# Patient Record
Sex: MALE | Race: WHITE | Employment: OTHER | URBAN - METROPOLITAN AREA
[De-identification: names, ages, dates, MRNs, and addresses within clinical notes are randomized per-mention and may not be internally consistent; named-entity substitution may affect disease eponyms.]

---

## 2017-02-01 ENCOUNTER — OFFICE VISIT (OUTPATIENT)
Dept: FAMILY MEDICINE CLINIC | Age: 57
End: 2017-02-01

## 2017-02-01 VITALS
RESPIRATION RATE: 18 BRPM | HEIGHT: 73 IN | TEMPERATURE: 98.2 F | SYSTOLIC BLOOD PRESSURE: 140 MMHG | HEART RATE: 79 BPM | BODY MASS INDEX: 29.53 KG/M2 | DIASTOLIC BLOOD PRESSURE: 90 MMHG | WEIGHT: 222.8 LBS | OXYGEN SATURATION: 99 %

## 2017-02-01 DIAGNOSIS — M54.41 CHRONIC MIDLINE LOW BACK PAIN WITH RIGHT-SIDED SCIATICA: Primary | ICD-10-CM

## 2017-02-01 DIAGNOSIS — M54.6 ACUTE MIDLINE THORACIC BACK PAIN: ICD-10-CM

## 2017-02-01 DIAGNOSIS — G89.29 CHRONIC MIDLINE LOW BACK PAIN WITH RIGHT-SIDED SCIATICA: Primary | ICD-10-CM

## 2017-02-01 DIAGNOSIS — G89.4 CHRONIC PAIN SYNDROME: ICD-10-CM

## 2017-02-01 RX ORDER — FENTANYL 75 UG/H
1 PATCH TRANSDERMAL
Qty: 10 PATCH | Refills: 0 | Status: SHIPPED | OUTPATIENT
Start: 2017-02-01 | End: 2017-05-01 | Stop reason: SDUPTHER

## 2017-02-01 NOTE — PATIENT INSTRUCTIONS

## 2017-02-01 NOTE — PROGRESS NOTES
Isha Antonio is a 55 y.o. male   Chief Complaint   Patient presents with    Medication Refill    pt here for refill of his fentanyl states that he uses patch for chronic low back pain. Pt also with a large ventral hernia with a chronic wound on abd. Pt states his pain is 10/10 and with the pain patch it is tolerable at a 4-5/10. Pt states he can not tolerate laying down. Pt has not seen spine and is willing to. 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 Pt refuses wound care for abd discussed wound care with him and s/s of infection. States he went to wound care and felt it was useless and expensive. Chief Complaint   Patient presents with    Medication Refill     he is a 55y.o. year old male who presents for evalution. Reviewed PmHx, RxHx, FmHx, SocHx, AllgHx and updated and dated in the chart. Review of Systems - negative except as listed above in the HPI    Objective:     Vitals:    02/01/17 1503   BP: 140/90   Pulse: 79   Resp: 18   Temp: 98.2 °F (36.8 °C)   TempSrc: Oral   SpO2: 99%   Weight: 222 lb 12.8 oz (101.1 kg)   Height: 6' 1\" (1.854 m)       Current Outpatient Prescriptions   Medication Sig    fentaNYL (DURAGESIC) 75 mcg/hr 1 Patch by TransDERmal route every seventy-two (72) hours. Max Daily Amount: 1 Patch. DO NOT FILL UNTIL 4/1/17    fentaNYL (DURAGESIC) 75 mcg/hr 1 Patch by TransDERmal route every seventy-two (72) hours. Max Daily Amount: 1 Patch. DO NOT FILL UNTIL 3/2/17    fentaNYL (DURAGESIC) 75 mcg/hr 1 Patch by TransDERmal route every seventy-two (72) hours. Max Daily Amount: 1 Patch.  losartan (COZAAR) 100 mg tablet Take 1 Tab by mouth daily.  omeprazole (PRILOSEC) 20 mg capsule Take 1 Cap by mouth daily.  simvastatin (ZOCOR) 40 mg tablet Take 1 Tab by mouth nightly.  loratadine (CLARITIN) 10 mg tablet TAKE 1 TABLET BY MOUTH DAILY. No current facility-administered medications for this visit. Physical Examination: General appearance - alert, well appearing, and in no distress  Eyes - pupils equal and reactive, extraocular eye movements intact  Chest - clear to auscultation, no wheezes, rales or rhonchi, symmetric air entry  Heart - normal rate, regular rhythm, normal S1, S2, no murmurs, rubs, clicks or gallops  Back exam - limited range of motion, pain with motion noted during exam, tenderness noted midline lumbar spine  Abd wound not infected      Assessment/ Plan:   Jose Payan was seen today for medication refill. Diagnoses and all orders for this visit:    Chronic midline low back pain with right-sided sciatica  -     XR SPINE LUMB 2 OR 3 V; Future  -     REFERRAL TO ORTHOPEDICS    Chronic pain syndrome  -     fentaNYL (DURAGESIC) 75 mcg/hr; 1 Patch by TransDERmal route every seventy-two (72) hours. Max Daily Amount: 1 Patch. DO NOT FILL UNTIL 4/1/17  -     fentaNYL (DURAGESIC) 75 mcg/hr; 1 Patch by TransDERmal route every seventy-two (72) hours. Max Daily Amount: 1 Patch. DO NOT FILL UNTIL 3/2/17  -     fentaNYL (DURAGESIC) 75 mcg/hr; 1 Patch by TransDERmal route every seventy-two (72) hours. Max Daily Amount: 1 Patch. Acute midline thoracic back pain  -     XR SPINE THORAC 3 V; Future     checked  Follow-up Disposition:  Return in about 3 months (around 5/1/2017), or if symptoms worsen or fail to improve. I have discussed the diagnosis with the patient and the intended plan as seen in the above orders. The patient has received an after-visit summary and questions were answered concerning future plans. Pt conveyed understanding of plan. Medication Side Effects and Warnings were discussed with patient      Ben Pickard DO   Over 50% of the 25 minutes face to face with Golden Umanzor consisted of counseling and/or discussing treatment plans in reference to his chronic pain and abd wound.

## 2017-02-20 RX ORDER — ASPIRIN 81 MG/1
81 TABLET ORAL DAILY
Qty: 90 TAB | Refills: 3
Start: 2017-02-20 | End: 2018-05-21 | Stop reason: SDDI

## 2017-05-01 ENCOUNTER — HOSPITAL ENCOUNTER (OUTPATIENT)
Dept: LAB | Age: 57
Discharge: HOME | End: 2017-05-01
Payer: MEDICARE

## 2017-05-01 ENCOUNTER — OFFICE VISIT (OUTPATIENT)
Dept: FAMILY MEDICINE CLINIC | Age: 57
End: 2017-05-01

## 2017-05-01 VITALS
HEART RATE: 99 BPM | HEIGHT: 73 IN | SYSTOLIC BLOOD PRESSURE: 154 MMHG | TEMPERATURE: 98.2 F | BODY MASS INDEX: 28.79 KG/M2 | OXYGEN SATURATION: 96 % | WEIGHT: 217.2 LBS | DIASTOLIC BLOOD PRESSURE: 95 MMHG | RESPIRATION RATE: 16 BRPM

## 2017-05-01 DIAGNOSIS — I10 ESSENTIAL HYPERTENSION: Primary | Chronic | ICD-10-CM

## 2017-05-01 DIAGNOSIS — G89.4 CHRONIC PAIN SYNDROME: ICD-10-CM

## 2017-05-01 DIAGNOSIS — M51.36 DDD (DEGENERATIVE DISC DISEASE), LUMBAR: ICD-10-CM

## 2017-05-01 PROCEDURE — 82570 ASSAY OF URINE CREATININE: CPT

## 2017-05-01 RX ORDER — FENTANYL 75 UG/H
1 PATCH TRANSDERMAL
Qty: 10 PATCH | Refills: 0 | Status: SHIPPED | OUTPATIENT
Start: 2017-05-01 | End: 2017-08-01 | Stop reason: SDUPTHER

## 2017-05-01 RX ORDER — NALOXONE HYDROCHLORIDE 4 MG/.1ML
1 SPRAY NASAL AS NEEDED
Qty: 1 BOTTLE | Refills: 2 | Status: SHIPPED | OUTPATIENT
Start: 2017-05-01 | End: 2018-05-21

## 2017-05-01 NOTE — MR AVS SNAPSHOT
Visit Information Date & Time Provider Department Dept. Phone Encounter #  
 5/1/2017  2:30 PM Qamar Angulo MD [address] [facility] [phone] [id number] Follow-up Instructions Return in about 3 months (around 8/1/2017). Upcoming Health Maintenance Date Due Pneumococcal 19-64 Medium Risk (1 of 1 - PPSV23) 11/6/1980 DTaP/Tdap/Td series (1 - Tdap) 11/6/1982 INFLUENZA AGE 9 TO ADULT 8/1/2017 COLONOSCOPY 3/8/2026 Allergies as of 5/1/2017  Review Complete On: 5/1/2017 By: Qamar Angulo MD  
  
 Severity Noted Reaction Type Reactions Morphine  10/20/2015    Other (comments)  
 itching Current Immunizations  Reviewed on 7/11/2016 No immunizations on file. Not reviewed this visit You Were Diagnosed With   
  
 Codes Comments Essential hypertension    -  Primary ICD-10-CM: I10 
ICD-9-CM: 401.9 Chronic pain syndrome     ICD-10-CM: G89.4 ICD-9-CM: 338.4 DDD (degenerative disc disease), lumbar     ICD-10-CM: M51.36 
ICD-9-CM: 722.52 Vitals BP Pulse Temp Resp Height(growth percentile) Weight(growth percentile) (!) 154/95 99 98.2 °F (36.8 °C) (Oral) 16 6' 1\" (1.854 m) 217 lb 3.2 oz (98.5 kg) SpO2 BMI Smoking Status 96% 28.66 kg/m2 Current Every Day Smoker Vitals History BMI and BSA Data Body Mass Index Body Surface Area  
 28.66 kg/m 2 2.25 m 2 Preferred Pharmacy Pharmacy Name Phone [id number], [address] [facility] [phone] Your Updated Medication List  
  
   
This list is accurate as of: 5/1/17  3:20 PM.  Always use your most recent med list.  
  
  
  
  
 aspirin delayed-release 81 mg tablet Take 1 Tab by mouth daily. * fentaNYL 75 mcg/hr Commonly known as:  DURAGESIC  
1 Patch by TransDERmal route every seventy-two (72) hours. Max Daily Amount: 1 Patch. DO NOT FILL UNTIL 4/1/17 * fentaNYL 75 mcg/hr Commonly known as:  Mary Kay Butler  
 1 Patch by TransDERmal route every seventy-two (72) hours. Max Daily Amount: 1 Patch. DO NOT FILL UNTIL 3/2/17 * fentaNYL 75 mcg/hr Commonly known as:  DURAGESIC  
1 Patch by TransDERmal route every seventy-two (72) hours. Max Daily Amount: 1 Patch.  
  
 loratadine 10 mg tablet Commonly known as:  CLARITIN  
TAKE 1 TABLET BY MOUTH DAILY. losartan 100 mg tablet Commonly known as:  COZAAR Take 1 Tab by mouth daily. naloxone 4 mg/actuation Spry Commonly known as:  NARCAN  
1 Spray by Nasal route as needed. Indications: OPIATE-INDUCED RESPIRATORY DEPRESSION  
  
 omeprazole 20 mg capsule Commonly known as:  PRILOSEC Take 1 Cap by mouth daily. simvastatin 40 mg tablet Commonly known as:  ZOCOR Take 1 Tab by mouth nightly. * Notice: This list has 3 medication(s) that are the same as other medications prescribed for you. Read the directions carefully, and ask your doctor or other care provider to review them with you. Prescriptions Printed Refills  
 fentaNYL (DURAGESIC) 75 mcg/hr 0 Si Patch by TransDERmal route every seventy-two (72) hours. Max Daily Amount: 1 Patch. DO NOT FILL UNTIL 17 Class: Print Route: TransDERmal  
 fentaNYL (DURAGESIC) 75 mcg/hr 0 Si Patch by TransDERmal route every seventy-two (72) hours. Max Daily Amount: 1 Patch. DO NOT FILL UNTIL 3/2/17 Class: Print Route: TransDERmal  
 fentaNYL (DURAGESIC) 75 mcg/hr 0 Si Patch by TransDERmal route every seventy-two (72) hours. Max Daily Amount: 1 Patch. Class: Print Route: TransDERmal  
  
Prescriptions Sent to Pharmacy Refills  
 naloxone (NARCAN) 4 mg/actuation spry 2 Si Spray by Nasal route as needed. Indications: OPIATE-INDUCED RESPIRATORY DEPRESSION Class: Normal  
 Pharmacy: [id number] - [place], [address] Ph #: [phone] Route: Nasal  
  
We Performed the Following [facility] MONITORING [[id number] Custom] Follow-up Instructions Return in about 3 months (around 8/1/2017). Introducing [facility]! Dear Pedro Burt: Thank you for requesting a [employer] account. Our records indicate that you already have an active [employer] account. You can access your account anytime at [email or web address] Did you know that you can access your hospital and ER discharge instructions at any time in [employer]? You can also review all of your test results from your hospital stay or ER visit. Additional Information If you have questions, please visit the Frequently Asked Questions section of the [employer] website at [email or web address] Remember, [employer] is NOT to be used for urgent needs. For medical emergencies, dial 911. Now available from your iPhone and Android! Please provide this summary of care documentation to your next provider. Your primary care clinician is listed as JENNIFER COLLIER. If you have any questions after today's visit, please call [phone].

## 2017-05-01 NOTE — LETTER
AGREEMENT for controlled medication treatment I, Joann Habermann, have agreed to a course of treatment that includes taking controlled medication. For this treatment I designate _____________________________________ as the [facility] Provider.  The purpose of this agreement is to prevent misunderstandings about controlled medications I may be taking for treatment, and to comply with applicable laws. I understand this agreement is essential to the trust and confidence necessary in a provider-patient relationship and that the designated provider will treat me in accordance with the statements below. As part of my treatment I agree to the followin.  USE 
a. I will take the controlled medication as instructed by the designated provider and avoid improper use of controlled medications. b.   I will not share, sell or trade controlled medication with anyone as this is a criminal offense.  
c.   I will not use any illegal controlled substance, including marijuana, cocaine, etc. as this is a criminal offense. 2.  PROVIDERS 
a. I will only obtain controlled medication from the designated provider. b.   I will not attempt to obtain the same or similar controlled medications, such as opioid pain medication, stimulants, anti-anxiety or hypnotics from any other provider as this is a criminal offense. 
c.   I will inform the designated provider about all other licensed professionals providing medical care to me and authorize communication between all providers to coordinate care, particularly prescribing or dispensing of controlled medications. 3.  PHARMACY 
a.   I will only fill controlled medication prescriptions at the approved pharmacy as listed below. 4.  OFFICE VISITS 
a. I agree to attend scheduled office visit appointments. b.   I am aware my office visits may be monthly or as determined necessary by the designated provider. c.   I will communicate fully with the designated provider about the character and intensity of my symptoms, the effect of the symptoms on my daily life, and how well the controlled medication is helping to relieve the cause of my symptoms. 5.  REFILLS OR CALL-IN PRESCRIPTIONS OF CONTROLLED MEDICATIONS 
a. I agree that refills of my prescriptions for controlled medications will be made at the time of an office visit during regular office hours. No refills will be available during evenings or on weekends. Abusive or inappropriate behavior related to medication refills will not be tolerated. b.   I will not call the office repeatedly to inquire about my controlled medication. I understand that medications will be written on the due date and not before. Calling the office repeatedly will be considered harassment, and I may be discharged from the practice. c.   Controlled medications may not be called in for refill, but doing so is at the discretion of the designated provider. d.   I am aware that only I must  prescriptions for controlled medications at the office but the designated provider may allow a designee to  the prescription from the office under very specific circumstance that may develop. 6.  TOXICOLOGY SCREENING 
a. I agree to random urine toxicology screenings in order to comply with government and [address] [place] [address] regulations. I understand that I will be financially responsible for any charges incurred by this office, Lucy Mclaughlin of [facility] laboratory, [employer], or [facility] for the urine toxicology screening, which may not be covered by my insurance. Failure to do so will be considered non-compliance and I may be discharged. b. In some cases an oral swab or hair sample may be substituted for a urine screen. This is at the discretion of the designated provider. I understand that I will be financially responsible for any charges incurred as well. c.   I understand that if the urine toxicology does not show my medications prescribed to me by the designated provider, or it shows any illegal substance or any other medications NOT prescribed by the designated providers, I may be discharged from this practice at the discretion of the designated provider and no further controlled medications will be prescribed or follow-up appointments scheduled. Also, if any illegal substances are detected on the urine toxicology, this information may be provided to local law enforcement. 7. PILL COUNTS 
a. I am aware I may be called at any time to come into the office for a count of all my remaining controlled medications in order to help the designated provider understand the rate at which I use my controlled medications and to more effectively adjust dosage. b. I agree that I will use my medication at a rate NO greater than the prescribed rate and that use of my medication at a greater rate will result in my being without medication for the period of time until next expected due date. c. I will bring in the containers with the medication prescribed by all providers, including the designated provider, to each office visit even if there is no medication remaining. All controlled medication will be in the original containers from the pharmacy for each medication. Failure to do so will be considered non-compliance and I may be discharged from the practice. 8.  LOSS OR THEFT OF MEDICATION 
a. I will safeguard my controlled medication from loss or theft. b.   Lost or stolen controlled medication may not be replaced. This includes a prescription that has not yet been filled at the pharmacy. c.   In the event my controlled medications are stolen or lost, I will notify the designated providers office immediately.   If such event occurs during the night, weekend or holiday, I will leave a detailed message on the answering machine or answering service at the number listed above. 
d.   I will file and produce an official police report for any effort to replace controlled medications prescribed. 9.  AGENCY COLLABORATION I authorize the designated provider and the authorized pharmacy/pharmacist to cooperate fully with any city, state, or federal law enforcement agency in the investigation of any possible misuse, sale or other diversion of my controlled medication. 10.  TREATMENT I understand that if I violate any of the conditions, my controlled medication and/or treatment will be terminated. If the violation involves obtaining controlled substances and/or dangerous drugs from another source, the incident may be reported to other medical facilities and authorities, including law enforcement. In this case, the designated provider may taper off the medication over a period of several days, as necessary, to avoid withdrawal symptoms or will suggest alternate treatment facilities. Also, a drug dependence treatment program may be recommended. 11.  AGREEMENT I agree to follow these guidelines that have been fully explained to me. All of my questions and concerns regarding treatment have been adequately answered. A copy of this document has been given to me. I agree to use ______________________________ Authorized Pharmacy located at _________________________________________________________________ Telephone ________________________________for filling ALL controlled medication prescriptions. This agreement is entered into on 5/1/2017 Patient signature__________________________________________________________ Legal Guardian signature___________________________________________________ Provider signature_________________________________________________________ Witness signature_________________________________________________________

## 2017-05-01 NOTE — LETTER
AGREEMENT for controlled medication treatment I, Mitchel Charlton, have agreed to a course of treatment that includes taking controlled medication. For this treatment I designate _____________________________________ as the [facility] Provider.  The purpose of this agreement is to prevent misunderstandings about controlled medications I may be taking for treatment, and to comply with applicable laws. I understand this agreement is essential to the trust and confidence necessary in a provider-patient relationship and that the designated provider will treat me in accordance with the statements below. As part of my treatment I agree to the followin.  USE 
a. I will take the controlled medication as instructed by the designated provider and avoid improper use of controlled medications. b.   I will not share, sell or trade controlled medication with anyone as this is a criminal offense.  
c.   I will not use any illegal controlled substance, including marijuana, cocaine, etc. as this is a criminal offense. 2.  PROVIDERS 
a. I will only obtain controlled medication from the designated provider. b.   I will not attempt to obtain the same or similar controlled medications, such as opioid pain medication, stimulants, anti-anxiety or hypnotics from any other provider as this is a criminal offense. 
c.   I will inform the designated provider about all other licensed professionals providing medical care to me and authorize communication between all providers to coordinate care, particularly prescribing or dispensing of controlled medications. 3.  PHARMACY 
a.   I will only fill controlled medication prescriptions at the approved pharmacy as listed below. 4.  OFFICE VISITS 
a. I agree to attend scheduled office visit appointments. b.   I am aware my office visits may be monthly or as determined necessary by the designated provider. c.   I will communicate fully with the designated provider about the character and intensity of my symptoms, the effect of the symptoms on my daily life, and how well the controlled medication is helping to relieve the cause of my symptoms. 5.  REFILLS OR CALL-IN PRESCRIPTIONS OF CONTROLLED MEDICATIONS 
a. I agree that refills of my prescriptions for controlled medications will be made at the time of an office visit during regular office hours. No refills will be available during evenings or on weekends. Abusive or inappropriate behavior related to medication refills will not be tolerated. b.   I will not call the office repeatedly to inquire about my controlled medication. I understand that medications will be written on the due date and not before. Calling the office repeatedly will be considered harassment, and I may be discharged from the practice. c.   Controlled medications may not be called in for refill, but doing so is at the discretion of the designated provider. d.   I am aware that only I must  prescriptions for controlled medications at the office but the designated provider may allow a designee to  the prescription from the office under very specific circumstance that may develop. 6.  TOXICOLOGY SCREENING 
a. I agree to random urine toxicology screenings in order to comply with government and [address] [place] [address] regulations. I understand that I will be financially responsible for any charges incurred by this office, Matilda Adams of [facility] laboratory, [employer], or [facility] for the urine toxicology screening, which may not be covered by my insurance. Failure to do so will be considered non-compliance and I may be discharged. b. In some cases an oral swab or hair sample may be substituted for a urine screen. This is at the discretion of the designated provider. I understand that I will be financially responsible for any charges incurred as well. c.   I understand that if the urine toxicology does not show my medications prescribed to me by the designated provider, or it shows any illegal substance or any other medications NOT prescribed by the designated providers, I may be discharged from this practice at the discretion of the designated provider and no further controlled medications will be prescribed or follow-up appointments scheduled. Also, if any illegal substances are detected on the urine toxicology, this information may be provided to local law enforcement. 7. PILL COUNTS 
a. I am aware I may be called at any time to come into the office for a count of all my remaining controlled medications in order to help the designated provider understand the rate at which I use my controlled medications and to more effectively adjust dosage. b. I agree that I will use my medication at a rate NO greater than the prescribed rate and that use of my medication at a greater rate will result in my being without medication for the period of time until next expected due date. c. I will bring in the containers with the medication prescribed by all providers, including the designated provider, to each office visit even if there is no medication remaining. All controlled medication will be in the original containers from the pharmacy for each medication. Failure to do so will be considered non-compliance and I may be discharged from the practice. 8.  LOSS OR THEFT OF MEDICATION 
a. I will safeguard my controlled medication from loss or theft. b.   Lost or stolen controlled medication may not be replaced. This includes a prescription that has not yet been filled at the pharmacy. c.   In the event my controlled medications are stolen or lost, I will notify the designated providers office immediately.   If such event occurs during the night, weekend or holiday, I will leave a detailed message on the answering machine or answering service at the number listed above. 
d.   I will file and produce an official police report for any effort to replace controlled medications prescribed. 9.  AGENCY COLLABORATION I authorize the designated provider and the authorized pharmacy/pharmacist to cooperate fully with any city, state, or federal law enforcement agency in the investigation of any possible misuse, sale or other diversion of my controlled medication. 10.  TREATMENT I understand that if I violate any of the conditions, my controlled medication and/or treatment will be terminated. If the violation involves obtaining controlled substances and/or dangerous drugs from another source, the incident may be reported to other medical facilities and authorities, including law enforcement. In this case, the designated provider may taper off the medication over a period of several days, as necessary, to avoid withdrawal symptoms or will suggest alternate treatment facilities. Also, a drug dependence treatment program may be recommended. 11.  AGREEMENT I agree to follow these guidelines that have been fully explained to me. All of my questions and concerns regarding treatment have been adequately answered. A copy of this document has been given to me. I agree to use ______________________________ Authorized Pharmacy located at _________________________________________________________________ Telephone ________________________________for filling ALL controlled medication prescriptions. This agreement is entered into on 5/1/2017 Patient signature__________________________________________________________ Legal Guardian signature___________________________________________________ Provider signature_________________________________________________________ Witness signature_________________________________________________________

## 2017-05-01 NOTE — PROGRESS NOTES
Chief Complaint   Patient presents with    Medication Refill     1. Have you been to the ER, urgent care clinic since your last visit? Hospitalized since your last visit? No    2. Have you seen or consulted any other health care providers outside of the [address] [place] [address] since your last visit? Include any pap smears or colon screening. No      DDD and chronic pain rx, is not a surgical candidate, long term trt of narcotics, does smoke pot off and on, wears patch, has severe abd hernia and cannot get it repaired and is in 10/10 pain most days    Pt cannot afford PT or xrays      Chief Complaint   Patient presents with    Medication Refill     he is a 55y.o. year old male who presents for evalution. Reviewed PmHx, RxHx, FmHx, SocHx, AllgHx and updated and dated in the chart.     Patient Active Problem List    Diagnosis    DDD (degenerative disc disease), lumbar    Chronic pain    Aspiration pneumonia (HCC)    Hypernatremia    Ischemic colitis ([address]    Colon perforation ([address]    Acute respiratory failure with hypoxia ([address]    Acute blood loss anemia    JESUS (acute kidney injury) ([address]    Pneumonia    Elevated INR    Alcoholism ([address]    Nicotine addiction    PVD (peripheral vascular disease) (HCC)    Hyponatremia    ETOH abuse    Coagulation disorder (HCC)    Allergic rhinitis due to other allergen    Stroke 2002    Anal fistula    HTN (hypertension)    Genital herpes    Noncompliance with treatment    High cholesterol    left Carotid Artery Occlusion       Review of Systems - negative except as listed above in the HPI    Objective:     Vitals:    05/01/17 1501   BP: (!) 154/95   Pulse: 99   Resp: 16   Temp: 98.2 °F (36.8 °C)   TempSrc: Oral   SpO2: 96%   Weight: 217 lb 3.2 oz (98.5 kg)   Height: 6' 1\" (1.854 m)     Physical Examination: General appearance - chronically ill appearing and walking with walker  Abdomen - open abd hernia-very very large and non healing    Assessment/ Plan:   Rosa M Nogueira was seen today for medication refill. Diagnoses and all orders for this visit:    Essential hypertension  -inc with pain    Chronic pain syndrome  -     fentaNYL (DURAGESIC) 75 mcg/hr; 1 Patch by TransDERmal route every seventy-two (72) hours. Max Daily Amount: 1 Patch. DO NOT FILL UNTIL 4/1/17  -     fentaNYL (DURAGESIC) 75 mcg/hr; 1 Patch by TransDERmal route every seventy-two (72) hours. Max Daily Amount: 1 Patch. DO NOT FILL UNTIL 3/2/17  -     fentaNYL (DURAGESIC) 75 mcg/hr; 1 Patch by TransDERmal route every seventy-two (72) hours. Max Daily Amount: 1 Patch.  -     COMPLIANCE DRUG SCREEN/PRESCRIPTION MONITORING  -     naloxone (NARCAN) 4 mg/actuation spry; 1 Spray by Nasal route as needed. Indications: OPIATE-INDUCED RESPIRATORY DEPRESSION    DDD (degenerative disc disease), lumbar  -as above   Opioid/Pain Management:    1. Has the patient signed a pain contract for chronic narcotics use? yes  2. Has the patient had a UDS or Serum screen as per guidelines as per [facility]?:   yes    3. Calculated Morphine Milligram Equivalent (MME)=qual  4. Does patient meet necessary guidelines for Naloxone treatement per the [facility]?:    yes  5. Has the Prescription Monitoring Program been reviewed? yes           Follow-up Disposition:  Return in about 3 months (around 8/1/2017). I have discussed the diagnosis with the patient and the intended plan as seen in the above orders. The patient understands and agrees with the plan. The patient has received an after-visit summary and questions were answered concerning future plans. Medication Side Effects and Warnings were discussed with patient  Patient Labs were reviewed and or requested:  Patient Past Records were reviewed and or requested    Rosa Martinez M.D. There are no Patient Instructions on file for this visit.

## 2017-05-06 LAB — DRUGS UR: NORMAL

## 2017-08-01 ENCOUNTER — DOCUMENTATION ONLY (OUTPATIENT)
Dept: FAMILY MEDICINE CLINIC | Age: 57
End: 2017-08-01

## 2017-08-01 ENCOUNTER — OFFICE VISIT (OUTPATIENT)
Dept: FAMILY MEDICINE CLINIC | Age: 57
End: 2017-08-01

## 2017-08-01 VITALS
DIASTOLIC BLOOD PRESSURE: 85 MMHG | TEMPERATURE: 98.3 F | RESPIRATION RATE: 22 BRPM | HEART RATE: 90 BPM | OXYGEN SATURATION: 98 % | WEIGHT: 215 LBS | SYSTOLIC BLOOD PRESSURE: 132 MMHG | HEIGHT: 73 IN | BODY MASS INDEX: 28.49 KG/M2

## 2017-08-01 DIAGNOSIS — M51.36 DDD (DEGENERATIVE DISC DISEASE), LUMBAR: Primary | ICD-10-CM

## 2017-08-01 DIAGNOSIS — G89.4 CHRONIC PAIN SYNDROME: ICD-10-CM

## 2017-08-01 DIAGNOSIS — K43.9 ABDOMINAL WALL HERNIA: ICD-10-CM

## 2017-08-01 RX ORDER — FENTANYL 75 UG/H
1 PATCH TRANSDERMAL
Qty: 10 PATCH | Refills: 0 | Status: SHIPPED | OUTPATIENT
Start: 2017-08-01 | End: 2018-04-20 | Stop reason: SDUPTHER

## 2017-08-01 RX ORDER — FENTANYL 75 UG/H
1 PATCH TRANSDERMAL
Qty: 10 PATCH | Refills: 0 | Status: SHIPPED | OUTPATIENT
Start: 2017-08-01 | End: 2017-11-01 | Stop reason: SDUPTHER

## 2017-08-01 RX ORDER — FENTANYL 75 UG/H
1 PATCH TRANSDERMAL
Qty: 10 PATCH | Refills: 0 | Status: SHIPPED | OUTPATIENT
Start: 2017-08-01 | End: 2018-02-01 | Stop reason: SDUPTHER

## 2017-08-01 NOTE — PROGRESS NOTES
Patient here for 3 month f/u for med refill. 1. Have you been to the ER, urgent care clinic since your last visit? Hospitalized since your last visit? No    2. Have you seen or consulted any other health care providers outside of the [address] [place] [address] since your last visit? Include any pap smears or colon screening. No       Chief Complaint   Patient presents with    Medication Refill     he is a 55y.o. year old male who presents for evalution. Reviewed PmHx, RxHx, FmHx, SocHx, AllgHx and updated and dated in the chart. Patient Active Problem List    Diagnosis    Abdominal wall hernia    DDD (degenerative disc disease), lumbar    Chronic pain    Aspiration pneumonia (HCC)    Hypernatremia    Ischemic colitis ([address]    Colon perforation ([address]    Acute respiratory failure with hypoxia ([address]    Acute blood loss anemia    JESUS (acute kidney injury) ([address]    Pneumonia    Elevated INR    [facility]    Nicotine addiction    PVD (peripheral vascular disease) (HCC)    Hyponatremia    ETOH abuse    Coagulation disorder (HCC)    Allergic rhinitis due to other allergen    Stroke 2002    Anal fistula    HTN (hypertension)    Genital herpes    Noncompliance with treatment    High cholesterol    left Carotid Artery Occlusion       Review of Systems - negative except as listed above in the HPI    Objective:     Vitals:    08/01/17 1515   BP: 132/85   Pulse: 90   Resp: 22   Temp: 98.3 °F (36.8 °C)   SpO2: 98%   Weight: 215 lb (97.5 kg)   Height: 6' 1\" (1.854 m)     Physical Examination: General appearance - alert, well appearing, and in no distress    Assessment/ Plan:   Diagnoses and all orders for this visit:    1. DDD (degenerative disc disease), lumbar  - Opioid/Pain Management:    1. Has the patient signed a pain contract for chronic narcotic use? yes  2. Has the patient had a UDS or Serum screen as per guidelines as per [facility]?:   yes    3.   Does patient meet necessary guidelines for Naloxone treatement per the [facility]?:    yes  4. Has the Prescription Monitoring Program been reviewed? Yes  5. Does patient have a long term condition that requires long term use of a Narcotic?  yes  6. Has patient been tolerant of therapy and responsible to routine follow up and specialist follow-up? Yes      2. Chronic pain syndrome  -     fentaNYL (DURAGESIC) 75 mcg/hr; 1 Patch by TransDERmal route every seventy-two (72) hours. Max Daily Amount: 1 Patch. DO NOT FILL UNTIL 3/2/17  -     fentaNYL (DURAGESIC) 75 mcg/hr; 1 Patch by TransDERmal route every seventy-two (72) hours. Max Daily Amount: 1 Patch. -     fentaNYL (DURAGESIC) 75 mcg/hr; 1 Patch by TransDERmal route every seventy-two (72) hours. Max Daily Amount: 1 Patch. DO NOT FILL UNTIL 4/1/17    3. Abdominal wall hernia  -source of chronic pain as well       Follow-up Disposition:  Return in about 3 months (around 11/1/2017). I have discussed the diagnosis with the patient and the intended plan as seen in the above orders. The patient understands and agrees with the plan. The patient has received an after-visit summary and questions were answered concerning future plans. Medication Side Effects and Warnings were discussed with patient  Patient Labs were reviewed and or requested:  Patient Past Records were reviewed and or requested    Venkatesh Barreto M.D. There are no Patient Instructions on file for this visit.

## 2017-08-01 NOTE — MR AVS SNAPSHOT
Visit Information Date & Time Provider Department Dept. Phone Encounter #  
 8/1/2017  3:00 PM Amilcar Metzger MD [address] [facility] [phone] [id number] Follow-up Instructions Return in about 3 months (around 11/1/2017). Upcoming Health Maintenance Date Due Pneumococcal 19-64 Medium Risk (1 of 1 - PPSV23) 11/6/1980 DTaP/Tdap/Td series (1 - Tdap) 11/6/1982 INFLUENZA AGE 9 TO ADULT 8/1/2017 COLONOSCOPY 3/8/2026 Allergies as of 8/1/2017  Review Complete On: 8/1/2017 By: Amilcar Metzger MD  
  
 Severity Noted Reaction Type Reactions Morphine  10/20/2015    Other (comments)  
 itching Current Immunizations  Reviewed on 7/11/2016 No immunizations on file. Not reviewed this visit You Were Diagnosed With   
  
 Codes Comments DDD (degenerative disc disease), lumbar    -  Primary ICD-10-CM: M51.36 
ICD-9-CM: 722.52 Chronic pain syndrome     ICD-10-CM: G89.4 ICD-9-CM: 338. 4 Abdominal wall hernia     ICD-10-CM: K43.9 ICD-9-CM: 553.20 Vitals BP Pulse Temp Resp Height(growth percentile) Weight(growth percentile) 132/85 90 98.3 °F (36.8 °C) 22 6' 1\" (1.854 m) 215 lb (97.5 kg) SpO2 BMI Smoking Status 98% 28.37 kg/m2 Current Every Day Smoker Vitals History BMI and BSA Data Body Mass Index Body Surface Area  
 28.37 kg/m 2 2.24 m 2 Preferred Pharmacy Pharmacy Name Phone [id number] [phone] Your Updated Medication List  
  
   
This list is accurate as of: 8/1/17  3:36 PM.  Always use your most recent med list.  
  
  
  
  
 aspirin delayed-release 81 mg tablet Take 1 Tab by mouth daily. * fentaNYL 75 mcg/hr Commonly known as:  DURAGESIC  
1 Patch by TransDERmal route every seventy-two (72) hours. Max Daily Amount: 1 Patch. DO NOT FILL UNTIL 3/2/17 * fentaNYL 75 mcg/hr Commonly known as:  Anya Jack  
 1 Patch by TransDERmal route every seventy-two (72) hours. Max Daily Amount: 1 Patch. * fentaNYL 75 mcg/hr Commonly known as:  DURAGESIC  
1 Patch by TransDERmal route every seventy-two (72) hours. Max Daily Amount: 1 Patch. DO NOT FILL UNTIL 17  
  
 loratadine 10 mg tablet Commonly known as:  CLARITIN  
TAKE 1 TABLET BY MOUTH DAILY. losartan 100 mg tablet Commonly known as:  COZAAR Take 1 Tab by mouth daily. naloxone 4 mg/actuation Spry Commonly known as:  NARCAN  
1 Spray by Nasal route as needed. Indications: OPIATE-INDUCED RESPIRATORY DEPRESSION  
  
 omeprazole 20 mg capsule Commonly known as:  PRILOSEC Take 1 Cap by mouth daily. simvastatin 40 mg tablet Commonly known as:  ZOCOR Take 1 Tab by mouth nightly. * Notice: This list has 3 medication(s) that are the same as other medications prescribed for you. Read the directions carefully, and ask your doctor or other care provider to review them with you. Prescriptions Printed Refills  
 fentaNYL (DURAGESIC) 75 mcg/hr 0 Si Patch by TransDERmal route every seventy-two (72) hours. Max Daily Amount: 1 Patch. DO NOT FILL UNTIL 3/2/17 Class: Print Route: TransDERmal  
 fentaNYL (DURAGESIC) 75 mcg/hr 0 Si Patch by TransDERmal route every seventy-two (72) hours. Max Daily Amount: 1 Patch. Class: Print Route: TransDERmal  
 fentaNYL (DURAGESIC) 75 mcg/hr 0 Si Patch by TransDERmal route every seventy-two (72) hours. Max Daily Amount: 1 Patch. DO NOT FILL UNTIL 17 Class: Print Route: TransDERmal  
  
Follow-up Instructions Return in about 3 months (around 2017). Introducing [facility] SERVICES! Dear Asad Mar: Thank you for requesting a [employer] account. Our records indicate that you already have an active [employer] account. You can access your account anytime at [email or web address]Cloud Did you know that you can access your hospital and ER discharge instructions at any time in [employer]? You can also review all of your test results from your hospital stay or ER visit. Additional Information If you have questions, please visit the Frequently Asked Questions section of the [employer] website at [email or web address] Remember, [employer] is NOT to be used for urgent needs. For medical emergencies, dial 911. Now available from your iPhone and Android! Please provide this summary of care documentation to your next provider. Your primary care clinician is listed as JENNIFER COLLIER. If you have any questions after today's visit, please call [phone].

## 2017-09-18 RX ORDER — SIMVASTATIN 40 MG/1
40 TABLET, FILM COATED ORAL
Qty: 90 TAB | Refills: 3 | Status: SHIPPED | OUTPATIENT
Start: 2017-09-18 | End: 2018-04-20 | Stop reason: ALTCHOICE

## 2017-09-18 RX ORDER — LOSARTAN POTASSIUM 100 MG/1
100 TABLET ORAL DAILY
Qty: 90 TAB | Refills: 3 | Status: SHIPPED | OUTPATIENT
Start: 2017-09-18 | End: 2018-04-20 | Stop reason: ALTCHOICE

## 2017-09-18 RX ORDER — OMEPRAZOLE 20 MG/1
20 CAPSULE, DELAYED RELEASE ORAL DAILY
Qty: 90 CAP | Refills: 3 | Status: SHIPPED | OUTPATIENT
Start: 2017-09-18 | End: 2018-09-22 | Stop reason: SDUPTHER

## 2017-11-01 ENCOUNTER — OFFICE VISIT (OUTPATIENT)
Dept: FAMILY MEDICINE CLINIC | Age: 57
End: 2017-11-01

## 2017-11-01 VITALS
DIASTOLIC BLOOD PRESSURE: 90 MMHG | HEART RATE: 110 BPM | BODY MASS INDEX: 28.3 KG/M2 | WEIGHT: 213.5 LBS | RESPIRATION RATE: 20 BRPM | SYSTOLIC BLOOD PRESSURE: 150 MMHG | OXYGEN SATURATION: 95 % | TEMPERATURE: 98.6 F | HEIGHT: 73 IN

## 2017-11-01 DIAGNOSIS — G89.4 CHRONIC PAIN SYNDROME: Primary | ICD-10-CM

## 2017-11-01 RX ORDER — FENTANYL 75 UG/H
1 PATCH TRANSDERMAL
Qty: 10 PATCH | Refills: 0 | Status: SHIPPED | OUTPATIENT
Start: 2017-11-01 | End: 2017-11-01 | Stop reason: SDUPTHER

## 2017-11-01 RX ORDER — FENTANYL 75 UG/H
1 PATCH TRANSDERMAL
Qty: 10 PATCH | Refills: 0 | Status: SHIPPED | OUTPATIENT
Start: 2017-11-01 | End: 2018-02-01 | Stop reason: SDUPTHER

## 2017-11-01 NOTE — MR AVS SNAPSHOT
Visit Information Date & Time Provider Department Dept. Phone Encounter #  
 11/1/2017  3:00 PM Rochelle Cruz MD [address] [facility] [phone] [id number] Follow-up Instructions Return in about 3 months (around 2/1/2018). Upcoming Health Maintenance Date Due Pneumococcal 19-64 Medium Risk (1 of 1 - PPSV23) 11/6/1980 DTaP/Tdap/Td series (1 - Tdap) 11/6/1982 COLONOSCOPY 3/8/2026 Allergies as of 11/1/2017  Review Complete On: 11/1/2017 By: Rochelle Cruz MD  
  
 Severity Noted Reaction Type Reactions Morphine  10/20/2015    Other (comments)  
 itching Current Immunizations  Reviewed on 7/11/2016 No immunizations on file. Not reviewed this visit You Were Diagnosed With   
  
 Codes Comments Chronic pain syndrome     ICD-10-CM: G89.4 ICD-9-CM: 338. 4 Vitals BP Pulse Temp Resp Height(growth percentile) Weight(growth percentile) 150/90 (!) 110 98.6 °F (37 °C) 20 6' 1\" (1.854 m) 213 lb 8 oz (96.8 kg) SpO2 BMI Smoking Status 95% 28.17 kg/m2 Current Every Day Smoker Vitals History BMI and BSA Data Body Mass Index Body Surface Area  
 28.17 kg/m 2 2.23 m 2 Preferred Pharmacy Pharmacy Name Phone [id number], [address] [facility] [phone] Your Updated Medication List  
  
   
This list is accurate as of: 11/1/17  3:56 PM.  Always use your most recent med list.  
  
  
  
  
 aspirin delayed-release 81 mg tablet Take 1 Tab by mouth daily. * fentaNYL 75 mcg/hr Commonly known as:  DURAGESIC  
1 Patch by TransDERmal route every seventy-two (72) hours. Max Daily Amount: 1 Patch. * fentaNYL 75 mcg/hr Commonly known as:  DURAGESIC  
1 Patch by TransDERmal route every seventy-two (72) hours. Max Daily Amount: 1 Patch. DO NOT FILL UNTIL 4/1/17 * fentaNYL 75 mcg/hr Commonly known as:  Juma Box  
 1 Patch by TransDERmal route every seventy-two (72) hours. Max Daily Amount: 1 Patch.  
  
 loratadine 10 mg tablet Commonly known as:  CLARITIN  
TAKE 1 TABLET BY MOUTH DAILY. losartan 100 mg tablet Commonly known as:  COZAAR Take 1 Tab by mouth daily. naloxone 4 mg/actuation nasal spray Commonly known as:  NARCAN  
1 Spray by Nasal route as needed. Indications: OPIATE-INDUCED RESPIRATORY DEPRESSION  
  
 omeprazole 20 mg capsule Commonly known as:  PRILOSEC Take 1 Cap by mouth daily. simvastatin 40 mg tablet Commonly known as:  ZOCOR Take 1 Tab by mouth nightly. * Notice: This list has 3 medication(s) that are the same as other medications prescribed for you. Read the directions carefully, and ask your doctor or other care provider to review them with you. Prescriptions Printed Refills  
 fentaNYL (DURAGESIC) 75 mcg/hr 0 Si Patch by TransDERmal route every seventy-two (72) hours. Max Daily Amount: 1 Patch. Class: Print Route: TransDERmal  
  
Follow-up Instructions Return in about 3 months (around 2018). Introducing [address]! Dear Robert Double: Thank you for requesting a [email or web address] account. Our records indicate that you already have an active [email or web address] account. You can access your account anytime at [email or web address]. Did you know that you can access your hospital and ER discharge instructions at any time in [email or web address]? You can also review all of your test results from your hospital stay or ER visit. Additional Information If you have questions, please visit the Frequently Asked Questions section of the [email or web address] website at [email or web address]. Remember, [email or web address] is NOT to be used for urgent needs. For medical emergencies, dial 911. Now available from your iPhone and Android! Please provide this summary of care documentation to your next provider. Your primary care clinician is listed as JENNIFER COLLIER. If you have any questions after today's visit, please call [phone].

## 2017-11-01 NOTE — PROGRESS NOTES
Patient here for med refill. 1. Have you been to the ER, urgent care clinic since your last visit? Hospitalized since your last visit? No    2. Have you seen or consulted any other health care providers outside of the [address] [place] [address] since your last visit? Include any pap smears or colon screening. No       Chief Complaint   Patient presents with    Medication Refill     He is a 55 y.o. male who presents for evalution. Reviewed PmHx, RxHx, FmHx, SocHx, AllgHx and updated and dated in the chart. Patient Active Problem List    Diagnosis    Abdominal wall hernia    DDD (degenerative disc disease), lumbar    Chronic pain    Aspiration pneumonia (HCC)    Hypernatremia    Ischemic colitis ([address]    Colon perforation ([address]    Acute respiratory failure with hypoxia ([address]    Acute blood loss anemia    JESUS (acute kidney injury) ([address]    Pneumonia    Elevated INR    Alcoholism ([address]    Nicotine addiction    PVD (peripheral vascular disease) (HCC)    Hyponatremia    ETOH abuse    Coagulation disorder (HCC)    Allergic rhinitis due to other allergen    Stroke 2002    Anal fistula    HTN (hypertension)    Genital herpes    Noncompliance with treatment    High cholesterol    left Carotid Artery Occlusion       Review of Systems - negative except as listed above in the HPI    Objective:     Vitals:    11/01/17 1546   BP: 150/90   Pulse: (!) 110   Resp: 20   Temp: 98.6 °F (37 °C)   SpO2: 95%   Weight: 213 lb 8 oz (96.8 kg)   Height: 6' 1\" (1.854 m)     Physical Examination: General appearance - alert, well appearing, and in no distress  Open abd wound    Assessment/ Plan:   Diagnoses and all orders for this visit:    1. Chronic pain syndrome  -     fentaNYL (DURAGESIC) 75 mcg/hr; 1 Patch by TransDERmal route every seventy-two (72) hours. Max Daily Amount: 1 Patch. -3 fills   Opioid/Pain Management:    1. Has the patient signed a pain contract for chronic narcotic use? yes  2.   Has the patient had a UDS or Serum screen as per guidelines as per [facility]?:   yes    3. Does patient meet necessary guidelines for Naloxone treatement per the [facility]?:    yes  4. Has the Prescription Monitoring Program been reviewed? yes  5. Does patient have a long term condition that requires long term use of a Narcotic?  yes  6. Has patient been tolerant of therapy and responsible to routine follow up and specialist follow-up? Yes           Follow-up Disposition:  Return in about 3 months (around 2/1/2018). I have discussed the diagnosis with the patient and the intended plan as seen in the above orders. The patient understands and agrees with the plan. The patient has received an after-visit summary and questions were answered concerning future plans. Medication Side Effects and Warnings were discussed with patient  Patient Labs were reviewed and or requested:  Patient Past Records were reviewed and or requested    Crys Hollingsworth M.D. There are no Patient Instructions on file for this visit.

## 2017-11-02 ENCOUNTER — DOCUMENTATION ONLY (OUTPATIENT)
Dept: FAMILY MEDICINE CLINIC | Age: 57
End: 2017-11-02

## 2018-02-01 ENCOUNTER — OFFICE VISIT (OUTPATIENT)
Dept: FAMILY MEDICINE CLINIC | Age: 58
End: 2018-02-01

## 2018-02-01 ENCOUNTER — DOCUMENTATION ONLY (OUTPATIENT)
Dept: FAMILY MEDICINE CLINIC | Age: 58
End: 2018-02-01

## 2018-02-01 ENCOUNTER — HOSPITAL ENCOUNTER (OUTPATIENT)
Dept: LAB | Age: 58
Discharge: HOME | End: 2018-02-01
Payer: MEDICARE

## 2018-02-01 VITALS
BODY MASS INDEX: 27.41 KG/M2 | TEMPERATURE: 98.4 F | DIASTOLIC BLOOD PRESSURE: 87 MMHG | HEART RATE: 95 BPM | RESPIRATION RATE: 18 BRPM | HEIGHT: 73 IN | SYSTOLIC BLOOD PRESSURE: 160 MMHG | OXYGEN SATURATION: 97 % | WEIGHT: 206.8 LBS

## 2018-02-01 DIAGNOSIS — J69.0 ASPIRATION PNEUMONIA, UNSPECIFIED ASPIRATION PNEUMONIA TYPE, UNSPECIFIED LATERALITY, UNSPECIFIED PART OF LUNG (HCC): ICD-10-CM

## 2018-02-01 DIAGNOSIS — K63.1 COLON PERFORATION (HCC): ICD-10-CM

## 2018-02-01 DIAGNOSIS — F10.20 ALCOHOLISM (HCC): ICD-10-CM

## 2018-02-01 DIAGNOSIS — I10 ESSENTIAL HYPERTENSION: Chronic | ICD-10-CM

## 2018-02-01 DIAGNOSIS — K55.9 ISCHEMIC COLITIS (HCC): ICD-10-CM

## 2018-02-01 DIAGNOSIS — I73.9 PVD (PERIPHERAL VASCULAR DISEASE) (HCC): ICD-10-CM

## 2018-02-01 DIAGNOSIS — J96.01 ACUTE RESPIRATORY FAILURE WITH HYPOXIA (HCC): ICD-10-CM

## 2018-02-01 DIAGNOSIS — N17.9 AKI (ACUTE KIDNEY INJURY) (HCC): ICD-10-CM

## 2018-02-01 DIAGNOSIS — G89.4 CHRONIC PAIN SYNDROME: Primary | ICD-10-CM

## 2018-02-01 DIAGNOSIS — D68.9 COAGULATION DISORDER (HCC): ICD-10-CM

## 2018-02-01 PROCEDURE — 82570 ASSAY OF URINE CREATININE: CPT

## 2018-02-01 RX ORDER — FENTANYL 75 UG/H
1 PATCH TRANSDERMAL
Qty: 10 PATCH | Refills: 0 | Status: SHIPPED | OUTPATIENT
Start: 2018-02-01 | End: 2018-06-05 | Stop reason: SDUPTHER

## 2018-02-01 RX ORDER — FENTANYL 75 UG/H
1 PATCH TRANSDERMAL
Qty: 10 PATCH | Refills: 0 | Status: SHIPPED | OUTPATIENT
Start: 2018-02-01 | End: 2018-04-20 | Stop reason: SDUPTHER

## 2018-02-01 RX ORDER — FENTANYL 75 UG/H
1 PATCH TRANSDERMAL
Qty: 10 PATCH | Refills: 0 | Status: SHIPPED | OUTPATIENT
Start: 2018-02-01 | End: 2018-02-01 | Stop reason: SDUPTHER

## 2018-02-01 NOTE — MR AVS SNAPSHOT
[address] [place] [address] 
[phone] Patient: Sue Friday MRN:  [id number] Visit Information Date & Time Provider Department Dept. Phone Encounter #  
 2018  2:30 PM Mina Rabago MD [address] [facility] [phone] [id number] Follow-up Instructions Return in about 3 months (around 2018). Upcoming Health Maintenance Date Due Pneumococcal 19-64 Medium Risk (1 of 1 - PPSV23) 1980 DTaP/Tdap/Td series (1 - Tdap) 1982 COLONOSCOPY 3/8/2026 Allergies as of 2018  Review Complete On: 2018 By: Mina Rabago MD  
  
 Severity Noted Reaction Type Reactions Morphine  10/20/2015    Other (comments)  
 itching Current Immunizations  Reviewed on 2016 No immunizations on file. Not reviewed this visit You Were Diagnosed With   
  
 Codes Comments Chronic pain syndrome    -  Primary ICD-10-CM: G89.4 ICD-9-CM: 338.4 Colon perforation ([facility]     ICD-10-CM: K63.1 ICD-9-CM: 527.47 PVD (peripheral vascular disease) ([facility]     ICD-10-CM: I73.9 ICD-9-CM: 443.9 Ischemic colitis ([facility]     ICD-10-CM: K55.9 ICD-9-CM: 557.9 JESUS (acute kidney injury) ([facility]     ICD-10-CM: N17.9 ICD-9-CM: 368. 9 Alcoholism ([facility]     ICD-10-CM: N48.75 ICD-9-CM: 303.90 Acute respiratory failure with hypoxia (HCC)     ICD-10-CM: J96.01 
ICD-9-CM: 518.81 Aspiration pneumonia, unspecified aspiration pneumonia type, unspecified laterality, unspecified part of lung ([facility]     ICD-10-CM: J69.0 ICD-9-CM: 507.0 Essential hypertension     ICD-10-CM: I10 
ICD-9-CM: 401.9 Coagulation disorder ([address]     ICD-10-CM: S12.5 ICD-9-CM: 286. 9 Vitals BP Pulse Temp Resp Height(growth percentile) Weight(growth percentile) 160/87 95 98.4 °F (36.9 °C) (Oral) 18 6' 1\" (1.854 m) 206 lb 12.8 oz (93.8 kg) SpO2 BMI Smoking Status 97% 27.28 kg/m2 Current Every Day Smoker Vitals History BMI and BSA Data Body Mass Index Body Surface Area  
 27.28 kg/m 2 2.2 m 2 Preferred Pharmacy [facility] Name Phone [id number] [address] [facility] [phone] Your Updated Medication List  
  
   
This list is accurate as of: 18  3:36 PM.  Always use your most recent med list.  
  
  
  
  
 aspirin delayed-release 81 mg tablet Take 1 Tab by mouth daily. * fentaNYL 75 mcg/hr Commonly known as:  DURAGESIC  
1 Patch by TransDERmal route every seventy-two (72) hours. Max Daily Amount: 1 Patch. DO NOT FILL UNTIL 17 * fentaNYL 75 mcg/hr Commonly known as:  DURAGESIC  
1 Patch by TransDERmal route every seventy-two (72) hours. Max Daily Amount: 1 Patch. * fentaNYL 75 mcg/hr Commonly known as:  DURAGESIC  
1 Patch by TransDERmal route every seventy-two (72) hours. Max Daily Amount: 1 Patch.  
  
 loratadine 10 mg tablet Commonly known as:  CLARITIN  
TAKE 1 TABLET BY MOUTH DAILY. losartan 100 mg tablet Commonly known as:  COZAAR Take 1 Tab by mouth daily. naloxone 4 mg/actuation nasal spray Commonly known as:  NARCAN  
1 Spray by Nasal route as needed. Indications: OPIATE-INDUCED RESPIRATORY DEPRESSION  
  
 omeprazole 20 mg capsule Commonly known as:  PRILOSEC Take 1 Cap by mouth daily. simvastatin 40 mg tablet Commonly known as:  ZOCOR Take 1 Tab by mouth nightly. * Notice: This list has 3 medication(s) that are the same as other medications prescribed for you. Read the directions carefully, and ask your doctor or other care provider to review them with you. Prescriptions Printed Refills  
 fentaNYL (DURAGESIC) 75 mcg/hr 0 Si Patch by TransDERmal route every seventy-two (72) hours. Max Daily Amount: 1 Patch. Class: Print  Route: TransDERmal  
 fentaNYL (DURAGESIC) 75 mcg/hr 0  
 Si Patch by TransDERmal route every seventy-two (72) hours. Max Daily Amount: 1 Patch. Class: Print Route: TransDERmal  
  
Follow-up Instructions Return in about 3 months (around 2018). Introducing [facility] SERVICES! Dear Jack Mcclain: Thank you for requesting a [employer] account. Our records indicate that you already have an active [employer] account. You can access your account anytime at [email or web address] Did you know that you can access your hospital and ER discharge instructions at any time in [employer]? You can also review all of your test results from your hospital stay or ER visit. Additional Information If you have questions, please visit the Frequently Asked Questions section of the [employer] website at [email or web address] Remember, [employer] is NOT to be used for urgent needs. For medical emergencies, dial 911. Now available from your iPhone and Android! Please provide this summary of care documentation to your next provider. Your primary care clinician is listed as JENNIFER COLLIER. If you have any questions after today's visit, please call [phone].

## 2018-02-01 NOTE — PROGRESS NOTES
Chief Complaint   Patient presents with    Cerebrovascular Accident    Medication Refill     Fentanyl    LOW BACK PAIN     Open Abdo. Wound. Hernia. 1. Have you been to the ER, urgent care clinic since your last visit? Hospitalized since your last visit? No    2. Have you seen or consulted any other health care providers outside of the [address] [place] [address] since your last visit? Include any pap smears or colon screening. No      Pt admits to \"pot\" use chronically for his pain      Chief Complaint   Patient presents with    Cerebrovascular Accident    Medication Refill     Fentanyl    LOW BACK PAIN     He is a 65 y.o. male who presents for evalution. Reviewed PmHx, RxHx, FmHx, SocHx, AllgHx and updated and dated in the chart.     Patient Active Problem List    Diagnosis    Abdominal wall hernia    DDD (degenerative disc disease), lumbar    Chronic pain    Aspiration pneumonia (HCC)    Hypernatremia    Ischemic colitis ([address]    Colon perforation ([address]    Acute respiratory failure with hypoxia ([address]    Acute blood loss anemia    JESUS (acute kidney injury) ([address]    Pneumonia    Elevated INR    Alcoholism ([address]    Nicotine addiction    PVD (peripheral vascular disease) (HCC)    Hyponatremia    ETOH abuse    Coagulation disorder (HCC)    Allergic rhinitis due to other allergen    Stroke 2002    Anal fistula    HTN (hypertension)    Genital herpes    Noncompliance with treatment    High cholesterol    left Carotid Artery Occlusion       Review of Systems - negative except as listed above in the HPI    Objective:     Vitals:    02/01/18 1518   BP: 160/87   Pulse: 95   Resp: 18   Temp: 98.4 °F (36.9 °C)   TempSrc: Oral   SpO2: 97%   Weight: 206 lb 12.8 oz (93.8 kg)   Height: 6' 1\" (1.854 m)     Physical Examination: General appearance - alert, well appearing, and in no distress  Chest - clear to auscultation, no wheezes, rales or rhonchi, symmetric air entry  Heart - normal rate, regular rhythm, normal S1, S2, no murmurs, rubs, clicks or gallops  Abdomen - abd hernia open wound, walks with walker      Assessment/ Plan:   Diagnoses and all orders for this visit:    1. Chronic pain syndrome  -     fentaNYL (DURAGESIC) 75 mcg/hr; 1 Patch by TransDERmal route every seventy-two (72) hours. Max Daily Amount: 1 Patch. -     fentaNYL (DURAGESIC) 75 mcg/hr; 1 Patch by TransDERmal route every seventy-two (72) hours. Max Daily Amount: 1 Patch.  -     COMPLIANCE DRUG SCREEN/PRESCRIPTION MONITORING  -3 fills, he is aware of risks of OD with ETOH and Pot use and assumes risk knowingly  -admits to using pot daily for years and was not aware of last UDS  -due to pt hx and pain hx and compliance hx I will make an exception for this pt due to pain   Opioid/Pain Management:    1. Has the patient signed a pain contract for chronic narcotic use? yes  2. Has the patient had a UDS or Serum screen as per guidelines as per [facility]?:   yes    3. Does patient meet necessary guidelines for Naloxone treatement per the [facility]?:    yes  4. Has the Prescription Monitoring Program been reviewed? yes  5. Does patient have a long term condition that requires long term use of a Narcotic?  yes  6. Has patient been tolerant of therapy and responsible to routine follow up and specialist follow-up? Yes      2. Colon perforation ([address]  -as above    3. PVD (peripheral vascular disease) (HCC)  -severe pain    4. Ischemic colitis ([address]  -resolved    5. JESUS (acute kidney injury) ([address]  -no labs    6. Alcoholism ([address]  -no issues    7. Acute respiratory failure with hypoxia (HCC)  -resolved    8. Aspiration pneumonia, unspecified aspiration pneumonia type, unspecified laterality, unspecified part of lung ([address]  -resolved    9. Essential hypertension  -inc with pain    10. Coagulation disorder ([address]  -off rx     Follow-up Disposition:  Return in about 3 months (around 5/1/2018) for pain.     I have discussed the diagnosis with the patient and the intended plan as seen in the above orders. The patient understands and agrees with the plan. The patient has received an after-visit summary and questions were answered concerning future plans. Medication Side Effects and Warnings were discussed with patient  Patient Labs were reviewed and or requested:  Patient Past Records were reviewed and or requested    Ann Garnett M.D. There are no Patient Instructions on file for this visit.           \

## 2018-02-07 LAB — DRUGS UR: NORMAL

## 2018-04-16 ENCOUNTER — PATIENT OUTREACH (OUTPATIENT)
Dept: FAMILY MEDICINE CLINIC | Age: 58
End: 2018-04-16

## 2018-04-16 NOTE — PROGRESS NOTES
[address]  [phone]  Fax [phone]    Patient Name: Rosa M Bains  YOB: 1960/18 admitted to [facility]. Patient unable to get up from recliner for 2 days due to pain in sacral area (10/10) and unable to walk. EMS had to knock down door to bring to the hospital.  Outpatient NN will continue to monitor for discharge plans.

## 2018-04-20 ENCOUNTER — PATIENT OUTREACH (OUTPATIENT)
Dept: FAMILY MEDICINE CLINIC | Age: 58
End: 2018-04-20

## 2018-04-20 RX ORDER — LANOLIN ALCOHOL/MO/W.PET/CERES
CREAM (GRAM) TOPICAL DAILY
COMMUNITY
End: 2018-05-21 | Stop reason: SDDI

## 2018-04-20 RX ORDER — IBUPROFEN 200 MG
1 TABLET ORAL EVERY 24 HOURS
COMMUNITY
End: 2018-05-21 | Stop reason: SDDI

## 2018-04-20 RX ORDER — TRIAMCINOLONE ACETONIDE 1 MG/G
CREAM TOPICAL 2 TIMES DAILY
COMMUNITY
End: 2020-09-01

## 2018-04-20 RX ORDER — LIDOCAINE 50 MG/G
2 PATCH TOPICAL EVERY 24 HOURS
COMMUNITY
End: 2018-05-21 | Stop reason: SDDI

## 2018-04-20 RX ORDER — CHOLECALCIFEROL TAB 125 MCG (5000 UNIT) 125 MCG
TAB ORAL DAILY
COMMUNITY
End: 2018-05-21 | Stop reason: SDDI

## 2018-04-20 RX ORDER — PREDNISONE 5 MG/1
TABLET ORAL SEE ADMIN INSTRUCTIONS
COMMUNITY
End: 2018-05-21

## 2018-04-20 RX ORDER — CALCIUM CARBONATE 600 MG
600 TABLET ORAL 2 TIMES DAILY
COMMUNITY
End: 2018-05-21 | Stop reason: SDDI

## 2018-04-20 RX ORDER — CHLORDIAZEPOXIDE HYDROCHLORIDE 5 MG/1
25 CAPSULE, GELATIN COATED ORAL
COMMUNITY
End: 2018-05-21 | Stop reason: SDDI

## 2018-04-20 RX ORDER — BISMUTH SUBSALICYLATE 262 MG
1 TABLET,CHEWABLE ORAL DAILY
COMMUNITY
End: 2018-05-21 | Stop reason: SDDI

## 2018-04-20 RX ORDER — PANTOPRAZOLE SODIUM 40 MG/1
40 TABLET, DELAYED RELEASE ORAL 2 TIMES DAILY
COMMUNITY
End: 2018-05-21

## 2018-04-20 RX ORDER — FOLIC ACID 1 MG/1
TABLET ORAL DAILY
COMMUNITY
End: 2018-05-21 | Stop reason: SDDI

## 2018-04-20 RX ORDER — TRAMADOL HYDROCHLORIDE 50 MG/1
50 TABLET ORAL
COMMUNITY
End: 2018-06-05 | Stop reason: ALTCHOICE

## 2018-05-16 ENCOUNTER — PATIENT OUTREACH (OUTPATIENT)
Dept: FAMILY MEDICINE CLINIC | Age: 58
End: 2018-05-16

## 2018-05-16 NOTE — PROGRESS NOTES
Nurse Navigator Documentation      Date/Time:  5/16/2018 8:37 AM    Spoke to discharge planner at [facility] [address] ([facility]). Discharge Planner states that the patient is being discharged today. Discharge planner to fax copy of discharge instructions and med list to NN to review with the patient. Patient refused home health. Follow-up appointment scheduled with Dr. Gladys Pretty. Will follow-up with the patient by phone tomorrow for transitions of care coordination.     PCP/Specialist follow up: Future Appointments  Date Time Provider Julieta Walker   5/21/2018 10:45 AM Arleen Martinez MD [facility] [address]

## 2018-05-18 ENCOUNTER — PATIENT OUTREACH (OUTPATIENT)
Dept: FAMILY MEDICINE CLINIC | Age: 58
End: 2018-05-18

## 2018-05-18 ENCOUNTER — TELEPHONE (OUTPATIENT)
Dept: FAMILY MEDICINE CLINIC | Age: 58
End: 2018-05-18

## 2018-05-18 NOTE — PROGRESS NOTES
Hospital Discharge Follow-Up      Date/Time:  2018 12:06 PM    Patient was admitted to [facility] on 4/15/18 and discharged on 18 for bilateral longitudinal sacral fracture. The physician discharge summary was available at the time of outreach. Patient was admitted to [facility] until 18. Discharged home. Patient refused home health. Patient was called within 2 business days of discharge, message left requesting call back. Top Challenges reviewed with the provider   [address]  Refusing many medications       Method of communication with provider :face to face    Inpatient RRAT score: unknown  Was this a readmission? no   Patient stated reason for the readmission: n/a    Nurse Navigator (NN) contacted the patient by telephone to perform post hospital discharge assessment. Verified name and  with patient as identifiers. Provided introduction to self, and explanation of the Nurse Navigator role. Reviewed discharge instructions and red flags with patient who verbalized understanding. Patient given an opportunity to ask questions and does not have any further questions or concerns at this time. The patient agrees to contact the PCP office for questions related to their healthcare. NN provided contact information for future reference. Home Health orders at discharge: Patient recommended HH, however, refused. Home Health company: none  Date of initial visit: n/a    Durable Medical Equipment ordered/company: none  Durable Medical Equipment received: none    Barriers to care? lack of knowledge about disease, level of motivation, support system, transportation, utilization of services    Advance Care Planning:   Does patient have an Advance Directive:  reviewed and current       Medication:     Medication reconciliation was performed with patient, who verbalizes understanding of administration of home medications.   There were no barriers to obtaining medications identified at this time. Patient refusing to take many of his medications. Dr. Nik Norman made aware and medications discontinued per discussion with Dr. Nik Norman. Med list reflects what the patient is currently taking. Referral to Pharm D needed: no     Current Outpatient Prescriptions   Medication Sig    losartan (COZAAR) 100 mg tablet Take 100 mg by mouth daily.  simvastatin (ZOCOR) 40 mg tablet Take  by mouth nightly.  OXYCODONE HCL/ACETAMINOPHEN (ROXICET PO) Take 1 Tab by mouth every six (6) hours as needed for Pain.  traMADol (ULTRAM) 50 mg tablet Take 50 mg by mouth every six (6) hours as needed for Pain.  triamcinolone acetonide (TRIDERM) 0.1 % topical cream Apply  to affected area two (2) times a day. use thin layer    fentaNYL (DURAGESIC) 75 mcg/hr 1 Patch by TransDERmal route every seventy-two (72) hours. Max Daily Amount: 1 Patch.  omeprazole (PRILOSEC) 20 mg capsule Take 1 Cap by mouth daily. No current facility-administered medications for this visit.         Medications Discontinued During This Encounter   Medication Reason    naloxone (NARCAN) 4 mg/actuation spry Not A Current Medication    pantoprazole (PROTONIX) 40 mg tablet Not A Current Medication    predniSONE (STERAPRED) 5 mg dose pack Not A Current Medication    aspirin delayed-release 81 mg tablet Non-Compliance    calcium carbonate (CALTREX) 600 mg calcium (1,500 mg) tablet Non-Compliance    chlordiazePOXIDE (LIBRIUM) 5 mg capsule Non-Compliance    cholecalciferol, VITAMIN D3, (VITAMIN D3) 5,000 unit tab tablet Non-Compliance    folic acid (FOLVITE) 1 mg tablet Non-Compliance    lidocaine (LIDODERM) 5 % Non-Compliance    multivitamin (ONE A DAY) tablet Non-Compliance    nicotine (NICODERM CQ) 21 mg/24 hr Non-Compliance    thiamine (B-1) 100 mg tablet Non-Compliance       PCP/Specialist follow up:   Future Appointments  Date Time Provider Julieta Denise   5/23/2018 2:45 PM Onesimo Sweeney MD [facility]          Goals      Supportive resources in place to maintain patient in the community (ie. Home Health, DME equipment, refer to, medication assistant plan, etc.)            5/21/18 - Patient lives alone with very little support. Refused home health. Cancelled appointment today due to transportation. Appointment rescheduled for 5/23/18. Patient will be attempting to coordinate transportation and will call NN back tomorrow if transportation will be a problem.  MISA

## 2018-05-21 RX ORDER — LOSARTAN POTASSIUM 100 MG/1
100 TABLET ORAL DAILY
COMMUNITY
End: 2020-09-01

## 2018-05-21 RX ORDER — SIMVASTATIN 40 MG/1
TABLET, FILM COATED ORAL
COMMUNITY
End: 2020-09-01

## 2018-05-24 ENCOUNTER — PATIENT OUTREACH (OUTPATIENT)
Dept: FAMILY MEDICINE CLINIC | Age: 58
End: 2018-05-24

## 2018-05-24 NOTE — PROGRESS NOTES
Goals      Supportive resources in place to maintain patient in the community (ie. Home Health, DME equipment, refer to, medication assistant plan, etc.)            5/21/18 - Patient lives alone with very little support. Refused home health. Cancelled appointment today due to transportation. Appointment rescheduled for 5/23/18. Patient will be attempting to coordinate transportation and will call NN back tomorrow if transportation will be a problem. MISA    5/24/18 - Noted that patient did not show for appointment yesterday. Call placed; message left requesting call back.  MISA

## 2018-06-05 ENCOUNTER — OFFICE VISIT (OUTPATIENT)
Dept: FAMILY MEDICINE CLINIC | Age: 58
End: 2018-06-05

## 2018-06-05 VITALS
WEIGHT: 177 LBS | RESPIRATION RATE: 18 BRPM | DIASTOLIC BLOOD PRESSURE: 74 MMHG | TEMPERATURE: 97.7 F | BODY MASS INDEX: 23.46 KG/M2 | HEIGHT: 73 IN | OXYGEN SATURATION: 97 % | SYSTOLIC BLOOD PRESSURE: 121 MMHG | HEART RATE: 85 BPM

## 2018-06-05 DIAGNOSIS — S32.10XG CLOSED FRACTURE OF SACRUM WITH DELAYED HEALING, UNSPECIFIED PORTION OF SACRUM, SUBSEQUENT ENCOUNTER: ICD-10-CM

## 2018-06-05 DIAGNOSIS — I10 ESSENTIAL HYPERTENSION: Chronic | ICD-10-CM

## 2018-06-05 DIAGNOSIS — G89.4 CHRONIC PAIN SYNDROME: Primary | ICD-10-CM

## 2018-06-05 RX ORDER — FENTANYL 75 UG/H
1 PATCH TRANSDERMAL
Qty: 10 PATCH | Refills: 0 | Status: SHIPPED | OUTPATIENT
Start: 2018-06-05 | End: 2018-08-30 | Stop reason: SDUPTHER

## 2018-06-05 RX ORDER — FENTANYL 75 UG/H
1 PATCH TRANSDERMAL
Qty: 10 PATCH | Refills: 0 | Status: SHIPPED | OUTPATIENT
Start: 2018-06-05 | End: 2018-06-05 | Stop reason: SDUPTHER

## 2018-06-05 NOTE — MR AVS SNAPSHOT
[address] [address] 
[phone] Patient: Dennie Sell MRN:  :[id number] Visit Information Date & Time Provider Department Dept. Phone Encounter #  
 2018 11:10 AM Tan Duvall MD [facility] [phone] [id number] Follow-up Instructions Return in about 3 months (around 2018). Upcoming Health Maintenance Date Due Pneumococcal 19-64 Medium Risk (1 of 1 - PPSV23) 1980 DTaP/Tdap/Td series (1 - Tdap) 1982 MEDICARE YEARLY EXAM 3/14/2018 Influenza Age 5 to Adult 2018 COLONOSCOPY 3/8/2026 Allergies as of 2018  Review Complete On: 2018 By: Tan Duvall MD  
  
 Severity Noted Reaction Type Reactions Morphine  10/20/2015    Other (comments)  
 itching Current Immunizations  Reviewed on 2016 No immunizations on file. Not reviewed this visit You Were Diagnosed With   
  
 Codes Comments Chronic pain syndrome    -  Primary ICD-10-CM: G89.4 ICD-9-CM: 338.4 Essential hypertension     ICD-10-CM: I10 
ICD-9-CM: 401.9 Vitals BP Pulse Temp Resp Height(growth percentile) Weight(growth percentile) 121/74 85 97.7 °F (36.5 °C) 18 6' 1\" (1.854 m) 177 lb (80.3 kg) SpO2 BMI Smoking Status 97% 23.35 kg/m2 Current Every Day Smoker Vitals History BMI and BSA Data Body Mass Index Body Surface Area  
 23.35 kg/m 2 2.03 m 2 Preferred Pharmacy Pharmacy Name Phone [id number] [place] [facility] [phone] Your Updated Medication List  
  
   
This list is accurate as of 18 11:59 AM.  Always use your most recent med list.  
  
  
  
  
 fentaNYL 75 mcg/hr Commonly known as:  DURAGESIC  
1 Patch by TransDERmal route every seventy-two (72) hours. Max Daily Amount: 1 Patch.  
  
 losartan 100 mg tablet Commonly known as:  COZAAR  
 Take 100 mg by mouth daily. omeprazole 20 mg capsule Commonly known as:  PRILOSEC Take 1 Cap by mouth daily. simvastatin 40 mg tablet Commonly known as:  ZOCOR Take  by mouth nightly. TRIDERM 0.1 % topical cream  
Generic drug:  triamcinolone acetonide Apply  to affected area two (2) times a day. use thin layer Prescriptions Printed Refills  
 fentaNYL (DURAGESIC) 75 mcg/hr 0 Si Patch by TransDERmal route every seventy-two (72) hours. Max Daily Amount: 1 Patch. Class: Print Route: TransDERmal  
  
Follow-up Instructions Return in about 3 months (around 2018). Introducing [facility] & [facility] SERVICES! Dear Veronica Reyes: Thank you for requesting a [email or web address] account. Our records indicate that you already have an active [email or web address] account. You can access your account anytime at [email or web address] Did you know that you can access your hospital and ER discharge instructions at any time in [email or web address]? You can also review all of your test results from your hospital stay or ER visit. Additional Information If you have questions, please visit the Frequently Asked Questions section of the [email or web address] website at [email or web address] Remember, [email or web address] is NOT to be used for urgent needs. For medical emergencies, dial 911. Now available from your iPhone and Android! Please provide this summary of care documentation to your next provider. Your primary care clinician is listed as JENNIFER COLLIER. If you have any questions after today's visit, please call [phone].

## 2018-06-05 NOTE — PROGRESS NOTES
Patient here for hosp f/u 4/16/18 Jeancarlos Munoz for sacral-lumbar pain. Needs med refill today. 1. Have you been to the ER, urgent care clinic since your last visit? Hospitalized since your last visit? No    2. Have you seen or consulted any other health care providers outside of the [address] [place] [address] since your last visit? Include any pap smears or colon screening. No     Just got out of rehab, not taking any rx, wants to refill his pain patch      Chief Complaint   Patient presents with   [address] [place] [address] Follow Up     4/16/2018  hosp for sacral-lumbar pain x 1 week then rehab 3 weeks. discharge date from rehab in May. He is a 65 y.o. male who presents for evalution. Reviewed PmHx, RxHx, FmHx, SocHx, AllgHx and updated and dated in the chart. Patient Active Problem List    Diagnosis    Abdominal wall hernia    DDD (degenerative disc disease), lumbar    Chronic pain    Aspiration pneumonia (HCC)    Hypernatremia    Ischemic colitis ([address]    Colon perforation ([address]    Acute respiratory failure with hypoxia ([address]    Acute blood loss anemia    JESUS (acute kidney injury) ([address]    Pneumonia    Elevated INR    Alcoholism ([address]    Nicotine addiction    PVD (peripheral vascular disease) (HCC)    Hyponatremia    ETOH abuse    Coagulation disorder (HCC)    Allergic rhinitis due to other allergen    Stroke 2002    Anal fistula    HTN (hypertension)    Genital herpes    Noncompliance with treatment    High cholesterol    left Carotid Artery Occlusion       Review of Systems - negative except as listed above in the HPI    Objective:     Vitals:    06/05/18 1137   BP: 121/74   Pulse: 85   Resp: 18   Temp: 97.7 °F (36.5 °C)   SpO2: 97%   Weight: 177 lb (80.3 kg)   Height: 6' 1\" (1.854 m)     Physical Examination: General appearance - alert, well appearing, and in no distress    Assessment/ Plan:   Diagnoses and all orders for this visit:    1.  Chronic pain syndrome  -     fentaNYL (DURAGESIC) 75 mcg/hr; 1 Patch by TransDERmal route every seventy-two (72) hours. Max Daily Amount: 1 Patch. -refill times 3   -uds next OV  -just got out of rehab  -pt is always noncompliant and will not change, supp care    2. Closed fracture of sacrum with delayed healing, unspecified portion of sacrum, subsequent encounter  -as above    3. Essential hypertension  -at goal       Follow-up Disposition:  Return in about 3 months (around 9/5/2018). I have discussed the diagnosis with the patient and the intended plan as seen in the above orders. The patient understands and agrees with the plan. The patient has received an after-visit summary and questions were answered concerning future plans. Medication Side Effects and Warnings were discussed with patient  Patient Labs were reviewed and or requested:  Patient Past Records were reviewed and or requested    Ирина Hancock M.D. There are no Patient Instructions on file for this visit.

## 2018-06-07 ENCOUNTER — PATIENT OUTREACH (OUTPATIENT)
Dept: FAMILY MEDICINE CLINIC | Age: 58
End: 2018-06-07

## 2018-06-07 NOTE — PROGRESS NOTES
Goals Addressed      COMPLETED: Supportive resources in place to maintain patient in the community (ie. Home Health, DME equipment, refer to, medication assistant plan, etc.)                  5/21/18 - Patient lives alone with very little support. Refused home health. Cancelled appointment today due to transportation. Appointment rescheduled for 5/23/18. Patient will be attempting to coordinate transportation and will call NN back tomorrow if transportation will be a problem. MISA    5/24/18 - Noted that patient did not show for appointment yesterday. Call placed; message left requesting call back. KEB    5/30/18 - Call placed again. Message left requesting call back. Will close current encounter and reopen when patient calls back. Will try calling again next week if no return call. KEB    6/7/18 - No return call from patient. Noted that patient attended follow-up on 6/5/18. Will resolve episode.  MISA

## 2018-08-30 ENCOUNTER — OFFICE VISIT (OUTPATIENT)
Dept: FAMILY MEDICINE CLINIC | Age: 58
End: 2018-08-30

## 2018-08-30 VITALS
SYSTOLIC BLOOD PRESSURE: 128 MMHG | HEART RATE: 89 BPM | DIASTOLIC BLOOD PRESSURE: 69 MMHG | TEMPERATURE: 98.1 F | BODY MASS INDEX: 23.72 KG/M2 | OXYGEN SATURATION: 99 % | RESPIRATION RATE: 16 BRPM | WEIGHT: 179 LBS | HEIGHT: 73 IN

## 2018-08-30 DIAGNOSIS — G89.4 CHRONIC PAIN SYNDROME: ICD-10-CM

## 2018-08-30 RX ORDER — FENTANYL 75 UG/H
1 PATCH TRANSDERMAL
Qty: 10 PATCH | Refills: 0 | Status: SHIPPED | OUTPATIENT
Start: 2018-08-30 | End: 2018-08-30 | Stop reason: SDUPTHER

## 2018-08-30 RX ORDER — FENTANYL 75 UG/H
1 PATCH TRANSDERMAL
Qty: 10 PATCH | Refills: 0 | Status: SHIPPED | OUTPATIENT
Start: 2018-08-30 | End: 2018-11-28 | Stop reason: SDUPTHER

## 2018-08-30 NOTE — MR AVS SNAPSHOT
[address] [address] 
[phone] Patient: Lianne Swain MRN:  :[id number] Visit Information Date & Time Provider Department Dept. Phone Encounter #  
 2018  3:15 PM Alicia Gaytan MD [facility] [phone] [id number] Follow-up Instructions Return in about 3 months (around 2018). Upcoming Health Maintenance Date Due Pneumococcal 19-64 Medium Risk (1 of 1 - PPSV23) 1980 DTaP/Tdap/Td series (1 - Tdap) 1982 MEDICARE YEARLY EXAM 3/14/2018 Influenza Age 5 to Adult 2018 COLONOSCOPY 3/8/2026 Allergies as of 2018  Review Complete On: 2018 By: Alicia Gaytan MD  
  
 Severity Noted Reaction Type Reactions Morphine  10/20/2015    Other (comments)  
 itching Current Immunizations  Reviewed on 2016 No immunizations on file. Not reviewed this visit You Were Diagnosed With   
  
 Codes Comments Chronic pain syndrome     ICD-10-CM: G89.4 ICD-9-CM: 338. 4 Vitals BP Pulse Temp Resp Height(growth percentile) Weight(growth percentile) 128/69 89 98.1 °F (36.7 °C) (Oral) 16 6' 1\" (1.854 m) 179 lb (81.2 kg) SpO2 BMI Smoking Status 99% 23.62 kg/m2 Current Every Day Smoker BMI and BSA Data Body Mass Index Body Surface Area  
 23.62 kg/m 2 2.05 m 2 Preferred Pharmacy Pharmacy Name Phone [id number], [address] [facility] [phone] Your Updated Medication List  
  
   
This list is accurate as of 18  4:04 PM.  Always use your most recent med list.  
  
  
  
  
 fentaNYL 75 mcg/hr Commonly known as:  DURAGESIC  
1 Patch by TransDERmal route every seventy-two (72) hours. Max Daily Amount: 1 Patch.  
  
 losartan 100 mg tablet Commonly known as:  COZAAR Take 100 mg by mouth daily. omeprazole 20 mg capsule Commonly known as:  PRILOSEC  
 Take 1 Cap by mouth daily. simvastatin 40 mg tablet Commonly known as:  ZOCOR Take  by mouth nightly. TRIDERM 0.1 % topical cream  
Generic drug:  triamcinolone acetonide Apply  to affected area two (2) times a day. use thin layer Prescriptions Printed Refills  
 fentaNYL (DURAGESIC) 75 mcg/hr 0 Si Patch by TransDERmal route every seventy-two (72) hours. Max Daily Amount: 1 Patch. Class: Print Route: TransDERmal  
  
Follow-up Instructions Return in about 3 months (around 2018). Introducing [facility] & [facility] SERVICES! Dear Maverick Lora: Thank you for requesting a [employer] account. Our records indicate that you already have an active [employer] account. You can access your account anytime at [email or web address] Did you know that you can access your hospital and ER discharge instructions at any time in [employer]? You can also review all of your test results from your hospital stay or ER visit. Additional Information If you have questions, please visit the Frequently Asked Questions section of the [employer] website at [email or web address] Remember, [employer] is NOT to be used for urgent needs. For medical emergencies, dial 911. Now available from your iPhone and Android! Please provide this summary of care documentation to your next provider. Your primary care clinician is listed as JENNIFER COLLIER. If you have any questions after today's visit, please call [phone].
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
## 2018-09-04 ENCOUNTER — DOCUMENTATION ONLY (OUTPATIENT)
Dept: FAMILY MEDICINE CLINIC | Age: 58
End: 2018-09-04

## 2018-09-24 RX ORDER — OMEPRAZOLE 20 MG/1
CAPSULE, DELAYED RELEASE ORAL
Qty: 90 CAP | Refills: 3 | Status: SHIPPED | OUTPATIENT
Start: 2018-09-24 | End: 2018-12-19 | Stop reason: SDUPTHER

## 2018-09-24 RX ORDER — SIMVASTATIN 40 MG/1
TABLET, FILM COATED ORAL
Qty: 90 TAB | Refills: 3 | Status: SHIPPED | OUTPATIENT
Start: 2018-09-24 | End: 2018-12-19 | Stop reason: SDUPTHER

## 2018-09-24 RX ORDER — LOSARTAN POTASSIUM 100 MG/1
TABLET ORAL
Qty: 90 TAB | Refills: 3 | Status: SHIPPED | OUTPATIENT
Start: 2018-09-24 | End: 2018-12-19 | Stop reason: SDUPTHER

## 2018-11-28 ENCOUNTER — DOCUMENTATION ONLY (OUTPATIENT)
Dept: FAMILY MEDICINE CLINIC | Age: 58
End: 2018-11-28

## 2018-11-28 ENCOUNTER — OFFICE VISIT (OUTPATIENT)
Dept: FAMILY MEDICINE CLINIC | Age: 58
End: 2018-11-28

## 2018-11-28 VITALS
OXYGEN SATURATION: 97 % | DIASTOLIC BLOOD PRESSURE: 88 MMHG | HEART RATE: 98 BPM | SYSTOLIC BLOOD PRESSURE: 150 MMHG | RESPIRATION RATE: 22 BRPM | BODY MASS INDEX: 24.52 KG/M2 | TEMPERATURE: 98.1 F | WEIGHT: 185 LBS | HEIGHT: 73 IN

## 2018-11-28 DIAGNOSIS — G89.4 CHRONIC PAIN SYNDROME: ICD-10-CM

## 2018-11-28 RX ORDER — FENTANYL 75 UG/H
1 PATCH TRANSDERMAL
Qty: 10 PATCH | Refills: 0 | Status: SHIPPED | OUTPATIENT
Start: 2018-11-28 | End: 2019-02-26 | Stop reason: SDUPTHER

## 2018-11-28 RX ORDER — FENTANYL 75 UG/H
1 PATCH TRANSDERMAL
Qty: 10 PATCH | Refills: 0 | Status: SHIPPED | OUTPATIENT
Start: 2018-11-28 | End: 2018-11-28 | Stop reason: SDUPTHER

## 2018-11-28 RX ORDER — NALOXONE HYDROCHLORIDE 4 MG/.1ML
1 SPRAY NASAL
Qty: 1 EACH | Refills: 1 | Status: SHIPPED | OUTPATIENT
Start: 2018-11-28 | End: 2018-11-28

## 2018-11-28 NOTE — PROGRESS NOTES
Patient here for med refill. 1. Have you been to the ER, urgent care clinic since your last visit? Hospitalized since your last visit? No 
 
2. Have you seen or consulted any other health care providers outside of the [address] [place] [address] since your last visit? Include any pap smears or colon screening. No  
 
 
Chief Complaint Patient presents with  Follow-up He is a 63 y.o. male who presents for evalution. Reviewed PmHx, RxHx, FmHx, SocHx, AllgHx and updated and dated in the chart. Patient Active Problem List  
 Diagnosis  Abdominal wall hernia  DDD (degenerative disc disease), lumbar  Chronic pain  Aspiration pneumonia ([facility]  Hypernatremia  Ischemic colitis ([facility]  Colon perforation ([facility]  Acute respiratory failure with hypoxia (HCC)  Acute blood loss anemia  JESUS (acute kidney injury) ([facility]  Pneumonia  Elevated INR  Alcoholism ([facility]  Nicotine addiction  PVD (peripheral vascular disease) ([facility]  Hyponatremia  ETOH abuse  Coagulation disorder ([facility]  Allergic rhinitis due to other allergen [facility] Stroke 2002  Anal fistula  
 HTN (hypertension)  Genital herpes  Noncompliance with treatment  High cholesterol  left Carotid Artery Occlusion Review of Systems - negative except as listed above in the HPI Objective:  
 
Vitals:  
 11/28/18 1552 BP: 150/88 Pulse: 98 Resp: 22 Temp: 98.1 °F (36.7 °C) SpO2: 97% Weight: 185 lb (83.9 kg) Height: 6' 1\" (1.854 m) Physical Examination: General appearance - alert, well appearing, and in no distress Chest - clear to auscultation, no wheezes, rales or rhonchi, symmetric air entry Heart - normal rate, regular rhythm, normal S1, S2, no murmurs, rubs, clicks or gallops Assessment/ Plan:  
Diagnoses and all orders for this visit: 
 
1.  Chronic pain syndrome 
-     fentaNYL (DURAGESIC) 75 mcg/hr; 1 Patch by TransDERmal route every seventy-two (72) hours. Max Daily Amount: 1 Patch. -     fentaNYL (DURAGESIC) 75 mcg/hr; 1 Patch by TransDERmal route every seventy-two (72) hours. Max Daily Amount: 1 Patch. -3 fills UDS next OV 
- ok Opioid/Pain Management: 
1. Has the patient signed a pain contract for chronic narcotic use? yes 2. Has the patient had a UDS or Serum screen as per guidelines as per [facility]?:   yes 3. Does patient meet necessary guidelines for Naloxone treatement per the [facility]?:    yes 4. Has the Prescription Monitoring Program been reviewed? yes 5. Does patient have a long term condition that requires long term use of a Narcotic?  yes 6. Has patient been tolerant of therapy and responsible to routine follow up and specialist follow-up? Yes Other orders 
-     naloxone [facility]) 4 mg/actuation nasal spray; 1 [place] by IntraNASal route once as needed for up to 1 dose. Follow-up Disposition: 
Return in about 3 months (around 2/28/2019). I have discussed the diagnosis with the patient and the intended plan as seen in the above orders. The patient understands and agrees with the plan. The patient has received an after-visit summary and questions were answered concerning future plans. Medication Side Effects and Warnings were discussed with patient Patient Labs were reviewed and or requested: 
Patient Past Records were reviewed and or requested Lainey León M.D. There are no Patient Instructions on file for this visit.

## 2018-12-19 RX ORDER — LOSARTAN POTASSIUM 100 MG/1
TABLET ORAL
Qty: 90 TAB | Refills: 3 | Status: SHIPPED | OUTPATIENT
Start: 2018-12-19 | End: 2019-12-24

## 2018-12-19 RX ORDER — SIMVASTATIN 40 MG/1
TABLET, FILM COATED ORAL
Qty: 90 TAB | Refills: 3 | Status: SHIPPED | OUTPATIENT
Start: 2018-12-19 | End: 2019-12-24

## 2018-12-19 RX ORDER — OMEPRAZOLE 20 MG/1
CAPSULE, DELAYED RELEASE ORAL
Qty: 90 CAP | Refills: 3 | Status: SHIPPED | OUTPATIENT
Start: 2018-12-19 | End: 2019-12-24

## 2019-02-26 ENCOUNTER — DOCUMENTATION ONLY (OUTPATIENT)
Dept: FAMILY MEDICINE CLINIC | Age: 59
End: 2019-02-26

## 2019-02-26 ENCOUNTER — OFFICE VISIT (OUTPATIENT)
Dept: FAMILY MEDICINE CLINIC | Age: 59
End: 2019-02-26

## 2019-02-26 ENCOUNTER — HOSPITAL ENCOUNTER (OUTPATIENT)
Dept: LAB | Age: 59
Discharge: HOME | End: 2019-02-26
Payer: MEDICARE

## 2019-02-26 VITALS
HEIGHT: 73 IN | HEART RATE: 100 BPM | DIASTOLIC BLOOD PRESSURE: 77 MMHG | TEMPERATURE: 97.9 F | SYSTOLIC BLOOD PRESSURE: 171 MMHG | BODY MASS INDEX: 24.12 KG/M2 | OXYGEN SATURATION: 97 % | RESPIRATION RATE: 18 BRPM | WEIGHT: 182 LBS

## 2019-02-26 DIAGNOSIS — G89.4 CHRONIC PAIN SYNDROME: Primary | ICD-10-CM

## 2019-02-26 PROCEDURE — 82570 ASSAY OF URINE CREATININE: CPT

## 2019-02-26 RX ORDER — FENTANYL 75 UG/H
1 PATCH TRANSDERMAL
Qty: 10 PATCH | Refills: 0 | Status: SHIPPED | OUTPATIENT
Start: 2019-02-26 | End: 2019-02-26 | Stop reason: SDUPTHER

## 2019-02-26 RX ORDER — FENTANYL 75 UG/H
1 PATCH TRANSDERMAL
Qty: 10 PATCH | Refills: 0 | Status: SHIPPED | OUTPATIENT
Start: 2019-02-26 | End: 2019-03-28

## 2019-02-26 NOTE — PROGRESS NOTES
Chief Complaint Patient presents with  LOW BACK PAIN  
 Medication Refill Patient in office today for chronic low back pain and med check. Pt states fentanyl patch works well with pain control. Chief Complaint Patient presents with  LOW BACK PAIN  
 Medication Refill He is a 63 y.o. male who presents for evalution. Reviewed PmHx, RxHx, FmHx, SocHx, AllgHx and updated and dated in the chart. Patient Active Problem List  
 Diagnosis  Abdominal wall hernia  DDD (degenerative disc disease), lumbar  Chronic pain  Aspiration pneumonia ([address]  Hypernatremia  Ischemic colitis ([address]  Colon perforation ([address]  Acute respiratory failure with hypoxia (HCC)  Acute blood loss anemia  JESUS (acute kidney injury) ([address]  Pneumonia  Elevated INR  Alcoholism ([address]  Nicotine addiction  PVD (peripheral vascular disease) ([address]  Hyponatremia  ETOH abuse  Coagulation disorder ([address]  Allergic rhinitis due to other allergen [facility] Stroke 2002  Anal fistula  
 HTN (hypertension)  Genital herpes  Noncompliance with treatment  High cholesterol  left Carotid Artery Occlusion Review of Systems - negative except as listed above in the HPI Objective:  
 
Vitals:  
 02/26/19 1557 BP: 171/77 Pulse: 100 Resp: 18 Temp: 97.9 °F (36.6 °C) TempSrc: Oral  
SpO2: 97% Weight: 182 lb (82.6 kg) Height: 6' 1\" (1.854 m) Physical Examination: General appearance - alert, well appearing, and in no distress Chest - clear to auscultation, no wheezes, rales or rhonchi, symmetric air entry Heart - normal rate, regular rhythm, normal S1, S2, no murmurs, rubs, clicks or gallops Assessment/ Plan:  
Diagnoses and all orders for this visit: 
 
1. Chronic pain syndrome 
-     fentaNYL (DURAGESIC) 75 mcg/hr; 1 Patch by TransDERmal route every seventy-two (72) hours for 30 days. Max Daily Amount: 1 Patch. -     fentaNYL (DURAGESIC) 75 mcg/hr; 1 Patch by TransDERmal route every seventy-two (72) hours for 30 days. Max Daily Amount: 1 Patch. 
-     COMPLIANCE DRUG SCREEN/PRESCRIPTION MONITORING 
-3 fills 
-admit to [facility] use Opioid/Pain Management: 
1. Has the patient signed a pain contract for chronic narcotic use? yes 2. Has the patient had a UDS or Serum screen as per guidelines as per [facility]?:   yes 3. Does patient meet necessary guidelines for Naloxone treatement per the [facility]?:    yes 4. Has the Prescription Monitoring Program been reviewed? yes 5. Does patient have a long term condition that requires long term use of a Narcotic?  yes 6. Has patient been tolerant of therapy and responsible to routine follow up and specialist follow-up? Yes Follow-up Disposition: 
Return in about 3 months (around 5/26/2019). I have discussed the diagnosis with the patient and the intended plan as seen in the above orders. The patient understands and agrees with the plan. The patient has received an after-visit summary and questions were answered concerning future plans. Medication Side Effects and Warnings were discussed with patient Patient Labs were reviewed and or requested: 
Patient Past Records were reviewed and or requested Diimtri Lopez M.D. There are no Patient Instructions on file for this visit.

## 2019-03-03 LAB — DRUGS UR: NORMAL

## 2019-12-24 RX ORDER — OMEPRAZOLE 20 MG/1
CAPSULE, DELAYED RELEASE ORAL
Qty: 90 CAP | Refills: 3 | Status: SHIPPED | OUTPATIENT
Start: 2019-12-24 | End: 2020-06-18

## 2019-12-24 RX ORDER — SIMVASTATIN 40 MG/1
TABLET, FILM COATED ORAL
Qty: 90 TAB | Refills: 3 | Status: SHIPPED | OUTPATIENT
Start: 2019-12-24

## 2019-12-24 RX ORDER — LOSARTAN POTASSIUM 100 MG/1
TABLET ORAL
Qty: 90 TAB | Refills: 3 | Status: SHIPPED | OUTPATIENT
Start: 2019-12-24

## 2019-12-24 NOTE — TELEPHONE ENCOUNTER
----- Message from Jaimee Salmon sent at 12/23/2019  9:39 PM EST -----  Regarding: Dr Shelly Rashid  Medication Refill    Caller (if not patient):      Relationship of caller (if not patient):      Best contact number(s): [phone]       Name of medication and dosage if known: three medications (pt does not know the names of medication)      Is patient out of this medication (yes/no): yes      Pharmacy name: [facility], [address] [place] [address] listed in chart? (yes/no): yes  Pharmacy phone number:      Details to clarify the request: Pt states that the pharmacy has submitted request for refills today      Jaimee Salmon

## 2020-02-19 ENCOUNTER — TELEPHONE (OUTPATIENT)
Dept: FAMILY MEDICINE CLINIC | Age: 60
End: 2020-02-19

## 2020-02-19 NOTE — TELEPHONE ENCOUNTER
Haley Romero, from [facility] calling to see if Dr. Jose Jordan will follow patient after discharge from rehab for abdominal wound. Per Dr. Jose Jordan yes. Follow up needed with Dr. Jose Jordan. Haley Romero states she will inform patient to schedule an appointment.

## 2020-02-27 ENCOUNTER — DOCUMENTATION ONLY (OUTPATIENT)
Dept: FAMILY MEDICINE CLINIC | Age: 60
End: 2020-02-27

## 2020-02-27 NOTE — PROGRESS NOTES
[employer] certification & POC were put on [facility] desk to process

## 2020-03-02 ENCOUNTER — TELEPHONE (OUTPATIENT)
Dept: FAMILY MEDICINE CLINIC | Age: 60
End: 2020-03-02

## 2020-03-02 NOTE — TELEPHONE ENCOUNTER
[employer] certification & POC were signed & faxed to [phone] on 2/27/2020, ok,Copy placed in scan folder to be scanned to chart.

## 2020-04-02 ENCOUNTER — DOCUMENTATION ONLY (OUTPATIENT)
Dept: FAMILY MEDICINE CLINIC | Age: 60
End: 2020-04-02

## 2020-04-06 ENCOUNTER — TELEPHONE (OUTPATIENT)
Dept: FAMILY MEDICINE CLINIC | Age: 60
End: 2020-04-06

## 2020-04-06 NOTE — TELEPHONE ENCOUNTER
[employer] order was signed & faxed to [phone],ok,Copy placed in scan folder to be scanned to chart.

## 2020-04-23 ENCOUNTER — DOCUMENTATION ONLY (OUTPATIENT)
Dept: FAMILY MEDICINE CLINIC | Age: 60
End: 2020-04-23

## 2020-04-23 NOTE — PROGRESS NOTES
[employer] [facility] certification & POC were put on [facility] desk to process

## 2020-05-19 ENCOUNTER — TELEPHONE (OUTPATIENT)
Dept: PRIMARY CARE CLINIC | Age: 60
End: 2020-05-19

## 2020-05-19 NOTE — TELEPHONE ENCOUNTER
[place] home [facility] nurse would like an order to change dressing to abdomen from xeroform to aquagel. Verbal order given as per Dr. Jax Nur.

## 2020-05-21 ENCOUNTER — DOCUMENTATION ONLY (OUTPATIENT)
Dept: FAMILY MEDICINE CLINIC | Age: 60
End: 2020-05-21

## 2020-06-18 ENCOUNTER — TELEPHONE (OUTPATIENT)
Dept: FAMILY MEDICINE CLINIC | Age: 60
End: 2020-06-18

## 2020-06-18 RX ORDER — PANTOPRAZOLE SODIUM 40 MG/1
40 TABLET, DELAYED RELEASE ORAL DAILY
Qty: 90 TAB | Refills: 3 | Status: SHIPPED | OUTPATIENT
Start: 2020-06-18

## 2020-06-18 NOTE — TELEPHONE ENCOUNTER
Patient called stated that insurance will no longer cover his Prilosec. He was last seen 2- but he can not get into office at any time states can not walk well.       He needs antacid medication

## 2020-06-18 NOTE — TELEPHONE ENCOUNTER
Called and spoke with patient. Advised of new RX sent to pharmacy. Patient verbalized understanding.

## 2020-08-31 ENCOUNTER — TELEPHONE (OUTPATIENT)
Dept: FAMILY MEDICINE CLINIC | Age: 60
End: 2020-08-31

## 2020-08-31 NOTE — TELEPHONE ENCOUNTER
Disregard this message for medication. Pt care giver called back and I made a virtual visit for tomorrow to discuss what is going on with pt.

## 2020-08-31 NOTE — TELEPHONE ENCOUNTER
Patient is insisting a Z-Joaquim. He is refusing a VV.  Patient's phone: [phone] Pharmacy on file

## 2020-09-01 ENCOUNTER — VIRTUAL VISIT (OUTPATIENT)
Dept: FAMILY MEDICINE CLINIC | Age: 60
End: 2020-09-01
Payer: MEDICARE

## 2020-09-01 DIAGNOSIS — L89.309 PRESSURE INJURY OF SKIN OF BUTTOCK, UNSPECIFIED INJURY STAGE, UNSPECIFIED LATERALITY: ICD-10-CM

## 2020-09-01 DIAGNOSIS — F10.20 ALCOHOLISM (HCC): ICD-10-CM

## 2020-09-01 DIAGNOSIS — D68.9 COAGULATION DISORDER (HCC): ICD-10-CM

## 2020-09-01 DIAGNOSIS — I10 ESSENTIAL HYPERTENSION: ICD-10-CM

## 2020-09-01 DIAGNOSIS — K63.1 COLON PERFORATION (HCC): ICD-10-CM

## 2020-09-01 DIAGNOSIS — Z00.00 ROUTINE GENERAL MEDICAL EXAMINATION AT A HEALTH CARE FACILITY: Primary | ICD-10-CM

## 2020-09-01 DIAGNOSIS — K55.9 ISCHEMIC COLITIS (HCC): ICD-10-CM

## 2020-09-01 DIAGNOSIS — J40 BRONCHITIS: ICD-10-CM

## 2020-09-01 DIAGNOSIS — I73.9 PVD (PERIPHERAL VASCULAR DISEASE) (HCC): ICD-10-CM

## 2020-09-01 PROCEDURE — 99214 OFFICE O/P EST MOD 30 MIN: CPT | Performed by: FAMILY MEDICINE

## 2020-09-01 PROCEDURE — G0439 PPPS, SUBSEQ VISIT: HCPCS | Performed by: FAMILY MEDICINE

## 2020-09-01 RX ORDER — CEFDINIR 300 MG/1
300 CAPSULE ORAL 2 TIMES DAILY
Qty: 20 CAP | Refills: 0 | Status: SHIPPED | OUTPATIENT
Start: 2020-09-01 | End: 2020-10-27

## 2020-09-01 NOTE — PROGRESS NOTES
Mr. Rashmi Wagner is here today for his Medicare complete physical examination. He also complains of coughing up yellow discharge for last week and a half mild wheezing and no shortness of breath. He has had no COVID-19 contacts. Patient has long history of alcoholism. He has bedsores on his buttocks and drains of yellow discharge in his hernia repaired area on his abdomen. Blood pressure is good at home. Consent: Seven Morillo, who was seen by synchronous (real-time) audio-video technology, and/or his healthcare decision maker, is aware that this patient-initiated, Telehealth encounter on 9/1/2020 is a billable service, with coverage as determined by his insurance carrier. He is aware that he may receive a bill and has provided verbal consent to proceed: YES    Fall Screen is completed and assessed=yes  Depression Screen is completed and assessed=yes  Medication list reviewed and adjusted for accuracy=yes  Immunizations reviewed and updated=yes  Health/Preventative Screenings reviewed and updated=yes  ADL Functions reviewed=yes    712  Subjective:   Seven Morillo is a 63 y.o. male who was seen for No chief complaint on file. Prior to Admission medications    Medication Sig Start Date End Date Taking? Authorizing Provider   pantoprazole (PROTONIX) 40 mg tablet Take 1 Tab by mouth daily. 6/18/20  Yes Franklin Anand MD   losartan (COZAAR) 100 mg tablet TAKE 1 TABLET BY MOUTH EVERY DAY 12/24/19  Yes Irene Joseph NP   simvastatin (ZOCOR) 40 mg tablet TAKE 1 TABLET BY MOUTH EVERY DAY AT NIGHT 12/24/19  Yes Irene Joseph NP   cefdinir (OMNICEF) 300 mg capsule Take 1 Cap by mouth two (2) times a day for 10 days. 9/1/20 9/11/20  Franklin Anand MD   losartan (COZAAR) 100 mg tablet Take 100 mg by mouth daily. 9/1/20  Provider, Historical   simvastatin (ZOCOR) 40 mg tablet Take  by mouth nightly.   9/1/20  Provider, Historical   triamcinolone acetonide (TRIDERM) 0.1 % topical cream Apply  to affected area two (2) times a day. use thin layer  9/1/20  Provider, Historical     Allergies   Allergen Reactions    Morphine Other (comments)     itching     @ROS    Objective:   Vital Signs: (As obtained by patient/caregiver at home)  There were no vitals taken for this visit. [INSTRUCTIONS:  \"[x]\" Indicates a positive item  \"[]\" Indicates a negative item  -- DELETE ALL ITEMS NOT EXAMINED]    Constitutional: [x] Appears well-developed and well-nourished [x] No apparent distress      [] Abnormal -     Mental status: [x] Alert and awake  [x] Oriented to person/place/time [x] Able to follow commands    [] Abnormal -     Eyes:   EOM    [x]  Normal    [] Abnormal -   Sclera  [x]  Normal    [] Abnormal -          Discharge [x]  None visible   [] Abnormal -     HENT: [x] Normocephalic, atraumatic  [] Abnormal -   [x] Mouth/Throat: Mucous membranes are moist    External Ears [x] Normal  [] Abnormal -    Neck: [x] No visualized mass [] Abnormal -     Pulmonary/Chest: [x] Respiratory effort normal   [x] No visualized signs of difficulty breathing or respiratory distress        [] Abnormal -        Neurological:        [x] No Facial Asymmetry (Cranial nerve 7 motor function) (limited exam due to video visit)          [x] No gaze palsy        [] Abnormal -          Skin:        [x] No significant exanthematous lesions or discoloration noted on facial skin         [] Abnormal -            Psychiatric:       [x] Normal Affect [] Abnormal -        [x] No Hallucinations    Other pertinent observable physical exam findings:-              Assessment & Plan:   Diagnoses and all orders for this visit:    1. Routine general medical examination at a health care facility  -Patient is a tremendous fall risk with overall muscle wasting due to chronic alcohol use. -He is currently living with somebody who is helping him out.  -Patient has no interest in getting living will    2. Bronchitis  -     cefdinir (OMNICEF) 300 mg capsule;  Take 1 Cap by mouth two (2) times a day for 10 days.  -Add medication  3. Alcoholism ([facility]   -Patient continues to drink alcohol at home  4. Ischemic colitis ([facility]  -No changes     5. Coagulation disorder ([facility]  -Patient is patient is no longer on Coumadin  6. PVD (peripheral vascular disease) (HCC)  -Chronic  7. Colon perforation (HCC)  -Stable  8. Essential hypertension  -At goal     9. Pressure injury of skin of buttock, unspecified injury stage, unspecified laterality  -Consult home health for routine care and review of bedsores      Pain evaluation performed   -Cognitive Screen performed  -Depression Screen, Fall risks (by up and go test)  and ADL functionality were addressed  -Medication list updated and reviewed for any changes   -A comprehensive review of medical issues and a plan was formulated  --Health Screenings for preventions were addressed and a plan was formulated  -Shingles Vaccine was recommended if appropriate for age  -Discussed with patient cancer risk factors and appropriate screenings for age  -Patient evaluated for colonoscopy and referred if needed per screeing criteria  -Labs from previous visits were discussed with patient   -Discussed with patient diet and exercise and formulated a plan as needed  -An Advanced care plan was developed with the patient if appropriate for age  -Alcohol screening performed and was negative            We discussed the expected course, resolution and complications of the diagnosis(es) in detail. Medication risks, benefits, costs, interactions, and alternatives were discussed as indicated. I advised him to contact the office if his condition worsens, changes or fails to improve as anticipated. He expressed understanding with the diagnosis(es) and plan. Katlin Park is a 63 y.o. male being evaluated by a video visit encounter for concerns as above. A caregiver was present when appropriate.  Due to this being a TeleHealth encounter (During JGAAP-25 public health emergency), evaluation of the following organ systems was limited: Vitals/Constitutional/EENT/Resp/CV/GI//MS/Neuro/Skin/Heme-Lymph-Imm. Pursuant to the emergency declaration under the [facility] waiver authority and the [employer] Act, this Virtual  Visit was conducted, with patient's (and/or legal guardian's) consent, to reduce the patient's risk of exposure to COVID-19 and provide necessary medical care. Services were provided through a video synchronous discussion virtually to substitute for in-person clinic visit. Patient and provider were located at their individual homes.         Cynthia Ferguson MD

## 2020-09-02 ENCOUNTER — TELEPHONE (OUTPATIENT)
Dept: FAMILY MEDICINE CLINIC | Age: 60
End: 2020-09-02

## 2020-09-02 DIAGNOSIS — J40 BRONCHITIS: Primary | ICD-10-CM

## 2020-09-02 DIAGNOSIS — S31.809A WOUND OF BUTTOCK, UNSPECIFIED LATERALITY, INITIAL ENCOUNTER: Primary | ICD-10-CM

## 2020-09-02 DIAGNOSIS — Z51.89 ENCOUNTER FOR WOUND CARE: ICD-10-CM

## 2020-09-02 NOTE — TELEPHONE ENCOUNTER
Naz from [facility] called and would like a call back at [phone] regarding referral to home health for patient. Please call her back.

## 2020-09-03 ENCOUNTER — HOME HEALTH ADMISSION (OUTPATIENT)
Dept: HOME HEALTH SERVICES | Facility: HOME HEALTH | Age: 60
End: 2020-09-03
Payer: MEDICARE

## 2020-09-05 ENCOUNTER — HOME CARE VISIT (OUTPATIENT)
Dept: SCHEDULING | Facility: HOME HEALTH | Age: 60
End: 2020-09-05
Payer: MEDICARE

## 2020-09-05 PROCEDURE — G0299 HHS/HOSPICE OF RN EA 15 MIN: HCPCS

## 2020-09-05 PROCEDURE — 400013 HH SOC

## 2020-09-05 PROCEDURE — 3331090001 HH PPS REVENUE CREDIT

## 2020-09-05 PROCEDURE — 3331090002 HH PPS REVENUE DEBIT

## 2020-09-06 PROCEDURE — 3331090002 HH PPS REVENUE DEBIT

## 2020-09-06 PROCEDURE — 3331090001 HH PPS REVENUE CREDIT

## 2020-09-07 PROCEDURE — 3331090002 HH PPS REVENUE DEBIT

## 2020-09-07 PROCEDURE — 3331090001 HH PPS REVENUE CREDIT

## 2020-09-08 ENCOUNTER — TELEPHONE (OUTPATIENT)
Dept: FAMILY MEDICINE CLINIC | Age: 60
End: 2020-09-08

## 2020-09-08 ENCOUNTER — HOME CARE VISIT (OUTPATIENT)
Dept: HOME HEALTH SERVICES | Facility: HOME HEALTH | Age: 60
End: 2020-09-08
Payer: MEDICARE

## 2020-09-08 VITALS
DIASTOLIC BLOOD PRESSURE: 60 MMHG | RESPIRATION RATE: 18 BRPM | TEMPERATURE: 98.1 F | OXYGEN SATURATION: 97 % | SYSTOLIC BLOOD PRESSURE: 110 MMHG | HEART RATE: 86 BPM

## 2020-09-08 PROCEDURE — 82140 ASSAY OF AMMONIA: CPT

## 2020-09-08 PROCEDURE — 99999999999 HC CONV PATIENT CONVENIENCE-OTHER

## 2020-09-08 PROCEDURE — 83735 ASSAY OF MAGNESIUM: CPT

## 2020-09-08 PROCEDURE — 3331090001 HH PPS REVENUE CREDIT

## 2020-09-08 PROCEDURE — 36415 COLL VENOUS BLD VENIPUNCTURE: CPT

## 2020-09-08 PROCEDURE — A6212 FOAM DRG <=16 SQ IN W/BORDER: HCPCS

## 2020-09-08 PROCEDURE — A6216 NON-STERILE GAUZE<=16 SQ IN: HCPCS

## 2020-09-08 PROCEDURE — 84443 ASSAY THYROID STIM HORMONE: CPT

## 2020-09-08 PROCEDURE — 3331090002 HH PPS REVENUE DEBIT

## 2020-09-08 PROCEDURE — 85025 COMPLETE CBC W/AUTO DIFF WBC: CPT

## 2020-09-08 PROCEDURE — A6260 WOUND CLEANSER ANY TYPE/SIZE: HCPCS

## 2020-09-08 PROCEDURE — 81001 URINALYSIS AUTO W/SCOPE: CPT

## 2020-09-08 PROCEDURE — A6223 GAUZE >16<=48 NO W/SAL W/O B: HCPCS

## 2020-09-08 PROCEDURE — 85610 PROTHROMBIN TIME: CPT

## 2020-09-08 PROCEDURE — 80053 COMPREHEN METABOLIC PANEL: CPT

## 2020-09-08 PROCEDURE — 84484 ASSAY OF TROPONIN QUANT: CPT

## 2020-09-08 PROCEDURE — 71045 X-RAY EXAM CHEST 1 VIEW: CPT

## 2020-09-08 PROCEDURE — 70450 CT HEAD/BRAIN W/O DYE: CPT

## 2020-09-08 PROCEDURE — 83880 ASSAY OF NATRIURETIC PEPTIDE: CPT

## 2020-09-08 PROCEDURE — 83605 ASSAY OF LACTIC ACID: CPT

## 2020-09-08 PROCEDURE — 74018 RADEX ABDOMEN 1 VIEW: CPT

## 2020-09-08 NOTE — TELEPHONE ENCOUNTER
I called 911, after unsuccessful attempts to reach patient [phone], or friend Eriberto Izaguirre [phone]. Dispatcher states they have already been called this am and patient has  refused x 2 today  to leave with rescue squad.

## 2020-09-08 NOTE — TELEPHONE ENCOUNTER
Returned call to MATTHEW Mojica , he states rescue was there twice already and patient refused to go. I told carry I will attempt to call patient again to see if I can talk to him. Attempted several times to contact patient, no answer.

## 2020-09-08 NOTE — TELEPHONE ENCOUNTER
Patient's friend- Kenneth Mccracken is calling back . He spoke with police and they spoke with doctor at ER and he is stating our office is to call 911 for ambulance. The only way they can take patient is for us to call or patient to agree to go and patient is not agreeing. Mr Love Shahid phone- [phone]. He said he will continue calling all day until something is solved.

## 2020-09-08 NOTE — TELEPHONE ENCOUNTER
Patient's friend/Karla Noe's stating patient is eating cigarette butts and can't get out of bed at all and completely incoherent.   Mr Noe's phone: [phone]

## 2020-09-08 NOTE — TELEPHONE ENCOUNTER
Carry from [facility] called and states she would like a verbal order for OT/PT and  to eval patient. VO given as per Dr. Louise Mcmullen. Bilat leg edema.

## 2020-09-09 ENCOUNTER — HOME CARE VISIT (OUTPATIENT)
Dept: HOME HEALTH SERVICES | Facility: HOME HEALTH | Age: 60
End: 2020-09-09
Payer: MEDICARE

## 2020-09-09 PROCEDURE — 74177 CT ABD & PELVIS W/CONTRAST: CPT

## 2020-09-09 PROCEDURE — 86592 SYPHILIS TEST NON-TREP QUAL: CPT

## 2020-09-09 PROCEDURE — 3331090002 HH PPS REVENUE DEBIT

## 2020-09-09 PROCEDURE — 87040 BLOOD CULTURE FOR BACTERIA: CPT

## 2020-09-09 PROCEDURE — 99999999999 HC CONV PATIENT CONVENIENCE-OTHER

## 2020-09-09 PROCEDURE — 93005 ELECTROCARDIOGRAM TRACING: CPT

## 2020-09-09 PROCEDURE — 84443 ASSAY THYROID STIM HORMONE: CPT

## 2020-09-09 PROCEDURE — 3331090001 HH PPS REVENUE CREDIT

## 2020-09-09 PROCEDURE — U0002 COVID-19 LAB TEST NON-CDC: HCPCS

## 2020-09-10 ENCOUNTER — HOSPITAL ENCOUNTER (INPATIENT)
Age: 60
LOS: 47 days | Discharge: NURSING, REHAB OR LONG-TERM CARE | DRG: 177 | End: 2020-10-27
Attending: INTERNAL MEDICINE | Admitting: FAMILY MEDICINE
Payer: MEDICARE

## 2020-09-10 ENCOUNTER — HOME CARE VISIT (OUTPATIENT)
Dept: HOME HEALTH SERVICES | Facility: HOME HEALTH | Age: 60
End: 2020-09-10
Payer: MEDICARE

## 2020-09-10 ENCOUNTER — IP HISTORICAL/CONVERTED ENCOUNTER (OUTPATIENT)
Dept: OTHER | Age: 60
End: 2020-09-10

## 2020-09-10 ENCOUNTER — HOME CARE VISIT (OUTPATIENT)
Dept: SCHEDULING | Facility: HOME HEALTH | Age: 60
End: 2020-09-10
Payer: MEDICARE

## 2020-09-10 PROCEDURE — 86592 SYPHILIS TEST NON-TREP QUAL: CPT

## 2020-09-10 PROCEDURE — 87040 BLOOD CULTURE FOR BACTERIA: CPT

## 2020-09-10 PROCEDURE — 3331090002 HH PPS REVENUE DEBIT

## 2020-09-10 PROCEDURE — 3331090003 HH PPS REVENUE ADJ

## 2020-09-10 PROCEDURE — 97166 OT EVAL MOD COMPLEX 45 MIN: CPT

## 2020-09-10 PROCEDURE — 99285 EMERGENCY DEPT VISIT HI MDM: CPT

## 2020-09-10 PROCEDURE — 74177 CT ABD & PELVIS W/CONTRAST: CPT

## 2020-09-10 PROCEDURE — 97530 THERAPEUTIC ACTIVITIES: CPT

## 2020-09-10 PROCEDURE — 74018 RADEX ABDOMEN 1 VIEW: CPT

## 2020-09-10 PROCEDURE — 99999999999 HC CONV PATIENT CONVENIENCE-OTHER

## 2020-09-10 PROCEDURE — 3331090001 HH PPS REVENUE CREDIT

## 2020-09-10 PROCEDURE — 70450 CT HEAD/BRAIN W/O DYE: CPT

## 2020-09-10 PROCEDURE — 80048 BASIC METABOLIC PNL TOTAL CA: CPT

## 2020-09-10 PROCEDURE — 71045 X-RAY EXAM CHEST 1 VIEW: CPT

## 2020-09-10 PROCEDURE — 96361 HYDRATE IV INFUSION ADD-ON: CPT

## 2020-09-10 PROCEDURE — 97162 PT EVAL MOD COMPLEX 30 MIN: CPT

## 2020-09-10 PROCEDURE — 80053 COMPREHEN METABOLIC PANEL: CPT

## 2020-09-10 PROCEDURE — 85610 PROTHROMBIN TIME: CPT

## 2020-09-10 PROCEDURE — U0002 COVID-19 LAB TEST NON-CDC: HCPCS

## 2020-09-10 PROCEDURE — 81001 URINALYSIS AUTO W/SCOPE: CPT

## 2020-09-10 PROCEDURE — 83735 ASSAY OF MAGNESIUM: CPT

## 2020-09-10 PROCEDURE — 84484 ASSAY OF TROPONIN QUANT: CPT

## 2020-09-10 PROCEDURE — 94760 N-INVAS EAR/PLS OXIMETRY 1: CPT

## 2020-09-10 PROCEDURE — 96374 THER/PROPH/DIAG INJ IV PUSH: CPT

## 2020-09-10 PROCEDURE — 83880 ASSAY OF NATRIURETIC PEPTIDE: CPT

## 2020-09-10 PROCEDURE — 36415 COLL VENOUS BLD VENIPUNCTURE: CPT

## 2020-09-10 PROCEDURE — 84443 ASSAY THYROID STIM HORMONE: CPT

## 2020-09-10 PROCEDURE — 93005 ELECTROCARDIOGRAM TRACING: CPT

## 2020-09-10 PROCEDURE — 83605 ASSAY OF LACTIC ACID: CPT

## 2020-09-10 PROCEDURE — 82140 ASSAY OF AMMONIA: CPT

## 2020-09-10 PROCEDURE — 85025 COMPLETE CBC W/AUTO DIFF WBC: CPT

## 2020-09-11 DIAGNOSIS — J18.9 CHRONIC PNEUMONIA: ICD-10-CM

## 2020-09-11 PROCEDURE — 85027 COMPLETE CBC AUTOMATED: CPT

## 2020-09-11 PROCEDURE — 94760 N-INVAS EAR/PLS OXIMETRY 1: CPT

## 2020-09-11 PROCEDURE — 99999999999 HC CONV PATIENT CONVENIENCE-OTHER

## 2020-09-11 PROCEDURE — 3331090001 HH PPS REVENUE CREDIT

## 2020-09-11 PROCEDURE — 36415 COLL VENOUS BLD VENIPUNCTURE: CPT

## 2020-09-11 PROCEDURE — 80048 BASIC METABOLIC PNL TOTAL CA: CPT

## 2020-09-11 PROCEDURE — 3331090002 HH PPS REVENUE DEBIT

## 2020-09-11 RX ORDER — MAG HYDROX/ALUMINUM HYD/SIMETH 200-200-20
30 SUSPENSION, ORAL (FINAL DOSE FORM) ORAL
Status: DISCONTINUED | OUTPATIENT
Start: 2020-09-12 | End: 2020-10-27 | Stop reason: HOSPADM

## 2020-09-11 RX ORDER — LOSARTAN POTASSIUM 50 MG/1
100 TABLET ORAL DAILY
Status: DISCONTINUED | OUTPATIENT
Start: 2020-09-12 | End: 2020-10-09

## 2020-09-11 RX ORDER — ADHESIVE BANDAGE
30 BANDAGE TOPICAL DAILY PRN
Status: DISCONTINUED | OUTPATIENT
Start: 2020-09-12 | End: 2020-10-27 | Stop reason: HOSPADM

## 2020-09-11 RX ORDER — AZITHROMYCIN 500 MG/1
500 TABLET, FILM COATED ORAL DAILY
Status: DISCONTINUED | OUTPATIENT
Start: 2020-09-12 | End: 2020-09-23

## 2020-09-11 RX ORDER — ATORVASTATIN CALCIUM 20 MG/1
20 TABLET, FILM COATED ORAL DAILY
Status: DISCONTINUED | OUTPATIENT
Start: 2020-09-12 | End: 2020-10-27 | Stop reason: HOSPADM

## 2020-09-11 RX ORDER — ACETAMINOPHEN 325 MG/1
650 TABLET ORAL
Status: DISCONTINUED | OUTPATIENT
Start: 2020-09-12 | End: 2020-10-27 | Stop reason: HOSPADM

## 2020-09-11 RX ORDER — ENOXAPARIN SODIUM 100 MG/ML
40 INJECTION SUBCUTANEOUS EVERY 24 HOURS
Status: DISCONTINUED | OUTPATIENT
Start: 2020-09-12 | End: 2020-10-06

## 2020-09-11 RX ORDER — ONDANSETRON 2 MG/ML
4 INJECTION INTRAMUSCULAR; INTRAVENOUS
Status: DISCONTINUED | OUTPATIENT
Start: 2020-09-12 | End: 2020-10-27 | Stop reason: HOSPADM

## 2020-09-11 RX ORDER — OMEPRAZOLE 20 MG/1
20 CAPSULE, DELAYED RELEASE ORAL DAILY
COMMUNITY

## 2020-09-11 RX ORDER — PANTOPRAZOLE SODIUM 40 MG/1
40 TABLET, DELAYED RELEASE ORAL DAILY
Status: DISCONTINUED | OUTPATIENT
Start: 2020-09-12 | End: 2020-10-27 | Stop reason: HOSPADM

## 2020-09-12 LAB
ANION GAP SERPL CALC-SCNC: 3 MMOL/L (ref 5–15)
BASOPHILS # BLD: 0 K/UL (ref 0–0.1)
BASOPHILS NFR BLD: 1 % (ref 0–1)
BUN SERPL-MCNC: 4 MG/DL (ref 6–20)
BUN/CREAT SERPL: 7 (ref 12–20)
CA-I BLD-MCNC: 7.7 MG/DL (ref 8.5–10.1)
CHLORIDE SERPL-SCNC: 93 MMOL/L (ref 97–108)
CO2 SERPL-SCNC: 37 MMOL/L (ref 21–32)
CREAT SERPL-MCNC: 0.56 MG/DL (ref 0.7–1.3)
DIFFERENTIAL METHOD BLD: ABNORMAL
EOSINOPHIL # BLD: 0.1 K/UL (ref 0–0.4)
EOSINOPHIL NFR BLD: 2 % (ref 0–7)
ERYTHROCYTE [DISTWIDTH] IN BLOOD BY AUTOMATED COUNT: 15.5 % (ref 11.5–14.5)
GLUCOSE BLD STRIP.AUTO-MCNC: 105 MG/DL (ref 65–100)
GLUCOSE SERPL-MCNC: 63 MG/DL (ref 65–100)
HCT VFR BLD AUTO: 22.9 % (ref 36.6–50.3)
HGB BLD-MCNC: 7.3 % (ref 12.1–17)
IMM GRANULOCYTES # BLD AUTO: 0 K/UL (ref 0–0.04)
IMM GRANULOCYTES NFR BLD AUTO: 0 % (ref 0–0.5)
LYMPHOCYTES # BLD: 0.9 K/UL (ref 0.8–3.5)
LYMPHOCYTES NFR BLD: 20 % (ref 12–49)
MCH RBC QN AUTO: 28.3 PG (ref 26–34)
MCHC RBC AUTO-ENTMCNC: 31.9 G/DL (ref 30–36.5)
MCV RBC AUTO: 88.8 FL (ref 80–99)
MONOCYTES # BLD: 0.7 K/UL (ref 0–1)
MONOCYTES NFR BLD: 14 % (ref 5–13)
NEUTS SEG # BLD: 2.9 K/UL (ref 1.8–8)
NEUTS SEG NFR BLD: 63 % (ref 32–75)
PERFORMED BY, TECHID: ABNORMAL
PLATELET # BLD AUTO: 268 K/UL (ref 150–400)
PMV BLD AUTO: 8.6 FL (ref 8.9–12.9)
POTASSIUM SERPL-SCNC: 3.6 MMOL/L (ref 3.5–5.1)
RBC # BLD AUTO: 2.58 M/UL (ref 4.1–5.7)
SODIUM SERPL-SCNC: 133 MMOL/L (ref 136–145)
WBC # BLD AUTO: 4.7 K/UL (ref 4.1–11.1)

## 2020-09-12 PROCEDURE — 86900 BLOOD TYPING SEROLOGIC ABO: CPT

## 2020-09-12 PROCEDURE — 36415 COLL VENOUS BLD VENIPUNCTURE: CPT

## 2020-09-12 PROCEDURE — 82962 GLUCOSE BLOOD TEST: CPT

## 2020-09-12 PROCEDURE — 77010033678 HC OXYGEN DAILY

## 2020-09-12 PROCEDURE — 80048 BASIC METABOLIC PNL TOTAL CA: CPT

## 2020-09-12 PROCEDURE — 85025 COMPLETE CBC W/AUTO DIFF WBC: CPT

## 2020-09-12 PROCEDURE — 3331090002 HH PPS REVENUE DEBIT

## 2020-09-12 PROCEDURE — 3331090001 HH PPS REVENUE CREDIT

## 2020-09-12 PROCEDURE — 74011000258 HC RX REV CODE- 258: Performed by: INTERNAL MEDICINE

## 2020-09-12 PROCEDURE — 99999999999 HC CONV PATIENT CONVENIENCE-OTHER

## 2020-09-12 PROCEDURE — 65270000029 HC RM PRIVATE

## 2020-09-12 PROCEDURE — 74011250636 HC RX REV CODE- 250/636: Performed by: INTERNAL MEDICINE

## 2020-09-12 PROCEDURE — G0378 HOSPITAL OBSERVATION PER HR: HCPCS

## 2020-09-12 PROCEDURE — 74011250637 HC RX REV CODE- 250/637: Performed by: INTERNAL MEDICINE

## 2020-09-12 RX ORDER — SODIUM CHLORIDE 9 MG/ML
250 INJECTION, SOLUTION INTRAVENOUS AS NEEDED
Status: DISCONTINUED | OUTPATIENT
Start: 2020-09-12 | End: 2020-10-27 | Stop reason: HOSPADM

## 2020-09-12 RX ADMIN — ENOXAPARIN SODIUM 40 MG: 40 INJECTION SUBCUTANEOUS at 09:11

## 2020-09-12 RX ADMIN — PIPERACILLIN SODIUM AND TAZOBACTAM SODIUM 3.38 G: 3; .375 INJECTION, POWDER, LYOPHILIZED, FOR SOLUTION INTRAVENOUS at 09:12

## 2020-09-12 RX ADMIN — ATORVASTATIN CALCIUM 20 MG: 20 TABLET, FILM COATED ORAL at 20:59

## 2020-09-12 RX ADMIN — PIPERACILLIN SODIUM AND TAZOBACTAM SODIUM 3.38 G: 3; .375 INJECTION, POWDER, LYOPHILIZED, FOR SOLUTION INTRAVENOUS at 18:16

## 2020-09-12 RX ADMIN — LOSARTAN POTASSIUM 100 MG: 50 TABLET, FILM COATED ORAL at 09:11

## 2020-09-12 NOTE — PROGRESS NOTES
Please refer to the paper portion of the medical record for therapy documentation prior to 09/12/2020 including evaluation findings, treatment notes, and therapy plan/goals. Please refer to the electronic portion of the medical record for therapy documentation entered on or after 09/12/2020.

## 2020-09-12 NOTE — PROGRESS NOTES
Hospitalist Progress Note         Karine Lazaro DO, MBA   Covering Team 5 on 9/22   Please use the page  at [phone] to reach me      Daily Progress Note: 9/12/2020    Chief complaint: Shortness of breath  Subjective: The patient is seen for follow  up. Notes improvement in shortness of breath overnight. Per nursing staff, patient desaturates upon exertion.   He was placed on 4 L nasal O2 this morning    Problem List:  Problem List as of 9/12/2020 Date Reviewed: 9/1/2020          Codes Class Noted - Resolved    Abdominal wall hernia ICD-10-CM: K43.9  ICD-9-CM: 553.20  8/1/2017 - Present        DDD (degenerative disc disease), lumbar ICD-10-CM: M51.36  ICD-9-CM: 722.52  5/1/2017 - Present        Chronic pain ICD-10-CM: G89.29  ICD-9-CM: 338.29  1/6/2015 - Present        Aspiration pneumonia ([facility] ICD-10-CM: J69.0  ICD-9-CM: 507.0  1/19/2014 - Present        Hypernatremia ICD-10-CM: E87.0  ICD-9-CM: 276.0  1/14/2014 - Present        Ischemic colitis ([facility] ICD-10-CM: K55.9  ICD-9-CM: 557.9  1/13/2014 - Present        Colon perforation ([facility] ICD-10-CM: K63.1  ICD-9-CM: 569.83  1/13/2014 - Present        Acute respiratory failure with hypoxia ([facility] ICD-10-CM: J96.01  ICD-9-CM: 518.81  1/13/2014 - Present        Acute blood loss anemia ICD-10-CM: D62  ICD-9-CM: 285.1  1/13/2014 - Present        JESUS (acute kidney injury) ([facility] ICD-10-CM: N17.9  ICD-9-CM: 584.9  1/13/2014 - Present        Pneumonia ICD-10-CM: J18.9  ICD-9-CM: 486  1/1/2014 - Present        Elevated INR ICD-10-CM: R79.1  ICD-9-CM: 790.92  1/1/2014 - Present        Alcoholism ([facility] ICD-10-CM: F10.20  ICD-9-CM: 303.90  1/1/2014 - Present        Nicotine addiction ICD-10-CM: F17.200  ICD-9-CM: 305.1  1/1/2014 - Present        PVD (peripheral vascular disease) ([facility] ICD-10-CM: I73.9  ICD-9-CM: 443.9  7/31/2013 - Present        Hyponatremia ICD-10-CM: E87.1  ICD-9-CM: 276.1  5/30/2012 - Present        ETOH abuse ICD-10-CM: F10.10  ICD-9-CM: 305.00  5/30/2012 - Present        Coagulation disorder ([facility] ICD-10-CM: D68.9  ICD-9-CM: 286.9  5/30/2012 - Present        Allergic rhinitis due to other allergen ICD-10-CM: J30.89  ICD-9-CM: 477.8  12/27/2011 - Present        Stroke 2002 (Chronic) ICD-10-CM: I63.9  ICD-9-CM: 434.91  3/15/2010 - Present        Anal fistula (Chronic) ICD-10-CM: K60.3  ICD-9-CM: 565.1  3/15/2010 - Present        HTN (hypertension) (Chronic) ICD-10-CM: I10  ICD-9-CM: 401.9  3/15/2010 - Present        Genital herpes (Chronic) ICD-10-CM: A60.00  ICD-9-CM: 054.10  3/15/2010 - Present        Noncompliance with treatment (Chronic) ICD-10-CM: Z91.19  ICD-9-CM: V15.81  3/15/2010 - Present        High cholesterol (Chronic) ICD-10-CM: E78.00  ICD-9-CM: 272.0  3/15/2010 - Present        left Carotid Artery Occlusion (Chronic) ICD-10-CM: W74.17  ICD-9-CM: 433.10  3/15/2010 - Present              Medications reviewed  Current Facility-Administered Medications   Medication Dose Route Frequency    0.9% sodium chloride infusion 250 mL  250 mL IntraVENous PRN    atorvastatin (LIPITOR) tablet 20 mg  20 mg Oral DAILY    enoxaparin (LOVENOX) injection 40 mg  40 mg SubCUTAneous Q24H    losartan (COZAAR) tablet 100 mg  100 mg Oral DAILY    piperacillin-tazobactam (ZOSYN) 3.375 g in 0.9% sodium chloride (MBP/ADV) 100 mL MBP  3.375 g IntraVENous Q8H    pantoprazole (PROTONIX) tablet 40 mg  40 mg Oral DAILY    acetaminophen (TYLENOL) tablet 650 mg  650 mg Oral Q4H PRN    alum-mag hydroxide-simeth (MYLANTA) oral suspension 30 mL  30 mL Oral Q4H PRN    magnesium hydroxide (MILK OF MAGNESIA) 400 mg/5 mL oral suspension 30 mL  30 mL Oral DAILY PRN    ondansetron (ZOFRAN) injection 4 mg  4 mg IntraVENous Q8H PRN    azithromycin (ZITHROMAX) tablet 500 mg  500 mg Oral DAILY       Review of Systems:   A comprehensive review of systems was negative except for: Respiratory: positive for dyspnea on exertion    Objective:   Physical Exam: Visit Vitals  /78   Pulse 89   Temp 98 °F (36.7 °C)   Resp 18   SpO2 100%    O2 Flow Rate (L/min): 3 l/min O2 Device: Nasal cannula    Temp (24hrs), Av.3 °F (36.8 °C), Min:98 °F (36.7 °C), Max:98.9 °F (37.2 °C)    No intake/output data recorded. No intake/output data recorded. General:  Alert, cooperative, no distress, appears stated age. Lungs:   Clear to auscultation bilaterally. Chest wall:  No tenderness or deformity. Heart:  Regular rate and rhythm, S1, S2 normal, no murmur, click, rub or gallop. Abdomen:   Soft, non-tender. Bowel sounds normal. No masses,  No organomegaly. Extremities: Extremities normal, atraumatic, no cyanosis or edema. Pulses: 2+ and symmetric all extremities. Skin: Skin color, texture, turgor normal. No rashes or lesions   Neurologic: CNII-XII intact. No gross sensory or motor deficits     Data Review:       Recent Days:  Recent Labs     20  0726   WBC 4.7   HGB 7.3*   HCT 22.9*        Recent Labs     20  0726   *   K 3.6   CL 93*   CO2 37*   GLU 63*   BUN 4*   CREA 0.56*   CA 7.7*     No results for input(s): PH, PCO2, PO2, HCO3, FIO2 in the last 72 hours.     24 Hour Results:  Recent Results (from the past 24 hour(s))   METABOLIC PANEL, BASIC    Collection Time: 20  7:26 AM   Result Value Ref Range    Sodium 133 (L) 136 - 145 mmol/L    Potassium 3.6 3.5 - 5.1 mmol/L    Chloride 93 (L) 97 - 108 mmol/L    CO2 37 (H) 21 - 32 mmol/L    Anion gap 3 (L) 5 - 15 mmol/L    Glucose 63 (L) 65 - 100 mg/dL    BUN 4 (L) 6 - 20 mg/dL    Creatinine 0.56 (L) 0.70 - 1.30 mg/dL    BUN/Creatinine ratio 7 (L) 12 - 20      GFR est AA >60 >60 ml/min/1.73m2    GFR est non-AA >60 >60 ml/min/1.73m2    Calcium 7.7 (L) 8.5 - 10.1 mg/dL   CBC WITH AUTOMATED DIFF    Collection Time: 20  7:26 AM   Result Value Ref Range    WBC 4.7 4.1 - 11.1 K/uL    RBC 2.58 (L) 4.10 - 5.70 M/uL    HGB 7.3 (L) 12.1 - 17.0 %    HCT 22.9 (L) 36.6 - 50.3 %    MCV 88.8 80.0 - 99.0 FL    MCH 28.3 26.0 - 34.0 PG    MCHC 31.9 30.0 - 36.5 g/dL    RDW 15.5 (H) 11.5 - 14.5 %    PLATELET 353 452 - 301 K/uL    MPV 8.6 (L) 8.9 - 12.9 FL    NEUTROPHILS 63 32 - 75 %    LYMPHOCYTES 20 12 - 49 %    MONOCYTES 14 (H) 5 - 13 %    EOSINOPHILS 2 0 - 7 %    BASOPHILS 1 0 - 1 %    IMMATURE GRANULOCYTES 0 0.0 - 0.5 %    ABS. NEUTROPHILS 2.9 1.8 - 8.0 K/UL    ABS. LYMPHOCYTES 0.9 0.8 - 3.5 K/UL    ABS. MONOCYTES 0.7 0.0 - 1.0 K/UL    ABS. EOSINOPHILS 0.1 0.0 - 0.4 K/UL    ABS. BASOPHILS 0.0 0.0 - 0.1 K/UL    ABS. IMM. GRANS. 0.0 0.00 - 0.04 K/UL    DF AUTOMATED             Assessment/     Acute hypoxic respiratory failure  Left lower lobe pneumonia  Acute on chronic anemia  Dehydration  Rule out COVID-19  Generalized weakness and unsteady gait         Plan:  Continue supportive care including IV antibiotics  Transfuse 2 units of packed red blood cells  Obtain stool for occult blood/check iron studies  Anticipate discharge to skilled care facility Monday    Care Plan discussed with: Patient/Family and Nurse    Total time spent with patient: 30 minutes.     Katherine Pace MD

## 2020-09-13 LAB
BASOPHILS # BLD: 0 K/UL (ref 0–0.1)
BASOPHILS NFR BLD: 0 % (ref 0–1)
DIFFERENTIAL METHOD BLD: ABNORMAL
EOSINOPHIL # BLD: 0.1 K/UL (ref 0–0.4)
EOSINOPHIL NFR BLD: 1 % (ref 0–7)
ERYTHROCYTE [DISTWIDTH] IN BLOOD BY AUTOMATED COUNT: 15.8 % (ref 11.5–14.5)
HCT VFR BLD AUTO: 29.1 % (ref 36.6–50.3)
HGB BLD-MCNC: 9.2 % (ref 12.1–17)
IMM GRANULOCYTES # BLD AUTO: 0 K/UL (ref 0–0.04)
IMM GRANULOCYTES NFR BLD AUTO: 0 % (ref 0–0.5)
LYMPHOCYTES # BLD: 0.9 K/UL (ref 0.8–3.5)
LYMPHOCYTES NFR BLD: 13 % (ref 12–49)
MCH RBC QN AUTO: 28.7 PG (ref 26–34)
MCHC RBC AUTO-ENTMCNC: 31.6 G/DL (ref 30–36.5)
MCV RBC AUTO: 90.7 FL (ref 80–99)
MONOCYTES # BLD: 0.8 K/UL (ref 0–1)
MONOCYTES NFR BLD: 11 % (ref 5–13)
NEUTS SEG # BLD: 5.2 K/UL (ref 1.8–8)
NEUTS SEG NFR BLD: 75 % (ref 32–75)
PLATELET # BLD AUTO: 270 K/UL (ref 150–400)
PMV BLD AUTO: 8.6 FL (ref 8.9–12.9)
RBC # BLD AUTO: 3.21 M/UL (ref 4.1–5.7)
WBC # BLD AUTO: 7 K/UL (ref 4.1–11.1)

## 2020-09-13 PROCEDURE — 74011250636 HC RX REV CODE- 250/636: Performed by: INTERNAL MEDICINE

## 2020-09-13 PROCEDURE — 36415 COLL VENOUS BLD VENIPUNCTURE: CPT

## 2020-09-13 PROCEDURE — 3331090001 HH PPS REVENUE CREDIT

## 2020-09-13 PROCEDURE — 3331090002 HH PPS REVENUE DEBIT

## 2020-09-13 PROCEDURE — 74011250637 HC RX REV CODE- 250/637: Performed by: INTERNAL MEDICINE

## 2020-09-13 PROCEDURE — 65660000000 HC RM CCU STEPDOWN

## 2020-09-13 PROCEDURE — 36430 TRANSFUSION BLD/BLD COMPNT: CPT

## 2020-09-13 PROCEDURE — P9016 RBC LEUKOCYTES REDUCED: HCPCS

## 2020-09-13 PROCEDURE — 65270000029 HC RM PRIVATE

## 2020-09-13 PROCEDURE — 85025 COMPLETE CBC W/AUTO DIFF WBC: CPT

## 2020-09-13 PROCEDURE — 77010033678 HC OXYGEN DAILY

## 2020-09-13 PROCEDURE — 74011000258 HC RX REV CODE- 258: Performed by: INTERNAL MEDICINE

## 2020-09-13 RX ADMIN — AZITHROMYCIN MONOHYDRATE 500 MG: 500 TABLET ORAL at 06:25

## 2020-09-13 RX ADMIN — PIPERACILLIN SODIUM AND TAZOBACTAM SODIUM 3.38 G: 3; .375 INJECTION, POWDER, LYOPHILIZED, FOR SOLUTION INTRAVENOUS at 18:26

## 2020-09-13 RX ADMIN — ENOXAPARIN SODIUM 40 MG: 40 INJECTION SUBCUTANEOUS at 08:44

## 2020-09-13 RX ADMIN — PANTOPRAZOLE SODIUM 40 MG: 40 TABLET, DELAYED RELEASE ORAL at 06:25

## 2020-09-13 RX ADMIN — LOSARTAN POTASSIUM 100 MG: 50 TABLET, FILM COATED ORAL at 08:44

## 2020-09-13 RX ADMIN — PIPERACILLIN SODIUM AND TAZOBACTAM SODIUM 3.38 G: 3; .375 INJECTION, POWDER, LYOPHILIZED, FOR SOLUTION INTRAVENOUS at 06:00

## 2020-09-13 RX ADMIN — AZITHROMYCIN MONOHYDRATE 500 MG: 500 TABLET ORAL at 08:44

## 2020-09-13 RX ADMIN — ATORVASTATIN CALCIUM 20 MG: 20 TABLET, FILM COATED ORAL at 21:50

## 2020-09-13 NOTE — PROGRESS NOTES
Hospitalist Progress Note         Jordy Cantrell DO, MBA   Covering Team 5 on 9/22   Please use the page  at [phone] to reach me      Daily Progress Note: 9/13/2020    Chief complaint: Shortness of breath  Subjective: The patient is seen for follow  up. Offers no complaints. Received 2 units of packed red blood cells yesterday.     Problem List:  Problem List as of 9/13/2020 Date Reviewed: 9/1/2020          Codes Class Noted - Resolved    Abdominal wall hernia ICD-10-CM: K43.9  ICD-9-CM: 553.20  8/1/2017 - Present        DDD (degenerative disc disease), lumbar ICD-10-CM: M51.36  ICD-9-CM: 722.52  5/1/2017 - Present        Chronic pain ICD-10-CM: G89.29  ICD-9-CM: 338.29  1/6/2015 - Present        Aspiration pneumonia ([facility] ICD-10-CM: J69.0  ICD-9-CM: 507.0  1/19/2014 - Present        Hypernatremia ICD-10-CM: E87.0  ICD-9-CM: 276.0  1/14/2014 - Present        Ischemic colitis ([facility] ICD-10-CM: K55.9  ICD-9-CM: 557.9  1/13/2014 - Present        Colon perforation ([facility] ICD-10-CM: K63.1  ICD-9-CM: 569.83  1/13/2014 - Present        Acute respiratory failure with hypoxia ([facility] ICD-10-CM: J96.01  ICD-9-CM: 518.81  1/13/2014 - Present        Acute blood loss anemia ICD-10-CM: D62  ICD-9-CM: 285.1  1/13/2014 - Present        JESUS (acute kidney injury) ([facility] ICD-10-CM: N17.9  ICD-9-CM: 584.9  1/13/2014 - Present        Pneumonia ICD-10-CM: J18.9  ICD-9-CM: 486  1/1/2014 - Present        Elevated INR ICD-10-CM: R79.1  ICD-9-CM: 790.92  1/1/2014 - Present        Alcoholism ([address] ICD-10-CM: F10.20  ICD-9-CM: 303.90  1/1/2014 - Present        Nicotine addiction ICD-10-CM: F17.200  ICD-9-CM: 305.1  1/1/2014 - Present        PVD (peripheral vascular disease) ([facility] ICD-10-CM: I73.9  ICD-9-CM: 443.9  7/31/2013 - Present        Hyponatremia ICD-10-CM: E87.1  ICD-9-CM: 276.1  5/30/2012 - Present        ETOH abuse ICD-10-CM: F10.10  ICD-9-CM: 305.00  5/30/2012 - Present        Coagulation disorder [facility]) ICD-10-CM: D68.9  ICD-9-CM: 286.9  5/30/2012 - Present        Allergic rhinitis due to other allergen ICD-10-CM: J30.89  ICD-9-CM: 477.8  12/27/2011 - Present        Stroke 2002 (Chronic) ICD-10-CM: I63.9  ICD-9-CM: 434.91  3/15/2010 - Present        Anal fistula (Chronic) ICD-10-CM: K60.3  ICD-9-CM: 565.1  3/15/2010 - Present        HTN (hypertension) (Chronic) ICD-10-CM: I10  ICD-9-CM: 401.9  3/15/2010 - Present        Genital herpes (Chronic) ICD-10-CM: A60.00  ICD-9-CM: 054.10  3/15/2010 - Present        Noncompliance with treatment (Chronic) ICD-10-CM: Z91.19  ICD-9-CM: V15.81  3/15/2010 - Present        High cholesterol (Chronic) ICD-10-CM: E78.00  ICD-9-CM: 272.0  3/15/2010 - Present        left Carotid Artery Occlusion (Chronic) ICD-10-CM: V52.72  ICD-9-CM: 433.10  3/15/2010 - Present              Medications reviewed  Current Facility-Administered Medications   Medication Dose Route Frequency    0.9% sodium chloride infusion 250 mL  250 mL IntraVENous PRN    atorvastatin (LIPITOR) tablet 20 mg  20 mg Oral DAILY    enoxaparin (LOVENOX) injection 40 mg  40 mg SubCUTAneous Q24H    losartan (COZAAR) tablet 100 mg  100 mg Oral DAILY    piperacillin-tazobactam (ZOSYN) 3.375 g in 0.9% sodium chloride (MBP/ADV) 100 mL MBP  3.375 g IntraVENous Q8H    pantoprazole (PROTONIX) tablet 40 mg  40 mg Oral DAILY    acetaminophen (TYLENOL) tablet 650 mg  650 mg Oral Q4H PRN    alum-mag hydroxide-simeth (MYLANTA) oral suspension 30 mL  30 mL Oral Q4H PRN    magnesium hydroxide (MILK OF MAGNESIA) 400 mg/5 mL oral suspension 30 mL  30 mL Oral DAILY PRN    ondansetron (ZOFRAN) injection 4 mg  4 mg IntraVENous Q8H PRN    azithromycin (ZITHROMAX) tablet 500 mg  500 mg Oral DAILY       Review of Systems:   A comprehensive review of systems was negative except for: Respiratory: positive for dyspnea on exertion    Objective:   Physical Exam:     Visit Vitals  /70   Pulse 93   Temp 97.9 °F (36.6 °C)   Resp 20 SpO2 100%    O2 Flow Rate (L/min): 3 l/min O2 Device: Nasal cannula    Temp (24hrs), Av.3 °F (36.8 °C), Min:97.9 °F (36.6 °C), Max:98.6 °F (37 °C)    No intake/output data recorded. No intake/output data recorded. General:  Alert, cooperative, no distress, appears stated age. Lungs:   Clear to auscultation bilaterally with diminished air entry bilateral bases. Chest wall:  No tenderness or deformity. Heart:  Regular rate and rhythm, S1, S2 normal, no murmur, click, rub or gallop. Abdomen:   Soft, non-tender. Bowel sounds normal. No masses,  No organomegaly. Extremities: Extremities normal, atraumatic, no cyanosis or edema. Pulses: 2+ and symmetric all extremities. Skin: Skin color, texture, turgor normal. No rashes or lesions   Neurologic: CNII-XII intact. No gross sensory or motor deficits     Data Review:       Recent Days:  Recent Labs     20  0726   WBC 4.7   HGB 7.3*   HCT 22.9*        Recent Labs     20  0726   *   K 3.6   CL 93*   CO2 37*   GLU 63*   BUN 4*   CREA 0.56*   CA 7.7*     No results for input(s): PH, PCO2, PO2, HCO3, FIO2 in the last 72 hours.     24 Hour Results:  Recent Results (from the past 24 hour(s))   TYPE & SCREEN    Collection Time: 20  2:52 PM   Result Value Ref Range    Crossmatch Expiration 09/15/2020,2359     ABO/Rh(D) O Positive     Antibody screen Negative     Unit number [id number]     Blood component type  LR     Unit division 00     Status of unit [address] to transfuse     Crossmatch result Compatible     Unit number [id number]     Blood component type  LR,1     Unit division 00     Status of unit [address] to transfuse     Crossmatch result Compatible    GLUCOSE, POC    Collection Time: 20  8:26 PM   Result Value Ref Range    Glucose (POC) 105 (H) 65 - 100 mg/dL    Performed by Nya Moreno            Assessment/     Acute hypoxic respiratory failure  Left lower lobe pneumonia  Acute on chronic anemia  Dehydration  Rule out COVID-19  Generalized weakness and unsteady gait          Plan:  Continue supportive care including IV antibiotics  Await placement to skilled care facility  Overall clinically improving        Care Plan discussed with: Patient/Family    Total time spent with patient: 30 minutes.     Pedro Key MD

## 2020-09-14 ENCOUNTER — HOME CARE VISIT (OUTPATIENT)
Dept: HOME HEALTH SERVICES | Facility: HOME HEALTH | Age: 60
End: 2020-09-14
Payer: MEDICARE

## 2020-09-14 LAB
ABO + RH BLD: NORMAL
ANION GAP SERPL CALC-SCNC: 3 MMOL/L (ref 5–15)
BASOPHILS # BLD: 0 K/UL (ref 0–0.1)
BASOPHILS NFR BLD: 1 % (ref 0–1)
BLD PROD TYP BPU: NORMAL
BLD PROD TYP BPU: NORMAL
BLOOD GROUP ANTIBODIES SERPL: NEGATIVE
BPU ID: NORMAL
BPU ID: NORMAL
BUN SERPL-MCNC: 4 MG/DL (ref 6–20)
BUN/CREAT SERPL: 8 (ref 12–20)
CA-I BLD-MCNC: 8.4 MG/DL (ref 8.5–10.1)
CHLORIDE SERPL-SCNC: 97 MMOL/L (ref 97–108)
CO2 SERPL-SCNC: 35 MMOL/L (ref 21–32)
CREAT SERPL-MCNC: 0.52 MG/DL (ref 0.7–1.3)
CROSSMATCH RESULT,%XM: NORMAL
CROSSMATCH RESULT,%XM: NORMAL
DIFFERENTIAL METHOD BLD: ABNORMAL
EOSINOPHIL # BLD: 0.2 K/UL (ref 0–0.4)
EOSINOPHIL NFR BLD: 4 % (ref 0–7)
ERYTHROCYTE [DISTWIDTH] IN BLOOD BY AUTOMATED COUNT: 16 % (ref 11.5–14.5)
GLUCOSE SERPL-MCNC: 59 MG/DL (ref 65–100)
HCT VFR BLD AUTO: 30 % (ref 36.6–50.3)
HGB BLD-MCNC: 9.4 % (ref 12.1–17)
IMM GRANULOCYTES # BLD AUTO: 0 K/UL (ref 0–0.04)
IMM GRANULOCYTES NFR BLD AUTO: 0 % (ref 0–0.5)
LYMPHOCYTES # BLD: 1 K/UL (ref 0.8–3.5)
LYMPHOCYTES NFR BLD: 18 % (ref 12–49)
MCH RBC QN AUTO: 28.7 PG (ref 26–34)
MCHC RBC AUTO-ENTMCNC: 31.3 G/DL (ref 30–36.5)
MCV RBC AUTO: 91.7 FL (ref 80–99)
MONOCYTES # BLD: 0.7 K/UL (ref 0–1)
MONOCYTES NFR BLD: 13 % (ref 5–13)
NEUTS SEG # BLD: 3.6 K/UL (ref 1.8–8)
NEUTS SEG NFR BLD: 64 % (ref 32–75)
PLATELET # BLD AUTO: 269 K/UL (ref 150–400)
PMV BLD AUTO: 9.1 FL (ref 8.9–12.9)
POTASSIUM SERPL-SCNC: 3.9 MMOL/L (ref 3.5–5.1)
RBC # BLD AUTO: 3.27 M/UL (ref 4.1–5.7)
SODIUM SERPL-SCNC: 135 MMOL/L (ref 136–145)
SPECIMEN EXP DATE BLD: NORMAL
STATUS OF UNIT,%ST: NORMAL
STATUS OF UNIT,%ST: NORMAL
TRANSFUSION STATUS PATIENT QL: NORMAL
TRANSFUSION STATUS PATIENT QL: NORMAL
UNIT DIVISION, %UDIV: 0
UNIT DIVISION, %UDIV: 0
WBC # BLD AUTO: 5.5 K/UL (ref 4.1–11.1)

## 2020-09-14 PROCEDURE — 74011250637 HC RX REV CODE- 250/637: Performed by: INTERNAL MEDICINE

## 2020-09-14 PROCEDURE — 74011250636 HC RX REV CODE- 250/636: Performed by: INTERNAL MEDICINE

## 2020-09-14 PROCEDURE — 77010033678 HC OXYGEN DAILY

## 2020-09-14 PROCEDURE — 3331090002 HH PPS REVENUE DEBIT

## 2020-09-14 PROCEDURE — 65270000029 HC RM PRIVATE

## 2020-09-14 PROCEDURE — 94760 N-INVAS EAR/PLS OXIMETRY 1: CPT

## 2020-09-14 PROCEDURE — 93005 ELECTROCARDIOGRAM TRACING: CPT | Performed by: INTERNAL MEDICINE

## 2020-09-14 PROCEDURE — 74011000258 HC RX REV CODE- 258: Performed by: INTERNAL MEDICINE

## 2020-09-14 PROCEDURE — 36415 COLL VENOUS BLD VENIPUNCTURE: CPT

## 2020-09-14 PROCEDURE — 3331090001 HH PPS REVENUE CREDIT

## 2020-09-14 PROCEDURE — 85025 COMPLETE CBC W/AUTO DIFF WBC: CPT

## 2020-09-14 PROCEDURE — 65660000000 HC RM CCU STEPDOWN

## 2020-09-14 PROCEDURE — 80048 BASIC METABOLIC PNL TOTAL CA: CPT

## 2020-09-14 RX ORDER — METOPROLOL TARTRATE 25 MG/1
25 TABLET, FILM COATED ORAL 2 TIMES DAILY
Status: COMPLETED | OUTPATIENT
Start: 2020-09-14 | End: 2020-09-15

## 2020-09-14 RX ADMIN — PANTOPRAZOLE SODIUM 40 MG: 40 TABLET, DELAYED RELEASE ORAL at 06:27

## 2020-09-14 RX ADMIN — PIPERACILLIN SODIUM AND TAZOBACTAM SODIUM 3.38 G: 3; .375 INJECTION, POWDER, LYOPHILIZED, FOR SOLUTION INTRAVENOUS at 03:52

## 2020-09-14 RX ADMIN — PIPERACILLIN SODIUM AND TAZOBACTAM SODIUM 3.38 G: 3; .375 INJECTION, POWDER, LYOPHILIZED, FOR SOLUTION INTRAVENOUS at 12:39

## 2020-09-14 RX ADMIN — ATORVASTATIN CALCIUM 20 MG: 20 TABLET, FILM COATED ORAL at 21:01

## 2020-09-14 RX ADMIN — PIPERACILLIN SODIUM AND TAZOBACTAM SODIUM 3.38 G: 3; .375 INJECTION, POWDER, LYOPHILIZED, FOR SOLUTION INTRAVENOUS at 21:01

## 2020-09-14 RX ADMIN — ENOXAPARIN SODIUM 40 MG: 40 INJECTION SUBCUTANEOUS at 08:19

## 2020-09-14 RX ADMIN — LOSARTAN POTASSIUM 100 MG: 50 TABLET, FILM COATED ORAL at 08:19

## 2020-09-15 ENCOUNTER — APPOINTMENT (OUTPATIENT)
Dept: GENERAL RADIOLOGY | Age: 60
DRG: 177 | End: 2020-09-15
Attending: INTERNAL MEDICINE
Payer: MEDICARE

## 2020-09-15 ENCOUNTER — APPOINTMENT (OUTPATIENT)
Dept: GENERAL RADIOLOGY | Age: 60
DRG: 177 | End: 2020-09-15
Attending: HOSPITALIST
Payer: MEDICARE

## 2020-09-15 LAB
ANION GAP SERPL CALC-SCNC: 2 MMOL/L (ref 5–15)
ATRIAL RATE: 87 BPM
BASOPHILS # BLD: 0.1 K/UL (ref 0–0.1)
BASOPHILS NFR BLD: 1 % (ref 0–1)
BUN SERPL-MCNC: 3 MG/DL (ref 6–20)
BUN/CREAT SERPL: 5 (ref 12–20)
CA-I BLD-MCNC: 8.6 MG/DL (ref 8.5–10.1)
CALCULATED P AXIS, ECG09: 79 DEGREES
CALCULATED R AXIS, ECG10: 65 DEGREES
CALCULATED T AXIS, ECG11: 76 DEGREES
CHLORIDE SERPL-SCNC: 94 MMOL/L (ref 97–108)
CO2 SERPL-SCNC: 39 MMOL/L (ref 21–32)
CREAT SERPL-MCNC: 0.6 MG/DL (ref 0.7–1.3)
DIAGNOSIS, 93000: NORMAL
DIFFERENTIAL METHOD BLD: ABNORMAL
EOSINOPHIL # BLD: 0.2 K/UL (ref 0–0.4)
EOSINOPHIL NFR BLD: 3 % (ref 0–7)
ERYTHROCYTE [DISTWIDTH] IN BLOOD BY AUTOMATED COUNT: 15.9 % (ref 11.5–14.5)
GLUCOSE SERPL-MCNC: 73 MG/DL (ref 65–100)
HCT VFR BLD AUTO: 32.9 % (ref 36.6–50.3)
HGB BLD-MCNC: 10.3 % (ref 12.1–17)
IMM GRANULOCYTES # BLD AUTO: 0 K/UL (ref 0–0.04)
IMM GRANULOCYTES NFR BLD AUTO: 0 % (ref 0–0.5)
LYMPHOCYTES # BLD: 0.9 K/UL (ref 0.8–3.5)
LYMPHOCYTES NFR BLD: 15 % (ref 12–49)
MCH RBC QN AUTO: 28.8 PG (ref 26–34)
MCHC RBC AUTO-ENTMCNC: 31.3 G/DL (ref 30–36.5)
MCV RBC AUTO: 91.9 FL (ref 80–99)
MONOCYTES # BLD: 0.8 K/UL (ref 0–1)
MONOCYTES NFR BLD: 14 % (ref 5–13)
NEUTS SEG # BLD: 4.1 K/UL (ref 1.8–8)
NEUTS SEG NFR BLD: 67 % (ref 32–75)
P-R INTERVAL, ECG05: 176 MS
PLATELET # BLD AUTO: 303 K/UL (ref 150–400)
PMV BLD AUTO: 9 FL (ref 8.9–12.9)
POTASSIUM SERPL-SCNC: 4.1 MMOL/L (ref 3.5–5.1)
Q-T INTERVAL, ECG07: 362 MS
QRS DURATION, ECG06: 98 MS
QTC CALCULATION (BEZET), ECG08: 435 MS
RBC # BLD AUTO: 3.58 M/UL (ref 4.1–5.7)
SODIUM SERPL-SCNC: 135 MMOL/L (ref 136–145)
TSH SERPL DL<=0.05 MIU/L-ACNC: 2.83 UIU/ML (ref 0.36–3.74)
VENTRICULAR RATE, ECG03: 87 BPM
WBC # BLD AUTO: 6 K/UL (ref 4.1–11.1)

## 2020-09-15 PROCEDURE — 74011000258 HC RX REV CODE- 258: Performed by: INTERNAL MEDICINE

## 2020-09-15 PROCEDURE — 74011250637 HC RX REV CODE- 250/637: Performed by: INTERNAL MEDICINE

## 2020-09-15 PROCEDURE — 80048 BASIC METABOLIC PNL TOTAL CA: CPT

## 2020-09-15 PROCEDURE — 36415 COLL VENOUS BLD VENIPUNCTURE: CPT

## 2020-09-15 PROCEDURE — 94760 N-INVAS EAR/PLS OXIMETRY 1: CPT

## 2020-09-15 PROCEDURE — 77010033678 HC OXYGEN DAILY

## 2020-09-15 PROCEDURE — 65660000000 HC RM CCU STEPDOWN

## 2020-09-15 PROCEDURE — 84443 ASSAY THYROID STIM HORMONE: CPT

## 2020-09-15 PROCEDURE — 93005 ELECTROCARDIOGRAM TRACING: CPT

## 2020-09-15 PROCEDURE — 85025 COMPLETE CBC W/AUTO DIFF WBC: CPT

## 2020-09-15 PROCEDURE — 74022 RADEX COMPL AQT ABD SERIES: CPT

## 2020-09-15 PROCEDURE — 3331090001 HH PPS REVENUE CREDIT

## 2020-09-15 PROCEDURE — 3331090002 HH PPS REVENUE DEBIT

## 2020-09-15 PROCEDURE — 97530 THERAPEUTIC ACTIVITIES: CPT

## 2020-09-15 PROCEDURE — 74011250636 HC RX REV CODE- 250/636: Performed by: INTERNAL MEDICINE

## 2020-09-15 PROCEDURE — 65270000029 HC RM PRIVATE

## 2020-09-15 RX ORDER — DILTIAZEM HYDROCHLORIDE 120 MG/1
120 CAPSULE, COATED, EXTENDED RELEASE ORAL DAILY
Status: DISCONTINUED | OUTPATIENT
Start: 2020-09-15 | End: 2020-10-10

## 2020-09-15 RX ORDER — ALBUTEROL SULFATE 90 UG/1
2 AEROSOL, METERED RESPIRATORY (INHALATION)
Status: DISCONTINUED | OUTPATIENT
Start: 2020-09-15 | End: 2020-09-18

## 2020-09-15 RX ADMIN — PIPERACILLIN SODIUM AND TAZOBACTAM SODIUM 3.38 G: 3; .375 INJECTION, POWDER, LYOPHILIZED, FOR SOLUTION INTRAVENOUS at 14:18

## 2020-09-15 RX ADMIN — ENOXAPARIN SODIUM 40 MG: 40 INJECTION SUBCUTANEOUS at 09:29

## 2020-09-15 RX ADMIN — LOSARTAN POTASSIUM 100 MG: 50 TABLET, FILM COATED ORAL at 09:30

## 2020-09-15 RX ADMIN — METOPROLOL TARTRATE 25 MG: 25 TABLET, FILM COATED ORAL at 09:30

## 2020-09-15 RX ADMIN — METOPROLOL TARTRATE 25 MG: 25 TABLET, FILM COATED ORAL at 00:03

## 2020-09-15 RX ADMIN — ATORVASTATIN CALCIUM 20 MG: 20 TABLET, FILM COATED ORAL at 20:52

## 2020-09-15 RX ADMIN — PIPERACILLIN SODIUM AND TAZOBACTAM SODIUM 3.38 G: 3; .375 INJECTION, POWDER, LYOPHILIZED, FOR SOLUTION INTRAVENOUS at 05:23

## 2020-09-15 RX ADMIN — METOPROLOL TARTRATE 25 MG: 25 TABLET, FILM COATED ORAL at 19:08

## 2020-09-15 RX ADMIN — PANTOPRAZOLE SODIUM 40 MG: 40 TABLET, DELAYED RELEASE ORAL at 05:21

## 2020-09-15 RX ADMIN — AZITHROMYCIN MONOHYDRATE 500 MG: 500 TABLET ORAL at 09:30

## 2020-09-15 RX ADMIN — PIPERACILLIN SODIUM AND TAZOBACTAM SODIUM 3.38 G: 3; .375 INJECTION, POWDER, LYOPHILIZED, FOR SOLUTION INTRAVENOUS at 20:52

## 2020-09-15 NOTE — PROGRESS NOTES
CM met with patient at bedside. Patient would like his referral sent to [address] [place] [address] [address] and rehab at this time. CM to send referral [facility]. Patient does not think he is ready for discharge at this time.

## 2020-09-15 NOTE — CONSULTS
Pulmonary/ CC Consult    Subjective:     Date of Consultation:  September 15, 2020  Referring Physician: Tray Fernandez MD    Reason for consultation: Evaluation for shortness of breath/pneumonia    Mr. Ava Jenkins is a 63 y.o. male who is being seen for elevation of shortness of breath and pneumonia. Has history of chronic smoking. He was smoking prior to coming to the hospital.  He has been hospital for last 4 to 5 days. He noted that he was getting increasingly weak and had a ground-level fall which resulted in a bruise of his left side of forehead and left upper arm. He was admitted and treated for pneumonia. No blood cultures growth was noted. Has been on IV Zosyn. He was noted to be more short of breath especially when he lays on his right side. Has history of pneumonia in the past as well. He has other comorbid conditions including hypertension, anal fistula and peripheral vascular disease. There is no chest x-ray is available for review. On bedside evaluation he is looking more tachypneic. He is on supplemental oxygen via nasal cannula.       Patient Active Problem List   Diagnosis Code    Stroke 2002 I63.9    Anal fistula K60.3    HTN (hypertension) I10    Genital herpes A60.00    Noncompliance with treatment Z91.19    High cholesterol E78.00    left Carotid Artery Occlusion I65.29    Allergic rhinitis due to other allergen J30.89    Hyponatremia E87.1    ETOH abuse F10.10    Coagulation disorder ([facility]) D68.9    PVD (peripheral vascular disease) ([facility]) I73.9    Pneumonia J18.9    Elevated INR R79.1    Alcoholism ([facility]) F10.20    Nicotine addiction F17.200    Ischemic colitis ([facility]) K55.9    Colon perforation ([facility]) K63.1    Acute respiratory failure with hypoxia ([facility]) J96.01    Acute blood loss anemia D62    JESUS (acute kidney injury) ([facility]) N17.9    Hypernatremia E87.0    Aspiration pneumonia ([facility]) J69.0    Chronic pain G89.29    DDD (degenerative disc disease), lumbar M51.36  Abdominal wall hernia K43.9     Past Medical History:   Diagnosis Date    Anal fistula 3/15/2010    Anxiety     ARF (acute renal failure) (HCC)     Colon perforation (HCC)     Genital herpes 3/15/2010    GERD (gastroesophageal reflux disease)     High cholesterol 3/15/2010    History of blood transfusion     HTN (hypertension) 3/15/2010    Hyponatremia     Ischemic colitis ([facility]     left Carotid Artery Occlusion 3/15/2010    Noncompliance with treatment 3/15/2010    Pneumonia 2011    PUD (peptic ulcer disease)     Septic shock(785.52)     Stroke 2002 3/15/2010    Thromboembolus ([facility]     rt thigh    TIA (transient ischemic attack) 2007    pt reported      Family History   Problem Relation Age of Onset    Cancer Mother       Social History     Tobacco Use    Smoking status: Current Every Day Smoker     Packs/day: 2.00     Years: 35.00     Pack years: 70.00    Smokeless tobacco: Never Used   Substance Use Topics    Alcohol use: Yes     Alcohol/week: 70.0 standard drinks     Types: 84 Cans of beer per week     Comment: happy hour every day from 4 to 2am     Past Surgical History:   Procedure Laterality Date    CARDIAC CATHETERIZATION      CARDIAC SURG PROCEDURE UNLIST      HX COLECTOMY  1/11/2014    Right hemicolectomy for free air, perforated viscus    US SCROTUM/TESTICLES      right testicle removed      Prior to Admission medications    Medication Sig Start Date End Date Taking? Authorizing Provider   omeprazole (PRILOSEC) 20 mg capsule Take 20 mg by mouth daily. Yes Provider, Historical   pantoprazole (PROTONIX) 40 mg tablet Take 1 Tab by mouth daily.  6/18/20   Aakash Abarca MD   losartan (COZAAR) 100 mg tablet TAKE 1 TABLET BY MOUTH EVERY DAY 12/24/19   Wilton Dust, NP   simvastatin (ZOCOR) 40 mg tablet TAKE 1 TABLET BY MOUTH EVERY DAY AT NIGHT 12/24/19   Wilton Dust, NP     Allergies   Allergen Reactions    Morphine Other (comments)     itching        Review of Systems: All 10 systems were reviewed. Positive pertinent findings are mentioned above  Rest of the examination review is essentially unremarkable    Objective:   Blood pressure 135/69, pulse 83, temperature 98.1 °F (36.7 °C), resp. rate 18, SpO2 99 %. Temp (24hrs), Av °F (36.7 °C), Min:97.4 °F (36.3 °C), Max:98.4 °F (36.9 °C)       Data Review:  Current Facility-Administered Medications   Medication Dose Route Frequency    dilTIAZem ER (CARDIZEM CD) capsule 120 mg  120 mg Oral DAILY    metoprolol tartrate (LOPRESSOR) tablet 25 mg  25 mg Oral BID    0.9% sodium chloride infusion 250 mL  250 mL IntraVENous PRN    atorvastatin (LIPITOR) tablet 20 mg  20 mg Oral DAILY    enoxaparin (LOVENOX) injection 40 mg  40 mg SubCUTAneous Q24H    losartan (COZAAR) tablet 100 mg  100 mg Oral DAILY    piperacillin-tazobactam (ZOSYN) 3.375 g in 0.9% sodium chloride (MBP/ADV) 100 mL MBP  3.375 g IntraVENous Q8H    pantoprazole (PROTONIX) tablet 40 mg  40 mg Oral DAILY    acetaminophen (TYLENOL) tablet 650 mg  650 mg Oral Q4H PRN    alum-mag hydroxide-simeth (MYLANTA) oral suspension 30 mL  30 mL Oral Q4H PRN    magnesium hydroxide (MILK OF MAGNESIA) 400 mg/5 mL oral suspension 30 mL  30 mL Oral DAILY PRN    ondansetron (ZOFRAN) injection 4 mg  4 mg IntraVENous Q8H PRN    azithromycin (ZITHROMAX) tablet 500 mg  500 mg Oral DAILY        Exam:    This is a middle-aged male who looks older than his stated age. He is alert and oriented however looking tachypneic. JVD is absent, he has a bruise on his left forehead. Also has bruise on his right upper arm. JVD is absent. Chest: Has rhonchi audible at right lung base. Heart: S1-S2 normal  Abdomen: Soft, nontender, no visceromegaly  Extremities: No edema, cyanosis or clubbing  Neuro: No focal motor deficit.     Recent Results (from the past 24 hour(s))   CBC WITH AUTOMATED DIFF    Collection Time: 09/15/20  6:25 AM   Result Value Ref Range    WBC 6.0 4.1 - 11.1 K/uL RBC 3.58 (L) 4.10 - 5.70 M/uL    HGB 10.3 (L) 12.1 - 17.0 %    HCT 32.9 (L) 36.6 - 50.3 %    MCV 91.9 80.0 - 99.0 FL    MCH 28.8 26.0 - 34.0 PG    MCHC 31.3 30.0 - 36.5 g/dL    RDW 15.9 (H) 11.5 - 14.5 %    PLATELET 413 990 - 106 K/uL    MPV 9.0 8.9 - 12.9 FL    NEUTROPHILS 67 32 - 75 %    LYMPHOCYTES 15 12 - 49 %    MONOCYTES 14 (H) 5 - 13 %    EOSINOPHILS 3 0 - 7 %    BASOPHILS 1 0 - 1 %    IMMATURE GRANULOCYTES 0 0.0 - 0.5 %    ABS. NEUTROPHILS 4.1 1.8 - 8.0 K/UL    ABS. LYMPHOCYTES 0.9 0.8 - 3.5 K/UL    ABS. MONOCYTES 0.8 0.0 - 1.0 K/UL    ABS. EOSINOPHILS 0.2 0.0 - 0.4 K/UL    ABS. BASOPHILS 0.1 0.0 - 0.1 K/UL    ABS. IMM. GRANS. 0.0 0.00 - 0.04 K/UL    DF AUTOMATED     METABOLIC PANEL, BASIC    Collection Time: 09/15/20  6:25 AM   Result Value Ref Range    Sodium 135 (L) 136 - 145 mmol/L    Potassium 4.1 3.5 - 5.1 mmol/L    Chloride 94 (L) 97 - 108 mmol/L    CO2 39 (H) 21 - 32 mmol/L    Anion gap 2 (L) 5 - 15 mmol/L    Glucose 73 65 - 100 mg/dL    BUN 3 (L) 6 - 20 mg/dL    Creatinine 0.60 (L) 0.70 - 1.30 mg/dL    BUN/Creatinine ratio 5 (L) 12 - 20      GFR est AA >60 >60 ml/min/1.73m2    GFR est non-AA >60 >60 ml/min/1.73m2    Calcium 8.6 8.5 - 10.1 mg/dL   EKG, 12 LEAD, INITIAL    Collection Time: 09/15/20  9:02 AM   Result Value Ref Range    Ventricular Rate 87 BPM    Atrial Rate 87 BPM    P-R Interval 176 ms    QRS Duration 98 ms    Q-T Interval 362 ms    QTC Calculation (Bezet) 435 ms    Calculated P Axis 79 degrees    Calculated R Axis 65 degrees    Calculated T Axis 76 degrees    Diagnosis       Normal sinus rhythm  Normal ECG  No previous ECGs available  Confirmed by Yas Manning MD, Sherman Meehan ([id number]) on 9/15/2020 11:03:54 AM     TSH 3RD GENERATION    Collection Time: 09/15/20  1:46 PM   Result Value Ref Range    TSH 2.83 0.36 - 3.74 uIU/mL       Impression:    This is a middle-aged male who was admitted because of increasing symptoms of shortness of breath  He is getting treated in hospital for pneumonia with IV Zosyn. He is noted to be increasingly tachypneic and short of breath. He has been on supplemental oxygen. Chest x-ray results are not available because of change of health record system. He has following medical problems      Plan:   1. Shortness of breath:  Patient is short of breath, he is on nasal cannula oxygen. He was admitted about 5 days ago in the hospital and was treated for pneumonia with IV Zosyn. He is also on IV Zithromax. His WBC count is normal.  We will repeat his chest x-ray today to further evaluate airspace disease/lung consolidation. In the meanwhile I will keep him on supplemental oxygen  2. COPD:  He has a history of COPD based on chronic smoking. Had formal pulmonary function studies done. I will start him on prednisone 40 mg once daily. We will start him on inhaled Dulera 2 puffs twice daily and rescue inhaler albuterol. 3.  Pneumonia:  He is on IV Zosyn along with IV Zithromax. We will adjust his antibiotics based chest x-ray results.   4.  Hypertension:  He is on antihypertensive medications    Thank you for involving me in the management of your patient    Laci Tomas MD  Pulmonary/CC

## 2020-09-15 NOTE — PROGRESS NOTES
Problem: Mobility Impaired (Adult and Pediatric)  Goal: *Acute Goals and Plan of Care (Insert Text)  Description: Pt will be I with LE HEP in 7 days. Pt will perform bed mobility with mod I in 7 days. Pt will perform transfers with mod I in 7 days. Pt will amb 25-50 feet with LRAD safely with mod I in 7 days. Outcome: Progressing Towards Goal     Problem: Patient Education: Go to Patient Education Activity  Goal: Patient/Family Education  Description: LE HEP to improve strength and functional mobility       Outcome: Progressing Towards Goal   PHYSICAL THERAPY TREATMENT  Patient: Osiris Guajardo (59 y.o. male)  Date: 9/15/2020  Diagnosis: pneumonia hypokalemia hypertension chronic gerd hy   <principal problem not specified>       Precautions:    Chart, physical therapy assessment, plan of care and goals were reviewed. ASSESSMENT  Patient continues with skilled PT services and is progressing towards goals. Pt agreeable to PT session with max encouragement. Pt reports 5/10 back pain and SOB when received in supine. Current Level of Function Impacting Discharge (mobility/balance): Pt presents with decr ROM, strength, bed mobility, transfers, balance, gait, activity tolerance, safety awareness, and functional mobility. Other factors to consider for discharge: req supp O2 via NC 3L, dyspnea          PLAN :  Patient continues to benefit from skilled intervention to address the above impairments. Continue treatment per established plan of care. to address goals. Recommendation for discharge: (in order for the patient to meet his/her long term goals)  PT d/c recc SNF when medically appropriate.      This discharge recommendation:  Has been made in collaboration with the attending provider and/or case management    IF patient discharges home will need the following DME: patient owns DME required for discharge       SUBJECTIVE:   Patient stated my back hurts and I can't breathe, I won't be able to do much therapy even though I know I need it.     OBJECTIVE DATA SUMMARY:   Critical Behavior:  Neurologic State: Alert  Orientation Level: Oriented X4  Cognition: Appropriate decision making, Follows commands     Functional Mobility Training:  Bed Mobility:  Rolling: Supervision  Supine to Sit: Supervision  Sit to Supine: Supervision  Scooting: Supervision    Pt completed bed mobility while toileting and performed supine<>sit following a period of decongestion req Pt to spit sputum in trash sitting EOB. Transfers:      Pt declined sit<>stand and bed <>chair transfers despite max encouragement stating \" I'm scared of falling, that's how I ended up here. \" Pt educated on importance of sitting upright for lung health and to prevent further low back pain. Pt denies dizziness and SOB with sitting EOB and change of position. Balance:  Sitting: Intact; With support  Pt tolerated sitting EOB approx 10 min with UE support using railing of bed to remain upright. Ambulation/Gait Training:            Does not occur this session due to Pt refusal despite max encouragement. Stairs: Therapeutic Exercises: Pt completed bed level and supported sitting LE therex for ROM, strength, and functional mobility. Pt req min cues for proper form and activity pacing. EXERCISE   Sets   Reps   Active Active Assist   Passive Self ROM   Comments   Ankle Pumps   [x] [] [] []    Quad Sets/Glut Sets   [] [] [] []    Hamstring Sets   [] [] [] []    Short Arc Quads   [] [] [] []    Heel Slides   [x] [] [] []    Straight Leg Raises   [] [] [] []    Hip abd/add   [x] [] [] []    Long Arc Quads   [x] [] [] []    Marching   [x] [] [] []    Bridges   [x] [] [] []       Pain Ratin/10 low back    Activity Tolerance:   Fair, requires rest breaks, and observed SOB with activity  Please refer to the flowsheet for vital signs taken during this treatment.     After treatment patient left in no apparent distress:   Supine in bed    COMMUNICATION/COLLABORATION:   The patients plan of care was discussed with: Registered nurse.      Jonnie Bae, PT   Time Calculation: 38 mins

## 2020-09-15 NOTE — CONSULTS
Consult    NAME: Danie Boland   :  1960   MRN:  [id number]     Date/Time:  9/15/2020 8:35 AM    Patient PCP: Jarett Lindsey MD  ________________________________________________________________________     Assessment:   Paroxysmal atrial flutter/fibrillation with rapid ventricular response which in this patient could be due to hyperadrenergic state with pre-existing cardiomyopathy. The patient however has no known prior history of coronary artery disease or myocardial infarction. Plan:     1. I will use calcium channel blocker such as verapamil or Cardizem for rate control of paroxysmal atrial fibrillation/flutter in this patient. 2. Echocardiogram for LV function and valvular function. 3. Check thyroid function test to rule out any coexistent hyperthyroidism as cause of patient's paroxysmal atrial fibrillation/flutter. []        High complexity decision making was performed        Subjective:   CHIEF COMPLAINT:   This is an inpatient cardiology consultation requested by Enrique Mosley for atrial fibrillation with rapid ventricular response. HISTORY OF PRESENT ILLNESS:     Cesia Card is a 63 y.o.  male who who has a history of multiple medical problems including colon perforation, anal fistula, ischemic colitis, TIA, pretension, hypercholesterolemia and renal failure who was admitted for acute hypoxic respiratory failure with generalized weakness. Patient's work-up later has been notable for left lower lobe pneumonia with dehydration. In hospital on telemetry has been noted briefly for spell of tachycardia suspicious for atrial flutter/fibrillation with rapid ventricular response with heart rate in 160s resulting in this cardiology consultation. He denies feeling any palpitations or chest fluttering, any unusual dyspnea or chest pain. Patient later on his own converted to sinus rhythm. Patient currently is in sinus rhythm with heart rate in 80s to 90s.   Past Medical History:   Diagnosis Date    Anal fistula 3/15/2010    Anxiety     ARF (acute renal failure) (HCC)     Colon perforation (HCC)     Genital herpes 3/15/2010    GERD (gastroesophageal reflux disease)     High cholesterol 3/15/2010    History of blood transfusion     HTN (hypertension) 3/15/2010    Hyponatremia     Ischemic colitis ([facility]     left Carotid Artery Occlusion 3/15/2010    Noncompliance with treatment 3/15/2010    Pneumonia 2011    PUD (peptic ulcer disease)     Septic shock(785.52)     Stroke 2002 3/15/2010    Thromboembolus ([facility]     rt thigh    TIA (transient ischemic attack) 2007    pt reported      Past Surgical History:   Procedure Laterality Date    CARDIAC CATHETERIZATION      CARDIAC SURG PROCEDURE UNLIST      HX COLECTOMY  1/11/2014    Right hemicolectomy for free air, perforated viscus    US SCROTUM/TESTICLES      right testicle removed     Allergies   Allergen Reactions    Morphine Other (comments)     itching      Meds:  See below  Social History     Tobacco Use    Smoking status: Current Every Day Smoker     Packs/day: 2.00     Years: 35.00     Pack years: 70.00    Smokeless tobacco: Never Used   Substance Use Topics    Alcohol use:  Yes     Alcohol/week: 70.0 standard drinks     Types: 84 Cans of beer per week     Comment: happy hour every day from 4 to 2am      Family History   Problem Relation Age of Onset    Cancer Mother        REVIEW OF SYSTEMS:     []         Unable to obtain  ROS due to ---   [x]         Total of 12 systems reviewed as follows:    Constitutional: negative fever, negative chills, negative weight loss  Eyes:   negative visual changes  ENT:   negative sore throat, tongue or lip swelling  Respiratory:  Admitted for left lower lobe pneumonia with respiratory failure  Cards:  Per history of present illness  GI:   negative for nausea, vomiting, diarrhea, and abdominal pain  Genitourinary: negative for frequency, dysuria  Integument:  negative for rash   Hematologic: negative for easy bruising and gum/nose bleeding  Musculoskel: negative for myalgias,  back pain  Neurological:  negative for headaches, dizziness, vertigo, weakness  Behavl/Psych: negative for feelings of anxiety, depression         Objective:      Physical Exam:    Last 24hrs VS reviewed since prior progress note. Most recent are:    Visit Vitals  BP (!) 141/79   Pulse 85   Temp 97.4 °F (36.3 °C)   Resp 18   SpO2 95%       Intake/Output Summary (Last 24 hours) at 9/15/2020 0835  Last data filed at 9/15/2020 4838  Gross per 24 hour   Intake 240 ml   Output 825 ml   Net -585 ml        General Appearance: Chronically ill-appearing middle-aged man, alert & oriented x 3,    no acute distress. Ears/Nose/Mouth/Throat: Pupils equal and round, Hearing grossly normal.  Neck: Supple. JVP within normal limits. Carotids good upstrokes, with no bruit. Chest: Lungs crackles in lower lung base with scattered rhonchi  Cardiovascular: Regular rate and rhythm, S1S2 normal, no murmur, rubs, gallops. Abdomen: Soft, non-tender, bowel sounds are active. No organomegaly. Extremities: No edema bilaterally. Femoral pulses +2, Distal Pulses +1. Skin: Warm and dry. Neuro: CN II-XII grossly intact, Strength and sensation grossly intact. []         Post-cath site without hematoma, bruit, tenderness, or thrill. Distal pulses intact. Data:      Telemetry:    EKG:  []  No new EKG for review. Prior to Admission medications    Medication Sig Start Date End Date Taking? Authorizing Provider   omeprazole (PRILOSEC) 20 mg capsule Take 20 mg by mouth daily. Yes Provider, Historical   pantoprazole (PROTONIX) 40 mg tablet Take 1 Tab by mouth daily.  6/18/20   Shamir Carter MD   losartan (COZAAR) 100 mg tablet TAKE 1 TABLET BY MOUTH EVERY DAY 12/24/19   Roxanna Strauss NP   simvastatin (ZOCOR) 40 mg tablet TAKE 1 TABLET BY MOUTH EVERY DAY AT NIGHT 12/24/19   Roxanna Strauss NP       Recent Results (from the past 25 hour(s))   CBC WITH AUTOMATED DIFF    Collection Time: 09/14/20  8:40 AM   Result Value Ref Range    WBC 5.5 4.1 - 11.1 K/uL    RBC 3.27 (L) 4.10 - 5.70 M/uL    HGB 9.4 (L) 12.1 - 17.0 %    HCT 30.0 (L) 36.6 - 50.3 %    MCV 91.7 80.0 - 99.0 FL    MCH 28.7 26.0 - 34.0 PG    MCHC 31.3 30.0 - 36.5 g/dL    RDW 16.0 (H) 11.5 - 14.5 %    PLATELET 812 491 - 325 K/uL    MPV 9.1 8.9 - 12.9 FL    NEUTROPHILS 64 32 - 75 %    LYMPHOCYTES 18 12 - 49 %    MONOCYTES 13 5 - 13 %    EOSINOPHILS 4 0 - 7 %    BASOPHILS 1 0 - 1 %    IMMATURE GRANULOCYTES 0 0.0 - 0.5 %    ABS. NEUTROPHILS 3.6 1.8 - 8.0 K/UL    ABS. LYMPHOCYTES 1.0 0.8 - 3.5 K/UL    ABS. MONOCYTES 0.7 0.0 - 1.0 K/UL    ABS. EOSINOPHILS 0.2 0.0 - 0.4 K/UL    ABS. BASOPHILS 0.0 0.0 - 0.1 K/UL    ABS. IMM. GRANS. 0.0 0.00 - 0.04 K/UL    DF AUTOMATED     METABOLIC PANEL, BASIC    Collection Time: 09/14/20  8:40 AM   Result Value Ref Range    Sodium 135 (L) 136 - 145 mmol/L    Potassium 3.9 3.5 - 5.1 mmol/L    Chloride 97 97 - 108 mmol/L    CO2 35 (H) 21 - 32 mmol/L    Anion gap 3 (L) 5 - 15 mmol/L    Glucose 59 (L) 65 - 100 mg/dL    BUN 4 (L) 6 - 20 mg/dL    Creatinine 0.52 (L) 0.70 - 1.30 mg/dL    BUN/Creatinine ratio 8 (L) 12 - 20      GFR est AA >60 >60 ml/min/1.73m2    GFR est non-AA >60 >60 ml/min/1.73m2    Calcium 8.4 (L) 8.5 - 10.1 mg/dL   I thank you for allowing me to see this patient. Please send a copy of this dictation to Dr. Eriberto Mims.     Milton Bertrand MD

## 2020-09-15 NOTE — PROGRESS NOTES
Patient was declined at [facility]. Patient gave choice for other SNF's close by, Referrals sent. Currently accepted at [facility], but they need H&P and other clinical information before the Epic transition. Currently CM can see clinical information on an older [id number] [place] [address] on nursing unit, unable to print to [employer] for the referral source, unable to connect [address] [place] [address] to [address] [employer] on unit to print the information to manually fax information to facility. Will continue to attempt and also contact IT for support.

## 2020-09-15 NOTE — PROGRESS NOTES
Hospitalist Progress Note               Daily Progress Note: 9/15/2020    Chief complaint: Shortness of breath  Subjective: The patient is seen for follow  up. Offers no complaints. 50-year-old male was admitted to the hospital with a complaint of shortness of breath and cough. Patient was found to have left lower lobe pneumonia. Patient continues to have shortness of breath and cough. Patient has severe rails and crackles on his left side and become short of breath just by turning from left lateral position to right lateral position.   He received 2 units of packed red blood cells during the stay    Problem List:  Problem List as of 9/15/2020 Date Reviewed: 9/1/2020          Codes Class Noted - Resolved    Abdominal wall hernia ICD-10-CM: K43.9  ICD-9-CM: 553.20  8/1/2017 - Present        DDD (degenerative disc disease), lumbar ICD-10-CM: M51.36  ICD-9-CM: 722.52  5/1/2017 - Present        Chronic pain ICD-10-CM: G89.29  ICD-9-CM: 338.29  1/6/2015 - Present        Aspiration pneumonia ([facility] ICD-10-CM: J69.0  ICD-9-CM: 507.0  1/19/2014 - Present        Hypernatremia ICD-10-CM: E87.0  ICD-9-CM: 276.0  1/14/2014 - Present        Ischemic colitis ([facility] ICD-10-CM: K55.9  ICD-9-CM: 557.9  1/13/2014 - Present        Colon perforation ([facility] ICD-10-CM: K63.1  ICD-9-CM: 569.83  1/13/2014 - Present        Acute respiratory failure with hypoxia ([facility] ICD-10-CM: J96.01  ICD-9-CM: 518.81  1/13/2014 - Present        Acute blood loss anemia ICD-10-CM: D62  ICD-9-CM: 285.1  1/13/2014 - Present        JESUS (acute kidney injury) ([facility] ICD-10-CM: N17.9  ICD-9-CM: 584.9  1/13/2014 - Present        Pneumonia ICD-10-CM: J18.9  ICD-9-CM: 486  1/1/2014 - Present        Elevated INR ICD-10-CM: R79.1  ICD-9-CM: 790.92  1/1/2014 - Present        Alcoholism ([facility] ICD-10-CM: F10.20  ICD-9-CM: 303.90  1/1/2014 - Present        Nicotine addiction ICD-10-CM: T66.480  ICD-9-CM: 305.1  1/1/2014 - Present        PVD (peripheral vascular disease) ([facility] ICD-10-CM: I73.9  ICD-9-CM: 443.9  7/31/2013 - Present        Hyponatremia ICD-10-CM: E87.1  ICD-9-CM: 276.1  5/30/2012 - Present        ETOH abuse ICD-10-CM: F10.10  ICD-9-CM: 305.00  5/30/2012 - Present        Coagulation disorder ([facility] ICD-10-CM: D68.9  ICD-9-CM: 286.9  5/30/2012 - Present        Allergic rhinitis due to other allergen ICD-10-CM: J30.89  ICD-9-CM: 477.8  12/27/2011 - Present        Stroke 2002 (Chronic) ICD-10-CM: I63.9  ICD-9-CM: 434.91  3/15/2010 - Present        Anal fistula (Chronic) ICD-10-CM: K60.3  ICD-9-CM: 565.1  3/15/2010 - Present        HTN (hypertension) (Chronic) ICD-10-CM: I10  ICD-9-CM: 401.9  3/15/2010 - Present        Genital herpes (Chronic) ICD-10-CM: A60.00  ICD-9-CM: 054.10  3/15/2010 - Present        Noncompliance with treatment (Chronic) ICD-10-CM: Z91.19  ICD-9-CM: V15.81  3/15/2010 - Present        High cholesterol (Chronic) ICD-10-CM: E78.00  ICD-9-CM: 272.0  3/15/2010 - Present        left Carotid Artery Occlusion (Chronic) ICD-10-CM: B97.29  ICD-9-CM: 433.10  3/15/2010 - Present              Medications reviewed  Current Facility-Administered Medications   Medication Dose Route Frequency    dilTIAZem ER (CARDIZEM CD) capsule 120 mg  120 mg Oral DAILY    metoprolol tartrate (LOPRESSOR) tablet 25 mg  25 mg Oral BID    0.9% sodium chloride infusion 250 mL  250 mL IntraVENous PRN    atorvastatin (LIPITOR) tablet 20 mg  20 mg Oral DAILY    enoxaparin (LOVENOX) injection 40 mg  40 mg SubCUTAneous Q24H    losartan (COZAAR) tablet 100 mg  100 mg Oral DAILY    piperacillin-tazobactam (ZOSYN) 3.375 g in 0.9% sodium chloride (MBP/ADV) 100 mL MBP  3.375 g IntraVENous Q8H    pantoprazole (PROTONIX) tablet 40 mg  40 mg Oral DAILY    acetaminophen (TYLENOL) tablet 650 mg  650 mg Oral Q4H PRN    alum-mag hydroxide-simeth (MYLANTA) oral suspension 30 mL  30 mL Oral Q4H PRN    magnesium hydroxide (MILK OF MAGNESIA) 400 mg/5 mL oral suspension 30 mL  30 mL Oral DAILY PRN    ondansetron (ZOFRAN) injection 4 mg  4 mg IntraVENous Q8H PRN    azithromycin (ZITHROMAX) tablet 500 mg  500 mg Oral DAILY       Review of Systems:   A comprehensive review of systems was negative except for: Respiratory: positive for dyspnea on exertion    Objective:   Physical Exam:     Visit Vitals  /69   Pulse 83   Temp 98.1 °F (36.7 °C)   Resp 18   SpO2 99%    O2 Flow Rate (L/min): 3 l/min O2 Device: Nasal cannula    Temp (24hrs), Av °F (36.7 °C), Min:97.4 °F (36.3 °C), Max:98.4 °F (36.9 °C)    No intake/output data recorded.  1901 - 09/15 0700  In: 240 [P.O.:240]  Out: 825 [Urine:825]    General:  Alert, cooperative, no distress, appears stated age. Lungs:   Clear to auscultation bilaterally with diminished air entry bilateral bases. Chest wall:  No tenderness or deformity. Heart:  Regular rate and rhythm, S1, S2 normal, no murmur, click, rub or gallop. Abdomen:   Soft, non-tender. Bowel sounds normal. No masses,  No organomegaly. Extremities: Extremities normal, atraumatic, no cyanosis or edema. Pulses: 2+ and symmetric all extremities. Skin: Skin color, texture, turgor normal. No rashes or lesions   Neurologic: CNII-XII intact. No gross sensory or motor deficits     Data Review:       Recent Days:  Recent Labs     09/15/20  0625 20  0840 20  1220   WBC 6.0 5.5 7.0   HGB 10.3* 9.4* 9.2*   HCT 32.9* 30.0* 29.1*    269 270     Recent Labs     09/15/20  0625 20  0840   * 135*   K 4.1 3.9   CL 94* 97   CO2 39* 35*   GLU 73 59*   BUN 3* 4*   CREA 0.60* 0.52*   CA 8.6 8.4*     No results for input(s): PH, PCO2, PO2, HCO3, FIO2 in the last 72 hours.     24 Hour Results:  Recent Results (from the past 24 hour(s))   CBC WITH AUTOMATED DIFF    Collection Time: 09/15/20  6:25 AM   Result Value Ref Range    WBC 6.0 4.1 - 11.1 K/uL    RBC 3.58 (L) 4.10 - 5.70 M/uL    HGB 10.3 (L) 12.1 - 17.0 %    HCT 32.9 (L) 36.6 - 50.3 %    MCV 91.9 80.0 - 99.0 FL    MCH 28.8 26.0 - 34.0 PG    MCHC 31.3 30.0 - 36.5 g/dL    RDW 15.9 (H) 11.5 - 14.5 %    PLATELET 877 434 - 358 K/uL    MPV 9.0 8.9 - 12.9 FL    NEUTROPHILS 67 32 - 75 %    LYMPHOCYTES 15 12 - 49 %    MONOCYTES 14 (H) 5 - 13 %    EOSINOPHILS 3 0 - 7 %    BASOPHILS 1 0 - 1 %    IMMATURE GRANULOCYTES 0 0.0 - 0.5 %    ABS. NEUTROPHILS 4.1 1.8 - 8.0 K/UL    ABS. LYMPHOCYTES 0.9 0.8 - 3.5 K/UL    ABS. MONOCYTES 0.8 0.0 - 1.0 K/UL    ABS. EOSINOPHILS 0.2 0.0 - 0.4 K/UL    ABS. BASOPHILS 0.1 0.0 - 0.1 K/UL    ABS. IMM.  GRANS. 0.0 0.00 - 0.04 K/UL    DF AUTOMATED     METABOLIC PANEL, BASIC    Collection Time: 09/15/20  6:25 AM   Result Value Ref Range    Sodium 135 (L) 136 - 145 mmol/L    Potassium 4.1 3.5 - 5.1 mmol/L    Chloride 94 (L) 97 - 108 mmol/L    CO2 39 (H) 21 - 32 mmol/L    Anion gap 2 (L) 5 - 15 mmol/L    Glucose 73 65 - 100 mg/dL    BUN 3 (L) 6 - 20 mg/dL    Creatinine 0.60 (L) 0.70 - 1.30 mg/dL    BUN/Creatinine ratio 5 (L) 12 - 20      GFR est AA >60 >60 ml/min/1.73m2    GFR est non-AA >60 >60 ml/min/1.73m2    Calcium 8.6 8.5 - 10.1 mg/dL   EKG, 12 LEAD, INITIAL    Collection Time: 09/15/20  9:02 AM   Result Value Ref Range    Ventricular Rate 87 BPM    Atrial Rate 87 BPM    P-R Interval 176 ms    QRS Duration 98 ms    Q-T Interval 362 ms    QTC Calculation (Bezet) 435 ms    Calculated P Axis 79 degrees    Calculated R Axis 65 degrees    Calculated T Axis 76 degrees    Diagnosis       Normal sinus rhythm  Normal ECG  No previous ECGs available  Confirmed by Rain Thompson MD, Duke Carlson ([id number]) on 9/15/2020 11:03:54 AM     TSH 3RD GENERATION    Collection Time: 09/15/20  1:46 PM   Result Value Ref Range    TSH 2.83 0.36 - 3.74 uIU/mL           Assessment/     Acute hypoxic respiratory failure  Left lower lobe pneumonia  Acute on chronic anemia  Dehydration  Rule out COVID-19  Generalized weakness and unsteady gait          Plan:  Continue supportive care including IV antibiotics  Will get pulmn eval as well. Await placement to skilled care facility  Overall clinically improving        Care Plan discussed with: Patient/Family    Total time spent with patient: 30 minutes.     Sandip Abdalla MD

## 2020-09-16 ENCOUNTER — APPOINTMENT (OUTPATIENT)
Dept: CT IMAGING | Age: 60
DRG: 177 | End: 2020-09-16
Attending: INTERNAL MEDICINE
Payer: MEDICARE

## 2020-09-16 ENCOUNTER — APPOINTMENT (OUTPATIENT)
Dept: NON INVASIVE DIAGNOSTICS | Age: 60
DRG: 177 | End: 2020-09-16
Attending: INTERNAL MEDICINE
Payer: MEDICARE

## 2020-09-16 LAB
ANION GAP SERPL CALC-SCNC: 4 MMOL/L (ref 5–15)
ANION GAP SERPL CALC-SCNC: 4 MMOL/L (ref 5–15)
BASOPHILS # BLD: 0 K/UL (ref 0–0.1)
BASOPHILS # BLD: 0.1 K/UL (ref 0–0.1)
BASOPHILS NFR BLD: 1 % (ref 0–1)
BASOPHILS NFR BLD: 1 % (ref 0–1)
BUN SERPL-MCNC: 3 MG/DL (ref 6–20)
BUN SERPL-MCNC: 3 MG/DL (ref 6–20)
BUN/CREAT SERPL: 5 (ref 12–20)
BUN/CREAT SERPL: 6 (ref 12–20)
CA-I BLD-MCNC: 8.2 MG/DL (ref 8.5–10.1)
CA-I BLD-MCNC: 8.4 MG/DL (ref 8.5–10.1)
CHLORIDE SERPL-SCNC: 92 MMOL/L (ref 97–108)
CHLORIDE SERPL-SCNC: 94 MMOL/L (ref 97–108)
CO2 SERPL-SCNC: 38 MMOL/L (ref 21–32)
CO2 SERPL-SCNC: 39 MMOL/L (ref 21–32)
CREAT SERPL-MCNC: 0.49 MG/DL (ref 0.7–1.3)
CREAT SERPL-MCNC: 0.64 MG/DL (ref 0.7–1.3)
DIFFERENTIAL METHOD BLD: ABNORMAL
DIFFERENTIAL METHOD BLD: ABNORMAL
ECHO AO ROOT DIAM: 3.5 CM
ECHO AV MEAN GRADIENT: 35 MMHG
ECHO AV PEAK GRADIENT: 61 MMHG
ECHO AV VTI: 91.3 CM
ECHO EST RA PRESSURE: 3 MMHG
ECHO LV E' SEPTAL VELOCITY: 4.97 CM/S
ECHO LV INTERNAL DIMENSION DIASTOLIC: 3.89 CM (ref 4.2–5.9)
ECHO LV INTERNAL DIMENSION SYSTOLIC: 2.55 CM
ECHO LV IVSD: 1.21 CM (ref 0.6–1)
ECHO LV MASS 2D: 170.8 G (ref 88–224)
ECHO LV MASS INDEX 2D: 82.6 G/M2 (ref 49–115)
ECHO LV POSTERIOR WALL DIASTOLIC: 1.31 CM (ref 0.6–1)
ECHO LVOT DIAM: 1.9 CM
ECHO LVOT PEAK GRADIENT: 8 MMHG
ECHO LVOT VTI: 32.6 CM
ECHO MV A VELOCITY: 37 CM/S
ECHO MV E DECELERATION TIME (DT): 0.17 S
ECHO MV E VELOCITY: 55.5 CM/S
ECHO MV E/A RATIO: 1.5
ECHO MV E/E' SEPTAL: 11.17
ECHO PV PEAK INSTANTANEOUS GRADIENT SYSTOLIC: 2 MMHG
ECHO PV REGURGITANT MAX VELOCITY: 138 CM/S
ECHO PV REGURGITANT MAX VELOCITY: 390 CM/S
ECHO PV REGURGITANT MAX VELOCITY: 75.4 CM/S
ECHO PVEIN A DURATION: 0.09 S
ECHO PVEIN A VELOCITY: 22.2 CM/S
ECHO RV INTERNAL DIMENSION: 3.71 CM
ECHO TV MAX VELOCITY: 333 CM/S
ECHO TV MEAN GRADIENT: 5 MMHG
ECHO TV REGURGITANT PEAK GRADIENT: 44 MMHG
EOSINOPHIL # BLD: 0 K/UL (ref 0–0.4)
EOSINOPHIL # BLD: 0.1 K/UL (ref 0–0.4)
EOSINOPHIL NFR BLD: 1 % (ref 0–7)
EOSINOPHIL NFR BLD: 2 % (ref 0–7)
ERYTHROCYTE [DISTWIDTH] IN BLOOD BY AUTOMATED COUNT: 16.1 % (ref 11.5–14.5)
ERYTHROCYTE [DISTWIDTH] IN BLOOD BY AUTOMATED COUNT: 16.2 % (ref 11.5–14.5)
GLUCOSE SERPL-MCNC: 54 MG/DL (ref 65–100)
GLUCOSE SERPL-MCNC: 85 MG/DL (ref 65–100)
HCT VFR BLD AUTO: 31 % (ref 36.6–50.3)
HCT VFR BLD AUTO: 33.1 % (ref 36.6–50.3)
HGB BLD-MCNC: 10.2 % (ref 12.1–17)
HGB BLD-MCNC: 9.8 % (ref 12.1–17)
IMM GRANULOCYTES # BLD AUTO: 0 K/UL (ref 0–0.04)
IMM GRANULOCYTES # BLD AUTO: 0 K/UL (ref 0–0.04)
IMM GRANULOCYTES NFR BLD AUTO: 0 % (ref 0–0.5)
IMM GRANULOCYTES NFR BLD AUTO: 0 % (ref 0–0.5)
LYMPHOCYTES # BLD: 0.6 K/UL (ref 0.8–3.5)
LYMPHOCYTES # BLD: 0.9 K/UL (ref 0.8–3.5)
LYMPHOCYTES NFR BLD: 10 % (ref 12–49)
LYMPHOCYTES NFR BLD: 15 % (ref 12–49)
MCH RBC QN AUTO: 28.8 PG (ref 26–34)
MCH RBC QN AUTO: 29.2 PG (ref 26–34)
MCHC RBC AUTO-ENTMCNC: 30.8 G/DL (ref 30–36.5)
MCHC RBC AUTO-ENTMCNC: 31.6 G/DL (ref 30–36.5)
MCV RBC AUTO: 92.3 FL (ref 80–99)
MCV RBC AUTO: 93.5 FL (ref 80–99)
MONOCYTES # BLD: 0.5 K/UL (ref 0–1)
MONOCYTES # BLD: 0.8 K/UL (ref 0–1)
MONOCYTES NFR BLD: 13 % (ref 5–13)
MONOCYTES NFR BLD: 8 % (ref 5–13)
NEUTS SEG # BLD: 4 K/UL (ref 1.8–8)
NEUTS SEG # BLD: 4.7 K/UL (ref 1.8–8)
NEUTS SEG NFR BLD: 69 % (ref 32–75)
NEUTS SEG NFR BLD: 80 % (ref 32–75)
PLATELET # BLD AUTO: 276 K/UL (ref 150–400)
PLATELET # BLD AUTO: 283 K/UL (ref 150–400)
PMV BLD AUTO: 9 FL (ref 8.9–12.9)
PMV BLD AUTO: 9.4 FL (ref 8.9–12.9)
POTASSIUM SERPL-SCNC: 3.1 MMOL/L (ref 3.5–5.1)
POTASSIUM SERPL-SCNC: 3.6 MMOL/L (ref 3.5–5.1)
RBC # BLD AUTO: 3.36 M/UL (ref 4.1–5.7)
RBC # BLD AUTO: 3.54 M/UL (ref 4.1–5.7)
SODIUM SERPL-SCNC: 135 MMOL/L (ref 136–145)
SODIUM SERPL-SCNC: 136 MMOL/L (ref 136–145)
WBC # BLD AUTO: 5.8 K/UL (ref 4.1–11.1)
WBC # BLD AUTO: 5.9 K/UL (ref 4.1–11.1)

## 2020-09-16 PROCEDURE — 3331090001 HH PPS REVENUE CREDIT

## 2020-09-16 PROCEDURE — 74011250636 HC RX REV CODE- 250/636: Performed by: INTERNAL MEDICINE

## 2020-09-16 PROCEDURE — 80048 BASIC METABOLIC PNL TOTAL CA: CPT

## 2020-09-16 PROCEDURE — 74011250637 HC RX REV CODE- 250/637: Performed by: INTERNAL MEDICINE

## 2020-09-16 PROCEDURE — 77010033678 HC OXYGEN DAILY

## 2020-09-16 PROCEDURE — 74011636637 HC RX REV CODE- 636/637: Performed by: INTERNAL MEDICINE

## 2020-09-16 PROCEDURE — 65270000029 HC RM PRIVATE

## 2020-09-16 PROCEDURE — 94760 N-INVAS EAR/PLS OXIMETRY 1: CPT

## 2020-09-16 PROCEDURE — 85025 COMPLETE CBC W/AUTO DIFF WBC: CPT

## 2020-09-16 PROCEDURE — 74011000258 HC RX REV CODE- 258: Performed by: INTERNAL MEDICINE

## 2020-09-16 PROCEDURE — 3331090002 HH PPS REVENUE DEBIT

## 2020-09-16 PROCEDURE — 36415 COLL VENOUS BLD VENIPUNCTURE: CPT

## 2020-09-16 PROCEDURE — 93306 TTE W/DOPPLER COMPLETE: CPT

## 2020-09-16 PROCEDURE — 94640 AIRWAY INHALATION TREATMENT: CPT

## 2020-09-16 PROCEDURE — 65660000000 HC RM CCU STEPDOWN

## 2020-09-16 RX ORDER — BUDESONIDE AND FORMOTEROL FUMARATE DIHYDRATE 80; 4.5 UG/1; UG/1
2 AEROSOL RESPIRATORY (INHALATION)
Status: DISCONTINUED | OUTPATIENT
Start: 2020-09-16 | End: 2020-09-22

## 2020-09-16 RX ADMIN — BUDESONIDE AND FORMOTEROL FUMARATE DIHYDRATE 2 PUFF: 80; 4.5 AEROSOL RESPIRATORY (INHALATION) at 13:39

## 2020-09-16 RX ADMIN — PREDNISONE 30 MG: 20 TABLET ORAL at 16:42

## 2020-09-16 RX ADMIN — AZITHROMYCIN MONOHYDRATE 500 MG: 500 TABLET ORAL at 08:48

## 2020-09-16 RX ADMIN — PANTOPRAZOLE SODIUM 40 MG: 40 TABLET, DELAYED RELEASE ORAL at 04:18

## 2020-09-16 RX ADMIN — DILTIAZEM HYDROCHLORIDE 120 MG: 120 CAPSULE, COATED, EXTENDED RELEASE ORAL at 08:48

## 2020-09-16 RX ADMIN — PIPERACILLIN SODIUM AND TAZOBACTAM SODIUM 3.38 G: 3; .375 INJECTION, POWDER, LYOPHILIZED, FOR SOLUTION INTRAVENOUS at 04:18

## 2020-09-16 RX ADMIN — ATORVASTATIN CALCIUM 20 MG: 20 TABLET, FILM COATED ORAL at 20:58

## 2020-09-16 RX ADMIN — ENOXAPARIN SODIUM 40 MG: 40 INJECTION SUBCUTANEOUS at 08:48

## 2020-09-16 RX ADMIN — ALBUTEROL SULFATE 2 PUFF: 108 AEROSOL, METERED RESPIRATORY (INHALATION) at 07:56

## 2020-09-16 RX ADMIN — ALBUTEROL SULFATE 2 PUFF: 108 AEROSOL, METERED RESPIRATORY (INHALATION) at 13:41

## 2020-09-16 RX ADMIN — PIPERACILLIN SODIUM AND TAZOBACTAM SODIUM 3.38 G: 3; .375 INJECTION, POWDER, LYOPHILIZED, FOR SOLUTION INTRAVENOUS at 20:57

## 2020-09-16 RX ADMIN — LOSARTAN POTASSIUM 100 MG: 50 TABLET, FILM COATED ORAL at 08:48

## 2020-09-16 RX ADMIN — BUDESONIDE AND FORMOTEROL FUMARATE DIHYDRATE 2 PUFF: 80; 4.5 AEROSOL RESPIRATORY (INHALATION) at 19:45

## 2020-09-16 RX ADMIN — PIPERACILLIN SODIUM AND TAZOBACTAM SODIUM 3.38 G: 3; .375 INJECTION, POWDER, LYOPHILIZED, FOR SOLUTION INTRAVENOUS at 12:07

## 2020-09-16 RX ADMIN — MOMETASONE FUROATE AND FORMOTEROL FUMARATE DIHYDRATE 2 PUFF: 100; 5 AEROSOL RESPIRATORY (INHALATION) at 08:00

## 2020-09-16 RX ADMIN — ALBUTEROL SULFATE 2 PUFF: 108 AEROSOL, METERED RESPIRATORY (INHALATION) at 19:45

## 2020-09-16 NOTE — PROGRESS NOTES
Pulm/CC Progress Note    Patient was consulted for evaluation of increasing shortness of breath  Subjective:   Daily Progress Note: 2020   Patient examined at bedside,  His shortness of breath has shown some improvement  Chest x-ray was done, results were reviewed. Patient denies any fever or chills. He has complaints of more dyspnea when he lays on his right side. Review of Systems  Feels weak, has complaints of shortness of breath. He is on nasal cannula oxygen. Denied any nausea vomiting. Has fair appetite    Objective:     Visit Vitals  BP (!) 140/79   Pulse 86   Temp 98.1 °F (36.7 °C)   Resp 16   Ht 6' 0.99\" (1.854 m)   Wt 82.6 kg (182 lb 1.6 oz)   SpO2 93%   BMI 24.03 kg/m²    O2 Flow Rate (L/min): 3 l/min(found on 3lpm nc, decreased to 2lpm nc ) O2 Device: Nasal cannula    Temp (24hrs), Av.1 °F (36.7 °C), Min:98 °F (36.7 °C), Max:98.1 °F (36.7 °C)      No intake/output data recorded.    1901 -  0700  In: 240 [P.O.:240]  Out: 825 [Urine:825]    Current Facility-Administered Medications   Medication Dose Route Frequency    budesonide-formoterol (SYMBICORT) 80-4.5 mcg inhaler  2 Puff Inhalation BID RT    dilTIAZem ER (CARDIZEM CD) capsule 120 mg  120 mg Oral DAILY    albuterol (PROVENTIL HFA, VENTOLIN HFA, PROAIR HFA) inhaler 2 Puff  2 Puff Inhalation QID RT    predniSONE (DELTASONE) tablet 30 mg  30 mg Oral DAILY WITH DINNER    0.9% sodium chloride infusion 250 mL  250 mL IntraVENous PRN    atorvastatin (LIPITOR) tablet 20 mg  20 mg Oral DAILY    enoxaparin (LOVENOX) injection 40 mg  40 mg SubCUTAneous Q24H    losartan (COZAAR) tablet 100 mg  100 mg Oral DAILY    piperacillin-tazobactam (ZOSYN) 3.375 g in 0.9% sodium chloride (MBP/ADV) 100 mL MBP  3.375 g IntraVENous Q8H    pantoprazole (PROTONIX) tablet 40 mg  40 mg Oral DAILY    acetaminophen (TYLENOL) tablet 650 mg  650 mg Oral Q4H PRN    alum-mag hydroxide-simeth (MYLANTA) oral suspension 30 mL  30 mL Oral Q4H PRN    magnesium hydroxide (MILK OF MAGNESIA) 400 mg/5 mL oral suspension 30 mL  30 mL Oral DAILY PRN    ondansetron (ZOFRAN) injection 4 mg  4 mg IntraVENous Q8H PRN    azithromycin (ZITHROMAX) tablet 500 mg  500 mg Oral DAILY       Physical Exam:  General: Patient is overall weak. On nasal cannula oxygen HEENT: atraumatic, PERRL, moist mucosa,     Neck: Trachea midline, no carotid bruit, no masses. Supple but thick. Respiratory: Has decreased air entry at left hemithorax. Rhonchi audible in the right hemithorax. A high riding  Cardiovascular: RRR, no m/r/g, Normal S1 and S2   abdomen: Soft, non-tender, non-distended, normal bowel sounds in all quadrants, no hepatosplenomegaly, no tympany. Rectal: deferred  Extremities: no cyanosis or clubbing. Trace edema. Integumentary: warm, dry, and pink, with no rash, purpura, or petechia. Heme/Lymph: no lymphadenopathy, no bruises  Neurological: No focal motor deficit   psychiatric: cooperative with normal mood, affect, and cognition      Additional comments: Chest x-ray and abdominal x-rays also reviewed    Data Review    Recent Results (from the past 24 hour(s))   CBC WITH AUTOMATED DIFF    Collection Time: 09/16/20  6:20 AM   Result Value Ref Range    WBC 5.8 4.1 - 11.1 K/uL    RBC 3.54 (L) 4.10 - 5.70 M/uL    HGB 10.2 (L) 12.1 - 17.0 %    HCT 33.1 (L) 36.6 - 50.3 %    MCV 93.5 80.0 - 99.0 FL    MCH 28.8 26.0 - 34.0 PG    MCHC 30.8 30.0 - 36.5 g/dL    RDW 16.2 (H) 11.5 - 14.5 %    PLATELET 428 946 - 263 K/uL    MPV 9.4 8.9 - 12.9 FL    NEUTROPHILS 69 32 - 75 %    LYMPHOCYTES 15 12 - 49 %    MONOCYTES 13 5 - 13 %    EOSINOPHILS 2 0 - 7 %    BASOPHILS 1 0 - 1 %    IMMATURE GRANULOCYTES 0 0.0 - 0.5 %    ABS. NEUTROPHILS 4.0 1.8 - 8.0 K/UL    ABS. LYMPHOCYTES 0.9 0.8 - 3.5 K/UL    ABS. MONOCYTES 0.8 0.0 - 1.0 K/UL    ABS. EOSINOPHILS 0.1 0.0 - 0.4 K/UL    ABS. BASOPHILS 0.1 0.0 - 0.1 K/UL    ABS. IMM.  GRANS. 0.0 0.00 - 0.04 K/UL    DF AUTOMATED     METABOLIC PANEL, BASIC    Collection Time: 09/16/20  6:20 AM   Result Value Ref Range    Sodium 136 136 - 145 mmol/L    Potassium 3.6 3.5 - 5.1 mmol/L    Chloride 94 (L) 97 - 108 mmol/L    CO2 38 (H) 21 - 32 mmol/L    Anion gap 4 (L) 5 - 15 mmol/L    Glucose 54 (L) 65 - 100 mg/dL    BUN 3 (L) 6 - 20 mg/dL    Creatinine 0.49 (L) 0.70 - 1.30 mg/dL    BUN/Creatinine ratio 6 (L) 12 - 20      GFR est AA >60 >60 ml/min/1.73m2    GFR est non-AA >60 >60 ml/min/1.73m2    Calcium 8.4 (L) 8.5 - 10.1 mg/dL   ECHO ADULT COMPLETE    Collection Time: 09/16/20 11:51 AM   Result Value Ref Range    Aortic Valve Systolic Mean Gradient 66.53 mmHg    AoV VTI 91.30 cm    Pulmonic Regurgitant End Max Velocity 390.00 cm/s    AoV PG 61.00 mmHg    Ao Root D 3.50 cm    IVSd 1.21 (A) 0.6 - 1.0 cm    LVIDd 3.89 (A) 4.2 - 5.9 cm    LVIDs 2.55 cm    LVOT d 1.90 cm    TV MG 5.00 mmHg    LVOT VTI 32.60 cm    Pulmonic Regurgitant End Max Velocity 138.00 cm/s    LVOT Peak Gradient 8.00 mmHg    LVPWd 1.31 (A) 0.6 - 1.0 cm    LV E' Septal Velocity 4.97 cm/s    Mitral Valve E Wave Deceleration Time 0.17 s    MV A Darell 37.00 cm/s    MV E Darell 55.50 cm/s    Pulmonic Regurgitant End Max Velocity 75.40 cm/s    Pulmonic Valve Systolic Peak Instantaneous Gradient 2.00 mmHg    P Vein A Dur 0.09 s    Pulmonary Vein \"A\" Wave Velocity 22.20 cm/s    Est. RA Pressure 3.00 mmHg    RVIDd 3.71 cm    Tricuspid Valve Max Velocity 333.00 cm/s    Triscuspid Valve Regurgitation Peak Gradient 44.00 mmHg    MV E/A 1.50     LV Mass .8 88 - 224 g    LV Mass AL Index 82.6 49 - 115 g/m2    E/E' septal 11.17        XR ABD ACUTE W 1 V CHEST   Final Result   IMPRESSION: Opacification of the left hemithorax, questioned for remote   pneumonectomy or lung collapse with pleural fluid. Prominent right parenchymal/interstitial lung markings compatible with fibrosis   and possible partial vascular congestion. Small bowel gas, nonobstructive pattern. Assessment/Plan:          This is a middle-aged male who was admitted because of increasing symptoms of shortness of breath  He is getting treated in hospital for pneumonia with IV Zosyn. He is noted to be increasingly tachypneic and short of breath. He has been on supplemental oxygen. His chest x-ray from last evening showed left lung opacification. There is a possibility that he may have had left pneumonectomy done. Patient did not give any significant information about previous surgery. He has following medical problems        Plan:   1. Shortness of breath:  Patient is short of breath, he is on nasal cannula oxygen. He was admitted about 5 days ago in the hospital and was treated for pneumonia with IV Zosyn. He is also on IV Zithromax. His WBC count is normal.  I saw his chest x-ray that was done last evening which showed left lung opacification. He may have had surgery done in the past.  Patient does not remember any surgeries. I will get CT scan of chest to further evaluate his left lung opacification. No chest x-ray results are available from his admission probably because the electronic health system has been changed and it is not uploaded. In the meanwhile he will be continued on antibiotics steroids and bronchodilators. Overall his shortness of breath has shown improvement as compared to yesterday. 2.  COPD:  He has a history of COPD based on chronic smoking. Patient got started on prednisone along with inhaled steroid with a spacer. Patient is on Symbicort and rescue inhaler. .  3.  Pneumonia:  He is on IV Zosyn along with IV Zithromax. We will adjust his antibiotics based chest x-ray results.   4.  Hypertension:  He is on antihypertensive medications       Thank you for involving me in the management of your patient      Care Plan discussed with: Attending physician    Total time spent with patient: 30 minutes including review of imaging studies and discussion with radiologist..     Houston Brito MD  Pulmonary/CC

## 2020-09-16 NOTE — PROGRESS NOTES
Progress Note      9/16/2020 12:59 PM  NAME: Chente Cassidy   MRN:  [id number]   Admit Diagnosis: pneumonia hypokalemia hypertension chronic gerd hy      Problem List:   1. Paroxysmal atrial fibrillation with rapid ventricular response to hyperadrenergic state from respiratory tract infection. Patient's atrial fibrillation now has converted to sinus rhythm  2. Acute hypoxic respiratory failure  3. Left lower lobe pneumonia5.   4. Dehydration  5. Rule out COVID-19  t     Assessment/Plan:     1. Continue Cardizem for paroxysmal atrial fibrillation with anticoagulation by Lovenox. 2. Continue IV antibiotics for left lower lobe pneumonia as per medical team.  3. Echocardiogram pending. []       High complexity decision making was performed in this patient at high risk for decompensation with multiple organ involvement. Subjective:     Chente Cassidy denies chest pain, dyspnea. Discussed with RN events overnight. Review of Systems:    Symptom Y/N Comments  Symptom Y/N Comments   Fever/Chills N   Chest Pain N    Poor Appetite N   Edema N    Cough N   Abdominal Pain N    Sputum N   Joint Pain N    SOB/LIZAMA N   Pruritis/Rash N    Nausea/vomit N   Tolerating PT/OT Y    Diarrhea N   Tolerating Diet Y    Constipation N   Other       Could NOT obtain due to:      Objective:      Physical Exam:    Last 24hrs VS reviewed since prior progress note. Most recent are:    Visit Vitals  BP (!) 140/79   Pulse 86   Temp 98.1 °F (36.7 °C)   Resp 16   Ht 6' 0.99\" (1.854 m)   Wt 82.6 kg (182 lb 1.6 oz)   SpO2 98%   BMI 24.03 kg/m²     No intake or output data in the 24 hours ending 09/16/20 1259     General Appearance: Well developed, well nourished, alert & oriented x 3,    no acute distress. Ears/Nose/Mouth/Throat: Hearing grossly normal.  Neck: Supple. Chest: Lungs with crackles at bases and scattered rhonchi. Cardiovascular: Regular rate and rhythm, S1S2 normal, no murmur.   Abdomen: Soft, non-tender, bowel sounds are active. Extremities: No edema bilaterally. Skin: Warm and dry. []         Post-cath site without hematoma, bruit, tenderness, or thrill. Distal pulses intact. PMH/SH reviewed - no change compared to H&P    Data Review    Telemetry: normal sinus rhythm     EKG:   []  No new EKG for review  XR ABD ACUTE W 1 V CHEST   Final Result   IMPRESSION: Opacification of the left hemithorax, questioned for remote   pneumonectomy or lung collapse with pleural fluid. Prominent right parenchymal/interstitial lung markings compatible with fibrosis   and possible partial vascular congestion. Small bowel gas, nonobstructive pattern. Lab Data Personally Reviewed:    Recent Labs     09/16/20  0620 09/15/20  0625   WBC 5.8 6.0   HGB 10.2* 10.3*   HCT 33.1* 32.9*    303     No results for input(s): INR, PTP, APTT, INREXT in the last 72 hours. Recent Labs     09/15/20  0625 09/14/20  0840   * 135*   K 4.1 3.9   CL 94* 97   CO2 39* 35*   BUN 3* 4*   CREA 0.60* 0.52*   GLU 73 59*   CA 8.6 8.4*     No results for input(s): CPK, CKNDX, TROIQ in the last 72 hours. No lab exists for component: CPKMB  Lab Results   Component Value Date/Time    Cholesterol, total 134 07/31/2013 02:52 PM    HDL Cholesterol 75 07/31/2013 02:52 PM    LDL, calculated 50 07/31/2013 02:52 PM    Triglyceride 43 07/31/2013 02:52 PM    CHOL/HDL Ratio 2.4 03/09/2010 03:44 PM       No results for input(s): AP, TBIL, TP, ALB, GLOB, GGT, AML, LPSE in the last 72 hours. No lab exists for component: SGOT, GPT, AMYP, HLPSE  No results for input(s): PH, PCO2, PO2 in the last 72 hours.     Medications Personally Reviewed:    Current Facility-Administered Medications   Medication Dose Route Frequency    budesonide-formoterol (SYMBICORT) 80-4.5 mcg inhaler  2 Puff Inhalation BID RT    dilTIAZem ER (CARDIZEM CD) capsule 120 mg  120 mg Oral DAILY    albuterol (PROVENTIL HFA, VENTOLIN HFA, PROAIR HFA) inhaler 2 Puff  2 Puff Inhalation QID RT    predniSONE (DELTASONE) tablet 30 mg  30 mg Oral DAILY WITH DINNER    0.9% sodium chloride infusion 250 mL  250 mL IntraVENous PRN    atorvastatin (LIPITOR) tablet 20 mg  20 mg Oral DAILY    enoxaparin (LOVENOX) injection 40 mg  40 mg SubCUTAneous Q24H    losartan (COZAAR) tablet 100 mg  100 mg Oral DAILY    piperacillin-tazobactam (ZOSYN) 3.375 g in 0.9% sodium chloride (MBP/ADV) 100 mL MBP  3.375 g IntraVENous Q8H    pantoprazole (PROTONIX) tablet 40 mg  40 mg Oral DAILY    acetaminophen (TYLENOL) tablet 650 mg  650 mg Oral Q4H PRN    alum-mag hydroxide-simeth (MYLANTA) oral suspension 30 mL  30 mL Oral Q4H PRN    magnesium hydroxide (MILK OF MAGNESIA) 400 mg/5 mL oral suspension 30 mL  30 mL Oral DAILY PRN    ondansetron (ZOFRAN) injection 4 mg  4 mg IntraVENous Q8H PRN    azithromycin (ZITHROMAX) tablet 500 mg  500 mg Oral Surinder Herring MD

## 2020-09-16 NOTE — PROGRESS NOTES
Hospitalist Progress Note               Daily Progress Note: 9/16/2020    Chief complaint: Shortness of breath  Subjective: The patient is seen for follow  up. Offers no complaints. 76-year-old male was admitted to the hospital with a complaint of shortness of breath and cough. Patient was found to have left lower lobe pneumonia. Patient continues to have shortness of breath and cough. Patient has severe rails and crackles on his left side and become short of breath just by turning from left lateral position to right lateral position. He received 2 units of packed red blood cells during the stay. Patient continue to complains SOB, worse on lying down.      Problem List:  Problem List as of 9/16/2020 Date Reviewed: 9/1/2020          Codes Class Noted - Resolved    Abdominal wall hernia ICD-10-CM: K43.9  ICD-9-CM: 553.20  8/1/2017 - Present        DDD (degenerative disc disease), lumbar ICD-10-CM: M51.36  ICD-9-CM: 722.52  5/1/2017 - Present        Chronic pain ICD-10-CM: G89.29  ICD-9-CM: 338.29  1/6/2015 - Present        Aspiration pneumonia ([facility] ICD-10-CM: J69.0  ICD-9-CM: 507.0  1/19/2014 - Present        Hypernatremia ICD-10-CM: E87.0  ICD-9-CM: 276.0  1/14/2014 - Present        Ischemic colitis ([facility] ICD-10-CM: K55.9  ICD-9-CM: 557.9  1/13/2014 - Present        Colon perforation ([facility] ICD-10-CM: K63.1  ICD-9-CM: 569.83  1/13/2014 - Present        Acute respiratory failure with hypoxia ([facility] ICD-10-CM: J96.01  ICD-9-CM: 518.81  1/13/2014 - Present        Acute blood loss anemia ICD-10-CM: D62  ICD-9-CM: 285.1  1/13/2014 - Present        JESUS (acute kidney injury) ([facility] ICD-10-CM: N17.9  ICD-9-CM: 584.9  1/13/2014 - Present        Pneumonia ICD-10-CM: J18.9  ICD-9-CM: 486  1/1/2014 - Present        Elevated INR ICD-10-CM: R79.1  ICD-9-CM: 790.92  1/1/2014 - Present        Alcoholism ([facility] ICD-10-CM: F10.20  ICD-9-CM: 303.90  1/1/2014 - Present        Nicotine addiction ICD-10-CM: X61.569  ICD-9-CM: 305.1  1/1/2014 - Present        PVD (peripheral vascular disease) ([facility] ICD-10-CM: I73.9  ICD-9-CM: 443.9  7/31/2013 - Present        Hyponatremia ICD-10-CM: E87.1  ICD-9-CM: 276.1  5/30/2012 - Present        ETOH abuse ICD-10-CM: F10.10  ICD-9-CM: 305.00  5/30/2012 - Present        Coagulation disorder ([facility] ICD-10-CM: D68.9  ICD-9-CM: 286.9  5/30/2012 - Present        Allergic rhinitis due to other allergen ICD-10-CM: J30.89  ICD-9-CM: 477.8  12/27/2011 - Present        Stroke 2002 (Chronic) ICD-10-CM: I63.9  ICD-9-CM: 434.91  3/15/2010 - Present        Anal fistula (Chronic) ICD-10-CM: K60.3  ICD-9-CM: 565.1  3/15/2010 - Present        HTN (hypertension) (Chronic) ICD-10-CM: I10  ICD-9-CM: 401.9  3/15/2010 - Present        Genital herpes (Chronic) ICD-10-CM: A60.00  ICD-9-CM: 054.10  3/15/2010 - Present        Noncompliance with treatment (Chronic) ICD-10-CM: Z91.19  ICD-9-CM: V15.81  3/15/2010 - Present        High cholesterol (Chronic) ICD-10-CM: E78.00  ICD-9-CM: 272.0  3/15/2010 - Present        left Carotid Artery Occlusion (Chronic) ICD-10-CM: Q85.07  ICD-9-CM: 433.10  3/15/2010 - Present              Medications reviewed  Current Facility-Administered Medications   Medication Dose Route Frequency    budesonide-formoterol (SYMBICORT) 80-4.5 mcg inhaler  2 Puff Inhalation BID RT    dilTIAZem ER (CARDIZEM CD) capsule 120 mg  120 mg Oral DAILY    albuterol (PROVENTIL HFA, VENTOLIN HFA, PROAIR HFA) inhaler 2 Puff  2 Puff Inhalation QID RT    predniSONE (DELTASONE) tablet 30 mg  30 mg Oral DAILY WITH DINNER    0.9% sodium chloride infusion 250 mL  250 mL IntraVENous PRN    atorvastatin (LIPITOR) tablet 20 mg  20 mg Oral DAILY    enoxaparin (LOVENOX) injection 40 mg  40 mg SubCUTAneous Q24H    losartan (COZAAR) tablet 100 mg  100 mg Oral DAILY    piperacillin-tazobactam (ZOSYN) 3.375 g in 0.9% sodium chloride (MBP/ADV) 100 mL MBP  3.375 g IntraVENous Q8H    pantoprazole (PROTONIX) tablet 40 mg  40 mg Oral DAILY    acetaminophen (TYLENOL) tablet 650 mg  650 mg Oral Q4H PRN    alum-mag hydroxide-simeth (MYLANTA) oral suspension 30 mL  30 mL Oral Q4H PRN    magnesium hydroxide (MILK OF MAGNESIA) 400 mg/5 mL oral suspension 30 mL  30 mL Oral DAILY PRN    ondansetron (ZOFRAN) injection 4 mg  4 mg IntraVENous Q8H PRN    azithromycin (ZITHROMAX) tablet 500 mg  500 mg Oral DAILY       Review of Systems:   A comprehensive review of systems was negative except for: Respiratory: positive for dyspnea on exertion    Objective:   Physical Exam:     Visit Vitals  BP (!) 140/79   Pulse 86   Temp 98.1 °F (36.7 °C)   Resp 16   Ht 6' 0.99\" (1.854 m)   Wt 82.6 kg (182 lb 1.6 oz)   SpO2 93%   BMI 24.03 kg/m²    O2 Flow Rate (L/min): 3 l/min(found on 3lpm nc, decreased to 2lpm nc ) O2 Device: Nasal cannula    Temp (24hrs), Av.1 °F (36.7 °C), Min:98 °F (36.7 °C), Max:98.1 °F (36.7 °C)    No intake/output data recorded.  1901 -  0700  In: 240 [P.O.:240]  Out: 825 [Urine:825]    General:  Alert, cooperative, no distress, appears stated age. Lungs:   Clear to auscultation bilaterally with diminished air entry bilateral bases. Chest wall:  No tenderness or deformity. Heart:  Regular rate and rhythm, S1, S2 normal, no murmur, click, rub or gallop. Abdomen:   Soft, non-tender. Bowel sounds normal. No masses,  No organomegaly. Extremities: Extremities normal, atraumatic, no cyanosis or edema. Pulses: 2+ and symmetric all extremities. Skin: Skin color, texture, turgor normal. No rashes or lesions   Neurologic: CNII-XII intact.  No gross sensory or motor deficits     Data Review:       Recent Days:  Recent Labs     20  0620 09/15/20  0625 20  0840   WBC 5.8 6.0 5.5   HGB 10.2* 10.3* 9.4*   HCT 33.1* 32.9* 30.0*    303 269     Recent Labs     20  0620 09/15/20  0625 20  0840    135* 135*   K 3.6 4.1 3.9   CL 94* 94* 97   CO2 38* 39* 35*   GLU 54* 73 59*   BUN 3* 3* 4*   CREA 0.49* 0.60* 0.52*   CA 8.4* 8.6 8.4*     No results for input(s): PH, PCO2, PO2, HCO3, FIO2 in the last 72 hours. 24 Hour Results:  Recent Results (from the past 24 hour(s))   CBC WITH AUTOMATED DIFF    Collection Time: 09/16/20  6:20 AM   Result Value Ref Range    WBC 5.8 4.1 - 11.1 K/uL    RBC 3.54 (L) 4.10 - 5.70 M/uL    HGB 10.2 (L) 12.1 - 17.0 %    HCT 33.1 (L) 36.6 - 50.3 %    MCV 93.5 80.0 - 99.0 FL    MCH 28.8 26.0 - 34.0 PG    MCHC 30.8 30.0 - 36.5 g/dL    RDW 16.2 (H) 11.5 - 14.5 %    PLATELET 415 703 - 544 K/uL    MPV 9.4 8.9 - 12.9 FL    NEUTROPHILS 69 32 - 75 %    LYMPHOCYTES 15 12 - 49 %    MONOCYTES 13 5 - 13 %    EOSINOPHILS 2 0 - 7 %    BASOPHILS 1 0 - 1 %    IMMATURE GRANULOCYTES 0 0.0 - 0.5 %    ABS. NEUTROPHILS 4.0 1.8 - 8.0 K/UL    ABS. LYMPHOCYTES 0.9 0.8 - 3.5 K/UL    ABS. MONOCYTES 0.8 0.0 - 1.0 K/UL    ABS. EOSINOPHILS 0.1 0.0 - 0.4 K/UL    ABS. BASOPHILS 0.1 0.0 - 0.1 K/UL    ABS. IMM.  GRANS. 0.0 0.00 - 0.04 K/UL    DF AUTOMATED     METABOLIC PANEL, BASIC    Collection Time: 09/16/20  6:20 AM   Result Value Ref Range    Sodium 136 136 - 145 mmol/L    Potassium 3.6 3.5 - 5.1 mmol/L    Chloride 94 (L) 97 - 108 mmol/L    CO2 38 (H) 21 - 32 mmol/L    Anion gap 4 (L) 5 - 15 mmol/L    Glucose 54 (L) 65 - 100 mg/dL    BUN 3 (L) 6 - 20 mg/dL    Creatinine 0.49 (L) 0.70 - 1.30 mg/dL    BUN/Creatinine ratio 6 (L) 12 - 20      GFR est AA >60 >60 ml/min/1.73m2    GFR est non-AA >60 >60 ml/min/1.73m2    Calcium 8.4 (L) 8.5 - 10.1 mg/dL   ECHO ADULT COMPLETE    Collection Time: 09/16/20 11:51 AM   Result Value Ref Range    Aortic Valve Systolic Mean Gradient 15.97 mmHg    AoV VTI 91.30 cm    Pulmonic Regurgitant End Max Velocity 390.00 cm/s    AoV PG 61.00 mmHg    Ao Root D 3.50 cm    IVSd 1.21 (A) 0.6 - 1.0 cm    LVIDd 3.89 (A) 4.2 - 5.9 cm    LVIDs 2.55 cm    LVOT d 1.90 cm    TV MG 5.00 mmHg    LVOT VTI 32.60 cm    Pulmonic Regurgitant End Max Velocity 138.00 cm/s LVOT Peak Gradient 8.00 mmHg    LVPWd 1.31 (A) 0.6 - 1.0 cm    LV E' Septal Velocity 4.97 cm/s    Mitral Valve E Wave Deceleration Time 0.17 s    MV A Darell 37.00 cm/s    MV E Darell 55.50 cm/s    Pulmonic Regurgitant End Max Velocity 75.40 cm/s    Pulmonic Valve Systolic Peak Instantaneous Gradient 2.00 mmHg    P Vein A Dur 0.09 s    Pulmonary Vein \"A\" Wave Velocity 22.20 cm/s    Est. RA Pressure 3.00 mmHg    RVIDd 3.71 cm    Tricuspid Valve Max Velocity 333.00 cm/s    Triscuspid Valve Regurgitation Peak Gradient 44.00 mmHg    MV E/A 1.50     LV Mass .8 88 - 224 g    LV Mass AL Index 82.6 49 - 115 g/m2    E/E' septal 11.17        Echo:  Result status: Final result    · LV: Estimated LVEF is 60 - 65%. Biplane method used to measure ejection fraction. Normal cavity size and systolic function (ejection fraction normal). Mild concentric hypertrophy. Wall motion: normal. Moderate (grade 2) left ventricular diastolic dysfunction. · AV: Probably trileaflet aortic valve. Moderate aortic valve leaflet calcification present. Severely reduced right leaflet mobility of the aortic valve. Aortic valve area is 1 cm2. Moderate to severe aortic valve stenosis is present. Mild aortic valve regurgitation is present. · MV: Mitral annular calcification. · TV: Mild tricuspid valve regurgitation is present. COMPARISON: Portable chest 1/27/2014     There is opacification of the left hemithorax. Surgical clips are suspected in  the region of the left hilum. There are increased parenchymal/interstitial  markings in the right lung. Left heart border is obscured. There is gas in the  transverse colon. There are multiple nondistended gas-filled loops of small  bowel. No mass or free air. Surgical clips in the right inguinal region.   Vascular calcifications.     IMPRESSION  IMPRESSION: Opacification of the left hemithorax, questioned for remote  pneumonectomy or lung collapse with pleural fluid.     Prominent right parenchymal/interstitial lung markings compatible with fibrosis  and possible partial vascular congestion.     Small bowel gas, nonobstructive pattern. Assessment/     Acute hypoxic respiratory failure  Left lower lobe pneumonia  Left pleural effusion  Acute on chronic anemia  Dehydration  Rule out COVID-19  Generalized weakness and unsteady gait          Plan:  Continue supportive care including IV antibiotics  We will get CT chest  Will get pulmn eval as well. Await placement to skilled care facility  Overall clinically improving    Care Plan discussed with: Patient/Family    Total time spent with patient: 30 minutes.     Tony Hickey MD

## 2020-09-17 ENCOUNTER — APPOINTMENT (OUTPATIENT)
Dept: CT IMAGING | Age: 60
DRG: 177 | End: 2020-09-17
Attending: INTERNAL MEDICINE
Payer: MEDICARE

## 2020-09-17 LAB
ANION GAP SERPL CALC-SCNC: ABNORMAL MMOL/L (ref 5–15)
ARTERIAL PATENCY WRIST A: ABNORMAL
BASE EXCESS BLDA CALC-SCNC: 15.8 MMOL/L (ref 0–2)
BASOPHILS # BLD: 0 K/UL (ref 0–0.1)
BASOPHILS NFR BLD: 0 % (ref 0–1)
BDY SITE: ABNORMAL
BUN SERPL-MCNC: 3 MG/DL (ref 6–20)
BUN/CREAT SERPL: 5 (ref 12–20)
CA-I BLD-MCNC: 8 MG/DL (ref 8.5–10.1)
CHLORIDE SERPL-SCNC: 93 MMOL/L (ref 97–108)
CO2 SERPL-SCNC: >45 MMOL/L (ref 21–32)
COHGB MFR BLD: 1.6 % (ref 0–3)
CREAT SERPL-MCNC: 0.58 MG/DL (ref 0.7–1.3)
DIFFERENTIAL METHOD BLD: ABNORMAL
EOSINOPHIL # BLD: 0 K/UL (ref 0–0.4)
EOSINOPHIL NFR BLD: 0 % (ref 0–7)
ERYTHROCYTE [DISTWIDTH] IN BLOOD BY AUTOMATED COUNT: 16.2 % (ref 11.5–14.5)
GAS FLOW.O2 O2 DELIVERY SYS: 2 L/MIN
GLUCOSE SERPL-MCNC: 90 MG/DL (ref 65–100)
HCO3 BLDA-SCNC: 40 MMOL/L (ref 22–26)
HCT VFR BLD AUTO: 31.2 % (ref 36.6–50.3)
HGB BLD OXIMETRY-MCNC: 9.5 G/DL (ref 12–16)
HGB BLD-MCNC: 9.6 % (ref 12.1–17)
IMM GRANULOCYTES # BLD AUTO: 0 K/UL (ref 0–0.04)
IMM GRANULOCYTES NFR BLD AUTO: 0 % (ref 0–0.5)
LYMPHOCYTES # BLD: 0.8 K/UL (ref 0.8–3.5)
LYMPHOCYTES NFR BLD: 12 % (ref 12–49)
MCH RBC QN AUTO: 28.4 PG (ref 26–34)
MCHC RBC AUTO-ENTMCNC: 30.8 G/DL (ref 30–36.5)
MCV RBC AUTO: 92.3 FL (ref 80–99)
METHGB MFR BLD: 0.9 % (ref 0–3)
MONOCYTES # BLD: 0.7 K/UL (ref 0–1)
MONOCYTES NFR BLD: 11 % (ref 5–13)
NEUTS SEG # BLD: 4.9 K/UL (ref 1.8–8)
NEUTS SEG NFR BLD: 77 % (ref 32–75)
PCO2 BLDA: 67 MMHG (ref 35–45)
PH BLDA: 7.41 [PH] (ref 7.35–7.45)
PLATELET # BLD AUTO: 278 K/UL (ref 150–400)
PMV BLD AUTO: 9.2 FL (ref 8.9–12.9)
PO2 BLDA: 81 MMHG (ref 75–100)
POTASSIUM SERPL-SCNC: 3.5 MMOL/L (ref 3.5–5.1)
RBC # BLD AUTO: 3.38 M/UL (ref 4.1–5.7)
SAO2 % BLD: 97 %
SAO2% DEVICE SAO2% SENSOR NAME: ABNORMAL
SODIUM SERPL-SCNC: 138 MMOL/L (ref 136–145)
WBC # BLD AUTO: 6.4 K/UL (ref 4.1–11.1)

## 2020-09-17 PROCEDURE — 65660000000 HC RM CCU STEPDOWN

## 2020-09-17 PROCEDURE — 74011250637 HC RX REV CODE- 250/637: Performed by: INTERNAL MEDICINE

## 2020-09-17 PROCEDURE — 3331090002 HH PPS REVENUE DEBIT

## 2020-09-17 PROCEDURE — 80048 BASIC METABOLIC PNL TOTAL CA: CPT

## 2020-09-17 PROCEDURE — 3331090001 HH PPS REVENUE CREDIT

## 2020-09-17 PROCEDURE — 97530 THERAPEUTIC ACTIVITIES: CPT

## 2020-09-17 PROCEDURE — 94760 N-INVAS EAR/PLS OXIMETRY 1: CPT

## 2020-09-17 PROCEDURE — 94640 AIRWAY INHALATION TREATMENT: CPT

## 2020-09-17 PROCEDURE — 82803 BLOOD GASES ANY COMBINATION: CPT

## 2020-09-17 PROCEDURE — 36600 WITHDRAWAL OF ARTERIAL BLOOD: CPT

## 2020-09-17 PROCEDURE — 36415 COLL VENOUS BLD VENIPUNCTURE: CPT

## 2020-09-17 PROCEDURE — 77010033678 HC OXYGEN DAILY

## 2020-09-17 PROCEDURE — 74011000258 HC RX REV CODE- 258: Performed by: INTERNAL MEDICINE

## 2020-09-17 PROCEDURE — 85025 COMPLETE CBC W/AUTO DIFF WBC: CPT

## 2020-09-17 PROCEDURE — 74011636637 HC RX REV CODE- 636/637: Performed by: INTERNAL MEDICINE

## 2020-09-17 PROCEDURE — 74011250636 HC RX REV CODE- 250/636: Performed by: INTERNAL MEDICINE

## 2020-09-17 PROCEDURE — 65270000029 HC RM PRIVATE

## 2020-09-17 RX ORDER — LORAZEPAM 1 MG/1
1 TABLET ORAL
Status: COMPLETED | OUTPATIENT
Start: 2020-09-17 | End: 2020-09-18

## 2020-09-17 RX ADMIN — ENOXAPARIN SODIUM 40 MG: 40 INJECTION SUBCUTANEOUS at 08:50

## 2020-09-17 RX ADMIN — ALBUTEROL SULFATE 2 PUFF: 108 AEROSOL, METERED RESPIRATORY (INHALATION) at 12:48

## 2020-09-17 RX ADMIN — BUDESONIDE AND FORMOTEROL FUMARATE DIHYDRATE 2 PUFF: 80; 4.5 AEROSOL RESPIRATORY (INHALATION) at 07:46

## 2020-09-17 RX ADMIN — PREDNISONE 30 MG: 20 TABLET ORAL at 19:04

## 2020-09-17 RX ADMIN — PIPERACILLIN SODIUM AND TAZOBACTAM SODIUM 3.38 G: 3; .375 INJECTION, POWDER, LYOPHILIZED, FOR SOLUTION INTRAVENOUS at 21:42

## 2020-09-17 RX ADMIN — PANTOPRAZOLE SODIUM 40 MG: 40 TABLET, DELAYED RELEASE ORAL at 05:31

## 2020-09-17 RX ADMIN — PIPERACILLIN SODIUM AND TAZOBACTAM SODIUM 3.38 G: 3; .375 INJECTION, POWDER, LYOPHILIZED, FOR SOLUTION INTRAVENOUS at 05:31

## 2020-09-17 RX ADMIN — ALBUTEROL SULFATE 2 PUFF: 108 AEROSOL, METERED RESPIRATORY (INHALATION) at 07:46

## 2020-09-17 RX ADMIN — ATORVASTATIN CALCIUM 20 MG: 20 TABLET, FILM COATED ORAL at 21:42

## 2020-09-17 RX ADMIN — ALBUTEROL SULFATE 2 PUFF: 108 AEROSOL, METERED RESPIRATORY (INHALATION) at 20:00

## 2020-09-17 RX ADMIN — DILTIAZEM HYDROCHLORIDE 120 MG: 120 CAPSULE, COATED, EXTENDED RELEASE ORAL at 08:50

## 2020-09-17 RX ADMIN — PIPERACILLIN SODIUM AND TAZOBACTAM SODIUM 3.38 G: 3; .375 INJECTION, POWDER, LYOPHILIZED, FOR SOLUTION INTRAVENOUS at 14:52

## 2020-09-17 RX ADMIN — BUDESONIDE AND FORMOTEROL FUMARATE DIHYDRATE 2 PUFF: 80; 4.5 AEROSOL RESPIRATORY (INHALATION) at 20:01

## 2020-09-17 RX ADMIN — LOSARTAN POTASSIUM 100 MG: 50 TABLET, FILM COATED ORAL at 08:50

## 2020-09-17 RX ADMIN — ALBUTEROL SULFATE 2 PUFF: 108 AEROSOL, METERED RESPIRATORY (INHALATION) at 16:39

## 2020-09-17 NOTE — PROGRESS NOTES
[facility] Skin Assessment    Pt has an IV in the left hand. The pt has a bruise on the forehead and bruises scattered over arms, and legs. Pt has moisture associated breakdown on buttocks. All other skin is warm, dry, and intact. Skin assessment completed by Sharyn Yanez Rn and NABEEL Smith RN.

## 2020-09-17 NOTE — PROGRESS NOTES
Problem: Self Care Deficits Care Plan (Adult)  Goal: *Acute Goals and Plan of Care (Insert Text)  Description:   1. Pt will be mod I sit < - >  prep for toileting LRAD   2. Pt will be mod I toileting/toilet transfer/cloth mgmt LRAD  3. Pt will be mod I sponge bathing sitting/standing at sink LRAD  4. Pt will be mod A LE dressing EOB  5. Pt will be mod I grooming standing at sink LRAD  6. Pt will be I following UE HEP in prep for self care tasks - progressing towards goal  Outcome: Progressing Towards Goal     OCCUPATIONAL THERAPY RE-EVALUATION  Patient: April Dunbar (83 y.o. male)  Date: 9/17/2020  Diagnosis: pneumonia hypokalemia hypertension chronic gerd hy   <principal problem not specified>       Precautions:  Fall precautions  Chart, occupational therapy assessment, plan of care, and goals were reviewed. ASSESSMENT  Pt received semi supine in bed agreeable for OT/PT RA with encouragement. Since initial eval, pt requiring decreased assistance for bed mobility however requiring increased time to complete tasks and agree to working with therapy. Pt continues to present with decreased balance, decreased activity tolerance, generalized weakness, decreased safety awareness, and increased need for assist with self care and functional transfers/mobility. Other factors to consider for discharge: motivation, time since onset, severity of deficits         PLAN :  Recommendations and Planned Interventions: self care training, functional mobility training, therapeutic exercise, balance training, therapeutic activities, endurance activities, patient education, and home safety training    Frequency/Duration: Patient will be followed by occupational therapy 3 times a week to address goals.     Recommend next OT session: OT/PT co-tx 2/2 pt tolerance and fear of falling with stance    Recommendation for discharge: (in order for the patient to meet his/her long term goals)  SNF    This discharge recommendation:  Has been made in collaboration with the attending provider and/or case management         SUBJECTIVE:   Patient stated I know I shouldn't say no but I don't want to stand.     OBJECTIVE DATA SUMMARY:   Cognitive/Behavioral Status:  Neurologic State: Alert     Hearing: Auditory  Auditory Impairment: None      Range of Motion:  LUE AROM shoulder flex aprox 70 degrees, PROM 95)  RUE WFL      Strength:  RUE Strength  Observation: grossly observed ot be 3+/5     LUE Strength  Observation: grossly 3+/5 within available range (AROM shoulder flex aprox 70 degrees, PROM 95)    Functional Mobility and Transfers for ADLs:  Bed Mobility:  Rolling: Minimum assistance  Supine to Sit: Minimum assistance  Sit to Supine: Minimum assistance  Scooting: Minimum assistance(bed level)    Balance:  Sitting: Impaired; With support  Sitting - Static: Fair (occasional)  Sitting - Dynamic: Fair (occasional)    ADL Assessment:    Oral Facial Hygiene/Grooming: Stand-by assistance;Setup(simulated bed level)    ADL Intervention and task modifications:    Grooming  Grooming Assistance: Set-up; Stand-by assistance(bed level simple grooming simulated)    Therapeutic Exercises:   Pt educated on and completed elpidio UE HEP for 1 set of 10 reps in prep for self care tasks    Activity Tolerance:   Poor and requires frequent rest breaks  Please refer to the flowsheet for vital signs taken during this treatment. After treatment patient left in no apparent distress:   Supine in bed, Call bell within reach, and Bed / chair alarm activated    COMMUNICATION/COLLABORATION:   The patients plan of care was discussed with: Physical therapist and Registered nurse.      [place] [employer]  Time Calculation: 19 mins

## 2020-09-17 NOTE — PROGRESS NOTES
Progress Note      9/17/2020 12:59 PM  NAME: Lee Ann Ma   MRN:  [id number]   Admit Diagnosis: pneumonia hypokalemia hypertension chronic gerd hy      Problem List:   1. Paroxysmal atrial fibrillation with rapid ventricular response to hyperadrenergic state from respiratory tract infection. Patient's atrial fibrillation now has converted to sinus rhythm  2. Acute hypoxic respiratory failure  3. Left lower lobe pneumonia5.   4. Dehydration  5. Rule out COVID-19  t     Assessment/Plan:     1. Continue Cardizem for paroxysmal atrial fibrillation with anticoagulation by Lovenox. 2. Continue IV antibiotics for left lower lobe pneumonia as per medical team.  3. Echocardiogram shows well-preserved LV systolic function with mild aortic regurgitation. 4.   Consider pharmacologic stress MPI study when patient clears from PNA which can be done as an outpatient         []       High complexity decision making was performed in this patient at high risk for decompensation with multiple organ involvement. Subjective:     Lee Ann Ma denies chest pain, dyspnea. Discussed with RN events overnight. Review of Systems:    Symptom Y/N Comments  Symptom Y/N Comments   Fever/Chills N   Chest Pain N    Poor Appetite N   Edema N    Cough N   Abdominal Pain N    Sputum N   Joint Pain N    SOB/LIZAMA N   Pruritis/Rash N    Nausea/vomit N   Tolerating PT/OT Y    Diarrhea N   Tolerating Diet Y    Constipation N   Other       Could NOT obtain due to:      Objective:      Physical Exam:    Last 24hrs VS reviewed since prior progress note. Most recent are:    Visit Vitals  /68   Pulse 95   Temp 97.6 °F (36.4 °C)   Resp 18   Ht 6' 0.99\" (1.854 m)   Wt 82.6 kg (182 lb 1.6 oz)   SpO2 95%   BMI 24.03 kg/m²     No intake or output data in the 24 hours ending 09/17/20 1456     General Appearance: Well developed, well nourished, alert & oriented x 3,    no acute distress.   Ears/Nose/Mouth/Throat: Hearing grossly normal.  Neck: Supple. Chest: Lungs with crackles at bases and scattered rhonchi. Cardiovascular: Regular rate and rhythm, S1S2 normal, no murmur. Abdomen: Soft, non-tender, bowel sounds are active. Extremities: No edema bilaterally. Skin: Warm and dry. []         Post-cath site without hematoma, bruit, tenderness, or thrill. Distal pulses intact. PMH/SH reviewed - no change compared to H&P    Data Review    Telemetry: normal sinus rhythm     EKG:   []  No new EKG for review  XR ABD ACUTE W 1 V CHEST   Final Result   IMPRESSION: Opacification of the left hemithorax, questioned for remote   pneumonectomy or lung collapse with pleural fluid. Prominent right parenchymal/interstitial lung markings compatible with fibrosis   and possible partial vascular congestion. Small bowel gas, nonobstructive pattern. CT CHEST WO CONT    (Results Pending)      Echo:  Result status: Final result    · LV: Estimated LVEF is 60 - 65%. Biplane method used to measure ejection fraction. Normal cavity size and systolic function (ejection fraction normal). Mild concentric hypertrophy. Wall motion: normal. Moderate (grade 2) left ventricular diastolic dysfunction. · AV: Probably trileaflet aortic valve. Moderate aortic valve leaflet calcification present. Severely reduced right leaflet mobility of the aortic valve. Aortic valve area is 1 cm2. Moderate to severe aortic valve stenosis is present. Mild aortic valve regurgitation is present. · MV: Mitral annular calcification. · TV: Mild tricuspid valve regurgitation is present. Lab Data Personally Reviewed:    Recent Labs     09/17/20  0400 09/16/20  1530   WBC 6.4 5.9   HGB 9.6* 9.8*   HCT 31.2* 31.0*    276     No results for input(s): INR, PTP, APTT, INREXT, INREXT in the last 72 hours.    Recent Labs     09/17/20  0400 09/16/20  1530 09/16/20  0620    135* 136   K 3.5 3.1* 3.6   CL 93* 92* 94*   CO2 >45* 39* 38*   BUN 3* 3* 3*   CREA 0.58* 0.64* 0.49*   GLU 90 85 54*   CA 8.0* 8.2* 8.4*     No results for input(s): CPK, CKNDX, TROIQ in the last 72 hours. No lab exists for component: CPKMB  Lab Results   Component Value Date/Time    Cholesterol, total 134 07/31/2013 02:52 PM    HDL Cholesterol 75 07/31/2013 02:52 PM    LDL, calculated 50 07/31/2013 02:52 PM    Triglyceride 43 07/31/2013 02:52 PM    CHOL/HDL Ratio 2.4 03/09/2010 03:44 PM       No results for input(s): AP, TBIL, TP, ALB, GLOB, GGT, AML, LPSE in the last 72 hours.     No lab exists for component: SGOT, GPT, AMYP, HLPSE  Recent Labs     09/17/20  1410   PH 7.412   PCO2 67*   PO2 81       Medications Personally Reviewed:    Current Facility-Administered Medications   Medication Dose Route Frequency    LORazepam (ATIVAN) tablet 1 mg  1 mg Oral ON CALL    budesonide-formoterol (SYMBICORT) 80-4.5 mcg inhaler  2 Puff Inhalation BID RT    dilTIAZem ER (CARDIZEM CD) capsule 120 mg  120 mg Oral DAILY    albuterol (PROVENTIL HFA, VENTOLIN HFA, PROAIR HFA) inhaler 2 Puff  2 Puff Inhalation QID RT    predniSONE (DELTASONE) tablet 30 mg  30 mg Oral DAILY WITH DINNER    0.9% sodium chloride infusion 250 mL  250 mL IntraVENous PRN    atorvastatin (LIPITOR) tablet 20 mg  20 mg Oral DAILY    enoxaparin (LOVENOX) injection 40 mg  40 mg SubCUTAneous Q24H    losartan (COZAAR) tablet 100 mg  100 mg Oral DAILY    piperacillin-tazobactam (ZOSYN) 3.375 g in 0.9% sodium chloride (MBP/ADV) 100 mL MBP  3.375 g IntraVENous Q8H    pantoprazole (PROTONIX) tablet 40 mg  40 mg Oral DAILY    acetaminophen (TYLENOL) tablet 650 mg  650 mg Oral Q4H PRN    alum-mag hydroxide-simeth (MYLANTA) oral suspension 30 mL  30 mL Oral Q4H PRN    magnesium hydroxide (MILK OF MAGNESIA) 400 mg/5 mL oral suspension 30 mL  30 mL Oral DAILY PRN    ondansetron (ZOFRAN) injection 4 mg  4 mg IntraVENous Q8H PRN    azithromycin (ZITHROMAX) tablet 500 mg  500 mg Oral Cabrera Chao MD

## 2020-09-17 NOTE — PROGRESS NOTES
Problem: Mobility Impaired (Adult and Pediatric)  Goal: *Acute Goals and Plan of Care (Insert Text)  Description: Pt will be I with LE HEP in 7 days. Pt will perform bed mobility with mod I in 7 days. Pt will perform transfers with mod I in 7 days. Pt will amb 25-50 feet with LRAD safely with mod I in 7 days. Outcome: Progressing Towards Goal     Problem: Patient Education: Go to Patient Education Activity  Goal: Patient/Family Education  Description: LE HEP to improve strength and functional mobility       Outcome: Progressing Towards Goal   PHYSICAL THERAPY RE-ASSESSMENT  Patient: Ava Jenkins (8 y.o. male)  Date: 9/17/2020  Diagnosis: pneumonia hypokalemia hypertension chronic gerd hy   <principal problem not specified>       Precautions:    Chart, physical therapy assessment, plan of care and goals were reviewed. ASSESSMENT  Patient continues with skilled PT services and is progressing towards goals, Pt presents with decr ROM, strength, bed mobility, transfers, balance, gait, activity tolerance, safety awareness, and functional mobility. Pt agreeable to PT/OT re-assessment with encouragement this session. Pt was evaluated by PT on 8/10/20 and has participated in 1 PT session since then. Pt has minimal mobility participation, declining OOB activities, sit<>stand transfers, and gait training. Current Level of Function Impacting Discharge (mobility/balance): Pt presents with decr functional mobility compared to Pt reported PLOF and would benefit from continued skilled PT services to address above limitations and progress towards goals. Other factors to consider for discharge: poor activity tolerance, poor therapy participation. PLAN :  Patient continues to benefit from skilled intervention to address the above impairments. Continue treatment per established plan of care. to address goals.     Recommendation for discharge: (in order for the patient to meet his/her long term goals)  PT d/c recc SNF when medically appropriate. This discharge recommendation:  Has been made in collaboration with the attending provider and/or case management    IF patient discharges home will need the following DME: to be determined (TBD)       SUBJECTIVE:   Patient stated its almost lunch and I can't do anything, I don't feel well.     OBJECTIVE DATA SUMMARY:   Critical Behavior:  Neurologic State: Appropriate for age  Orientation Level: Oriented X4  Cognition: Decreased attention/concentration     Functional Mobility Training:  Bed Mobility:  Rolling: Minimum assistance  Supine to Sit: Minimum assistance  Sit to Supine: Minimum assistance  Scooting: Minimum assistance     Pt performed bed mobility with Min A req encouragement to participate and cues for rolling technique, hand placement, LE assist, and for safety. Pt declined further mobility training stating \"I can't do it. \" Pt educated on the importance of OOB activity and participating in therapy session to prevent further decline. Transfers:      Does not occur due to Pt refusal.      Balance:  Sitting: With support; Impaired  Sitting - Static: Fair (occasional)  Sitting - Dynamic: Fair (occasional)  Ambulation/Gait Training:               Does not occur  Stairs: Therapeutic Exercises:   Pt educated on LE therex sitting EOB for ROM, strengthening, and functional mobility. Pt req cues for activity pacing and proper muscle activation.      Therapeutic Exercises:       EXERCISE   Sets   Reps   Active Active Assist   Passive Self ROM   Comments   Ankle Pumps   [] [] [] []    Quad Sets/Glut Sets   [] [] [] []    Hamstring Sets   [] [] [] []    Short Arc Quads   [] [] [] []    Heel Slides   [] [] [] []    Straight Leg Raises   [] [] [] []    Hip abd/add   [] [] [] []    Long Arc Quads  10 [x] [] [] []    Marching  10 [x] [] [] []       [] [] [] []         Pain Ratin/10 back pain    Activity Tolerance:   Poor and requires rest breaks  Please refer to the flowsheet for vital signs taken during this treatment. After treatment patient left in no apparent distress:   Supine in bed, Call bell within reach, and Side rails x 3    COMMUNICATION/COLLABORATION:   The patients plan of care was discussed with: Occupational therapist and Registered nurse. PT/OT co-treatment for Pt safety and to maximize functional mobility participation.       Larry Coleman, PT   Time Calculation: 18 mins

## 2020-09-17 NOTE — PROGRESS NOTES
Hospitalist Progress Note               Daily Progress Note: 9/17/2020    Chief complaint: Shortness of breath  Subjective: The patient is seen for follow  up. Offers no complaints. 81-year-old male was admitted to the hospital with a complaint of shortness of breath and cough. Patient was found to have left lower lobe pneumonia. Patient continues to have shortness of breath and cough. Patient has severe rails and crackles on his left side and become short of breath just by turning from left lateral position to right lateral position. He received 2 units of packed red blood cells during the stay. Patient continue to complains SOB, worse on lying down. Patient could not get CT chest done as he got anxious and did not lay down.      Problem List:  Problem List as of 9/17/2020 Date Reviewed: 9/1/2020          Codes Class Noted - Resolved    Abdominal wall hernia ICD-10-CM: K43.9  ICD-9-CM: 553.20  8/1/2017 - Present        DDD (degenerative disc disease), lumbar ICD-10-CM: M51.36  ICD-9-CM: 722.52  5/1/2017 - Present        Chronic pain ICD-10-CM: G89.29  ICD-9-CM: 338.29  1/6/2015 - Present        Aspiration pneumonia ([facility] ICD-10-CM: J69.0  ICD-9-CM: 507.0  1/19/2014 - Present        Hypernatremia ICD-10-CM: E87.0  ICD-9-CM: 276.0  1/14/2014 - Present        Ischemic colitis ([facility] ICD-10-CM: K55.9  ICD-9-CM: 557.9  1/13/2014 - Present        Colon perforation ([facility] ICD-10-CM: K63.1  ICD-9-CM: 569.83  1/13/2014 - Present        Acute respiratory failure with hypoxia ([facility] ICD-10-CM: J96.01  ICD-9-CM: 518.81  1/13/2014 - Present        Acute blood loss anemia ICD-10-CM: D62  ICD-9-CM: 285.1  1/13/2014 - Present        JESUS (acute kidney injury) ([facility] ICD-10-CM: N17.9  ICD-9-CM: 584.9  1/13/2014 - Present        Pneumonia ICD-10-CM: J18.9  ICD-9-CM: 486  1/1/2014 - Present        Elevated INR ICD-10-CM: R79.1  ICD-9-CM: 790.92  1/1/2014 - Present        Alcoholism ([facility] ICD-10-CM: F10.20  ICD-9-CM: 303.90  1/1/2014 - Present        Nicotine addiction ICD-10-CM: F17.200  ICD-9-CM: 305.1  1/1/2014 - Present        PVD (peripheral vascular disease) ([facility] ICD-10-CM: I73.9  ICD-9-CM: 443.9  7/31/2013 - Present        Hyponatremia ICD-10-CM: E87.1  ICD-9-CM: 276.1  5/30/2012 - Present        ETOH abuse ICD-10-CM: F10.10  ICD-9-CM: 305.00  5/30/2012 - Present        Coagulation disorder ([facility] ICD-10-CM: D68.9  ICD-9-CM: 286.9  5/30/2012 - Present        Allergic rhinitis due to other allergen ICD-10-CM: J30.89  ICD-9-CM: 477.8  12/27/2011 - Present        Stroke 2002 (Chronic) ICD-10-CM: I63.9  ICD-9-CM: 434.91  3/15/2010 - Present        Anal fistula (Chronic) ICD-10-CM: K60.3  ICD-9-CM: 565.1  3/15/2010 - Present        HTN (hypertension) (Chronic) ICD-10-CM: I10  ICD-9-CM: 401.9  3/15/2010 - Present        Genital herpes (Chronic) ICD-10-CM: A60.00  ICD-9-CM: 054.10  3/15/2010 - Present        Noncompliance with treatment (Chronic) ICD-10-CM: Z91.19  ICD-9-CM: V15.81  3/15/2010 - Present        High cholesterol (Chronic) ICD-10-CM: E78.00  ICD-9-CM: 272.0  3/15/2010 - Present        left Carotid Artery Occlusion (Chronic) ICD-10-CM: P00.33  ICD-9-CM: 433.10  3/15/2010 - Present              Medications reviewed  Current Facility-Administered Medications   Medication Dose Route Frequency    LORazepam (ATIVAN) tablet 1 mg  1 mg Oral ON CALL    budesonide-formoterol (SYMBICORT) 80-4.5 mcg inhaler  2 Puff Inhalation BID RT    dilTIAZem ER (CARDIZEM CD) capsule 120 mg  120 mg Oral DAILY    albuterol (PROVENTIL HFA, VENTOLIN HFA, PROAIR HFA) inhaler 2 Puff  2 Puff Inhalation QID RT    predniSONE (DELTASONE) tablet 30 mg  30 mg Oral DAILY WITH DINNER    0.9% sodium chloride infusion 250 mL  250 mL IntraVENous PRN    atorvastatin (LIPITOR) tablet 20 mg  20 mg Oral DAILY    enoxaparin (LOVENOX) injection 40 mg  40 mg SubCUTAneous Q24H    losartan (COZAAR) tablet 100 mg  100 mg Oral DAILY    piperacillin-tazobactam (ZOSYN) 3.375 g in 0.9% sodium chloride (MBP/ADV) 100 mL MBP  3.375 g IntraVENous Q8H    pantoprazole (PROTONIX) tablet 40 mg  40 mg Oral DAILY    acetaminophen (TYLENOL) tablet 650 mg  650 mg Oral Q4H PRN    alum-mag hydroxide-simeth (MYLANTA) oral suspension 30 mL  30 mL Oral Q4H PRN    magnesium hydroxide (MILK OF MAGNESIA) 400 mg/5 mL oral suspension 30 mL  30 mL Oral DAILY PRN    ondansetron (ZOFRAN) injection 4 mg  4 mg IntraVENous Q8H PRN    azithromycin (ZITHROMAX) tablet 500 mg  500 mg Oral DAILY       Review of Systems:   A comprehensive review of systems was negative except for: Respiratory: positive for dyspnea on exertion    Objective:   Physical Exam:     Visit Vitals  /68   Pulse 95   Temp 97.6 °F (36.4 °C)   Resp 18   Ht 6' 0.99\" (1.854 m)   Wt 82.6 kg (182 lb 1.6 oz)   SpO2 95%   BMI 24.03 kg/m²    O2 Flow Rate (L/min): 2 l/min O2 Device: Nasal cannula    Temp (24hrs), Av °F (36.7 °C), Min:97.6 °F (36.4 °C), Max:98.2 °F (36.8 °C)    No intake/output data recorded. No intake/output data recorded. General:  Alert, cooperative, no distress, appears stated age. Lungs:   Clear to auscultation bilaterally with diminished air entry bilateral.   Chest wall:  No tenderness or deformity. Heart:  Regular rate and rhythm, S1, S2 normal, no murmur, click, rub or gallop. Abdomen:   Soft, non-tender. Bowel sounds normal. No masses,  No organomegaly. Extremities: Extremities normal, atraumatic, no cyanosis or edema. Pulses: 2+ and symmetric all extremities. Skin: Skin color, texture, turgor normal. No rashes or lesions   Neurologic: CNII-XII intact.  No gross sensory or motor deficits     Data Review:       Recent Days:  Recent Labs     20  0400 20  1530 20  0620   WBC 6.4 5.9 5.8   HGB 9.6* 9.8* 10.2*   HCT 31.2* 31.0* 33.1*    276 283     Recent Labs     20  0400 20  1530 20  0620    135* 136   K 3.5 3.1* 3.6 CL 93* 92* 94*   CO2 >45* 39* 38*   GLU 90 85 54*   BUN 3* 3* 3*   CREA 0.58* 0.64* 0.49*   CA 8.0* 8.2* 8.4*     No results for input(s): PH, PCO2, PO2, HCO3, FIO2 in the last 72 hours. 24 Hour Results:  Recent Results (from the past 24 hour(s))   CBC WITH AUTOMATED DIFF    Collection Time: 09/16/20  3:30 PM   Result Value Ref Range    WBC 5.9 4.1 - 11.1 K/uL    RBC 3.36 (L) 4.10 - 5.70 M/uL    HGB 9.8 (L) 12.1 - 17.0 %    HCT 31.0 (L) 36.6 - 50.3 %    MCV 92.3 80.0 - 99.0 FL    MCH 29.2 26.0 - 34.0 PG    MCHC 31.6 30.0 - 36.5 g/dL    RDW 16.1 (H) 11.5 - 14.5 %    PLATELET 790 404 - 848 K/uL    MPV 9.0 8.9 - 12.9 FL    NEUTROPHILS 80 (H) 32 - 75 %    LYMPHOCYTES 10 (L) 12 - 49 %    MONOCYTES 8 5 - 13 %    EOSINOPHILS 1 0 - 7 %    BASOPHILS 1 0 - 1 %    IMMATURE GRANULOCYTES 0 0.0 - 0.5 %    ABS. NEUTROPHILS 4.7 1.8 - 8.0 K/UL    ABS. LYMPHOCYTES 0.6 (L) 0.8 - 3.5 K/UL    ABS. MONOCYTES 0.5 0.0 - 1.0 K/UL    ABS. EOSINOPHILS 0.0 0.0 - 0.4 K/UL    ABS. BASOPHILS 0.0 0.0 - 0.1 K/UL    ABS. IMM.  GRANS. 0.0 0.00 - 0.04 K/UL    DF AUTOMATED     METABOLIC PANEL, BASIC    Collection Time: 09/16/20  3:30 PM   Result Value Ref Range    Sodium 135 (L) 136 - 145 mmol/L    Potassium 3.1 (L) 3.5 - 5.1 mmol/L    Chloride 92 (L) 97 - 108 mmol/L    CO2 39 (H) 21 - 32 mmol/L    Anion gap 4 (L) 5 - 15 mmol/L    Glucose 85 65 - 100 mg/dL    BUN 3 (L) 6 - 20 mg/dL    Creatinine 0.64 (L) 0.70 - 1.30 mg/dL    BUN/Creatinine ratio 5 (L) 12 - 20      GFR est AA >60 >60 ml/min/1.73m2    GFR est non-AA >60 >60 ml/min/1.73m2    Calcium 8.2 (L) 8.5 - 10.1 mg/dL   CBC WITH AUTOMATED DIFF    Collection Time: 09/17/20  4:00 AM   Result Value Ref Range    WBC 6.4 4.1 - 11.1 K/uL    RBC 3.38 (L) 4.10 - 5.70 M/uL    HGB 9.6 (L) 12.1 - 17.0 %    HCT 31.2 (L) 36.6 - 50.3 %    MCV 92.3 80.0 - 99.0 FL    MCH 28.4 26.0 - 34.0 PG    MCHC 30.8 30.0 - 36.5 g/dL    RDW 16.2 (H) 11.5 - 14.5 %    PLATELET 013 396 - 143 K/uL    MPV 9.2 8.9 - 12.9 FL NEUTROPHILS 77 (H) 32 - 75 %    LYMPHOCYTES 12 12 - 49 %    MONOCYTES 11 5 - 13 %    EOSINOPHILS 0 0 - 7 %    BASOPHILS 0 0 - 1 %    IMMATURE GRANULOCYTES 0 0.0 - 0.5 %    ABS. NEUTROPHILS 4.9 1.8 - 8.0 K/UL    ABS. LYMPHOCYTES 0.8 0.8 - 3.5 K/UL    ABS. MONOCYTES 0.7 0.0 - 1.0 K/UL    ABS. EOSINOPHILS 0.0 0.0 - 0.4 K/UL    ABS. BASOPHILS 0.0 0.0 - 0.1 K/UL    ABS. IMM. GRANS. 0.0 0.00 - 0.04 K/UL    DF AUTOMATED     METABOLIC PANEL, BASIC    Collection Time: 09/17/20  4:00 AM   Result Value Ref Range    Sodium 138 136 - 145 mmol/L    Potassium 3.5 3.5 - 5.1 mmol/L    Chloride 93 (L) 97 - 108 mmol/L    CO2 >45 (HH) 21 - 32 mmol/L    Anion gap Not calculated 5 - 15 mmol/L    Glucose 90 65 - 100 mg/dL    BUN 3 (L) 6 - 20 mg/dL    Creatinine 0.58 (L) 0.70 - 1.30 mg/dL    BUN/Creatinine ratio 5 (L) 12 - 20      GFR est AA >60 >60 ml/min/1.73m2    GFR est non-AA >60 >60 ml/min/1.73m2    Calcium 8.0 (L) 8.5 - 10.1 mg/dL       Echo:  Result status: Final result    · LV: Estimated LVEF is 60 - 65%. Biplane method used to measure ejection fraction. Normal cavity size and systolic function (ejection fraction normal). Mild concentric hypertrophy. Wall motion: normal. Moderate (grade 2) left ventricular diastolic dysfunction. · AV: Probably trileaflet aortic valve. Moderate aortic valve leaflet calcification present. Severely reduced right leaflet mobility of the aortic valve. Aortic valve area is 1 cm2. Moderate to severe aortic valve stenosis is present. Mild aortic valve regurgitation is present. · MV: Mitral annular calcification. · TV: Mild tricuspid valve regurgitation is present. XR ABD ACUTE W 1 V CHEST   Final Result   IMPRESSION: Opacification of the left hemithorax, questioned for remote   pneumonectomy or lung collapse with pleural fluid. Prominent right parenchymal/interstitial lung markings compatible with fibrosis   and possible partial vascular congestion.       Small bowel gas, nonobstructive pattern. CT CHEST WO CONT    (Results Pending)         Assessment/     Acute hypoxic respiratory failure  Hypercapnia  Left lower lobe pneumonia  Left pleural effusion  Acute on chronic anemia  Dehydration  Rule out COVID-19  Generalized weakness and unsteady gait          Plan:  Continue supportive care including IV antibiotics  We will get CT chest- Will check ABG and see if he can get ativan and go for CT. Will consider NIPPV if CO2 is elevated. Will get pulmn eval as well. Await placement to skilled care facility  Overall clinically improving    Care Plan discussed with: Patient/Family, Nurse,  and Consultant Pulmnology    Total time spent with patient: 30 minutes.     Billy Leigh MD

## 2020-09-17 NOTE — PROGRESS NOTES
Pulm/CC Progress Note    Patient was consulted for evaluation of increasing shortness of breath  Subjective:   Daily Progress Note: 2020   Patient examined at bedside,  His shortness of breath has shown some improvement. His CT scan of chest could not be done as patient reported to get short of breath when he lays down flat. Declined going to the CT scan of concerned that he has to lay flat. He has complaints of more dyspnea when he lays on his right side. Review of Systems  has complains of shortness of breath. He is on nasal cannula oxygen. Denied any nausea vomiting. Has fair appetite    Objective:     Visit Vitals  /68   Pulse 95   Temp 97.6 °F (36.4 °C)   Resp 18   Ht 6' 0.99\" (1.854 m)   Wt 82.6 kg (182 lb 1.6 oz)   SpO2 95%   BMI 24.03 kg/m²    O2 Flow Rate (L/min): 2 l/min O2 Device: Nasal cannula    Temp (24hrs), Av °F (36.7 °C), Min:97.6 °F (36.4 °C), Max:98.2 °F (36.8 °C)      No intake/output data recorded. No intake/output data recorded.     Current Facility-Administered Medications   Medication Dose Route Frequency    LORazepam (ATIVAN) tablet 1 mg  1 mg Oral ON CALL    budesonide-formoterol (SYMBICORT) 80-4.5 mcg inhaler  2 Puff Inhalation BID RT    dilTIAZem ER (CARDIZEM CD) capsule 120 mg  120 mg Oral DAILY    albuterol (PROVENTIL HFA, VENTOLIN HFA, PROAIR HFA) inhaler 2 Puff  2 Puff Inhalation QID RT    predniSONE (DELTASONE) tablet 30 mg  30 mg Oral DAILY WITH DINNER    0.9% sodium chloride infusion 250 mL  250 mL IntraVENous PRN    atorvastatin (LIPITOR) tablet 20 mg  20 mg Oral DAILY    enoxaparin (LOVENOX) injection 40 mg  40 mg SubCUTAneous Q24H    losartan (COZAAR) tablet 100 mg  100 mg Oral DAILY    piperacillin-tazobactam (ZOSYN) 3.375 g in 0.9% sodium chloride (MBP/ADV) 100 mL MBP  3.375 g IntraVENous Q8H    pantoprazole (PROTONIX) tablet 40 mg  40 mg Oral DAILY    acetaminophen (TYLENOL) tablet 650 mg  650 mg Oral Q4H PRN    alum-mag hydroxide-simeth (MYLANTA) oral suspension 30 mL  30 mL Oral Q4H PRN    magnesium hydroxide (MILK OF MAGNESIA) 400 mg/5 mL oral suspension 30 mL  30 mL Oral DAILY PRN    ondansetron (ZOFRAN) injection 4 mg  4 mg IntraVENous Q8H PRN    azithromycin (ZITHROMAX) tablet 500 mg  500 mg Oral DAILY       Physical Exam:  General: Unresponsive. Patient is overall weak. On nasal cannula oxygen   HEENT: atraumatic, PERRL, moist mucosa,     Neck: Trachea midline, no carotid bruit, no masses. Supple but thick. Respiratory: Has decreased air entry at left hemithorax. Rhonchi audible in the right hemithorax. A high riding  Cardiovascular: RRR, no m/r/g, Normal S1 and S2   abdomen: Has ventral abdominal hernia, evidence of surgical scars soft,   Rectal: deferred  Extremities: no cyanosis or clubbing. Trace edema. Integumentary: warm, dry, and pink, with no rash, purpura, or petechia. Heme/Lymph: no lymphadenopathy, no bruises  Neurological: No focal motor deficit   psychiatric: cooperative with normal mood, affect, and cognition      Additional comments: Chest x-ray and abdominal x-rays also reviewed    Data Review    Recent Results (from the past 24 hour(s))   CBC WITH AUTOMATED DIFF    Collection Time: 09/16/20  3:30 PM   Result Value Ref Range    WBC 5.9 4.1 - 11.1 K/uL    RBC 3.36 (L) 4.10 - 5.70 M/uL    HGB 9.8 (L) 12.1 - 17.0 %    HCT 31.0 (L) 36.6 - 50.3 %    MCV 92.3 80.0 - 99.0 FL    MCH 29.2 26.0 - 34.0 PG    MCHC 31.6 30.0 - 36.5 g/dL    RDW 16.1 (H) 11.5 - 14.5 %    PLATELET 792 715 - 815 K/uL    MPV 9.0 8.9 - 12.9 FL    NEUTROPHILS 80 (H) 32 - 75 %    LYMPHOCYTES 10 (L) 12 - 49 %    MONOCYTES 8 5 - 13 %    EOSINOPHILS 1 0 - 7 %    BASOPHILS 1 0 - 1 %    IMMATURE GRANULOCYTES 0 0.0 - 0.5 %    ABS. NEUTROPHILS 4.7 1.8 - 8.0 K/UL    ABS. LYMPHOCYTES 0.6 (L) 0.8 - 3.5 K/UL    ABS. MONOCYTES 0.5 0.0 - 1.0 K/UL    ABS. EOSINOPHILS 0.0 0.0 - 0.4 K/UL    ABS. BASOPHILS 0.0 0.0 - 0.1 K/UL    ABS. IMM.  GRANS. 0.0 0.00 - 0.04 K/UL DF AUTOMATED     METABOLIC PANEL, BASIC    Collection Time: 09/16/20  3:30 PM   Result Value Ref Range    Sodium 135 (L) 136 - 145 mmol/L    Potassium 3.1 (L) 3.5 - 5.1 mmol/L    Chloride 92 (L) 97 - 108 mmol/L    CO2 39 (H) 21 - 32 mmol/L    Anion gap 4 (L) 5 - 15 mmol/L    Glucose 85 65 - 100 mg/dL    BUN 3 (L) 6 - 20 mg/dL    Creatinine 0.64 (L) 0.70 - 1.30 mg/dL    BUN/Creatinine ratio 5 (L) 12 - 20      GFR est AA >60 >60 ml/min/1.73m2    GFR est non-AA >60 >60 ml/min/1.73m2    Calcium 8.2 (L) 8.5 - 10.1 mg/dL   CBC WITH AUTOMATED DIFF    Collection Time: 09/17/20  4:00 AM   Result Value Ref Range    WBC 6.4 4.1 - 11.1 K/uL    RBC 3.38 (L) 4.10 - 5.70 M/uL    HGB 9.6 (L) 12.1 - 17.0 %    HCT 31.2 (L) 36.6 - 50.3 %    MCV 92.3 80.0 - 99.0 FL    MCH 28.4 26.0 - 34.0 PG    MCHC 30.8 30.0 - 36.5 g/dL    RDW 16.2 (H) 11.5 - 14.5 %    PLATELET 865 759 - 423 K/uL    MPV 9.2 8.9 - 12.9 FL    NEUTROPHILS 77 (H) 32 - 75 %    LYMPHOCYTES 12 12 - 49 %    MONOCYTES 11 5 - 13 %    EOSINOPHILS 0 0 - 7 %    BASOPHILS 0 0 - 1 %    IMMATURE GRANULOCYTES 0 0.0 - 0.5 %    ABS. NEUTROPHILS 4.9 1.8 - 8.0 K/UL    ABS. LYMPHOCYTES 0.8 0.8 - 3.5 K/UL    ABS. MONOCYTES 0.7 0.0 - 1.0 K/UL    ABS. EOSINOPHILS 0.0 0.0 - 0.4 K/UL    ABS. BASOPHILS 0.0 0.0 - 0.1 K/UL    ABS. IMM.  GRANS. 0.0 0.00 - 0.04 K/UL    DF AUTOMATED     METABOLIC PANEL, BASIC    Collection Time: 09/17/20  4:00 AM   Result Value Ref Range    Sodium 138 136 - 145 mmol/L    Potassium 3.5 3.5 - 5.1 mmol/L    Chloride 93 (L) 97 - 108 mmol/L    CO2 >45 (HH) 21 - 32 mmol/L    Anion gap Not calculated 5 - 15 mmol/L    Glucose 90 65 - 100 mg/dL    BUN 3 (L) 6 - 20 mg/dL    Creatinine 0.58 (L) 0.70 - 1.30 mg/dL    BUN/Creatinine ratio 5 (L) 12 - 20      GFR est AA >60 >60 ml/min/1.73m2    GFR est non-AA >60 >60 ml/min/1.73m2    Calcium 8.0 (L) 8.5 - 10.1 mg/dL       XR ABD ACUTE W 1 V CHEST   Final Result   IMPRESSION: Opacification of the left hemithorax, questioned for remote pneumonectomy or lung collapse with pleural fluid. Prominent right parenchymal/interstitial lung markings compatible with fibrosis   and possible partial vascular congestion. Small bowel gas, nonobstructive pattern. CT CHEST WO CONT    (Results Pending)          Assessment/Plan: This is a middle-aged male who was admitted because of increasing symptoms of shortness of breath  He is getting treated in hospital for pneumonia with IV Zosyn. He is noted to be increasingly tachypneic and short of breath. He has been on supplemental oxygen. His chest x-ray from last evening showed left lung opacification. There is a possibility that he may have had left pneumonectomy done. Patient did not give any significant information about previous surgery. He has following medical problems        Plan:   1. Shortness of breath:  Patient is short of breath, he is on nasal cannula oxygen. He was admitted about 5 days ago in the hospital and was treated for pneumonia with IV Zosyn. He is also on IV Zithromax. His WBC count is normal.  I reviewed his chest x-ray which showed left lung opacification. There is no scar slime for any previous surgery in his left hemithorax. CT scan of chest was ordered for him but patient did not go for the test as he is concerned that he cannot lay flat which makes him short of breath. Discussed with attending physician, will give him 2 mg of Ativan when he is transported to the CT scan to help him relax, CT scan would be done with him mildly sedated. He is still not able to do his CT scan of stent will order ultrasound of his left hemithorax. In the meanwhile he will be continued on antibiotics steroids and bronchodilators. Overall his shortness of breath has shown improvement as compared to yesterday. 2.  COPD:  He has a history of COPD based on chronic smoking. Patient got started on prednisone along with inhaled steroid with a spacer.     Patient is on Symbicort and rescue inhaler. .  3.  Pneumonia:  He is on IV Zosyn along with IV Zithromax. We will adjust his antibiotics based chest x-ray results. 4.  Hypertension:  He is on antihypertensive medications  5. Status post ex lap:  His abdominal examination shows significant fecal scars and ventral abdominal hernia. ;       Thank you for involving me in the management of your patient      Care Plan discussed with: Attending physician    Total time spent with patient: 30 minutes including review of imaging studies and discussion with radiologist..     Teagan Perez MD  Pulmonary/CC

## 2020-09-18 ENCOUNTER — APPOINTMENT (OUTPATIENT)
Dept: CT IMAGING | Age: 60
DRG: 177 | End: 2020-09-18
Attending: INTERNAL MEDICINE
Payer: MEDICARE

## 2020-09-18 PROCEDURE — 74011250637 HC RX REV CODE- 250/637: Performed by: INTERNAL MEDICINE

## 2020-09-18 PROCEDURE — 74011636637 HC RX REV CODE- 636/637: Performed by: INTERNAL MEDICINE

## 2020-09-18 PROCEDURE — 71250 CT THORAX DX C-: CPT

## 2020-09-18 PROCEDURE — 74011250637 HC RX REV CODE- 250/637: Performed by: HOSPITALIST

## 2020-09-18 PROCEDURE — 77010033678 HC OXYGEN DAILY

## 2020-09-18 PROCEDURE — 94760 N-INVAS EAR/PLS OXIMETRY 1: CPT

## 2020-09-18 PROCEDURE — 3331090001 HH PPS REVENUE CREDIT

## 2020-09-18 PROCEDURE — 74011250636 HC RX REV CODE- 250/636: Performed by: INTERNAL MEDICINE

## 2020-09-18 PROCEDURE — 94640 AIRWAY INHALATION TREATMENT: CPT

## 2020-09-18 PROCEDURE — 65270000029 HC RM PRIVATE

## 2020-09-18 PROCEDURE — 3331090002 HH PPS REVENUE DEBIT

## 2020-09-18 PROCEDURE — 65660000000 HC RM CCU STEPDOWN

## 2020-09-18 PROCEDURE — 74011000258 HC RX REV CODE- 258: Performed by: INTERNAL MEDICINE

## 2020-09-18 RX ORDER — ALBUTEROL SULFATE 90 UG/1
2 AEROSOL, METERED RESPIRATORY (INHALATION)
Status: DISCONTINUED | OUTPATIENT
Start: 2020-09-18 | End: 2020-09-19 | Stop reason: SDUPTHER

## 2020-09-18 RX ADMIN — ENOXAPARIN SODIUM 40 MG: 40 INJECTION SUBCUTANEOUS at 09:03

## 2020-09-18 RX ADMIN — ATORVASTATIN CALCIUM 20 MG: 20 TABLET, FILM COATED ORAL at 20:10

## 2020-09-18 RX ADMIN — AZITHROMYCIN MONOHYDRATE 500 MG: 500 TABLET ORAL at 09:03

## 2020-09-18 RX ADMIN — PIPERACILLIN SODIUM AND TAZOBACTAM SODIUM 3.38 G: 3; .375 INJECTION, POWDER, LYOPHILIZED, FOR SOLUTION INTRAVENOUS at 05:36

## 2020-09-18 RX ADMIN — ALBUTEROL SULFATE 2 PUFF: 108 AEROSOL, METERED RESPIRATORY (INHALATION) at 20:24

## 2020-09-18 RX ADMIN — LOSARTAN POTASSIUM 100 MG: 50 TABLET, FILM COATED ORAL at 09:03

## 2020-09-18 RX ADMIN — ALBUTEROL SULFATE 2 PUFF: 108 AEROSOL, METERED RESPIRATORY (INHALATION) at 13:37

## 2020-09-18 RX ADMIN — ALBUTEROL SULFATE 2 PUFF: 108 AEROSOL, METERED RESPIRATORY (INHALATION) at 08:21

## 2020-09-18 RX ADMIN — LORAZEPAM 1 MG: 1 TABLET ORAL at 12:16

## 2020-09-18 RX ADMIN — PIPERACILLIN SODIUM AND TAZOBACTAM SODIUM 3.38 G: 3; .375 INJECTION, POWDER, LYOPHILIZED, FOR SOLUTION INTRAVENOUS at 14:14

## 2020-09-18 RX ADMIN — BUDESONIDE AND FORMOTEROL FUMARATE DIHYDRATE 2 PUFF: 80; 4.5 AEROSOL RESPIRATORY (INHALATION) at 20:24

## 2020-09-18 RX ADMIN — DILTIAZEM HYDROCHLORIDE 120 MG: 120 CAPSULE, COATED, EXTENDED RELEASE ORAL at 09:03

## 2020-09-18 RX ADMIN — BUDESONIDE AND FORMOTEROL FUMARATE DIHYDRATE 2 PUFF: 80; 4.5 AEROSOL RESPIRATORY (INHALATION) at 08:21

## 2020-09-18 RX ADMIN — PIPERACILLIN SODIUM AND TAZOBACTAM SODIUM 3.38 G: 3; .375 INJECTION, POWDER, LYOPHILIZED, FOR SOLUTION INTRAVENOUS at 22:57

## 2020-09-18 RX ADMIN — PREDNISONE 30 MG: 20 TABLET ORAL at 20:10

## 2020-09-18 RX ADMIN — PANTOPRAZOLE SODIUM 40 MG: 40 TABLET, DELAYED RELEASE ORAL at 05:36

## 2020-09-18 NOTE — PROGRESS NOTES
Hospitalist Progress Note               Daily Progress Note: 9/18/2020    Chief complaint: Shortness of breath  Subjective: The patient is seen for follow  up. Offers no complaints. 55-year-old male was admitted to the hospital with a complaint of shortness of breath and cough. Patient was found to have left lower lobe pneumonia. Patient continues to have shortness of breath and cough. Patient has severe rails and crackles on his left side and become short of breath just by turning from left lateral position to right lateral position. He received 2 units of packed red blood cells during the stay. Patient continue to complains SOB, worse on lying down. CT was done yesterday.      Problem List:  Problem List as of 9/18/2020 Date Reviewed: 9/1/2020          Codes Class Noted - Resolved    Abdominal wall hernia ICD-10-CM: K43.9  ICD-9-CM: 553.20  8/1/2017 - Present        DDD (degenerative disc disease), lumbar ICD-10-CM: M51.36  ICD-9-CM: 722.52  5/1/2017 - Present        Chronic pain ICD-10-CM: G89.29  ICD-9-CM: 338.29  1/6/2015 - Present        Aspiration pneumonia ([facility] ICD-10-CM: J69.0  ICD-9-CM: 507.0  1/19/2014 - Present        Hypernatremia ICD-10-CM: E87.0  ICD-9-CM: 276.0  1/14/2014 - Present        Ischemic colitis ([facility] ICD-10-CM: K55.9  ICD-9-CM: 557.9  1/13/2014 - Present        Colon perforation ([facility] ICD-10-CM: K63.1  ICD-9-CM: 569.83  1/13/2014 - Present        Acute respiratory failure with hypoxia ([facility] ICD-10-CM: J96.01  ICD-9-CM: 518.81  1/13/2014 - Present        Acute blood loss anemia ICD-10-CM: D62  ICD-9-CM: 285.1  1/13/2014 - Present        JESUS (acute kidney injury) ([facility] ICD-10-CM: N17.9  ICD-9-CM: 584.9  1/13/2014 - Present        Pneumonia ICD-10-CM: J18.9  ICD-9-CM: 486  1/1/2014 - Present        Elevated INR ICD-10-CM: R79.1  ICD-9-CM: 790.92  1/1/2014 - Present        Alcoholism ([facility] ICD-10-CM: F10.20  ICD-9-CM: 303.90  1/1/2014 - Present Nicotine addiction ICD-10-CM: F17.200  ICD-9-CM: 305.1  1/1/2014 - Present        PVD (peripheral vascular disease) ([facility] ICD-10-CM: I73.9  ICD-9-CM: 443.9  7/31/2013 - Present        Hyponatremia ICD-10-CM: E87.1  ICD-9-CM: 276.1  5/30/2012 - Present        ETOH abuse ICD-10-CM: F10.10  ICD-9-CM: 305.00  5/30/2012 - Present        Coagulation disorder ([facility] ICD-10-CM: D68.9  ICD-9-CM: 286.9  5/30/2012 - Present        Allergic rhinitis due to other allergen ICD-10-CM: J30.89  ICD-9-CM: 477.8  12/27/2011 - Present        Stroke 2002 (Chronic) ICD-10-CM: I63.9  ICD-9-CM: 434.91  3/15/2010 - Present        Anal fistula (Chronic) ICD-10-CM: K60.3  ICD-9-CM: 565.1  3/15/2010 - Present        HTN (hypertension) (Chronic) ICD-10-CM: I10  ICD-9-CM: 401.9  3/15/2010 - Present        Genital herpes (Chronic) ICD-10-CM: A60.00  ICD-9-CM: 054.10  3/15/2010 - Present        Noncompliance with treatment (Chronic) ICD-10-CM: Z91.19  ICD-9-CM: V15.81  3/15/2010 - Present        High cholesterol (Chronic) ICD-10-CM: E78.00  ICD-9-CM: 272.0  3/15/2010 - Present        left Carotid Artery Occlusion (Chronic) ICD-10-CM: T21.66  ICD-9-CM: 433.10  3/15/2010 - Present              Medications reviewed  Current Facility-Administered Medications   Medication Dose Route Frequency    LORazepam (ATIVAN) tablet 1 mg  1 mg Oral ON CALL    budesonide-formoterol (SYMBICORT) 80-4.5 mcg inhaler  2 Puff Inhalation BID RT    dilTIAZem ER (CARDIZEM CD) capsule 120 mg  120 mg Oral DAILY    albuterol (PROVENTIL HFA, VENTOLIN HFA, PROAIR HFA) inhaler 2 Puff  2 Puff Inhalation QID RT    predniSONE (DELTASONE) tablet 30 mg  30 mg Oral DAILY WITH DINNER    0.9% sodium chloride infusion 250 mL  250 mL IntraVENous PRN    atorvastatin (LIPITOR) tablet 20 mg  20 mg Oral DAILY    enoxaparin (LOVENOX) injection 40 mg  40 mg SubCUTAneous Q24H    losartan (COZAAR) tablet 100 mg  100 mg Oral DAILY    piperacillin-tazobactam (ZOSYN) 3.375 g in 0.9% sodium chloride (MBP/ADV) 100 mL MBP  3.375 g IntraVENous Q8H    pantoprazole (PROTONIX) tablet 40 mg  40 mg Oral DAILY    acetaminophen (TYLENOL) tablet 650 mg  650 mg Oral Q4H PRN    alum-mag hydroxide-simeth (MYLANTA) oral suspension 30 mL  30 mL Oral Q4H PRN    magnesium hydroxide (MILK OF MAGNESIA) 400 mg/5 mL oral suspension 30 mL  30 mL Oral DAILY PRN    ondansetron (ZOFRAN) injection 4 mg  4 mg IntraVENous Q8H PRN    azithromycin (ZITHROMAX) tablet 500 mg  500 mg Oral DAILY       Review of Systems:   A comprehensive review of systems was negative except for: Respiratory: positive for dyspnea on exertion    Objective:   Physical Exam:     Visit Vitals  /69   Pulse 80   Temp 97.4 °F (36.3 °C)   Resp 18   Ht 6' 0.99\" (1.854 m)   Wt 82.6 kg (182 lb 1.6 oz)   SpO2 98%   BMI 24.03 kg/m²    O2 Flow Rate (L/min): 2 l/min(decreased o2 to 1lpm nc) O2 Device: Nasal cannula    Temp (24hrs), Av.6 °F (36.4 °C), Min:97.3 °F (36.3 °C), Max:98 °F (36.7 °C)    No intake/output data recorded.  1901 -  0700  In: -   Out: 750 [Urine:750]    General:  Alert, cooperative, no distress, appears stated age. Lungs:   Clear to auscultation bilaterally with diminished air entry bilateral.   Chest wall:  No tenderness or deformity. Heart:  Regular rate and rhythm, S1, S2 normal, no murmur, click, rub or gallop. Abdomen:   Soft, non-tender. Bowel sounds normal. No masses,  No organomegaly. Extremities: Extremities normal, atraumatic, no cyanosis or edema. Pulses: 2+ and symmetric all extremities. Skin: Skin color, texture, turgor normal. No rashes or lesions   Neurologic: CNII-XII intact.  No gross sensory or motor deficits     Data Review:       Recent Days:  Recent Labs     20  0400 20  1530 20  0620   WBC 6.4 5.9 5.8   HGB 9.6* 9.8* 10.2*   HCT 31.2* 31.0* 33.1*    276 283     Recent Labs     20  0400 20  1530 20  0620    135* 136   K 3.5 3.1* 3.6   CL 93* 92* 94*   CO2 >45* 39* 38*   GLU 90 85 54*   BUN 3* 3* 3*   CREA 0.58* 0.64* 0.49*   CA 8.0* 8.2* 8.4*     Recent Labs     09/17/20  1410   PH 7.412   PCO2 67*   PO2 81   HCO3 40*       24 Hour Results:  Recent Results (from the past 24 hour(s))   BLOOD GAS, ARTERIAL    Collection Time: 09/17/20  2:10 PM   Result Value Ref Range    pH 7.412 7.35 - 7.45      PCO2 67 (H) 35 - 45 mmHg    PO2 81 75 - 100 mmHg    O2 SAT 97 >95 %    BICARBONATE 40 (HH) 22 - 26 mmol/L    BASE EXCESS 15.8 (H) 0 - 2 mmol/L    O2 METHOD Nasal Cannula      O2 FLOW RATE 2 L/min    SITE Right Brachial      RAQUEL'S TEST PASS      Carboxy-Hgb 1.6 0 - 3 %    Methemoglobin 0.9 0 - 3 %    tHb 9.5 (L) 12.0 - 16.0 g/dL       Echo:  Result status: Final result    · LV: Estimated LVEF is 60 - 65%. Biplane method used to measure ejection fraction. Normal cavity size and systolic function (ejection fraction normal). Mild concentric hypertrophy. Wall motion: normal. Moderate (grade 2) left ventricular diastolic dysfunction. · AV: Probably trileaflet aortic valve. Moderate aortic valve leaflet calcification present. Severely reduced right leaflet mobility of the aortic valve. Aortic valve area is 1 cm2. Moderate to severe aortic valve stenosis is present. Mild aortic valve regurgitation is present. · MV: Mitral annular calcification. · TV: Mild tricuspid valve regurgitation is present. XR ABD ACUTE W 1 V CHEST   Final Result   IMPRESSION: Opacification of the left hemithorax, questioned for remote   pneumonectomy or lung collapse with pleural fluid. Prominent right parenchymal/interstitial lung markings compatible with fibrosis   and possible partial vascular congestion. Small bowel gas, nonobstructive pattern. CT CHEST WO CONT    (Results Pending)   REPORT:  96 mL Optiray 350 were injected and scanning was performed the abdomen and   pelvis.  Coronal reconstructions were performed.     There is patchy groundglass opacification in the right middle lobe, lingula,   and right lower lobe.     The liver, gallbladder, spleen, adrenal glands, and kidneys appear   unremarkable. There is a tiny hypodensity in the left kidney which is too   small to characterize. There is no significant lymphadenopathy.     Scanning through the pelvis demonstrates that the bladder is unremarkable. There is significant distention of the ascending and transverse colon. There   is mild pelvic and perihepatic ascites. The appendix is not identified but   no abnormal bowel loops are seen. There is a T11 compression deformity which   has progressed since May 2012.         IMPRESSION:  1. Significant colonic distention. The appendix is not identified. 2. Ascites. 3. Patchy groundglass at the lung bases, nonspecific. Assessment/     Acute hypoxic respiratory failure  Hypercapnia  Left lower lobe pneumonia  Left pleural effusion  Acute on chronic anemia  Dehydration  Rule out COVID-19  Generalized weakness and unsteady gait          Plan:  Continue supportive care including IV antibiotics  Continue prednisone. O2 supplements  Pulmn eval appreciated  Overall clinically improving    Care Plan discussed with: Patient/Family, Nurse,  and Consultant Pulmnology    Total time spent with patient: 30 minutes.     Jessica Alcaraz MD

## 2020-09-18 NOTE — PROGRESS NOTES
Comprehensive Nutrition Assessment    Type and Reason for Visit: RD nutrition re-screen/LOS    Nutrition Recommendations/Plan:   Continue current Cardiac diet   Add Ensure pdg BID  Allow snacks as desired  Monitor BMs, consider adding PRN immodium for diarrhea  Please document % of all meal/snack consumed      Nutrition Assessment:  Admitted for PNA, hypokalemia. Pt currently s/p 2 units PRBC, remains inpatient waiting for SNF placement. Interviewed at bedside, pt endorses good appetites and intakes inpatient, RD observed breakfast tray with >/=75% consumed. Pt endorsed displeasure with cardiac diet, wants salt packets. RD explained to pt why he is on a cardiac diet and suggested using other condiements (ketchup, mustard, etc) to help flavor food. Pt still asking for salt pkts. Discussed snacks vs. supplements, pt agreeable to Ensure pdg. Labs: Na 135, K+ 3.1, BG wnl; Meds: abx, mylanta, MOM, prednisone, PRN PPI    Malnutrition Assessment:  Malnutrition Status:  Mild malnutrition    Context:  Chronic illness     Findings of the 6 clinical characteristics of malnutrition:   Energy Intake:  Mild decrease in energy intake (specify)  Weight Loss:  No significant weight loss     Body Fat Loss:  No significant body fat loss,     Muscle Mass Loss:  7 - Severe muscle mass loss, Calf (gastrocnemius), Thigh (quadraceps), Temples (temporalis)  Fluid Accumulation:  No significant fluid accumulation,     Strength:  Not performed         Estimated Daily Nutrient Needs:  Energy (kcal):  2010(HBE x1.2)  Protein (g):  94(1.2g/kg)       Fluid (ml/day):  2010(1ml/kcal)    Needs for COPD using AND guidelines     Nutrition Related Findings:  Pt with adequate dentition but endorses issues with dry mouth, denies needing altered texture diet. Pt also endorses heavy diarrhea that is likely 2/2 abx, refused both yogurt and probiotic supplement, RD suggested pt ask for immodium if diarrhea worsens.       Wounds:    Moisture associate skin damage(buttocks)       Current Nutrition Therapies:   DIET CARDIAC Regular    Anthropometric Measures:  · Height:  6' 0.99\" (185.4 cm)  · Current Body Wt:  78.1 kg (172 lb 2.9 oz)   · Admission Body Wt:  182 lb 1.6 oz    · Usual Body Wt:  143 lb     · Ideal Body Wt:  184 lbs:  93.6 %   · BMI Category:  Normal weight (BMI 18.5-24. 9)     Pt endorses wt loss from UBW x1 year however this is unfounded according to admission BW. Pt also endorses wt fluctuations, says he has been eating better due to roommate helping him.      Nutrition Diagnosis:   · Mild malnutrition related to catabolic illness(COPD) as evidenced by severe muscle loss      Nutrition Interventions:   Food and/or Nutrient Delivery: Continue current diet, Start oral nutrition supplement(Ensure pdg)  Nutrition Education and Counseling: No recommendations at this time  Coordination of Nutrition Care: Continued inpatient monitoring    Goals:  Intakes >/=75% of EENs, wt maintenance with in +/-0.5kg, BMs every 1-2 days in 7 days       Nutrition Monitoring and Evaluation:   Behavioral-Environmental Outcomes: Readiness for change  Food/Nutrient Intake Outcomes: Food and nutrient intake  Physical Signs/Symptoms Outcomes: Diarrhea, Weight    Discharge Planning:    No discharge needs at this time     Electronically signed by Medina Sheppard RD on 9/18/2020 at 8:05 AM    Contact: Ext [phone]

## 2020-09-18 NOTE — PROGRESS NOTES
Pulm/CC Progress Note    Patient was consulted for evaluation of increasing shortness of breath  Subjective:   Daily Progress Note: 2020   Patient examined at bedside,  His shortness of breath has shown some improvement. His CT scan of chest could not be done as patient reported to get short of breath when he lays down flat. He is written Ativan to help him relax and get CT scan chest done. He has complaints of more dyspnea when he lays on his right side. Review of Systems  has complains of shortness of breath. He is on nasal cannula oxygen. Denied any nausea vomiting. Has fair appetite    Objective:     Visit Vitals  /69   Pulse 80   Temp 97.4 °F (36.3 °C)   Resp 18   Ht 6' 0.99\" (1.854 m)   Wt 82.6 kg (182 lb 1.6 oz)   SpO2 98%   BMI 24.03 kg/m²    O2 Flow Rate (L/min): (2) O2 Device: Nasal cannula    Temp (24hrs), Av.6 °F (36.4 °C), Min:97.3 °F (36.3 °C), Max:98 °F (36.7 °C)      No intake/output data recorded.    1901 -  0700  In: -   Out: 750 [Urine:750]    Current Facility-Administered Medications   Medication Dose Route Frequency    budesonide-formoterol (SYMBICORT) 80-4.5 mcg inhaler  2 Puff Inhalation BID RT    dilTIAZem ER (CARDIZEM CD) capsule 120 mg  120 mg Oral DAILY    albuterol (PROVENTIL HFA, VENTOLIN HFA, PROAIR HFA) inhaler 2 Puff  2 Puff Inhalation QID RT    predniSONE (DELTASONE) tablet 30 mg  30 mg Oral DAILY WITH DINNER    0.9% sodium chloride infusion 250 mL  250 mL IntraVENous PRN    atorvastatin (LIPITOR) tablet 20 mg  20 mg Oral DAILY    enoxaparin (LOVENOX) injection 40 mg  40 mg SubCUTAneous Q24H    losartan (COZAAR) tablet 100 mg  100 mg Oral DAILY    piperacillin-tazobactam (ZOSYN) 3.375 g in 0.9% sodium chloride (MBP/ADV) 100 mL MBP  3.375 g IntraVENous Q8H    pantoprazole (PROTONIX) tablet 40 mg  40 mg Oral DAILY    acetaminophen (TYLENOL) tablet 650 mg  650 mg Oral Q4H PRN    alum-mag hydroxide-simeth (MYLANTA) oral suspension 30 mL  30 mL Oral Q4H PRN    magnesium hydroxide (MILK OF MAGNESIA) 400 mg/5 mL oral suspension 30 mL  30 mL Oral DAILY PRN    ondansetron (ZOFRAN) injection 4 mg  4 mg IntraVENous Q8H PRN    azithromycin (ZITHROMAX) tablet 500 mg  500 mg Oral DAILY       Physical Exam:  General: Unresponsive. Patient is overall weak. On nasal cannula oxygen   HEENT: atraumatic, PERRL, moist mucosa,     Neck: Trachea midline, no carotid bruit, no masses. Supple but thick. Respiratory: Has decreased air entry at left hemithorax. Rhonchi audible in the right hemithorax. A high riding  Cardiovascular: RRR, no m/r/g, Normal S1 and S2   abdomen: Has ventral abdominal hernia, evidence of surgical scars soft,   Rectal: deferred  Extremities: no cyanosis or clubbing. Trace edema. Integumentary: warm, dry, and pink, with no rash, purpura, or petechia. Heme/Lymph: no lymphadenopathy, no bruises  Neurological: No focal motor deficit   psychiatric: cooperative with normal mood, affect, and cognition      Additional comments: Chest x-ray and abdominal x-rays also reviewed    Data Review    Recent Results (from the past 24 hour(s))   BLOOD GAS, ARTERIAL    Collection Time: 09/17/20  2:10 PM   Result Value Ref Range    pH 7.412 7.35 - 7.45      PCO2 67 (H) 35 - 45 mmHg    PO2 81 75 - 100 mmHg    O2 SAT 97 >95 %    BICARBONATE 40 (HH) 22 - 26 mmol/L    BASE EXCESS 15.8 (H) 0 - 2 mmol/L    O2 METHOD Nasal Cannula      O2 FLOW RATE 2 L/min    SITE Right Brachial      RAQUEL'S TEST PASS      Carboxy-Hgb 1.6 0 - 3 %    Methemoglobin 0.9 0 - 3 %    tHb 9.5 (L) 12.0 - 16.0 g/dL       XR ABD ACUTE W 1 V CHEST   Final Result   IMPRESSION: Opacification of the left hemithorax, questioned for remote   pneumonectomy or lung collapse with pleural fluid. Prominent right parenchymal/interstitial lung markings compatible with fibrosis   and possible partial vascular congestion. Small bowel gas, nonobstructive pattern.       CT CHEST WO CONT    (Results Pending)          Assessment/Plan: This is a middle-aged male who was admitted because of increasing symptoms of shortness of breath  He is getting treated in hospital for pneumonia with IV Zosyn. He is noted to be increasingly tachypneic and short of breath. He has been on supplemental oxygen. His chest x-ray from last evening showed left lung opacification. There is a possibility that he may have had left pneumonectomy done. Patient did not give any significant information about previous surgery. He has following medical problems        Plan:   1. Shortness of breath:  Patient is short of breath, he is on nasal cannula oxygen. He was admitted about 7 days ago in the hospital and was treated for pneumonia with IV Zosyn. He is also on IV Zithromax. His WBC count is normal.  I reviewed his chest x-ray which showed left lung opacification. There is no scar slime for any previous surgery in his left hemithorax. CT scan of chest was ordered for him but patient did not go for the test as he is concerned that he cannot lay flat which makes him short of breath. Discussed with attending physician, will give him Ativan when he is transported to the CT scan to help him relax, CT scan would be done with him mildly sedated today. Discussed with patient. He is still not able to do his CT scan of stent will order ultrasound of his left hemithorax. In the meanwhile he will be continued on antibiotics steroids and bronchodilators. Overall his shortness of breath has shown improvement as compared to yesterday. 2.  COPD:  He has a history of COPD based on chronic smoking. Patient got started on prednisone along with inhaled steroid with a spacer. Patient is on Symbicort and rescue inhaler. .  3.  Pneumonia:  He is on IV Zosyn along with IV Zithromax. We will adjust his antibiotics based chest x-ray results. 4.  Hypertension:  He is on antihypertensive medications  5.   Status post ex lap:  His abdominal examination shows significant fecal scars and ventral abdominal hernia. ;       Thank you for involving me in the management of your patient      Care Plan discussed with: Attending physician    Total time spent with patient: 30 minutes including review of imaging studies and discussion with radiologist..     Niles Ramirez MD  Pulmonary/CC

## 2020-09-19 LAB
ANION GAP SERPL CALC-SCNC: 1 MMOL/L (ref 5–15)
BASOPHILS # BLD: 0 K/UL (ref 0–0.1)
BASOPHILS NFR BLD: 0 % (ref 0–1)
BUN SERPL-MCNC: 8 MG/DL (ref 6–20)
BUN/CREAT SERPL: 12 (ref 12–20)
CA-I BLD-MCNC: 8 MG/DL (ref 8.5–10.1)
CHLORIDE SERPL-SCNC: 95 MMOL/L (ref 97–108)
CO2 SERPL-SCNC: 43 MMOL/L (ref 21–32)
CREAT SERPL-MCNC: 0.67 MG/DL (ref 0.7–1.3)
DIFFERENTIAL METHOD BLD: ABNORMAL
EOSINOPHIL # BLD: 0 K/UL (ref 0–0.4)
EOSINOPHIL NFR BLD: 0 % (ref 0–7)
ERYTHROCYTE [DISTWIDTH] IN BLOOD BY AUTOMATED COUNT: 17.4 % (ref 11.5–14.5)
GLUCOSE SERPL-MCNC: 102 MG/DL (ref 65–100)
HCT VFR BLD AUTO: 29.2 % (ref 36.6–50.3)
HGB BLD-MCNC: 9 % (ref 12.1–17)
IMM GRANULOCYTES # BLD AUTO: 0 K/UL (ref 0–0.04)
IMM GRANULOCYTES NFR BLD AUTO: 0 % (ref 0–0.5)
LYMPHOCYTES # BLD: 0.8 K/UL (ref 0.8–3.5)
LYMPHOCYTES NFR BLD: 13 % (ref 12–49)
MCH RBC QN AUTO: 28.6 PG (ref 26–34)
MCHC RBC AUTO-ENTMCNC: 30.8 G/DL (ref 30–36.5)
MCV RBC AUTO: 92.7 FL (ref 80–99)
MONOCYTES # BLD: 0.9 K/UL (ref 0–1)
MONOCYTES NFR BLD: 14 % (ref 5–13)
NEUTS SEG # BLD: 4.8 K/UL (ref 1.8–8)
NEUTS SEG NFR BLD: 73 % (ref 32–75)
PLATELET # BLD AUTO: 269 K/UL (ref 150–400)
PMV BLD AUTO: 9.6 FL (ref 8.9–12.9)
POTASSIUM SERPL-SCNC: 3.5 MMOL/L (ref 3.5–5.1)
RBC # BLD AUTO: 3.15 M/UL (ref 4.1–5.7)
SODIUM SERPL-SCNC: 139 MMOL/L (ref 136–145)
WBC # BLD AUTO: 6.5 K/UL (ref 4.1–11.1)

## 2020-09-19 PROCEDURE — 36415 COLL VENOUS BLD VENIPUNCTURE: CPT

## 2020-09-19 PROCEDURE — 80048 BASIC METABOLIC PNL TOTAL CA: CPT

## 2020-09-19 PROCEDURE — 74011250637 HC RX REV CODE- 250/637: Performed by: INTERNAL MEDICINE

## 2020-09-19 PROCEDURE — 65660000000 HC RM CCU STEPDOWN

## 2020-09-19 PROCEDURE — 3331090002 HH PPS REVENUE DEBIT

## 2020-09-19 PROCEDURE — 74011000250 HC RX REV CODE- 250: Performed by: INTERNAL MEDICINE

## 2020-09-19 PROCEDURE — 3331090001 HH PPS REVENUE CREDIT

## 2020-09-19 PROCEDURE — 85025 COMPLETE CBC W/AUTO DIFF WBC: CPT

## 2020-09-19 PROCEDURE — 74011636637 HC RX REV CODE- 636/637: Performed by: INTERNAL MEDICINE

## 2020-09-19 PROCEDURE — 94760 N-INVAS EAR/PLS OXIMETRY 1: CPT

## 2020-09-19 PROCEDURE — 94669 MECHANICAL CHEST WALL OSCILL: CPT

## 2020-09-19 PROCEDURE — 65270000029 HC RM PRIVATE

## 2020-09-19 PROCEDURE — 74011000258 HC RX REV CODE- 258: Performed by: INTERNAL MEDICINE

## 2020-09-19 PROCEDURE — 94640 AIRWAY INHALATION TREATMENT: CPT

## 2020-09-19 PROCEDURE — 74011250636 HC RX REV CODE- 250/636: Performed by: INTERNAL MEDICINE

## 2020-09-19 PROCEDURE — 77010033678 HC OXYGEN DAILY

## 2020-09-19 RX ORDER — FUROSEMIDE 10 MG/ML
40 INJECTION INTRAMUSCULAR; INTRAVENOUS DAILY
Status: DISCONTINUED | OUTPATIENT
Start: 2020-09-20 | End: 2020-09-25

## 2020-09-19 RX ORDER — IPRATROPIUM BROMIDE AND ALBUTEROL SULFATE 2.5; .5 MG/3ML; MG/3ML
3 SOLUTION RESPIRATORY (INHALATION)
Status: DISCONTINUED | OUTPATIENT
Start: 2020-09-19 | End: 2020-09-23

## 2020-09-19 RX ORDER — ACETYLCYSTEINE 200 MG/ML
400 SOLUTION ORAL; RESPIRATORY (INHALATION)
Status: DISCONTINUED | OUTPATIENT
Start: 2020-09-19 | End: 2020-09-22

## 2020-09-19 RX ADMIN — BUDESONIDE AND FORMOTEROL FUMARATE DIHYDRATE 2 PUFF: 80; 4.5 AEROSOL RESPIRATORY (INHALATION) at 09:43

## 2020-09-19 RX ADMIN — BUDESONIDE AND FORMOTEROL FUMARATE DIHYDRATE 2 PUFF: 80; 4.5 AEROSOL RESPIRATORY (INHALATION) at 20:03

## 2020-09-19 RX ADMIN — ENOXAPARIN SODIUM 40 MG: 40 INJECTION SUBCUTANEOUS at 09:21

## 2020-09-19 RX ADMIN — IPRATROPIUM BROMIDE AND ALBUTEROL SULFATE 3 ML: .5; 3 SOLUTION RESPIRATORY (INHALATION) at 15:48

## 2020-09-19 RX ADMIN — DILTIAZEM HYDROCHLORIDE 120 MG: 120 CAPSULE, COATED, EXTENDED RELEASE ORAL at 09:21

## 2020-09-19 RX ADMIN — PIPERACILLIN SODIUM AND TAZOBACTAM SODIUM 3.38 G: 3; .375 INJECTION, POWDER, LYOPHILIZED, FOR SOLUTION INTRAVENOUS at 13:35

## 2020-09-19 RX ADMIN — ACETYLCYSTEINE 400 MG: 200 SOLUTION ORAL; RESPIRATORY (INHALATION) at 15:48

## 2020-09-19 RX ADMIN — PANTOPRAZOLE SODIUM 40 MG: 40 TABLET, DELAYED RELEASE ORAL at 04:24

## 2020-09-19 RX ADMIN — AZITHROMYCIN MONOHYDRATE 500 MG: 500 TABLET ORAL at 09:21

## 2020-09-19 RX ADMIN — PIPERACILLIN SODIUM AND TAZOBACTAM SODIUM 3.38 G: 3; .375 INJECTION, POWDER, LYOPHILIZED, FOR SOLUTION INTRAVENOUS at 04:24

## 2020-09-19 RX ADMIN — LOSARTAN POTASSIUM 100 MG: 50 TABLET, FILM COATED ORAL at 09:21

## 2020-09-19 RX ADMIN — ACETYLCYSTEINE 800 MG: 200 SOLUTION ORAL; RESPIRATORY (INHALATION) at 20:03

## 2020-09-19 RX ADMIN — ALBUTEROL SULFATE 2 PUFF: 108 AEROSOL, METERED RESPIRATORY (INHALATION) at 09:43

## 2020-09-19 RX ADMIN — PREDNISONE 30 MG: 20 TABLET ORAL at 17:44

## 2020-09-19 RX ADMIN — IPRATROPIUM BROMIDE AND ALBUTEROL SULFATE 3 ML: .5; 3 SOLUTION RESPIRATORY (INHALATION) at 20:03

## 2020-09-19 RX ADMIN — ALBUTEROL SULFATE 2 PUFF: 108 AEROSOL, METERED RESPIRATORY (INHALATION) at 02:02

## 2020-09-19 RX ADMIN — PIPERACILLIN SODIUM AND TAZOBACTAM SODIUM 3.38 G: 3; .375 INJECTION, POWDER, LYOPHILIZED, FOR SOLUTION INTRAVENOUS at 21:07

## 2020-09-19 RX ADMIN — ATORVASTATIN CALCIUM 20 MG: 20 TABLET, FILM COATED ORAL at 21:07

## 2020-09-19 NOTE — PROGRESS NOTES
Hospitalist Progress Note               Daily Progress Note: 9/19/2020    Chief complaint: Shortness of breath  Subjective: The patient is seen for follow  up. Offers no complaints. 52-year-old male was admitted to the hospital with a complaint of shortness of breath and cough. Patient was found to have left lower lobe pneumonia. Patient continues to have shortness of breath and cough. Patient has severe rails and crackles on his left side and become short of breath just by turning from left lateral position to right lateral position. He received 2 units of packed red blood cells during the stay. Patient continue to complains SOB, worse on lying down. CT CHEST WO CONT   Final Result   IMPRESSION: Complete collapse of the left lung due to complete bronchial   obstruction, possibly aspiration. Fluid overload also noted      XR ABD ACUTE W 1 V CHEST   Final Result   IMPRESSION: Opacification of the left hemithorax, questioned for remote   pneumonectomy or lung collapse with pleural fluid. Prominent right parenchymal/interstitial lung markings compatible with fibrosis   and possible partial vascular congestion. Small bowel gas, nonobstructive pattern.       US CHEST    (Results Pending)         Problem List:  Problem List as of 9/19/2020 Date Reviewed: 9/1/2020          Codes Class Noted - Resolved    Abdominal wall hernia ICD-10-CM: K43.9  ICD-9-CM: 553.20  8/1/2017 - Present        DDD (degenerative disc disease), lumbar ICD-10-CM: M51.36  ICD-9-CM: 722.52  5/1/2017 - Present        Chronic pain ICD-10-CM: G89.29  ICD-9-CM: 338.29  1/6/2015 - Present        Aspiration pneumonia ([facility] ICD-10-CM: J69.0  ICD-9-CM: 507.0  1/19/2014 - Present        Hypernatremia ICD-10-CM: E87.0  ICD-9-CM: 276.0  1/14/2014 - Present        Ischemic colitis ([facility] ICD-10-CM: K55.9  ICD-9-CM: 557.9  1/13/2014 - Present        Colon perforation ([facility] ICD-10-CM: K63.1  ICD-9-CM: 569.83 1/13/2014 - Present        Acute respiratory failure with hypoxia (HCC) ICD-10-CM: J96.01  ICD-9-CM: 518.81  1/13/2014 - Present        Acute blood loss anemia ICD-10-CM: D62  ICD-9-CM: 285.1  1/13/2014 - Present        JESUS (acute kidney injury) ([place] [id number].) ICD-10-CM: N17.9  ICD-9-CM: 584.9  1/13/2014 - Present        Pneumonia ICD-10-CM: J18.9  ICD-9-CM: 486  1/1/2014 - Present        Elevated INR ICD-10-CM: R79.1  ICD-9-CM: 790.92  1/1/2014 - Present        Alcoholism ([place] [address].) ICD-10-CM: F10.20  ICD-9-CM: 303.90  1/1/2014 - Present        Nicotine addiction ICD-10-CM: F17.200  ICD-9-CM: 305.1  1/1/2014 - Present        PVD (peripheral vascular disease) ([place] [address] ICD-10-CM: I73.9  ICD-9-CM: 443.9  7/31/2013 - Present        Hyponatremia ICD-10-CM: E87.1  ICD-9-CM: 276.1  5/30/2012 - Present        ETOH abuse ICD-10-CM: F10.10  ICD-9-CM: 305.00  5/30/2012 - Present        Coagulation disorder ([place] [address] ICD-10-CM: D68.9  ICD-9-CM: 286.9  5/30/2012 - Present        Allergic rhinitis due to other allergen ICD-10-CM: J30.89  ICD-9-CM: 477.8  12/27/2011 - Present        Stroke 2002 (Chronic) ICD-10-CM: I63.9  ICD-9-CM: 434.91  3/15/2010 - Present        Anal fistula (Chronic) ICD-10-CM: K60.3  ICD-9-CM: 565.1  3/15/2010 - Present        HTN (hypertension) (Chronic) ICD-10-CM: I10  ICD-9-CM: 401.9  3/15/2010 - Present        Genital herpes (Chronic) ICD-10-CM: A60.00  ICD-9-CM: 054.10  3/15/2010 - Present        Noncompliance with treatment (Chronic) ICD-10-CM: Z91.19  ICD-9-CM: V15.81  3/15/2010 - Present        High cholesterol (Chronic) ICD-10-CM: E78.00  ICD-9-CM: 272.0  3/15/2010 - Present        left Carotid Artery Occlusion (Chronic) ICD-10-CM: L39.06  ICD-9-CM: 433.10  3/15/2010 - Present              Medications reviewed  Current Facility-Administered Medications   Medication Dose Route Frequency    albuterol-ipratropium (DUO-NEB) 2.5 MG-0.5 MG/3 ML  3 mL Nebulization QID RT    acetylcysteine (MUCOMYST) 200 mg/mL (20 %) solution 400 mg  400 mg Nebulization QID RT    budesonide-formoterol (SYMBICORT) 80-4.5 mcg inhaler  2 Puff Inhalation BID RT    dilTIAZem ER (CARDIZEM CD) capsule 120 mg  120 mg Oral DAILY    predniSONE (DELTASONE) tablet 30 mg  30 mg Oral DAILY WITH DINNER    0.9% sodium chloride infusion 250 mL  250 mL IntraVENous PRN    atorvastatin (LIPITOR) tablet 20 mg  20 mg Oral DAILY    enoxaparin (LOVENOX) injection 40 mg  40 mg SubCUTAneous Q24H    losartan (COZAAR) tablet 100 mg  100 mg Oral DAILY    piperacillin-tazobactam (ZOSYN) 3.375 g in 0.9% sodium chloride (MBP/ADV) 100 mL MBP  3.375 g IntraVENous Q8H    pantoprazole (PROTONIX) tablet 40 mg  40 mg Oral DAILY    acetaminophen (TYLENOL) tablet 650 mg  650 mg Oral Q4H PRN    alum-mag hydroxide-simeth (MYLANTA) oral suspension 30 mL  30 mL Oral Q4H PRN    magnesium hydroxide (MILK OF MAGNESIA) 400 mg/5 mL oral suspension 30 mL  30 mL Oral DAILY PRN    ondansetron (ZOFRAN) injection 4 mg  4 mg IntraVENous Q8H PRN    azithromycin (ZITHROMAX) tablet 500 mg  500 mg Oral DAILY       Review of Systems:   A comprehensive review of systems was negative except for: Respiratory: positive for dyspnea on exertion    Objective:   Physical Exam:     Visit Vitals  /78   Pulse 97   Temp 99.5 °F (37.5 °C)   Resp 16 Comment: 16   Ht 6' 0.99\" (1.854 m)   Wt 76.9 kg (169 lb 8.5 oz)   SpO2 97%   BMI 22.37 kg/m²    O2 Flow Rate (L/min): 2 l/min O2 Device: Nasal cannula    Temp (24hrs), Av.3 °F (36.8 °C), Min:97.6 °F (36.4 °C), Max:99.5 °F (37.5 °C)    No intake/output data recorded.  1901 -  0700  In: -   Out: 750 [Urine:750]    General:  Alert, cooperative, no distress, appears stated age. Lungs:   Clear to auscultation bilaterally with diminished air entry Left side   Chest wall:  No tenderness or deformity. Heart:  Regular rate and rhythm, S1, S2 normal, no murmur, click, rub or gallop. Abdomen:   Soft, non-tender.  Bowel sounds normal. No masses,  No organomegaly. Extremities: Extremities normal, atraumatic, no cyanosis or edema. Pulses: 2+ and symmetric all extremities. Skin: Skin color, texture, turgor normal. No rashes or lesions   Neurologic: CNII-XII intact. No gross sensory or motor deficits     Data Review:       Recent Days:  Recent Labs     09/19/20  1151 09/17/20  0400 09/16/20  1530   WBC 6.5 6.4 5.9   HGB 9.0* 9.6* 9.8*   HCT 29.2* 31.2* 31.0*    278 276     Recent Labs     09/19/20  1151 09/17/20  0400 09/16/20  1530    138 135*   K 3.5 3.5 3.1*   CL 95* 93* 92*   CO2 43* >45* 39*   * 90 85   BUN 8 3* 3*   CREA 0.67* 0.58* 0.64*   CA 8.0* 8.0* 8.2*     Recent Labs     09/17/20  1410   PH 7.412   PCO2 67*   PO2 81   HCO3 40*       24 Hour Results:  Recent Results (from the past 24 hour(s))   CBC WITH AUTOMATED DIFF    Collection Time: 09/19/20 11:51 AM   Result Value Ref Range    WBC 6.5 4.1 - 11.1 K/uL    RBC 3.15 (L) 4.10 - 5.70 M/uL    HGB 9.0 (L) 12.1 - 17.0 %    HCT 29.2 (L) 36.6 - 50.3 %    MCV 92.7 80.0 - 99.0 FL    MCH 28.6 26.0 - 34.0 PG    MCHC 30.8 30.0 - 36.5 g/dL    RDW 17.4 (H) 11.5 - 14.5 %    PLATELET 981 779 - 311 K/uL    MPV 9.6 8.9 - 12.9 FL    NEUTROPHILS 73 32 - 75 %    LYMPHOCYTES 13 12 - 49 %    MONOCYTES 14 (H) 5 - 13 %    EOSINOPHILS 0 0 - 7 %    BASOPHILS 0 0 - 1 %    IMMATURE GRANULOCYTES 0 0.0 - 0.5 %    ABS. NEUTROPHILS 4.8 1.8 - 8.0 K/UL    ABS. LYMPHOCYTES 0.8 0.8 - 3.5 K/UL    ABS. MONOCYTES 0.9 0.0 - 1.0 K/UL    ABS. EOSINOPHILS 0.0 0.0 - 0.4 K/UL    ABS. BASOPHILS 0.0 0.0 - 0.1 K/UL    ABS. IMM.  GRANS. 0.0 0.00 - 0.04 K/UL    DF AUTOMATED     METABOLIC PANEL, BASIC    Collection Time: 09/19/20 11:51 AM   Result Value Ref Range    Sodium 139 136 - 145 mmol/L    Potassium 3.5 3.5 - 5.1 mmol/L    Chloride 95 (L) 97 - 108 mmol/L    CO2 43 (HH) 21 - 32 mmol/L    Anion gap 1 (L) 5 - 15 mmol/L    Glucose 102 (H) 65 - 100 mg/dL    BUN 8 6 - 20 mg/dL    Creatinine 0.67 (L) 0.70 - 1.30 mg/dL    BUN/Creatinine ratio 12 12 - 20      GFR est AA >60 >60 ml/min/1.73m2    GFR est non-AA >60 >60 ml/min/1.73m2    Calcium 8.0 (L) 8.5 - 10.1 mg/dL       Echo:  Result status: Final result    · LV: Estimated LVEF is 60 - 65%. Biplane method used to measure ejection fraction. Normal cavity size and systolic function (ejection fraction normal). Mild concentric hypertrophy. Wall motion: normal. Moderate (grade 2) left ventricular diastolic dysfunction. · AV: Probably trileaflet aortic valve. Moderate aortic valve leaflet calcification present. Severely reduced right leaflet mobility of the aortic valve. Aortic valve area is 1 cm2. Moderate to severe aortic valve stenosis is present. Mild aortic valve regurgitation is present. · MV: Mitral annular calcification. · TV: Mild tricuspid valve regurgitation is present. CT CHEST WO CONT   Final Result   IMPRESSION: Complete collapse of the left lung due to complete bronchial   obstruction, possibly aspiration. Fluid overload also noted      XR ABD ACUTE W 1 V CHEST   Final Result   IMPRESSION: Opacification of the left hemithorax, questioned for remote   pneumonectomy or lung collapse with pleural fluid. Prominent right parenchymal/interstitial lung markings compatible with fibrosis   and possible partial vascular congestion. Small bowel gas, nonobstructive pattern. US CHEST    (Results Pending)   REPORT:  96 mL Optiray 350 were injected and scanning was performed the abdomen and   pelvis. Coronal reconstructions were performed.     There is patchy groundglass opacification in the right middle lobe, lingula,   and right lower lobe.     The liver, gallbladder, spleen, adrenal glands, and kidneys appear   unremarkable. There is a tiny hypodensity in the left kidney which is too   small to characterize. There is no significant lymphadenopathy.     Scanning through the pelvis demonstrates that the bladder is unremarkable.    There is significant distention of the ascending and transverse colon. There   is mild pelvic and perihepatic ascites. The appendix is not identified but   no abnormal bowel loops are seen. There is a T11 compression deformity which   has progressed since May 2012.         IMPRESSION:  1. Significant colonic distention. The appendix is not identified. 2. Ascites. 3. Patchy groundglass at the lung bases, nonspecific. Assessment/     Acute hypoxic respiratory failure  Hypercapnia  Left lower lobe pneumonia- with lung colapse  Left pleural effusion  Acute on chronic anemia  Dehydration  Rule out COVID-19  Generalized weakness and unsteady gait          Plan:  Continue supportive care including IV antibiotics  Continue prednisone. O2 supplements  Pulmn eval appreciated, d/w pulmnology  Overall clinically improving    Care Plan discussed with: Patient/Family, Nurse,  and Consultant Pulmnology    Total time spent with patient: 40 minutes.     Adelaide Hamilton MD

## 2020-09-19 NOTE — PROGRESS NOTES
Problem: Falls - Risk of  Goal: *Absence of Falls  Description: Document Tony Jolly Fall Risk and appropriate interventions in the flowsheet.   Outcome: Progressing Towards Goal  Note: Fall Risk Interventions:  Mobility Interventions: OT consult for ADLs, PT Consult for mobility concerns, Bed/chair exit alarm, Communicate number of staff needed for ambulation/transfer    Mentation Interventions: Bed/chair exit alarm, Adequate sleep, hydration, pain control, Room close to nurse's station, More frequent rounding, Toileting rounds         Elimination Interventions: Bed/chair exit alarm, Call light in reach, Patient to call for help with toileting needs, Urinal in reach    History of Falls Interventions: Bed/chair exit alarm, Consult care management for discharge planning, Room close to nurse's station, Door open when patient unattended         Problem: Patient Education: Go to Patient Education Activity  Goal: Patient/Family Education  Outcome: Progressing Towards Goal

## 2020-09-19 NOTE — PROGRESS NOTES
Pulm/CC Progress Note    Patient was consulted for evaluation of increasing shortness of breath  Subjective:   Daily Progress Note: 2020   Patient examined at bedside,  His shortness of breath has shown some improvement. No acute distress  On nasal cannula oxygen    CT chest reviewed and shows near total collapse of the left lung due to debris in the airway  Moderate size right and small left-sided pleural effusion     Review of Systems  has complains of shortness of breath. He is on nasal cannula oxygen. Denied any nausea vomiting. Has fair appetite    Objective:     Visit Vitals  /68   Pulse 97   Temp 99.1 °F (37.3 °C)   Resp 18   Ht 6' 0.99\" (1.854 m)   Wt 76.9 kg (169 lb 8.5 oz)   SpO2 97%   BMI 22.37 kg/m²    O2 Flow Rate (L/min): 2 l/min O2 Device: Nasal cannula    Temp (24hrs), Av.5 °F (36.9 °C), Min:97.6 °F (36.4 °C), Max:99.5 °F (37.5 °C)      No intake/output data recorded.    1901 -  0700  In: -   Out: 750 [Urine:750]    Current Facility-Administered Medications   Medication Dose Route Frequency    albuterol-ipratropium (DUO-NEB) 2.5 MG-0.5 MG/3 ML  3 mL Nebulization QID RT    acetylcysteine (MUCOMYST) 200 mg/mL (20 %) solution 400 mg  400 mg Nebulization QID RT    budesonide-formoterol (SYMBICORT) 80-4.5 mcg inhaler  2 Puff Inhalation BID RT    dilTIAZem ER (CARDIZEM CD) capsule 120 mg  120 mg Oral DAILY    predniSONE (DELTASONE) tablet 30 mg  30 mg Oral DAILY WITH DINNER    0.9% sodium chloride infusion 250 mL  250 mL IntraVENous PRN    atorvastatin (LIPITOR) tablet 20 mg  20 mg Oral DAILY    enoxaparin (LOVENOX) injection 40 mg  40 mg SubCUTAneous Q24H    losartan (COZAAR) tablet 100 mg  100 mg Oral DAILY    piperacillin-tazobactam (ZOSYN) 3.375 g in 0.9% sodium chloride (MBP/ADV) 100 mL MBP  3.375 g IntraVENous Q8H    pantoprazole (PROTONIX) tablet 40 mg  40 mg Oral DAILY    acetaminophen (TYLENOL) tablet 650 mg  650 mg Oral Q4H PRN    alum-mag hydroxide-simeth (MYLANTA) oral suspension 30 mL  30 mL Oral Q4H PRN    magnesium hydroxide (MILK OF MAGNESIA) 400 mg/5 mL oral suspension 30 mL  30 mL Oral DAILY PRN    ondansetron (ZOFRAN) injection 4 mg  4 mg IntraVENous Q8H PRN    azithromycin (ZITHROMAX) tablet 500 mg  500 mg Oral DAILY       Physical Exam:  General: Unresponsive. Patient is overall weak. On nasal cannula oxygen   HEENT: atraumatic, PERRL, moist mucosa,     Neck: Trachea midline, no carotid bruit, no masses. Supple but thick. Respiratory: Has decreased air entry at left hemithorax. Rhonchi audible in the right hemithorax. A high riding  Cardiovascular: RRR, no m/r/g, Normal S1 and S2   abdomen: Has ventral abdominal hernia, evidence of surgical scars soft,   Rectal: deferred  Extremities: no cyanosis or clubbing. Trace edema. Integumentary: warm, dry, and pink, with no rash, purpura, or petechia. Heme/Lymph: no lymphadenopathy, no bruises  Neurological: No focal motor deficit   psychiatric: cooperative with normal mood, affect, and cognition      Additional comments: Chest x-ray and abdominal x-rays also reviewed    Data Review    Recent Results (from the past 24 hour(s))   CBC WITH AUTOMATED DIFF    Collection Time: 09/19/20 11:51 AM   Result Value Ref Range    WBC 6.5 4.1 - 11.1 K/uL    RBC 3.15 (L) 4.10 - 5.70 M/uL    HGB 9.0 (L) 12.1 - 17.0 %    HCT 29.2 (L) 36.6 - 50.3 %    MCV 92.7 80.0 - 99.0 FL    MCH 28.6 26.0 - 34.0 PG    MCHC 30.8 30.0 - 36.5 g/dL    RDW 17.4 (H) 11.5 - 14.5 %    PLATELET 353 580 - 594 K/uL    MPV 9.6 8.9 - 12.9 FL    NEUTROPHILS 73 32 - 75 %    LYMPHOCYTES 13 12 - 49 %    MONOCYTES 14 (H) 5 - 13 %    EOSINOPHILS 0 0 - 7 %    BASOPHILS 0 0 - 1 %    IMMATURE GRANULOCYTES 0 0.0 - 0.5 %    ABS. NEUTROPHILS 4.8 1.8 - 8.0 K/UL    ABS. LYMPHOCYTES 0.8 0.8 - 3.5 K/UL    ABS. MONOCYTES 0.9 0.0 - 1.0 K/UL    ABS. EOSINOPHILS 0.0 0.0 - 0.4 K/UL    ABS. BASOPHILS 0.0 0.0 - 0.1 K/UL    ABS. IMM.  GRANS. 0.0 0.00 - 0.04 K/UL    DF AUTOMATED     METABOLIC PANEL, BASIC    Collection Time: 09/19/20 11:51 AM   Result Value Ref Range    Sodium 139 136 - 145 mmol/L    Potassium 3.5 3.5 - 5.1 mmol/L    Chloride 95 (L) 97 - 108 mmol/L    CO2 43 (HH) 21 - 32 mmol/L    Anion gap 1 (L) 5 - 15 mmol/L    Glucose 102 (H) 65 - 100 mg/dL    BUN 8 6 - 20 mg/dL    Creatinine 0.67 (L) 0.70 - 1.30 mg/dL    BUN/Creatinine ratio 12 12 - 20      GFR est AA >60 >60 ml/min/1.73m2    GFR est non-AA >60 >60 ml/min/1.73m2    Calcium 8.0 (L) 8.5 - 10.1 mg/dL       CT CHEST WO CONT   Final Result   IMPRESSION: Complete collapse of the left lung due to complete bronchial   obstruction, possibly aspiration. Fluid overload also noted      XR ABD ACUTE W 1 V CHEST   Final Result   IMPRESSION: Opacification of the left hemithorax, questioned for remote   pneumonectomy or lung collapse with pleural fluid. Prominent right parenchymal/interstitial lung markings compatible with fibrosis   and possible partial vascular congestion. Small bowel gas, nonobstructive pattern. US CHEST    (Results Pending)          Assessment/Plan: This is a middle-aged male who was admitted because of increasing symptoms of shortness of breath  He is getting treated in hospital for pneumonia with IV Zosyn. He is noted to be increasingly tachypneic and short of breath. He has been on supplemental oxygen. His chest x-ray from last evening showed left lung opacification. There is a possibility that he may have had left pneumonectomy done. Patient did not give any significant information about previous surgery. He has following medical problems        Plan:   1.     Left lung collapse/shortness of breath:  Shortness of breath and hypoxia improved  Appears to be due to pneumonia along with COPD and left lung collapse  Lung collapse is complicated by possible aspiration and pneumonia  Respiratory status appears relatively stable on 2 to 3 L nasal cannula oxygen with adequate saturations  No acute distress  Discussed the option of bronchoscopy with the patient but he does not wish to do so at present unless urgently required  We will proceed with nebulizers and Mucomyst every 6 hours  Aggressive chest PT with vest every 6 hours with suctioning and encouragement of coughing  Will follow next 24 to 48 hours before making decision regarding need for possible bronchoscopy for airway clearance if imaging not improved    2. COPD:  He has a history of COPD based on chronic smoking. Patient got started on prednisone along with inhaled steroid with a spacer. Patient is on Symbicort and rescue inhaler    3. Pneumonia:  He is on IV Zosyn along with IV Zithromax. We will adjust his antibiotics based chest x-ray results. 4.  Hypertension:  He is on antihypertensive medications    5. Status post ex lap:  His abdominal examination shows significant fecal scars and ventral abdominal hernia. ;    Questions of patient were answered at bedside in detail  Case discussed in detail with RN, RT, and care team  Thank you for involving me in the care of this patient  I will follow with you closely during hospitalization    Time spent more than 30 minutes under patient care with no overlap    Merline Loader, MD  Pulmonary/CC

## 2020-09-20 LAB
ANION GAP SERPL CALC-SCNC: 1 MMOL/L (ref 5–15)
BASOPHILS # BLD: 0 K/UL (ref 0–0.1)
BASOPHILS NFR BLD: 0 % (ref 0–1)
BUN SERPL-MCNC: 8 MG/DL (ref 6–20)
BUN/CREAT SERPL: 15 (ref 12–20)
CA-I BLD-MCNC: 8.4 MG/DL (ref 8.5–10.1)
CHLORIDE SERPL-SCNC: 93 MMOL/L (ref 97–108)
CO2 SERPL-SCNC: 40 MMOL/L (ref 21–32)
CREAT SERPL-MCNC: 0.52 MG/DL (ref 0.7–1.3)
DIFFERENTIAL METHOD BLD: ABNORMAL
EOSINOPHIL # BLD: 0 K/UL (ref 0–0.4)
EOSINOPHIL NFR BLD: 0 % (ref 0–7)
ERYTHROCYTE [DISTWIDTH] IN BLOOD BY AUTOMATED COUNT: 16.6 % (ref 11.5–14.5)
GLUCOSE SERPL-MCNC: 92 MG/DL (ref 65–100)
HCT VFR BLD AUTO: 32.3 % (ref 36.6–50.3)
HGB BLD-MCNC: 9.8 % (ref 12.1–17)
IMM GRANULOCYTES # BLD AUTO: 0 K/UL
IMM GRANULOCYTES NFR BLD AUTO: 0 %
LYMPHOCYTES # BLD: 1.1 K/UL (ref 0.8–3.5)
LYMPHOCYTES NFR BLD: 18 % (ref 12–49)
MCH RBC QN AUTO: 29.3 PG (ref 26–34)
MCHC RBC AUTO-ENTMCNC: 30.3 G/DL (ref 30–36.5)
MCV RBC AUTO: 96.7 FL (ref 80–99)
MONOCYTES # BLD: 0.4 K/UL (ref 0–1)
MONOCYTES NFR BLD: 7 % (ref 5–13)
NEUTS BAND NFR BLD MANUAL: 7 % (ref 0–6)
NEUTS SEG # BLD: 4.6 K/UL (ref 1.8–8)
NEUTS SEG NFR BLD: 68 % (ref 32–75)
PLATELET # BLD AUTO: 246 K/UL (ref 150–400)
PMV BLD AUTO: 9.7 FL (ref 8.9–12.9)
POTASSIUM SERPL-SCNC: 3.6 MMOL/L (ref 3.5–5.1)
RBC # BLD AUTO: 3.34 M/UL (ref 4.1–5.7)
RBC MORPH BLD: ABNORMAL
RBC MORPH BLD: ABNORMAL
SODIUM SERPL-SCNC: 134 MMOL/L (ref 136–145)
WBC # BLD AUTO: 6.1 K/UL (ref 4.1–11.1)

## 2020-09-20 PROCEDURE — 74011636637 HC RX REV CODE- 636/637: Performed by: INTERNAL MEDICINE

## 2020-09-20 PROCEDURE — 80048 BASIC METABOLIC PNL TOTAL CA: CPT

## 2020-09-20 PROCEDURE — 74011000250 HC RX REV CODE- 250: Performed by: INTERNAL MEDICINE

## 2020-09-20 PROCEDURE — 36415 COLL VENOUS BLD VENIPUNCTURE: CPT

## 2020-09-20 PROCEDURE — 94640 AIRWAY INHALATION TREATMENT: CPT

## 2020-09-20 PROCEDURE — 65270000029 HC RM PRIVATE

## 2020-09-20 PROCEDURE — 74011250637 HC RX REV CODE- 250/637: Performed by: INTERNAL MEDICINE

## 2020-09-20 PROCEDURE — 97530 THERAPEUTIC ACTIVITIES: CPT

## 2020-09-20 PROCEDURE — 65660000000 HC RM CCU STEPDOWN

## 2020-09-20 PROCEDURE — 77010033678 HC OXYGEN DAILY

## 2020-09-20 PROCEDURE — 3331090001 HH PPS REVENUE CREDIT

## 2020-09-20 PROCEDURE — 3331090002 HH PPS REVENUE DEBIT

## 2020-09-20 PROCEDURE — 85025 COMPLETE CBC W/AUTO DIFF WBC: CPT

## 2020-09-20 PROCEDURE — 74011250636 HC RX REV CODE- 250/636: Performed by: INTERNAL MEDICINE

## 2020-09-20 PROCEDURE — 94760 N-INVAS EAR/PLS OXIMETRY 1: CPT

## 2020-09-20 PROCEDURE — 74011000258 HC RX REV CODE- 258: Performed by: INTERNAL MEDICINE

## 2020-09-20 PROCEDURE — 94669 MECHANICAL CHEST WALL OSCILL: CPT

## 2020-09-20 RX ADMIN — ENOXAPARIN SODIUM 40 MG: 40 INJECTION SUBCUTANEOUS at 10:40

## 2020-09-20 RX ADMIN — ATORVASTATIN CALCIUM 20 MG: 20 TABLET, FILM COATED ORAL at 21:49

## 2020-09-20 RX ADMIN — IPRATROPIUM BROMIDE AND ALBUTEROL SULFATE 3 ML: .5; 3 SOLUTION RESPIRATORY (INHALATION) at 19:32

## 2020-09-20 RX ADMIN — PIPERACILLIN SODIUM AND TAZOBACTAM SODIUM 3.38 G: 3; .375 INJECTION, POWDER, LYOPHILIZED, FOR SOLUTION INTRAVENOUS at 21:51

## 2020-09-20 RX ADMIN — BUDESONIDE AND FORMOTEROL FUMARATE DIHYDRATE 2 PUFF: 80; 4.5 AEROSOL RESPIRATORY (INHALATION) at 19:32

## 2020-09-20 RX ADMIN — LOSARTAN POTASSIUM 100 MG: 50 TABLET, FILM COATED ORAL at 10:40

## 2020-09-20 RX ADMIN — PIPERACILLIN SODIUM AND TAZOBACTAM SODIUM 3.38 G: 3; .375 INJECTION, POWDER, LYOPHILIZED, FOR SOLUTION INTRAVENOUS at 04:53

## 2020-09-20 RX ADMIN — ACETYLCYSTEINE 400 MG: 200 SOLUTION ORAL; RESPIRATORY (INHALATION) at 19:33

## 2020-09-20 RX ADMIN — IPRATROPIUM BROMIDE AND ALBUTEROL SULFATE 3 ML: .5; 3 SOLUTION RESPIRATORY (INHALATION) at 08:33

## 2020-09-20 RX ADMIN — PIPERACILLIN SODIUM AND TAZOBACTAM SODIUM 3.38 G: 3; .375 INJECTION, POWDER, LYOPHILIZED, FOR SOLUTION INTRAVENOUS at 13:54

## 2020-09-20 RX ADMIN — AZITHROMYCIN MONOHYDRATE 500 MG: 500 TABLET ORAL at 10:40

## 2020-09-20 RX ADMIN — PANTOPRAZOLE SODIUM 40 MG: 40 TABLET, DELAYED RELEASE ORAL at 05:50

## 2020-09-20 RX ADMIN — FUROSEMIDE 40 MG: 10 INJECTION, SOLUTION INTRAMUSCULAR; INTRAVENOUS at 10:40

## 2020-09-20 RX ADMIN — PREDNISONE 30 MG: 20 TABLET ORAL at 16:40

## 2020-09-20 RX ADMIN — BUDESONIDE AND FORMOTEROL FUMARATE DIHYDRATE 2 PUFF: 80; 4.5 AEROSOL RESPIRATORY (INHALATION) at 08:33

## 2020-09-20 RX ADMIN — ACETYLCYSTEINE 400 MG: 200 SOLUTION ORAL; RESPIRATORY (INHALATION) at 15:46

## 2020-09-20 RX ADMIN — IPRATROPIUM BROMIDE AND ALBUTEROL SULFATE 3 ML: .5; 3 SOLUTION RESPIRATORY (INHALATION) at 15:46

## 2020-09-20 RX ADMIN — ACETYLCYSTEINE 400 MG: 200 SOLUTION ORAL; RESPIRATORY (INHALATION) at 11:31

## 2020-09-20 RX ADMIN — ACETAMINOPHEN 650 MG: 325 TABLET, FILM COATED ORAL at 23:45

## 2020-09-20 RX ADMIN — ACETYLCYSTEINE 400 MG: 200 SOLUTION ORAL; RESPIRATORY (INHALATION) at 08:33

## 2020-09-20 RX ADMIN — IPRATROPIUM BROMIDE AND ALBUTEROL SULFATE 3 ML: .5; 3 SOLUTION RESPIRATORY (INHALATION) at 11:30

## 2020-09-20 RX ADMIN — DILTIAZEM HYDROCHLORIDE 120 MG: 120 CAPSULE, COATED, EXTENDED RELEASE ORAL at 10:40

## 2020-09-20 NOTE — PROGRESS NOTES
Pulm/CC Progress Note    Patient was consulted for evaluation of increasing shortness of breath  Subjective:   Daily Progress Note: 2020   Patient examined at bedside,    His shortness of breath has shown some improvement. No acute distress  On nasal cannula oxygen    CT chest reviewed and shows near total collapse of the left lung due to debris in the airway  Moderate size right and small left-sided pleural effusion     Review of Systems  has complains of shortness of breath. He is on nasal cannula oxygen. Denied any nausea vomiting. Has fair appetite    Objective:     Visit Vitals  /64   Pulse 91   Temp 98 °F (36.7 °C)   Resp 12   Ht 6' 0.99\" (1.854 m)   Wt 76.9 kg (169 lb 8.5 oz)   SpO2 98%   BMI 22.37 kg/m²    O2 Flow Rate (L/min): 2 l/min O2 Device: Nasal cannula    Temp (24hrs), Av.2 °F (36.8 °C), Min:97.4 °F (36.3 °C), Max:99.1 °F (37.3 °C)      No intake/output data recorded.    1901 -  0700  In: -   Out: 1100 [Urine:1100]    Current Facility-Administered Medications   Medication Dose Route Frequency    albuterol-ipratropium (DUO-NEB) 2.5 MG-0.5 MG/3 ML  3 mL Nebulization QID RT    acetylcysteine (MUCOMYST) 200 mg/mL (20 %) solution 400 mg  400 mg Nebulization QID RT    furosemide (LASIX) injection 40 mg  40 mg IntraVENous DAILY    budesonide-formoterol (SYMBICORT) 80-4.5 mcg inhaler  2 Puff Inhalation BID RT    dilTIAZem ER (CARDIZEM CD) capsule 120 mg  120 mg Oral DAILY    predniSONE (DELTASONE) tablet 30 mg  30 mg Oral DAILY WITH DINNER    0.9% sodium chloride infusion 250 mL  250 mL IntraVENous PRN    atorvastatin (LIPITOR) tablet 20 mg  20 mg Oral DAILY    enoxaparin (LOVENOX) injection 40 mg  40 mg SubCUTAneous Q24H    losartan (COZAAR) tablet 100 mg  100 mg Oral DAILY    piperacillin-tazobactam (ZOSYN) 3.375 g in 0.9% sodium chloride (MBP/ADV) 100 mL MBP  3.375 g IntraVENous Q8H    pantoprazole (PROTONIX) tablet 40 mg  40 mg Oral DAILY    acetaminophen (TYLENOL) tablet 650 mg  650 mg Oral Q4H PRN    alum-mag hydroxide-simeth (MYLANTA) oral suspension 30 mL  30 mL Oral Q4H PRN    magnesium hydroxide (MILK OF MAGNESIA) 400 mg/5 mL oral suspension 30 mL  30 mL Oral DAILY PRN    ondansetron (ZOFRAN) injection 4 mg  4 mg IntraVENous Q8H PRN    azithromycin (ZITHROMAX) tablet 500 mg  500 mg Oral DAILY       Physical Exam:  General: Unresponsive. Patient is overall weak. On nasal cannula oxygen   HEENT: atraumatic, PERRL, moist mucosa,     Neck: Trachea midline, no carotid bruit, no masses. Supple but thick. Respiratory: Has decreased air entry at left hemithorax. Rhonchi audible in the right hemithorax. A high riding  Cardiovascular: RRR, no m/r/g, Normal S1 and S2   abdomen: Has ventral abdominal hernia, evidence of surgical scars soft,   Rectal: deferred  Extremities: no cyanosis or clubbing. Trace edema. Integumentary: warm, dry, and pink, with no rash, purpura, or petechia. Heme/Lymph: no lymphadenopathy, no bruises  Neurological: No focal motor deficit   psychiatric: cooperative with normal mood, affect, and cognition      Additional comments: Chest x-ray and abdominal x-rays also reviewed    Data Review    Recent Results (from the past 24 hour(s))   METABOLIC PANEL, BASIC    Collection Time: 09/20/20  7:20 AM   Result Value Ref Range    Sodium 134 (L) 136 - 145 mmol/L    Potassium 3.6 3.5 - 5.1 mmol/L    Chloride 93 (L) 97 - 108 mmol/L    CO2 40 (H) 21 - 32 mmol/L    Anion gap 1 (L) 5 - 15 mmol/L    Glucose 92 65 - 100 mg/dL    BUN 8 6 - 20 mg/dL    Creatinine 0.52 (L) 0.70 - 1.30 mg/dL    BUN/Creatinine ratio 15 12 - 20      GFR est AA >60 >60 ml/min/1.73m2    GFR est non-AA >60 >60 ml/min/1.73m2    Calcium 8.4 (L) 8.5 - 10.1 mg/dL       CT CHEST WO CONT   Final Result   IMPRESSION: Complete collapse of the left lung due to complete bronchial   obstruction, possibly aspiration.  Fluid overload also noted      XR ABD ACUTE W 1 V CHEST   Final Result IMPRESSION: Opacification of the left hemithorax, questioned for remote   pneumonectomy or lung collapse with pleural fluid. Prominent right parenchymal/interstitial lung markings compatible with fibrosis   and possible partial vascular congestion. Small bowel gas, nonobstructive pattern. US CHEST    (Results Pending)          Assessment/Plan: This is a middle-aged male who was admitted because of increasing symptoms of shortness of breath  He is getting treated in hospital for pneumonia with IV Zosyn. He is noted to be increasingly tachypneic and short of breath. He has been on supplemental oxygen. His chest x-ray from last evening showed left lung opacification. There is a possibility that he may have had left pneumonectomy done. Patient did not give any significant information about previous surgery. He has following medical problems        Plan:   1. Left lung collapse/shortness of breath:  Shortness of breath and hypoxia improved  Appears to be due to pneumonia along with COPD and left lung collapse  Lung collapse is complicated by possible aspiration and pneumonia  Respiratory status appears relatively stable on 2 to 3 L nasal cannula oxygen with adequate saturations    Coughing up a lot of mucus overnight  Breathing is a lot easier and does not appear to be in any distress at this time    Discussed the option of bronchoscopy with the patient but he does not wish to do so at present unless urgently required  We will proceed with nebulizers and Mucomyst every 6 hours  Aggressive chest PT with vest every 6 hours with suctioning and encouragement of coughing  Will repeat chest x-ray in a.m. Will follow next 24 hours before making decision regarding need for possible bronchoscopy for airway clearance if imaging not improved    2. COPD:  He has a history of COPD based on chronic smoking. Patient got started on prednisone along with inhaled steroid with a spacer.     Patient is on Symbicort and rescue inhaler    3. Pneumonia:  He is on IV Zosyn along with IV Zithromax. We will adjust his antibiotics based chest x-ray results. 4.  Hypertension:  He is on antihypertensive medications    5. Status post ex lap:  His abdominal examination shows significant fecal scars and ventral abdominal hernia. ;    Questions of patient were answered at bedside in detail  Case discussed in detail with RN, RT, and care team  Thank you for involving me in the care of this patient  I will follow with you closely during hospitalization    Time spent more than 30 minutes under patient care with no overlap    Megan Lawson MD  Pulmonary/CC

## 2020-09-20 NOTE — PROGRESS NOTES
Problem: Mobility Impaired (Adult and Pediatric)  Goal: *Acute Goals and Plan of Care (Insert Text)  Description: Pt will be I with LE HEP in 7 days. Pt will perform bed mobility with mod I in 7 days. Pt will perform transfers with mod I in 7 days. Pt will amb 25-50 feet with LRAD safely with mod I in 7 days. Outcome: Progressing Towards Goal     Problem: Patient Education: Go to Patient Education Activity  Goal: Patient/Family Education  Description: LE HEP to improve strength and functional mobility       Outcome: Progressing Towards Goal     PHYSICAL THERAPY TREATMENT  Patient: Alba Lenz (86 y.o. male)  Date: 9/20/2020  Diagnosis: pneumonia hypokalemia hypertension chronic gerd hy   <principal problem not specified>       Precautions:    Chart, physical therapy assessment, plan of care and goals were reviewed. ASSESSMENT  Patient continues with skilled PT services and is progressing towards goals. Pt presents with decr ROM, strength, bed mobility, transfers, gait, balance, activity tolerance, safety awareness, and functional mobility. Pt agreeable to PT session. Pt reports constant low back pain. Pt completed EOB therex for LE with encouragement. Pt downgraded to 1x/week for POC due to repeatedly presenting with poor affect and lack of participation with therapy. Other factors to consider for discharge: Activity tolerance, participation with therapy          PLAN :  Patient continues to benefit from skilled intervention to address the above impairments. Continue treatment per established plan of care. to address goals. Recommendation for discharge: (in order for the patient to meet his/her long term goals)  PT d/c recc SNF when medically appropriate.      This discharge recommendation:  Has been made in collaboration with the attending provider and/or case management    IF patient discharges home will need the following DME: patient owns DME required for discharge       SUBJECTIVE: Patient stated you always come at the wrong time.     OBJECTIVE DATA SUMMARY:   Critical Behavior:  Neurologic State: Alert  Orientation Level: Oriented X4  Cognition: Follows commands, Poor safety awareness     Functional Mobility Training:  Bed Mobility:  Rolling: Stand-by assistance  Supine to Sit: Stand-by assistance  Sit to Supine: Stand-by assistance  Scooting: Moderate assistance     Pt completed bed mobility with SBA and scoot to [facility] with Mod A req cues for hand placement, LE use, and safety. Pt repeatedly stating \"you're going to make me bust my hernia. \" Pt educated on safe bed mobility techniques. Pt refused OOB activities stating \"I will fall, I just want a wheelchair to get around in.\" Pt tolerated sittign EOB for approx 12 min completed dynamic activities with BUE support. Transfers:   Does not occur     Balance:  Sitting: Impaired; With support  Sitting - Static: Poor (constant support)  Sitting - Dynamic: Poor (constant support)  Ambulation/Gait Training:         Does not occur  Stairs: Therapeutic Exercises:   Pt completed and educated on LE therex for ROM, strengthening, and functional mobility. Pt req max encouragement to participate in therex. EXERCISE   Sets   Reps   Active Active Assist   Passive Self ROM   Comments   Ankle Pumps, toe raises  10 [x] [] [] []    Quad Sets/Glut Sets   [] [] [] []    Hamstring Sets   [] [] [] []    Short Arc Quads   [] [] [] []    Heel Slides  10 [x] [] [] []    Straight Leg Raises   [] [] [] []    Hip abd/add  10 [x] [] [] []    Long Arc Quads  10 [x] [] [] []    Marching  10 [x] [] [] []       [] [] [] []       Pain Ratin/10 low back pain    Activity Tolerance:   Poor  Please refer to the flowsheet for vital signs taken during this treatment.     After treatment patient left in no apparent distress:   Supine in bed, Call bell within reach, Bed / chair alarm activated, and Side rails x 3    COMMUNICATION/COLLABORATION:   The patients plan of care was discussed with: Registered nurse.      Miguel Francis, PT   Time Calculation: 19 mins

## 2020-09-20 NOTE — PROGRESS NOTES
Hospitalist Progress Note               Daily Progress Note: 9/20/2020    Chief complaint: Shortness of breath  Subjective: The patient is seen for follow  up. Offers no complaints. 56-year-old male was admitted to the hospital with a complaint of shortness of breath and cough. Patient was found to have left lower lobe pneumonia. Patient continues to have shortness of breath and cough. Patient has severe rails and crackles on his left side and become short of breath just by turning from left lateral position to right lateral position. He received 2 units of packed red blood cells during the stay. Patient continue to complains SOB, worse on lying down. CT CHEST WO CONT   Final Result   IMPRESSION: Complete collapse of the left lung due to complete bronchial   obstruction, possibly aspiration. Fluid overload also noted      XR ABD ACUTE W 1 V CHEST   Final Result   IMPRESSION: Opacification of the left hemithorax, questioned for remote   pneumonectomy or lung collapse with pleural fluid. Prominent right parenchymal/interstitial lung markings compatible with fibrosis   and possible partial vascular congestion. Small bowel gas, nonobstructive pattern.       US CHEST    (Results Pending)         Problem List:  Problem List as of 9/20/2020 Date Reviewed: 9/1/2020          Codes Class Noted - Resolved    Abdominal wall hernia ICD-10-CM: K43.9  ICD-9-CM: 553.20  8/1/2017 - Present        DDD (degenerative disc disease), lumbar ICD-10-CM: M51.36  ICD-9-CM: 722.52  5/1/2017 - Present        Chronic pain ICD-10-CM: G89.29  ICD-9-CM: 338.29  1/6/2015 - Present        Aspiration pneumonia ([facility] ICD-10-CM: J69.0  ICD-9-CM: 507.0  1/19/2014 - Present        Hypernatremia ICD-10-CM: E87.0  ICD-9-CM: 276.0  1/14/2014 - Present        Ischemic colitis ([facility] ICD-10-CM: K55.9  ICD-9-CM: 557.9  1/13/2014 - Present        Colon perforation ([facility] ICD-10-CM: K63.1  ICD-9-CM: 569.83 1/13/2014 - Present        Acute respiratory failure with hypoxia (HCC) ICD-10-CM: J96.01  ICD-9-CM: 518.81  1/13/2014 - Present        Acute blood loss anemia ICD-10-CM: D62  ICD-9-CM: 285.1  1/13/2014 - Present        JESUS (acute kidney injury) ([place] [address] ICD-10-CM: N17.9  ICD-9-CM: 584.9  1/13/2014 - Present        Pneumonia ICD-10-CM: J18.9  ICD-9-CM: 486  1/1/2014 - Present        Elevated INR ICD-10-CM: R79.1  ICD-9-CM: 790.92  1/1/2014 - Present        Alcoholism ([place] [address] ICD-10-CM: F10.20  ICD-9-CM: 303.90  1/1/2014 - Present        Nicotine addiction ICD-10-CM: F17.200  ICD-9-CM: 305.1  1/1/2014 - Present        PVD (peripheral vascular disease) ([place] [address] ICD-10-CM: I73.9  ICD-9-CM: 443.9  7/31/2013 - Present        Hyponatremia ICD-10-CM: E87.1  ICD-9-CM: 276.1  5/30/2012 - Present        ETOH abuse ICD-10-CM: F10.10  ICD-9-CM: 305.00  5/30/2012 - Present        Coagulation disorder ([place] [address] ICD-10-CM: D68.9  ICD-9-CM: 286.9  5/30/2012 - Present        Allergic rhinitis due to other allergen ICD-10-CM: J30.89  ICD-9-CM: 477.8  12/27/2011 - Present        Stroke 2002 (Chronic) ICD-10-CM: I63.9  ICD-9-CM: 434.91  3/15/2010 - Present        Anal fistula (Chronic) ICD-10-CM: K60.3  ICD-9-CM: 565.1  3/15/2010 - Present        HTN (hypertension) (Chronic) ICD-10-CM: I10  ICD-9-CM: 401.9  3/15/2010 - Present        Genital herpes (Chronic) ICD-10-CM: A60.00  ICD-9-CM: 054.10  3/15/2010 - Present        Noncompliance with treatment (Chronic) ICD-10-CM: Z91.19  ICD-9-CM: V15.81  3/15/2010 - Present        High cholesterol (Chronic) ICD-10-CM: E78.00  ICD-9-CM: 272.0  3/15/2010 - Present        left Carotid Artery Occlusion (Chronic) ICD-10-CM: P33.90  ICD-9-CM: 433.10  3/15/2010 - Present              Medications reviewed  Current Facility-Administered Medications   Medication Dose Route Frequency    albuterol-ipratropium (DUO-NEB) 2.5 MG-0.5 MG/3 ML  3 mL Nebulization QID RT    acetylcysteine (MUCOMYST) 200 mg/mL (20 %) solution 400 mg  400 mg Nebulization QID RT    furosemide (LASIX) injection 40 mg  40 mg IntraVENous DAILY    budesonide-formoterol (SYMBICORT) 80-4.5 mcg inhaler  2 Puff Inhalation BID RT    dilTIAZem ER (CARDIZEM CD) capsule 120 mg  120 mg Oral DAILY    predniSONE (DELTASONE) tablet 30 mg  30 mg Oral DAILY WITH DINNER    0.9% sodium chloride infusion 250 mL  250 mL IntraVENous PRN    atorvastatin (LIPITOR) tablet 20 mg  20 mg Oral DAILY    enoxaparin (LOVENOX) injection 40 mg  40 mg SubCUTAneous Q24H    losartan (COZAAR) tablet 100 mg  100 mg Oral DAILY    piperacillin-tazobactam (ZOSYN) 3.375 g in 0.9% sodium chloride (MBP/ADV) 100 mL MBP  3.375 g IntraVENous Q8H    pantoprazole (PROTONIX) tablet 40 mg  40 mg Oral DAILY    acetaminophen (TYLENOL) tablet 650 mg  650 mg Oral Q4H PRN    alum-mag hydroxide-simeth (MYLANTA) oral suspension 30 mL  30 mL Oral Q4H PRN    magnesium hydroxide (MILK OF MAGNESIA) 400 mg/5 mL oral suspension 30 mL  30 mL Oral DAILY PRN    ondansetron (ZOFRAN) injection 4 mg  4 mg IntraVENous Q8H PRN    azithromycin (ZITHROMAX) tablet 500 mg  500 mg Oral DAILY       Review of Systems:   A comprehensive review of systems was negative except for: Respiratory: positive for dyspnea on exertion    Objective:   Physical Exam:     Visit Vitals  /64   Pulse 91   Temp 98 °F (36.7 °C)   Resp 12   Ht 6' 0.99\" (1.854 m)   Wt 76.9 kg (169 lb 8.5 oz)   SpO2 98%   BMI 22.37 kg/m²    O2 Flow Rate (L/min): 2 l/min O2 Device: Nasal cannula    Temp (24hrs), Av.2 °F (36.8 °C), Min:97.4 °F (36.3 °C), Max:99.1 °F (37.3 °C)    No intake/output data recorded.  1901 -  0700  In: -   Out: 1100 [Urine:1100]    General:  Alert, cooperative, no distress, appears stated age. Lungs:   Clear to auscultation bilaterally with diminished air entry Left side   Chest wall:  No tenderness or deformity. Heart:  Regular rate and rhythm, S1, S2 normal, no murmur, click, rub or gallop. Abdomen:   Soft, non-tender. Bowel sounds normal. No masses,  No organomegaly. Extremities: Extremities normal, atraumatic, no cyanosis or edema. Pulses: 2+ and symmetric all extremities. Skin: Skin color, texture, turgor normal. No rashes or lesions   Neurologic: CNII-XII intact. No gross sensory or motor deficits     Data Review:       Recent Days:  Recent Labs     09/19/20  1151   WBC 6.5   HGB 9.0*   HCT 29.2*        Recent Labs     09/20/20  0720 09/19/20  1151   * 139   K 3.6 3.5   CL 93* 95*   CO2 40* 43*   GLU 92 102*   BUN 8 8   CREA 0.52* 0.67*   CA 8.4* 8.0*     No results for input(s): PH, PCO2, PO2, HCO3, FIO2 in the last 72 hours. 24 Hour Results:  Recent Results (from the past 24 hour(s))   METABOLIC PANEL, BASIC    Collection Time: 09/20/20  7:20 AM   Result Value Ref Range    Sodium 134 (L) 136 - 145 mmol/L    Potassium 3.6 3.5 - 5.1 mmol/L    Chloride 93 (L) 97 - 108 mmol/L    CO2 40 (H) 21 - 32 mmol/L    Anion gap 1 (L) 5 - 15 mmol/L    Glucose 92 65 - 100 mg/dL    BUN 8 6 - 20 mg/dL    Creatinine 0.52 (L) 0.70 - 1.30 mg/dL    BUN/Creatinine ratio 15 12 - 20      GFR est AA >60 >60 ml/min/1.73m2    GFR est non-AA >60 >60 ml/min/1.73m2    Calcium 8.4 (L) 8.5 - 10.1 mg/dL       Echo:  Result status: Final result    · LV: Estimated LVEF is 60 - 65%. Biplane method used to measure ejection fraction. Normal cavity size and systolic function (ejection fraction normal). Mild concentric hypertrophy. Wall motion: normal. Moderate (grade 2) left ventricular diastolic dysfunction. · AV: Probably trileaflet aortic valve. Moderate aortic valve leaflet calcification present. Severely reduced right leaflet mobility of the aortic valve. Aortic valve area is 1 cm2. Moderate to severe aortic valve stenosis is present. Mild aortic valve regurgitation is present. · MV: Mitral annular calcification. · TV: Mild tricuspid valve regurgitation is present.      CT CHEST WO CONT   Final Result IMPRESSION: Complete collapse of the left lung due to complete bronchial   obstruction, possibly aspiration. Fluid overload also noted      XR ABD ACUTE W 1 V CHEST   Final Result   IMPRESSION: Opacification of the left hemithorax, questioned for remote   pneumonectomy or lung collapse with pleural fluid. Prominent right parenchymal/interstitial lung markings compatible with fibrosis   and possible partial vascular congestion. Small bowel gas, nonobstructive pattern. US CHEST    (Results Pending)   REPORT:  96 mL Optiray 350 were injected and scanning was performed the abdomen and   pelvis. Coronal reconstructions were performed.     There is patchy groundglass opacification in the right middle lobe, lingula,   and right lower lobe.     The liver, gallbladder, spleen, adrenal glands, and kidneys appear   unremarkable. There is a tiny hypodensity in the left kidney which is too   small to characterize. There is no significant lymphadenopathy.     Scanning through the pelvis demonstrates that the bladder is unremarkable. There is significant distention of the ascending and transverse colon. There   is mild pelvic and perihepatic ascites. The appendix is not identified but   no abnormal bowel loops are seen. There is a T11 compression deformity which   has progressed since May 2012.         IMPRESSION:  1. Significant colonic distention. The appendix is not identified. 2. Ascites. 3. Patchy groundglass at the lung bases, nonspecific. Assessment/     Acute hypoxic respiratory failure  Hypercapnia  Left lower lobe pneumonia- with lung colapse  Left pleural effusion  Acute on chronic anemia  Dehydration  Rule out COVID-19  Generalized weakness and unsteady gait          Plan:  Continue supportive care including IV antibiotics  Continue mucomyst nebs. Continue prednisone.    O2 supplements  Pulmn eval appreciated, d/w pulmnology  Overall clinically improving    Care Plan discussed with: Patient/Family, Nurse,  and Consultant Pulmnology    Total time spent with patient: 25 minutes.     Danie Thomason MD

## 2020-09-21 ENCOUNTER — APPOINTMENT (OUTPATIENT)
Dept: ENDOSCOPY | Age: 60
DRG: 177 | End: 2020-09-21
Attending: INTERNAL MEDICINE
Payer: MEDICARE

## 2020-09-21 ENCOUNTER — APPOINTMENT (OUTPATIENT)
Dept: GENERAL RADIOLOGY | Age: 60
DRG: 177 | End: 2020-09-21
Attending: INTERNAL MEDICINE
Payer: MEDICARE

## 2020-09-21 ENCOUNTER — ANESTHESIA (OUTPATIENT)
Dept: ENDOSCOPY | Age: 60
DRG: 177 | End: 2020-09-21
Payer: MEDICARE

## 2020-09-21 ENCOUNTER — ANESTHESIA EVENT (OUTPATIENT)
Dept: ENDOSCOPY | Age: 60
DRG: 177 | End: 2020-09-21
Payer: MEDICARE

## 2020-09-21 LAB
ANION GAP SERPL CALC-SCNC: 2 MMOL/L (ref 5–15)
BASOPHILS # BLD: 0 K/UL (ref 0–0.1)
BASOPHILS NFR BLD: 0 % (ref 0–1)
BUN SERPL-MCNC: 12 MG/DL (ref 6–20)
BUN/CREAT SERPL: 21 (ref 12–20)
CA-I BLD-MCNC: 8.3 MG/DL (ref 8.5–10.1)
CHLORIDE SERPL-SCNC: 94 MMOL/L (ref 97–108)
CO2 SERPL-SCNC: 39 MMOL/L (ref 21–32)
CREAT SERPL-MCNC: 0.57 MG/DL (ref 0.7–1.3)
DIFFERENTIAL METHOD BLD: ABNORMAL
EOSINOPHIL # BLD: 0 K/UL (ref 0–0.4)
EOSINOPHIL NFR BLD: 0 % (ref 0–7)
ERYTHROCYTE [DISTWIDTH] IN BLOOD BY AUTOMATED COUNT: 17.7 % (ref 11.5–14.5)
GLUCOSE SERPL-MCNC: 92 MG/DL (ref 65–100)
HCT VFR BLD AUTO: 31 % (ref 36.6–50.3)
HGB BLD-MCNC: 9.4 % (ref 12.1–17)
IMM GRANULOCYTES # BLD AUTO: 0 K/UL (ref 0–0.04)
IMM GRANULOCYTES NFR BLD AUTO: 0 % (ref 0–0.5)
LYMPHOCYTES # BLD: 0.9 K/UL (ref 0.8–3.5)
LYMPHOCYTES NFR BLD: 16 % (ref 12–49)
MCH RBC QN AUTO: 28.5 PG (ref 26–34)
MCHC RBC AUTO-ENTMCNC: 30.3 G/DL (ref 30–36.5)
MCV RBC AUTO: 93.9 FL (ref 80–99)
MONOCYTES # BLD: 0.5 K/UL (ref 0–1)
MONOCYTES NFR BLD: 9 % (ref 5–13)
NEUTS SEG # BLD: 4.2 K/UL (ref 1.8–8)
NEUTS SEG NFR BLD: 75 % (ref 32–75)
PLATELET # BLD AUTO: 250 K/UL (ref 150–400)
PMV BLD AUTO: 9.9 FL (ref 8.9–12.9)
POTASSIUM SERPL-SCNC: 3.8 MMOL/L (ref 3.5–5.1)
RBC # BLD AUTO: 3.3 M/UL (ref 4.1–5.7)
SODIUM SERPL-SCNC: 135 MMOL/L (ref 136–145)
WBC # BLD AUTO: 5.6 K/UL (ref 4.1–11.1)

## 2020-09-21 PROCEDURE — 74011250636 HC RX REV CODE- 250/636: Performed by: INTERNAL MEDICINE

## 2020-09-21 PROCEDURE — 74011000250 HC RX REV CODE- 250: Performed by: INTERNAL MEDICINE

## 2020-09-21 PROCEDURE — 74011250636 HC RX REV CODE- 250/636: Performed by: NURSE ANESTHETIST, CERTIFIED REGISTERED

## 2020-09-21 PROCEDURE — 74011250637 HC RX REV CODE- 250/637: Performed by: INTERNAL MEDICINE

## 2020-09-21 PROCEDURE — 94640 AIRWAY INHALATION TREATMENT: CPT

## 2020-09-21 PROCEDURE — 74011000258 HC RX REV CODE- 258: Performed by: NURSE ANESTHETIST, CERTIFIED REGISTERED

## 2020-09-21 PROCEDURE — 74011250636 HC RX REV CODE- 250/636

## 2020-09-21 PROCEDURE — 74011636637 HC RX REV CODE- 636/637: Performed by: INTERNAL MEDICINE

## 2020-09-21 PROCEDURE — 77030012699 HC VLV SUC CNTRL OCOA -A: Performed by: INTERNAL MEDICINE

## 2020-09-21 PROCEDURE — 71046 X-RAY EXAM CHEST 2 VIEWS: CPT

## 2020-09-21 PROCEDURE — 94669 MECHANICAL CHEST WALL OSCILL: CPT

## 2020-09-21 PROCEDURE — 94760 N-INVAS EAR/PLS OXIMETRY 1: CPT

## 2020-09-21 PROCEDURE — 36415 COLL VENOUS BLD VENIPUNCTURE: CPT

## 2020-09-21 PROCEDURE — 74011000250 HC RX REV CODE- 250: Performed by: NURSE ANESTHETIST, CERTIFIED REGISTERED

## 2020-09-21 PROCEDURE — 3331090001 HH PPS REVENUE CREDIT

## 2020-09-21 PROCEDURE — 77010033678 HC OXYGEN DAILY

## 2020-09-21 PROCEDURE — 80048 BASIC METABOLIC PNL TOTAL CA: CPT

## 2020-09-21 PROCEDURE — 85025 COMPLETE CBC W/AUTO DIFF WBC: CPT

## 2020-09-21 PROCEDURE — 3331090002 HH PPS REVENUE DEBIT

## 2020-09-21 PROCEDURE — 74011250637 HC RX REV CODE- 250/637

## 2020-09-21 PROCEDURE — 2709999900 HC NON-CHARGEABLE SUPPLY: Performed by: INTERNAL MEDICINE

## 2020-09-21 PROCEDURE — 65660000000 HC RM CCU STEPDOWN

## 2020-09-21 PROCEDURE — 65270000029 HC RM PRIVATE

## 2020-09-21 PROCEDURE — 74011636637 HC RX REV CODE- 636/637

## 2020-09-21 PROCEDURE — 76060000032 HC ANESTHESIA 0.5 TO 1 HR: Performed by: INTERNAL MEDICINE

## 2020-09-21 PROCEDURE — 76040000007: Performed by: INTERNAL MEDICINE

## 2020-09-21 PROCEDURE — 74011000258 HC RX REV CODE- 258: Performed by: INTERNAL MEDICINE

## 2020-09-21 RX ORDER — FENTANYL CITRATE 50 UG/ML
INJECTION, SOLUTION INTRAMUSCULAR; INTRAVENOUS
Status: COMPLETED
Start: 2020-09-21 | End: 2020-09-21

## 2020-09-21 RX ORDER — DEXAMETHASONE SODIUM PHOSPHATE 4 MG/ML
INJECTION, SOLUTION INTRA-ARTICULAR; INTRALESIONAL; INTRAMUSCULAR; INTRAVENOUS; SOFT TISSUE AS NEEDED
Status: DISCONTINUED | OUTPATIENT
Start: 2020-09-21 | End: 2020-09-21 | Stop reason: HOSPADM

## 2020-09-21 RX ORDER — PHENYLEPHRINE HCL IN 0.9% NACL 1 MG/10 ML
SYRINGE (ML) INTRAVENOUS
Status: DISPENSED
Start: 2020-09-21 | End: 2020-09-22

## 2020-09-21 RX ORDER — MINERAL OIL
OIL (ML) MISCELLANEOUS AS NEEDED
Status: DISCONTINUED | OUTPATIENT
Start: 2020-09-21 | End: 2020-10-13 | Stop reason: HOSPADM

## 2020-09-21 RX ORDER — PIPERACILLIN SODIUM, TAZOBACTAM SODIUM 3; .375 G/15ML; G/15ML
INJECTION, POWDER, LYOPHILIZED, FOR SOLUTION INTRAVENOUS
Status: COMPLETED
Start: 2020-09-21 | End: 2020-09-21

## 2020-09-21 RX ORDER — LIDOCAINE HYDROCHLORIDE 20 MG/ML
INJECTION, SOLUTION EPIDURAL; INFILTRATION; INTRACAUDAL; PERINEURAL AS NEEDED
Status: DISCONTINUED | OUTPATIENT
Start: 2020-09-21 | End: 2020-09-21 | Stop reason: HOSPADM

## 2020-09-21 RX ORDER — FENTANYL CITRATE 50 UG/ML
INJECTION, SOLUTION INTRAMUSCULAR; INTRAVENOUS AS NEEDED
Status: DISCONTINUED | OUTPATIENT
Start: 2020-09-21 | End: 2020-09-21 | Stop reason: HOSPADM

## 2020-09-21 RX ORDER — ONDANSETRON 2 MG/ML
INJECTION INTRAMUSCULAR; INTRAVENOUS AS NEEDED
Status: DISCONTINUED | OUTPATIENT
Start: 2020-09-21 | End: 2020-09-21 | Stop reason: HOSPADM

## 2020-09-21 RX ORDER — ATORVASTATIN CALCIUM 20 MG/1
TABLET, FILM COATED ORAL
Status: COMPLETED
Start: 2020-09-21 | End: 2020-09-21

## 2020-09-21 RX ORDER — PROPOFOL 10 MG/ML
INJECTION, EMULSION INTRAVENOUS
Status: COMPLETED
Start: 2020-09-21 | End: 2020-09-21

## 2020-09-21 RX ORDER — LIDOCAINE HYDROCHLORIDE 10 MG/ML
INJECTION INFILTRATION; PERINEURAL AS NEEDED
Status: DISCONTINUED | OUTPATIENT
Start: 2020-09-21 | End: 2020-10-13 | Stop reason: HOSPADM

## 2020-09-21 RX ORDER — ONDANSETRON 2 MG/ML
INJECTION INTRAMUSCULAR; INTRAVENOUS
Status: COMPLETED
Start: 2020-09-21 | End: 2020-09-21

## 2020-09-21 RX ORDER — PROPOFOL 10 MG/ML
INJECTION, EMULSION INTRAVENOUS AS NEEDED
Status: DISCONTINUED | OUTPATIENT
Start: 2020-09-21 | End: 2020-09-21 | Stop reason: HOSPADM

## 2020-09-21 RX ORDER — PREDNISONE 5 MG/1
TABLET ORAL
Status: COMPLETED
Start: 2020-09-21 | End: 2020-09-21

## 2020-09-21 RX ORDER — LIDOCAINE HYDROCHLORIDE 20 MG/ML
INJECTION, SOLUTION EPIDURAL; INFILTRATION; INTRACAUDAL; PERINEURAL
Status: COMPLETED
Start: 2020-09-21 | End: 2020-09-21

## 2020-09-21 RX ORDER — PREDNISONE 20 MG/1
TABLET ORAL
Status: COMPLETED
Start: 2020-09-21 | End: 2020-09-21

## 2020-09-21 RX ORDER — DEXAMETHASONE SODIUM PHOSPHATE 4 MG/ML
INJECTION, SOLUTION INTRA-ARTICULAR; INTRALESIONAL; INTRAMUSCULAR; INTRAVENOUS; SOFT TISSUE
Status: COMPLETED
Start: 2020-09-21 | End: 2020-09-21

## 2020-09-21 RX ORDER — SODIUM CHLORIDE 900 MG/100ML
INJECTION INTRAVENOUS
Status: COMPLETED
Start: 2020-09-21 | End: 2020-09-22

## 2020-09-21 RX ORDER — SUCCINYLCHOLINE CHLORIDE 20 MG/ML
INJECTION INTRAMUSCULAR; INTRAVENOUS AS NEEDED
Status: DISCONTINUED | OUTPATIENT
Start: 2020-09-21 | End: 2020-09-21 | Stop reason: HOSPADM

## 2020-09-21 RX ADMIN — BUDESONIDE AND FORMOTEROL FUMARATE DIHYDRATE 2 PUFF: 80; 4.5 AEROSOL RESPIRATORY (INHALATION) at 20:08

## 2020-09-21 RX ADMIN — ACETAMINOPHEN 650 MG: 325 TABLET, FILM COATED ORAL at 23:51

## 2020-09-21 RX ADMIN — PREDNISONE: 5 TABLET ORAL at 19:00

## 2020-09-21 RX ADMIN — PHENYLEPHRINE HYDROCHLORIDE 200 MCG: 10 INJECTION INTRAVENOUS at 14:56

## 2020-09-21 RX ADMIN — DEXAMETHASONE SODIUM PHOSPHATE 4 MG: 4 INJECTION, SOLUTION INTRA-ARTICULAR; INTRALESIONAL; INTRAMUSCULAR; INTRAVENOUS; SOFT TISSUE at 14:50

## 2020-09-21 RX ADMIN — ACETYLCYSTEINE 400 MG: 200 SOLUTION ORAL; RESPIRATORY (INHALATION) at 20:07

## 2020-09-21 RX ADMIN — PANTOPRAZOLE SODIUM 40 MG: 40 TABLET, DELAYED RELEASE ORAL at 07:00

## 2020-09-21 RX ADMIN — PIPERACILLIN AND TAZOBACTAM 3.38 G: 3; .375 INJECTION, POWDER, LYOPHILIZED, FOR SOLUTION INTRAVENOUS at 16:05

## 2020-09-21 RX ADMIN — PANTOPRAZOLE SODIUM 40 MG: 40 TABLET, DELAYED RELEASE ORAL at 04:04

## 2020-09-21 RX ADMIN — PREDNISONE 30 MG: 20 TABLET ORAL at 18:28

## 2020-09-21 RX ADMIN — FUROSEMIDE 40 MG: 10 INJECTION, SOLUTION INTRAMUSCULAR; INTRAVENOUS at 10:06

## 2020-09-21 RX ADMIN — ATORVASTATIN CALCIUM 20 MG: 20 TABLET, FILM COATED ORAL at 21:47

## 2020-09-21 RX ADMIN — PIPERACILLIN SODIUM AND TAZOBACTAM SODIUM 3.38 G: 3; .375 INJECTION, POWDER, LYOPHILIZED, FOR SOLUTION INTRAVENOUS at 04:04

## 2020-09-21 RX ADMIN — DILTIAZEM HYDROCHLORIDE 120 MG: 120 CAPSULE, COATED, EXTENDED RELEASE ORAL at 10:05

## 2020-09-21 RX ADMIN — FENTANYL CITRATE 100 MCG: 50 INJECTION, SOLUTION INTRAMUSCULAR; INTRAVENOUS at 14:38

## 2020-09-21 RX ADMIN — SUCCINYLCHOLINE CHLORIDE 140 MG: 20 INJECTION, SOLUTION INTRAMUSCULAR; INTRAVENOUS at 14:40

## 2020-09-21 RX ADMIN — PHENYLEPHRINE HYDROCHLORIDE 200 MCG: 10 INJECTION INTRAVENOUS at 14:51

## 2020-09-21 RX ADMIN — PHENYLEPHRINE HYDROCHLORIDE 300 MCG: 10 INJECTION INTRAVENOUS at 14:49

## 2020-09-21 RX ADMIN — PROPOFOL 200 MG: 10 INJECTION, EMULSION INTRAVENOUS at 14:39

## 2020-09-21 RX ADMIN — ACETAMINOPHEN 650 MG: 325 TABLET, FILM COATED ORAL at 04:04

## 2020-09-21 RX ADMIN — LOSARTAN POTASSIUM 100 MG: 50 TABLET, FILM COATED ORAL at 10:05

## 2020-09-21 RX ADMIN — LIDOCAINE HYDROCHLORIDE 100 MG: 20 INJECTION, SOLUTION EPIDURAL; INFILTRATION; INTRACAUDAL; PERINEURAL at 14:39

## 2020-09-21 RX ADMIN — PIPERACILLIN SODIUM AND TAZOBACTAM SODIUM 3.38 G: 3; .375 INJECTION, POWDER, LYOPHILIZED, FOR SOLUTION INTRAVENOUS at 23:55

## 2020-09-21 RX ADMIN — IPRATROPIUM BROMIDE AND ALBUTEROL SULFATE 3 ML: .5; 3 SOLUTION RESPIRATORY (INHALATION) at 20:08

## 2020-09-21 RX ADMIN — ONDANSETRON 4 MG: 2 INJECTION INTRAMUSCULAR; INTRAVENOUS at 14:50

## 2020-09-21 RX ADMIN — AZITHROMYCIN MONOHYDRATE 500 MG: 500 TABLET ORAL at 10:05

## 2020-09-21 RX ADMIN — PREDNISONE: 20 TABLET ORAL at 19:00

## 2020-09-21 RX ADMIN — PIPERACILLIN AND TAZOBACTAM 3.38 G: 3; .375 INJECTION, POWDER, LYOPHILIZED, FOR SOLUTION INTRAVENOUS at 23:55

## 2020-09-21 RX ADMIN — PIPERACILLIN SODIUM AND TAZOBACTAM SODIUM 3.38 G: 3; .375 INJECTION, POWDER, LYOPHILIZED, FOR SOLUTION INTRAVENOUS at 16:04

## 2020-09-21 NOTE — ANESTHESIA PREPROCEDURE EVALUATION
Relevant Problems   No relevant active problems       Anesthetic History          Comments: \"Strong Gag reflex\"     Review of Systems / Medical History  Patient summary reviewed, nursing notes reviewed and pertinent labs reviewed    Pulmonary          Pneumonia and smoker (2 PK DAILY. )      Comments: Allergic Rhinitis. Hx of Aspiration pneumonia. Neuro/Psych       CVA (RIGHT SIDED WEAKNESS. )  TIA    Comments: Left Carotid Occlusion. Cardiovascular    Hypertension          Hyperlipidemia      Comments: Hb/Hct 9.8/32.3   GI/Hepatic/Renal     GERD    Renal disease: ARF  PUD     Endo/Other        Arthritis and anemia     Other Findings   Comments: Non compliant with treatment. ETOH ABUSE. CHRONIC BACK PAIN.    Hb/Hct 9.8/32.3         Physical Exam    Airway  Mallampati: I           Cardiovascular    Rhythm: regular  Rate: normal         Dental    Dentition: Poor dentition     Pulmonary    Rhonchi             Abdominal         Other Findings            Anesthetic Plan    ASA: 3  Anesthesia type: general          Induction: Intravenous  Anesthetic plan and risks discussed with: Patient

## 2020-09-21 NOTE — PROGRESS NOTES
Hospitalist Progress Note               Daily Progress Note: 9/21/2020    Chief complaint: Shortness of breath  Subjective: The patient is seen for follow  up. Offers no complaints. 55-year-old male was admitted to the hospital with a complaint of shortness of breath and cough. Patient was found to have left lower lobe pneumonia. Patient continues to have shortness of breath and cough. Patient has severe rails and crackles on his left side and become short of breath just by turning from left lateral position to right lateral position. He received 2 units of packed red blood cells during the stay. Patient continue to complains SOB, worse on lying down. XR CHEST PA LAT   Final Result   Impression:   Ongoing near complete white out of the left lung. Little interval change since   the prior examination. CT CHEST WO CONT   Final Result   IMPRESSION: Complete collapse of the left lung due to complete bronchial   obstruction, possibly aspiration. Fluid overload also noted      XR ABD ACUTE W 1 V CHEST   Final Result   IMPRESSION: Opacification of the left hemithorax, questioned for remote   pneumonectomy or lung collapse with pleural fluid. Prominent right parenchymal/interstitial lung markings compatible with fibrosis   and possible partial vascular congestion. Small bowel gas, nonobstructive pattern.       US CHEST    (Results Pending)         Problem List:  Problem List as of 9/21/2020 Date Reviewed: 9/1/2020          Codes Class Noted - Resolved    Abdominal wall hernia ICD-10-CM: K43.9  ICD-9-CM: 553.20  8/1/2017 - Present        DDD (degenerative disc disease), lumbar ICD-10-CM: M51.36  ICD-9-CM: 722.52  5/1/2017 - Present        Chronic pain ICD-10-CM: G89.29  ICD-9-CM: 338.29  1/6/2015 - Present        Aspiration pneumonia ([facility] [address] ICD-10-CM: J69.0  ICD-9-CM: 507.0  1/19/2014 - Present        Hypernatremia ICD-10-CM: E87.0  ICD-9-CM: 276.0  1/14/2014 - Present Ischemic colitis ([place] [address].) ICD-10-CM: K55.9  ICD-9-CM: 557.9  1/13/2014 - Present        Colon perforation ([place] [address].) ICD-10-CM: K63.1  ICD-9-CM: 569.83  1/13/2014 - Present        Acute respiratory failure with hypoxia (HCC) ICD-10-CM: J96.01  ICD-9-CM: 518.81  1/13/2014 - Present        Acute blood loss anemia ICD-10-CM: D62  ICD-9-CM: 285.1  1/13/2014 - Present        JESUS (acute kidney injury) ([place] [address].) ICD-10-CM: N17.9  ICD-9-CM: 584.9  1/13/2014 - Present        Pneumonia ICD-10-CM: J18.9  ICD-9-CM: 486  1/1/2014 - Present        Elevated INR ICD-10-CM: R79.1  ICD-9-CM: 790.92  1/1/2014 - Present        Alcoholism ([place] [address].) ICD-10-CM: F10.20  ICD-9-CM: 303.90  1/1/2014 - Present        Nicotine addiction ICD-10-CM: F17.200  ICD-9-CM: 305.1  1/1/2014 - Present        PVD (peripheral vascular disease) ([place] [address].) ICD-10-CM: I73.9  ICD-9-CM: 443.9  7/31/2013 - Present        Hyponatremia ICD-10-CM: E87.1  ICD-9-CM: 276.1  5/30/2012 - Present        ETOH abuse ICD-10-CM: F10.10  ICD-9-CM: 305.00  5/30/2012 - Present        Coagulation disorder ([place] [address].) ICD-10-CM: D68.9  ICD-9-CM: 286.9  5/30/2012 - Present        Allergic rhinitis due to other allergen ICD-10-CM: J30.89  ICD-9-CM: 477.8  12/27/2011 - Present        Stroke 2002 (Chronic) ICD-10-CM: I63.9  ICD-9-CM: 434.91  3/15/2010 - Present        Anal fistula (Chronic) ICD-10-CM: K60.3  ICD-9-CM: 565.1  3/15/2010 - Present        HTN (hypertension) (Chronic) ICD-10-CM: I10  ICD-9-CM: 401.9  3/15/2010 - Present        Genital herpes (Chronic) ICD-10-CM: A60.00  ICD-9-CM: 054.10  3/15/2010 - Present        Noncompliance with treatment (Chronic) ICD-10-CM: Z91.19  ICD-9-CM: V15.81  3/15/2010 - Present        High cholesterol (Chronic) ICD-10-CM: E78.00  ICD-9-CM: 272.0  3/15/2010 - Present        left Carotid Artery Occlusion (Chronic) ICD-10-CM: C53.58  ICD-9-CM: 433.10  3/15/2010 - Present              Medications reviewed  Current Facility-Administered Medications   Medication Dose Route Frequency    albuterol-ipratropium (DUO-NEB) 2.5 MG-0.5 MG/3 ML  3 mL Nebulization QID RT    acetylcysteine (MUCOMYST) 200 mg/mL (20 %) solution 400 mg  400 mg Nebulization QID RT    furosemide (LASIX) injection 40 mg  40 mg IntraVENous DAILY    budesonide-formoterol (SYMBICORT) 80-4.5 mcg inhaler  2 Puff Inhalation BID RT    dilTIAZem ER (CARDIZEM CD) capsule 120 mg  120 mg Oral DAILY    predniSONE (DELTASONE) tablet 30 mg  30 mg Oral DAILY WITH DINNER    0.9% sodium chloride infusion 250 mL  250 mL IntraVENous PRN    atorvastatin (LIPITOR) tablet 20 mg  20 mg Oral DAILY    enoxaparin (LOVENOX) injection 40 mg  40 mg SubCUTAneous Q24H    losartan (COZAAR) tablet 100 mg  100 mg Oral DAILY    piperacillin-tazobactam (ZOSYN) 3.375 g in 0.9% sodium chloride (MBP/ADV) 100 mL MBP  3.375 g IntraVENous Q8H    pantoprazole (PROTONIX) tablet 40 mg  40 mg Oral DAILY    acetaminophen (TYLENOL) tablet 650 mg  650 mg Oral Q4H PRN    alum-mag hydroxide-simeth (MYLANTA) oral suspension 30 mL  30 mL Oral Q4H PRN    magnesium hydroxide (MILK OF MAGNESIA) 400 mg/5 mL oral suspension 30 mL  30 mL Oral DAILY PRN    ondansetron (ZOFRAN) injection 4 mg  4 mg IntraVENous Q8H PRN    azithromycin (ZITHROMAX) tablet 500 mg  500 mg Oral DAILY       Review of Systems:   A comprehensive review of systems was negative except for: Respiratory: positive for dyspnea on exertion    Objective:   Physical Exam:     Visit Vitals  /75 (BP Patient Position: At rest)   Pulse 83   Temp 98.2 °F (36.8 °C)   Resp 16   Ht 6' 0.99\" (1.854 m)   Wt 76.9 kg (169 lb 8.5 oz)   SpO2 98%   BMI 22.37 kg/m²    O2 Flow Rate (L/min): 2 l/min O2 Device: Nasal cannula    Temp (24hrs), Av.3 °F (36.8 °C), Min:98.2 °F (36.8 °C), Max:98.3 °F (36.8 °C)    No intake/output data recorded.  1901 -  0700  In: -   Out: 1100 [Urine:1100]    General:  Alert, cooperative, no distress, appears stated age.    Lungs:   Clear to auscultation bilaterally with diminished air entry Left side   Chest wall:  No tenderness or deformity. Heart:  Regular rate and rhythm, S1, S2 normal, no murmur, click, rub or gallop. Abdomen:   Soft, non-tender. Bowel sounds normal. No masses,  No organomegaly. Extremities: Extremities normal, atraumatic, no cyanosis or edema. Pulses: 2+ and symmetric all extremities. Skin: Skin color, texture, turgor normal. No rashes or lesions   Neurologic: CNII-XII intact. No gross sensory or motor deficits     Data Review:       Recent Days:  Recent Labs     09/20/20  0720 09/19/20  1151   WBC 6.1 6.5   HGB 9.8* 9.0*   HCT 32.3* 29.2*    269     Recent Labs     09/21/20  0615 09/20/20  0720 09/19/20  1151   * 134* 139   K 3.8 3.6 3.5   CL 94* 93* 95*   CO2 39* 40* 43*   GLU 92 92 102*   BUN 12 8 8   CREA 0.57* 0.52* 0.67*   CA 8.3* 8.4* 8.0*     No results for input(s): PH, PCO2, PO2, HCO3, FIO2 in the last 72 hours. 24 Hour Results:  Recent Results (from the past 24 hour(s))   METABOLIC PANEL, BASIC    Collection Time: 09/21/20  6:15 AM   Result Value Ref Range    Sodium 135 (L) 136 - 145 mmol/L    Potassium 3.8 3.5 - 5.1 mmol/L    Chloride 94 (L) 97 - 108 mmol/L    CO2 39 (H) 21 - 32 mmol/L    Anion gap 2 (L) 5 - 15 mmol/L    Glucose 92 65 - 100 mg/dL    BUN 12 6 - 20 mg/dL    Creatinine 0.57 (L) 0.70 - 1.30 mg/dL    BUN/Creatinine ratio 21 (H) 12 - 20      GFR est AA >60 >60 ml/min/1.73m2    GFR est non-AA >60 >60 ml/min/1.73m2    Calcium 8.3 (L) 8.5 - 10.1 mg/dL       Echo:  Result status: Final result    · LV: Estimated LVEF is 60 - 65%. Biplane method used to measure ejection fraction. Normal cavity size and systolic function (ejection fraction normal). Mild concentric hypertrophy. Wall motion: normal. Moderate (grade 2) left ventricular diastolic dysfunction. · AV: Probably trileaflet aortic valve. Moderate aortic valve leaflet calcification present.  Severely reduced right leaflet mobility of the aortic valve. Aortic valve area is 1 cm2. Moderate to severe aortic valve stenosis is present. Mild aortic valve regurgitation is present. · MV: Mitral annular calcification. · TV: Mild tricuspid valve regurgitation is present. Assessment/     Acute hypoxic respiratory failure  Hypercapnia  Left lower lobe pneumonia- with lung colapse  Left pleural effusion  Acute on chronic anemia  Dehydration  Rule out COVID-19  Generalized weakness and unsteady gait          Plan:  Continue supportive care including IV antibiotics  Plan for bronchoscopy today. Continue prednisone. O2 supplements  Pulmn eval appreciated, d/w pulmnology  Overall clinically improving    Care Plan discussed with: Patient/Family, Nurse,  and Consultant Pulmnology    Total time spent with patient: 25 minutes.     Kaley Murillo MD

## 2020-09-21 NOTE — ANESTHESIA POSTPROCEDURE EVALUATION
Procedure(s):  BRONCHOSCOPY.     general    Anesthesia Post Evaluation      Multimodal analgesia: multimodal analgesia not used between 6 hours prior to anesthesia start to PACU discharge  Patient location during evaluation: bedside  Patient participation: complete - patient participated  Level of consciousness: awake  Pain score: 0  Airway patency: patent  Anesthetic complications: no  Cardiovascular status: stable  Respiratory status: unassisted  Hydration status: euvolemic  Post anesthesia nausea and vomiting:  none  Final Post Anesthesia Temperature Assessment:  Normothermia (36.0-37.5 degrees C)      INITIAL Post-op Vital signs:   Vitals Value Taken Time   /82 9/21/2020  3:30 PM   Temp 36.3 °C (97.3 °F) 9/21/2020  3:16 PM   Pulse 99 9/21/2020  3:30 PM   Resp 18 9/21/2020  3:30 PM   SpO2 93 % 9/21/2020  3:30 PM

## 2020-09-21 NOTE — PROGRESS NOTES
Pulm/CC Progress Note    Subjective:   Daily Progress Note: 2020   Patient examined at bedside, no acute events overnight. He is more anxious today in anticipation of the planned flexible bronchoscopy. His shortness of breath has shown some improvement. On nasal cannula oxygen, 2 L nasal cannula. CT chest reviewed and shows near total collapse of the left lung due to debris in the airway  Moderate size right and small left-sided pleural effusion    Patient underwent aggressive pulmonary hygiene management with bronchodilators/Mucomyst/chest physiotherapy over the weekend, however his chest x-ray was reviewed today and shows persistent left lung collapse secondary to likely mucous plugging. Patient is scheduled for flexible bronchoscopy with mucus clearance planned for this afternoon. Review of Systems  has complains of shortness of breath. He is on nasal cannula oxygen. Denied any nausea vomiting. Has fair appetite    Objective:     Visit Vitals  /75 (BP Patient Position: At rest)   Pulse 83   Temp 98.2 °F (36.8 °C)   Resp 16   Ht 6' 0.99\" (1.854 m)   Wt 76.9 kg (169 lb 8.5 oz)   SpO2 98%   BMI 22.37 kg/m²    O2 Flow Rate (L/min): 2 l/min O2 Device: Nasal cannula    Temp (24hrs), Av.3 °F (36.8 °C), Min:98.2 °F (36.8 °C), Max:98.3 °F (36.8 °C)      No intake/output data recorded.    1901 -  0700  In: -   Out: 1100 [Urine:1100]    Current Facility-Administered Medications   Medication Dose Route Frequency    albuterol-ipratropium (DUO-NEB) 2.5 MG-0.5 MG/3 ML  3 mL Nebulization QID RT    acetylcysteine (MUCOMYST) 200 mg/mL (20 %) solution 400 mg  400 mg Nebulization QID RT    furosemide (LASIX) injection 40 mg  40 mg IntraVENous DAILY    budesonide-formoterol (SYMBICORT) 80-4.5 mcg inhaler  2 Puff Inhalation BID RT    dilTIAZem ER (CARDIZEM CD) capsule 120 mg  120 mg Oral DAILY    predniSONE (DELTASONE) tablet 30 mg  30 mg Oral DAILY WITH DINNER    0.9% sodium chloride infusion 250 mL  250 mL IntraVENous PRN    atorvastatin (LIPITOR) tablet 20 mg  20 mg Oral DAILY    enoxaparin (LOVENOX) injection 40 mg  40 mg SubCUTAneous Q24H    losartan (COZAAR) tablet 100 mg  100 mg Oral DAILY    piperacillin-tazobactam (ZOSYN) 3.375 g in 0.9% sodium chloride (MBP/ADV) 100 mL MBP  3.375 g IntraVENous Q8H    pantoprazole (PROTONIX) tablet 40 mg  40 mg Oral DAILY    acetaminophen (TYLENOL) tablet 650 mg  650 mg Oral Q4H PRN    alum-mag hydroxide-simeth (MYLANTA) oral suspension 30 mL  30 mL Oral Q4H PRN    magnesium hydroxide (MILK OF MAGNESIA) 400 mg/5 mL oral suspension 30 mL  30 mL Oral DAILY PRN    ondansetron (ZOFRAN) injection 4 mg  4 mg IntraVENous Q8H PRN    azithromycin (ZITHROMAX) tablet 500 mg  500 mg Oral DAILY       Physical Exam:  General: Alert and oriented x3.  2 L nasal cannula. Pleasant but nervous. HEENT: atraumatic, PERRL, moist mucosa,     Neck: Trachea midline, no carotid bruit, no masses. Supple but thick. Respiratory: Has decreased air entry at left hemithorax. Rhonchi audible in the right hemithorax. 2 L nasal cannula. Cardiovascular: RRR, no m/r/g, Normal S1 and S2   abdomen: Has ventral abdominal hernia, evidence of surgical scars soft,   Rectal: deferred  Extremities: no cyanosis or clubbing. Trace edema. Integumentary: warm, dry, and pink, with no rash, purpura, or petechia.    Heme/Lymph: no lymphadenopathy, no bruises  Neurological: No focal motor deficit   psychiatric: cooperative with normal mood, affect, and cognition      Additional comments: Chest x-ray and abdominal x-rays also reviewed    Data Review    Recent Results (from the past 24 hour(s))   METABOLIC PANEL, BASIC    Collection Time: 09/21/20  6:15 AM   Result Value Ref Range    Sodium 135 (L) 136 - 145 mmol/L    Potassium 3.8 3.5 - 5.1 mmol/L    Chloride 94 (L) 97 - 108 mmol/L    CO2 39 (H) 21 - 32 mmol/L    Anion gap 2 (L) 5 - 15 mmol/L    Glucose 92 65 - 100 mg/dL    BUN 12 6 - 20 mg/dL    Creatinine 0.57 (L) 0.70 - 1.30 mg/dL    BUN/Creatinine ratio 21 (H) 12 - 20      GFR est AA >60 >60 ml/min/1.73m2    GFR est non-AA >60 >60 ml/min/1.73m2    Calcium 8.3 (L) 8.5 - 10.1 mg/dL       XR CHEST PA LAT   Final Result   Impression:   Ongoing near complete white out of the left lung. Little interval change since   the prior examination. CT CHEST WO CONT   Final Result   IMPRESSION: Complete collapse of the left lung due to complete bronchial   obstruction, possibly aspiration. Fluid overload also noted      XR ABD ACUTE W 1 V CHEST   Final Result   IMPRESSION: Opacification of the left hemithorax, questioned for remote   pneumonectomy or lung collapse with pleural fluid. Prominent right parenchymal/interstitial lung markings compatible with fibrosis   and possible partial vascular congestion. Small bowel gas, nonobstructive pattern. US CHEST    (Results Pending)          Assessment/Plan: This is a middle-aged male who was admitted because of increasing symptoms of shortness of breath  He is getting treated in hospital for pneumonia with IV Zosyn/Azithromycin. He is noted to be increasingly tachypneic and short of breath. He has been on supplemental oxygen. His chest x-ray showed left lung opacification likely from mucous plugging. This has not resolved with aggressive pulmonary hygiene over the weekend.     Plan:   1. Left lung collapse/shortness of breath:  Shortness of breath and hypoxia improved, satting well on 2 L nasal cannula. Please wean supplemental oxygen for sats greater than 92%. Appears to be due to pneumonia along with COPD and left lung collapse  Lung collapse is complicated by possible aspiration and pneumonia  Repeat chest x-ray without improvement in left-sided collapse, will plan for flexible therapeutic bronchoscopy today. Also will likely obtain sputum sample/BAL while in the airway.   Continue aggressive pulmonary hygiene with nebulizers and Mucomyst every 6 hours;  Aggressive chest PT with vest every 6 hours with suctioning and encouragement of coughing  Consent for the bronchoscopy has been signed, it is in his chart. 2.  COPD:  He has a history of COPD based on chronic smoking. Patient got started on prednisone along with inhaled steroid with a spacer. Patient is on Symbicort and rescue inhaler  Further systemic steroid weaning recommendations to follow tomorrow. 3.  Pneumonia:  He is on IV Zosyn along with IV Zithromax. We will adjust his antibiotics based sputum culture results. Likely will undergo bronchoalveolar washings today, cultures to be sent to the lab. 4.  Hypertension:  He is on antihypertensive medications, BP's stable. 5.  Status post ex lap:  His abdominal examination shows significant fecal scars and ventral abdominal hernia. ;    Questions of patient were answered at bedside in detail  Case discussed in detail with RN, RT, and care team  Thank you for involving me in the care of this patient  I will follow with you closely during hospitalization    Time spent more than 30 minutes under patient care with no overlap        Camden Dsouza DO  [facility]

## 2020-09-21 NOTE — PROGRESS NOTES
OT treatment attempt at 8:59 AM, however pt refused therapy at this time stating that \"he does not need therapy\" and \"He is about to leave for surgery and is anxious about the procedure\". Pt. Encouraged to participate with OT, pt stated he has pain in lower back. Pt educated on the importance of participating in therapy to increase strength and endurance and to allow him to get back to independence. Therapist also discussed importance of performing in OOB mobility in prep for ADL's (toliet transfers and grooming at sink) . Pt. began to get agitated with therapist; therapist attempted to re-direct conversation; however pt stated \"just leave\". Transportation in pt's room to take pt off the floor for x-ray. Will continue to follow pt. And attempt therapy session at a later time.

## 2020-09-22 ENCOUNTER — APPOINTMENT (OUTPATIENT)
Dept: ENDOSCOPY | Age: 60
DRG: 177 | End: 2020-09-22
Attending: INTERNAL MEDICINE
Payer: MEDICARE

## 2020-09-22 ENCOUNTER — APPOINTMENT (OUTPATIENT)
Dept: GENERAL RADIOLOGY | Age: 60
DRG: 177 | End: 2020-09-22
Attending: INTERNAL MEDICINE
Payer: MEDICARE

## 2020-09-22 ENCOUNTER — ANESTHESIA EVENT (OUTPATIENT)
Dept: ENDOSCOPY | Age: 60
DRG: 177 | End: 2020-09-22
Payer: MEDICARE

## 2020-09-22 PROCEDURE — 94669 MECHANICAL CHEST WALL OSCILL: CPT

## 2020-09-22 PROCEDURE — 94668 MNPJ CHEST WALL SBSQ: CPT

## 2020-09-22 PROCEDURE — 74011000250 HC RX REV CODE- 250: Performed by: INTERNAL MEDICINE

## 2020-09-22 PROCEDURE — 74011250637 HC RX REV CODE- 250/637: Performed by: INTERNAL MEDICINE

## 2020-09-22 PROCEDURE — 3331090001 HH PPS REVENUE CREDIT

## 2020-09-22 PROCEDURE — 71046 X-RAY EXAM CHEST 2 VIEWS: CPT

## 2020-09-22 PROCEDURE — 3331090002 HH PPS REVENUE DEBIT

## 2020-09-22 PROCEDURE — 65270000029 HC RM PRIVATE

## 2020-09-22 PROCEDURE — 74011000258 HC RX REV CODE- 258: Performed by: INTERNAL MEDICINE

## 2020-09-22 PROCEDURE — 94760 N-INVAS EAR/PLS OXIMETRY 1: CPT

## 2020-09-22 PROCEDURE — 74011250636 HC RX REV CODE- 250/636: Performed by: INTERNAL MEDICINE

## 2020-09-22 PROCEDURE — 74011636637 HC RX REV CODE- 636/637: Performed by: INTERNAL MEDICINE

## 2020-09-22 PROCEDURE — 87070 CULTURE OTHR SPECIMN AEROBIC: CPT

## 2020-09-22 PROCEDURE — 87205 SMEAR GRAM STAIN: CPT

## 2020-09-22 PROCEDURE — 74011000258 HC RX REV CODE- 258

## 2020-09-22 PROCEDURE — 77010033678 HC OXYGEN DAILY

## 2020-09-22 PROCEDURE — 94640 AIRWAY INHALATION TREATMENT: CPT

## 2020-09-22 RX ORDER — PREDNISONE 20 MG/1
20 TABLET ORAL
Status: DISCONTINUED | OUTPATIENT
Start: 2020-09-22 | End: 2020-09-24

## 2020-09-22 RX ORDER — GUAIFENESIN 100 MG/5ML
400 SOLUTION ORAL EVERY 6 HOURS
Status: DISCONTINUED | OUTPATIENT
Start: 2020-09-22 | End: 2020-10-24

## 2020-09-22 RX ADMIN — IPRATROPIUM BROMIDE AND ALBUTEROL SULFATE 3 ML: .5; 3 SOLUTION RESPIRATORY (INHALATION) at 11:16

## 2020-09-22 RX ADMIN — PANTOPRAZOLE SODIUM 40 MG: 40 TABLET, DELAYED RELEASE ORAL at 09:50

## 2020-09-22 RX ADMIN — BUDESONIDE AND FORMOTEROL FUMARATE DIHYDRATE 2 PUFF: 80; 4.5 AEROSOL RESPIRATORY (INHALATION) at 07:48

## 2020-09-22 RX ADMIN — ENOXAPARIN SODIUM 40 MG: 40 INJECTION SUBCUTANEOUS at 09:43

## 2020-09-22 RX ADMIN — PIPERACILLIN SODIUM AND TAZOBACTAM SODIUM 3.38 G: 3; .375 INJECTION, POWDER, LYOPHILIZED, FOR SOLUTION INTRAVENOUS at 09:43

## 2020-09-22 RX ADMIN — IPRATROPIUM BROMIDE AND ALBUTEROL SULFATE 3 ML: .5; 3 SOLUTION RESPIRATORY (INHALATION) at 19:36

## 2020-09-22 RX ADMIN — ATORVASTATIN CALCIUM 20 MG: 20 TABLET, FILM COATED ORAL at 22:15

## 2020-09-22 RX ADMIN — IPRATROPIUM BROMIDE AND ALBUTEROL SULFATE 3 ML: .5; 3 SOLUTION RESPIRATORY (INHALATION) at 15:33

## 2020-09-22 RX ADMIN — PIPERACILLIN SODIUM AND TAZOBACTAM SODIUM 3.38 G: 3; .375 INJECTION, POWDER, LYOPHILIZED, FOR SOLUTION INTRAVENOUS at 23:59

## 2020-09-22 RX ADMIN — ALUMINUM HYDROXIDE, MAGNESIUM HYDROXIDE, AND SIMETHICONE 30 ML: 200; 200; 20 SUSPENSION ORAL at 22:15

## 2020-09-22 RX ADMIN — ACETYLCYSTEINE 400 MG: 200 SOLUTION ORAL; RESPIRATORY (INHALATION) at 07:49

## 2020-09-22 RX ADMIN — AZITHROMYCIN MONOHYDRATE 500 MG: 500 TABLET ORAL at 09:43

## 2020-09-22 RX ADMIN — PIPERACILLIN SODIUM AND TAZOBACTAM SODIUM 3.38 G: 3; .375 INJECTION, POWDER, LYOPHILIZED, FOR SOLUTION INTRAVENOUS at 14:08

## 2020-09-22 RX ADMIN — PREDNISONE 20 MG: 20 TABLET ORAL at 17:00

## 2020-09-22 RX ADMIN — LOSARTAN POTASSIUM 100 MG: 50 TABLET, FILM COATED ORAL at 09:43

## 2020-09-22 RX ADMIN — FUROSEMIDE 40 MG: 10 INJECTION, SOLUTION INTRAMUSCULAR; INTRAVENOUS at 09:44

## 2020-09-22 RX ADMIN — SODIUM CHLORIDE 100 ML: 900 INJECTION INTRAVENOUS at 00:00

## 2020-09-22 RX ADMIN — ACETAMINOPHEN 650 MG: 325 TABLET, FILM COATED ORAL at 22:16

## 2020-09-22 RX ADMIN — GUAIFENESIN 400 MG: 200 SOLUTION ORAL at 11:00

## 2020-09-22 RX ADMIN — GUAIFENESIN 400 MG: 200 SOLUTION ORAL at 17:34

## 2020-09-22 RX ADMIN — DILTIAZEM HYDROCHLORIDE 120 MG: 120 CAPSULE, COATED, EXTENDED RELEASE ORAL at 09:44

## 2020-09-22 RX ADMIN — GUAIFENESIN 400 MG: 200 SOLUTION ORAL at 23:59

## 2020-09-22 RX ADMIN — IPRATROPIUM BROMIDE AND ALBUTEROL SULFATE 3 ML: .5; 3 SOLUTION RESPIRATORY (INHALATION) at 07:48

## 2020-09-22 NOTE — PROGRESS NOTES
PT treatment attempted at [phone] however, pt stating therapy should come back later so that nursing could shave him. When offered to assist patient with sitting EOB while OT assisted with self care however patient still declining. Will continue to follow pt and will attempt treatment at a later time.  Thank you

## 2020-09-22 NOTE — PROGRESS NOTES
Progress Note    Patient: Brayden Powell MRN: [id number]  SSN: [id number]    YOB: 1960  Age: 63 y.o. Sex: male      Admit Date: 9/10/2020    LOS: 12 days     Subjective:     Chief complaint: Shortness of breath  Subjective: The patient is seen for follow  up. Offers no complaints. 21-year-old male was admitted to the hospital with a complaint of shortness of breath and cough. Patient was found to have left lower lobe pneumonia. Patient continues to have shortness of breath and cough. Patient has severe rails and crackles on his left side and become short of breath just by turning from left lateral position to right lateral position. He received 2 units of packed red blood cells during the stay. Patient continue to complains SOB, worse on lying down. Review of Systems   Constitutional: Negative. HENT: Negative. Eyes: Negative. Respiratory: Positive for shortness of breath and wheezing. Cardiovascular: Negative. Gastrointestinal: Negative. Genitourinary: Negative. Musculoskeletal: Negative. Skin: Negative. Neurological: Negative. Endo/Heme/Allergies: Negative. Psychiatric/Behavioral: Negative. Objective:     Vitals:    09/22/20 1117 09/22/20 1457 09/22/20 1520 09/22/20 1535   BP:  139/69 (!) 85/55    Pulse:  86 100    Resp:  17 20    Temp:  98.9 °F (37.2 °C) 98.7 °F (37.1 °C)    SpO2: 98% 94% 92% 92%   Weight:       Height:            Intake and Output:  Current Shift: No intake/output data recorded. Last three shifts: 09/20 1901 - 09/22 0700  In: 666 [P.O.:666]  Out: 175 [Urine:175]    Physical Exam:   General:  Alert, cooperative, no distress, appears stated age. Lungs:   Clear to auscultation bilaterally with diminished air entry Left side   Chest wall:  No tenderness or deformity. Heart:  Regular rate and rhythm, S1, S2 normal, no murmur, click, rub or gallop. Abdomen:   Soft, non-tender.  Bowel sounds normal. No masses,  No organomegaly. Extremities: Extremities normal, atraumatic, no cyanosis or edema. Pulses: 2+ and symmetric all extremities. Skin: Skin color, texture, turgor normal. No rashes or lesions   Neurologic: CNII-XII intact. No gross sensory or motor deficits         Lab/Data Review:  No results found for this or any previous visit (from the past 24 hour(s)). Assessment:       Acute hypoxic respiratory failure  Hypercapnia  Left lower lobe pneumonia- with lung colapse  Left pleural effusion  Acute on chronic anemia  Dehydration  Rule out COVID-19  Generalized weakness and unsteady gait            Plan:  Continue supportive care including IV antibiotics  Plan for bronchoscopy today. Continue prednisone.    O2 supplements  Pulmn eval appreciated, d/w pulmnology  Overall clinically improving but XR unchamnged

## 2020-09-22 NOTE — ROUTINE PROCESS
Bedside shift change report given to Cristobal Cotter RN (oncoming nurse) by Gabriela Allison RN 
 (offgoing nurse). Report included the following information SBAR and OR Summary.

## 2020-09-22 NOTE — PROGRESS NOTES
Pulm/CC Progress Note    Subjective:   Daily Progress Note: 2020   Patient examined at bedside, no acute events overnight. States that he is feeling better today and able to cough up some more mucous. Nevertheless, his CXR this morning is showing persistent left lung collapse with shift of the mediastinum to the left. His shortness of breath has shown some improvement. On nasal cannula oxygen, 2 L nasal cannula. CT chest reviewed and shows near total collapse of the left lung due to debris in the airway  Moderate size right and small left-sided pleural effusion    Patient undergoing aggressive pulmonary hygiene, but still poor ability to clear his mucous. Review of Systems  has complains of shortness of breath. He is on nasal cannula oxygen. Denied any nausea vomiting. Has fair appetite    Objective:     Visit Vitals  /72   Pulse 88   Temp 97.9 °F (36.6 °C)   Resp 17   Ht 6' 0.99\" (1.854 m)   Wt 76.9 kg (169 lb 8.5 oz)   SpO2 100%   BMI 22.37 kg/m²    O2 Flow Rate (L/min): 2 l/min O2 Device: Nasal cannula    Temp (24hrs), Av.5 °F (36.4 °C), Min:97.3 °F (36.3 °C), Max:97.9 °F (36.6 °C)      No intake/output data recorded.    1901 -  0700  In: 666 [P.O.:666]  Out: 175 [Urine:175]    Current Facility-Administered Medications   Medication Dose Route Frequency    guaiFENesin (ROBITUSSIN) 100 mg/5 mL oral liquid 400 mg  400 mg Oral Q6H    lidocaine (XYLOCAINE) 10 mg/mL (1 %) injection    PRN    mineral oil (topical)    PRN    albuterol-ipratropium (DUO-NEB) 2.5 MG-0.5 MG/3 ML  3 mL Nebulization QID RT    furosemide (LASIX) injection 40 mg  40 mg IntraVENous DAILY    budesonide-formoterol (SYMBICORT) 80-4.5 mcg inhaler  2 Puff Inhalation BID RT    dilTIAZem ER (CARDIZEM CD) capsule 120 mg  120 mg Oral DAILY    predniSONE (DELTASONE) tablet 30 mg  30 mg Oral DAILY WITH DINNER    0.9% sodium chloride infusion 250 mL  250 mL IntraVENous PRN    atorvastatin (LIPITOR) tablet 20 mg  20 mg Oral DAILY    enoxaparin (LOVENOX) injection 40 mg  40 mg SubCUTAneous Q24H    losartan (COZAAR) tablet 100 mg  100 mg Oral DAILY    piperacillin-tazobactam (ZOSYN) 3.375 g in 0.9% sodium chloride (MBP/ADV) 100 mL MBP  3.375 g IntraVENous Q8H    pantoprazole (PROTONIX) tablet 40 mg  40 mg Oral DAILY    acetaminophen (TYLENOL) tablet 650 mg  650 mg Oral Q4H PRN    alum-mag hydroxide-simeth (MYLANTA) oral suspension 30 mL  30 mL Oral Q4H PRN    magnesium hydroxide (MILK OF MAGNESIA) 400 mg/5 mL oral suspension 30 mL  30 mL Oral DAILY PRN    ondansetron (ZOFRAN) injection 4 mg  4 mg IntraVENous Q8H PRN    azithromycin (ZITHROMAX) tablet 500 mg  500 mg Oral DAILY       Physical Exam:  General: Alert and oriented x3.  2 L nasal cannula. Pleasant but nervous. HEENT: atraumatic, PERRL, moist mucosa,     Neck: Trachea midline, no carotid bruit, no masses. Supple but thick. Respiratory: Has decreased air entry at left hemithorax. Rhonchi audible in the right hemithorax. 2 L nasal cannula. Cardiovascular: RRR, no m/r/g, Normal S1 and S2   abdomen: Has ventral abdominal hernia, evidence of surgical scars soft,   Rectal: deferred  Extremities: no cyanosis or clubbing. Trace edema. Integumentary: warm, dry, and pink, with no rash, purpura, or petechia. Heme/Lymph: no lymphadenopathy, no bruises  Neurological: No focal motor deficit   psychiatric: cooperative with normal mood, affect, and cognition      Additional comments: Chest x-ray and abdominal x-rays also reviewed    Data Review    No results found for this or any previous visit (from the past 24 hour(s)). XR CHEST AP LAT   Final Result   IMPRESSION: Persistent collapse of the left lung with bilateral pleural   effusions. Increasing vascular congestion on the right. XR CHEST PA LAT   Final Result   Impression:   Ongoing near complete white out of the left lung. Little interval change since   the prior examination.       CT CHEST WO CONT   Final Result   IMPRESSION: Complete collapse of the left lung due to complete bronchial   obstruction, possibly aspiration. Fluid overload also noted      XR ABD ACUTE W 1 V CHEST   Final Result   IMPRESSION: Opacification of the left hemithorax, questioned for remote   pneumonectomy or lung collapse with pleural fluid. Prominent right parenchymal/interstitial lung markings compatible with fibrosis   and possible partial vascular congestion. Small bowel gas, nonobstructive pattern. US CHEST    (Results Pending)          Assessment/Plan: This is a middle-aged male who was admitted because of increasing symptoms of shortness of breath. He is getting treated in hospital for pneumonia with IV Zosyn/Azithromycin. He is noted to be increasingly tachypneic and short of breath. He has been on supplemental oxygen. His chest x-ray is showing left lung opacification likely from mucous plugging. This has not resolved with aggressive pulmonary hygiene over the weekend or after one suction bronchoscopy.     Plan:   1. Left lung collapse/shortness of breath:  Shortness of breath and hypoxia improved, satting well on 2 L nasal cannula. Please wean supplemental oxygen for sats greater than 92%. S/P suction bronchoscopy with adequate removal of multiple mucous plugs, BAL of the lingula performed and sent for cell count an culture. Unfortunately, today's CXR still showing left lung collpase. Will take back for a repeat suction bronchoscopy tomorrow at 0800 as he has eaten breakfast this morning and endoscopy is busy this afternoon. Will repeat CXR after the bronch is completed to see if there is any component of pleural fluid. Continue aggressive pulmonary hygiene with nebulizers every 6 hours;  Aggressive chest PT with vest every 6 hours with suctioning and encouragement of coughing.  Added guaifenesin today, stopped Mucomyst.  Consent for the bronchoscopy has been signed, it is in his chart.  Continue Zosyn/Azithromycin  Continue Lasix 40 mg IV daily, target a negative fluid goal daily. 2.  COPD:  He has a history of COPD based on chronic smoking. Patient got started on prednisone along with inhaled steroid with a spacer. D/C symbicort as inhaled corticosteroids should be avoided in pneumonia. Patient should be discharged with a LAMA/LABA such as Anoro and needs f/u in our office for PFT's and further management. Wean prednisone to 20 mg today. Recommend a total of 5 days of systemic steroids. 3.  Pneumonia:  He is on IV Zosyn along with IV Zithromax. We will adjust his antibiotics based BAL culture results. 4.  Hypertension:  He is on antihypertensive medications, BP's stable. 5.  Status post ex lap:  His abdominal examination shows significant fecal scars and ventral abdominal hernia. ;    Questions of patient were answered at bedside in detail  Case discussed in detail with RN, RT, and care team  Thank you for involving me in the care of this patient  I will follow with you closely during hospitalization        Roby Pabon DO  [facility]

## 2020-09-22 NOTE — ANESTHESIA PREPROCEDURE EVALUATION
Relevant Problems   No relevant active problems       Anesthetic History   No history of anesthetic complications            Review of Systems / Medical History  Patient summary reviewed, nursing notes reviewed and pertinent labs reviewed    Pulmonary      Recent URI    Pneumonia and shortness of breath      Comments: Asp PNA  Resp Failure   Neuro/Psych       CVA  TIA    Comments: Anxiety Cardiovascular    Hypertension          PAD and hyperlipidemia      Comments: Carotid Stenosis   GI/Hepatic/Renal     GERD      PUD    Comments: Colitis Endo/Other        Arthritis and anemia     Other Findings   Comments: Herpes           Physical Exam    Airway  Mallampati: II  TM Distance: 4 - 6 cm  Neck ROM: normal range of motion   Mouth opening: Normal     Cardiovascular    Rhythm: regular  Rate: normal         Dental    Dentition: Poor dentition     Pulmonary  Breath sounds clear to auscultation  Rhonchi:bilateral    Wheezes:bilateral         Abdominal  Abdominal exam normal       Other Findings            Anesthetic Plan    ASA: 4  Anesthesia type: general and total IV anesthesia          Induction: Intravenous  Anesthetic plan and risks discussed with: Patient

## 2020-09-22 NOTE — PROGRESS NOTES
OT treatment attempted at [phone] however, pt stating therapy come back later so that nursing could assist him with shaving. When offered to assist patient with sitting EOB while therapist assisted with self care however patient still declining. Will continue to follow pt and will attempt treatment at a later time.

## 2020-09-23 ENCOUNTER — APPOINTMENT (OUTPATIENT)
Dept: GENERAL RADIOLOGY | Age: 60
DRG: 177 | End: 2020-09-23
Attending: INTERNAL MEDICINE
Payer: MEDICARE

## 2020-09-23 ENCOUNTER — ANESTHESIA (OUTPATIENT)
Dept: ENDOSCOPY | Age: 60
DRG: 177 | End: 2020-09-23
Payer: MEDICARE

## 2020-09-23 ENCOUNTER — ANESTHESIA EVENT (OUTPATIENT)
Dept: ENDOSCOPY | Age: 60
DRG: 177 | End: 2020-09-23
Payer: MEDICARE

## 2020-09-23 PROCEDURE — 74011000250 HC RX REV CODE- 250: Performed by: INTERNAL MEDICINE

## 2020-09-23 PROCEDURE — 3331090001 HH PPS REVENUE CREDIT

## 2020-09-23 PROCEDURE — 3331090002 HH PPS REVENUE DEBIT

## 2020-09-23 PROCEDURE — 76060000032 HC ANESTHESIA 0.5 TO 1 HR: Performed by: INTERNAL MEDICINE

## 2020-09-23 PROCEDURE — 74011250637 HC RX REV CODE- 250/637: Performed by: INTERNAL MEDICINE

## 2020-09-23 PROCEDURE — 94640 AIRWAY INHALATION TREATMENT: CPT

## 2020-09-23 PROCEDURE — 76040000007: Performed by: INTERNAL MEDICINE

## 2020-09-23 PROCEDURE — 65270000029 HC RM PRIVATE

## 2020-09-23 PROCEDURE — 65660000000 HC RM CCU STEPDOWN

## 2020-09-23 PROCEDURE — 2709999900 HC NON-CHARGEABLE SUPPLY: Performed by: INTERNAL MEDICINE

## 2020-09-23 PROCEDURE — 74011000250 HC RX REV CODE- 250: Performed by: NURSE ANESTHETIST, CERTIFIED REGISTERED

## 2020-09-23 PROCEDURE — 74011250636 HC RX REV CODE- 250/636: Performed by: INTERNAL MEDICINE

## 2020-09-23 PROCEDURE — 77030012699 HC VLV SUC CNTRL OCOA -A: Performed by: INTERNAL MEDICINE

## 2020-09-23 PROCEDURE — 74011000258 HC RX REV CODE- 258: Performed by: INTERNAL MEDICINE

## 2020-09-23 PROCEDURE — 74011250636 HC RX REV CODE- 250/636: Performed by: NURSE ANESTHETIST, CERTIFIED REGISTERED

## 2020-09-23 PROCEDURE — 74011250637 HC RX REV CODE- 250/637

## 2020-09-23 PROCEDURE — 94760 N-INVAS EAR/PLS OXIMETRY 1: CPT

## 2020-09-23 PROCEDURE — 74011636637 HC RX REV CODE- 636/637: Performed by: INTERNAL MEDICINE

## 2020-09-23 PROCEDURE — 74011250636 HC RX REV CODE- 250/636

## 2020-09-23 PROCEDURE — 71045 X-RAY EXAM CHEST 1 VIEW: CPT

## 2020-09-23 RX ORDER — DILTIAZEM HYDROCHLORIDE 120 MG/1
CAPSULE, COATED, EXTENDED RELEASE ORAL
Status: COMPLETED
Start: 2020-09-23 | End: 2020-09-23

## 2020-09-23 RX ORDER — PROPOFOL 10 MG/ML
INJECTION, EMULSION INTRAVENOUS
Status: COMPLETED
Start: 2020-09-23 | End: 2020-09-23

## 2020-09-23 RX ORDER — ROCURONIUM BROMIDE 10 MG/ML
INJECTION, SOLUTION INTRAVENOUS AS NEEDED
Status: DISCONTINUED | OUTPATIENT
Start: 2020-09-23 | End: 2020-09-23 | Stop reason: HOSPADM

## 2020-09-23 RX ORDER — PROPOFOL 10 MG/ML
INJECTION, EMULSION INTRAVENOUS AS NEEDED
Status: DISCONTINUED | OUTPATIENT
Start: 2020-09-23 | End: 2020-09-23 | Stop reason: HOSPADM

## 2020-09-23 RX ORDER — SUCCINYLCHOLINE CHLORIDE 20 MG/ML
INJECTION INTRAMUSCULAR; INTRAVENOUS AS NEEDED
Status: DISCONTINUED | OUTPATIENT
Start: 2020-09-23 | End: 2020-09-23 | Stop reason: HOSPADM

## 2020-09-23 RX ORDER — MIDAZOLAM HYDROCHLORIDE 1 MG/ML
INJECTION, SOLUTION INTRAMUSCULAR; INTRAVENOUS AS NEEDED
Status: DISCONTINUED | OUTPATIENT
Start: 2020-09-23 | End: 2020-09-23 | Stop reason: HOSPADM

## 2020-09-23 RX ORDER — LOSARTAN POTASSIUM 50 MG/1
TABLET ORAL
Status: COMPLETED
Start: 2020-09-23 | End: 2020-09-23

## 2020-09-23 RX ORDER — SODIUM CHLORIDE, SODIUM LACTATE, POTASSIUM CHLORIDE, CALCIUM CHLORIDE 600; 310; 30; 20 MG/100ML; MG/100ML; MG/100ML; MG/100ML
INJECTION, SOLUTION INTRAVENOUS
Status: DISCONTINUED | OUTPATIENT
Start: 2020-09-23 | End: 2020-09-23 | Stop reason: HOSPADM

## 2020-09-23 RX ORDER — ROCURONIUM BROMIDE 10 MG/ML
INJECTION, SOLUTION INTRAVENOUS
Status: COMPLETED
Start: 2020-09-23 | End: 2020-09-23

## 2020-09-23 RX ORDER — MIDAZOLAM HYDROCHLORIDE 1 MG/ML
INJECTION, SOLUTION INTRAMUSCULAR; INTRAVENOUS
Status: DISCONTINUED
Start: 2020-09-23 | End: 2020-09-23

## 2020-09-23 RX ORDER — FUROSEMIDE 10 MG/ML
INJECTION INTRAMUSCULAR; INTRAVENOUS
Status: COMPLETED
Start: 2020-09-23 | End: 2020-09-23

## 2020-09-23 RX ORDER — SUCCINYLCHOLINE CHLORIDE 20 MG/ML INJECTION SOLUTION
SOLUTION
Status: COMPLETED
Start: 2020-09-23 | End: 2020-09-23

## 2020-09-23 RX ORDER — LIDOCAINE HYDROCHLORIDE 20 MG/ML
INJECTION, SOLUTION EPIDURAL; INFILTRATION; INTRACAUDAL; PERINEURAL AS NEEDED
Status: DISCONTINUED | OUTPATIENT
Start: 2020-09-23 | End: 2020-09-23 | Stop reason: HOSPADM

## 2020-09-23 RX ORDER — IPRATROPIUM BROMIDE AND ALBUTEROL SULFATE 2.5; .5 MG/3ML; MG/3ML
3 SOLUTION RESPIRATORY (INHALATION)
Status: DISCONTINUED | OUTPATIENT
Start: 2020-09-23 | End: 2020-10-10

## 2020-09-23 RX ORDER — LIDOCAINE HYDROCHLORIDE 20 MG/ML
INJECTION, SOLUTION EPIDURAL; INFILTRATION; INTRACAUDAL; PERINEURAL
Status: COMPLETED
Start: 2020-09-23 | End: 2020-09-23

## 2020-09-23 RX ADMIN — IPRATROPIUM BROMIDE AND ALBUTEROL SULFATE 3 ML: .5; 3 SOLUTION RESPIRATORY (INHALATION) at 19:28

## 2020-09-23 RX ADMIN — ENOXAPARIN SODIUM 40 MG: 40 INJECTION SUBCUTANEOUS at 11:04

## 2020-09-23 RX ADMIN — ACETAMINOPHEN 650 MG: 325 TABLET, FILM COATED ORAL at 22:39

## 2020-09-23 RX ADMIN — ATORVASTATIN CALCIUM 20 MG: 20 TABLET, FILM COATED ORAL at 21:00

## 2020-09-23 RX ADMIN — FUROSEMIDE 40 MG: 10 INJECTION, SOLUTION INTRAMUSCULAR; INTRAVENOUS at 11:04

## 2020-09-23 RX ADMIN — GUAIFENESIN 400 MG: 200 SOLUTION ORAL at 11:08

## 2020-09-23 RX ADMIN — SUCCINYLCHOLINE CHLORIDE 160 MG: 20 INJECTION, SOLUTION INTRAMUSCULAR; INTRAVENOUS at 08:11

## 2020-09-23 RX ADMIN — LOSARTAN POTASSIUM 100 MG: 50 TABLET, FILM COATED ORAL at 11:03

## 2020-09-23 RX ADMIN — GUAIFENESIN 400 MG: 200 SOLUTION ORAL at 18:18

## 2020-09-23 RX ADMIN — IPRATROPIUM BROMIDE AND ALBUTEROL SULFATE 3 ML: .5; 3 SOLUTION RESPIRATORY (INHALATION) at 11:20

## 2020-09-23 RX ADMIN — PROPOFOL 200 MG: 10 INJECTION, EMULSION INTRAVENOUS at 08:11

## 2020-09-23 RX ADMIN — ROCURONIUM BROMIDE 5 MG: 10 SOLUTION INTRAVENOUS at 08:11

## 2020-09-23 RX ADMIN — PREDNISONE 20 MG: 20 TABLET ORAL at 18:37

## 2020-09-23 RX ADMIN — MIDAZOLAM HYDROCHLORIDE 2 MG: 2 INJECTION, SOLUTION INTRAMUSCULAR; INTRAVENOUS at 08:02

## 2020-09-23 RX ADMIN — PIPERACILLIN SODIUM AND TAZOBACTAM SODIUM 3.38 G: 3; .375 INJECTION, POWDER, LYOPHILIZED, FOR SOLUTION INTRAVENOUS at 18:37

## 2020-09-23 RX ADMIN — SODIUM CHLORIDE, POTASSIUM CHLORIDE, SODIUM LACTATE AND CALCIUM CHLORIDE: 600; 310; 30; 20 INJECTION, SOLUTION INTRAVENOUS at 08:02

## 2020-09-23 RX ADMIN — DILTIAZEM HYDROCHLORIDE 120 MG: 120 CAPSULE, COATED, EXTENDED RELEASE ORAL at 11:05

## 2020-09-23 RX ADMIN — LIDOCAINE HYDROCHLORIDE 100 MG: 20 INJECTION, SOLUTION EPIDURAL; INFILTRATION; INTRACAUDAL; PERINEURAL at 08:11

## 2020-09-23 RX ADMIN — IPRATROPIUM BROMIDE AND ALBUTEROL SULFATE 3 ML: .5; 3 SOLUTION RESPIRATORY (INHALATION) at 16:00

## 2020-09-23 NOTE — PROGRESS NOTES
Pulm/CC Progress Note    Subjective:   Daily Progress Note: 2020     Patient examined at bedside today, no acute events overnight. States that he is still coughing up a lot of mucous yesterday/this morning. CXR yesterday showing persistent left lung collapse despite bronchoscopy on ; planning repeat bronchoscopy this morning with suctioning of mucous plugs. His shortness of breath has shown some improvement. On nasal cannula oxygen, 1-2 L nasal cannula. CT chest reviewed and shows near total collapse of the left lung due to debris in the airway  Moderate size right and small left-sided pleural effusion    Patient undergoing aggressive pulmonary hygiene, but still poor ability to clear his mucous. Review of Systems  has complains of shortness of breath. He is on nasal cannula oxygen. Denied any nausea vomiting. Has fair appetite    Objective:     Visit Vitals  BP (!) 154/78 (BP 1 Location: Left arm, BP Patient Position: Supine)   Pulse 89   Temp 98.6 °F (37 °C)   Resp 18   Ht 6' 0.99\" (1.854 m)   Wt 75.6 kg (166 lb 10.7 oz)   SpO2 98%   BMI 21.99 kg/m²    O2 Flow Rate (L/min): 1 l/min O2 Device: Nasal cannula    Temp (24hrs), Av.4 °F (36.9 °C), Min:97.9 °F (36.6 °C), Max:98.9 °F (37.2 °C)      No intake/output data recorded.    1901 -  0700  In: 666 [P.O.:666]  Out: 700 [Urine:700]    Current Facility-Administered Medications   Medication Dose Route Frequency    succinylcholine chloride 200 mg/10 mL (20 mg/mL) 200 mg/10 ml syringe        lidocaine (PF) (XYLOCAINE) 20 mg/mL (2 %) injection        rocuronium 10 mg/mL injection        propofoL (DIPRIVAN) 10 mg/mL injection        midazolam (PF) (VERSED) 1 mg/mL injection        guaiFENesin (ROBITUSSIN) 100 mg/5 mL oral liquid 400 mg  400 mg Oral Q6H    predniSONE (DELTASONE) tablet 20 mg  20 mg Oral DAILY WITH DINNER    lidocaine (XYLOCAINE) 10 mg/mL (1 %) injection    PRN    mineral oil (topical)    PRN    albuterol-ipratropium (DUO-NEB) 2.5 MG-0.5 MG/3 ML  3 mL Nebulization QID RT    furosemide (LASIX) injection 40 mg  40 mg IntraVENous DAILY    dilTIAZem ER (CARDIZEM CD) capsule 120 mg  120 mg Oral DAILY    0.9% sodium chloride infusion 250 mL  250 mL IntraVENous PRN    atorvastatin (LIPITOR) tablet 20 mg  20 mg Oral DAILY    enoxaparin (LOVENOX) injection 40 mg  40 mg SubCUTAneous Q24H    losartan (COZAAR) tablet 100 mg  100 mg Oral DAILY    piperacillin-tazobactam (ZOSYN) 3.375 g in 0.9% sodium chloride (MBP/ADV) 100 mL MBP  3.375 g IntraVENous Q8H    pantoprazole (PROTONIX) tablet 40 mg  40 mg Oral DAILY    acetaminophen (TYLENOL) tablet 650 mg  650 mg Oral Q4H PRN    alum-mag hydroxide-simeth (MYLANTA) oral suspension 30 mL  30 mL Oral Q4H PRN    magnesium hydroxide (MILK OF MAGNESIA) 400 mg/5 mL oral suspension 30 mL  30 mL Oral DAILY PRN    ondansetron (ZOFRAN) injection 4 mg  4 mg IntraVENous Q8H PRN       Physical Exam:  General: Alert and oriented x3.  1 L nasal cannula. Pleasant but nervous. Chronically ill appearing. HEENT: atraumatic, PERRL, moist mucosa,     Neck: Trachea midline, no carotid bruit, no masses. Supple but thick. Respiratory: Has decreased air entry throughout the left hemithorax. Rhonchi audible in the right hemithorax but this is improved. 1 L nasal cannula. Cardiovascular: RRR, no m/r/g, Normal S1 and S2   abdomen: Has ventral abdominal hernia, evidence of surgical scars soft,   Rectal: deferred  Extremities: no cyanosis or clubbing. Trace edema. Integumentary: warm, dry, and pink, with no rash, purpura, or petechia. Heme/Lymph: no lymphadenopathy, no bruises  Neurological: No focal motor deficit   psychiatric: cooperative with normal mood, affect, and cognition      Additional comments: Chest x-rays reviewed    Data Review    No results found for this or any previous visit (from the past 24 hour(s)).     XR CHEST AP LAT   Final Result   IMPRESSION: Persistent collapse of the left lung with bilateral pleural   effusions. Increasing vascular congestion on the right. XR CHEST PA LAT   Final Result   Impression:   Ongoing near complete white out of the left lung. Little interval change since   the prior examination. CT CHEST WO CONT   Final Result   IMPRESSION: Complete collapse of the left lung due to complete bronchial   obstruction, possibly aspiration. Fluid overload also noted      XR ABD ACUTE W 1 V CHEST   Final Result   IMPRESSION: Opacification of the left hemithorax, questioned for remote   pneumonectomy or lung collapse with pleural fluid. Prominent right parenchymal/interstitial lung markings compatible with fibrosis   and possible partial vascular congestion. Small bowel gas, nonobstructive pattern. US CHEST    (Results Pending)   XR CHEST PORT    (Results Pending)          Assessment/Plan: This is a middle-aged male who was admitted because of increasing symptoms of shortness of breath. He is getting treated in hospital for pneumonia with IV Zosyn/Azithromycin. He is noted to be increasingly tachypneic and short of breath. He has been on supplemental oxygen. His chest x-ray is showing left lung opacification likely from mucous plugging. This has not resolved with aggressive pulmonary hygiene over the weekend or after one suction bronchoscopy.     Plan:     1.)    Left lung collapse/shortness of breath:  Shortness of breath and hypoxia improved with diuresis and antibiotics, however still having significant mucous production and difficulty clearing his mucous. This has resulted in left lung colllapse. Mucous plugging persisting despite aggressive pulmonary hygiene and 1 clean out bronch on 9/21/20. BAL of the lingula performed and sent for cell count an culture, these results are currently pending. Planning repeat bronchoscopy today for removal of mucous plugs.  I will then perform a bedside US of his chest to evaluate for pleural effusion, but ultimately I suspect that if there is effusion it is due to collapse of his lung. Sometimes a large pleural effusion can prevent adequate expansion of the lung, but I suspect his collapse is mostly due to mucous plugging (mediastinum is always shifted toward the affected side on his chest films). If there is a significant pleural effusion present, will plan for thoracentesis today. Supplemental O2 needs improved to 1L NC today. Please wean supplemental oxygen for sats greater than 92%. Continue aggressive pulmonary hygiene with nebulizers every 6 hours;  Aggressive chest PT with vest every 6 hours with suctioning and encouragement of coughing. Added guaifenesin 400 mg q6, stopped Mucomyst.  Consent for the bronchoscopy has been signed, it is in his chart. Continue Zosyn, stop azithromycin today. Once mucous is better tolerated, can switch to Augmentin. Would plan for a total of 10-14 days of antibiotics. Continue Lasix 40 mg IV daily, target a negative fluid goal daily. 2.  COPD:  He has a history of COPD based on chronic smoking. Patient got started on prednisone along with inhaled steroid with a spacer. D/C symbicort as inhaled corticosteroids should be avoided in pneumonia. Patient should be discharged with a LAMA/LABA such as Anoro and needs f/u in our office for PFT's and further management. Weaned prednisone to 20 mg yesterday. Likely wean further tomorrow to 10 mg daily. 3.  Pneumonia:  He is on IV Zosyn, stop Zithromax today. We will adjust his antibiotics based BAL culture results. 4.  Hypertension:  He is on antihypertensive medications, BP's stable. 5.  Status post ex lap:  His abdominal examination shows significant fecal scars and ventral abdominal hernia. ;    Questions of patient were answered at bedside in detail  Case discussed in detail with RN, RT, and care team  Thank you for involving me in the care of this patient  I will follow with you closely during hospitalization        Hortencia Tubbs DO  [facility]

## 2020-09-23 NOTE — ANESTHESIA PREPROCEDURE EVALUATION
Relevant Problems   No relevant active problems       Anesthetic History               Review of Systems / Medical History  Patient summary reviewed, nursing notes reviewed and pertinent labs reviewed    Pulmonary          Pneumonia and shortness of breath      Comments: Acute Resp Failure. Neuro/Psych       CVA  TIA    Comments: Anxiety Cardiovascular    Hypertension  Valvular problems/murmurs: aortic stenosis        PAD and hyperlipidemia      Comments: Carotid Stenosis    ECHO:   · LV: Estimated LVEF is 60 - 65%. Biplane method used to measure ejection fraction. Normal cavity size and systolic function (ejection fraction normal). Mild concentric hypertrophy. Wall motion: normal. Moderate (grade 2) left ventricular diastolic dysfunction. · AV: Probably trileaflet aortic valve. Moderate aortic valve leaflet calcification present. Severely reduced right leaflet mobility of the aortic valve. Aortic valve area is 1 cm2. Moderate to severe aortic valve stenosis is present. Mild aortic valve regurgitation is present. · MV: Mitral annular calcification. · TV: Mild tricuspid valve regurgitation is present.      EKG: NSR   GI/Hepatic/Renal     GERD      PUD     Endo/Other        Arthritis and anemia     Other Findings   Comments: Herpes           Physical Exam    Airway  Mallampati: II  TM Distance: 4 - 6 cm  Neck ROM: normal range of motion   Mouth opening: Normal     Cardiovascular    Rhythm: regular  Rate: normal    Murmur: Grade 2, Aortic area     Dental    Dentition: Poor dentition     Pulmonary  Breath sounds clear to auscultation               Abdominal  GI exam deferred       Other Findings            Anesthetic Plan    ASA: 4  Anesthesia type: general - backup and total IV anesthesia          Induction: Intravenous  Anesthetic plan and risks discussed with: Patient

## 2020-09-23 NOTE — ANESTHESIA POSTPROCEDURE EVALUATION
Procedure(s):  BRONCHOSCOPY.     general, total IV anesthesia    Anesthesia Post Evaluation      Multimodal analgesia: multimodal analgesia not used between 6 hours prior to anesthesia start to PACU discharge  Patient location during evaluation: bedside  Patient participation: complete - patient participated  Level of consciousness: awake  Pain score: 0  Pain management: adequate  Airway patency: patent  Cardiovascular status: stable  Respiratory status: unassisted  Hydration status: acceptable  Post anesthesia nausea and vomiting:  none  Final Post Anesthesia Temperature Assessment:  Normothermia (36.0-37.5 degrees C)      INITIAL Post-op Vital signs:   Vitals Value Taken Time   /74 9/23/2020  9:33 AM   Temp 36.7 °C (98 °F) 9/23/2020  8:52 AM   Pulse 99 9/23/2020  9:33 AM   Resp 27 9/23/2020  9:33 AM   SpO2 97 % 9/23/2020  9:33 AM

## 2020-09-23 NOTE — PROGRESS NOTES
Progress Note    Patient: Clau Hernández MRN: [id number]  SSN: [id number]    YOB: 1960  Age: 63 y.o. Sex: male      Admit Date: 9/10/2020    LOS: 13 days     Subjective:     59M, H/o COPD not on oxygen with with shortness of breath s/t pneumonia complicated by left lung collapse. Currently, undergoing bronchoscopy and aggressive pulmonary toilet. On zosyn- deescalation based on BAL cultures. Subjectively coughing with sputum. Plan for bronchoscopy today with the aim to remove plugs and open up the lungs. Repeat XR in AM.       ROS  Review of Systems   Constitutional: Negative. HENT: Negative. Eyes: Negative. Respiratory: Positive for shortness of breath and cough  Cardiovascular: Negative. Gastrointestinal: Negative. Genitourinary: Negative. Musculoskeletal: Negative. Skin: Negative. Neurological: Negative. Endo/Heme/Allergies: Negative. Psychiatric/Behavioral: Negative. Objective:     Vitals:    09/23/20 1000 09/23/20 1015 09/23/20 1124 09/23/20 1125   BP: 122/81 139/85     Pulse: 94 96     Resp: 17 18     Temp:  97 °F (36.1 °C)     SpO2: 99%  99% 99%   Weight:       Height:            Intake and Output:  Current Shift: 09/23 0701 - 09/23 1900  In: 400 [I.V.:400]  Out: 50 [Urine:50]  Last three shifts: 09/21 1901 - 09/23 0700  In: 666 [P.O.:666]  Out: 700 [Urine:700]    Physical Exam:   General:  Alert, cooperative, no distress, appears stated age. Lungs:   Clear to auscultation bilaterally with diminished air entry Left side   Chest wall:  No tenderness or deformity. Heart:  Regular rate and rhythm, S1, S2 normal, no murmur, click, rub or gallop. Abdomen:   Soft, non-tender. Bowel sounds normal. No masses,  No organomegaly. Extremities: Extremities normal, atraumatic, no cyanosis or edema. Pulses: 2+ and symmetric all extremities. Skin: Skin color, texture, turgor normal. No rashes or lesions   Neurologic: CNII-XII intact.  No gross sensory or motor deficits         Lab/Data Review:  No results found for this or any previous visit (from the past 24 hour(s)). No current facility-administered medications on file prior to encounter. Current Outpatient Medications on File Prior to Encounter   Medication Sig Dispense Refill    omeprazole (PRILOSEC) 20 mg capsule Take 20 mg by mouth daily.  pantoprazole (PROTONIX) 40 mg tablet Take 1 Tab by mouth daily. 90 Tab 3    losartan (COZAAR) 100 mg tablet TAKE 1 TABLET BY MOUTH EVERY DAY 90 Tab 3    simvastatin (ZOCOR) 40 mg tablet TAKE 1 TABLET BY MOUTH EVERY DAY AT NIGHT 90 Tab 3     Assessment:   (1) acute hypoxic respiratory failure : likely s/t 2,3. On 1L NC    (2) Left lung collapse: s/p 2 broncoscopy and aggressive pulmonary hygeine. Chest PT, Q6H nebs, guaifenesin, lasix with negative fluid target. Once XR shows resolution of collapse would likely change to Augmentin for 10 days    (3) pneumonia : same as #2. (4) COPD: LAMA/ LABA, OP PFT, pulm f/u. Weaning prednisone. (5) anemia: AOCI. stable    (6) GERD: protonix. Complicates #1.     (7) HTN : on losartan and CCB    (8) HLD: simvastatin    DVT ppx: lovenox    DISPO: once lung collapse resolves, likely dc as per PT recommendations.        Signed By: Nan Duran MD     September 23, 2020

## 2020-09-23 NOTE — ROUTINE PROCESS
TRANSFER - OUT REPORT: 
 
Verbal report given to Franco Morrison on Gabrielle Davenport  being transferred to [address] for routine post - op Report consisted of patients Situation, Background, Assessment and  
Recommendations(SBAR). Information from the following report(s) SBAR, Procedure Summary, Intake/Output, MAR and Recent Results was reviewed with the receiving nurse. Opportunity for questions and clarification was provided. Patient transported with: 
 O2 @ 2 liters

## 2020-09-23 NOTE — MANAGEMENT PLAN
Flexible bronchoscopy completed and endoscopy this morning. Patient was again significantly occluded with mucous plugs throughout the left tracheobronchial tree. These mucous plugs were successfully suctioned and removed with a good result of clear segments within the left upper lobe, left lower lobe, and left mainstem bronchus. Prior to extubation, recruitment maneuvers were performed by the anesthesia team in order to assist with left lung reexpansion, much appreciated. The patient was successfully extubated and is now being sent to the PACU for recovery. We will check a chest x-ray now to evaluate for left lung reexpansion. After the procedure, I did perform a bedside chest ultrasound which did not reveal any evidence of pleural effusion. His abnormal chest imaging is secondary to mucous plugging and inability to clear his own secretions. Continue aggressive pulmonary hygiene, will add an Acapella device which he can use multiple times per hour at the bedside to assist in mucus clearance. I will discuss with the respiratory therapist on whether or not we have a cough assist device here. For all the questions/concerns please give me a call. Otherwise, I will continue to follow along with you on a daily basis. Zacarias Mercado,   [facility] [address] [place] [address] [phone]):  Postprocedural chest x-ray reviewed showing partial opening of left upper lobe. Still with significant collapse. I discussed the case with respiratory therapy, we will add EZ-PAP pressure therapy to be use q6 along with his nebulizers. Also, I will repeat a CXR in the morning.  Hopefully he will open up with continues treatments, otherwise I may need to bring him back for another bronchoscopy +/- checking a repeat CT chest.

## 2020-09-23 NOTE — ROUTINE PROCESS
Bedside shift change report given to Frandy Antony RN (oncoming nurse) by Rogelio Hines RN (offgoing nurse). Report included the following information SBAR and Procedure Summary.

## 2020-09-24 ENCOUNTER — APPOINTMENT (OUTPATIENT)
Dept: ENDOSCOPY | Age: 60
DRG: 177 | End: 2020-09-24
Attending: INTERNAL MEDICINE
Payer: MEDICARE

## 2020-09-24 ENCOUNTER — ANESTHESIA EVENT (OUTPATIENT)
Dept: ENDOSCOPY | Age: 60
DRG: 177 | End: 2020-09-24
Payer: MEDICARE

## 2020-09-24 ENCOUNTER — APPOINTMENT (OUTPATIENT)
Dept: GENERAL RADIOLOGY | Age: 60
DRG: 177 | End: 2020-09-24
Attending: INTERNAL MEDICINE
Payer: MEDICARE

## 2020-09-24 ENCOUNTER — ANESTHESIA (OUTPATIENT)
Dept: ENDOSCOPY | Age: 60
DRG: 177 | End: 2020-09-24
Payer: MEDICARE

## 2020-09-24 PROCEDURE — 74011250636 HC RX REV CODE- 250/636: Performed by: INTERNAL MEDICINE

## 2020-09-24 PROCEDURE — 76060000032 HC ANESTHESIA 0.5 TO 1 HR: Performed by: INTERNAL MEDICINE

## 2020-09-24 PROCEDURE — 74011250637 HC RX REV CODE- 250/637: Performed by: INTERNAL MEDICINE

## 2020-09-24 PROCEDURE — 74011000258 HC RX REV CODE- 258: Performed by: INTERNAL MEDICINE

## 2020-09-24 PROCEDURE — 76040000007: Performed by: INTERNAL MEDICINE

## 2020-09-24 PROCEDURE — 3331090002 HH PPS REVENUE DEBIT

## 2020-09-24 PROCEDURE — 3331090001 HH PPS REVENUE CREDIT

## 2020-09-24 PROCEDURE — 74011636637 HC RX REV CODE- 636/637: Performed by: INTERNAL MEDICINE

## 2020-09-24 PROCEDURE — 94760 N-INVAS EAR/PLS OXIMETRY 1: CPT

## 2020-09-24 PROCEDURE — 65270000029 HC RM PRIVATE

## 2020-09-24 PROCEDURE — 77010033678 HC OXYGEN DAILY

## 2020-09-24 PROCEDURE — 74011000250 HC RX REV CODE- 250: Performed by: INTERNAL MEDICINE

## 2020-09-24 PROCEDURE — 94640 AIRWAY INHALATION TREATMENT: CPT

## 2020-09-24 PROCEDURE — 74011000250 HC RX REV CODE- 250: Performed by: NURSE ANESTHETIST, CERTIFIED REGISTERED

## 2020-09-24 PROCEDURE — 71045 X-RAY EXAM CHEST 1 VIEW: CPT

## 2020-09-24 PROCEDURE — 74011250636 HC RX REV CODE- 250/636: Performed by: NURSE ANESTHETIST, CERTIFIED REGISTERED

## 2020-09-24 RX ORDER — SUCCINYLCHOLINE CHLORIDE 20 MG/ML
INJECTION INTRAMUSCULAR; INTRAVENOUS AS NEEDED
Status: DISCONTINUED | OUTPATIENT
Start: 2020-09-24 | End: 2020-09-24 | Stop reason: HOSPADM

## 2020-09-24 RX ORDER — LIDOCAINE HYDROCHLORIDE 20 MG/ML
INJECTION, SOLUTION EPIDURAL; INFILTRATION; INTRACAUDAL; PERINEURAL
Status: COMPLETED
Start: 2020-09-24 | End: 2020-09-24

## 2020-09-24 RX ORDER — EPINEPHRINE 0.1 MG/ML
INJECTION INTRACARDIAC; INTRAVENOUS
Status: DISCONTINUED
Start: 2020-09-24 | End: 2020-09-24 | Stop reason: WASHOUT

## 2020-09-24 RX ORDER — PROPOFOL 10 MG/ML
INJECTION, EMULSION INTRAVENOUS
Status: COMPLETED
Start: 2020-09-24 | End: 2020-09-24

## 2020-09-24 RX ORDER — ROCURONIUM BROMIDE 10 MG/ML
INJECTION, SOLUTION INTRAVENOUS AS NEEDED
Status: DISCONTINUED | OUTPATIENT
Start: 2020-09-24 | End: 2020-09-24 | Stop reason: HOSPADM

## 2020-09-24 RX ORDER — PROPOFOL 10 MG/ML
INJECTION, EMULSION INTRAVENOUS AS NEEDED
Status: DISCONTINUED | OUTPATIENT
Start: 2020-09-24 | End: 2020-09-24 | Stop reason: HOSPADM

## 2020-09-24 RX ORDER — LIDOCAINE HYDROCHLORIDE 10 MG/ML
INJECTION INFILTRATION; PERINEURAL
Status: DISPENSED
Start: 2020-09-24 | End: 2020-09-25

## 2020-09-24 RX ORDER — PREDNISONE 5 MG/1
10 TABLET ORAL
Status: DISCONTINUED | OUTPATIENT
Start: 2020-09-24 | End: 2020-09-28

## 2020-09-24 RX ORDER — LIDOCAINE HYDROCHLORIDE 20 MG/ML
INJECTION, SOLUTION EPIDURAL; INFILTRATION; INTRACAUDAL; PERINEURAL AS NEEDED
Status: DISCONTINUED | OUTPATIENT
Start: 2020-09-24 | End: 2020-09-24 | Stop reason: HOSPADM

## 2020-09-24 RX ORDER — ROCURONIUM BROMIDE 10 MG/ML
INJECTION, SOLUTION INTRAVENOUS
Status: COMPLETED
Start: 2020-09-24 | End: 2020-09-24

## 2020-09-24 RX ORDER — SUCCINYLCHOLINE CHLORIDE 20 MG/ML INJECTION SOLUTION
SOLUTION
Status: COMPLETED
Start: 2020-09-24 | End: 2020-09-24

## 2020-09-24 RX ORDER — SODIUM CHLORIDE, SODIUM LACTATE, POTASSIUM CHLORIDE, CALCIUM CHLORIDE 600; 310; 30; 20 MG/100ML; MG/100ML; MG/100ML; MG/100ML
INJECTION, SOLUTION INTRAVENOUS
Status: DISCONTINUED | OUTPATIENT
Start: 2020-09-24 | End: 2020-09-24 | Stop reason: HOSPADM

## 2020-09-24 RX ADMIN — ROCURONIUM BROMIDE 5 MG: 10 SOLUTION INTRAVENOUS at 12:57

## 2020-09-24 RX ADMIN — SODIUM CHLORIDE, POTASSIUM CHLORIDE, SODIUM LACTATE AND CALCIUM CHLORIDE: 600; 310; 30; 20 INJECTION, SOLUTION INTRAVENOUS at 12:49

## 2020-09-24 RX ADMIN — IPRATROPIUM BROMIDE AND ALBUTEROL SULFATE 3 ML: .5; 3 SOLUTION RESPIRATORY (INHALATION) at 19:53

## 2020-09-24 RX ADMIN — GUAIFENESIN 400 MG: 200 SOLUTION ORAL at 15:21

## 2020-09-24 RX ADMIN — PIPERACILLIN SODIUM AND TAZOBACTAM SODIUM 3.38 G: 3; .375 INJECTION, POWDER, LYOPHILIZED, FOR SOLUTION INTRAVENOUS at 23:59

## 2020-09-24 RX ADMIN — PROPOFOL 200 MG: 10 INJECTION, EMULSION INTRAVENOUS at 12:57

## 2020-09-24 RX ADMIN — ATORVASTATIN CALCIUM 20 MG: 20 TABLET, FILM COATED ORAL at 20:36

## 2020-09-24 RX ADMIN — LIDOCAINE HYDROCHLORIDE 100 MG: 20 INJECTION, SOLUTION EPIDURAL; INFILTRATION; INTRACAUDAL; PERINEURAL at 12:57

## 2020-09-24 RX ADMIN — GUAIFENESIN 400 MG: 200 SOLUTION ORAL at 09:53

## 2020-09-24 RX ADMIN — PIPERACILLIN SODIUM AND TAZOBACTAM SODIUM 3.38 G: 3; .375 INJECTION, POWDER, LYOPHILIZED, FOR SOLUTION INTRAVENOUS at 15:21

## 2020-09-24 RX ADMIN — ACETAMINOPHEN 650 MG: 325 TABLET, FILM COATED ORAL at 23:19

## 2020-09-24 RX ADMIN — SUCCINYLCHOLINE CHLORIDE 160 MG: 20 INJECTION, SOLUTION INTRAMUSCULAR; INTRAVENOUS at 12:57

## 2020-09-24 RX ADMIN — PIPERACILLIN SODIUM AND TAZOBACTAM SODIUM 3.38 G: 3; .375 INJECTION, POWDER, LYOPHILIZED, FOR SOLUTION INTRAVENOUS at 09:48

## 2020-09-24 RX ADMIN — IPRATROPIUM BROMIDE AND ALBUTEROL SULFATE 3 ML: .5; 3 SOLUTION RESPIRATORY (INHALATION) at 09:44

## 2020-09-24 RX ADMIN — IPRATROPIUM BROMIDE AND ALBUTEROL SULFATE 3 ML: .5; 3 SOLUTION RESPIRATORY (INHALATION) at 16:06

## 2020-09-24 RX ADMIN — LOSARTAN POTASSIUM 100 MG: 50 TABLET, FILM COATED ORAL at 09:47

## 2020-09-24 RX ADMIN — PIPERACILLIN SODIUM AND TAZOBACTAM SODIUM 3.38 G: 3; .375 INJECTION, POWDER, LYOPHILIZED, FOR SOLUTION INTRAVENOUS at 01:34

## 2020-09-24 RX ADMIN — FUROSEMIDE 40 MG: 10 INJECTION, SOLUTION INTRAMUSCULAR; INTRAVENOUS at 09:47

## 2020-09-24 RX ADMIN — ENOXAPARIN SODIUM 40 MG: 40 INJECTION SUBCUTANEOUS at 09:48

## 2020-09-24 RX ADMIN — DILTIAZEM HYDROCHLORIDE 120 MG: 120 CAPSULE, COATED, EXTENDED RELEASE ORAL at 09:47

## 2020-09-24 RX ADMIN — PREDNISONE 10 MG: 5 TABLET ORAL at 15:22

## 2020-09-24 NOTE — PROGRESS NOTES
Progress Note    Patient: Darling Vidal MRN: [id number]  SSN: [id number]    YOB: 1960  Age: 63 y.o. Sex: male      Admit Date: 9/10/2020    LOS: 14 days     Subjective:   59M, H/o COPD not on oxygen with with shortness of breath s/t pneumonia complicated by left lung collapse.      Currently, undergoing bronchoscopy and aggressive pulmonary toilet. On zosyn- deescalation based on BAL cultures.     Subjectively coughing with sputum. Plan for bronchoscopy today with the aim to remove plugs and open up the lungs. Repeat XR in AM.         ROS  Review of Systems   Constitutional: Negative.    HENT: Negative.    Eyes: Negative.    Respiratory: Positive for shortness of breath and cough  Cardiovascular: Negative.    Gastrointestinal: Negative.    Genitourinary: Negative.    Musculoskeletal: Negative.    Skin: Negative.    Neurological: Negative.    Endo/Heme/Allergies: Negative.    Psychiatric/Behavioral: Negative.      Objective:     Vitals:    09/23/20 1925 09/24/20 0301 09/24/20 0825 09/24/20 0952   BP:  126/73 (!) 145/87    Pulse:  84 87    Resp:  18 18    Temp:  98.4 °F (36.9 °C) 98 °F (36.7 °C)    SpO2: 98% 97% 98% 100%   Weight:       Height:            Intake and Output:  Current Shift: No intake/output data recorded. Last three shifts: 09/22 1901 - 09/24 0700  In: 400 [I.V.:400]  Out: 18 [Urine:575]    Physical Exam:   Physical Exam:   General:  Alert, cooperative, no distress, appears stated age. Lungs:   Clear to auscultation bilaterally with diminished air entry Left side   Chest wall:  No tenderness or deformity. Heart:  Regular rate and rhythm, S1, S2 normal, no murmur, click, rub or gallop. Abdomen:   Soft, non-tender. Bowel sounds normal. No masses,  No organomegaly. Extremities: Extremities normal, atraumatic, no cyanosis or edema. Pulses: 2+ and symmetric all extremities. Skin: Skin color, texture, turgor normal. No rashes or lesions   Neurologic: CNII-XII intact.  No gross sensory or motor deficits         Lab/Data Review:No results found for this or any previous visit (from the past 24 hour(s)). Assessment:     (1) acute hypoxic respiratory failure : likely s/t 2,3. On 1L NC     (2) Left lung collapse: s/p 2 broncoscopy and aggressive pulmonary hygeine. Chest PT, Q6H nebs, guaifenesin, lasix with negative fluid target. Once XR shows resolution of collapse would likely change to Augmentin for 10 days     (3) pneumonia : same as #2.     (4) COPD: LAMA/ LABA, OP PFT, pulm f/u. Weaning prednisone.      (5) anemia: AOCI. stable     (6) GERD: protonix. Complicates #1.      (7) HTN : on losartan and CCB     (8) HLD: simvastatin     DVT ppx: lovenox     DISPO: bronch today. once lung collapse resolves (it may take time), likely dc as per PT recommendations.      Signed By: Sean Wick MD     September 24, 2020

## 2020-09-24 NOTE — PROGRESS NOTES
Comprehensive Nutrition Assessment    Type and Reason for Visit: RD nutrition re-screen/LOS    Nutrition Recommendations/Plan:     Consider liberalizing to Regular diet  Continue Ensure Pdg BID  Honor pt preferences    Nursing to document %meal and supplement intakes in I/Os  Consider addition of anti-diarrheal agent if diarrhea persists    Nutrition Assessment:  Admitted for PNA, hypokalemia. LL lung collaps s/p x2 Bronch, plan for 3rd today. NPO for procedure. Out of room at time of visit. Ordered Cardiac diet prior to this, appetite documented as fair. RN relayed caring for pt during admit, confirmed good acceptance of Ensure pdg with fair acceptance of meal trays. On initial visit, interviewed at bedside, pt endorses good appetites and intakes inpatient, RD observed breakfast tray with >/=75% consumed. Pt endorsed displeasure with cardiac diet, wants salt packets. RD explained to pt why he is on a cardiac diet and suggested using other condiments (ketchup, mustard, etc) to help flavor food. Pt still asking for salt pkts. Discussed snacks vs. supplements, pt agreeable to Ensure pdg. Meds: abx, statin, mylanta, lasix, MoM, PRN PPI    Malnutrition Assessment:  Malnutrition Status:  Mild malnutrition    Context:  Chronic illness     Findings of the 6 clinical characteristics of malnutrition:   Energy Intake:  Mild decrease in energy intake (specify)  Weight Loss:  No significant weight loss     Body Fat Loss:  No significant body fat loss,     Muscle Mass Loss:  7 - Severe muscle mass loss, Calf (gastrocnemius), Thigh (quadraceps), Temples (temporalis)  Fluid Accumulation:  No significant fluid accumulation,     Strength:  Not performed         Estimated Daily Nutrient Needs:  Energy (kcal):  2010(HBE x1.2)  Protein (g):  94(1.2g/kg)       Fluid (ml/day):  2010(1ml/kcal)    Nutrition Related Findings:  RD visualized adequate dentition on intial visit, pt reported issues with dry mouth, tolerates reg texture diet. Loose BM recorded 9/23 - unclear if diarrhea/liquid stool continues. Wounds:    Moisture associate skin damage(buttocks)       Current Nutrition Therapies:   DIET NUTRITIONAL SUPPLEMENTS  DIET NPO      Anthropometric Measures:  · Height:  6' 0.99\" (185.4 cm)  · Current Body Wt:  75.6 kg (166 lb 10.7 oz)   · Admission Body Wt:  182 lb 1.6 oz    · Usual Body Wt:  143 lb(1 year ago)     · Ideal Body Wt:  184 lbs:  93.6 %   · BMI Category:  Normal weight (BMI 18.5-24. 9)       Nutrition Diagnosis:   · Mild malnutrition related to catabolic illness(COPD) as evidenced by severe muscle loss    Nutrition Interventions:   Food and/or Nutrient Delivery: Continue current diet, Continue oral nutrition supplement  Nutrition Education and Counseling: No recommendations at this time  Coordination of Nutrition Care: Continued inpatient monitoring    Goals:  Intakes >/=75% of EENs, wt maintenance with in +/-0.5kg, BMs every 1-2 days in 7 days       Nutrition Monitoring and Evaluation:   Behavioral-Environmental Outcomes: Readiness for change  Food/Nutrient Intake Outcomes: Food and nutrient intake  Physical Signs/Symptoms Outcomes: Diarrhea, Weight    Discharge Planning:    No discharge needs at this time     Electronically signed by Johnie Waldrop on 9/24/2020 at 3:51 PM    Contact: [phone]

## 2020-09-24 NOTE — ANESTHESIA PREPROCEDURE EVALUATION
Relevant Problems   No relevant active problems       Anesthetic History   No history of anesthetic complications            Review of Systems / Medical History  Patient summary reviewed, nursing notes reviewed and pertinent labs reviewed    Pulmonary          Pneumonia and shortness of breath         Neuro/Psych       CVA  TIA    Comments: anxiety Cardiovascular    Hypertension  Valvular problems/murmurs: aortic stenosis        PAD and hyperlipidemia    Exercise tolerance: <4 METS: Carotid stenosis  Comments: · LV: Estimated LVEF is 60 - 65%. Biplane method used to measure ejection fraction. Normal cavity size and systolic function (ejection fraction normal). Mild concentric hypertrophy. Wall motion: normal. Moderate (grade 2) left ventricular diastolic dysfunction. · AV: Probably trileaflet aortic valve. Moderate aortic valve leaflet calcification present. Severely reduced right leaflet mobility of the aortic valve. Aortic valve area is 1 cm2. Moderate to severe aortic valve stenosis is present. Mild aortic valve regurgitation is present. · MV: Mitral annular calcification. · TV: Mild tricuspid valve regurgitation is present.    GI/Hepatic/Renal     GERD      PUD     Endo/Other        Arthritis     Other Findings   Comments: Herpes         Past Medical History:   Diagnosis Date    Anal fistula 3/15/2010    Anxiety     ARF (acute renal failure) (HCC)     Colon perforation (HCC)     Genital herpes 3/15/2010    GERD (gastroesophageal reflux disease)     High cholesterol 3/15/2010    History of blood transfusion     HTN (hypertension) 3/15/2010    Hyponatremia     Ischemic colitis ([address]     left Carotid Artery Occlusion 3/15/2010    Noncompliance with treatment 3/15/2010    Pneumonia 2011    PUD (peptic ulcer disease)     Septic shock(785.52)     Stroke 2002 3/15/2010    Thromboembolus ([address]     rt thigh    TIA (transient ischemic attack) 2007    pt reported       Past Surgical History: Procedure Laterality Date    CARDIAC CATHETERIZATION      CARDIAC SURG PROCEDURE UNLIST      HX COLECTOMY  1/11/2014    Right hemicolectomy for free air, perforated viscus    US SCROTUM/TESTICLES      right testicle removed       Current Outpatient Medications   Medication Instructions    losartan (COZAAR) 100 mg tablet TAKE 1 TABLET BY MOUTH EVERY DAY    omeprazole (PRILOSEC) 20 mg, Oral, DAILY    pantoprazole (PROTONIX) 40 mg, Oral, DAILY    simvastatin (ZOCOR) 40 mg tablet TAKE 1 TABLET BY MOUTH EVERY DAY AT NIGHT       Current Facility-Administered Medications   Medication Dose Route Frequency    predniSONE (DELTASONE) tablet 10 mg  10 mg Oral DAILY WITH DINNER    albuterol-ipratropium (DUO-NEB) 2.5 MG-0.5 MG/3 ML  3 mL Nebulization QID RT    guaiFENesin (ROBITUSSIN) 100 mg/5 mL oral liquid 400 mg  400 mg Oral Q6H    lidocaine (XYLOCAINE) 10 mg/mL (1 %) injection    PRN    mineral oil (topical)    PRN    furosemide (LASIX) injection 40 mg  40 mg IntraVENous DAILY    dilTIAZem ER (CARDIZEM CD) capsule 120 mg  120 mg Oral DAILY    0.9% sodium chloride infusion 250 mL  250 mL IntraVENous PRN    atorvastatin (LIPITOR) tablet 20 mg  20 mg Oral DAILY    enoxaparin (LOVENOX) injection 40 mg  40 mg SubCUTAneous Q24H    losartan (COZAAR) tablet 100 mg  100 mg Oral DAILY    piperacillin-tazobactam (ZOSYN) 3.375 g in 0.9% sodium chloride (MBP/ADV) 100 mL MBP  3.375 g IntraVENous Q8H    pantoprazole (PROTONIX) tablet 40 mg  40 mg Oral DAILY    acetaminophen (TYLENOL) tablet 650 mg  650 mg Oral Q4H PRN    alum-mag hydroxide-simeth (MYLANTA) oral suspension 30 mL  30 mL Oral Q4H PRN    magnesium hydroxide (MILK OF MAGNESIA) 400 mg/5 mL oral suspension 30 mL  30 mL Oral DAILY PRN    ondansetron (ZOFRAN) injection 4 mg  4 mg IntraVENous Q8H PRN       Patient Vitals for the past 24 hrs:   Temp Pulse Resp BP SpO2   09/25/20 0807     98 %   09/25/20 0751 36.7 °C (98.1 °F) 82 19 (!) 143/84 99 % 09/24/20 2038 36.6 °C (97.9 °F) (!) 105 20 112/68    09/24/20 1951     97 %   09/24/20 1607     97 %   09/24/20 1502 36.6 °C (97.9 °F) 86 20 107/64 (!) 80 %   09/24/20 1340  96 24 139/86 98 %   09/24/20 1334 36.3 °C (97.4 °F) (!) 101 16 (!) 150/85 98 %       Lab Results   Component Value Date/Time    WBC 5.6 09/21/2020 06:15 AM    HGB 9.4 (L) 09/21/2020 06:15 AM    HCT 31.0 (L) 09/21/2020 06:15 AM    PLATELET 090 08/11/5072 06:15 AM    MCV 93.9 09/21/2020 06:15 AM     Lab Results   Component Value Date/Time    Sodium 135 (L) 09/21/2020 06:15 AM    Potassium 3.8 09/21/2020 06:15 AM    Chloride 94 (L) 09/21/2020 06:15 AM    CO2 39 (H) 09/21/2020 06:15 AM    Anion gap 2 (L) 09/21/2020 06:15 AM    Glucose 92 09/21/2020 06:15 AM    BUN 12 09/21/2020 06:15 AM    Creatinine 0.57 (L) 09/21/2020 06:15 AM    BUN/Creatinine ratio 21 (H) 09/21/2020 06:15 AM    GFR est AA >60 09/21/2020 06:15 AM    GFR est non-AA >60 09/21/2020 06:15 AM    Calcium 8.3 (L) 09/21/2020 06:15 AM     No results found for: APTT, PTP, INR, INREXT  Lab Results   Component Value Date/Time    Glucose 92 09/21/2020 06:15 AM    Glucose (POC) 105 (H) 09/12/2020 08:26 PM               Physical Exam    Airway  Mallampati: II  TM Distance: 4 - 6 cm  Neck ROM: normal range of motion   Mouth opening: Normal     Cardiovascular    Rhythm: regular  Rate: normal         Dental    Dentition: Poor dentition     Pulmonary    Rhonchi:bilateral             Abdominal  GI exam deferred       Other Findings            Anesthetic Plan    ASA: 4  Anesthesia type: total IV anesthesia and general          Induction: Intravenous  Anesthetic plan and risks discussed with: Patient and Family      General anesthesia was prescribed for this patient because by definition it is \"a drug-induced loss of consciousness during which patients are not arousable, even by painful stimulation. \" Sometimes, the ability to independently maintain ventilatory function is often impaired and patients often require assistance in maintaining a patent airway. Occasionally, positive pressure ventilation may be required because of depressed spontaneous ventilation or drug-induced depression of neuromuscular function. This depth of anesthesia is preferred for endoscopic procedures to facilitate the procedure and for patient safety/quality of care.

## 2020-09-24 NOTE — PROGRESS NOTES
Pulm/CC Progress Note    Subjective:   Daily Progress Note: 2020     Patient seen and examined at th bedside today, no acute events overnight. States that he is still coughing up a lot of mucous yesterday/this morning, helped by the addition of EZ-PAP. CXR yesterday after bronchoscopy #2 showing partial NATALIE opening but persistent LLL collapse. This is unchanged this morning on today's CXR. We will plan for bronchoscopy #3 today at 12:30-1PM.       Review of Systems  has complains of shortness of breath. He is on nasal cannula oxygen. Denied any nausea vomiting. Has fair appetite    Objective:     Visit Vitals  BP (!) 145/87   Pulse 87   Temp 98 °F (36.7 °C)   Resp 18   Ht 6' 0.99\" (1.854 m)   Wt 75.6 kg (166 lb 10.7 oz)   SpO2 98%   BMI 21.99 kg/m²    O2 Flow Rate (L/min): 2 l/min O2 Device: Nasal cannula    Temp (24hrs), Av.8 °F (36.6 °C), Min:97 °F (36.1 °C), Max:98.4 °F (36.9 °C)      No intake/output data recorded.    1901 -  0700  In: 400 [I.V.:400]  Out: 575 [Urine:575]    Current Facility-Administered Medications   Medication Dose Route Frequency    predniSONE (DELTASONE) tablet 10 mg  10 mg Oral DAILY WITH DINNER    albuterol-ipratropium (DUO-NEB) 2.5 MG-0.5 MG/3 ML  3 mL Nebulization QID RT    guaiFENesin (ROBITUSSIN) 100 mg/5 mL oral liquid 400 mg  400 mg Oral Q6H    lidocaine (XYLOCAINE) 10 mg/mL (1 %) injection    PRN    mineral oil (topical)    PRN    furosemide (LASIX) injection 40 mg  40 mg IntraVENous DAILY    dilTIAZem ER (CARDIZEM CD) capsule 120 mg  120 mg Oral DAILY    0.9% sodium chloride infusion 250 mL  250 mL IntraVENous PRN    atorvastatin (LIPITOR) tablet 20 mg  20 mg Oral DAILY    enoxaparin (LOVENOX) injection 40 mg  40 mg SubCUTAneous Q24H    losartan (COZAAR) tablet 100 mg  100 mg Oral DAILY    piperacillin-tazobactam (ZOSYN) 3.375 g in 0.9% sodium chloride (MBP/ADV) 100 mL MBP  3.375 g IntraVENous Q8H    pantoprazole (PROTONIX) tablet 40 mg  40 mg Oral DAILY    acetaminophen (TYLENOL) tablet 650 mg  650 mg Oral Q4H PRN    alum-mag hydroxide-simeth (MYLANTA) oral suspension 30 mL  30 mL Oral Q4H PRN    magnesium hydroxide (MILK OF MAGNESIA) 400 mg/5 mL oral suspension 30 mL  30 mL Oral DAILY PRN    ondansetron (ZOFRAN) injection 4 mg  4 mg IntraVENous Q8H PRN       Physical Exam:  General: Alert and oriented x3.  1 L nasal cannula. Pleasant but nervous. Chronically ill appearing. HEENT: atraumatic, PERRL, moist mucosa,     Neck: Trachea midline, no carotid bruit, no masses. Supple but thick. Respiratory: Has decreased air entry throughout the left hemithorax. Rhonchi audible in the right hemithorax but this is improved. 1 L nasal cannula. Cardiovascular: RRR, no m/r/g, Normal S1 and S2   abdomen: Has ventral abdominal hernia, evidence of surgical scars soft,   Rectal: deferred  Extremities: no cyanosis or clubbing. Trace edema. Integumentary: warm, dry, and pink, with no rash, purpura, or petechia. Heme/Lymph: no lymphadenopathy, no bruises  Neurological: No focal motor deficit   psychiatric: cooperative with normal mood, affect, and cognition      Additional comments: Chest x-rays reviewed    Data Review    No results found for this or any previous visit (from the past 24 hour(s)). XR CHEST PORT   Final Result   Impression:Left lung collapse without improvement from prior study. XR CHEST PORT   Final Result   IMPRESSION:   1. There is milder enlargement of the cardiac silhouette as well as increasing   pulmonary vascular ill definition suspect for mild pulmonary edema. This could   be related to cardiogenic edema or fluid overload. 2.  There is been an improvement in the overall aeration of the left lung with   and improved visualization of vascular markings within the left upper lung zone. There still remains significant partial collapse of the left lung.    3.  There is increased patchy airspace disease laterally within the right lower   lobe just above the right lateral costophrenic angle. 4.  There are persistent moderate-sized bilateral pleural effusions. XR CHEST AP LAT   Final Result   IMPRESSION: Persistent collapse of the left lung with bilateral pleural   effusions. Increasing vascular congestion on the right. XR CHEST PA LAT   Final Result   Impression:   Ongoing near complete white out of the left lung. Little interval change since   the prior examination. CT CHEST WO CONT   Final Result   IMPRESSION: Complete collapse of the left lung due to complete bronchial   obstruction, possibly aspiration. Fluid overload also noted      XR ABD ACUTE W 1 V CHEST   Final Result   IMPRESSION: Opacification of the left hemithorax, questioned for remote   pneumonectomy or lung collapse with pleural fluid. Prominent right parenchymal/interstitial lung markings compatible with fibrosis   and possible partial vascular congestion. Small bowel gas, nonobstructive pattern. Assessment/Plan: This is a middle-aged male who was admitted because of increasing symptoms of shortness of breath. He is getting treated in hospital for pneumonia with IV Zosyn/Azithromycin. He is noted to be increasingly tachypneic and short of breath. He has been on supplemental oxygen. His chest x-ray is showing left lung opacification likely from mucous plugging. This is slowly improving with aggressive pulmonary hygiene and two suction bronchoscopies.     Plan:     1.)    Left lung collapse/shortness of breath:  Shortness of breath and hypoxia improved with diuresis and antibiotics, however still having significant mucous production and difficulty clearing his mucous. This has resulted in left lung colllapse. Mucous plugging persisting despite aggressive pulmonary hygiene and 2 clean out bronchs on 9/21/20 and 9/23/20.  BAL of the lingula performed and sent for cell count an culture, these results are currently pending. Bedside ultrasound performed after the bronchoscopy yesterday did not reveal a significant pleural effusion on the left. Planning repeat bronchoscopy today at 12:30-1:00 PM  Supplemental O2 needs improved to 1L NC today. Please wean supplemental oxygen for sats greater than 92%. Continue aggressive pulmonary hygiene with nebulizers every 6 hours;  Aggressive chest PT with vest every 6 hours, EZ-PAP therapy q6, acapella device as well as incentive spirometer use. Added guaifenesin 400 mg q6, stopped Mucomyst.  Consent for the bronchoscopy has been signed, it is in his chart. Continue Zosyn, stopped azithromycin on 9/23/2020. Once mucous is better tolerated, can switch to Augmentin. Would plan for a total of 14 days of antibiotics. Continue Lasix 40 mg IV daily, target a negative fluid goal daily. 2.)  COPD:  He has a history of COPD based on chronic smoking. Continue scheduled bronchodilators. Patient should be discharged with a LAMA/LABA such as Anoro and needs f/u in our office for PFT's and further management. Weaned prednisone to 10 mg daily today. 3.)  Pneumonia:  He is on IV Zosyn, stopped Zithromax on 9/23/2020. Recommend a total of 14 days of antibiotics. We will adjust his antibiotics based BAL culture results. 4.)  Hypertension:  He is on antihypertensive medications, BP's stable.     5.)  Status post ex lap:  His abdominal examination shows significant fecal scars and ventral abdominal hernia      Patient has an appointment with me in the clinic on Tuesday, October 13th at 11 AM.      Questions of patient were answered at bedside in detail  Case discussed in detail with RN, RT, and care team  Thank you for involving me in the care of this patient  I will follow with you closely during hospitalization        Julia Jones DO  1972 [address]

## 2020-09-25 ENCOUNTER — ANESTHESIA (OUTPATIENT)
Dept: ENDOSCOPY | Age: 60
DRG: 177 | End: 2020-09-25
Payer: MEDICARE

## 2020-09-25 ENCOUNTER — APPOINTMENT (OUTPATIENT)
Dept: GENERAL RADIOLOGY | Age: 60
DRG: 177 | End: 2020-09-25
Attending: INTERNAL MEDICINE
Payer: MEDICARE

## 2020-09-25 ENCOUNTER — APPOINTMENT (OUTPATIENT)
Dept: ENDOSCOPY | Age: 60
DRG: 177 | End: 2020-09-25
Attending: INTERNAL MEDICINE
Payer: MEDICARE

## 2020-09-25 LAB
ANION GAP SERPL CALC-SCNC: 0 MMOL/L (ref 5–15)
BASOPHILS # BLD: 0 K/UL (ref 0–0.1)
BASOPHILS NFR BLD: 0 % (ref 0–1)
BUN SERPL-MCNC: 13 MG/DL (ref 6–20)
BUN/CREAT SERPL: 20 (ref 12–20)
CA-I BLD-MCNC: 8.4 MG/DL (ref 8.5–10.1)
CHLORIDE SERPL-SCNC: 95 MMOL/L (ref 97–108)
CO2 SERPL-SCNC: 42 MMOL/L (ref 21–32)
CREAT SERPL-MCNC: 0.64 MG/DL (ref 0.7–1.3)
DIFFERENTIAL METHOD BLD: ABNORMAL
EOSINOPHIL # BLD: 0.1 K/UL (ref 0–0.4)
EOSINOPHIL NFR BLD: 1 % (ref 0–7)
ERYTHROCYTE [DISTWIDTH] IN BLOOD BY AUTOMATED COUNT: 17.7 % (ref 11.5–14.5)
GLUCOSE SERPL-MCNC: 66 MG/DL (ref 65–100)
HCT VFR BLD AUTO: 32 % (ref 36.6–50.3)
HGB BLD-MCNC: 9.8 % (ref 12.1–17)
IMM GRANULOCYTES # BLD AUTO: 0 K/UL (ref 0–0.04)
IMM GRANULOCYTES NFR BLD AUTO: 1 % (ref 0–0.5)
LYMPHOCYTES # BLD: 1.5 K/UL (ref 0.8–3.5)
LYMPHOCYTES NFR BLD: 17 % (ref 12–49)
MAGNESIUM SERPL-MCNC: 1.4 MG/DL (ref 1.6–2.4)
MCH RBC QN AUTO: 28.9 PG (ref 26–34)
MCHC RBC AUTO-ENTMCNC: 30.6 G/DL (ref 30–36.5)
MCV RBC AUTO: 94.4 FL (ref 80–99)
MONOCYTES # BLD: 1.2 K/UL (ref 0–1)
MONOCYTES NFR BLD: 14 % (ref 5–13)
NEUTS SEG # BLD: 5.9 K/UL (ref 1.8–8)
NEUTS SEG NFR BLD: 67 % (ref 32–75)
PLATELET # BLD AUTO: 296 K/UL (ref 150–400)
PMV BLD AUTO: 10.7 FL (ref 8.9–12.9)
POTASSIUM SERPL-SCNC: 3.6 MMOL/L (ref 3.5–5.1)
RBC # BLD AUTO: 3.39 M/UL (ref 4.1–5.7)
SARS-COV-2, COV2: NORMAL
SODIUM SERPL-SCNC: 137 MMOL/L (ref 136–145)
WBC # BLD AUTO: 8.6 K/UL (ref 4.1–11.1)

## 2020-09-25 PROCEDURE — 74011000250 HC RX REV CODE- 250: Performed by: INTERNAL MEDICINE

## 2020-09-25 PROCEDURE — 87635 SARS-COV-2 COVID-19 AMP PRB: CPT

## 2020-09-25 PROCEDURE — 2709999900 HC NON-CHARGEABLE SUPPLY: Performed by: INTERNAL MEDICINE

## 2020-09-25 PROCEDURE — 74011250636 HC RX REV CODE- 250/636: Performed by: NURSE ANESTHETIST, CERTIFIED REGISTERED

## 2020-09-25 PROCEDURE — 74011250637 HC RX REV CODE- 250/637: Performed by: INTERNAL MEDICINE

## 2020-09-25 PROCEDURE — 74011250636 HC RX REV CODE- 250/636: Performed by: INTERNAL MEDICINE

## 2020-09-25 PROCEDURE — 87449 NOS EACH ORGANISM AG IA: CPT

## 2020-09-25 PROCEDURE — 3331090001 HH PPS REVENUE CREDIT

## 2020-09-25 PROCEDURE — 83735 ASSAY OF MAGNESIUM: CPT

## 2020-09-25 PROCEDURE — 71045 X-RAY EXAM CHEST 1 VIEW: CPT

## 2020-09-25 PROCEDURE — 85025 COMPLETE CBC W/AUTO DIFF WBC: CPT

## 2020-09-25 PROCEDURE — 77030012699 HC VLV SUC CNTRL OCOA -A: Performed by: INTERNAL MEDICINE

## 2020-09-25 PROCEDURE — 80048 BASIC METABOLIC PNL TOTAL CA: CPT

## 2020-09-25 PROCEDURE — 74011000250 HC RX REV CODE- 250: Performed by: NURSE ANESTHETIST, CERTIFIED REGISTERED

## 2020-09-25 PROCEDURE — 76060000032 HC ANESTHESIA 0.5 TO 1 HR: Performed by: INTERNAL MEDICINE

## 2020-09-25 PROCEDURE — 74011000258 HC RX REV CODE- 258: Performed by: INTERNAL MEDICINE

## 2020-09-25 PROCEDURE — 65270000029 HC RM PRIVATE

## 2020-09-25 PROCEDURE — 36415 COLL VENOUS BLD VENIPUNCTURE: CPT

## 2020-09-25 PROCEDURE — 3331090002 HH PPS REVENUE DEBIT

## 2020-09-25 PROCEDURE — 76040000007: Performed by: INTERNAL MEDICINE

## 2020-09-25 PROCEDURE — 94640 AIRWAY INHALATION TREATMENT: CPT

## 2020-09-25 PROCEDURE — 74011636637 HC RX REV CODE- 636/637: Performed by: INTERNAL MEDICINE

## 2020-09-25 PROCEDURE — 77030008684 HC TU ET CUF COVD -B: Performed by: NURSE ANESTHETIST, CERTIFIED REGISTERED

## 2020-09-25 RX ORDER — POTASSIUM CHLORIDE 20 MEQ/1
40 TABLET, EXTENDED RELEASE ORAL ONCE
Status: COMPLETED | OUTPATIENT
Start: 2020-09-25 | End: 2020-09-25

## 2020-09-25 RX ORDER — ROCURONIUM BROMIDE 10 MG/ML
INJECTION, SOLUTION INTRAVENOUS AS NEEDED
Status: DISCONTINUED | OUTPATIENT
Start: 2020-09-25 | End: 2020-09-25 | Stop reason: HOSPADM

## 2020-09-25 RX ORDER — DEXAMETHASONE SODIUM PHOSPHATE 4 MG/ML
INJECTION, SOLUTION INTRA-ARTICULAR; INTRALESIONAL; INTRAMUSCULAR; INTRAVENOUS; SOFT TISSUE
Status: COMPLETED
Start: 2020-09-25 | End: 2020-09-25

## 2020-09-25 RX ORDER — ETOMIDATE 2 MG/ML
INJECTION INTRAVENOUS AS NEEDED
Status: DISCONTINUED | OUTPATIENT
Start: 2020-09-25 | End: 2020-09-25 | Stop reason: HOSPADM

## 2020-09-25 RX ORDER — EPINEPHRINE 0.1 MG/ML
INJECTION INTRACARDIAC; INTRAVENOUS
Status: DISCONTINUED
Start: 2020-09-25 | End: 2020-09-25 | Stop reason: WASHOUT

## 2020-09-25 RX ORDER — DEXAMETHASONE SODIUM PHOSPHATE 4 MG/ML
INJECTION, SOLUTION INTRA-ARTICULAR; INTRALESIONAL; INTRAMUSCULAR; INTRAVENOUS; SOFT TISSUE AS NEEDED
Status: DISCONTINUED | OUTPATIENT
Start: 2020-09-25 | End: 2020-09-25 | Stop reason: HOSPADM

## 2020-09-25 RX ORDER — PROPOFOL 10 MG/ML
INJECTION, EMULSION INTRAVENOUS AS NEEDED
Status: DISCONTINUED | OUTPATIENT
Start: 2020-09-25 | End: 2020-09-25 | Stop reason: HOSPADM

## 2020-09-25 RX ORDER — FENTANYL CITRATE 50 UG/ML
INJECTION, SOLUTION INTRAMUSCULAR; INTRAVENOUS
Status: COMPLETED
Start: 2020-09-25 | End: 2020-09-25

## 2020-09-25 RX ORDER — LIDOCAINE HYDROCHLORIDE 10 MG/ML
INJECTION INFILTRATION; PERINEURAL
Status: DISCONTINUED
Start: 2020-09-25 | End: 2020-09-25 | Stop reason: WASHOUT

## 2020-09-25 RX ORDER — ETOMIDATE 2 MG/ML
INJECTION INTRAVENOUS
Status: COMPLETED
Start: 2020-09-25 | End: 2020-09-25

## 2020-09-25 RX ORDER — LIDOCAINE HYDROCHLORIDE 20 MG/ML
INJECTION, SOLUTION EPIDURAL; INFILTRATION; INTRACAUDAL; PERINEURAL
Status: DISPENSED
Start: 2020-09-25 | End: 2020-09-26

## 2020-09-25 RX ORDER — MAGNESIUM SULFATE HEPTAHYDRATE 40 MG/ML
2 INJECTION, SOLUTION INTRAVENOUS ONCE
Status: COMPLETED | OUTPATIENT
Start: 2020-09-25 | End: 2020-09-25

## 2020-09-25 RX ORDER — PROPOFOL 10 MG/ML
INJECTION, EMULSION INTRAVENOUS
Status: COMPLETED
Start: 2020-09-25 | End: 2020-09-25

## 2020-09-25 RX ORDER — FENTANYL CITRATE 50 UG/ML
INJECTION, SOLUTION INTRAMUSCULAR; INTRAVENOUS AS NEEDED
Status: DISCONTINUED | OUTPATIENT
Start: 2020-09-25 | End: 2020-09-25 | Stop reason: HOSPADM

## 2020-09-25 RX ORDER — SODIUM CHLORIDE, SODIUM LACTATE, POTASSIUM CHLORIDE, CALCIUM CHLORIDE 600; 310; 30; 20 MG/100ML; MG/100ML; MG/100ML; MG/100ML
INJECTION, SOLUTION INTRAVENOUS
Status: DISCONTINUED | OUTPATIENT
Start: 2020-09-25 | End: 2020-09-25 | Stop reason: HOSPADM

## 2020-09-25 RX ORDER — ONDANSETRON 2 MG/ML
INJECTION INTRAMUSCULAR; INTRAVENOUS
Status: COMPLETED
Start: 2020-09-25 | End: 2020-09-25

## 2020-09-25 RX ORDER — ONDANSETRON 2 MG/ML
INJECTION INTRAMUSCULAR; INTRAVENOUS AS NEEDED
Status: DISCONTINUED | OUTPATIENT
Start: 2020-09-25 | End: 2020-09-25 | Stop reason: HOSPADM

## 2020-09-25 RX ORDER — FUROSEMIDE 40 MG/1
40 TABLET ORAL DAILY
Status: DISCONTINUED | OUTPATIENT
Start: 2020-09-26 | End: 2020-10-01

## 2020-09-25 RX ORDER — SUCCINYLCHOLINE CHLORIDE 20 MG/ML INJECTION SOLUTION
SOLUTION
Status: DISPENSED
Start: 2020-09-25 | End: 2020-09-26

## 2020-09-25 RX ADMIN — FUROSEMIDE 40 MG: 10 INJECTION, SOLUTION INTRAMUSCULAR; INTRAVENOUS at 08:20

## 2020-09-25 RX ADMIN — SODIUM CHLORIDE, POTASSIUM CHLORIDE, SODIUM LACTATE AND CALCIUM CHLORIDE: 600; 310; 30; 20 INJECTION, SOLUTION INTRAVENOUS at 13:03

## 2020-09-25 RX ADMIN — IPRATROPIUM BROMIDE AND ALBUTEROL SULFATE 3 ML: .5; 3 SOLUTION RESPIRATORY (INHALATION) at 11:33

## 2020-09-25 RX ADMIN — GUAIFENESIN 400 MG: 200 SOLUTION ORAL at 17:06

## 2020-09-25 RX ADMIN — FENTANYL CITRATE 100 MCG: 50 INJECTION, SOLUTION INTRAMUSCULAR; INTRAVENOUS at 13:09

## 2020-09-25 RX ADMIN — PIPERACILLIN SODIUM AND TAZOBACTAM SODIUM 3.38 G: 3; .375 INJECTION, POWDER, LYOPHILIZED, FOR SOLUTION INTRAVENOUS at 23:50

## 2020-09-25 RX ADMIN — PIPERACILLIN SODIUM AND TAZOBACTAM SODIUM 3.38 G: 3; .375 INJECTION, POWDER, LYOPHILIZED, FOR SOLUTION INTRAVENOUS at 17:06

## 2020-09-25 RX ADMIN — GUAIFENESIN 400 MG: 200 SOLUTION ORAL at 23:49

## 2020-09-25 RX ADMIN — PREDNISONE 10 MG: 5 TABLET ORAL at 17:06

## 2020-09-25 RX ADMIN — ROCURONIUM BROMIDE 30 MG: 10 SOLUTION INTRAVENOUS at 13:09

## 2020-09-25 RX ADMIN — PROPOFOL 50 MG: 10 INJECTION, EMULSION INTRAVENOUS at 13:09

## 2020-09-25 RX ADMIN — ONDANSETRON 4 MG: 2 INJECTION INTRAMUSCULAR; INTRAVENOUS at 13:18

## 2020-09-25 RX ADMIN — IPRATROPIUM BROMIDE AND ALBUTEROL SULFATE 3 ML: .5; 3 SOLUTION RESPIRATORY (INHALATION) at 08:07

## 2020-09-25 RX ADMIN — POTASSIUM CHLORIDE 40 MEQ: 1500 TABLET, EXTENDED RELEASE ORAL at 15:43

## 2020-09-25 RX ADMIN — DEXAMETHASONE SODIUM PHOSPHATE 8 MG: 4 INJECTION, SOLUTION INTRA-ARTICULAR; INTRALESIONAL; INTRAMUSCULAR; INTRAVENOUS; SOFT TISSUE at 13:18

## 2020-09-25 RX ADMIN — DILTIAZEM HYDROCHLORIDE 120 MG: 120 CAPSULE, COATED, EXTENDED RELEASE ORAL at 08:20

## 2020-09-25 RX ADMIN — ATORVASTATIN CALCIUM 20 MG: 20 TABLET, FILM COATED ORAL at 20:48

## 2020-09-25 RX ADMIN — PIPERACILLIN SODIUM AND TAZOBACTAM SODIUM 3.38 G: 3; .375 INJECTION, POWDER, LYOPHILIZED, FOR SOLUTION INTRAVENOUS at 08:20

## 2020-09-25 RX ADMIN — ETOMIDATE 10 MG: 2 INJECTION INTRAVENOUS at 13:09

## 2020-09-25 RX ADMIN — GUAIFENESIN 400 MG: 200 SOLUTION ORAL at 11:43

## 2020-09-25 RX ADMIN — PHENYLEPHRINE HYDROCHLORIDE 200 MCG: 10 INJECTION INTRAVENOUS at 13:16

## 2020-09-25 RX ADMIN — MAGNESIUM SULFATE IN WATER 2 G: 40 INJECTION, SOLUTION INTRAVENOUS at 14:36

## 2020-09-25 RX ADMIN — LOSARTAN POTASSIUM 100 MG: 50 TABLET, FILM COATED ORAL at 08:20

## 2020-09-25 NOTE — ANESTHESIA POSTPROCEDURE EVALUATION
Procedure(s):  BRONCHOSCOPY.    total IV anesthesia, general    Anesthesia Post Evaluation      Multimodal analgesia: multimodal analgesia not used between 6 hours prior to anesthesia start to PACU discharge  Patient location during evaluation: bedside  Patient participation: complete - patient participated  Level of consciousness: awake  Pain score: 0  Airway patency: patent  Anesthetic complications: yes  Cardiovascular status: acceptable  Respiratory status: acceptable  Hydration status: acceptable  Post anesthesia nausea and vomiting:  none  Final Post Anesthesia Temperature Assessment:  Normothermia (36.0-37.5 degrees C)      INITIAL Post-op Vital signs:   Vitals Value Taken Time   /73 9/25/2020  1:42 PM   Temp     Pulse 109 9/25/2020  1:42 PM   Resp 25 9/25/2020  1:42 PM   SpO2 100 % 9/25/2020  1:42 PM

## 2020-09-25 NOTE — PROGRESS NOTES
OT reassessment attempted at [phone], despite encouragement, pt politely declined stating he would Tun[place] like to rest\" today. Will continue to follow patient and attempt OT reassessment at a later date. Thank you.

## 2020-09-25 NOTE — PROGRESS NOTES
Progress Note    Patient: Brayden Powell MRN: [id number]  SSN: [id number]    YOB: 1960  Age: 63 y.o. Sex: male      Admit Date: 9/10/2020    LOS: 15 days     Subjective:   59M, H/o COPD not on oxygen with with shortness of breath s/t pneumonia complicated by left lung collapse.      Currently, undergoing bronchoscopy and aggressive pulmonary toilet. On zosyn- deescalation based on BAL cultures.     Subjectively coughing with sputum. Plan for bronchoscopy today with the aim to remove plugs and open up the lungs. Repeat XR in AM.         ROS  Review of Systems   Constitutional: Negative.    HENT: Negative.    Eyes: Negative.    Respiratory: Positive for shortness of breath and cough  Cardiovascular: Negative.    Gastrointestinal: Negative.    Genitourinary: Negative.    Musculoskeletal: Negative.    Skin: Negative.    Neurological: Negative.    Endo/Heme/Allergies: Negative.    Psychiatric/Behavioral: Negative.        Review of Systems:  Constitutional:  denies weight loss, no fever, no chills, no night sweats +++ cough  Eye: No recent visual disturbances, no discharge, no double vision  Ear/nose/mouth/throat : No hearing disturbance, no ear pain, no nasal congestion, no sore throat, no trouble swallowing. Respiratory : No trouble breathing, no cough, no shortness of breath, no hemoptysis, no wheezing  Cardiovascular : No chest pain, no palpitation, no racing of heart, no orthopnea, no paroxysmal nocturnal dyspnea, no peripheral edema  Gastrointestinal : No nausea, no vomiting, no diarrhea, constipation, heartburn, abdominal pain  Genitourinary : No dysuria, no hematuria, no increased frequency, incontinence,  Lymphatics : No swollen glands -Neck, axillary, inguinal  Endocrine : No excessive thirst, no polyuria no cold intolerance, no heat intolerance. Immunologic : No hives, urticaria, no seasonal allergies,   Musculoskeletal : Left upper back pain.   No joint swelling, pain, effusion,  no neck pain,   Integumentary : No rash, no pruritus, no ecchymosis  Hematology : No petechiae, No easy bruising,  No tendency to bleed easy  Neurology : Denies change in mental status, no abnormal balance, no headache, no confusion, numbness, tingling,  Psychiatric : No mood swings, no anxiety, depression      Objective:     Vitals:    09/25/20 1355 09/25/20 1400 09/25/20 1405 09/25/20 1500   BP: 119/65 113/66 110/65 119/69   Pulse: 84 82 84 75   Resp: 22 25 24 20   Temp:    98.2 °F (36.8 °C)   SpO2: 98% 98% 98% 96%   Weight:       Height:            Intake and Output:  Current Shift: 09/25 0701 - 09/25 1900  In: -   Out: 125 [Urine:125]  Last three shifts: 09/23 1901 - 09/25 0700  In: 400 [I.V.:400]  Out: -       Physical Exam:   General:  Alert, cooperative, no distress, appears stated age. Lungs:   Clear to auscultation bilaterally with diminished air entry Left side   Chest wall:  No tenderness or deformity. Heart:  Regular rate and rhythm, S1, S2 normal, no murmur, click, rub or gallop. Abdomen:   Soft, non-tender. Bowel sounds normal. No masses,  No organomegaly. Extremities: Extremities normal, atraumatic, no cyanosis or edema. Pulses: 2+ and symmetric all extremities. Skin: Skin color, texture, turgor normal. No rashes or lesions   Neurologic: CNII-XII intact.  No gross sensory or motor deficits         Lab/Data Review:  Recent Results (from the past 24 hour(s))   CBC WITH AUTOMATED DIFF    Collection Time: 09/25/20  8:00 AM   Result Value Ref Range    WBC 8.6 4.1 - 11.1 K/uL    RBC 3.39 (L) 4.10 - 5.70 M/uL    HGB 9.8 (L) 12.1 - 17.0 %    HCT 32.0 (L) 36.6 - 50.3 %    MCV 94.4 80.0 - 99.0 FL    MCH 28.9 26.0 - 34.0 PG    MCHC 30.6 30.0 - 36.5 g/dL    RDW 17.7 (H) 11.5 - 14.5 %    PLATELET 587 858 - 763 K/uL    MPV 10.7 8.9 - 12.9 FL    NEUTROPHILS 67 32 - 75 %    LYMPHOCYTES 17 12 - 49 %    MONOCYTES 14 (H) 5 - 13 %    EOSINOPHILS 1 0 - 7 %    BASOPHILS 0 0 - 1 %    IMMATURE GRANULOCYTES 1 (H) 0.0 - 0.5 %    ABS. NEUTROPHILS 5.9 1.8 - 8.0 K/UL    ABS. LYMPHOCYTES 1.5 0.8 - 3.5 K/UL    ABS. MONOCYTES 1.2 (H) 0.0 - 1.0 K/UL    ABS. EOSINOPHILS 0.1 0.0 - 0.4 K/UL    ABS. BASOPHILS 0.0 0.0 - 0.1 K/UL    ABS. IMM. GRANS. 0.0 0.00 - 0.04 K/UL    DF AUTOMATED     MAGNESIUM    Collection Time: 09/25/20  8:00 AM   Result Value Ref Range    Magnesium 1.4 (L) 1.6 - 2.4 mg/dL   METABOLIC PANEL, BASIC    Collection Time: 09/25/20  8:00 AM   Result Value Ref Range    Sodium 137 136 - 145 mmol/L    Potassium 3.6 3.5 - 5.1 mmol/L    Chloride 95 (L) 97 - 108 mmol/L    CO2 42 (HH) 21 - 32 mmol/L    Anion gap 0 (L) 5 - 15 mmol/L    Glucose 66 65 - 100 mg/dL    BUN 13 6 - 20 mg/dL    Creatinine 0.64 (L) 0.70 - 1.30 mg/dL    BUN/Creatinine ratio 20 12 - 20      GFR est AA >60 >60 ml/min/1.73m2    GFR est non-AA >60 >60 ml/min/1.73m2    Calcium 8.4 (L) 8.5 - 10.1 mg/dL   SARS-COV-2    Collection Time: 09/25/20 10:30 AM   Result Value Ref Range    SARS-CoV-2 Please find results under separate order           Assessment and plan:       (1) acute hypoxic respiratory failure : likely s/t 2,3. On 1L NC     (2) Left lung collapse: s/p 2 broncoscopy and aggressive pulmonary hygeine. Chest PT, Q6H nebs, guaifenesin, lasix with negative fluid target. Once XR shows resolution of collapse would likely change to Augmentin for 10 days     (3) pneumonia : same as #2.     (4) COPD: LAMA/ LABA, OP PFT, pulm f/u. Weaning prednisone.      (5) anemia: AOCI. stable     (6) GERD: protonix.  Complicates #1.      (7) HTN : on losartan and CCB     (8) HLD: simvastatin     DVT ppx: lovenox     DISPO: as per pulm, dispo per PT      Signed By: Lisa Celaya MD     September 25, 2020

## 2020-09-25 NOTE — PROGRESS NOTES
Pulm/CC Progress Note    Subjective:   Daily Progress Note: 2020     Patient seen and examined at th bedside today, no acute events overnight. States that he is still coughing up a lot of mucous yesterday/this morning, helped by the addition of EZ-PAP. CXR this morning showing improvement after bronch #3; now his NATALIE is completely open, LLL is still collapsed however. Still stating that he is having difficulty with mucous clearance, mainly because of pain from his ventral hernia. We will plan for bronchoscopy #4 today at 12:30-1PM. Likely will be the last time we will need to do this. Review of Systems  has complains of shortness of breath. He is on nasal cannula oxygen. Denied any nausea vomiting. Has fair appetite    Objective:     Visit Vitals  BP (!) 143/84   Pulse 82   Temp 98.1 °F (36.7 °C)   Resp 19   Ht 6' 0.99\" (1.854 m)   Wt 75.6 kg (166 lb 10.7 oz)   SpO2 98%   BMI 21.99 kg/m²    O2 Flow Rate (L/min): 2 l/min O2 Device: Nasal cannula    Temp (24hrs), Av.8 °F (36.6 °C), Min:97.4 °F (36.3 °C), Max:98.1 °F (36.7 °C)      No intake/output data recorded.    1901 -  0700  In: 400 [I.V.:400]  Out: -     Current Facility-Administered Medications   Medication Dose Route Frequency    [START ON 2020] furosemide (LASIX) tablet 40 mg  40 mg Oral DAILY    predniSONE (DELTASONE) tablet 10 mg  10 mg Oral DAILY WITH DINNER    albuterol-ipratropium (DUO-NEB) 2.5 MG-0.5 MG/3 ML  3 mL Nebulization QID RT    guaiFENesin (ROBITUSSIN) 100 mg/5 mL oral liquid 400 mg  400 mg Oral Q6H    lidocaine (XYLOCAINE) 10 mg/mL (1 %) injection    PRN    mineral oil (topical)    PRN    dilTIAZem ER (CARDIZEM CD) capsule 120 mg  120 mg Oral DAILY    0.9% sodium chloride infusion 250 mL  250 mL IntraVENous PRN    atorvastatin (LIPITOR) tablet 20 mg  20 mg Oral DAILY    enoxaparin (LOVENOX) injection 40 mg  40 mg SubCUTAneous Q24H    losartan (COZAAR) tablet 100 mg  100 mg Oral DAILY    piperacillin-tazobactam (ZOSYN) 3.375 g in 0.9% sodium chloride (MBP/ADV) 100 mL MBP  3.375 g IntraVENous Q8H    pantoprazole (PROTONIX) tablet 40 mg  40 mg Oral DAILY    acetaminophen (TYLENOL) tablet 650 mg  650 mg Oral Q4H PRN    alum-mag hydroxide-simeth (MYLANTA) oral suspension 30 mL  30 mL Oral Q4H PRN    magnesium hydroxide (MILK OF MAGNESIA) 400 mg/5 mL oral suspension 30 mL  30 mL Oral DAILY PRN    ondansetron (ZOFRAN) injection 4 mg  4 mg IntraVENous Q8H PRN       Physical Exam:  General: Alert and oriented x3.  1 L nasal cannula. Pleasant but nervous. Chronically ill appearing. HEENT: atraumatic, PERRL, moist mucosa,     Neck: Trachea midline, no carotid bruit, no masses. Supple but thick. Respiratory: Improved air entry in the anterior/superior chest, still diminished at the left base. Rhonchi improved in the right hemithorax. 1 L nasal cannula. Cardiovascular: RRR, no m/r/g, Normal S1 and S2   abdomen: Has ventral abdominal hernia, evidence of surgical scars soft,   Rectal: deferred  Extremities: no cyanosis or clubbing. Trace edema. Integumentary: warm, dry, and pink, with no rash, purpura, or petechia.    Heme/Lymph: no lymphadenopathy, no bruises  Neurological: No focal motor deficit   psychiatric: cooperative with normal mood, affect, and cognition      Additional comments: Chest x-rays reviewed    Data Review    Recent Results (from the past 24 hour(s))   MAGNESIUM    Collection Time: 09/25/20  8:00 AM   Result Value Ref Range    Magnesium 1.4 (L) 1.6 - 2.4 mg/dL   METABOLIC PANEL, BASIC    Collection Time: 09/25/20  8:00 AM   Result Value Ref Range    Sodium 137 136 - 145 mmol/L    Potassium 3.6 3.5 - 5.1 mmol/L    Chloride 95 (L) 97 - 108 mmol/L    CO2 42 (HH) 21 - 32 mmol/L    Anion gap 0 (L) 5 - 15 mmol/L    Glucose 66 65 - 100 mg/dL    BUN 13 6 - 20 mg/dL    Creatinine 0.64 (L) 0.70 - 1.30 mg/dL    BUN/Creatinine ratio 20 12 - 20      GFR est AA >60 >60 ml/min/1.73m2    GFR est non-AA >60 >60 ml/min/1.73m2    Calcium 8.4 (L) 8.5 - 10.1 mg/dL     Today's CXR read is currently pending. XR CHEST PORT   Final Result   Impression:Left lung collapse without improvement from prior study. XR CHEST PORT   Final Result   IMPRESSION:   1. There is milder enlargement of the cardiac silhouette as well as increasing   pulmonary vascular ill definition suspect for mild pulmonary edema. This could   be related to cardiogenic edema or fluid overload. 2.  There is been an improvement in the overall aeration of the left lung with   and improved visualization of vascular markings within the left upper lung zone. There still remains significant partial collapse of the left lung. 3.  There is increased patchy airspace disease laterally within the right lower   lobe just above the right lateral costophrenic angle. 4.  There are persistent moderate-sized bilateral pleural effusions. XR CHEST AP LAT   Final Result   IMPRESSION: Persistent collapse of the left lung with bilateral pleural   effusions. Increasing vascular congestion on the right. XR CHEST PA LAT   Final Result   Impression:   Ongoing near complete white out of the left lung. Little interval change since   the prior examination. CT CHEST WO CONT   Final Result   IMPRESSION: Complete collapse of the left lung due to complete bronchial   obstruction, possibly aspiration. Fluid overload also noted      XR ABD ACUTE W 1 V CHEST   Final Result   IMPRESSION: Opacification of the left hemithorax, questioned for remote   pneumonectomy or lung collapse with pleural fluid. Prominent right parenchymal/interstitial lung markings compatible with fibrosis   and possible partial vascular congestion. Small bowel gas, nonobstructive pattern. XR CHEST PORT    (Results Pending)          Assessment/Plan: This is a middle-aged male who was admitted because of increasing symptoms of shortness of breath.  He is getting treated in hospital for pneumonia with IV Zosyn/Azithromycin. He is noted to be increasingly tachypneic and short of breath. He has been on supplemental oxygen. His chest x-ray is showing left lung opacification likely from mucous plugging. This is slowly improving with aggressive pulmonary hygiene and three suction bronchoscopies.     Plan:     1.)    Left lung collapse/shortness of breath:  - Shortness of breath and hypoxia improved with diuresis and antibiotics, however still having significant mucous production and difficulty clearing his mucous. This has resulted in left lung colllapse. - Mucous plugging slowly improving with aggressive pulmonary hygiene and 3 clean out bronchs on 9/21, 9/23, and 9/24.  - BAL of the lingula performed on 9/21 and sent for cell count an culture. - I called the microbiology lab at [facility] and apparently his bronchial lavage and washing from 9/22/20 are completed and are both growing candida albicans. It appears that a cell count was never done. Candida is normal xiomara within the airway and is often deemed a contaminant when shown on BAL culture. It certainly seems less likely that he has a true fungal pneumonia given lack of toxic symptoms and history of immunosuppression. Nevertheless, will send of a fungal sputum culture and a serum Fungitel. - Bedside ultrasound performed after the second bronchoscopy did not reveal a significant pleural effusion on the left. Planning repeat bronchoscopy today at 12:30-1:00 PM, likely will be the last.  Supplemental O2 needs improved to 1L NC today. Please wean supplemental oxygen for sats greater than 92%. Continue aggressive pulmonary hygiene with nebulizers every 6 hours;  Aggressive chest PT with vest every 6 hours, EZ-PAP therapy q6, acapella device as well as incentive spirometer use. Added guaifenesin 400 mg q6. Consent for the bronchoscopy has been signed, it is in his chart. Continue Zosyn, stopped azithromycin on 9/23/2020.  Once mucous is better tolerated, can switch to Augmentin. Would plan for a total of 14 days of antibiotics. Switch Lasix to PO today. No need for CT chest if he continues to show improvement on his chest imaging. Will repeat CXR on Sunday morning.    2.)  COPD:  He has a history of COPD based on chronic smoking. Continue scheduled bronchodilators. Patient should be discharged with a LAMA/LABA such as Anoro and needs f/u in our office for PFT's and further management. Weaned prednisone to 10 mg daily on 9/24, likely can stop tomorrow. 3.)  Pneumonia:  He is on IV Zosyn, stopped Zithromax on 9/23/2020. Recommend a total of 14 days of antibiotics. We will adjust his antibiotics based BAL culture results. 4.)  Hypertension:  He is on antihypertensive medications, BP's stable.     5.)  Status post ex lap:  His abdominal examination shows significant fecal scars and ventral abdominal hernia      Patient has an appointment with me in the clinic on Tuesday, October 13th at 11 AM.      Questions of patient were answered at bedside in detail  Case discussed in detail with RN, RT, and care team  Thank you for involving me in the care of this patient  I will follow with you closely during hospitalization        Vira Holliday DO  [phone]

## 2020-09-25 NOTE — PROGRESS NOTES
Problem: Patient Education: Go to Patient Education Activity  Goal: Patient/Family Education  Description: LE HEP to improve strength and functional mobility       Outcome: Progressing Towards Goal     Problem: Falls - Risk of  Goal: *Absence of Falls  Description: Document Albino Messing Fall Risk and appropriate interventions in the flowsheet.   Outcome: Progressing Towards Goal  Note: Fall Risk Interventions:  Mobility Interventions: Bed/chair exit alarm    Mentation Interventions: Bed/chair exit alarm    Medication Interventions: Bed/chair exit alarm    Elimination Interventions: Bed/chair exit alarm, Call light in reach    History of Falls Interventions: Bed/chair exit alarm         Problem: Patient Education: Go to Patient Education Activity  Goal: Patient/Family Education  Outcome: Progressing Towards Goal     Problem: Patient Education: Go to Patient Education Activity  Goal: Patient/Family Education  Outcome: Progressing Towards Goal     Problem: Patient Education: Go to Patient Education Activity  Goal: Patient/Family Education  Outcome: Progressing Towards Goal     Problem: Nutrition Deficit  Goal: *Optimize nutritional status  Outcome: Progressing Towards Goal

## 2020-09-26 LAB
ANION GAP SERPL CALC-SCNC: 2 MMOL/L (ref 5–15)
BUN SERPL-MCNC: 17 MG/DL (ref 6–20)
BUN/CREAT SERPL: 18 (ref 12–20)
CA-I BLD-MCNC: 8 MG/DL (ref 8.5–10.1)
CHLORIDE SERPL-SCNC: 92 MMOL/L (ref 97–108)
CO2 SERPL-SCNC: 43 MMOL/L (ref 21–32)
CREAT SERPL-MCNC: 0.93 MG/DL (ref 0.7–1.3)
GLUCOSE SERPL-MCNC: 110 MG/DL (ref 65–100)
MAGNESIUM SERPL-MCNC: 1.5 MG/DL (ref 1.6–2.4)
POTASSIUM SERPL-SCNC: 3.8 MMOL/L (ref 3.5–5.1)
SODIUM SERPL-SCNC: 137 MMOL/L (ref 136–145)

## 2020-09-26 PROCEDURE — 74011250637 HC RX REV CODE- 250/637: Performed by: HOSPITALIST

## 2020-09-26 PROCEDURE — 74011000258 HC RX REV CODE- 258: Performed by: INTERNAL MEDICINE

## 2020-09-26 PROCEDURE — 36415 COLL VENOUS BLD VENIPUNCTURE: CPT

## 2020-09-26 PROCEDURE — 65270000029 HC RM PRIVATE

## 2020-09-26 PROCEDURE — 74011250636 HC RX REV CODE- 250/636: Performed by: INTERNAL MEDICINE

## 2020-09-26 PROCEDURE — 74011250637 HC RX REV CODE- 250/637: Performed by: INTERNAL MEDICINE

## 2020-09-26 PROCEDURE — 74011000250 HC RX REV CODE- 250: Performed by: INTERNAL MEDICINE

## 2020-09-26 PROCEDURE — 94640 AIRWAY INHALATION TREATMENT: CPT

## 2020-09-26 PROCEDURE — 74011636637 HC RX REV CODE- 636/637: Performed by: INTERNAL MEDICINE

## 2020-09-26 PROCEDURE — 3331090002 HH PPS REVENUE DEBIT

## 2020-09-26 PROCEDURE — 77010033678 HC OXYGEN DAILY

## 2020-09-26 PROCEDURE — 83735 ASSAY OF MAGNESIUM: CPT

## 2020-09-26 PROCEDURE — 3331090001 HH PPS REVENUE CREDIT

## 2020-09-26 PROCEDURE — 80048 BASIC METABOLIC PNL TOTAL CA: CPT

## 2020-09-26 RX ORDER — OXYCODONE AND ACETAMINOPHEN 5; 325 MG/1; MG/1
1 TABLET ORAL
Status: DISCONTINUED | OUTPATIENT
Start: 2020-09-26 | End: 2020-10-09

## 2020-09-26 RX ORDER — TRAMADOL HYDROCHLORIDE 50 MG/1
50 TABLET ORAL
Status: DISCONTINUED | OUTPATIENT
Start: 2020-09-26 | End: 2020-09-26

## 2020-09-26 RX ADMIN — DILTIAZEM HYDROCHLORIDE 120 MG: 120 CAPSULE, COATED, EXTENDED RELEASE ORAL at 10:07

## 2020-09-26 RX ADMIN — GUAIFENESIN 400 MG: 200 SOLUTION ORAL at 12:57

## 2020-09-26 RX ADMIN — GUAIFENESIN 400 MG: 200 SOLUTION ORAL at 23:10

## 2020-09-26 RX ADMIN — PIPERACILLIN SODIUM AND TAZOBACTAM SODIUM 3.38 G: 3; .375 INJECTION, POWDER, LYOPHILIZED, FOR SOLUTION INTRAVENOUS at 17:08

## 2020-09-26 RX ADMIN — LOSARTAN POTASSIUM 100 MG: 50 TABLET, FILM COATED ORAL at 10:07

## 2020-09-26 RX ADMIN — GUAIFENESIN 400 MG: 200 SOLUTION ORAL at 17:09

## 2020-09-26 RX ADMIN — IPRATROPIUM BROMIDE AND ALBUTEROL SULFATE 3 ML: .5; 3 SOLUTION RESPIRATORY (INHALATION) at 08:37

## 2020-09-26 RX ADMIN — PANTOPRAZOLE SODIUM 40 MG: 40 TABLET, DELAYED RELEASE ORAL at 06:15

## 2020-09-26 RX ADMIN — PIPERACILLIN SODIUM AND TAZOBACTAM SODIUM 3.38 G: 3; .375 INJECTION, POWDER, LYOPHILIZED, FOR SOLUTION INTRAVENOUS at 10:09

## 2020-09-26 RX ADMIN — OXYCODONE HYDROCHLORIDE AND ACETAMINOPHEN 1 TABLET: 5; 325 TABLET ORAL at 21:08

## 2020-09-26 RX ADMIN — PIPERACILLIN SODIUM AND TAZOBACTAM SODIUM 3.38 G: 3; .375 INJECTION, POWDER, LYOPHILIZED, FOR SOLUTION INTRAVENOUS at 23:10

## 2020-09-26 RX ADMIN — ATORVASTATIN CALCIUM 20 MG: 20 TABLET, FILM COATED ORAL at 21:08

## 2020-09-26 RX ADMIN — GUAIFENESIN 400 MG: 200 SOLUTION ORAL at 06:15

## 2020-09-26 RX ADMIN — OXYCODONE HYDROCHLORIDE AND ACETAMINOPHEN 1 TABLET: 5; 325 TABLET ORAL at 17:09

## 2020-09-26 RX ADMIN — ENOXAPARIN SODIUM 40 MG: 40 INJECTION SUBCUTANEOUS at 10:08

## 2020-09-26 RX ADMIN — FUROSEMIDE 40 MG: 40 TABLET ORAL at 10:07

## 2020-09-26 RX ADMIN — PREDNISONE 10 MG: 5 TABLET ORAL at 17:09

## 2020-09-26 NOTE — PROGRESS NOTES
Progress Note    Patient: Kelsie Martin MRN: [id number]  SSN: [id number]    YOB: 1960  Age: 63 y.o. Sex: male      Admit Date: 9/10/2020    LOS: 16 days     Subjective:     59M, H/o COPD not on oxygen with with shortness of breath s/t pneumonia complicated by left lung collapse.      Currently, undergoing bronchoscopy and aggressive pulmonary toilet. On zosyn- deescalation based on BAL cultures.     S      ROS  Review of Systems   Constitutional: Negative.    HENT: Negative.    Eyes: Negative.    Respiratory: Positive for shortness of breath and cough  Cardiovascular: Negative.    Gastrointestinal: Negative.    Genitourinary: Negative.    Musculoskeletal: Negative.    Skin: Negative.    Neurological: Negative.    Endo/Heme/Allergies: Negative.    Psychiatric/Behavioral: Negative.          Objective:     Vitals:    09/25/20 2047 09/26/20 0002 09/26/20 0719 09/26/20 0837   BP: 105/63 131/72 138/79    Pulse: 81 89 90    Resp: 20 16 18    Temp: 98 °F (36.7 °C) 98.4 °F (36.9 °C) 99 °F (37.2 °C)    SpO2: 98% 99% 100% 98%   Weight:   82.9 kg (182 lb 12.2 oz)    Height:            Intake and Output:  Current Shift: 09/26 0701 - 09/26 1900  In: -   Out: 550 [Urine:550]  Last three shifts: 09/24 1901 - 09/26 0700  In: -   Out: 125 [Urine:125]      Physical Exam:   General : Appearance:  no respiratory distress noted  HEENT : PERRLA, normal oral mucosa, atraumatic normocephalic, Normal ear and nose. Neck : Supple, no JVD, no masses noted, no carotid bruit  Lungs : normal breath sounds. No wheezing, no rales. He is splinting on deep breathing.   CVS : Rhythm rate regular, S1+, S2+, no murmur or gallop  Abdomen : Soft, nontender, no organomegaly, bowel sounds active  Extremities : No edema noted,  pedal pulses palpable  Musculoskeletal : Fair range of motion, no joint swelling or effusion, muscle tone and power appears normal,   Skin : Moist, warm, skin turgor, no pathological rash  Lymphatic : No cervical lymphadenopathy. Neurological : Awake, alert, oriented to time place person. No neurological deficits. Psychiatric : Mood and affect appears appropriate to the situation. Lab/Data Review:  No results found for this or any previous visit (from the past 24 hour(s)). Assessment and plan:    (1) acute hypoxic respiratory failure : likely s/t 2,3. On 1L NC     (2) Left lung collapse: s/p 2 broncoscopy and aggressive pulmonary hygeine. Chest PT, Q6H nebs, guaifenesin, lasix with negative fluid target. Once XR shows resolution of collapse would likely change to Augmentin for 10 days     (3) pneumonia : same as #2.     (4) COPD: LAMA/ LABA, OP PFT, pulm f/u. Weaning prednisone.      (5) anemia: AOCI. stable     (6) GERD: protonix.  Complicates #1.      (7) HTN : on losartan and CCB     (8) HLD: simvastatin     DVT ppx: lovenox     DISPO: as per pulm, dispo per PT      Signed By: Tanmay Crocker MD     September 26, 2020

## 2020-09-26 NOTE — PROGRESS NOTES
Problem: Patient Education: Go to Patient Education Activity  Goal: Patient/Family Education  Description: LE HEP to improve strength and functional mobility       Outcome: Progressing Towards Goal     Problem: Falls - Risk of  Goal: *Absence of Falls  Description: Document Mary Jaime Fall Risk and appropriate interventions in the flowsheet.   Outcome: Progressing Towards Goal  Note: Fall Risk Interventions:  Mobility Interventions: Bed/chair exit alarm, OT consult for ADLs, Patient to call before getting OOB, PT Consult for mobility concerns, Strengthening exercises (ROM-active/passive)    Mentation Interventions: Bed/chair exit alarm, Increase mobility, Toileting rounds, Update white board    Medication Interventions: Bed/chair exit alarm, Patient to call before getting OOB, Teach patient to arise slowly    Elimination Interventions: Bed/chair exit alarm, Call light in reach, Urinal in reach, Toileting schedule/hourly rounds    History of Falls Interventions: Bed/chair exit alarm         Problem: Patient Education: Go to Patient Education Activity  Goal: Patient/Family Education  Outcome: Progressing Towards Goal     Problem: Patient Education: Go to Patient Education Activity  Goal: Patient/Family Education  Outcome: Progressing Towards Goal     Problem: Patient Education: Go to Patient Education Activity  Goal: Patient/Family Education  Outcome: Progressing Towards Goal     Problem: Nutrition Deficit  Goal: *Optimize nutritional status  Outcome: Progressing Towards Goal     Problem: Airway Clearance - Ineffective  Goal: *Patent airway  Outcome: Progressing Towards Goal  Goal: *Absence of airway secretions  Outcome: Progressing Towards Goal  Goal: *Able to cough effectively  Outcome: Progressing Towards Goal

## 2020-09-27 ENCOUNTER — APPOINTMENT (OUTPATIENT)
Dept: GENERAL RADIOLOGY | Age: 60
DRG: 177 | End: 2020-09-27
Attending: INTERNAL MEDICINE
Payer: MEDICARE

## 2020-09-27 LAB — SARS-COV-2, COV2NT: NOT DETECTED

## 2020-09-27 PROCEDURE — 74011250636 HC RX REV CODE- 250/636: Performed by: HOSPITALIST

## 2020-09-27 PROCEDURE — 3331090001 HH PPS REVENUE CREDIT

## 2020-09-27 PROCEDURE — 74011250637 HC RX REV CODE- 250/637: Performed by: INTERNAL MEDICINE

## 2020-09-27 PROCEDURE — 94640 AIRWAY INHALATION TREATMENT: CPT

## 2020-09-27 PROCEDURE — 97530 THERAPEUTIC ACTIVITIES: CPT

## 2020-09-27 PROCEDURE — 74011000250 HC RX REV CODE- 250: Performed by: INTERNAL MEDICINE

## 2020-09-27 PROCEDURE — 65270000029 HC RM PRIVATE

## 2020-09-27 PROCEDURE — 74011000258 HC RX REV CODE- 258: Performed by: INTERNAL MEDICINE

## 2020-09-27 PROCEDURE — 74011636637 HC RX REV CODE- 636/637: Performed by: INTERNAL MEDICINE

## 2020-09-27 PROCEDURE — 77010033678 HC OXYGEN DAILY

## 2020-09-27 PROCEDURE — 3331090002 HH PPS REVENUE DEBIT

## 2020-09-27 PROCEDURE — 74011250637 HC RX REV CODE- 250/637: Performed by: HOSPITALIST

## 2020-09-27 PROCEDURE — 94760 N-INVAS EAR/PLS OXIMETRY 1: CPT

## 2020-09-27 PROCEDURE — 74011250636 HC RX REV CODE- 250/636: Performed by: INTERNAL MEDICINE

## 2020-09-27 PROCEDURE — 71045 X-RAY EXAM CHEST 1 VIEW: CPT

## 2020-09-27 RX ORDER — DIPHENHYDRAMINE HYDROCHLORIDE 50 MG/ML
25 INJECTION, SOLUTION INTRAMUSCULAR; INTRAVENOUS
Status: DISCONTINUED | OUTPATIENT
Start: 2020-09-27 | End: 2020-10-04

## 2020-09-27 RX ADMIN — OXYCODONE HYDROCHLORIDE AND ACETAMINOPHEN 1 TABLET: 5; 325 TABLET ORAL at 22:23

## 2020-09-27 RX ADMIN — ATORVASTATIN CALCIUM 20 MG: 20 TABLET, FILM COATED ORAL at 20:05

## 2020-09-27 RX ADMIN — GUAIFENESIN 400 MG: 200 SOLUTION ORAL at 12:56

## 2020-09-27 RX ADMIN — PANTOPRAZOLE SODIUM 40 MG: 40 TABLET, DELAYED RELEASE ORAL at 06:06

## 2020-09-27 RX ADMIN — GUAIFENESIN 400 MG: 200 SOLUTION ORAL at 18:24

## 2020-09-27 RX ADMIN — DIPHENHYDRAMINE HYDROCHLORIDE 25 MG: 50 INJECTION, SOLUTION INTRAMUSCULAR; INTRAVENOUS at 18:23

## 2020-09-27 RX ADMIN — PIPERACILLIN SODIUM AND TAZOBACTAM SODIUM 3.38 G: 3; .375 INJECTION, POWDER, LYOPHILIZED, FOR SOLUTION INTRAVENOUS at 23:47

## 2020-09-27 RX ADMIN — OXYCODONE HYDROCHLORIDE AND ACETAMINOPHEN 1 TABLET: 5; 325 TABLET ORAL at 18:24

## 2020-09-27 RX ADMIN — DILTIAZEM HYDROCHLORIDE 120 MG: 120 CAPSULE, COATED, EXTENDED RELEASE ORAL at 10:31

## 2020-09-27 RX ADMIN — PIPERACILLIN SODIUM AND TAZOBACTAM SODIUM 3.38 G: 3; .375 INJECTION, POWDER, LYOPHILIZED, FOR SOLUTION INTRAVENOUS at 10:31

## 2020-09-27 RX ADMIN — GUAIFENESIN 400 MG: 200 SOLUTION ORAL at 05:47

## 2020-09-27 RX ADMIN — OXYCODONE HYDROCHLORIDE AND ACETAMINOPHEN 1 TABLET: 5; 325 TABLET ORAL at 00:57

## 2020-09-27 RX ADMIN — OXYCODONE HYDROCHLORIDE AND ACETAMINOPHEN 1 TABLET: 5; 325 TABLET ORAL at 06:03

## 2020-09-27 RX ADMIN — OXYCODONE HYDROCHLORIDE AND ACETAMINOPHEN 1 TABLET: 5; 325 TABLET ORAL at 10:31

## 2020-09-27 RX ADMIN — ENOXAPARIN SODIUM 40 MG: 40 INJECTION SUBCUTANEOUS at 10:31

## 2020-09-27 RX ADMIN — LOSARTAN POTASSIUM 100 MG: 50 TABLET, FILM COATED ORAL at 10:31

## 2020-09-27 RX ADMIN — IPRATROPIUM BROMIDE AND ALBUTEROL SULFATE 3 ML: .5; 3 SOLUTION RESPIRATORY (INHALATION) at 19:19

## 2020-09-27 RX ADMIN — FUROSEMIDE 40 MG: 40 TABLET ORAL at 10:31

## 2020-09-27 RX ADMIN — PIPERACILLIN SODIUM AND TAZOBACTAM SODIUM 3.38 G: 3; .375 INJECTION, POWDER, LYOPHILIZED, FOR SOLUTION INTRAVENOUS at 18:24

## 2020-09-27 RX ADMIN — OXYCODONE HYDROCHLORIDE AND ACETAMINOPHEN 1 TABLET: 5; 325 TABLET ORAL at 14:24

## 2020-09-27 RX ADMIN — PREDNISONE 10 MG: 5 TABLET ORAL at 18:24

## 2020-09-27 RX ADMIN — GUAIFENESIN 400 MG: 200 SOLUTION ORAL at 23:47

## 2020-09-27 NOTE — PROGRESS NOTES
Problem: Mobility Impaired (Adult and Pediatric)  Goal: *Acute Goals and Plan of Care (Insert Text)  Description: Pt will be I with LE HEP in 7 days. Pt will perform bed mobility with mod I in 7 days. Pt will perform transfers with mod I in 7 days. Pt will amb 10 feet with LRAD safely with mod I in 7 days. 9/27/2020 0942 by Brianna Serna PT  Outcome: Progressing Towards Goal    Problem: Patient Education: Go to Patient Education Activity  Goal: Patient/Family Education  Description: LE HEP to improve strength and functional mobility     9/27/2020 0942 by Brianna Serna PT  Outcome: Progressing Towards Goal     PHYSICAL THERAPY REEVALUATION  Patient: Osiris Guajardo (89 y.o. male)  Date: 9/27/2020  Primary Diagnosis: pneumonia hypokalemia hypertension chronic gerd hy  Procedure(s) (LRB):  BRONCHOSCOPY (N/A) 2 Days Post-Op   Precautions: falls, Sat O2         ASSESSMENT  Based on the objective data described below, the patient presents with decr ROM, strength, bed mobility, transfers, balance, gait, activity tolerance, safety awareness, and functional mobility. Pt agreeable to PT/OT treatment session, Pt is seen this session for a re-assessment. Pt was last re-assessed by PT on 9/17/20. Pt has since participated in 1 treatment. Pt was admitted with PNA and presents on 3 L supp O2. Pt req max encouragement and education on the importance of mobility training to prevent further decline. Pt is slowly progressing towards goals due to limited therapy participation and refusal to participate in OOB activities due to fear of falling despite max encouragement. Pt amb goal was downgraded due to poor participation. Pt states \"I don't like the walker being out in my room, its a reminder of how scared I am to fall. You guys aren't strong enough to hold me, I will fall. \" Pt performed bed mobility with SBA and scoot to [facility] with Max A x2. Pt completed limited LE therex due to poor attitude towards therapy.  PT d/c recc SNF when medically appropriate. Other factors to consider for discharge: activity tolerance, therapy participation, pain. Patient will benefit from skilled therapy intervention to address the above noted impairments. PLAN :  Recommendations and Planned Interventions: bed mobility training, transfer training, gait training, therapeutic exercises, neuromuscular re-education, patient and family training/education, and therapeutic activities      Frequency/Duration: Patient will be followed by physical therapy:  1 time a week to address goals. Recommendation for discharge: (in order for the patient to meet his/her long term goals)  PT d/c rec SNF when medically appropriate. This discharge recommendation:  Has been made in collaboration with the attending provider and/or case management    Equipment recommendations for successful discharge (if) home: walker: rolling         SUBJECTIVE:   Patient stated I just finished breakfast can you guys give me some time. I am not getting out of bed or standing. I have a huge fear of falling, I just can't do it.     OBJECTIVE DATA SUMMARY:   HISTORY:    Past Medical History:   Diagnosis Date    Anal fistula 3/15/2010    Anxiety     ARF (acute renal failure) (HCC)     Colon perforation (HCC)     Genital herpes 3/15/2010    GERD (gastroesophageal reflux disease)     High cholesterol 3/15/2010    History of blood transfusion     HTN (hypertension) 3/15/2010    Hyponatremia     Ischemic colitis ([address]     left Carotid Artery Occlusion 3/15/2010    Noncompliance with treatment 3/15/2010    Pneumonia 2011    PUD (peptic ulcer disease)     Septic shock(785[address]52)     Stroke 2002 3/15/2010    Thromboembolus ([address]     rt thigh    TIA (transient ischemic attack) 2007    pt reported     Past Surgical History:   Procedure Laterality Date    CARDIAC CATHETERIZATION      CARDIAC SURG PROCEDURE UNLIST      HX COLECTOMY  1/11/2014    Right hemicolectomy for free air, perforated viscus    US SCROTUM/TESTICLES      right testicle removed     Hospital course since last seen and reason for reevaluation: limited therapy participation, last reassessed on 20    Personal factors and/or comorbidities impacting plan of care: complicated medical history    Home Situation  Home Environment: Private residence  One/Two Story Residence: One story  Living Alone: No  Support Systems: Spouse/Significant Other/Partner  Patient Expects to be Discharged to[de-identified] Rehabilitation facility  Current DME Used/Available at Home: None    EXAMINATION/PRESENTATION/DECISION MAKING:   Critical Behavior:  Neurologic State: Alert, Agitated, Irritable  Orientation Level: Oriented X4  Cognition: Decreased command following, Poor safety awareness, Impaired decision making     Hearing: Auditory  Auditory Impairment: None    Range Of Motion: WFL         Strength:  WFL     Functional Mobility:  Bed Mobility:  Rolling: Stand-by assistance  Supine to Sit: Stand-by assistance  Sit to Supine: Stand-by assistance  Scooting: Maximum assistance;Assist x2(to [facility])  Transfers:      Pt completed bed mobility with SBA and scoot to [facility] with Max A x2. Pt refuses OOB activity due to fear of falling, no transfer training occurred. Pt has good sitting balance, tolerated sitting EOB for approx 5 min. Balance:   Sitting: Intact; With support  Ambulation/Gait Training:      Does not occur   Stairs: Therapeutic Exercises:   Pt completed sitting LE therex for ROM, strengthening, and functional mobility.        EXERCISE   Sets   Reps   Active Active Assist   Passive Self ROM   Comments   Ankle Pumps   [] [] [] []    Quad Sets/Glut Sets   [] [] [] []    Hamstring Sets   [] [] [] []    Short Arc Quads   [] [] [] []    Heel Slides   [] [] [] []    Straight Leg Raises   [] [] [] []    Hip abd/add   [] [] [] []    Long Arc Quads  10 [x] [] [] []    Marching  10 [x] [] [] []       [] [] [] []         Pain Ratin/10, Pt reports recently starting Percocet to control back pain    Activity Tolerance:   Poor and requires rest breaks  Please refer to the flowsheet for vital signs taken during this treatment. After treatment patient left in no apparent distress:   Supine in bed, Call bell within reach, Bed / chair alarm activated, and Side rails x 3    COMMUNICATION/EDUCATION:   The patients plan of care was discussed with: Occupational therapist.     PT/OT co-treat for patient safety, tolerance, and to maximize functional mobility potential.    Fall prevention education was provided and the patient/caregiver indicated understanding. and Patient/family have participated as able in goal setting and plan of care.     Thank you for this referral.  Jessy Lester, PT   Time Calculation: 25 mins

## 2020-09-27 NOTE — PROGRESS NOTES
Progress Note    Patient: Wei Coats MRN: [id number]  SSN: [id number]    YOB: 1960  Age: 63 y.o. Sex: male      Admit Date: 9/10/2020    LOS: 17 days     Subjective:     59M, H/o COPD not on oxygen with with shortness of breath s/t pneumonia complicated by left lung collapse.      Currently, undergoing bronchoscopy and aggressive pulmonary toilet. On zosyn- deescalation based on BAL cultures.     S      ROS  Review of Systems   Constitutional: Negative.    HENT: Negative.    Eyes: Negative.    Respiratory: Positive for shortness of breath and cough  Cardiovascular: Negative.    Gastrointestinal: Negative.    Genitourinary: Negative.    Musculoskeletal: Negative.    Skin: Negative.    Neurological: Negative.    Endo/Heme/Allergies: Negative.    Psychiatric/Behavioral: Negative.          Objective:     Vitals:    09/26/20 1500 09/26/20 2015 09/27/20 0026 09/27/20 0700   BP: (!) 97/59 100/62 (!) 105/57 96/68   Pulse: 96 88 81 86   Resp: 18 18 18 18   Temp: 98.8 °F (37.1 °C) 98 °F (36.7 °C) 98.2 °F (36.8 °C) 97.6 °F (36.4 °C)   SpO2: 98% 98% 97% 100%   Weight:       Height:            Intake and Output:  Current Shift: No intake/output data recorded. Last three shifts: 09/25 1901 - 09/27 0700  In: -   Out: 1150 [Urine:1150]      Physical Exam:   General : Appearance:  no respiratory distress noted  HEENT : PERRLA, normal oral mucosa, atraumatic normocephalic, Normal ear and nose. Neck : Supple, no JVD, no masses noted, no carotid bruit  Lungs : normal breath sounds. No wheezing, no rales. He is splinting on deep breathing.   CVS : Rhythm rate regular, S1+, S2+, no murmur or gallop  Abdomen : Soft, nontender, no organomegaly, bowel sounds active  Extremities : No edema noted,  pedal pulses palpable  Musculoskeletal : Fair range of motion, no joint swelling or effusion, muscle tone and power appears normal,   Skin : Moist, warm, skin turgor, no pathological rash  Lymphatic : No cervical lymphadenopathy. Neurological : Awake, alert, oriented to time place person. No neurological deficits. Psychiatric : Mood and affect appears appropriate to the situation. Lab/Data Review:  Recent Results (from the past 24 hour(s))   METABOLIC PANEL, BASIC    Collection Time: 09/26/20  7:10 PM   Result Value Ref Range    Sodium 137 136 - 145 mmol/L    Potassium 3.8 3.5 - 5.1 mmol/L    Chloride 92 (L) 97 - 108 mmol/L    CO2 43 (HH) 21 - 32 mmol/L    Anion gap 2 (L) 5 - 15 mmol/L    Glucose 110 (H) 65 - 100 mg/dL    BUN 17 6 - 20 mg/dL    Creatinine 0.93 0.70 - 1.30 mg/dL    BUN/Creatinine ratio 18 12 - 20      GFR est AA >60 >60 ml/min/1.73m2    GFR est non-AA >60 >60 ml/min/1.73m2    Calcium 8.0 (L) 8.5 - 10.1 mg/dL   MAGNESIUM    Collection Time: 09/26/20  7:10 PM   Result Value Ref Range    Magnesium 1.5 (L) 1.6 - 2.4 mg/dL         Assessment and plan:    (1) acute hypoxic respiratory failure : likely s/t 2,3. On 1L NC     (2) Left lung collapse: s/p 2 broncoscopy and aggressive pulmonary hygeine. Chest PT, Q6H nebs, guaifenesin, lasix with negative fluid target. Once XR shows resolution of collapse would likely change to Augmentin for 10 days     (3) pneumonia : same as #2.     (4) COPD: LAMA/ LABA, OP PFT, pulm f/u. Weaning prednisone.      (5) anemia: AOCI. stable     (6) GERD: protonix. Complicates #1.      (7) HTN : on losartan and CCB     (8) HLD: simvastatin     DVT ppx: lovenox     DISPO: dispo probably to SNF, as per pulm. , in 2-3 days      Signed By: Jose Antonio Gibbs MD     September 27, 2020

## 2020-09-27 NOTE — PROGRESS NOTES
Problem: Self Care Deficits Care Plan (Adult)  Goal: *Acute Goals and Plan of Care (Insert Text)  Description: 1. Pt will be mod I sit < - >  prep for toileting LRAD  2. Pt will be mod I toileting/toilet transfer/cloth mgmt LRAD  3. Pt will be mod I sponge bathing sitting/standing at sink LRAD  4. Pt will be mod A LE dressing EOB  5. Pt will be mod I grooming standing at sink LRAD  6. Pt will be I following UE HEP in prep for self care tasks  Outcome: Progressing Towards Goal slowly as pt declining any activity beyond sitting up at [address]  Patient: Davide Zambrano (21 y.o. male)  Date: 9/27/2020  Diagnosis: pneumonia hypokalemia hypertension chronic gerd hy   <principal problem not specified>  Procedure(s) (LRB):  BRONCHOSCOPY (N/A) 2 Days Post-Op  Precautions:  Fall Precautions  Chart, occupational therapy assessment, plan of care, and goals were reviewed. ASSESSMENT  Pt received semi supine in bed agreeable for OT/PT RA with encouragement initially stating I just ate then eventually agreeable. Pt agreeable to complete EOB activities however initially declining to complete UE exercises stating his right arm is moving fine he just needs leg exercises however pt agreeable for OT teaching pt HEP. Pt declining LB dressing activity however agreeable to simulating LB dressing while seated EOB and simulated simple grooming EOB level. OT/PT providing max encouragement for pt to participate with therapy and work on sit<->stand transfers however pt insistent about not completing any standing activity on \"cement floor\" 2/2 fear. Pt encouraged to at least think about standing next session as OT unable to complete much more activity at EOB as pt has been noncompliant. Pt also has made very slow to no progress towards goals 2/2 declining participating beyond EOB LB exercises thus pt downgraded to one time a week.  Pt would benefit from continued skilled OT services while at [facility] in order to increase safety and independence with self care and functional transfers/mobility. Recommend discharge to SNF when medically appropriate. Other factors to consider for discharge: time since onset, severity of deficits, fear, lack of motivation to work with OT         PLAN :  Recommendations and Planned Interventions: self care training, functional mobility training, therapeutic exercise, balance training, endurance activities, patient education, and home safety training    Frequency/Duration: Patient will be followed by occupational therapy 1 time a week to address goals. Recommend next OT session: 2 person assist with encouraging pt to attempt standing    Recommendation for discharge: (in order for the patient to meet his/her long term goals)  SNF    This discharge recommendation:  Has been made in collaboration with the attending provider and/or case management       SUBJECTIVE:   Patient stated I am not standing on the floors here    OBJECTIVE DATA SUMMARY:   Hospital course since last seen and reason for reevaluation: left lung collapse s/p bronchoscopy per chart review    Cognitive/Behavioral Status:  Neurologic State: Alert;Agitated;Irritable  Orientation Level: Oriented X4  Cognition: Decreased command following;Poor safety awareness; Impaired decision making     Hearing: Auditory  Auditory Impairment: None    Range of Motion:  LUE AROM shoulder flex aprox 40 degrees, PROM 95  RUE AROM WFL  Strength:    RUE Strength  Observation: grossly observed to be 3+/5     LUE Strength  Observation: grossly 3+/5 within available range(AROM shoulder flex aprox 40 degrees, PROM 95)    Functional Mobility and Transfers for ADLs:  Bed Mobility:  Rolling: Stand-by assistance  Supine to Sit: Stand-by assistance  Sit to Supine: Stand-by assistance  Scooting: Maximum assistance;Assist x2(to HOB)    Balance:  Sitting: Intact; With support    ADL Intervention and task modifications:     Grooming  Grooming Assistance: Set-up; Stand-by assistance  Position Performed: Seated edge of bed  Washing Face: Stand-by assistance    Lower Body Dressing Assistance  Socks: Stand-by assistance(simulated EOB level)    Therapeutic Exercises:   Pt educated on LUE AAROM for 1 set of 10 reps and RUE AROM for 1 set of 10 reps with pt demonstrating and verbalizing understanding    Activity Tolerance:   Fair  Please refer to the flowsheet for vital signs taken during this treatment. After treatment patient left in no apparent distress:   Supine in bed, Call bell within reach, and Bed / chair alarm activated    COMMUNICATION/COLLABORATION:   The patients plan of care was discussed with: Physical therapist.  PT/OT sessions occurred together for increased safety of pt and clinician.        Filtr8  Time Calculation: 24 mins

## 2020-09-27 NOTE — PROGRESS NOTES
Problem: Patient Education: Go to Patient Education Activity  Goal: Patient/Family Education  Description: LE HEP to improve strength and functional mobility       Outcome: Progressing Towards Goal     Problem: Falls - Risk of  Goal: *Absence of Falls  Description: Document Bola Queenccasin Fall Risk and appropriate interventions in the flowsheet.   Outcome: Progressing Towards Goal  Note: Fall Risk Interventions:  Mobility Interventions: Bed/chair exit alarm, PT Consult for mobility concerns, OT consult for ADLs, Communicate number of staff needed for ambulation/transfer    Mentation Interventions: Bed/chair exit alarm, Adequate sleep, hydration, pain control, More frequent rounding, Room close to nurse's station, Reorient patient    Medication Interventions: Bed/chair exit alarm    Elimination Interventions: Patient to call for help with toileting needs    History of Falls Interventions: Bed/chair exit alarm, Room close to nurse's station, Vital signs minimum Q4HRs X 24 hrs (comment for end date)

## 2020-09-28 PROCEDURE — 94760 N-INVAS EAR/PLS OXIMETRY 1: CPT

## 2020-09-28 PROCEDURE — 74011250636 HC RX REV CODE- 250/636: Performed by: INTERNAL MEDICINE

## 2020-09-28 PROCEDURE — 74011250637 HC RX REV CODE- 250/637: Performed by: HOSPITALIST

## 2020-09-28 PROCEDURE — 94668 MNPJ CHEST WALL SBSQ: CPT

## 2020-09-28 PROCEDURE — 74011250636 HC RX REV CODE- 250/636: Performed by: HOSPITALIST

## 2020-09-28 PROCEDURE — 3331090001 HH PPS REVENUE CREDIT

## 2020-09-28 PROCEDURE — 74011250637 HC RX REV CODE- 250/637: Performed by: INTERNAL MEDICINE

## 2020-09-28 PROCEDURE — 65270000029 HC RM PRIVATE

## 2020-09-28 PROCEDURE — 94640 AIRWAY INHALATION TREATMENT: CPT

## 2020-09-28 PROCEDURE — 3331090002 HH PPS REVENUE DEBIT

## 2020-09-28 PROCEDURE — 77010033678 HC OXYGEN DAILY

## 2020-09-28 PROCEDURE — 74011000258 HC RX REV CODE- 258: Performed by: INTERNAL MEDICINE

## 2020-09-28 PROCEDURE — 74011000250 HC RX REV CODE- 250: Performed by: INTERNAL MEDICINE

## 2020-09-28 RX ORDER — ACETYLCYSTEINE 200 MG/ML
600 SOLUTION ORAL; RESPIRATORY (INHALATION)
Status: DISCONTINUED | OUTPATIENT
Start: 2020-09-28 | End: 2020-10-07

## 2020-09-28 RX ADMIN — PIPERACILLIN SODIUM AND TAZOBACTAM SODIUM 3.38 G: 3; .375 INJECTION, POWDER, LYOPHILIZED, FOR SOLUTION INTRAVENOUS at 08:00

## 2020-09-28 RX ADMIN — ACETYLCYSTEINE 600 MG: 200 SOLUTION ORAL; RESPIRATORY (INHALATION) at 15:34

## 2020-09-28 RX ADMIN — ENOXAPARIN SODIUM 40 MG: 40 INJECTION SUBCUTANEOUS at 09:49

## 2020-09-28 RX ADMIN — PIPERACILLIN SODIUM AND TAZOBACTAM SODIUM 3.38 G: 3; .375 INJECTION, POWDER, LYOPHILIZED, FOR SOLUTION INTRAVENOUS at 13:50

## 2020-09-28 RX ADMIN — GUAIFENESIN 400 MG: 200 SOLUTION ORAL at 17:50

## 2020-09-28 RX ADMIN — OXYCODONE HYDROCHLORIDE AND ACETAMINOPHEN 1 TABLET: 5; 325 TABLET ORAL at 21:59

## 2020-09-28 RX ADMIN — IPRATROPIUM BROMIDE AND ALBUTEROL SULFATE 3 ML: .5; 3 SOLUTION RESPIRATORY (INHALATION) at 15:34

## 2020-09-28 RX ADMIN — FUROSEMIDE 40 MG: 40 TABLET ORAL at 09:49

## 2020-09-28 RX ADMIN — DILTIAZEM HYDROCHLORIDE 120 MG: 120 CAPSULE, COATED, EXTENDED RELEASE ORAL at 09:49

## 2020-09-28 RX ADMIN — GUAIFENESIN 400 MG: 200 SOLUTION ORAL at 06:10

## 2020-09-28 RX ADMIN — OXYCODONE HYDROCHLORIDE AND ACETAMINOPHEN 1 TABLET: 5; 325 TABLET ORAL at 02:20

## 2020-09-28 RX ADMIN — IPRATROPIUM BROMIDE AND ALBUTEROL SULFATE 3 ML: .5; 3 SOLUTION RESPIRATORY (INHALATION) at 07:35

## 2020-09-28 RX ADMIN — OXYCODONE HYDROCHLORIDE AND ACETAMINOPHEN 1 TABLET: 5; 325 TABLET ORAL at 06:10

## 2020-09-28 RX ADMIN — ACETYLCYSTEINE 600 MG: 200 SOLUTION ORAL; RESPIRATORY (INHALATION) at 20:22

## 2020-09-28 RX ADMIN — IPRATROPIUM BROMIDE AND ALBUTEROL SULFATE 3 ML: .5; 3 SOLUTION RESPIRATORY (INHALATION) at 20:22

## 2020-09-28 RX ADMIN — LOSARTAN POTASSIUM 100 MG: 50 TABLET, FILM COATED ORAL at 09:49

## 2020-09-28 RX ADMIN — IPRATROPIUM BROMIDE AND ALBUTEROL SULFATE 3 ML: .5; 3 SOLUTION RESPIRATORY (INHALATION) at 11:58

## 2020-09-28 RX ADMIN — GUAIFENESIN 400 MG: 200 SOLUTION ORAL at 09:49

## 2020-09-28 RX ADMIN — DIPHENHYDRAMINE HYDROCHLORIDE 25 MG: 50 INJECTION, SOLUTION INTRAMUSCULAR; INTRAVENOUS at 21:58

## 2020-09-28 RX ADMIN — PIPERACILLIN SODIUM AND TAZOBACTAM SODIUM 3.38 G: 3; .375 INJECTION, POWDER, LYOPHILIZED, FOR SOLUTION INTRAVENOUS at 23:37

## 2020-09-28 RX ADMIN — OXYCODONE HYDROCHLORIDE AND ACETAMINOPHEN 1 TABLET: 5; 325 TABLET ORAL at 13:51

## 2020-09-28 RX ADMIN — OXYCODONE HYDROCHLORIDE AND ACETAMINOPHEN 1 TABLET: 5; 325 TABLET ORAL at 17:50

## 2020-09-28 RX ADMIN — ATORVASTATIN CALCIUM 20 MG: 20 TABLET, FILM COATED ORAL at 21:59

## 2020-09-28 RX ADMIN — PANTOPRAZOLE SODIUM 40 MG: 40 TABLET, DELAYED RELEASE ORAL at 06:10

## 2020-09-28 NOTE — PROGRESS NOTES
Progress Note    Patient: Harshal Alcala MRN: [id number]  SSN: [id number]    YOB: 1960  Age: 63 y.o. Sex: male      Admit Date: 9/10/2020    LOS: 18 days     Subjective:     59M, H/o COPD not on oxygen with with shortness of breath s/t pneumonia complicated by left lung collapse.      Currently, undergoing bronchoscopy and aggressive pulmonary toilet. On zosyn- deescalation based on BAL cultures.     The left lung is still collapsed despite 4 bronch, and aggressive toilet.        ROS  Review of Systems   Constitutional: Negative.    HENT: Negative.    Eyes: Negative.    Respiratory: Positive for shortness of breath and cough  Cardiovascular: Negative.    Gastrointestinal: Negative.    Genitourinary: Negative.    Musculoskeletal: Negative.    Skin: Negative.    Neurological: Negative.    Endo/Heme/Allergies: Negative.    Psychiatric/Behavioral: Negative.          Objective:     Vitals:    09/28/20 0739 09/28/20 0751 09/28/20 1034 09/28/20 1158   BP:   103/63    Pulse:   82    Resp:   18    Temp:   98.2 °F (36.8 °C)    SpO2: 100% 100%  97%   Weight:       Height:            Intake and Output:  Current Shift: No intake/output data recorded. Last three shifts: 09/26 1901 - 09/28 0700  In: 12 [P.O.:960]  Out: 850 [Urine:850]      Physical Exam:   General : Appearance:  no respiratory distress noted  HEENT : PERRLA, normal oral mucosa, atraumatic normocephalic, Normal ear and nose. Neck : Supple, no JVD, no masses noted, no carotid bruit  Lungs : normal breath sounds. No wheezing, no rales. He is splinting on deep breathing.   CVS : Rhythm rate regular, S1+, S2+, no murmur or gallop  Abdomen : Soft, nontender, no organomegaly, bowel sounds active  Extremities : No edema noted,  pedal pulses palpable  Musculoskeletal : Fair range of motion, no joint swelling or effusion, muscle tone and power appears normal,   Skin : Moist, warm, skin turgor, no pathological rash  Lymphatic : No cervical lymphadenopathy. Neurological : Awake, alert, oriented to time place person. No neurological deficits. Psychiatric : Mood and affect appears appropriate to the situation. Lab/Data Review:  No results found for this or any previous visit (from the past 24 hour(s)). Assessment and plan:      (1) acute hypoxic respiratory failure : likely s/t 2,3. On 1L NC     (2) Left lung collapse: s/p 2 broncoscopy and aggressive pulmonary hygeine. Chest PT, Q6H nebs, guaifenesin, lasix with negative fluid target. Once XR shows resolution of collapse would likely change to Augmentin for 10 days     (3) pneumonia : same as #2.     (4) COPD: LAMA/ LABA, OP PFT, pulm f/u. Weaning prednisone.      (5) anemia: AOCI. stable     (6) GERD: protonix.  Complicates #1.      (7) HTN : on losartan and CCB     (8) HLD: simvastatin     DVT ppx: lovenox     DISPO: SNF, pulmonology for green signal. augmentin for 10 days      Signed By: Sravani Menjivar MD     September 28, 2020

## 2020-09-28 NOTE — PROGRESS NOTES
Pulm/CC Progress Note    Subjective:   Daily Progress Note: 2020     Patient seen and examined at th bedside today, no acute events overnight. States that he is still coughing up a lot of mucous yesterday/this morning, helped by the addition of EZ-PAP. Patient had before bronchoscopy for last week  Still on 2 L nasal cannula oxygen  No acute distress    Chest x-ray from yesterday,  shows near total collapse of the left lung once again       Review of Systems  has complains of shortness of breath. He is on nasal cannula oxygen. Denied any nausea vomiting. Has fair appetite    Objective:     Visit Vitals  /63   Pulse 82   Temp 98.2 °F (36.8 °C)   Resp 18   Ht 6' 0.99\" (1.854 m)   Wt 82.4 kg (181 lb 10.5 oz)   SpO2 97%   BMI 23.97 kg/m²    O2 Flow Rate (L/min): 2 l/min O2 Device: Nasal cannula    Temp (24hrs), Av °F (36.7 °C), Min:97.7 °F (36.5 °C), Max:98.2 °F (36.8 °C)      No intake/output data recorded.    1901 -  0700  In: 960 [P.O.:960]  Out: 850 [Urine:850]    Current Facility-Administered Medications   Medication Dose Route Frequency    diphenhydrAMINE (BENADRYL) injection 25 mg  25 mg IntraVENous Q6H PRN    oxyCODONE-acetaminophen (PERCOCET) 5-325 mg per tablet 1 Tab  1 Tab Oral Q4H PRN    furosemide (LASIX) tablet 40 mg  40 mg Oral DAILY    albuterol-ipratropium (DUO-NEB) 2.5 MG-0.5 MG/3 ML  3 mL Nebulization QID RT    guaiFENesin (ROBITUSSIN) 100 mg/5 mL oral liquid 400 mg  400 mg Oral Q6H    lidocaine (XYLOCAINE) 10 mg/mL (1 %) injection    PRN    mineral oil (topical)    PRN    dilTIAZem ER (CARDIZEM CD) capsule 120 mg  120 mg Oral DAILY    0.9% sodium chloride infusion 250 mL  250 mL IntraVENous PRN    atorvastatin (LIPITOR) tablet 20 mg  20 mg Oral DAILY    enoxaparin (LOVENOX) injection 40 mg  40 mg SubCUTAneous Q24H    losartan (COZAAR) tablet 100 mg  100 mg Oral DAILY    piperacillin-tazobactam (ZOSYN) 3.375 g in 0.9% sodium chloride (MBP/ADV) 100 mL MBP  3.375 g IntraVENous Q8H    pantoprazole (PROTONIX) tablet 40 mg  40 mg Oral DAILY    acetaminophen (TYLENOL) tablet 650 mg  650 mg Oral Q4H PRN    alum-mag hydroxide-simeth (MYLANTA) oral suspension 30 mL  30 mL Oral Q4H PRN    magnesium hydroxide (MILK OF MAGNESIA) 400 mg/5 mL oral suspension 30 mL  30 mL Oral DAILY PRN    ondansetron (ZOFRAN) injection 4 mg  4 mg IntraVENous Q8H PRN       Physical Exam:  General: Alert and oriented x3.  1 L nasal cannula. Pleasant but nervous. Chronically ill appearing. HEENT: atraumatic, PERRL, moist mucosa,     Neck: Trachea midline, no carotid bruit, no masses. Supple but thick. Respiratory: Improved air entry in the anterior/superior chest, still diminished at the left base. Rhonchi improved in the right hemithorax. 1 L nasal cannula. Cardiovascular: RRR, no m/r/g, Normal S1 and S2   abdomen: Has ventral abdominal hernia, evidence of surgical scars soft,   Rectal: deferred  Extremities: no cyanosis or clubbing. Trace edema. Integumentary: warm, dry, and pink, with no rash, purpura, or petechia. Heme/Lymph: no lymphadenopathy, no bruises  Neurological: No focal motor deficit   psychiatric: cooperative with normal mood, affect, and cognition      Additional comments: Chest x-rays reviewed    Data Review    No results found for this or any previous visit (from the past 24 hour(s)). Today's CXR read is currently pending. XR CHEST PORT   Final Result   Impression: Increased volume loss left lung. Otherwise stable. XR CHEST PORT   Final Result   IMPRESSION:   1. Improved aeration of the left lung with persistent basilar consolidative   opacities and probable pleural effusions. 2. Other extensive interstitial and alveolar opacities are nonspecific, but   potentially related to pulmonary edema or infection. XR CHEST PORT   Final Result   Impression:Left lung collapse without improvement from prior study.       XR CHEST PORT   Final Result IMPRESSION:   1. There is milder enlargement of the cardiac silhouette as well as increasing   pulmonary vascular ill definition suspect for mild pulmonary edema. This could   be related to cardiogenic edema or fluid overload. 2.  There is been an improvement in the overall aeration of the left lung with   and improved visualization of vascular markings within the left upper lung zone. There still remains significant partial collapse of the left lung. 3.  There is increased patchy airspace disease laterally within the right lower   lobe just above the right lateral costophrenic angle. 4.  There are persistent moderate-sized bilateral pleural effusions. XR CHEST AP LAT   Final Result   IMPRESSION: Persistent collapse of the left lung with bilateral pleural   effusions. Increasing vascular congestion on the right. XR CHEST PA LAT   Final Result   Impression:   Ongoing near complete white out of the left lung. Little interval change since   the prior examination. CT CHEST WO CONT   Final Result   IMPRESSION: Complete collapse of the left lung due to complete bronchial   obstruction, possibly aspiration. Fluid overload also noted      XR ABD ACUTE W 1 V CHEST   Final Result   IMPRESSION: Opacification of the left hemithorax, questioned for remote   pneumonectomy or lung collapse with pleural fluid. Prominent right parenchymal/interstitial lung markings compatible with fibrosis   and possible partial vascular congestion. Small bowel gas, nonobstructive pattern. Assessment/Plan: This is a middle-aged male who was admitted because of increasing symptoms of shortness of breath. He is getting treated in hospital for pneumonia with IV Zosyn/Azithromycin. He is noted to be increasingly tachypneic and short of breath. He has been on supplemental oxygen. His chest x-ray is showing left lung opacification likely from mucous plugging.   This is slowly improving with aggressive pulmonary hygiene and three suction bronchoscopies.     Plan:     1.)    Left lung collapse/shortness of breath:  - Shortness of breath and hypoxia improved with diuresis and antibiotics, however still having significant mucous production and difficulty clearing his mucous. This has resulted in left lung colllapse. - Mucous plugging slowly improving with aggressive pulmonary hygiene and 3 clean out bronchs on 9/21, 9/23, and 9/24.  - BAL of the lingula performed on 9/21 and sent for cell count an culture. - I called the microbiology lab at [facility] and apparently his bronchial lavage and washing from 9/22/20 are completed and are both growing candida albicans. It appears that a cell count was never done. Candida is normal xiomara within the airway and is often deemed a contaminant when shown on BAL culture. It certainly seems less likely that he has a true fungal pneumonia given lack of toxic symptoms and history of immunosuppression. Nevertheless, will send of a fungal sputum culture and a serum Fungitel. - Bedside ultrasound performed after the second bronchoscopy did not reveal a significant pleural effusion on the left. Supplemental O2 needs improved to 1L NC today. Please wean supplemental oxygen for sats greater than 92%. Continue aggressive pulmonary hygiene with nebulizers every 6 hours;  Aggressive chest PT with vest every 6 hours, EZ-PAP therapy q6, acapella device as well as incentive spirometer use. Added guaifenesin 400 mg q6. Chest x-ray now once again shows near total collapse of left lung  We will proceed with bronchoscopy in a.m. 9/29 for cleanout    2.)  COPD:  He has a history of COPD based on chronic smoking. Continue scheduled bronchodilators. Patient should be discharged with a LAMA/LABA such as Anoro and needs f/u in our office for PFT's and further management. Weaned prednisone to 10 mg daily on 9/24, likely can stop tomorrow.     3.)  Pneumonia:  He is on IV Zosyn, stopped Zithromax on 9/23/2020. Recommend a total of 14 days of antibiotics. We will adjust his antibiotics based BAL culture results. 4.)  Hypertension:  He is on antihypertensive medications, BP's stable. 5.)  Status post ex lap:  His abdominal examination shows significant fecal scars and ventral abdominal hernia    Questions of patient were answered at bedside in detail  Case discussed in detail with RN, RT, and care team  Thank you for involving me in the care of this patient  I will follow with you closely during hospitalization      Duong Olea MD  Pulmonary and critical care

## 2020-09-29 ENCOUNTER — ANESTHESIA (OUTPATIENT)
Dept: ENDOSCOPY | Age: 60
DRG: 177 | End: 2020-09-29
Payer: MEDICARE

## 2020-09-29 ENCOUNTER — APPOINTMENT (OUTPATIENT)
Dept: ENDOSCOPY | Age: 60
DRG: 177 | End: 2020-09-29
Attending: INTERNAL MEDICINE
Payer: MEDICARE

## 2020-09-29 ENCOUNTER — ANESTHESIA EVENT (OUTPATIENT)
Dept: ENDOSCOPY | Age: 60
DRG: 177 | End: 2020-09-29
Payer: MEDICARE

## 2020-09-29 ENCOUNTER — HOSPITAL ENCOUNTER (OUTPATIENT)
Dept: LAB | Age: 60
Discharge: HOME | End: 2020-09-29

## 2020-09-29 PROCEDURE — 76040000008: Performed by: INTERNAL MEDICINE

## 2020-09-29 PROCEDURE — 74011000250 HC RX REV CODE- 250: Performed by: INTERNAL MEDICINE

## 2020-09-29 PROCEDURE — 74011250637 HC RX REV CODE- 250/637: Performed by: INTERNAL MEDICINE

## 2020-09-29 PROCEDURE — 74011000250 HC RX REV CODE- 250: Performed by: NURSE ANESTHETIST, CERTIFIED REGISTERED

## 2020-09-29 PROCEDURE — 65270000029 HC RM PRIVATE

## 2020-09-29 PROCEDURE — 74011250636 HC RX REV CODE- 250/636: Performed by: NURSE ANESTHETIST, CERTIFIED REGISTERED

## 2020-09-29 PROCEDURE — 94668 MNPJ CHEST WALL SBSQ: CPT

## 2020-09-29 PROCEDURE — 88108 CYTOPATH CONCENTRATE TECH: CPT

## 2020-09-29 PROCEDURE — 76060000033 HC ANESTHESIA 1 TO 1.5 HR: Performed by: INTERNAL MEDICINE

## 2020-09-29 PROCEDURE — 3331090002 HH PPS REVENUE DEBIT

## 2020-09-29 PROCEDURE — 77010033678 HC OXYGEN DAILY

## 2020-09-29 PROCEDURE — 87102 FUNGUS ISOLATION CULTURE: CPT

## 2020-09-29 PROCEDURE — 3331090001 HH PPS REVENUE CREDIT

## 2020-09-29 PROCEDURE — 87116 MYCOBACTERIA CULTURE: CPT

## 2020-09-29 PROCEDURE — 87206 SMEAR FLUORESCENT/ACID STAI: CPT

## 2020-09-29 PROCEDURE — 0BDG8ZX EXTRACTION OF LEFT UPPER LUNG LOBE, VIA NATURAL OR ARTIFICIAL OPENING ENDOSCOPIC, DIAGNOSTIC: ICD-10-PCS | Performed by: INTERNAL MEDICINE

## 2020-09-29 PROCEDURE — 77030026438 HC STYL ET INTUB CARD -A: Performed by: NURSE ANESTHETIST, CERTIFIED REGISTERED

## 2020-09-29 PROCEDURE — 77030012699 HC VLV SUC CNTRL OCOA -A: Performed by: INTERNAL MEDICINE

## 2020-09-29 PROCEDURE — 94760 N-INVAS EAR/PLS OXIMETRY 1: CPT

## 2020-09-29 PROCEDURE — 0BDH8ZX EXTRACTION OF LUNG LINGULA, VIA NATURAL OR ARTIFICIAL OPENING ENDOSCOPIC, DIAGNOSTIC: ICD-10-PCS | Performed by: INTERNAL MEDICINE

## 2020-09-29 PROCEDURE — 74011000258 HC RX REV CODE- 258: Performed by: INTERNAL MEDICINE

## 2020-09-29 PROCEDURE — 74011250636 HC RX REV CODE- 250/636: Performed by: HOSPITALIST

## 2020-09-29 PROCEDURE — 0BDJ8ZX EXTRACTION OF LEFT LOWER LUNG LOBE, VIA NATURAL OR ARTIFICIAL OPENING ENDOSCOPIC, DIAGNOSTIC: ICD-10-PCS | Performed by: INTERNAL MEDICINE

## 2020-09-29 PROCEDURE — 94640 AIRWAY INHALATION TREATMENT: CPT

## 2020-09-29 PROCEDURE — 87205 SMEAR GRAM STAIN: CPT

## 2020-09-29 PROCEDURE — 74011250637 HC RX REV CODE- 250/637: Performed by: HOSPITALIST

## 2020-09-29 PROCEDURE — 77030008684 HC TU ET CUF COVD -B: Performed by: NURSE ANESTHETIST, CERTIFIED REGISTERED

## 2020-09-29 PROCEDURE — 74011250636 HC RX REV CODE- 250/636: Performed by: INTERNAL MEDICINE

## 2020-09-29 PROCEDURE — 2709999900 HC NON-CHARGEABLE SUPPLY: Performed by: INTERNAL MEDICINE

## 2020-09-29 RX ORDER — ACETYLCYSTEINE 200 MG/ML
SOLUTION ORAL; RESPIRATORY (INHALATION) AS NEEDED
Status: DISCONTINUED | OUTPATIENT
Start: 2020-09-29 | End: 2020-10-09

## 2020-09-29 RX ORDER — LIDOCAINE HYDROCHLORIDE 10 MG/ML
INJECTION INFILTRATION; PERINEURAL
Status: DISPENSED
Start: 2020-09-29 | End: 2020-09-29

## 2020-09-29 RX ORDER — EPINEPHRINE 1 MG/ML
INJECTION, SOLUTION, CONCENTRATE INTRAVENOUS
Status: DISCONTINUED
Start: 2020-09-29 | End: 2020-09-29 | Stop reason: WASHOUT

## 2020-09-29 RX ORDER — MINERAL OIL
OIL (ML) MISCELLANEOUS AS NEEDED
Status: DISCONTINUED | OUTPATIENT
Start: 2020-09-29 | End: 2020-10-13 | Stop reason: HOSPADM

## 2020-09-29 RX ORDER — SODIUM CHLORIDE 9 MG/ML
INJECTION, SOLUTION INTRAVENOUS
Status: DISCONTINUED | OUTPATIENT
Start: 2020-09-29 | End: 2020-09-29 | Stop reason: HOSPADM

## 2020-09-29 RX ORDER — SUCCINYLCHOLINE CHLORIDE 20 MG/ML
INJECTION INTRAMUSCULAR; INTRAVENOUS AS NEEDED
Status: DISCONTINUED | OUTPATIENT
Start: 2020-09-29 | End: 2020-09-29 | Stop reason: HOSPADM

## 2020-09-29 RX ORDER — SUCCINYLCHOLINE CHLORIDE 20 MG/ML INJECTION SOLUTION
SOLUTION
Status: COMPLETED
Start: 2020-09-29 | End: 2020-09-29

## 2020-09-29 RX ORDER — ONDANSETRON 2 MG/ML
INJECTION INTRAMUSCULAR; INTRAVENOUS AS NEEDED
Status: DISCONTINUED | OUTPATIENT
Start: 2020-09-29 | End: 2020-09-29 | Stop reason: HOSPADM

## 2020-09-29 RX ORDER — ROCURONIUM BROMIDE 10 MG/ML
INJECTION, SOLUTION INTRAVENOUS AS NEEDED
Status: DISCONTINUED | OUTPATIENT
Start: 2020-09-29 | End: 2020-09-29 | Stop reason: HOSPADM

## 2020-09-29 RX ORDER — LIDOCAINE HYDROCHLORIDE 20 MG/ML
INJECTION, SOLUTION EPIDURAL; INFILTRATION; INTRACAUDAL; PERINEURAL AS NEEDED
Status: DISCONTINUED | OUTPATIENT
Start: 2020-09-29 | End: 2020-09-29 | Stop reason: HOSPADM

## 2020-09-29 RX ORDER — ONDANSETRON 2 MG/ML
INJECTION INTRAMUSCULAR; INTRAVENOUS
Status: COMPLETED
Start: 2020-09-29 | End: 2020-09-29

## 2020-09-29 RX ORDER — PROPOFOL 10 MG/ML
INJECTION, EMULSION INTRAVENOUS
Status: COMPLETED
Start: 2020-09-29 | End: 2020-09-29

## 2020-09-29 RX ORDER — DEXAMETHASONE SODIUM PHOSPHATE 4 MG/ML
INJECTION, SOLUTION INTRA-ARTICULAR; INTRALESIONAL; INTRAMUSCULAR; INTRAVENOUS; SOFT TISSUE AS NEEDED
Status: DISCONTINUED | OUTPATIENT
Start: 2020-09-29 | End: 2020-09-29 | Stop reason: HOSPADM

## 2020-09-29 RX ORDER — DEXAMETHASONE SODIUM PHOSPHATE 4 MG/ML
INJECTION, SOLUTION INTRA-ARTICULAR; INTRALESIONAL; INTRAMUSCULAR; INTRAVENOUS; SOFT TISSUE
Status: COMPLETED
Start: 2020-09-29 | End: 2020-09-29

## 2020-09-29 RX ORDER — ROCURONIUM BROMIDE 10 MG/ML
INJECTION, SOLUTION INTRAVENOUS
Status: COMPLETED
Start: 2020-09-29 | End: 2020-09-29

## 2020-09-29 RX ORDER — LEVOFLOXACIN 5 MG/ML
500 INJECTION, SOLUTION INTRAVENOUS EVERY 24 HOURS
Status: DISCONTINUED | OUTPATIENT
Start: 2020-09-30 | End: 2020-10-11

## 2020-09-29 RX ORDER — PROPOFOL 10 MG/ML
INJECTION, EMULSION INTRAVENOUS AS NEEDED
Status: DISCONTINUED | OUTPATIENT
Start: 2020-09-29 | End: 2020-09-29 | Stop reason: HOSPADM

## 2020-09-29 RX ORDER — LIDOCAINE HYDROCHLORIDE 20 MG/ML
INJECTION, SOLUTION EPIDURAL; INFILTRATION; INTRACAUDAL; PERINEURAL
Status: COMPLETED
Start: 2020-09-29 | End: 2020-09-29

## 2020-09-29 RX ORDER — PHENYLEPHRINE HCL IN 0.9% NACL 0.4MG/10ML
SYRINGE (ML) INTRAVENOUS AS NEEDED
Status: DISCONTINUED | OUTPATIENT
Start: 2020-09-29 | End: 2020-09-29 | Stop reason: HOSPADM

## 2020-09-29 RX ADMIN — GUAIFENESIN 400 MG: 200 SOLUTION ORAL at 17:57

## 2020-09-29 RX ADMIN — LIDOCAINE HYDROCHLORIDE 60 MG: 20 INJECTION, SOLUTION EPIDURAL; INFILTRATION; INTRACAUDAL; PERINEURAL at 11:58

## 2020-09-29 RX ADMIN — GUAIFENESIN 400 MG: 200 SOLUTION ORAL at 05:53

## 2020-09-29 RX ADMIN — Medication 80 MCG: at 12:05

## 2020-09-29 RX ADMIN — GUAIFENESIN 400 MG: 200 SOLUTION ORAL at 01:55

## 2020-09-29 RX ADMIN — ATORVASTATIN CALCIUM 20 MG: 20 TABLET, FILM COATED ORAL at 20:25

## 2020-09-29 RX ADMIN — DIPHENHYDRAMINE HYDROCHLORIDE 25 MG: 50 INJECTION, SOLUTION INTRAMUSCULAR; INTRAVENOUS at 21:35

## 2020-09-29 RX ADMIN — PIPERACILLIN SODIUM AND TAZOBACTAM SODIUM 3.38 G: 3; .375 INJECTION, POWDER, LYOPHILIZED, FOR SOLUTION INTRAVENOUS at 21:47

## 2020-09-29 RX ADMIN — ACETYLCYSTEINE 600 MG: 200 SOLUTION ORAL; RESPIRATORY (INHALATION) at 08:08

## 2020-09-29 RX ADMIN — PIPERACILLIN SODIUM AND TAZOBACTAM SODIUM 3.38 G: 3; .375 INJECTION, POWDER, LYOPHILIZED, FOR SOLUTION INTRAVENOUS at 15:22

## 2020-09-29 RX ADMIN — Medication 80 MCG: at 12:10

## 2020-09-29 RX ADMIN — LIDOCAINE HYDROCHLORIDE 40 MG: 20 INJECTION, SOLUTION EPIDURAL; INFILTRATION; INTRACAUDAL; PERINEURAL at 11:59

## 2020-09-29 RX ADMIN — FUROSEMIDE 40 MG: 40 TABLET ORAL at 10:10

## 2020-09-29 RX ADMIN — SUCCINYLCHOLINE CHLORIDE 140 MG: 20 INJECTION, SOLUTION INTRAMUSCULAR; INTRAVENOUS at 12:00

## 2020-09-29 RX ADMIN — OXYCODONE HYDROCHLORIDE AND ACETAMINOPHEN 1 TABLET: 5; 325 TABLET ORAL at 05:53

## 2020-09-29 RX ADMIN — PANTOPRAZOLE SODIUM 40 MG: 40 TABLET, DELAYED RELEASE ORAL at 05:54

## 2020-09-29 RX ADMIN — OXYCODONE HYDROCHLORIDE AND ACETAMINOPHEN 1 TABLET: 5; 325 TABLET ORAL at 10:10

## 2020-09-29 RX ADMIN — DEXAMETHASONE SODIUM PHOSPHATE 4 MG: 4 INJECTION, SOLUTION INTRA-ARTICULAR; INTRALESIONAL; INTRAMUSCULAR; INTRAVENOUS; SOFT TISSUE at 12:09

## 2020-09-29 RX ADMIN — OXYCODONE HYDROCHLORIDE AND ACETAMINOPHEN 1 TABLET: 5; 325 TABLET ORAL at 01:55

## 2020-09-29 RX ADMIN — SODIUM CHLORIDE: 9 INJECTION, SOLUTION INTRAVENOUS at 11:00

## 2020-09-29 RX ADMIN — IPRATROPIUM BROMIDE AND ALBUTEROL SULFATE 3 ML: .5; 3 SOLUTION RESPIRATORY (INHALATION) at 20:19

## 2020-09-29 RX ADMIN — DIPHENHYDRAMINE HYDROCHLORIDE 25 MG: 50 INJECTION, SOLUTION INTRAMUSCULAR; INTRAVENOUS at 15:25

## 2020-09-29 RX ADMIN — IPRATROPIUM BROMIDE AND ALBUTEROL SULFATE 3 ML: .5; 3 SOLUTION RESPIRATORY (INHALATION) at 08:07

## 2020-09-29 RX ADMIN — OXYCODONE HYDROCHLORIDE AND ACETAMINOPHEN 1 TABLET: 5; 325 TABLET ORAL at 15:22

## 2020-09-29 RX ADMIN — ONDANSETRON 4 MG: 2 INJECTION INTRAMUSCULAR; INTRAVENOUS at 12:05

## 2020-09-29 RX ADMIN — OXYCODONE HYDROCHLORIDE AND ACETAMINOPHEN 1 TABLET: 5; 325 TABLET ORAL at 20:25

## 2020-09-29 RX ADMIN — ACETYLCYSTEINE 600 MG: 200 SOLUTION ORAL; RESPIRATORY (INHALATION) at 20:20

## 2020-09-29 RX ADMIN — LOSARTAN POTASSIUM 100 MG: 50 TABLET, FILM COATED ORAL at 10:10

## 2020-09-29 RX ADMIN — ROCURONIUM BROMIDE 5 MG: 10 SOLUTION INTRAVENOUS at 11:59

## 2020-09-29 RX ADMIN — DILTIAZEM HYDROCHLORIDE 120 MG: 120 CAPSULE, COATED, EXTENDED RELEASE ORAL at 10:10

## 2020-09-29 RX ADMIN — PIPERACILLIN SODIUM AND TAZOBACTAM SODIUM 3.38 G: 3; .375 INJECTION, POWDER, LYOPHILIZED, FOR SOLUTION INTRAVENOUS at 07:13

## 2020-09-29 RX ADMIN — DIPHENHYDRAMINE HYDROCHLORIDE 25 MG: 50 INJECTION, SOLUTION INTRAMUSCULAR; INTRAVENOUS at 03:29

## 2020-09-29 RX ADMIN — PROPOFOL 50 MG: 10 INJECTION, EMULSION INTRAVENOUS at 11:59

## 2020-09-29 RX ADMIN — PROPOFOL 50 MG: 10 INJECTION, EMULSION INTRAVENOUS at 11:58

## 2020-09-29 NOTE — PROGRESS NOTES
Pulm/CC Progress Note    Subjective:   Daily Progress Note: 2020     Patient seen and examined at th bedside today, no acute events overnight. States that he is still coughing up a lot of mucous yesterday/this morning, helped by the addition of EZ-PAP. Still on 2 L nasal cannula oxygen  No acute distress    Chest x-ray from yesterday,  shows near total collapse of the left lung once again    Evaluated this morning for stability for bronchoscopy later today       Review of Systems  has complains of shortness of breath. He is on nasal cannula oxygen. Denied any nausea vomiting.   Has fair appetite    Objective:     Visit Vitals  /75 (BP 1 Location: Left arm, BP Patient Position: At rest)   Pulse 82   Temp 97.8 °F (36.6 °C)   Resp 18   Ht 6' 0.99\" (1.854 m)   Wt 82.5 kg (181 lb 14.1 oz)   SpO2 100%   BMI 24.00 kg/m²    O2 Flow Rate (L/min): 2 l/min O2 Device: Nasal cannula    Temp (24hrs), Av °F (36.7 °C), Min:97.8 °F (36.6 °C), Max:98.1 °F (36.7 °C)      701 - 1900  In: -   Out: 400 [Urine:400]  1901 -  0700  In: 700 [P.O.:700]  Out: 600 [Urine:600]    Current Facility-Administered Medications   Medication Dose Route Frequency    lidocaine (XYLOCAINE) 10 mg/mL (1 %) injection        EPINEPHrine HCl (PF) (ADRENALIN) 1 mg/mL (1 mL) injection        ondansetron (ZOFRAN) 4 mg/2 mL injection        dexamethasone (DECADRON) 4 mg/mL injection        acetylcysteine (MUCOMYST) 200 mg/mL (20 %) solution 600 mg  600 mg Nebulization QID RT    diphenhydrAMINE (BENADRYL) injection 25 mg  25 mg IntraVENous Q6H PRN    oxyCODONE-acetaminophen (PERCOCET) 5-325 mg per tablet 1 Tab  1 Tab Oral Q4H PRN    furosemide (LASIX) tablet 40 mg  40 mg Oral DAILY    albuterol-ipratropium (DUO-NEB) 2.5 MG-0.5 MG/3 ML  3 mL Nebulization QID RT    guaiFENesin (ROBITUSSIN) 100 mg/5 mL oral liquid 400 mg  400 mg Oral Q6H    lidocaine (XYLOCAINE) 10 mg/mL (1 %) injection    PRN    mineral oil (topical)    PRN    dilTIAZem ER (CARDIZEM CD) capsule 120 mg  120 mg Oral DAILY    0.9% sodium chloride infusion 250 mL  250 mL IntraVENous PRN    atorvastatin (LIPITOR) tablet 20 mg  20 mg Oral DAILY    enoxaparin (LOVENOX) injection 40 mg  40 mg SubCUTAneous Q24H    losartan (COZAAR) tablet 100 mg  100 mg Oral DAILY    piperacillin-tazobactam (ZOSYN) 3.375 g in 0.9% sodium chloride (MBP/ADV) 100 mL MBP  3.375 g IntraVENous Q8H    pantoprazole (PROTONIX) tablet 40 mg  40 mg Oral DAILY    acetaminophen (TYLENOL) tablet 650 mg  650 mg Oral Q4H PRN    alum-mag hydroxide-simeth (MYLANTA) oral suspension 30 mL  30 mL Oral Q4H PRN    magnesium hydroxide (MILK OF MAGNESIA) 400 mg/5 mL oral suspension 30 mL  30 mL Oral DAILY PRN    ondansetron (ZOFRAN) injection 4 mg  4 mg IntraVENous Q8H PRN     Facility-Administered Medications Ordered in Other Encounters   Medication Dose Route Frequency    lidocaine (PF) (XYLOCAINE) 20 mg/mL (2 %) injection   IntraVENous PRN    propofoL (DIPRIVAN) 10 mg/mL injection   IntraVENous PRN    rocuronium injection   IntraVENous PRN    succinylcholine (ANECTINE) injection   IntraVENous PRN    PHENYLephrine (NEOSYNEPHRINE) in NS syringe   IntraVENous PRN       Physical Exam:  General: Alert and oriented x3.  1 L nasal cannula. Pleasant but nervous. Chronically ill appearing. HEENT: atraumatic, PERRL, moist mucosa,     Neck: Trachea midline, no carotid bruit, no masses. Supple but thick. Respiratory: Improved air entry in the anterior/superior chest, still diminished at the left base. Rhonchi improved in the right hemithorax. 1 L nasal cannula. Cardiovascular: RRR, no m/r/g, Normal S1 and S2   abdomen: Has ventral abdominal hernia, evidence of surgical scars soft,   Rectal: deferred  Extremities: no cyanosis or clubbing. Trace edema. Integumentary: warm, dry, and pink, with no rash, purpura, or petechia.    Heme/Lymph: no lymphadenopathy, no bruises  Neurological: No focal motor deficit   psychiatric: cooperative with normal mood, affect, and cognition      Additional comments: Chest x-rays reviewed    Data Review    No results found for this or any previous visit (from the past 24 hour(s)). Today's CXR read is currently pending. XR CHEST PORT   Final Result   Impression: Increased volume loss left lung. Otherwise stable. XR CHEST PORT   Final Result   IMPRESSION:   1. Improved aeration of the left lung with persistent basilar consolidative   opacities and probable pleural effusions. 2. Other extensive interstitial and alveolar opacities are nonspecific, but   potentially related to pulmonary edema or infection. XR CHEST PORT   Final Result   Impression:Left lung collapse without improvement from prior study. XR CHEST PORT   Final Result   IMPRESSION:   1. There is milder enlargement of the cardiac silhouette as well as increasing   pulmonary vascular ill definition suspect for mild pulmonary edema. This could   be related to cardiogenic edema or fluid overload. 2.  There is been an improvement in the overall aeration of the left lung with   and improved visualization of vascular markings within the left upper lung zone. There still remains significant partial collapse of the left lung. 3.  There is increased patchy airspace disease laterally within the right lower   lobe just above the right lateral costophrenic angle. 4.  There are persistent moderate-sized bilateral pleural effusions. XR CHEST AP LAT   Final Result   IMPRESSION: Persistent collapse of the left lung with bilateral pleural   effusions. Increasing vascular congestion on the right. XR CHEST PA LAT   Final Result   Impression:   Ongoing near complete white out of the left lung. Little interval change since   the prior examination. CT CHEST WO CONT   Final Result   IMPRESSION: Complete collapse of the left lung due to complete bronchial   obstruction, possibly aspiration. Fluid overload also noted      XR ABD ACUTE W 1 V CHEST   Final Result   IMPRESSION: Opacification of the left hemithorax, questioned for remote   pneumonectomy or lung collapse with pleural fluid. Prominent right parenchymal/interstitial lung markings compatible with fibrosis   and possible partial vascular congestion. Small bowel gas, nonobstructive pattern. Assessment/Plan: This is a middle-aged male who was admitted because of increasing symptoms of shortness of breath. He is getting treated in hospital for pneumonia with IV Zosyn/Azithromycin. He is noted to be increasingly tachypneic and short of breath. He has been on supplemental oxygen. His chest x-ray is showing left lung opacification likely from mucous plugging. This is slowly improving with aggressive pulmonary hygiene and three suction bronchoscopies.     Plan:     1.)    Left lung collapse/shortness of breath:  - Shortness of breath and hypoxia improved with diuresis and antibiotics, however still having significant mucous production and difficulty clearing his mucous. This has resulted in left lung colllapse. - Mucous plugging slowly improving with aggressive pulmonary hygiene and 3 clean out bronchs on 9/21, 9/23, and 9/24.  - BAL of the lingula performed on 9/21 and sent for cell count an culture. - I called the microbiology lab at [facility] and apparently his bronchial lavage and washing from 9/22/20 are completed and are both growing candida albicans. It appears that a cell count was never done. Candida is normal xiomara within the airway and is often deemed a contaminant when shown on BAL culture. It certainly seems less likely that he has a true fungal pneumonia given lack of toxic symptoms and history of immunosuppression. Nevertheless, will send of a fungal sputum culture and a serum Fungitel.   - Bedside ultrasound performed after the second bronchoscopy did not reveal a significant pleural effusion on the left.    Supplemental O2 needs improved to 1L NC today. Please wean supplemental oxygen for sats greater than 92%. Continue aggressive pulmonary hygiene with nebulizers every 6 hours;  Aggressive chest PT with vest every 6 hours, EZ-PAP therapy q6, acapella device as well as incentive spirometer use. Added guaifenesin 400 mg q6. Chest x-ray now once again shows near total collapse of left lung  We will proceed with bronchoscopy today for cleanout    2.)  COPD:  He has a history of COPD based on chronic smoking. Continue scheduled bronchodilators. Patient should be discharged with a LAMA/LABA such as Anoro and needs f/u in our office for PFT's and further management. Weaned prednisone to 10 mg daily on 9/24, likely can stop tomorrow. 3.)  Pneumonia:  He is on IV Zosyn, stopped Zithromax on 9/23/2020. We will start him on Levaquin given persistent atelectasis/infiltrate/secretions    4.)  Hypertension:  He is on antihypertensive medications, BP's stable. 5.)  Status post ex lap:  His abdominal examination shows significant fecal scars and ventral abdominal hernia    Questions of patient were answered at bedside in detail  Case discussed in detail with RN, RT, and care team  Thank you for involving me in the care of this patient  I will follow with you closely during hospitalization      Duong Peoples MD  Pulmonary and critical care

## 2020-09-29 NOTE — PROCEDURES
Indication: Complete left lung atelectasis/collapse    Procedure: Flexible bronchoscopy for cleanout and sampling    Consent: Risks benefits and alternatives discussed with the patient and he agrees to proceed. Consent signed    Airway: Patient intubated per anesthesia    Sedation: Provided by anesthesia    Procedure note:    Timeout was called  Patient was placed in the correct position    Bronchoscope was inserted via the endotracheal tube without difficulty  Lower trachea and right lung were entered and were without significant secretions or lesions    Copious mucopurulent secretions were noted in the left lung coming all the way up to the main vianey through the left mainstem bronchus  Saline was used to clear the secretions  Mucomyst was instilled in the left upper lobe, lingula, and left upper lobe  Extensive suctioning was performed in the left upper lobe, lingula, and left lower lobe  Mucosal irregularities were noted in the left mainstem bronchus along with the left lower lobe of unclear etiology and significance at 2, 3, and 4 o'clock positions  Bronchial brushing was performed at the left mainstem bronchus at these mucosal irregularities x2 and these were sent for cytology  Bronchial brushing was performed in the left lung and this was sent for Gram stain and culture  Bronchial washing was performed in the left lung including left upper lobe, lingula, and left lower lobe. And this was sent for Gram stain, culture, AFB, and fungus and cytology    At the end of the procedure excellent hemostasis was noted  Lungs showed only minimal secretions    Bronchoscope was withdrawn  Patient was left on the care of anesthesia for reversal and extubation  Will do chest x-ray in a.m.     Continue aggressive chest physical therapy

## 2020-09-29 NOTE — PROGRESS NOTES
Progress Note    Patient: Jay Carrasco MRN: [id number]  SSN: [id number]    YOB: 1960  Age: 63 y.o. Sex: male      Admit Date: 9/10/2020    LOS: 19 days     Subjective:     59M, H/o COPD not on oxygen with with shortness of breath s/t pneumonia complicated by left lung collapse. Going for bronchoscopy this morning.          ROS  Review of Systems   Constitutional: Negative.    HENT: Negative.    Eyes: Negative.    Respiratory: Positive for shortness of breath and cough  Cardiovascular: Negative.    Gastrointestinal: Negative.    Genitourinary: Negative.    Musculoskeletal: Negative.    Skin: Negative.    Neurological: Negative.    Endo/Heme/Allergies: Negative.    Psychiatric/Behavioral: Negative.      Prior to Admission Medications   Prescriptions Last Dose Informant Patient Reported? Taking? cefdinir (OMNICEF) 300 mg capsule   No No   Sig: Take 1 Cap by mouth two (2) times a day for 10 days. losartan (COZAAR) 100 mg tablet   No No   Sig: TAKE 1 TABLET BY MOUTH EVERY DAY   omeprazole (PRILOSEC) 20 mg capsule   Yes Yes   Sig: Take 20 mg by mouth daily. pantoprazole (PROTONIX) 40 mg tablet   No No   Sig: Take 1 Tab by mouth daily.    simvastatin (ZOCOR) 40 mg tablet   No No   Sig: TAKE 1 TABLET BY MOUTH EVERY DAY AT NIGHT      Facility-Administered Medications: None       Current Facility-Administered Medications:     lidocaine (XYLOCAINE) 10 mg/mL (1 %) injection, , , ,     EPINEPHrine HCl (PF) (ADRENALIN) 1 mg/mL (1 mL) injection, , , ,     ondansetron (ZOFRAN) 4 mg/2 mL injection, , , ,     dexamethasone (DECADRON) 4 mg/mL injection, , , ,     acetylcysteine (MUCOMYST) 200 mg/mL (20 %) solution 600 mg, 600 mg, Nebulization, QID RT, Duong Ruiz MD, 600 mg at 09/29/20 0808    diphenhydrAMINE (BENADRYL) injection 25 mg, 25 mg, IntraVENous, Q6H PRN, Trevon Ca MD, 25 mg at 09/29/20 0329    oxyCODONE-acetaminophen (PERCOCET) 5-325 mg per tablet 1 Tab, 1 Tab, Oral, Q4H PRN, Shaheed Koo MD, 1 Tab at 09/29/20 1010    furosemide (LASIX) tablet 40 mg, 40 mg, Oral, DAILY, Jarett Ocampo DO, 40 mg at 09/29/20 1010    albuterol-ipratropium (DUO-NEB) 2.5 MG-0.5 MG/3 ML, 3 mL, Nebulization, QID RT, Jarett Ocampo DO, 3 mL at 09/29/20 0807    guaiFENesin (ROBITUSSIN) 100 mg/5 mL oral liquid 400 mg, 400 mg, Oral, Q6H, Jarett Ocampo DO, 400 mg at 09/29/20 0553    lidocaine (XYLOCAINE) 10 mg/mL (1 %) injection, , , PRN, Jarett Ocampo DO, 15 mL at 09/21/20 1447    mineral oil (topical), , , PRN, Jarett Ocampo DO, 5 mL at 09/23/20 0820    dilTIAZem ER (CARDIZEM CD) capsule 120 mg, 120 mg, Oral, DAILY, Romel Johnson MD, 120 mg at 09/29/20 1010    0.9% sodium chloride infusion 250 mL, 250 mL, IntraVENous, PRN, Uma Almodovar MD    atorvastatin (LIPITOR) tablet 20 mg, 20 mg, Oral, DAILY, Uma Almodovar MD, 20 mg at 09/28/20 2159    enoxaparin (LOVENOX) injection 40 mg, 40 mg, SubCUTAneous, Q24H, Uma Almodovar MD, Stopped at 09/29/20 0900    losartan (COZAAR) tablet 100 mg, 100 mg, Oral, DAILY, Uma Almodovar MD, 100 mg at 09/29/20 1010    piperacillin-tazobactam (ZOSYN) 3.375 g in 0.9% sodium chloride (MBP/ADV) 100 mL MBP, 3.375 g, IntraVENous, Q8H, Uma Almodovar MD, Last Rate: 25 mL/hr at 09/29/20 0713, 3.375 g at 09/29/20 0713    pantoprazole (PROTONIX) tablet 40 mg, 40 mg, Oral, DAILY, Uma Almodovar MD, Stopped at 09/29/20 0700    acetaminophen (TYLENOL) tablet 650 mg, 650 mg, Oral, Q4H PRN, Uma Almodovar MD, 650 mg at 09/24/20 2319    alum-mag hydroxide-simeth (MYLANTA) oral suspension 30 mL, 30 mL, Oral, Q4H PRN, Uma Almodovar MD, 30 mL at 09/22/20 2215    magnesium hydroxide (MILK OF MAGNESIA) 400 mg/5 mL oral suspension 30 mL, 30 mL, Oral, DAILY PRN, Uma Almodovar MD    ondansetron [facility]) injection 4 mg, 4 mg, IntraVENous, Q8H PRN, Uma Almodovar MD    Facility-Administered Medications Ordered in Other Encounters:   [address] [place] [address] lidocaine (PF) (XYLOCAINE) 20 mg/mL (2 %) injection, , IntraVENous, PRN, Nolene Curling A, CRNA, 40 mg at 09/29/20 1159    propofoL (DIPRIVAN) 10 mg/mL injection, , IntraVENous, PRN, Nolene Curling A, CRNA, 50 mg at 09/29/20 1159    rocuronium injection, , IntraVENous, PRN, Nolene Curling A, CRNA, 5 mg at 09/29/20 1159    succinylcholine (ANECTINE) injection, , IntraVENous, PRN, Nolene Curling A, CRNA, 140 mg at 09/29/20 1200    PHENYLephrine (NEOSYNEPHRINE) in NS syringe, , IntraVENous, PRN, Sara Heading, CRNA, 80 mcg at 09/29/20 1210    Objective:     Vitals:    09/29/20 0559 09/29/20 0751 09/29/20 0810 09/29/20 1042   BP: 109/63 121/69  130/75   Pulse: 91 81  82   Resp: 18 20  18   Temp:  97.8 °F (36.6 °C)     SpO2:  100% 97% 100%   Weight:       Height:            Intake and Output:  Current Shift: 09/29 0701 - 09/29 1900  In: -   Out: 400 [Urine:400]  Last three shifts: 09/27 1901 - 09/29 0700  In: 700 [P.O.:700]  Out: 600 [Urine:600]      Physical Exam:   General : Appearance:  no respiratory distress noted  HEENT : PERRLA, normal oral mucosa, atraumatic normocephalic, Normal ear and nose. Neck : Supple, no JVD, no masses noted, no carotid bruit  Lungs : normal breath sounds. No wheezing, no rales. He is splinting on deep breathing. CVS : Rhythm rate regular, S1+, S2+, no murmur or gallop  Abdomen : Soft, nontender, no organomegaly, bowel sounds active  Extremities : No edema noted,  pedal pulses palpable  Musculoskeletal : Fair range of motion, no joint swelling or effusion, muscle tone and power appears normal,   Skin : Moist, warm, skin turgor, no pathological rash  Lymphatic : No cervical lymphadenopathy. Neurological : Awake, alert, oriented to time place person. No neurological deficits. Psychiatric : Mood and affect appears appropriate to the situation. Lab/Data Review:  No results found for this or any previous visit (from the past 24 hour(s)).       Assessment and plan:      (1) acute hypoxic respiratory failure : s/t 2,3. On 1L NC     (2) Left lung collapse: s/p 2 broncoscopy and aggressive pulmonary hygeine. Chest PT, Q6H nebs, guaifenesin, lasix with negative fluid target. Pulmnology following. For repeat bronch this Am. (3) Pneumonia : same as #2. Continue Zosyn, stopped Azithro 9/23. 14 d abx total is recommended. Follow BAL culture. BAL cxs 9/21 & 9/22 grew Autumn Albicans, presumed normal xiomara and often deemed contaminant when seen on BAL cs. Fungal cs and fungitel sent.     (4) COPD: LAMA/ LABA, OP PFT, pulm f/u. Wean steroids.     (5) Anemia: AOCI. stable     (6) GERD: protonix. Complicates #1.      (7) HTN : on losartan and CCB     (8) HLD: simvastatin     DVT ppx: lovenox     DISPO: SNF pending course. Bronchoscopy this Am.        Signed By: Calvin Yañez MD     September 29, 2020

## 2020-09-29 NOTE — PROGRESS NOTES
Problem: Patient Education: Go to Patient Education Activity  Goal: Patient/Family Education  Description: LE HEP to improve strength and functional mobility       Outcome: Progressing Towards Goal     Problem: Falls - Risk of  Goal: *Absence of Falls  Description: Document Tulio Hazel Fall Risk and appropriate interventions in the flowsheet.   Outcome: Progressing Towards Goal  Note: Fall Risk Interventions:  Mobility Interventions: Bed/chair exit alarm    Mentation Interventions: Bed/chair exit alarm, Toileting rounds    Medication Interventions: Teach patient to arise slowly, Patient to call before getting OOB    Elimination Interventions: Call light in reach, Bed/chair exit alarm    History of Falls Interventions: Bed/chair exit alarm         Problem: Patient Education: Go to Patient Education Activity  Goal: Patient/Family Education  Outcome: Progressing Towards Goal     Problem: Patient Education: Go to Patient Education Activity  Goal: Patient/Family Education  Outcome: Progressing Towards Goal     Problem: Patient Education: Go to Patient Education Activity  Goal: Patient/Family Education  Outcome: Progressing Towards Goal     Problem: Nutrition Deficit  Goal: *Optimize nutritional status  Outcome: Progressing Towards Goal     Problem: Airway Clearance - Ineffective  Goal: *Patent airway  Outcome: Progressing Towards Goal  Goal: *Absence of airway secretions  Outcome: Progressing Towards Goal  Goal: *Able to cough effectively  Outcome: Progressing Towards Goal

## 2020-09-29 NOTE — ANESTHESIA PREPROCEDURE EVALUATION
Relevant Problems   No relevant active problems       Anesthetic History   No history of anesthetic complications            Review of Systems / Medical History  Patient summary reviewed, nursing notes reviewed and pertinent labs reviewed    Pulmonary    COPD  Recent URI    Pneumonia, shortness of breath and smoker  Asthma        Neuro/Psych       CVA  TIA     Cardiovascular    Hypertension              Exercise tolerance: >4 METS  Comments: · LV: Estimated LVEF is 60 - 65%. Biplane method used to measure ejection fraction. Normal cavity size and systolic function (ejection fraction normal). Mild concentric hypertrophy. Wall motion: normal. Moderate (grade 2) left ventricular diastolic dysfunction. · AV: Probably trileaflet aortic valve. Moderate aortic valve leaflet calcification present. Severely reduced right leaflet mobility of the aortic valve. Aortic valve area is 1 cm2. Moderate to severe aortic valve stenosis is present. Mild aortic valve regurgitation is present. · MV: Mitral annular calcification. · TV: Mild tricuspid valve regurgitation is present.    GI/Hepatic/Renal     GERD      PUD     Endo/Other        Arthritis     Other Findings            Past Medical History:   Diagnosis Date    Anal fistula 3/15/2010    Anxiety     ARF (acute renal failure) (HCC)     Colon perforation (HCC)     Genital herpes 3/15/2010    GERD (gastroesophageal reflux disease)     High cholesterol 3/15/2010    History of blood transfusion     HTN (hypertension) 3/15/2010    Hyponatremia     Ischemic colitis ([address]     left Carotid Artery Occlusion 3/15/2010    Noncompliance with treatment 3/15/2010    Pneumonia 2011    PUD (peptic ulcer disease)     Septic shock(785.52)     Stroke 2002 3/15/2010    Thromboembolus ([address]     rt thigh    TIA (transient ischemic attack) 2007    pt reported       Past Surgical History:   Procedure Laterality Date    CARDIAC CATHETERIZATION      CARDIAC SURG PROCEDURE UNLIST  HX COLECTOMY  1/11/2014    Right hemicolectomy for free air, perforated viscus    US SCROTUM/TESTICLES      right testicle removed       Current Outpatient Medications   Medication Instructions    losartan (COZAAR) 100 mg tablet TAKE 1 TABLET BY MOUTH EVERY DAY    omeprazole (PRILOSEC) 20 mg, Oral, DAILY    pantoprazole (PROTONIX) 40 mg, Oral, DAILY    simvastatin (ZOCOR) 40 mg tablet TAKE 1 TABLET BY MOUTH EVERY DAY AT NIGHT       Current Facility-Administered Medications   Medication Dose Route Frequency    lidocaine (XYLOCAINE) 10 mg/mL (1 %) injection        acetylcysteine (MUCOMYST) 200 mg/mL (20 %) solution 600 mg  600 mg Nebulization QID RT    diphenhydrAMINE (BENADRYL) injection 25 mg  25 mg IntraVENous Q6H PRN    oxyCODONE-acetaminophen (PERCOCET) 5-325 mg per tablet 1 Tab  1 Tab Oral Q4H PRN    furosemide (LASIX) tablet 40 mg  40 mg Oral DAILY    albuterol-ipratropium (DUO-NEB) 2.5 MG-0.5 MG/3 ML  3 mL Nebulization QID RT    guaiFENesin (ROBITUSSIN) 100 mg/5 mL oral liquid 400 mg  400 mg Oral Q6H    lidocaine (XYLOCAINE) 10 mg/mL (1 %) injection    PRN    mineral oil (topical)    PRN    dilTIAZem ER (CARDIZEM CD) capsule 120 mg  120 mg Oral DAILY    0.9% sodium chloride infusion 250 mL  250 mL IntraVENous PRN    atorvastatin (LIPITOR) tablet 20 mg  20 mg Oral DAILY    enoxaparin (LOVENOX) injection 40 mg  40 mg SubCUTAneous Q24H    losartan (COZAAR) tablet 100 mg  100 mg Oral DAILY    piperacillin-tazobactam (ZOSYN) 3.375 g in 0.9% sodium chloride (MBP/ADV) 100 mL MBP  3.375 g IntraVENous Q8H    pantoprazole (PROTONIX) tablet 40 mg  40 mg Oral DAILY    acetaminophen (TYLENOL) tablet 650 mg  650 mg Oral Q4H PRN    alum-mag hydroxide-simeth (MYLANTA) oral suspension 30 mL  30 mL Oral Q4H PRN    magnesium hydroxide (MILK OF MAGNESIA) 400 mg/5 mL oral suspension 30 mL  30 mL Oral DAILY PRN    ondansetron (ZOFRAN) injection 4 mg  4 mg IntraVENous Q8H PRN       Patient Vitals for the past 24 hrs:   Temp Pulse Resp BP SpO2   09/29/20 1042  82 18 130/75 100 %   09/29/20 0810     97 %   09/29/20 0751 36.6 °C (97.8 °F) 81 20 121/69 100 %   09/29/20 0559  91 18 109/63    09/28/20 2154  96 16 112/65    09/28/20 2019     96 %   09/28/20 1944 36.7 °C (98.1 °F) (!) 108 18 101/60 96 %   09/28/20 1538     94 %   09/28/20 1158     97 %       Lab Results   Component Value Date/Time    WBC 8.6 09/25/2020 08:00 AM    HGB 9.8 (L) 09/25/2020 08:00 AM    HCT 32.0 (L) 09/25/2020 08:00 AM    PLATELET 711 43/57/1345 08:00 AM    MCV 94.4 09/25/2020 08:00 AM     Lab Results   Component Value Date/Time    Sodium 137 09/26/2020 07:10 PM    Potassium 3.8 09/26/2020 07:10 PM    Chloride 92 (L) 09/26/2020 07:10 PM    CO2 43 (HH) 09/26/2020 07:10 PM    Anion gap 2 (L) 09/26/2020 07:10 PM    Glucose 110 (H) 09/26/2020 07:10 PM    BUN 17 09/26/2020 07:10 PM    Creatinine 0.93 09/26/2020 07:10 PM    BUN/Creatinine ratio 18 09/26/2020 07:10 PM    GFR est AA >60 09/26/2020 07:10 PM    GFR est non-AA >60 09/26/2020 07:10 PM    Calcium 8.0 (L) 09/26/2020 07:10 PM     No results found for: APTT, PTP, INR, INREXT  Lab Results   Component Value Date/Time    Glucose 110 (H) 09/26/2020 07:10 PM    Glucose (POC) 105 (H) 09/12/2020 08:26 PM             Physical Exam    Airway  Mallampati: II  TM Distance: < 4 cm  Neck ROM: normal range of motion   Mouth opening: Normal     Cardiovascular    Rhythm: regular  Rate: normal         Dental  No notable dental hx       Pulmonary    Rhonchi  Decreased breath sounds: bibasilar  Wheezes    Prolonged expiration and stridor     Abdominal  GI exam deferred       Other Findings            Anesthetic Plan    ASA: 4  Anesthesia type: general          Induction: Intravenous  Anesthetic plan and risks discussed with: Patient and Family

## 2020-09-29 NOTE — ANESTHESIA POSTPROCEDURE EVALUATION
Procedure(s):  BRONCHOSCOPY.     general    Anesthesia Post Evaluation      Multimodal analgesia: multimodal analgesia used between 6 hours prior to anesthesia start to PACU discharge  Patient location during evaluation: PACU  Patient participation: complete - patient participated  Level of consciousness: awake  Pain score: 0  Pain management: adequate  Airway patency: patent  Anesthetic complications: no  Cardiovascular status: acceptable  Respiratory status: acceptable  Hydration status: acceptable  Post anesthesia nausea and vomiting:  controlled  Final Post Anesthesia Temperature Assessment:  Normothermia (36.0-37.5 degrees C)      INITIAL Post-op Vital signs:   Vitals Value Taken Time   /72 9/29/2020  1:40 PM   Temp 36.2 °C (97.2 °F) 9/29/2020 12:54 PM   Pulse 98 9/29/2020  1:40 PM   Resp 17 9/29/2020  1:40 PM   SpO2 93 % 9/29/2020  1:40 PM

## 2020-09-30 ENCOUNTER — APPOINTMENT (OUTPATIENT)
Dept: GENERAL RADIOLOGY | Age: 60
DRG: 177 | End: 2020-09-30
Attending: INTERNAL MEDICINE
Payer: MEDICARE

## 2020-09-30 LAB
ANION GAP SERPL CALC-SCNC: 0 MMOL/L (ref 5–15)
BUN SERPL-MCNC: 15 MG/DL (ref 6–20)
BUN/CREAT SERPL: 19 (ref 12–20)
CA-I BLD-MCNC: 8.6 MG/DL (ref 8.5–10.1)
CHLORIDE SERPL-SCNC: 97 MMOL/L (ref 97–108)
CO2 SERPL-SCNC: 43 MMOL/L (ref 21–32)
CREAT SERPL-MCNC: 0.81 MG/DL (ref 0.7–1.3)
GLUCOSE SERPL-MCNC: 79 MG/DL (ref 65–100)
POTASSIUM SERPL-SCNC: 4 MMOL/L (ref 3.5–5.1)
SODIUM SERPL-SCNC: 140 MMOL/L (ref 136–145)

## 2020-09-30 PROCEDURE — 74011250636 HC RX REV CODE- 250/636: Performed by: INTERNAL MEDICINE

## 2020-09-30 PROCEDURE — 94760 N-INVAS EAR/PLS OXIMETRY 1: CPT

## 2020-09-30 PROCEDURE — 74011250637 HC RX REV CODE- 250/637: Performed by: HOSPITALIST

## 2020-09-30 PROCEDURE — 94667 MNPJ CHEST WALL 1ST: CPT

## 2020-09-30 PROCEDURE — 3331090002 HH PPS REVENUE DEBIT

## 2020-09-30 PROCEDURE — 74011000258 HC RX REV CODE- 258: Performed by: INTERNAL MEDICINE

## 2020-09-30 PROCEDURE — 94640 AIRWAY INHALATION TREATMENT: CPT

## 2020-09-30 PROCEDURE — 71046 X-RAY EXAM CHEST 2 VIEWS: CPT

## 2020-09-30 PROCEDURE — 74011000250 HC RX REV CODE- 250: Performed by: INTERNAL MEDICINE

## 2020-09-30 PROCEDURE — 74011250637 HC RX REV CODE- 250/637: Performed by: INTERNAL MEDICINE

## 2020-09-30 PROCEDURE — 87070 CULTURE OTHR SPECIMN AEROBIC: CPT

## 2020-09-30 PROCEDURE — 80048 BASIC METABOLIC PNL TOTAL CA: CPT

## 2020-09-30 PROCEDURE — 77010033678 HC OXYGEN DAILY

## 2020-09-30 PROCEDURE — 36415 COLL VENOUS BLD VENIPUNCTURE: CPT

## 2020-09-30 PROCEDURE — 94668 MNPJ CHEST WALL SBSQ: CPT

## 2020-09-30 PROCEDURE — 3331090001 HH PPS REVENUE CREDIT

## 2020-09-30 PROCEDURE — 65270000029 HC RM PRIVATE

## 2020-09-30 PROCEDURE — 94669 MECHANICAL CHEST WALL OSCILL: CPT

## 2020-09-30 PROCEDURE — 74011250636 HC RX REV CODE- 250/636: Performed by: HOSPITALIST

## 2020-09-30 RX ADMIN — OXYCODONE HYDROCHLORIDE AND ACETAMINOPHEN 1 TABLET: 5; 325 TABLET ORAL at 06:16

## 2020-09-30 RX ADMIN — ACETYLCYSTEINE 600 MG: 200 SOLUTION ORAL; RESPIRATORY (INHALATION) at 12:49

## 2020-09-30 RX ADMIN — DIPHENHYDRAMINE HYDROCHLORIDE 25 MG: 50 INJECTION, SOLUTION INTRAMUSCULAR; INTRAVENOUS at 11:54

## 2020-09-30 RX ADMIN — LOSARTAN POTASSIUM 100 MG: 50 TABLET, FILM COATED ORAL at 10:11

## 2020-09-30 RX ADMIN — ACETYLCYSTEINE 800 MG: 200 SOLUTION ORAL; RESPIRATORY (INHALATION) at 07:31

## 2020-09-30 RX ADMIN — IPRATROPIUM BROMIDE AND ALBUTEROL SULFATE 3 ML: .5; 3 SOLUTION RESPIRATORY (INHALATION) at 07:30

## 2020-09-30 RX ADMIN — LEVOFLOXACIN 500 MG: 5 INJECTION, SOLUTION INTRAVENOUS at 10:11

## 2020-09-30 RX ADMIN — PIPERACILLIN SODIUM AND TAZOBACTAM SODIUM 3.38 G: 3; .375 INJECTION, POWDER, LYOPHILIZED, FOR SOLUTION INTRAVENOUS at 05:25

## 2020-09-30 RX ADMIN — GUAIFENESIN 200 MG: 200 SOLUTION ORAL at 05:25

## 2020-09-30 RX ADMIN — GUAIFENESIN 400 MG: 200 SOLUTION ORAL at 11:54

## 2020-09-30 RX ADMIN — IPRATROPIUM BROMIDE AND ALBUTEROL SULFATE 3 ML: .5; 3 SOLUTION RESPIRATORY (INHALATION) at 21:21

## 2020-09-30 RX ADMIN — PANTOPRAZOLE SODIUM 40 MG: 40 TABLET, DELAYED RELEASE ORAL at 05:25

## 2020-09-30 RX ADMIN — ATORVASTATIN CALCIUM 20 MG: 20 TABLET, FILM COATED ORAL at 19:44

## 2020-09-30 RX ADMIN — OXYCODONE HYDROCHLORIDE AND ACETAMINOPHEN 1 TABLET: 5; 325 TABLET ORAL at 15:03

## 2020-09-30 RX ADMIN — ACETYLCYSTEINE 600 MG: 200 SOLUTION ORAL; RESPIRATORY (INHALATION) at 21:21

## 2020-09-30 RX ADMIN — OXYCODONE HYDROCHLORIDE AND ACETAMINOPHEN 1 TABLET: 5; 325 TABLET ORAL at 02:16

## 2020-09-30 RX ADMIN — GUAIFENESIN 400 MG: 200 SOLUTION ORAL at 18:34

## 2020-09-30 RX ADMIN — IPRATROPIUM BROMIDE AND ALBUTEROL SULFATE 3 ML: .5; 3 SOLUTION RESPIRATORY (INHALATION) at 12:49

## 2020-09-30 RX ADMIN — ACETYLCYSTEINE 600 MG: 200 SOLUTION ORAL; RESPIRATORY (INHALATION) at 16:17

## 2020-09-30 RX ADMIN — ENOXAPARIN SODIUM 40 MG: 40 INJECTION SUBCUTANEOUS at 10:11

## 2020-09-30 RX ADMIN — IPRATROPIUM BROMIDE AND ALBUTEROL SULFATE 3 ML: .5; 3 SOLUTION RESPIRATORY (INHALATION) at 16:16

## 2020-09-30 RX ADMIN — DIPHENHYDRAMINE HYDROCHLORIDE 25 MG: 50 INJECTION, SOLUTION INTRAMUSCULAR; INTRAVENOUS at 02:16

## 2020-09-30 RX ADMIN — PIPERACILLIN SODIUM AND TAZOBACTAM SODIUM 3.38 G: 3; .375 INJECTION, POWDER, LYOPHILIZED, FOR SOLUTION INTRAVENOUS at 15:57

## 2020-09-30 RX ADMIN — OXYCODONE HYDROCHLORIDE AND ACETAMINOPHEN 1 TABLET: 5; 325 TABLET ORAL at 19:44

## 2020-09-30 RX ADMIN — DIPHENHYDRAMINE HYDROCHLORIDE 25 MG: 50 INJECTION, SOLUTION INTRAMUSCULAR; INTRAVENOUS at 18:34

## 2020-09-30 RX ADMIN — DILTIAZEM HYDROCHLORIDE 120 MG: 120 CAPSULE, COATED, EXTENDED RELEASE ORAL at 10:10

## 2020-09-30 RX ADMIN — FUROSEMIDE 40 MG: 40 TABLET ORAL at 10:10

## 2020-09-30 NOTE — PROGRESS NOTES
Progress Note    Patient: Darling Vidal MRN: [id number]  SSN: [id number]    YOB: 1960  Age: 63 y.o. Sex: male      Admit Date: 9/10/2020    LOS: 20 days     Subjective:     59M, H/o COPD not on oxygen with with shortness of breath s/t pneumonia complicated by left lung collapse. + sob, no significant improvement with cxr. Resp at bedside for manual percusion          ROS  Review of Systems   Constitutional: Negative.    HENT: Negative.    Eyes: Negative.    Respiratory: Positive for shortness of breath and cough  Cardiovascular: Negative.    Gastrointestinal: Negative.    Genitourinary: Negative.    Musculoskeletal: Negative.    Skin: Negative.    Neurological: Negative.    Endo/Heme/Allergies: Negative.    Psychiatric/Behavioral: Negative.      Prior to Admission Medications   Prescriptions Last Dose Informant Patient Reported? Taking? cefdinir (OMNICEF) 300 mg capsule   No No   Sig: Take 1 Cap by mouth two (2) times a day for 10 days. losartan (COZAAR) 100 mg tablet   No No   Sig: TAKE 1 TABLET BY MOUTH EVERY DAY   omeprazole (PRILOSEC) 20 mg capsule   Yes Yes   Sig: Take 20 mg by mouth daily. pantoprazole (PROTONIX) 40 mg tablet   No No   Sig: Take 1 Tab by mouth daily.    simvastatin (ZOCOR) 40 mg tablet   No No   Sig: TAKE 1 TABLET BY MOUTH EVERY DAY AT NIGHT      Facility-Administered Medications: None       Current Facility-Administered Medications:     mineral oil (topical), , , PRN, Duong Ruiz MD, 5 mL at 09/29/20 1204    acetylcysteine (MUCOMYST) 200 mg/mL (20 %) solution, , , PRN, Duong Ruiz MD, 4,800 mg at 09/29/20 1210    levoFLOXacin (LEVAQUIN) 500 mg in D5W IVPB, 500 mg, IntraVENous, Q24H, Duong Ruiz MD, Last Rate: 100 mL/hr at 09/30/20 1011, 500 mg at 09/30/20 1011    acetylcysteine (MUCOMYST) 200 mg/mL (20 %) solution 600 mg, 600 mg, Nebulization, QID RT, Duong Ruiz MD, 800 mg at 09/30/20 0731    diphenhydrAMINE (BENADRYL) injection 25 mg, 25 mg, IntraVENous, Q6H PRN, Daniel Batista MD, 25 mg at 09/30/20 0216    oxyCODONE-acetaminophen (PERCOCET) 5-325 mg per tablet 1 Tab, 1 Tab, Oral, Q4H PRN, Daniel Batista MD, 1 Tab at 09/30/20 0616    furosemide (LASIX) tablet 40 mg, 40 mg, Oral, DAILY, Jarett Ocampo DO, 40 mg at 09/30/20 1010    albuterol-ipratropium (DUO-NEB) 2.5 MG-0.5 MG/3 ML, 3 mL, Nebulization, QID RT, Jarett Ocampo, , 3 mL at 09/30/20 0730    guaiFENesin (ROBITUSSIN) 100 mg/5 mL oral liquid 400 mg, 400 mg, Oral, Q6H, Jarett Ocampo, , 200 mg at 09/30/20 0525    lidocaine (XYLOCAINE) 10 mg/mL (1 %) injection, , , PRN, Jarett Ocampo DO, 18 mL at 09/29/20 1204    mineral oil (topical), , , PRN, Jarett Ocampo DO, 5 mL at 09/23/20 0820    dilTIAZem ER (CARDIZEM CD) capsule 120 mg, 120 mg, Oral, DAILY, Romel Vann MD, 120 mg at 09/30/20 1010    0.9% sodium chloride infusion 250 mL, 250 mL, IntraVENous, PRN, Calvin Nielsen MD    atorvastatin (LIPITOR) tablet 20 mg, 20 mg, Oral, DAILY, Calvin Nielsen MD, 20 mg at 09/29/20 2025    enoxaparin (LOVENOX) injection 40 mg, 40 mg, SubCUTAneous, Q24H, Calvin Nielsen MD, 40 mg at 09/30/20 1011    losartan (COZAAR) tablet 100 mg, 100 mg, Oral, DAILY, Calvin Nielsen MD, 100 mg at 09/30/20 1011    piperacillin-tazobactam (ZOSYN) 3.375 g in 0.9% sodium chloride (MBP/ADV) 100 mL MBP, 3.375 g, IntraVENous, Q8H, Calvin Nielsen MD, Last Rate: 25 mL/hr at 09/30/20 0525, 3.375 g at 09/30/20 0525    pantoprazole (PROTONIX) tablet 40 mg, 40 mg, Oral, DAILY, Calvin Nielsen MD, 40 mg at 09/30/20 8452    acetaminophen (TYLENOL) tablet 650 mg, 650 mg, Oral, Q4H PRN, Calvin Nielsen MD, 650 mg at 09/24/20 2319    alum-mag hydroxide-simeth (MYLANTA) oral suspension 30 mL, 30 mL, Oral, Q4H PRN, Calvin Nielsen MD, 30 mL at 09/22/20 2215    magnesium hydroxide (MILK OF MAGNESIA) 400 mg/5 mL oral suspension 30 mL, 30 mL, Oral, DAILY PRN, Calvin Nielsen MD    ondansetron [facility]) injection 4 mg, 4 mg, IntraVENous, Q8H PRN, Reji Driver MD    Objective:     Vitals:    09/30/20 5687 09/30/20 0738 09/30/20 1030 09/30/20 1037   BP:       Pulse:       Resp:       Temp:       SpO2: 97% 97% 98% 97%   Weight:       Height:            Intake and Output:  Current Shift: No intake/output data recorded. Last three shifts: 09/28 1901 - 09/30 0700  In: 2100 [P.O.:1300; I.V.:800]  Out: 2500 [Urine:2500]      Physical Exam:   General : Appearance:  no respiratory distress noted  HEENT : PERRLA, normal oral mucosa, atraumatic normocephalic, Normal ear and nose. Neck : Supple, no JVD, no masses noted, no carotid bruit  Lungs : normal breath sounds. No wheezing, no rales. He is splinting on deep breathing. CVS : Rhythm rate regular, S1+, S2+, no murmur or gallop  Abdomen : Soft, nontender, no organomegaly, bowel sounds active  Extremities : No edema noted,  pedal pulses palpable  Musculoskeletal : Fair range of motion, no joint swelling or effusion, muscle tone and power appears normal,   Skin : Moist, warm, skin turgor, no pathological rash  Lymphatic : No cervical lymphadenopathy. Neurological : Awake, alert, oriented to time place person. No neurological deficits. Psychiatric : Mood and affect appears appropriate to the situation. Lab/Data Review:  No results found for this or any previous visit (from the past 24 hour(s)). Assessment and plan:      (1) acute hypoxic respiratory failure : s/t 2,3. On 1L NC     (2) Left lung collapse: s/p 2 broncoscopy and aggressive pulmonary hygeine. Chest PT, Q6H nebs, guaifenesin, lasix with negative fluid target. Pulmnology following. For repeat bronch 10/1. No significant cxr improvement    (3) Pneumonia : same as #2. Continue Zosyn, stopped Azithro 9/23. 14 d abx total is recommended. Follow BAL culture. BAL cxs 9/21 & 9/22 grew Autumn Albicans, presumed normal xiomara and often deemed contaminant when seen on BAL cs.  Fungal cs and fungitel sent.     (4) COPD: LAMA/ LABA, OP PFT, pulm f/u. Wean steroids.     (5) Anemia: AOCI. stable     (6) GERD: protonix. Complicates #1.      (7) HTN : on losartan and CCB     (8) HLD: simvastatin     DVT ppx: lovenox     DISPO: SNF pending course.  Bronchoscopy anticipated 10/1      Signed By: Zerita Schirmer, MD     September 30, 2020

## 2020-09-30 NOTE — PROGRESS NOTES
Manual percussion performed as per order,  More concentration was given on the left mid and lower areas, tolerated well.

## 2020-09-30 NOTE — PROGRESS NOTES
Pulm/CC Progress Note    Subjective:   Daily Progress Note: 2020     Patient seen and examined at th bedside today, no acute events overnight. States that he is still coughing up a lot of mucous yesterday/this morning, helped by the addition of EZ-PAP. Still on 2 L nasal cannula oxygen  No acute distress    Bronchoscopy done yesterday for mucus clearance with Mucomyst and extensive suctioning    Chest x-ray this morning shows slight improvement in aeration but persistent left lower lobe, lingula, and partial left upper lobe atelectasis       Review of Systems  has complains of shortness of breath. He is on nasal cannula oxygen. Denied any nausea vomiting. Has fair appetite    Objective:     Visit Vitals  /70   Pulse 100   Temp 99 °F (37.2 °C)   Resp 20   Ht 6' 0.99\" (1.854 m)   Wt 82.5 kg (181 lb 14.1 oz)   SpO2 97%   BMI 24.00 kg/m²    O2 Flow Rate (L/min): 3 l/min O2 Device: Nasal cannula    Temp (24hrs), Av.6 °F (37 °C), Min:97.2 °F (36.2 °C), Max:99.1 °F (37.3 °C)      No intake/output data recorded.  1901 -  0700  In: 2100 [P.O.:1300;  I.V.:800]  Out: 2500 [Urine:2500]    Current Facility-Administered Medications   Medication Dose Route Frequency    mineral oil (topical)    PRN    acetylcysteine (MUCOMYST) 200 mg/mL (20 %) solution    PRN    levoFLOXacin (LEVAQUIN) 500 mg in D5W IVPB  500 mg IntraVENous Q24H    acetylcysteine (MUCOMYST) 200 mg/mL (20 %) solution 600 mg  600 mg Nebulization QID RT    diphenhydrAMINE (BENADRYL) injection 25 mg  25 mg IntraVENous Q6H PRN    oxyCODONE-acetaminophen (PERCOCET) 5-325 mg per tablet 1 Tab  1 Tab Oral Q4H PRN    furosemide (LASIX) tablet 40 mg  40 mg Oral DAILY    albuterol-ipratropium (DUO-NEB) 2.5 MG-0.5 MG/3 ML  3 mL Nebulization QID RT    guaiFENesin (ROBITUSSIN) 100 mg/5 mL oral liquid 400 mg  400 mg Oral Q6H    lidocaine (XYLOCAINE) 10 mg/mL (1 %) injection    PRN    mineral oil (topical)    PRN    dilTIAZem ER (CARDIZEM CD) capsule 120 mg  120 mg Oral DAILY    0.9% sodium chloride infusion 250 mL  250 mL IntraVENous PRN    atorvastatin (LIPITOR) tablet 20 mg  20 mg Oral DAILY    enoxaparin (LOVENOX) injection 40 mg  40 mg SubCUTAneous Q24H    losartan (COZAAR) tablet 100 mg  100 mg Oral DAILY    piperacillin-tazobactam (ZOSYN) 3.375 g in 0.9% sodium chloride (MBP/ADV) 100 mL MBP  3.375 g IntraVENous Q8H    pantoprazole (PROTONIX) tablet 40 mg  40 mg Oral DAILY    acetaminophen (TYLENOL) tablet 650 mg  650 mg Oral Q4H PRN    alum-mag hydroxide-simeth (MYLANTA) oral suspension 30 mL  30 mL Oral Q4H PRN    magnesium hydroxide (MILK OF MAGNESIA) 400 mg/5 mL oral suspension 30 mL  30 mL Oral DAILY PRN    ondansetron (ZOFRAN) injection 4 mg  4 mg IntraVENous Q8H PRN       Physical Exam:  General: Alert and oriented x3.  1 L nasal cannula. Pleasant but nervous. Chronically ill appearing. HEENT: atraumatic, PERRL, moist mucosa,     Neck: Trachea midline, no carotid bruit, no masses. Supple but thick. Respiratory: Improved air entry in the anterior/superior chest, still diminished at the left base. Rhonchi improved in the right hemithorax. 1 L nasal cannula. Cardiovascular: RRR, no m/r/g, Normal S1 and S2   abdomen: Has ventral abdominal hernia, evidence of surgical scars soft,   Rectal: deferred  Extremities: no cyanosis or clubbing. Trace edema. Integumentary: warm, dry, and pink, with no rash, purpura, or petechia. Heme/Lymph: no lymphadenopathy, no bruises  Neurological: No focal motor deficit   psychiatric: cooperative with normal mood, affect, and cognition      Additional comments: Chest x-rays reviewed    Data Review    No results found for this or any previous visit (from the past 24 hour(s)). Today's CXR read is currently pending. XR CHEST AP LAT   Final Result   IMPRESSION:   1. Persistent opacification of the left hemithorax with volume loss.    2.  Moderate right pleural effusion with probable associated right basilar   atelectasis. XR CHEST PORT   Final Result   Impression: Increased volume loss left lung. Otherwise stable. XR CHEST PORT   Final Result   IMPRESSION:   1. Improved aeration of the left lung with persistent basilar consolidative   opacities and probable pleural effusions. 2. Other extensive interstitial and alveolar opacities are nonspecific, but   potentially related to pulmonary edema or infection. XR CHEST PORT   Final Result   Impression:Left lung collapse without improvement from prior study. XR CHEST PORT   Final Result   IMPRESSION:   1. There is milder enlargement of the cardiac silhouette as well as increasing   pulmonary vascular ill definition suspect for mild pulmonary edema. This could   be related to cardiogenic edema or fluid overload. 2.  There is been an improvement in the overall aeration of the left lung with   and improved visualization of vascular markings within the left upper lung zone. There still remains significant partial collapse of the left lung. 3.  There is increased patchy airspace disease laterally within the right lower   lobe just above the right lateral costophrenic angle. 4.  There are persistent moderate-sized bilateral pleural effusions. XR CHEST AP LAT   Final Result   IMPRESSION: Persistent collapse of the left lung with bilateral pleural   effusions. Increasing vascular congestion on the right. XR CHEST PA LAT   Final Result   Impression:   Ongoing near complete white out of the left lung. Little interval change since   the prior examination. CT CHEST WO CONT   Final Result   IMPRESSION: Complete collapse of the left lung due to complete bronchial   obstruction, possibly aspiration. Fluid overload also noted      XR ABD ACUTE W 1 V CHEST   Final Result   IMPRESSION: Opacification of the left hemithorax, questioned for remote   pneumonectomy or lung collapse with pleural fluid.       Prominent right parenchymal/interstitial lung markings compatible with fibrosis   and possible partial vascular congestion. Small bowel gas, nonobstructive pattern. Assessment/Plan: This is a middle-aged male who was admitted because of increasing symptoms of shortness of breath. He is getting treated in hospital for pneumonia with IV Zosyn/Azithromycin. He is noted to be increasingly tachypneic and short of breath. He has been on supplemental oxygen. His chest x-ray is showing left lung opacification likely from mucous plugging. This is slowly improving with aggressive pulmonary hygiene and three suction bronchoscopies.     Plan:     1.)    Left lung collapse/shortness of breath:  - Shortness of breath and hypoxia improved with diuresis and antibiotics, however still having significant mucous production and difficulty clearing his mucous. This has resulted in left lung colllapse. - Mucous plugging slowly improving with aggressive pulmonary hygiene and 3 clean out bronchs on 9/21, 9/23, and 9/24.  - BAL of the lingula performed on 9/21 and sent for cell count an culture. - I called the microbiology lab at [facility] and apparently his bronchial lavage and washing from 9/22/20 are completed and are both growing candida albicans. It appears that a cell count was never done. Candida is normal xiomara within the airway and is often deemed a contaminant when shown on BAL culture. It certainly seems less likely that he has a true fungal pneumonia given lack of toxic symptoms and history of immunosuppression. Nevertheless, will send of a fungal sputum culture and a serum Fungitel. - Bedside ultrasound performed after the second bronchoscopy did not reveal a significant pleural effusion on the left. Supplemental O2 needs improved to 1L NC today. Please wean supplemental oxygen for sats greater than 92%.   Continue aggressive pulmonary hygiene with nebulizers every 6 hours;  Aggressive chest PT with vest every 6 hours, EZ-PAP therapy q6, acapella device as well as incentive spirometer use. Added guaifenesin 400 mg q6. Apparently Vest device not working  Acapella valve not effective since patient is using it and does not seem to be having any benefit  We will ask respiratory therapy to do manual percussion to left middle and lower lungs with suctioning    Chest x-ray now once again shows near total collapse of left lung  We will proceed with bronchoscopy in a.m. 10/1    Await culture and cytology from bronchoscopy yesterday    2.)  COPD:  He has a history of COPD based on chronic smoking. Continue scheduled bronchodilators. Patient should be discharged with a LAMA/LABA such as Anoro and needs f/u in our office for PFT's and further management. Weaned prednisone to 10 mg daily on 9/24, likely can stop tomorrow. 3.)  Pneumonia:  He is on IV Zosyn, stopped Zithromax on 9/23/2020. We will start him on Levaquin given persistent atelectasis/infiltrate/secretions    4.)  Hypertension:  He is on antihypertensive medications, BP's stable. 5.)  Status post ex lap:  His abdominal examination shows significant fecal scars and ventral abdominal hernia    Questions of patient were answered at bedside in detail  Case discussed in detail with RN, RT, and care team  Thank you for involving me in the care of this patient  I will follow with you closely during hospitalization    Time spent more than 30 minutes in direct patient care with no overlap    Duong Hogue MD  Pulmonary and critical care

## 2020-10-01 ENCOUNTER — APPOINTMENT (OUTPATIENT)
Dept: ENDOSCOPY | Age: 60
DRG: 177 | End: 2020-10-01
Attending: INTERNAL MEDICINE
Payer: MEDICARE

## 2020-10-01 ENCOUNTER — ANESTHESIA EVENT (OUTPATIENT)
Dept: ENDOSCOPY | Age: 60
DRG: 177 | End: 2020-10-01
Payer: MEDICARE

## 2020-10-01 ENCOUNTER — ANESTHESIA (OUTPATIENT)
Dept: ENDOSCOPY | Age: 60
DRG: 177 | End: 2020-10-01
Payer: MEDICARE

## 2020-10-01 LAB
BACTERIA SPEC CULT: NORMAL
BACTERIA SPEC CULT: NORMAL
CULTURE,CULT: NORMAL
CULTURE,CULT: NORMAL
GRAM STN SPEC: NORMAL
SPECIAL REQUESTS,SREQ: NORMAL

## 2020-10-01 PROCEDURE — 65270000029 HC RM PRIVATE

## 2020-10-01 PROCEDURE — 76040000003: Performed by: INTERNAL MEDICINE

## 2020-10-01 PROCEDURE — 74011250636 HC RX REV CODE- 250/636: Performed by: NURSE ANESTHETIST, CERTIFIED REGISTERED

## 2020-10-01 PROCEDURE — 74011250637 HC RX REV CODE- 250/637: Performed by: INTERNAL MEDICINE

## 2020-10-01 PROCEDURE — 94640 AIRWAY INHALATION TREATMENT: CPT

## 2020-10-01 PROCEDURE — 77030012699 HC VLV SUC CNTRL OCOA -A: Performed by: INTERNAL MEDICINE

## 2020-10-01 PROCEDURE — 2709999900 HC NON-CHARGEABLE SUPPLY: Performed by: INTERNAL MEDICINE

## 2020-10-01 PROCEDURE — 74011000250 HC RX REV CODE- 250: Performed by: INTERNAL MEDICINE

## 2020-10-01 PROCEDURE — 76060000035 HC ANESTHESIA 2 TO 2.5 HR: Performed by: INTERNAL MEDICINE

## 2020-10-01 PROCEDURE — 74011250636 HC RX REV CODE- 250/636: Performed by: HOSPITALIST

## 2020-10-01 PROCEDURE — 3331090002 HH PPS REVENUE DEBIT

## 2020-10-01 PROCEDURE — 94760 N-INVAS EAR/PLS OXIMETRY 1: CPT

## 2020-10-01 PROCEDURE — 74011250636 HC RX REV CODE- 250/636: Performed by: INTERNAL MEDICINE

## 2020-10-01 PROCEDURE — 87070 CULTURE OTHR SPECIMN AEROBIC: CPT

## 2020-10-01 PROCEDURE — 88108 CYTOPATH CONCENTRATE TECH: CPT

## 2020-10-01 PROCEDURE — 77010033678 HC OXYGEN DAILY

## 2020-10-01 PROCEDURE — 77030008684 HC TU ET CUF COVD -B: Performed by: NURSE ANESTHETIST, CERTIFIED REGISTERED

## 2020-10-01 PROCEDURE — 3331090001 HH PPS REVENUE CREDIT

## 2020-10-01 PROCEDURE — 74011250637 HC RX REV CODE- 250/637: Performed by: HOSPITALIST

## 2020-10-01 PROCEDURE — 74011000258 HC RX REV CODE- 258: Performed by: INTERNAL MEDICINE

## 2020-10-01 PROCEDURE — 74011000250 HC RX REV CODE- 250: Performed by: NURSE ANESTHETIST, CERTIFIED REGISTERED

## 2020-10-01 PROCEDURE — 77030026438 HC STYL ET INTUB CARD -A: Performed by: NURSE ANESTHETIST, CERTIFIED REGISTERED

## 2020-10-01 RX ORDER — PROPOFOL 10 MG/ML
INJECTION, EMULSION INTRAVENOUS
Status: DISCONTINUED
Start: 2020-10-01 | End: 2020-10-01 | Stop reason: WASHOUT

## 2020-10-01 RX ORDER — ROCURONIUM BROMIDE 10 MG/ML
INJECTION, SOLUTION INTRAVENOUS
Status: COMPLETED
Start: 2020-10-01 | End: 2020-10-01

## 2020-10-01 RX ORDER — DEXAMETHASONE SODIUM PHOSPHATE 4 MG/ML
INJECTION, SOLUTION INTRA-ARTICULAR; INTRALESIONAL; INTRAMUSCULAR; INTRAVENOUS; SOFT TISSUE AS NEEDED
Status: DISCONTINUED | OUTPATIENT
Start: 2020-10-01 | End: 2020-10-01 | Stop reason: HOSPADM

## 2020-10-01 RX ORDER — NEOSTIGMINE METHYLSULFATE 5 MG/5 ML
SYRINGE (ML) INTRAVENOUS
Status: COMPLETED
Start: 2020-10-01 | End: 2020-10-01

## 2020-10-01 RX ORDER — PHENYLEPHRINE HCL IN 0.9% NACL 1 MG/10 ML
SYRINGE (ML) INTRAVENOUS
Status: DISPENSED
Start: 2020-10-01 | End: 2020-10-02

## 2020-10-01 RX ORDER — LIDOCAINE HYDROCHLORIDE 20 MG/ML
INJECTION, SOLUTION EPIDURAL; INFILTRATION; INTRACAUDAL; PERINEURAL
Status: COMPLETED
Start: 2020-10-01 | End: 2020-10-01

## 2020-10-01 RX ORDER — ROCURONIUM BROMIDE 10 MG/ML
INJECTION, SOLUTION INTRAVENOUS AS NEEDED
Status: DISCONTINUED | OUTPATIENT
Start: 2020-10-01 | End: 2020-10-01 | Stop reason: HOSPADM

## 2020-10-01 RX ORDER — FENTANYL CITRATE 50 UG/ML
INJECTION, SOLUTION INTRAMUSCULAR; INTRAVENOUS AS NEEDED
Status: DISCONTINUED | OUTPATIENT
Start: 2020-10-01 | End: 2020-10-01 | Stop reason: HOSPADM

## 2020-10-01 RX ORDER — GLYCOPYRROLATE 0.2 MG/ML
INJECTION INTRAMUSCULAR; INTRAVENOUS AS NEEDED
Status: DISCONTINUED | OUTPATIENT
Start: 2020-10-01 | End: 2020-10-01 | Stop reason: HOSPADM

## 2020-10-01 RX ORDER — FENTANYL CITRATE 50 UG/ML
INJECTION, SOLUTION INTRAMUSCULAR; INTRAVENOUS
Status: COMPLETED
Start: 2020-10-01 | End: 2020-10-01

## 2020-10-01 RX ORDER — LIDOCAINE HYDROCHLORIDE 10 MG/ML
INJECTION INFILTRATION; PERINEURAL
Status: DISPENSED
Start: 2020-10-01 | End: 2020-10-02

## 2020-10-01 RX ORDER — ETOMIDATE 2 MG/ML
INJECTION INTRAVENOUS
Status: COMPLETED
Start: 2020-10-01 | End: 2020-10-01

## 2020-10-01 RX ORDER — ETOMIDATE 2 MG/ML
INJECTION INTRAVENOUS AS NEEDED
Status: DISCONTINUED | OUTPATIENT
Start: 2020-10-01 | End: 2020-10-01 | Stop reason: HOSPADM

## 2020-10-01 RX ORDER — ONDANSETRON 2 MG/ML
INJECTION INTRAMUSCULAR; INTRAVENOUS AS NEEDED
Status: DISCONTINUED | OUTPATIENT
Start: 2020-10-01 | End: 2020-10-01 | Stop reason: HOSPADM

## 2020-10-01 RX ORDER — EPINEPHRINE 1 MG/ML
INJECTION INTRAMUSCULAR; INTRAVENOUS; SUBCUTANEOUS AS NEEDED
Status: DISCONTINUED | OUTPATIENT
Start: 2020-10-01 | End: 2020-10-01 | Stop reason: HOSPADM

## 2020-10-01 RX ORDER — LIDOCAINE HYDROCHLORIDE 20 MG/ML
INJECTION, SOLUTION EPIDURAL; INFILTRATION; INTRACAUDAL; PERINEURAL AS NEEDED
Status: DISCONTINUED | OUTPATIENT
Start: 2020-10-01 | End: 2020-10-01 | Stop reason: HOSPADM

## 2020-10-01 RX ORDER — GLYCOPYRROLATE 0.2 MG/ML
INJECTION INTRAMUSCULAR; INTRAVENOUS
Status: COMPLETED
Start: 2020-10-01 | End: 2020-10-01

## 2020-10-01 RX ORDER — NEOSTIGMINE METHYLSULFATE 5 MG/5 ML
SYRINGE (ML) INTRAVENOUS AS NEEDED
Status: DISCONTINUED | OUTPATIENT
Start: 2020-10-01 | End: 2020-10-01 | Stop reason: HOSPADM

## 2020-10-01 RX ADMIN — LEVOFLOXACIN 500 MG: 5 INJECTION, SOLUTION INTRAVENOUS at 09:34

## 2020-10-01 RX ADMIN — EPINEPHRINE 0.01 MG: 1 INJECTION INTRAMUSCULAR; INTRAVENOUS; SUBCUTANEOUS at 14:39

## 2020-10-01 RX ADMIN — OXYCODONE HYDROCHLORIDE AND ACETAMINOPHEN 1 TABLET: 5; 325 TABLET ORAL at 16:08

## 2020-10-01 RX ADMIN — ROCURONIUM BROMIDE 30 MG: 10 SOLUTION INTRAVENOUS at 14:26

## 2020-10-01 RX ADMIN — GUAIFENESIN 400 MG: 200 SOLUTION ORAL at 11:27

## 2020-10-01 RX ADMIN — ATORVASTATIN CALCIUM 20 MG: 20 TABLET, FILM COATED ORAL at 20:43

## 2020-10-01 RX ADMIN — ACETYLCYSTEINE 600 MG: 200 SOLUTION ORAL; RESPIRATORY (INHALATION) at 07:25

## 2020-10-01 RX ADMIN — EPINEPHRINE 0.02 MG: 1 INJECTION INTRAMUSCULAR; INTRAVENOUS; SUBCUTANEOUS at 14:53

## 2020-10-01 RX ADMIN — ETOMIDATE 10 MG: 2 INJECTION INTRAVENOUS at 14:26

## 2020-10-01 RX ADMIN — OXYCODONE HYDROCHLORIDE AND ACETAMINOPHEN 1 TABLET: 5; 325 TABLET ORAL at 20:43

## 2020-10-01 RX ADMIN — FUROSEMIDE 40 MG: 40 TABLET ORAL at 09:33

## 2020-10-01 RX ADMIN — PANTOPRAZOLE SODIUM 40 MG: 40 TABLET, DELAYED RELEASE ORAL at 05:29

## 2020-10-01 RX ADMIN — LIDOCAINE HYDROCHLORIDE 100 MG: 20 INJECTION, SOLUTION EPIDURAL; INFILTRATION; INTRACAUDAL; PERINEURAL at 14:26

## 2020-10-01 RX ADMIN — PHENYLEPHRINE HYDROCHLORIDE 200 MCG: 10 INJECTION INTRAVENOUS at 14:34

## 2020-10-01 RX ADMIN — GUAIFENESIN 400 MG: 200 SOLUTION ORAL at 18:27

## 2020-10-01 RX ADMIN — FENTANYL CITRATE 100 MCG: 50 INJECTION, SOLUTION INTRAMUSCULAR; INTRAVENOUS at 14:26

## 2020-10-01 RX ADMIN — PHENYLEPHRINE HYDROCHLORIDE 200 MCG: 10 INJECTION INTRAVENOUS at 14:33

## 2020-10-01 RX ADMIN — GUAIFENESIN 400 MG: 200 SOLUTION ORAL at 00:00

## 2020-10-01 RX ADMIN — PIPERACILLIN SODIUM AND TAZOBACTAM SODIUM 3.38 G: 3; .375 INJECTION, POWDER, LYOPHILIZED, FOR SOLUTION INTRAVENOUS at 23:18

## 2020-10-01 RX ADMIN — PHENYLEPHRINE HYDROCHLORIDE 200 MCG: 10 INJECTION INTRAVENOUS at 14:26

## 2020-10-01 RX ADMIN — OXYCODONE HYDROCHLORIDE AND ACETAMINOPHEN 1 TABLET: 5; 325 TABLET ORAL at 09:33

## 2020-10-01 RX ADMIN — DEXAMETHASONE SODIUM PHOSPHATE 4 MG: 4 INJECTION, SOLUTION INTRA-ARTICULAR; INTRALESIONAL; INTRAMUSCULAR; INTRAVENOUS; SOFT TISSUE at 14:31

## 2020-10-01 RX ADMIN — PHENYLEPHRINE HYDROCHLORIDE 200 MCG: 10 INJECTION INTRAVENOUS at 14:29

## 2020-10-01 RX ADMIN — DILTIAZEM HYDROCHLORIDE 120 MG: 120 CAPSULE, COATED, EXTENDED RELEASE ORAL at 09:33

## 2020-10-01 RX ADMIN — PIPERACILLIN SODIUM AND TAZOBACTAM SODIUM 3.38 G: 3; .375 INJECTION, POWDER, LYOPHILIZED, FOR SOLUTION INTRAVENOUS at 00:17

## 2020-10-01 RX ADMIN — EPINEPHRINE 0.02 MG: 1 INJECTION INTRAMUSCULAR; INTRAVENOUS; SUBCUTANEOUS at 14:48

## 2020-10-01 RX ADMIN — GUAIFENESIN 400 MG: 200 SOLUTION ORAL at 05:29

## 2020-10-01 RX ADMIN — PIPERACILLIN SODIUM AND TAZOBACTAM SODIUM 3.38 G: 3; .375 INJECTION, POWDER, LYOPHILIZED, FOR SOLUTION INTRAVENOUS at 16:08

## 2020-10-01 RX ADMIN — ONDANSETRON 4 MG: 2 INJECTION INTRAMUSCULAR; INTRAVENOUS at 14:31

## 2020-10-01 RX ADMIN — DIPHENHYDRAMINE HYDROCHLORIDE 25 MG: 50 INJECTION, SOLUTION INTRAMUSCULAR; INTRAVENOUS at 00:00

## 2020-10-01 RX ADMIN — IPRATROPIUM BROMIDE AND ALBUTEROL SULFATE 3 ML: .5; 3 SOLUTION RESPIRATORY (INHALATION) at 07:24

## 2020-10-01 RX ADMIN — DIPHENHYDRAMINE HYDROCHLORIDE 25 MG: 50 INJECTION, SOLUTION INTRAMUSCULAR; INTRAVENOUS at 05:29

## 2020-10-01 RX ADMIN — GUAIFENESIN 400 MG: 200 SOLUTION ORAL at 23:18

## 2020-10-01 RX ADMIN — OXYCODONE HYDROCHLORIDE AND ACETAMINOPHEN 1 TABLET: 5; 325 TABLET ORAL at 02:43

## 2020-10-01 RX ADMIN — Medication 5 MG: at 15:05

## 2020-10-01 RX ADMIN — PIPERACILLIN SODIUM AND TAZOBACTAM SODIUM 3.38 G: 3; .375 INJECTION, POWDER, LYOPHILIZED, FOR SOLUTION INTRAVENOUS at 09:33

## 2020-10-01 RX ADMIN — LOSARTAN POTASSIUM 100 MG: 50 TABLET, FILM COATED ORAL at 09:33

## 2020-10-01 RX ADMIN — GLYCOPYRROLATE 0.4 MG: 0.2 INJECTION, SOLUTION INTRAMUSCULAR; INTRAVENOUS at 15:05

## 2020-10-01 RX ADMIN — PHENYLEPHRINE HYDROCHLORIDE 200 MCG: 10 INJECTION INTRAVENOUS at 14:30

## 2020-10-01 NOTE — ROUTINE PROCESS
Phone report called to [facility] nurse on [facility].  SBAR given including pt.'s request to have hernia fixed. Pt. Will be returning to room with transport. Update: 1552: Pt. Out of PACU via bed with transport tech. In stable condition.

## 2020-10-01 NOTE — PROCEDURES
Indication: Complete left lung atelectasis/collapse     Procedure: Flexible bronchoscopy for cleanout and sampling     Consent: Risks benefits and alternatives discussed with the patient and he agrees to proceed. Consent signed     Airway: Patient intubated per anesthesia     Sedation: Provided by anesthesia     Procedure note:     Timeout was called  Patient was placed in the correct position     Bronchoscope was inserted via the endotracheal tube without difficulty  Lower trachea and right lung were entered and were without significant secretions or lesions     Extensive copious thick mucopurulent secretions were noted in the left lung coming all the way up to the main vianey through the left mainstem bronchus  Saline was used to clear the secretions  Mucomyst was instilled in the left upper lobe, lingula, and left upper lobe  Extensive suctioning was performed in the left upper lobe, lingula, and left lower lobe    Mucosal irregularities were noted in the left mainstem bronchus along with the left lower lobe of unclear etiology and significance at 2, 3, and 4 o'clock positions    Bronchial brushing was performed at the left mainstem bronchus at these mucosal irregularities x2 and these were sent for cytology  Bronchial washing was performed in the left lung including left upper lobe, lingula, and left lower lobe.   And this was sent for Gram stain, culture and cytology     At the end of the procedure excellent hemostasis was noted  Lungs showed only minimal secretions     Bronchoscope was withdrawn  Patient was left on the care of anesthesia for reversal and extubation  Will do chest x-ray in a.m.     Continue aggressive chest physical therapy

## 2020-10-01 NOTE — ANESTHESIA POSTPROCEDURE EVALUATION
Procedure(s):  BRONCHOSCOPY. general    Anesthesia Post Evaluation      Multimodal analgesia: multimodal analgesia not used between 6 hours prior to anesthesia start to PACU discharge  Patient location during evaluation: bedside (Endoscopy suite)  Patient participation: complete - patient cannot participate  Level of consciousness: sleepy but conscious  Pain score: 0  Pain management: adequate  Airway patency: patent  Anesthetic complications: no  Cardiovascular status: acceptable  Respiratory status: acceptable and nasal cannula  Hydration status: acceptable  Comments: This patient remained on the stretcher. The patient was handed off to the endoscopy nursing team.  All questions regarding pre-, intra-, and postoperative care were answered.   Post anesthesia nausea and vomiting:  none      INITIAL Post-op Vital signs:   Vitals Value Taken Time   /71 10/1/2020  3:16 PM   Temp     Pulse 115 10/1/2020  3:16 PM   Resp 20 10/1/2020  3:16 PM   SpO2 100 % 10/1/2020  3:16 PM

## 2020-10-01 NOTE — ANESTHESIA PREPROCEDURE EVALUATION
Relevant Problems   No relevant active problems       Anesthetic History   No history of anesthetic complications            Review of Systems / Medical History  Patient summary reviewed, nursing notes reviewed and pertinent labs reviewed    Pulmonary    COPD  Recent URI    Pneumonia, shortness of breath and smoker  Asthma        Neuro/Psych       CVA  TIA     Cardiovascular    Hypertension              Exercise tolerance: >4 METS  Comments: · LV: Estimated LVEF is 60 - 65%. Biplane method used to measure ejection fraction. Normal cavity size and systolic function (ejection fraction normal). Mild concentric hypertrophy. Wall motion: normal. Moderate (grade 2) left ventricular diastolic dysfunction. · AV: Probably trileaflet aortic valve. Moderate aortic valve leaflet calcification present. Severely reduced right leaflet mobility of the aortic valve. Aortic valve area is 1 cm2. Moderate to severe aortic valve stenosis is present. Mild aortic valve regurgitation is present. · MV: Mitral annular calcification. · TV: Mild tricuspid valve regurgitation is present.    GI/Hepatic/Renal     GERD      PUD     Endo/Other        Arthritis     Other Findings              Past Medical History:   Diagnosis Date    Anal fistula 3/15/2010    Anxiety     ARF (acute renal failure) (HCC)     Colon perforation (HCC)     Genital herpes 3/15/2010    GERD (gastroesophageal reflux disease)     High cholesterol 3/15/2010    History of blood transfusion     HTN (hypertension) 3/15/2010    Hyponatremia     Ischemic colitis ([address]     left Carotid Artery Occlusion 3/15/2010    Noncompliance with treatment 3/15/2010    Pneumonia 2011    PUD (peptic ulcer disease)     Septic shock(785.52)     Stroke 2002 3/15/2010    Thromboembolus ([address]     rt thigh    TIA (transient ischemic attack) 2007    pt reported       Past Surgical History:   Procedure Laterality Date    CARDIAC CATHETERIZATION      CARDIAC SURG PROCEDURE UNLIST  HX COLECTOMY  1/11/2014    Right hemicolectomy for free air, perforated viscus    US SCROTUM/TESTICLES      right testicle removed       Current Outpatient Medications   Medication Instructions    losartan (COZAAR) 100 mg tablet TAKE 1 TABLET BY MOUTH EVERY DAY    omeprazole (PRILOSEC) 20 mg, Oral, DAILY    pantoprazole (PROTONIX) 40 mg, Oral, DAILY    simvastatin (ZOCOR) 40 mg tablet TAKE 1 TABLET BY MOUTH EVERY DAY AT NIGHT       Current Facility-Administered Medications   Medication Dose Route Frequency    mineral oil (topical)    PRN    acetylcysteine (MUCOMYST) 200 mg/mL (20 %) solution    PRN    levoFLOXacin (LEVAQUIN) 500 mg in D5W IVPB  500 mg IntraVENous Q24H    acetylcysteine (MUCOMYST) 200 mg/mL (20 %) solution 600 mg  600 mg Nebulization QID RT    diphenhydrAMINE (BENADRYL) injection 25 mg  25 mg IntraVENous Q6H PRN    oxyCODONE-acetaminophen (PERCOCET) 5-325 mg per tablet 1 Tab  1 Tab Oral Q4H PRN    furosemide (LASIX) tablet 40 mg  40 mg Oral DAILY    albuterol-ipratropium (DUO-NEB) 2.5 MG-0.5 MG/3 ML  3 mL Nebulization QID RT    guaiFENesin (ROBITUSSIN) 100 mg/5 mL oral liquid 400 mg  400 mg Oral Q6H    lidocaine (XYLOCAINE) 10 mg/mL (1 %) injection    PRN    mineral oil (topical)    PRN    dilTIAZem ER (CARDIZEM CD) capsule 120 mg  120 mg Oral DAILY    0.9% sodium chloride infusion 250 mL  250 mL IntraVENous PRN    atorvastatin (LIPITOR) tablet 20 mg  20 mg Oral DAILY    enoxaparin (LOVENOX) injection 40 mg  40 mg SubCUTAneous Q24H    losartan (COZAAR) tablet 100 mg  100 mg Oral DAILY    piperacillin-tazobactam (ZOSYN) 3.375 g in 0.9% sodium chloride (MBP/ADV) 100 mL MBP  3.375 g IntraVENous Q8H    pantoprazole (PROTONIX) tablet 40 mg  40 mg Oral DAILY    acetaminophen (TYLENOL) tablet 650 mg  650 mg Oral Q4H PRN    alum-mag hydroxide-simeth (MYLANTA) oral suspension 30 mL  30 mL Oral Q4H PRN    magnesium hydroxide (MILK OF MAGNESIA) 400 mg/5 mL oral suspension 30 mL  30 mL Oral DAILY PRN    ondansetron (ZOFRAN) injection 4 mg  4 mg IntraVENous Q8H PRN       Patient Vitals for the past 24 hrs:   Temp Pulse Resp BP SpO2   10/01/20 0803 36.9 °C (98.5 °F) 90 18 127/74 94 %   10/01/20 0733     99 %   10/01/20 0728     100 %   10/01/20 0303 36.7 °C (98.1 °F) 88 18 118/77 98 %   09/30/20 2122     98 %   09/30/20 1945 36.8 °C (98.2 °F) (!) 107 20 117/79 98 %   09/30/20 1624     97 %   09/30/20 1622     97 %   09/30/20 1555 36.7 °C (98.1 °F) 98 22 (!) 93/58 99 %   09/30/20 1251 36.6 °C (97.9 °F) (!) 107 20 (!) 94/58 97 %       Lab Results   Component Value Date/Time    WBC 8.6 09/25/2020 08:00 AM    HGB 9.8 (L) 09/25/2020 08:00 AM    HCT 32.0 (L) 09/25/2020 08:00 AM    PLATELET 824 87/91/0920 08:00 AM    MCV 94.4 09/25/2020 08:00 AM     Lab Results   Component Value Date/Time    Sodium 140 09/30/2020 10:55 AM    Potassium 4.0 09/30/2020 10:55 AM    Chloride 97 09/30/2020 10:55 AM    CO2 43 (HH) 09/30/2020 10:55 AM    Anion gap 0 (L) 09/30/2020 10:55 AM    Glucose 79 09/30/2020 10:55 AM    BUN 15 09/30/2020 10:55 AM    Creatinine 0.81 09/30/2020 10:55 AM    BUN/Creatinine ratio 19 09/30/2020 10:55 AM    GFR est AA >60 09/30/2020 10:55 AM    GFR est non-AA >60 09/30/2020 10:55 AM    Calcium 8.6 09/30/2020 10:55 AM     No results found for: APTT, PTP, INR, INREXT, INREXT  Lab Results   Component Value Date/Time    Glucose 79 09/30/2020 10:55 AM    Glucose (POC) 105 (H) 09/12/2020 08:26 PM             Physical Exam    Airway  Mallampati: II  TM Distance: < 4 cm  Neck ROM: normal range of motion   Mouth opening: Normal     Cardiovascular    Rhythm: regular  Rate: normal         Dental  No notable dental hx       Pulmonary    Rhonchi  Decreased breath sounds: bibasilar  Wheezes    Prolonged expiration and stridor     Abdominal  GI exam deferred       Other Findings            Anesthetic Plan    ASA: 4  Anesthesia type: general          Induction: Intravenous  Anesthetic plan and risks discussed with: Patient and Family

## 2020-10-01 NOTE — PROGRESS NOTES
Comprehensive Nutrition Assessment    Type and Reason for Visit: RD nutrition re-screen/LOS    Nutrition Recommendations/Plan:     Continue Regular diet  Continue Ensure Pdg BID  Honor pt preferences     Nursing to document %meal and supplement intakes in I/Os      Nutrition Assessment:  Admitted for PNA, hypokalemia. LL lung collapse s/p x5 Bronch, plans for 6th today. Now ordered Regular diet, intakes much improved per pt. Endorsed eating well, however now with several procedures meal times often held or interrupted. Has snacks from home of pudding and fruit cups, also receiving snacks from the unit of pb crackers, cookies, puddings. Reports being much happier with menu selections on liberalized diet. Good acceptance of Ensure pdg and would like to keep them coming d/t frequently missing meals from NPO status or procedures. Earlier this admit, intakes were decreased d/t dislike on Cardiac diet. Meds: Zosyn, statin, mylanta, lasix, diltiazem, MoM, PRN PPI       Malnutrition Assessment:  Malnutrition Status:  Mild malnutrition    Context:  Chronic illness     Findings of the 6 clinical characteristics of malnutrition:   Energy Intake:  Mild decrease in energy intake (specify)  Weight Loss:  No significant weight loss     Body Fat Loss:  No significant body fat loss,     Muscle Mass Loss:  7 - Severe muscle mass loss, Calf (gastrocnemius), Thigh (quadraceps), Temples (temporalis)  Fluid Accumulation:  No significant fluid accumulation,     Strength:  Not performed         Estimated Daily Nutrient Needs:  Energy (kcal):  2010(HBE x1.2)  Protein (g):  94(1.2g/kg)       Fluid (ml/day):  2010(1ml/kcal)    Nutrition Related Findings:  Adequate dentition, pt reported issues with dry mouth, tolerates reg texture diet. Soft BM recorded today; diarrhea resolved. Large protruding hernia to abd - not interfering with bowel fx.       Wounds:    Moisture associate skin damage(buttocks)       Current Nutrition Therapies:  DIET NUTRITIONAL SUPPLEMENTS Lunch, Dinner; Ensure Pudding (any flavor)  DIET REGULAR    Anthropometric Measures:  · Height:  6' 0.99\" (185.4 cm)  · Current Body Wt:  75.6 kg (166 lb 10.7 oz)   · Admission Body Wt:  182 lb 1.6 oz    · Usual Body Wt:  143 lb(1 year ago)     · Ideal Body Wt:  184 lbs:  93.6 %   · BMI Category:  Normal weight (BMI 18.5-24. 9)       Nutrition Diagnosis:   · Mild malnutrition related to catabolic illness(COPD) as evidenced by severe muscle loss      Nutrition Interventions:   Food and/or Nutrient Delivery: Continue current diet, Continue oral nutrition supplement  Nutrition Education and Counseling: No recommendations at this time  Coordination of Nutrition Care: Continued inpatient monitoring    Goals:  Intakes >/=75% of EENs (met)  Wt maintenance with in +/-0.5kg (not met)  BMs every 1-2 days in 7 days (met)    Nutrition Monitoring and Evaluation:   Behavioral-Environmental Outcomes: N/A  Food/Nutrient Intake Outcomes: Food and nutrient intake  Physical Signs/Symptoms Outcomes: GI status, Weight    Discharge Planning:    No discharge needs at this time     Electronically signed by Juan Snow on 10/1/2020 at 11:04 AM    Contact: [phone]

## 2020-10-01 NOTE — PROGRESS NOTES
Pulm/CC Progress Note    Subjective:   Daily Progress Note: 10/1/2020     Patient seen and examined at th bedside today, no acute events overnight. States that he is still coughing up a lot of mucous yesterday/this morning, helped by the addition of EZ-PAP. Still on 2 L nasal cannula oxygen  No acute distress    Chest x-ray  showed near total opacification of left lung  Patient assessed for stability for bronchoscopy       Review of Systems  has complains of shortness of breath. He is on nasal cannula oxygen. Denied any nausea vomiting.   Has fair appetite    Objective:     Visit Vitals  /71   Pulse (!) 115   Temp 98.5 °F (36.9 °C)   Resp 20   Ht 6' 0.99\" (1.854 m)   Wt 79.8 kg (175 lb 14.8 oz)   SpO2 100%   BMI 23.22 kg/m²    O2 Flow Rate (L/min): 3 l/min O2 Device: Nasal cannula    Temp (24hrs), Av.2 °F (36.8 °C), Min:98.1 °F (36.7 °C), Max:98.5 °F (36.9 °C)      10/01 0701 - 10/01 1900  In: 250 [I.V.:250]  Out: 220 [Urine:220]  1901 - 10/01 0700  In: 1300 [P.O.:1300]  Out: 1900 [Urine:1900]    Current Facility-Administered Medications   Medication Dose Route Frequency    lidocaine (XYLOCAINE) 10 mg/mL (1 %) injection        PHENYLephrine (ROXY-SYNEPHRINE) 1 mg/10 mL (100 mcg/mL) 100 mcg/mL in NS syringe        mineral oil (topical)    PRN    acetylcysteine (MUCOMYST) 200 mg/mL (20 %) solution    PRN    levoFLOXacin (LEVAQUIN) 500 mg in D5W IVPB  500 mg IntraVENous Q24H    acetylcysteine (MUCOMYST) 200 mg/mL (20 %) solution 600 mg  600 mg Nebulization QID RT    diphenhydrAMINE (BENADRYL) injection 25 mg  25 mg IntraVENous Q6H PRN    oxyCODONE-acetaminophen (PERCOCET) 5-325 mg per tablet 1 Tab  1 Tab Oral Q4H PRN    furosemide (LASIX) tablet 40 mg  40 mg Oral DAILY    albuterol-ipratropium (DUO-NEB) 2.5 MG-0.5 MG/3 ML  3 mL Nebulization QID RT    guaiFENesin (ROBITUSSIN) 100 mg/5 mL oral liquid 400 mg  400 mg Oral Q6H    lidocaine (XYLOCAINE) 10 mg/mL (1 %) injection    PRN    mineral oil (topical)    PRN    dilTIAZem ER (CARDIZEM CD) capsule 120 mg  120 mg Oral DAILY    0.9% sodium chloride infusion 250 mL  250 mL IntraVENous PRN    atorvastatin (LIPITOR) tablet 20 mg  20 mg Oral DAILY    enoxaparin (LOVENOX) injection 40 mg  40 mg SubCUTAneous Q24H    losartan (COZAAR) tablet 100 mg  100 mg Oral DAILY    piperacillin-tazobactam (ZOSYN) 3.375 g in 0.9% sodium chloride (MBP/ADV) 100 mL MBP  3.375 g IntraVENous Q8H    pantoprazole (PROTONIX) tablet 40 mg  40 mg Oral DAILY    acetaminophen (TYLENOL) tablet 650 mg  650 mg Oral Q4H PRN    alum-mag hydroxide-simeth (MYLANTA) oral suspension 30 mL  30 mL Oral Q4H PRN    magnesium hydroxide (MILK OF MAGNESIA) 400 mg/5 mL oral suspension 30 mL  30 mL Oral DAILY PRN    ondansetron (ZOFRAN) injection 4 mg  4 mg IntraVENous Q8H PRN       Physical Exam:  General: Alert and oriented x3.  1 L nasal cannula. Pleasant but nervous. Chronically ill appearing. HEENT: atraumatic, PERRL, moist mucosa,     Neck: Trachea midline, no carotid bruit, no masses. Supple but thick. Respiratory: Improved air entry in the anterior/superior chest, still diminished at the left base. Rhonchi improved in the right hemithorax. 1 L nasal cannula. Cardiovascular: RRR, no m/r/g, Normal S1 and S2   abdomen: Has ventral abdominal hernia, evidence of surgical scars soft,   Rectal: deferred  Extremities: no cyanosis or clubbing. Trace edema. Integumentary: warm, dry, and pink, with no rash, purpura, or petechia. Heme/Lymph: no lymphadenopathy, no bruises  Neurological: No focal motor deficit   psychiatric: cooperative with normal mood, affect, and cognition      Additional comments: Chest x-rays reviewed    Data Review    No results found for this or any previous visit (from the past 24 hour(s)). Today's CXR read is currently pending. XR CHEST AP LAT   Final Result   IMPRESSION:   1. Persistent opacification of the left hemithorax with volume loss.    2. Moderate right pleural effusion with probable associated right basilar   atelectasis. XR CHEST PORT   Final Result   Impression: Increased volume loss left lung. Otherwise stable. XR CHEST PORT   Final Result   IMPRESSION:   1. Improved aeration of the left lung with persistent basilar consolidative   opacities and probable pleural effusions. 2. Other extensive interstitial and alveolar opacities are nonspecific, but   potentially related to pulmonary edema or infection. XR CHEST PORT   Final Result   Impression:Left lung collapse without improvement from prior study. XR CHEST PORT   Final Result   IMPRESSION:   1. There is milder enlargement of the cardiac silhouette as well as increasing   pulmonary vascular ill definition suspect for mild pulmonary edema. This could   be related to cardiogenic edema or fluid overload. 2.  There is been an improvement in the overall aeration of the left lung with   and improved visualization of vascular markings within the left upper lung zone. There still remains significant partial collapse of the left lung. 3.  There is increased patchy airspace disease laterally within the right lower   lobe just above the right lateral costophrenic angle. 4.  There are persistent moderate-sized bilateral pleural effusions. XR CHEST AP LAT   Final Result   IMPRESSION: Persistent collapse of the left lung with bilateral pleural   effusions. Increasing vascular congestion on the right. XR CHEST PA LAT   Final Result   Impression:   Ongoing near complete white out of the left lung. Little interval change since   the prior examination. CT CHEST WO CONT   Final Result   IMPRESSION: Complete collapse of the left lung due to complete bronchial   obstruction, possibly aspiration.  Fluid overload also noted      XR ABD ACUTE W 1 V CHEST   Final Result   IMPRESSION: Opacification of the left hemithorax, questioned for remote   pneumonectomy or lung collapse with pleural fluid. Prominent right parenchymal/interstitial lung markings compatible with fibrosis   and possible partial vascular congestion. Small bowel gas, nonobstructive pattern. Assessment/Plan: This is a middle-aged male who was admitted because of increasing symptoms of shortness of breath. He is getting treated in hospital for pneumonia with IV Zosyn/Azithromycin. He is noted to be increasingly tachypneic and short of breath. He has been on supplemental oxygen. His chest x-ray is showing left lung opacification likely from mucous plugging. This is slowly improving with aggressive pulmonary hygiene and three suction bronchoscopies.     Plan:     1.)    Left lung collapse/shortness of breath:  - Shortness of breath and hypoxia improved with diuresis and antibiotics, however still having significant mucous production and difficulty clearing his mucous. This has resulted in left lung colllapse. - Mucous plugging slowly improving with aggressive pulmonary hygiene and 3 clean out bronchs on 9/21, 9/23, and 9/24.  - BAL of the lingula performed on 9/21 and sent for cell count an culture. - I called the microbiology lab at [facility] and apparently his bronchial lavage and washing from 9/22/20 are completed and are both growing candida albicans. It appears that a cell count was never done. Candida is normal xiomara within the airway and is often deemed a contaminant when shown on BAL culture. It certainly seems less likely that he has a true fungal pneumonia given lack of toxic symptoms and history of immunosuppression. Nevertheless, will send of a fungal sputum culture and a serum Fungitel. - Bedside ultrasound performed after the second bronchoscopy did not reveal a significant pleural effusion on the left. Supplemental O2 needs improved to 1L NC today. Please wean supplemental oxygen for sats greater than 92%.   Continue aggressive pulmonary hygiene with nebulizers every 6 hours;  Aggressive chest PT with vest every 6 hours, EZ-PAP therapy q6, acapella device as well as incentive spirometer use. Added guaifenesin 400 mg q6. Apparently Vest device not working  Continue manual percussion to left middle and lower lungs with suctioning    Chest x-ray now once again shows near total collapse of left lung  We will proceed with bronchoscopy later today    Await culture and cytology from bronchoscopy yesterday    2.)  COPD:  He has a history of COPD based on chronic smoking. Continue scheduled bronchodilators. Patient should be discharged with a LAMA/LABA such as Anoro and needs f/u in our office for PFT's and further management. Weaned prednisone to 10 mg daily on 9/24, likely can stop tomorrow. 3.)  Pneumonia:  He is on IV Zosyn, stopped Zithromax on 9/23/2020. We will start him on Levaquin given persistent atelectasis/infiltrate/secretions    4.)  Hypertension:  He is on antihypertensive medications, BP's stable. 5.)  Status post ex lap:  His abdominal examination shows significant fecal scars and ventral abdominal hernia    Questions of patient were answered at bedside in detail  Case discussed in detail with RN, RT, and care team  Thank you for involving me in the care of this patient  I will follow with you closely during hospitalization    Time spent more than 30 minutes in direct patient care with no overlap    Duong Vu MD  Pulmonary and critical care

## 2020-10-02 ENCOUNTER — ANESTHESIA EVENT (OUTPATIENT)
Dept: ENDOSCOPY | Age: 60
DRG: 177 | End: 2020-10-02
Payer: MEDICARE

## 2020-10-02 ENCOUNTER — APPOINTMENT (OUTPATIENT)
Dept: ENDOSCOPY | Age: 60
DRG: 177 | End: 2020-10-02
Attending: INTERNAL MEDICINE
Payer: MEDICARE

## 2020-10-02 ENCOUNTER — APPOINTMENT (OUTPATIENT)
Dept: GENERAL RADIOLOGY | Age: 60
DRG: 177 | End: 2020-10-02
Attending: INTERNAL MEDICINE
Payer: MEDICARE

## 2020-10-02 ENCOUNTER — ANESTHESIA (OUTPATIENT)
Dept: ENDOSCOPY | Age: 60
DRG: 177 | End: 2020-10-02
Payer: MEDICARE

## 2020-10-02 LAB
ANION GAP SERPL CALC-SCNC: 0 MMOL/L (ref 5–15)
BASOPHILS # BLD: 0 K/UL (ref 0–0.1)
BASOPHILS NFR BLD: 0 % (ref 0–1)
BUN SERPL-MCNC: 17 MG/DL (ref 6–20)
BUN/CREAT SERPL: 18 (ref 12–20)
CA-I BLD-MCNC: 8 MG/DL (ref 8.5–10.1)
CHLORIDE SERPL-SCNC: 96 MMOL/L (ref 97–108)
CO2 SERPL-SCNC: 41 MMOL/L (ref 21–32)
CREAT SERPL-MCNC: 0.92 MG/DL (ref 0.7–1.3)
DIFFERENTIAL METHOD BLD: ABNORMAL
EOSINOPHIL # BLD: 0 K/UL (ref 0–0.4)
EOSINOPHIL NFR BLD: 0 % (ref 0–7)
ERYTHROCYTE [DISTWIDTH] IN BLOOD BY AUTOMATED COUNT: 19.1 % (ref 11.5–14.5)
GLUCOSE SERPL-MCNC: 121 MG/DL (ref 65–100)
HCT VFR BLD AUTO: 26.7 % (ref 36.6–50.3)
HGB BLD-MCNC: 8.6 G/DL (ref 12.1–17)
IMM GRANULOCYTES # BLD AUTO: 0.1 K/UL (ref 0–0.04)
IMM GRANULOCYTES NFR BLD AUTO: 0 % (ref 0–0.5)
LYMPHOCYTES # BLD: 1.5 K/UL (ref 0.8–3.5)
LYMPHOCYTES NFR BLD: 13 % (ref 12–49)
MCH RBC QN AUTO: 30.2 PG (ref 26–34)
MCHC RBC AUTO-ENTMCNC: 32.2 G/DL (ref 30–36.5)
MCV RBC AUTO: 93.7 FL (ref 80–99)
MONOCYTES # BLD: 1.5 K/UL (ref 0–1)
MONOCYTES NFR BLD: 13 % (ref 5–13)
NEUTS SEG # BLD: 8.7 K/UL (ref 1.8–8)
NEUTS SEG NFR BLD: 74 % (ref 32–75)
PLATELET # BLD AUTO: 388 K/UL (ref 150–400)
PMV BLD AUTO: 8.9 FL (ref 8.9–12.9)
POTASSIUM SERPL-SCNC: 3.3 MMOL/L (ref 3.5–5.1)
RBC # BLD AUTO: 2.85 M/UL (ref 4.1–5.7)
SODIUM SERPL-SCNC: 137 MMOL/L (ref 136–145)
WBC # BLD AUTO: 11.6 K/UL (ref 4.1–11.1)

## 2020-10-02 PROCEDURE — 74011250637 HC RX REV CODE- 250/637: Performed by: INTERNAL MEDICINE

## 2020-10-02 PROCEDURE — 74011000258 HC RX REV CODE- 258: Performed by: INTERNAL MEDICINE

## 2020-10-02 PROCEDURE — 71046 X-RAY EXAM CHEST 2 VIEWS: CPT

## 2020-10-02 PROCEDURE — 74011250637 HC RX REV CODE- 250/637: Performed by: HOSPITALIST

## 2020-10-02 PROCEDURE — 74011000250 HC RX REV CODE- 250: Performed by: INTERNAL MEDICINE

## 2020-10-02 PROCEDURE — 3331090001 HH PPS REVENUE CREDIT

## 2020-10-02 PROCEDURE — 36415 COLL VENOUS BLD VENIPUNCTURE: CPT

## 2020-10-02 PROCEDURE — 94669 MECHANICAL CHEST WALL OSCILL: CPT

## 2020-10-02 PROCEDURE — 3331090002 HH PPS REVENUE DEBIT

## 2020-10-02 PROCEDURE — 94640 AIRWAY INHALATION TREATMENT: CPT

## 2020-10-02 PROCEDURE — 3E1F88X IRRIGATION OF RESPIRATORY TRACT USING IRRIGATING SUBSTANCE, VIA NATURAL OR ARTIFICIAL OPENING ENDOSCOPIC, DIAGNOSTIC: ICD-10-PCS | Performed by: INTERNAL MEDICINE

## 2020-10-02 PROCEDURE — 0BDJ8ZX EXTRACTION OF LEFT LOWER LUNG LOBE, VIA NATURAL OR ARTIFICIAL OPENING ENDOSCOPIC, DIAGNOSTIC: ICD-10-PCS | Performed by: INTERNAL MEDICINE

## 2020-10-02 PROCEDURE — 74011250636 HC RX REV CODE- 250/636: Performed by: INTERNAL MEDICINE

## 2020-10-02 PROCEDURE — 0BDH8ZX EXTRACTION OF LUNG LINGULA, VIA NATURAL OR ARTIFICIAL OPENING ENDOSCOPIC, DIAGNOSTIC: ICD-10-PCS | Performed by: INTERNAL MEDICINE

## 2020-10-02 PROCEDURE — 74011000250 HC RX REV CODE- 250: Performed by: NURSE ANESTHETIST, CERTIFIED REGISTERED

## 2020-10-02 PROCEDURE — 0BDG8ZX EXTRACTION OF LEFT UPPER LUNG LOBE, VIA NATURAL OR ARTIFICIAL OPENING ENDOSCOPIC, DIAGNOSTIC: ICD-10-PCS | Performed by: INTERNAL MEDICINE

## 2020-10-02 PROCEDURE — 88305 TISSUE EXAM BY PATHOLOGIST: CPT

## 2020-10-02 PROCEDURE — 80048 BASIC METABOLIC PNL TOTAL CA: CPT

## 2020-10-02 PROCEDURE — 0BD78ZX EXTRACTION OF LEFT MAIN BRONCHUS, VIA NATURAL OR ARTIFICIAL OPENING ENDOSCOPIC, DIAGNOSTIC: ICD-10-PCS | Performed by: INTERNAL MEDICINE

## 2020-10-02 PROCEDURE — 76060000033 HC ANESTHESIA 1 TO 1.5 HR: Performed by: INTERNAL MEDICINE

## 2020-10-02 PROCEDURE — 88108 CYTOPATH CONCENTRATE TECH: CPT

## 2020-10-02 PROCEDURE — 77010033678 HC OXYGEN DAILY

## 2020-10-02 PROCEDURE — 74011250636 HC RX REV CODE- 250/636: Performed by: NURSE ANESTHETIST, CERTIFIED REGISTERED

## 2020-10-02 PROCEDURE — 76040000008: Performed by: INTERNAL MEDICINE

## 2020-10-02 PROCEDURE — 94760 N-INVAS EAR/PLS OXIMETRY 1: CPT

## 2020-10-02 PROCEDURE — 65270000029 HC RM PRIVATE

## 2020-10-02 PROCEDURE — 85025 COMPLETE CBC W/AUTO DIFF WBC: CPT

## 2020-10-02 PROCEDURE — 74011250636 HC RX REV CODE- 250/636: Performed by: HOSPITALIST

## 2020-10-02 RX ORDER — FENTANYL CITRATE 50 UG/ML
INJECTION, SOLUTION INTRAMUSCULAR; INTRAVENOUS AS NEEDED
Status: DISCONTINUED | OUTPATIENT
Start: 2020-10-02 | End: 2020-10-02 | Stop reason: HOSPADM

## 2020-10-02 RX ORDER — SUCCINYLCHOLINE CHLORIDE 20 MG/ML
INJECTION INTRAMUSCULAR; INTRAVENOUS AS NEEDED
Status: DISCONTINUED | OUTPATIENT
Start: 2020-10-02 | End: 2020-10-02 | Stop reason: HOSPADM

## 2020-10-02 RX ORDER — SODIUM CHLORIDE, SODIUM LACTATE, POTASSIUM CHLORIDE, CALCIUM CHLORIDE 600; 310; 30; 20 MG/100ML; MG/100ML; MG/100ML; MG/100ML
INJECTION, SOLUTION INTRAVENOUS
Status: DISCONTINUED | OUTPATIENT
Start: 2020-10-02 | End: 2020-10-02 | Stop reason: HOSPADM

## 2020-10-02 RX ORDER — GLYCOPYRROLATE 1 MG/1
1 TABLET ORAL 3 TIMES DAILY
Status: DISCONTINUED | OUTPATIENT
Start: 2020-10-02 | End: 2020-10-05

## 2020-10-02 RX ORDER — PROPOFOL 10 MG/ML
INJECTION, EMULSION INTRAVENOUS
Status: COMPLETED
Start: 2020-10-02 | End: 2020-10-02

## 2020-10-02 RX ORDER — LIDOCAINE HYDROCHLORIDE 20 MG/ML
INJECTION, SOLUTION EPIDURAL; INFILTRATION; INTRACAUDAL; PERINEURAL AS NEEDED
Status: DISCONTINUED | OUTPATIENT
Start: 2020-10-02 | End: 2020-10-02 | Stop reason: HOSPADM

## 2020-10-02 RX ORDER — LIDOCAINE HYDROCHLORIDE 20 MG/ML
INJECTION, SOLUTION EPIDURAL; INFILTRATION; INTRACAUDAL; PERINEURAL
Status: COMPLETED
Start: 2020-10-02 | End: 2020-10-02

## 2020-10-02 RX ORDER — ROCURONIUM BROMIDE 10 MG/ML
INJECTION, SOLUTION INTRAVENOUS
Status: COMPLETED
Start: 2020-10-02 | End: 2020-10-02

## 2020-10-02 RX ORDER — SUCCINYLCHOLINE CHLORIDE 20 MG/ML INJECTION SOLUTION
SOLUTION
Status: COMPLETED
Start: 2020-10-02 | End: 2020-10-02

## 2020-10-02 RX ORDER — PROPOFOL 10 MG/ML
INJECTION, EMULSION INTRAVENOUS AS NEEDED
Status: DISCONTINUED | OUTPATIENT
Start: 2020-10-02 | End: 2020-10-02 | Stop reason: HOSPADM

## 2020-10-02 RX ORDER — ROCURONIUM BROMIDE 10 MG/ML
INJECTION, SOLUTION INTRAVENOUS AS NEEDED
Status: DISCONTINUED | OUTPATIENT
Start: 2020-10-02 | End: 2020-10-02 | Stop reason: HOSPADM

## 2020-10-02 RX ORDER — FENTANYL CITRATE 50 UG/ML
INJECTION, SOLUTION INTRAMUSCULAR; INTRAVENOUS
Status: COMPLETED
Start: 2020-10-02 | End: 2020-10-02

## 2020-10-02 RX ADMIN — OXYCODONE HYDROCHLORIDE AND ACETAMINOPHEN 1 TABLET: 5; 325 TABLET ORAL at 00:39

## 2020-10-02 RX ADMIN — DIPHENHYDRAMINE HYDROCHLORIDE 25 MG: 50 INJECTION, SOLUTION INTRAMUSCULAR; INTRAVENOUS at 08:20

## 2020-10-02 RX ADMIN — OXYCODONE HYDROCHLORIDE AND ACETAMINOPHEN 1 TABLET: 5; 325 TABLET ORAL at 05:30

## 2020-10-02 RX ADMIN — ROCURONIUM BROMIDE 10 MG: 10 SOLUTION INTRAVENOUS at 11:22

## 2020-10-02 RX ADMIN — ACETYLCYSTEINE 200 MG: 200 SOLUTION ORAL; RESPIRATORY (INHALATION) at 16:45

## 2020-10-02 RX ADMIN — IPRATROPIUM BROMIDE AND ALBUTEROL SULFATE 3 ML: .5; 3 SOLUTION RESPIRATORY (INHALATION) at 16:45

## 2020-10-02 RX ADMIN — DILTIAZEM HYDROCHLORIDE 120 MG: 120 CAPSULE, COATED, EXTENDED RELEASE ORAL at 07:56

## 2020-10-02 RX ADMIN — DIPHENHYDRAMINE HYDROCHLORIDE 25 MG: 50 INJECTION, SOLUTION INTRAMUSCULAR; INTRAVENOUS at 00:47

## 2020-10-02 RX ADMIN — DIPHENHYDRAMINE HYDROCHLORIDE 25 MG: 50 INJECTION, SOLUTION INTRAMUSCULAR; INTRAVENOUS at 16:03

## 2020-10-02 RX ADMIN — GUAIFENESIN 400 MG: 200 SOLUTION ORAL at 16:03

## 2020-10-02 RX ADMIN — IPRATROPIUM BROMIDE AND ALBUTEROL SULFATE 3 ML: .5; 3 SOLUTION RESPIRATORY (INHALATION) at 20:21

## 2020-10-02 RX ADMIN — GUAIFENESIN 400 MG: 200 SOLUTION ORAL at 06:29

## 2020-10-02 RX ADMIN — GUAIFENESIN 400 MG: 200 SOLUTION ORAL at 23:54

## 2020-10-02 RX ADMIN — FENTANYL CITRATE 50 MCG: 50 INJECTION, SOLUTION INTRAMUSCULAR; INTRAVENOUS at 11:22

## 2020-10-02 RX ADMIN — OXYCODONE HYDROCHLORIDE AND ACETAMINOPHEN 1 TABLET: 5; 325 TABLET ORAL at 14:15

## 2020-10-02 RX ADMIN — PIPERACILLIN SODIUM AND TAZOBACTAM SODIUM 3.38 G: 3; .375 INJECTION, POWDER, LYOPHILIZED, FOR SOLUTION INTRAVENOUS at 06:30

## 2020-10-02 RX ADMIN — LEVOFLOXACIN 500 MG: 5 INJECTION, SOLUTION INTRAVENOUS at 14:15

## 2020-10-02 RX ADMIN — GLYCOPYRROLATE 1 MG: 1 TABLET ORAL at 21:26

## 2020-10-02 RX ADMIN — PIPERACILLIN SODIUM AND TAZOBACTAM SODIUM 3.38 G: 3; .375 INJECTION, POWDER, LYOPHILIZED, FOR SOLUTION INTRAVENOUS at 16:03

## 2020-10-02 RX ADMIN — DIPHENHYDRAMINE HYDROCHLORIDE 25 MG: 50 INJECTION, SOLUTION INTRAMUSCULAR; INTRAVENOUS at 23:54

## 2020-10-02 RX ADMIN — OXYCODONE HYDROCHLORIDE AND ACETAMINOPHEN 1 TABLET: 5; 325 TABLET ORAL at 21:26

## 2020-10-02 RX ADMIN — PIPERACILLIN SODIUM AND TAZOBACTAM SODIUM 3.38 G: 3; .375 INJECTION, POWDER, LYOPHILIZED, FOR SOLUTION INTRAVENOUS at 23:55

## 2020-10-02 RX ADMIN — ACETYLCYSTEINE 600 MG: 200 SOLUTION ORAL; RESPIRATORY (INHALATION) at 20:22

## 2020-10-02 RX ADMIN — SODIUM CHLORIDE, POTASSIUM CHLORIDE, SODIUM LACTATE AND CALCIUM CHLORIDE: 600; 310; 30; 20 INJECTION, SOLUTION INTRAVENOUS at 11:11

## 2020-10-02 RX ADMIN — ATORVASTATIN CALCIUM 20 MG: 20 TABLET, FILM COATED ORAL at 21:26

## 2020-10-02 RX ADMIN — PANTOPRAZOLE SODIUM 40 MG: 40 TABLET, DELAYED RELEASE ORAL at 06:29

## 2020-10-02 RX ADMIN — SUCCINYLCHOLINE CHLORIDE 160 MG: 20 INJECTION, SOLUTION INTRAMUSCULAR; INTRAVENOUS at 11:22

## 2020-10-02 RX ADMIN — LOSARTAN POTASSIUM 100 MG: 50 TABLET, FILM COATED ORAL at 07:55

## 2020-10-02 RX ADMIN — PROPOFOL 150 MG: 10 INJECTION, EMULSION INTRAVENOUS at 11:22

## 2020-10-02 RX ADMIN — LIDOCAINE HYDROCHLORIDE 100 MG: 20 INJECTION, SOLUTION EPIDURAL; INFILTRATION; INTRACAUDAL; PERINEURAL at 11:22

## 2020-10-02 NOTE — PROGRESS NOTES
Progress Note    Patient: Ava Jenkins MRN: [id number]  SSN: [id number]    YOB: 1960  Age: 63 y.o. Sex: male      Admit Date: 9/10/2020    LOS: 22 days     Subjective:     59M, H/o COPD not on oxygen with with shortness of breath s/t pneumonia complicated by left lung collapse. Down for bronchoscopy         Prior to Admission Medications   Prescriptions Last Dose Informant Patient Reported? Taking? cefdinir (OMNICEF) 300 mg capsule   No No   Sig: Take 1 Cap by mouth two (2) times a day for 10 days. losartan (COZAAR) 100 mg tablet   No No   Sig: TAKE 1 TABLET BY MOUTH EVERY DAY   omeprazole (PRILOSEC) 20 mg capsule   Yes Yes   Sig: Take 20 mg by mouth daily. pantoprazole (PROTONIX) 40 mg tablet   No No   Sig: Take 1 Tab by mouth daily.    simvastatin (ZOCOR) 40 mg tablet   No No   Sig: TAKE 1 TABLET BY MOUTH EVERY DAY AT NIGHT      Facility-Administered Medications: None       Current Facility-Administered Medications:     mineral oil (topical), , , PRN, Duong Ruiz MD, 5 mL at 10/01/20 1422    acetylcysteine (MUCOMYST) 200 mg/mL (20 %) solution, , , PRN, Duong Ruiz MD, 16 mL at 10/01/20 1445    levoFLOXacin (LEVAQUIN) 500 mg in D5W IVPB, 500 mg, IntraVENous, Q24H, Duong Ruiz MD, Last Rate: 100 mL/hr at 10/01/20 0934, 500 mg at 10/01/20 0934    acetylcysteine (MUCOMYST) 200 mg/mL (20 %) solution 600 mg, 600 mg, Nebulization, QID RT, Radha Infante MD, 600 mg at 10/01/20 0725    diphenhydrAMINE (BENADRYL) injection 25 mg, 25 mg, IntraVENous, Q6H PRN, Channing Tucker MD, 25 mg at 10/02/20 0047    oxyCODONE-acetaminophen (PERCOCET) 5-325 mg per tablet 1 Tab, 1 Tab, Oral, Q4H PRN, Channing Tucker MD, 1 Tab at 10/02/20 0530    albuterol-ipratropium (DUO-NEB) 2.5 MG-0.5 MG/3 ML, 3 mL, Nebulization, QID RT, Jarett Ocampo DO, 3 mL at 10/01/20 0724    guaiFENesin (ROBITUSSIN) 100 mg/5 mL oral liquid 400 mg, 400 mg, Oral, Q6H, Jarett Ocampo DO, 400 mg at 10/02/20 0629   lidocaine (XYLOCAINE) 10 mg/mL (1 %) injection, , , PRN, Jarett Ocampo DO, 18 mL at 10/01/20 1430    mineral oil (topical), , , PRN, Jarett Ocampo DO, 5 mL at 20 0820    dilTIAZem ER (CARDIZEM CD) capsule 120 mg, 120 mg, Oral, DAILY, Ankit Ball MD, 120 mg at 10/01/20 0933    0.9% sodium chloride infusion 250 mL, 250 mL, IntraVENous, PRN, Rhina Gonzalez MD    atorvastatin (LIPITOR) tablet 20 mg, 20 mg, Oral, DAILY, Rhina Gonzalez MD, 20 mg at 10/01/20 2043    enoxaparin (LOVENOX) injection 40 mg, 40 mg, SubCUTAneous, Q24H, Rhina Gonzalez MD, Stopped at 10/01/20 0900    losartan (COZAAR) tablet 100 mg, 100 mg, Oral, DAILY, Rhina Gonzalez MD, 100 mg at 10/01/20 0933    piperacillin-tazobactam (ZOSYN) 3.375 g in 0.9% sodium chloride (MBP/ADV) 100 mL MBP, 3.375 g, IntraVENous, Q8H, Rhina Gonzalez MD, Last Rate: 25 mL/hr at 10/02/20 0630, 3.375 g at 10/02/20 0630    pantoprazole (PROTONIX) tablet 40 mg, 40 mg, Oral, DAILY, Rhina Gonzalez MD, 40 mg at 10/02/20 1295    acetaminophen (TYLENOL) tablet 650 mg, 650 mg, Oral, Q4H PRN, Rhina Gonzalez MD, 650 mg at 20 2319    alum-mag hydroxide-simeth (MYLANTA) oral suspension 30 mL, 30 mL, Oral, Q4H PRN, Rhina Gonzalez MD, 30 mL at 20 2215    magnesium hydroxide (MILK OF MAGNESIA) 400 mg/5 mL oral suspension 30 mL, 30 mL, Oral, DAILY PRN, Rhina Gonzalez MD    ondansetron [facility]) injection 4 mg, 4 mg, IntraVENous, Q8H PRN, Rhina Gonzalez MD    Objective:     Visit Vitals  /70   Pulse 100   Temp 99 °F (37.2 °C)   Resp 20   Ht 6' 0.99\" (1.854 m)   Wt 82.5 kg (181 lb 14.1 oz)   SpO2 97%   BMI 24.00 kg/m²    O2 Flow Rate (L/min): 3 l/min O2 Device: Nasal cannula     Temp (24hrs), Av.6 °F (37 °C), Min:97.2 °F (36.2 °C), Max:99.1 °F (37.3 °C)        No intake/output data recorded.  1901 -  0700  In: 2100 [P.O.:1300;  I.V.:800]  Out: 2500 [Urine:2500]      Physical Exam:   Out of room, bronchoscopoy    Lab/Data Review:  No results found for this or any previous visit (from the past 24 hour(s)). Assessment and plan:      (1) acute hypoxic respiratory failure : s/t 2,3. On 3L NC     (2) Left lung collapse: s/p 4 broncoscopy and aggressive pulmonary hygeine. Chest PT, Q6H nebs, guaifenesin, lasix with negative fluid target. Reaccummulating, cxr ll whiteoutPulmnology following. For repeat bronch 10/1. No significant cxr improvement. (3) Pneumonia : same as #2. Continue Zosyn, stopped Azithro 9/23. 14 d abx total is recommended. Follow BAL culture. BAL cxs 9/21 & 9/22 grew Autumn Albicans, presumed normal xiomara and often deemed contaminant when seen on BAL cs. Fungal cs and fungitel sent. 9/30 cs bal wbc+, cs no growth. 10/1 gram stain sputum, no organisms. Cytology pending. Fungal cs pending     (4) COPD: LAMA/ LABA, OP PFT, pulm f/u. Wean steroids.     (5) Anemia: AOCI. stable     (6) GERD: protonix. Complicates #1.      (7) HTN : on losartan and CCB     (8) HLD: simvastatin     DVT ppx: lovenox     DISPO: SNF pending course. Bronchoscopy today.        Signed By: Wolf Mcneill MD     October 2, 2020

## 2020-10-02 NOTE — ANESTHESIA PREPROCEDURE EVALUATION
Relevant Problems   No relevant active problems       Anesthetic History   No history of anesthetic complications            Review of Systems / Medical History  Patient summary reviewed, nursing notes reviewed and pertinent labs reviewed    Pulmonary    COPD  Recent URI    Pneumonia, shortness of breath and smoker  Asthma        Neuro/Psych       CVA  TIA     Cardiovascular    Hypertension              Exercise tolerance: >4 METS  Comments: · LV: Estimated LVEF is 60 - 65%. Biplane method used to measure ejection fraction. Normal cavity size and systolic function (ejection fraction normal). Mild concentric hypertrophy. Wall motion: normal. Moderate (grade 2) left ventricular diastolic dysfunction. · AV: Probably trileaflet aortic valve. Moderate aortic valve leaflet calcification present. Severely reduced right leaflet mobility of the aortic valve. Aortic valve area is 1 cm2. Moderate to severe aortic valve stenosis is present. Mild aortic valve regurgitation is present. · MV: Mitral annular calcification. · TV: Mild tricuspid valve regurgitation is present.    GI/Hepatic/Renal     GERD      PUD     Endo/Other        Arthritis     Other Findings              Past Medical History:   Diagnosis Date    Anal fistula 3/15/2010    Anxiety     ARF (acute renal failure) (HCC)     Colon perforation (HCC)     Genital herpes 3/15/2010    GERD (gastroesophageal reflux disease)     High cholesterol 3/15/2010    History of blood transfusion     HTN (hypertension) 3/15/2010    Hyponatremia     Ischemic colitis ([address]     left Carotid Artery Occlusion 3/15/2010    Noncompliance with treatment 3/15/2010    Pneumonia 2011    PUD (peptic ulcer disease)     Septic shock(785.52)     Stroke 2002 3/15/2010    Thromboembolus ([address]     rt thigh    TIA (transient ischemic attack) 2007    pt reported       Past Surgical History:   Procedure Laterality Date    CARDIAC CATHETERIZATION      CARDIAC SURG PROCEDURE UNLIST  HX COLECTOMY  1/11/2014    Right hemicolectomy for free air, perforated viscus    US SCROTUM/TESTICLES      right testicle removed       Current Outpatient Medications   Medication Instructions    losartan (COZAAR) 100 mg tablet TAKE 1 TABLET BY MOUTH EVERY DAY    omeprazole (PRILOSEC) 20 mg, Oral, DAILY    pantoprazole (PROTONIX) 40 mg, Oral, DAILY    simvastatin (ZOCOR) 40 mg tablet TAKE 1 TABLET BY MOUTH EVERY DAY AT NIGHT       Current Facility-Administered Medications   Medication Dose Route Frequency    mineral oil (topical)    PRN    acetylcysteine (MUCOMYST) 200 mg/mL (20 %) solution    PRN    levoFLOXacin (LEVAQUIN) 500 mg in D5W IVPB  500 mg IntraVENous Q24H    acetylcysteine (MUCOMYST) 200 mg/mL (20 %) solution 600 mg  600 mg Nebulization QID RT    diphenhydrAMINE (BENADRYL) injection 25 mg  25 mg IntraVENous Q6H PRN    oxyCODONE-acetaminophen (PERCOCET) 5-325 mg per tablet 1 Tab  1 Tab Oral Q4H PRN    albuterol-ipratropium (DUO-NEB) 2.5 MG-0.5 MG/3 ML  3 mL Nebulization QID RT    guaiFENesin (ROBITUSSIN) 100 mg/5 mL oral liquid 400 mg  400 mg Oral Q6H    lidocaine (XYLOCAINE) 10 mg/mL (1 %) injection    PRN    mineral oil (topical)    PRN    dilTIAZem ER (CARDIZEM CD) capsule 120 mg  120 mg Oral DAILY    0.9% sodium chloride infusion 250 mL  250 mL IntraVENous PRN    atorvastatin (LIPITOR) tablet 20 mg  20 mg Oral DAILY    enoxaparin (LOVENOX) injection 40 mg  40 mg SubCUTAneous Q24H    losartan (COZAAR) tablet 100 mg  100 mg Oral DAILY    piperacillin-tazobactam (ZOSYN) 3.375 g in 0.9% sodium chloride (MBP/ADV) 100 mL MBP  3.375 g IntraVENous Q8H    pantoprazole (PROTONIX) tablet 40 mg  40 mg Oral DAILY    acetaminophen (TYLENOL) tablet 650 mg  650 mg Oral Q4H PRN    alum-mag hydroxide-simeth (MYLANTA) oral suspension 30 mL  30 mL Oral Q4H PRN    magnesium hydroxide (MILK OF MAGNESIA) 400 mg/5 mL oral suspension 30 mL  30 mL Oral DAILY PRN    ondansetron (ZOFRAN) injection 4 mg  4 mg IntraVENous Q8H PRN       Patient Vitals for the past 24 hrs:   Temp Pulse Resp BP SpO2   10/02/20 0724 36.4 °C (97.6 °F) 86 18 139/82 98 %   10/02/20 0035 36.2 °C (97.2 °F) 88 18 113/67 99 %   10/01/20 2106     97 %   10/01/20 2043     97 %   10/01/20 2033 36.9 °C (98.4 °F) 91 20 105/64 97 %   10/01/20 1545  (!) 103 20 97/73 96 %   10/01/20 1540  (!) 108 23 103/67 96 %   10/01/20 1535  (!) 107 21 (!) 89/59 97 %   10/01/20 1530  (!) 114 21 93/68 96 %   10/01/20 1526 36.2 °C (97.2 °F) (!) 107 23 95/70 95 %   10/01/20 1516  (!) 115 20 117/71 100 %       Lab Results   Component Value Date/Time    WBC 8.6 09/25/2020 08:00 AM    HGB 9.8 (L) 09/25/2020 08:00 AM    HCT 32.0 (L) 09/25/2020 08:00 AM    PLATELET 320 50/38/4457 08:00 AM    MCV 94.4 09/25/2020 08:00 AM     Lab Results   Component Value Date/Time    Sodium 140 09/30/2020 10:55 AM    Potassium 4.0 09/30/2020 10:55 AM    Chloride 97 09/30/2020 10:55 AM    CO2 43 (HH) 09/30/2020 10:55 AM    Anion gap 0 (L) 09/30/2020 10:55 AM    Glucose 79 09/30/2020 10:55 AM    BUN 15 09/30/2020 10:55 AM    Creatinine 0.81 09/30/2020 10:55 AM    BUN/Creatinine ratio 19 09/30/2020 10:55 AM    GFR est AA >60 09/30/2020 10:55 AM    GFR est non-AA >60 09/30/2020 10:55 AM    Calcium 8.6 09/30/2020 10:55 AM     No results found for: APTT, PTP, INR, INREXT, INREXT  Lab Results   Component Value Date/Time    Glucose 79 09/30/2020 10:55 AM    Glucose (POC) 105 (H) 09/12/2020 08:26 PM             Physical Exam    Airway  Mallampati: II  TM Distance: < 4 cm  Neck ROM: normal range of motion   Mouth opening: Normal     Cardiovascular    Rhythm: regular  Rate: normal         Dental  No notable dental hx       Pulmonary    Rhonchi  Decreased breath sounds: bibasilar  Wheezes    Prolonged expiration and stridor     Abdominal  GI exam deferred       Other Findings            Anesthetic Plan    ASA: 4  Anesthesia type: general          Induction: Intravenous  Anesthetic plan and risks discussed with: Patient and Family

## 2020-10-02 NOTE — ANESTHESIA POSTPROCEDURE EVALUATION
Procedure(s):  BRONCHOSCOPY. general    Anesthesia Post Evaluation      Multimodal analgesia: multimodal analgesia not used between 6 hours prior to anesthesia start to PACU discharge  Patient location during evaluation: bedside  Patient participation: complete - patient participated  Level of consciousness: awake and awake and alert  Pain score: 0  Pain management: adequate  Airway patency: patent  Anesthetic complications: no  Cardiovascular status: acceptable  Respiratory status: acceptable  Hydration status: acceptable  Post anesthesia nausea and vomiting:  none  Final Post Anesthesia Temperature Assessment:  Normothermia (36.0-37.5 degrees C)      INITIAL Post-op Vital signs: No vitals data found for the desired time range.

## 2020-10-02 NOTE — PROGRESS NOTES
Progress Note    Patient: Uday Greenfield MRN: [id number]  SSN: [id number]    YOB: 1960  Age: 63 y.o. Sex: male      Admit Date: 9/10/2020    LOS: 22 days     Subjective:     59M, H/o COPD not on oxygen with with shortness of breath s/t pneumonia complicated by left lung collapse. Seen at bedside,  Does not appear distress. Went down for another bronch- d/w anesthesia, recommending surgical eval 2/2 likelihood massive abd hernia contributing.           Prior to Admission Medications   Prescriptions Last Dose Informant Patient Reported? Taking? cefdinir (OMNICEF) 300 mg capsule   No No   Sig: Take 1 Cap by mouth two (2) times a day for 10 days. losartan (COZAAR) 100 mg tablet   No No   Sig: TAKE 1 TABLET BY MOUTH EVERY DAY   omeprazole (PRILOSEC) 20 mg capsule   Yes Yes   Sig: Take 20 mg by mouth daily. pantoprazole (PROTONIX) 40 mg tablet   No No   Sig: Take 1 Tab by mouth daily.    simvastatin (ZOCOR) 40 mg tablet   No No   Sig: TAKE 1 TABLET BY MOUTH EVERY DAY AT NIGHT      Facility-Administered Medications: None       Current Facility-Administered Medications:     glycopyrrolate (ROBINUL) tablet 1 mg, 1 mg, Oral, TID, Zeina Ruiz MD, Stopped at 10/02/20 1600    mineral oil (topical), , , PRN, Duong Ruiz MD, 5 mL at 10/02/20 1125    acetylcysteine (MUCOMYST) 200 mg/mL (20 %) solution, , , PRN, Duong Ruiz MD, 5 mL at 10/02/20 1159    levoFLOXacin (LEVAQUIN) 500 mg in D5W IVPB, 500 mg, IntraVENous, Q24H, Duong Ruiz MD, Last Rate: 100 mL/hr at 10/02/20 1415, 500 mg at 10/02/20 1415    acetylcysteine (MUCOMYST) 200 mg/mL (20 %) solution 600 mg, 600 mg, Nebulization, QID RT, Duong Ruiz MD, 200 mg at 10/02/20 1645    diphenhydrAMINE (BENADRYL) injection 25 mg, 25 mg, IntraVENous, Q6H PRN, Eleazar Herbert MD, 25 mg at 10/02/20 1603    oxyCODONE-acetaminophen (PERCOCET) 5-325 mg per tablet 1 Tab, 1 Tab, Oral, Q4H PRN, Eleazar Herbert MD, 1 Tab at 10/02/20 1415    albuterol-ipratropium (DUO-NEB) 2.5 MG-0.5 MG/3 ML, 3 mL, Nebulization, QID RT, Jarett Ocampo DO, 3 mL at 10/02/20 1645    guaiFENesin (ROBITUSSIN) 100 mg/5 mL oral liquid 400 mg, 400 mg, Oral, Q6H, Jarett Ocampo DO, Stopped at 10/02/20 1800    lidocaine (XYLOCAINE) 10 mg/mL (1 %) injection, , , PRN, Jarett Ocampo, , 15 mL at 10/02/20 1159    mineral oil (topical), , , PRN, Jarett Ocampo, , 5 mL at 09/23/20 0820    dilTIAZem ER (CARDIZEM CD) capsule 120 mg, 120 mg, Oral, DAILY, Ankit Ball MD, Stopped at 10/02/20 0900    0.9% sodium chloride infusion 250 mL, 250 mL, IntraVENous, PRN, Rhina Gonzalez MD    atorvastatin (LIPITOR) tablet 20 mg, 20 mg, Oral, DAILY, Rhina Gonzalez MD, 20 mg at 10/01/20 2043    enoxaparin (LOVENOX) injection 40 mg, 40 mg, SubCUTAneous, Q24H, Rhina Gonzalez MD, Stopped at 10/01/20 0900    losartan (COZAAR) tablet 100 mg, 100 mg, Oral, DAILY, Rhina Gonzalez MD, Stopped at 10/02/20 0900    piperacillin-tazobactam (ZOSYN) 3.375 g in 0.9% sodium chloride (MBP/ADV) 100 mL MBP, 3.375 g, IntraVENous, Q8H, Rhina Gonzalez MD, Last Rate: 25 mL/hr at 10/02/20 1603, 3.375 g at 10/02/20 1603    pantoprazole (PROTONIX) tablet 40 mg, 40 mg, Oral, DAILY, Rhina Gonzalez MD, 40 mg at 10/02/20 7829    acetaminophen (TYLENOL) tablet 650 mg, 650 mg, Oral, Q4H PRN, Rhina Gonzalez MD, 650 mg at 09/24/20 2319    alum-mag hydroxide-simeth (MYLANTA) oral suspension 30 mL, 30 mL, Oral, Q4H PRN, Rhina Gonzalez MD, 30 mL at 09/22/20 5906    magnesium hydroxide (MILK OF MAGNESIA) 400 mg/5 mL oral suspension 30 mL, 30 mL, Oral, DAILY PRN, Rhina Gonzalez MD    ondansetron [facility]) injection 4 mg, 4 mg, IntraVENous, Q8H PRN, Rhina Gonzalez MD    Objective:     Visit Vitals  /70   Pulse 100   Temp 99 °F (37.2 °C)   Resp 20   Ht 6' 0.99\" (1.854 m)   Wt 82.5 kg (181 lb 14.1 oz)   SpO2 97%   BMI 24.00 kg/m²    O2 Flow Rate (L/min): 3 l/min O2 Device: Nasal cannula     Temp (24hrs), Av.6 °F (37 °C), Min:97.2 °F (36.2 °C), Max:99.1 °F (37.3 °C)        No intake/output data recorded. 1901 -  0700  In: 2100 [P.O.:1300; I.V.:800]  Out: 2500 [Urine:2500]      Physical Exam:   General : Appearance:  no respiratory distress noted  HEENT : PERRLA, normal oral mucosa, atraumatic normocephalic, Normal ear and nose. Neck : Supple, no JVD, no masses noted, no carotid bruit  Lungs : dinished bs left, not labored. CVS : Rhythm rate regular, S1+, S2+, no murmur or gallop  Abdomen : Soft, nontender, ++ventral hernia. bowel sounds active  Extremities : No edema noted,  pedal pulses palpable  Musculoskeletal : Fair range of motion, no joint swelling or effusion, muscle tone and power appears normal,   Skin : Moist, warm, skin turgor, no pathological rash  Lymphatic : No cervical lymphadenopathy. Neurological : Awake, alert, oriented to time place person.  No neurological deficits.    Psychiatric : Mood and affect appears appropriate to the situation.     Lab/Data Review:  No results found for this or any previous visit (from the past 24 hour(s)). Assessment and plan:      (1) acute hypoxic respiratory failure : s/t 2,3. On 3L NC     (2) Left lung collapse: s/p multiple bronch's, left lung remains largely opacified, went for bronchoscopy again this am. D/w anesthesia, suspect abd hernia/massive contributing to lung issues. Chest PT, Q6H nebs, guaifenesin, lasix with negative fluid target. Bal cs multiple =nl xiomara. Cytology x 4 unrevealing for malignancy. (3) Pneumonia : same as #2. Continue Zosyn, stopped Azithro . 14 d abx total is recommended. Bal cs multiple =nl xiomara. Cytology x 4 unrevealing for malignancy. (4) COPD: LAMA/ LABA, OP PFT, pulm following. Wean steroids.     (5) Anemia: AOCI. stable     (6) GERD: protonix.  Complicates #1.      (7) HTN : on losartan and CCB     (8) HLD: simvastatin    (9) Massive abd hernia, chronic 6+ years, admits neglected follow up. Discussed potential for repair, limited I believe currently 2/2 acute issues. Discussed ct abd to assess, states cannot lie flat. He is aware of increased risk/mortality with intervention under current circumstances, responded \"I want to take that chance\". He has had multiple brochs under gen, flat for procedure. D/w gen surgery and will offer recs.      DVT ppx: lovenox     DISPO: SNF pending course. BGen surgery cx for massive ventral hernia recs. Cont per pulmonology.        Signed By: Nick Hernandez MD     October 2, 2020

## 2020-10-02 NOTE — PROCEDURES
Indication: Left lingula and lower lobe collapse     Procedure: Flexible bronchoscopy for cleanout and sampling     Consent: Risks benefits and alternatives discussed with the patient and he agrees to proceed.  Consent signed     Airway: Patient intubated per anesthesia     Sedation: Provided by anesthesia     Procedure note:     Timeout was called  Patient was placed in the correct position     Bronchoscope was inserted via the endotracheal tube without difficulty  Lower trachea and right lung were entered and were without significant secretions or lesions     Extensive copious thick mucopurulent secretions were noted in the left lung coming all the way up to the main vianey through the left mainstem bronchus  Saline was used to clear the secretions  Mucomyst was instilled in the left upper lobe, lingula, and left upper lobe  Extensive suctioning was performed in the left upper lobe, lingula, and left lower lobe     Mucosal irregularities were noted in the left mainstem bronchus along with the left lower lobe of unclear etiology and significance at 2, 3, and 4 o'clock positions     Bronchial brushing was performed at the left lower lobe bronchus at these mucosal irregularities x2 and these were sent for cytology  Bronchial washing was performed in the left lung including left upper lobe, lingula, and left lower lobe.  And this was sent for Gram stain, culture and cytology  Endobronchial biopsies were performed at the left lower lobe mucosal abnormality and was sent for pathology.     Epinephrine was used for topical hemostasis  At the end of the procedure excellent hemostasis was noted  Lungs showed only minimal secretions     Bronchoscope was withdrawn  Patient was left on the care of anesthesia for reversal and extubation  Will do chest x-ray in a.m.     Continue aggressive chest physical therapy

## 2020-10-02 NOTE — PROGRESS NOTES
PT treatment attempted . Currently , pt. Off the floor -X-ray  . PT will reattempt for PT treatment  at a later time . Thanks .

## 2020-10-02 NOTE — PROGRESS NOTES
Pulm/CC Progress Note    Subjective:   Daily Progress Note: 10/2/2020     Patient seen and examined  Overnight events noted    Lying in bed comfortably  Remains on 2 L nasal cannula oxygen  Still coughing up some mucus  No acute distress    Chest x-ray  has shown near total opacification of the left lung  Status post bronchoscopy 10/1  Repeat chest x-ray this morning shows slight improvement in aeration in the left upper lobe but persistent lingular and left lower lobe collapse  Will proceed with bronchoscopy later on today      Review of Systems  has complains of shortness of breath. He is on nasal cannula oxygen. Denied any nausea vomiting. Has fair appetite    Objective:     Visit Vitals  BP (!) 140/80   Pulse 94   Temp 97.6 °F (36.4 °C)   Resp 20   Ht 6' 0.99\" (1.854 m)   Wt 79.8 kg (175 lb 14.8 oz)   SpO2 100%   BMI 23.22 kg/m²    O2 Flow Rate (L/min): (3) O2 Device: Nasal cannula    Temp (24hrs), Av.6 °F (36.4 °C), Min:97.2 °F (36.2 °C), Max:98.4 °F (36.9 °C)      10/02 0701 - 10/02 190  In: 200 [I.V.:200]  Out: -   1901 - 10/02 0700  In: 1082 [P.O.:1900;  I.V.:450]  Out: 2915 [Urine:2915]    Current Facility-Administered Medications   Medication Dose Route Frequency    mineral oil (topical)    PRN    acetylcysteine (MUCOMYST) 200 mg/mL (20 %) solution    PRN    levoFLOXacin (LEVAQUIN) 500 mg in D5W IVPB  500 mg IntraVENous Q24H    acetylcysteine (MUCOMYST) 200 mg/mL (20 %) solution 600 mg  600 mg Nebulization QID RT    diphenhydrAMINE (BENADRYL) injection 25 mg  25 mg IntraVENous Q6H PRN    oxyCODONE-acetaminophen (PERCOCET) 5-325 mg per tablet 1 Tab  1 Tab Oral Q4H PRN    albuterol-ipratropium (DUO-NEB) 2.5 MG-0.5 MG/3 ML  3 mL Nebulization QID RT    guaiFENesin (ROBITUSSIN) 100 mg/5 mL oral liquid 400 mg  400 mg Oral Q6H    lidocaine (XYLOCAINE) 10 mg/mL (1 %) injection    PRN    mineral oil (topical)    PRN    dilTIAZem ER (CARDIZEM CD) capsule 120 mg  120 mg Oral DAILY    0.9% sodium chloride infusion 250 mL  250 mL IntraVENous PRN    atorvastatin (LIPITOR) tablet 20 mg  20 mg Oral DAILY    enoxaparin (LOVENOX) injection 40 mg  40 mg SubCUTAneous Q24H    losartan (COZAAR) tablet 100 mg  100 mg Oral DAILY    piperacillin-tazobactam (ZOSYN) 3.375 g in 0.9% sodium chloride (MBP/ADV) 100 mL MBP  3.375 g IntraVENous Q8H    pantoprazole (PROTONIX) tablet 40 mg  40 mg Oral DAILY    acetaminophen (TYLENOL) tablet 650 mg  650 mg Oral Q4H PRN    alum-mag hydroxide-simeth (MYLANTA) oral suspension 30 mL  30 mL Oral Q4H PRN    magnesium hydroxide (MILK OF MAGNESIA) 400 mg/5 mL oral suspension 30 mL  30 mL Oral DAILY PRN    ondansetron (ZOFRAN) injection 4 mg  4 mg IntraVENous Q8H PRN     Facility-Administered Medications Ordered in Other Encounters   Medication Dose Route Frequency    lactated Ringers infusion   IntraVENous CONTINUOUS    fentaNYL citrate (PF) injection   IntraVENous PRN    lidocaine (PF) (XYLOCAINE) 20 mg/mL (2 %) injection   IntraVENous PRN    propofoL (DIPRIVAN) 10 mg/mL injection   IntraVENous PRN    succinylcholine (ANECTINE) injection   IntraVENous PRN    rocuronium injection   IntraVENous PRN       Physical Exam:  General: Alert and oriented x3.  1 L nasal cannula. Pleasant but nervous. Chronically ill appearing. HEENT: atraumatic, PERRL, moist mucosa,     Neck: Trachea midline, no carotid bruit, no masses. Supple but thick. Respiratory: Poor air entry in left lung especially mid and lower lung zones  Cardiovascular: RRR, no m/r/g, Normal S1 and S2   abdomen: Has ventral abdominal hernia, evidence of surgical scars soft,   Rectal: deferred  Extremities: no cyanosis or clubbing. Trace edema. Integumentary: warm, dry, and pink, with no rash, purpura, or petechia.    Heme/Lymph: no lymphadenopathy, no bruises  Neurological: No focal motor deficit   psychiatric: cooperative with normal mood, affect, and cognition      Additional comments: Chest x-rays reviewed    Data Review    No results found for this or any previous visit (from the past 24 hour(s)). Today's CXR read is currently pending. XR CHEST AP LAT   Final Result      XR CHEST AP LAT   Final Result   IMPRESSION:   1. Persistent opacification of the left hemithorax with volume loss. 2.  Moderate right pleural effusion with probable associated right basilar   atelectasis. XR CHEST PORT   Final Result   Impression: Increased volume loss left lung. Otherwise stable. XR CHEST PORT   Final Result   IMPRESSION:   1. Improved aeration of the left lung with persistent basilar consolidative   opacities and probable pleural effusions. 2. Other extensive interstitial and alveolar opacities are nonspecific, but   potentially related to pulmonary edema or infection. XR CHEST PORT   Final Result   Impression:Left lung collapse without improvement from prior study. XR CHEST PORT   Final Result   IMPRESSION:   1. There is milder enlargement of the cardiac silhouette as well as increasing   pulmonary vascular ill definition suspect for mild pulmonary edema. This could   be related to cardiogenic edema or fluid overload. 2.  There is been an improvement in the overall aeration of the left lung with   and improved visualization of vascular markings within the left upper lung zone. There still remains significant partial collapse of the left lung. 3.  There is increased patchy airspace disease laterally within the right lower   lobe just above the right lateral costophrenic angle. 4.  There are persistent moderate-sized bilateral pleural effusions. XR CHEST AP LAT   Final Result   IMPRESSION: Persistent collapse of the left lung with bilateral pleural   effusions. Increasing vascular congestion on the right. XR CHEST PA LAT   Final Result   Impression:   Ongoing near complete white out of the left lung. Little interval change since   the prior examination.       CT CHEST WO CONT Final Result   IMPRESSION: Complete collapse of the left lung due to complete bronchial   obstruction, possibly aspiration. Fluid overload also noted      XR ABD ACUTE W 1 V CHEST   Final Result   IMPRESSION: Opacification of the left hemithorax, questioned for remote   pneumonectomy or lung collapse with pleural fluid. Prominent right parenchymal/interstitial lung markings compatible with fibrosis   and possible partial vascular congestion. Small bowel gas, nonobstructive pattern. Assessment/Plan: This is a middle-aged male who was admitted because of increasing symptoms of shortness of breath. He is getting treated in hospital for pneumonia with IV Zosyn/Azithromycin. He is noted to be increasingly tachypneic and short of breath. He has been on supplemental oxygen. His chest x-ray is showing left lung opacification likely from mucous plugging. This is slowly improving with aggressive pulmonary hygiene and three suction bronchoscopies.     Plan:     1.)    Left lung collapse/shortness of breath:  Exact etiology unclear  Likely combination of underlying COPD along with severe pneumonia  Status post multiple bronchoscopies  Currently on Levaquin and Zosyn  Recent cytology has been negative  Chest x-ray shows slight improvement in left upper lobe aeration but persistent lingular and left lower lobe atelectasis    Continue chest PT  Continue Mucomyst and nebulizers  We will proceed with bronchoscopy later on today  We will do brushings for cytology and biopsies if possible    Today will be his 6th bronchoscopy    2.)  COPD:  Continue bronchodilators and steroids    3.)  Pneumonia:  Continue Zosyn and Levaquin    4.)  Hypertension:  He is on antihypertensive medications, BP's stable.     5.)  Status post ex lap:  His abdominal examination shows significant fecal scars and ventral abdominal hernia    Questions of patient were answered at bedside in detail  Case discussed in detail with RN, RT, and care team  Thank you for involving me in the care of this patient  I will follow with you closely during hospitalization    Time spent more than 30 minutes in direct patient care with no overlap    Duong Vazquez, MD  Pulmonary and critical care

## 2020-10-03 ENCOUNTER — APPOINTMENT (OUTPATIENT)
Dept: GENERAL RADIOLOGY | Age: 60
DRG: 177 | End: 2020-10-03
Attending: INTERNAL MEDICINE
Payer: MEDICARE

## 2020-10-03 LAB
ANION GAP SERPL CALC-SCNC: 3 MMOL/L (ref 5–15)
BASOPHILS # BLD: 0 K/UL (ref 0–0.1)
BASOPHILS NFR BLD: 0 % (ref 0–1)
BUN SERPL-MCNC: 15 MG/DL (ref 6–20)
BUN/CREAT SERPL: 17 (ref 12–20)
CA-I BLD-MCNC: 8.4 MG/DL (ref 8.5–10.1)
CHLORIDE SERPL-SCNC: 96 MMOL/L (ref 97–108)
CO2 SERPL-SCNC: 40 MMOL/L (ref 21–32)
CREAT SERPL-MCNC: 0.9 MG/DL (ref 0.7–1.3)
CULTURE,CULT: NORMAL
CULTURE,CULT: NORMAL
DIFFERENTIAL METHOD BLD: ABNORMAL
EOSINOPHIL # BLD: 0 K/UL (ref 0–0.4)
EOSINOPHIL NFR BLD: 0 % (ref 0–7)
ERYTHROCYTE [DISTWIDTH] IN BLOOD BY AUTOMATED COUNT: 19.2 % (ref 11.5–14.5)
GLUCOSE SERPL-MCNC: 90 MG/DL (ref 65–100)
GRAM STN SPEC: NORMAL
HCT VFR BLD AUTO: 27.1 % (ref 36.6–50.3)
HGB BLD-MCNC: 8.3 G/DL (ref 12.1–17)
IMM GRANULOCYTES # BLD AUTO: 0.1 K/UL (ref 0–0.04)
IMM GRANULOCYTES NFR BLD AUTO: 1 % (ref 0–0.5)
LYMPHOCYTES # BLD: 2.3 K/UL (ref 0.8–3.5)
LYMPHOCYTES NFR BLD: 18 % (ref 12–49)
MCH RBC QN AUTO: 28.9 PG (ref 26–34)
MCHC RBC AUTO-ENTMCNC: 30.6 G/DL (ref 30–36.5)
MCV RBC AUTO: 94.4 FL (ref 80–99)
MONOCYTES # BLD: 1.5 K/UL (ref 0–1)
MONOCYTES NFR BLD: 12 % (ref 5–13)
NEUTS SEG # BLD: 9 K/UL (ref 1.8–8)
NEUTS SEG NFR BLD: 69 % (ref 32–75)
PLATELET # BLD AUTO: 369 K/UL (ref 150–400)
PMV BLD AUTO: 9.2 FL (ref 8.9–12.9)
POTASSIUM SERPL-SCNC: 3.8 MMOL/L (ref 3.5–5.1)
RBC # BLD AUTO: 2.87 M/UL (ref 4.1–5.7)
SODIUM SERPL-SCNC: 139 MMOL/L (ref 136–145)
SPECIAL REQUESTS,SREQ: NORMAL
WBC # BLD AUTO: 12.8 K/UL (ref 4.1–11.1)

## 2020-10-03 PROCEDURE — 74011000250 HC RX REV CODE- 250: Performed by: INTERNAL MEDICINE

## 2020-10-03 PROCEDURE — 94760 N-INVAS EAR/PLS OXIMETRY 1: CPT

## 2020-10-03 PROCEDURE — 77010033678 HC OXYGEN DAILY

## 2020-10-03 PROCEDURE — 74011250636 HC RX REV CODE- 250/636: Performed by: INTERNAL MEDICINE

## 2020-10-03 PROCEDURE — 74011250637 HC RX REV CODE- 250/637: Performed by: INTERNAL MEDICINE

## 2020-10-03 PROCEDURE — 94640 AIRWAY INHALATION TREATMENT: CPT

## 2020-10-03 PROCEDURE — 71045 X-RAY EXAM CHEST 1 VIEW: CPT

## 2020-10-03 PROCEDURE — 65270000029 HC RM PRIVATE

## 2020-10-03 PROCEDURE — 3331090002 HH PPS REVENUE DEBIT

## 2020-10-03 PROCEDURE — 74011000258 HC RX REV CODE- 258: Performed by: INTERNAL MEDICINE

## 2020-10-03 PROCEDURE — 74011250637 HC RX REV CODE- 250/637: Performed by: HOSPITALIST

## 2020-10-03 PROCEDURE — 80048 BASIC METABOLIC PNL TOTAL CA: CPT

## 2020-10-03 PROCEDURE — 36415 COLL VENOUS BLD VENIPUNCTURE: CPT

## 2020-10-03 PROCEDURE — 74011250636 HC RX REV CODE- 250/636: Performed by: HOSPITALIST

## 2020-10-03 PROCEDURE — 85025 COMPLETE CBC W/AUTO DIFF WBC: CPT

## 2020-10-03 PROCEDURE — 3331090001 HH PPS REVENUE CREDIT

## 2020-10-03 RX ADMIN — OXYCODONE HYDROCHLORIDE AND ACETAMINOPHEN 1 TABLET: 5; 325 TABLET ORAL at 04:07

## 2020-10-03 RX ADMIN — GUAIFENESIN 400 MG: 200 SOLUTION ORAL at 17:10

## 2020-10-03 RX ADMIN — GLYCOPYRROLATE 1 MG: 1 TABLET ORAL at 15:04

## 2020-10-03 RX ADMIN — LEVOFLOXACIN 500 MG: 5 INJECTION, SOLUTION INTRAVENOUS at 08:54

## 2020-10-03 RX ADMIN — DIPHENHYDRAMINE HYDROCHLORIDE 25 MG: 50 INJECTION, SOLUTION INTRAMUSCULAR; INTRAVENOUS at 07:53

## 2020-10-03 RX ADMIN — PIPERACILLIN SODIUM AND TAZOBACTAM SODIUM 3.38 G: 3; .375 INJECTION, POWDER, LYOPHILIZED, FOR SOLUTION INTRAVENOUS at 08:54

## 2020-10-03 RX ADMIN — PANTOPRAZOLE SODIUM 40 MG: 40 TABLET, DELAYED RELEASE ORAL at 06:15

## 2020-10-03 RX ADMIN — ACETYLCYSTEINE 600 MG: 200 SOLUTION ORAL; RESPIRATORY (INHALATION) at 15:22

## 2020-10-03 RX ADMIN — ATORVASTATIN CALCIUM 20 MG: 20 TABLET, FILM COATED ORAL at 21:15

## 2020-10-03 RX ADMIN — ENOXAPARIN SODIUM 40 MG: 40 INJECTION SUBCUTANEOUS at 08:55

## 2020-10-03 RX ADMIN — LOSARTAN POTASSIUM 100 MG: 50 TABLET, FILM COATED ORAL at 08:54

## 2020-10-03 RX ADMIN — GUAIFENESIN 400 MG: 200 SOLUTION ORAL at 11:52

## 2020-10-03 RX ADMIN — OXYCODONE HYDROCHLORIDE AND ACETAMINOPHEN 1 TABLET: 5; 325 TABLET ORAL at 21:15

## 2020-10-03 RX ADMIN — DILTIAZEM HYDROCHLORIDE 120 MG: 120 CAPSULE, COATED, EXTENDED RELEASE ORAL at 08:54

## 2020-10-03 RX ADMIN — GUAIFENESIN 400 MG: 200 SOLUTION ORAL at 06:15

## 2020-10-03 RX ADMIN — GLYCOPYRROLATE 1 MG: 1 TABLET ORAL at 21:15

## 2020-10-03 RX ADMIN — GLYCOPYRROLATE 1 MG: 1 TABLET ORAL at 08:54

## 2020-10-03 RX ADMIN — IPRATROPIUM BROMIDE AND ALBUTEROL SULFATE 3 ML: .5; 3 SOLUTION RESPIRATORY (INHALATION) at 11:38

## 2020-10-03 RX ADMIN — ACETYLCYSTEINE 600 MG: 200 SOLUTION ORAL; RESPIRATORY (INHALATION) at 11:38

## 2020-10-03 RX ADMIN — OXYCODONE HYDROCHLORIDE AND ACETAMINOPHEN 1 TABLET: 5; 325 TABLET ORAL at 11:54

## 2020-10-03 RX ADMIN — DIPHENHYDRAMINE HYDROCHLORIDE 25 MG: 50 INJECTION, SOLUTION INTRAMUSCULAR; INTRAVENOUS at 15:04

## 2020-10-03 RX ADMIN — IPRATROPIUM BROMIDE AND ALBUTEROL SULFATE 3 ML: .5; 3 SOLUTION RESPIRATORY (INHALATION) at 15:22

## 2020-10-03 RX ADMIN — PIPERACILLIN SODIUM AND TAZOBACTAM SODIUM 3.38 G: 3; .375 INJECTION, POWDER, LYOPHILIZED, FOR SOLUTION INTRAVENOUS at 15:03

## 2020-10-03 NOTE — PROGRESS NOTES
Pulm/CC Progress Note    Subjective:   Daily Progress Note: 10/3/2020     Patient seen and examined at th bedside today, no acute events overnight. Looks comfortable. Without any distress. On 2 L of nasal cannula oxygen. He is on  EZ-PAP. Chest x-ray 10 3 results reviewed      Review of Systems  has complains of shortness of breath. He is on nasal cannula oxygen. Denied any nausea vomiting. Has fair appetite    Objective:     Visit Vitals  /71   Pulse (!) 105   Temp 97.9 °F (36.6 °C)   Resp 18   Ht 6' 0.99\" (1.854 m)   Wt 81.8 kg (180 lb 5.4 oz)   SpO2 96%   BMI 23.80 kg/m²    O2 Flow Rate (L/min): 3 l/min O2 Device: Nasal cannula    Temp (24hrs), Av °F (36.7 °C), Min:97.8 °F (36.6 °C), Max:98.2 °F (36.8 °C)      10/03 0701 - 10/03 1900  In: -   Out: 043 [OKTMY:521]  10/01 1901 - 10/03 07  In: 1600 [P.O.:1200;  I.V.:400]  Out: 1651 [Urine:1650]    Current Facility-Administered Medications   Medication Dose Route Frequency    glycopyrrolate (ROBINUL) tablet 1 mg  1 mg Oral TID    mineral oil (topical)    PRN    acetylcysteine (MUCOMYST) 200 mg/mL (20 %) solution    PRN    levoFLOXacin (LEVAQUIN) 500 mg in D5W IVPB  500 mg IntraVENous Q24H    acetylcysteine (MUCOMYST) 200 mg/mL (20 %) solution 600 mg  600 mg Nebulization QID RT    diphenhydrAMINE (BENADRYL) injection 25 mg  25 mg IntraVENous Q6H PRN    oxyCODONE-acetaminophen (PERCOCET) 5-325 mg per tablet 1 Tab  1 Tab Oral Q4H PRN    albuterol-ipratropium (DUO-NEB) 2.5 MG-0.5 MG/3 ML  3 mL Nebulization QID RT    guaiFENesin (ROBITUSSIN) 100 mg/5 mL oral liquid 400 mg  400 mg Oral Q6H    lidocaine (XYLOCAINE) 10 mg/mL (1 %) injection    PRN    mineral oil (topical)    PRN    dilTIAZem ER (CARDIZEM CD) capsule 120 mg  120 mg Oral DAILY    0.9% sodium chloride infusion 250 mL  250 mL IntraVENous PRN    atorvastatin (LIPITOR) tablet 20 mg  20 mg Oral DAILY    enoxaparin (LOVENOX) injection 40 mg  40 mg SubCUTAneous Q24H    losartan (COZAAR) tablet 100 mg  100 mg Oral DAILY    piperacillin-tazobactam (ZOSYN) 3.375 g in 0.9% sodium chloride (MBP/ADV) 100 mL MBP  3.375 g IntraVENous Q8H    pantoprazole (PROTONIX) tablet 40 mg  40 mg Oral DAILY    acetaminophen (TYLENOL) tablet 650 mg  650 mg Oral Q4H PRN    alum-mag hydroxide-simeth (MYLANTA) oral suspension 30 mL  30 mL Oral Q4H PRN    magnesium hydroxide (MILK OF MAGNESIA) 400 mg/5 mL oral suspension 30 mL  30 mL Oral DAILY PRN    ondansetron (ZOFRAN) injection 4 mg  4 mg IntraVENous Q8H PRN       Physical Exam:  General: Alert and oriented x3.  2 L nasal cannula. Pleasant. HEENT: atraumatic, PERRL, moist mucosa,     Neck: Trachea midline, no carotid bruit, no masses. Supple but thick. Respiratory: Improved air entry in the anterior/superior chest, still diminished at the left base. Rhonchi improved in the right hemithorax. 1 L nasal cannula. Cardiovascular: RRR, no m/r/g, Normal S1 and S2   abdomen: Has ventral abdominal hernia, evidence of surgical scars soft,   Rectal: deferred  Extremities: no cyanosis or clubbing. Trace edema. Integumentary: warm, dry, and pink, with no rash, purpura, or petechia.    Heme/Lymph: no lymphadenopathy, no bruises  Neurological: No focal motor deficit   psychiatric: cooperative with normal mood, affect, and cognition      Additional comments: Chest x-rays reviewed    Data Review    Recent Results (from the past 24 hour(s))   METABOLIC PANEL, BASIC    Collection Time: 10/03/20 10:00 AM   Result Value Ref Range    Sodium 139 136 - 145 mmol/L    Potassium 3.8 3.5 - 5.1 mmol/L    Chloride 96 (L) 97 - 108 mmol/L    CO2 40 (H) 21 - 32 mmol/L    Anion gap 3 (L) 5 - 15 mmol/L    Glucose 90 65 - 100 mg/dL    BUN 15 6 - 20 mg/dL    Creatinine 0.90 0.70 - 1.30 mg/dL    BUN/Creatinine ratio 17 12 - 20      GFR est AA >60 >60 ml/min/1.73m2    GFR est non-AA >60 >60 ml/min/1.73m2    Calcium 8.4 (L) 8.5 - 10.1 mg/dL   CBC WITH AUTOMATED DIFF Collection Time: 10/03/20 10:00 AM   Result Value Ref Range    WBC 12.8 (H) 4.1 - 11.1 K/uL    RBC 2.87 (L) 4.10 - 5.70 M/uL    HGB 8.3 (L) 12.1 - 17.0 g/dL    HCT 27.1 (L) 36.6 - 50.3 %    MCV 94.4 80.0 - 99.0 FL    MCH 28.9 26.0 - 34.0 PG    MCHC 30.6 30.0 - 36.5 g/dL    RDW 19.2 (H) 11.5 - 14.5 %    PLATELET 493 059 - 691 K/uL    MPV 9.2 8.9 - 12.9 FL    NEUTROPHILS 69 32 - 75 %    LYMPHOCYTES 18 12 - 49 %    MONOCYTES 12 5 - 13 %    EOSINOPHILS 0 0 - 7 %    BASOPHILS 0 0 - 1 %    IMMATURE GRANULOCYTES 1 (H) 0.0 - 0.5 %    ABS. NEUTROPHILS 9.0 (H) 1.8 - 8.0 K/UL    ABS. LYMPHOCYTES 2.3 0.8 - 3.5 K/UL    ABS. MONOCYTES 1.5 (H) 0.0 - 1.0 K/UL    ABS. EOSINOPHILS 0.0 0.0 - 0.4 K/UL    ABS. BASOPHILS 0.0 0.0 - 0.1 K/UL    ABS. IMM. GRANS. 0.1 (H) 0.00 - 0.04 K/UL    DF AUTOMATED       Today's CXR read is currently pending. 6 results from 10/3/2020 were reviewed  Patient has left basal atelectasis. XR CHEST PORT   Final Result   IMPRESSION: No significant change. XR CHEST AP LAT   Final Result      XR CHEST AP LAT   Final Result   IMPRESSION:   1. Persistent opacification of the left hemithorax with volume loss. 2.  Moderate right pleural effusion with probable associated right basilar   atelectasis. XR CHEST PORT   Final Result   Impression: Increased volume loss left lung. Otherwise stable. XR CHEST PORT   Final Result   IMPRESSION:   1. Improved aeration of the left lung with persistent basilar consolidative   opacities and probable pleural effusions. 2. Other extensive interstitial and alveolar opacities are nonspecific, but   potentially related to pulmonary edema or infection. XR CHEST PORT   Final Result   Impression:Left lung collapse without improvement from prior study. XR CHEST PORT   Final Result   IMPRESSION:   1. There is milder enlargement of the cardiac silhouette as well as increasing   pulmonary vascular ill definition suspect for mild pulmonary edema.  This could be related to cardiogenic edema or fluid overload. 2.  There is been an improvement in the overall aeration of the left lung with   and improved visualization of vascular markings within the left upper lung zone. There still remains significant partial collapse of the left lung. 3.  There is increased patchy airspace disease laterally within the right lower   lobe just above the right lateral costophrenic angle. 4.  There are persistent moderate-sized bilateral pleural effusions. XR CHEST AP LAT   Final Result   IMPRESSION: Persistent collapse of the left lung with bilateral pleural   effusions. Increasing vascular congestion on the right. XR CHEST PA LAT   Final Result   Impression:   Ongoing near complete white out of the left lung. Little interval change since   the prior examination. CT CHEST WO CONT   Final Result   IMPRESSION: Complete collapse of the left lung due to complete bronchial   obstruction, possibly aspiration. Fluid overload also noted      XR ABD ACUTE W 1 V CHEST   Final Result   IMPRESSION: Opacification of the left hemithorax, questioned for remote   pneumonectomy or lung collapse with pleural fluid. Prominent right parenchymal/interstitial lung markings compatible with fibrosis   and possible partial vascular congestion. Small bowel gas, nonobstructive pattern. Assessment/Plan: This is a middle-aged male who was admitted because of increasing symptoms of shortness of breath. He is getting treated in hospital for pneumonia with IV Zosyn/Azithromycin. He is noted to be increasingly tachypneic and short of breath. He has been on supplemental oxygen. His chest x-ray is showing left lung opacification likely from mucous plugging.   This is slowly improving with aggressive pulmonary hygiene and three suction bronchoscopies.     Plan:     1.)    Left lung collapse/shortness of breath:  - Shortness of breath and hypoxia improved with diuresis and antibiotics,  Patient's repeated chest x-ray from last 4 days were reviewed, he continues to have left basal atelectasis. He had multiple bronchoscopies done along with lavage but he continues to have left basal atelectasis. Part of it is because of his body habitus, his large abdominal hernia pushes against his diaphragm. The way anatomy of left lower lobe bronchus is, 8 would cause atelectasis if patient continues to be in bed. Plans: EZ Pap at night  Chest physical therapy focusing on left side. Patient is encouraged to sleep on his right side. Supplemental O2 needs improved to 1L NC today. Please wean supplemental oxygen for sats greater than 92%. Continue aggressive pulmonary hygiene with nebulizers every 6 hours;  Aggressive chest PT with vest every 6 hours, EZ-PAP therapy q6, acapella device as well as incentive spirometer use. Added guaifenesin 400 mg q6.    2.)  COPD:  He has a history of COPD based on chronic smoking. Continue scheduled bronchodilators. Patient should be discharged with a LAMA/LABA such as Anoro and needs f/u in our office for PFT's and further management. Weaned prednisone to 10 mg daily on 9/24, likely can stop tomorrow. 3.)  Pneumonia:   He is on IV Zosyn, stopped Zithromax on 9/23/2020. We will start him on Levaquin given persistent atelectasis/infiltrate/secretions    4.)  Hypertension:  He is on antihypertensive medications, BP's stable. 5.)  Status post ex lap:  His abdominal examination shows significant surgical scars and ventral abdominal hernia. Patient wishes to proceed for abdominal ventral wall hernia surgery. Setting the size of his ventral abdominal wall hernia, it will be a difficult task. From my perspective he can have surgery done however surgeon has to make decision  That would be feasible to push back all the abdominal hernia into the abdominal cavity  What impact it will have.   Questions of patient were answered at bedside in detail  Case discussed in detail with RN, RT, and care team  Thank you for involving me in the care of this patient  I will follow with you closely during hospitalization    Time spent more than 30 minutes in direct patient care with no overlap    SIMA Stevenson MD  Pulmonary and critical care

## 2020-10-03 NOTE — PROGRESS NOTES
Progress Note    Patient: Renata Yung MRN: [id number]  SSN: [id number]    YOB: 1960  Age: 63 y.o. Sex: male      Admit Date: 9/10/2020    LOS: 23 days     Subjective:     59M, H/o COPD not on oxygen with with shortness of breath s/t pneumonia complicated by left lung collapse. Seen at bedside,  Does not appear distress. O2 nc 97%  Appears nad  Pulmonary/surgery input noted             Prior to Admission Medications   Prescriptions Last Dose Informant Patient Reported? Taking? cefdinir (OMNICEF) 300 mg capsule   No No   Sig: Take 1 Cap by mouth two (2) times a day for 10 days. losartan (COZAAR) 100 mg tablet   No No   Sig: TAKE 1 TABLET BY MOUTH EVERY DAY   omeprazole (PRILOSEC) 20 mg capsule   Yes Yes   Sig: Take 20 mg by mouth daily. pantoprazole (PROTONIX) 40 mg tablet   No No   Sig: Take 1 Tab by mouth daily.    simvastatin (ZOCOR) 40 mg tablet   No No   Sig: TAKE 1 TABLET BY MOUTH EVERY DAY AT NIGHT      Facility-Administered Medications: None       Current Facility-Administered Medications:     glycopyrrolate (ROBINUL) tablet 1 mg, 1 mg, Oral, TID, Duong Ruiz MD, 1 mg at 10/03/20 1504    mineral oil (topical), , , PRN, Duong Ruiz MD, 5 mL at 10/02/20 1125    acetylcysteine (MUCOMYST) 200 mg/mL (20 %) solution, , , PRN, Duong Ruiz MD, 5 mL at 10/02/20 1159    levoFLOXacin (LEVAQUIN) 500 mg in D5W IVPB, 500 mg, IntraVENous, Q24H, Duong Ruiz MD, Last Rate: 100 mL/hr at 10/03/20 0854, 500 mg at 10/03/20 0854    acetylcysteine (MUCOMYST) 200 mg/mL (20 %) solution 600 mg, 600 mg, Nebulization, QID RT, Duong Ruiz MD, 600 mg at 10/03/20 1522    diphenhydrAMINE (BENADRYL) injection 25 mg, 25 mg, IntraVENous, Q6H PRN, Katie aZmarripa MD, 25 mg at 10/03/20 1504    oxyCODONE-acetaminophen (PERCOCET) 5-325 mg per tablet 1 Tab, 1 Tab, Oral, Q4H PRN, Katie Zamarripa MD, 1 Tab at 10/03/20 1154    albuterol-ipratropium (DUO-NEB) 2.5 MG-0.5 MG/3 ML, 3 mL, Nebulization, QID RT, Jarett Ocampo DO, 3 mL at 10/03/20 1522    guaiFENesin (ROBITUSSIN) 100 mg/5 mL oral liquid 400 mg, 400 mg, Oral, Q6H, Jarett Ocampo DO, 400 mg at 10/03/20 1710    lidocaine (XYLOCAINE) 10 mg/mL (1 %) injection, , , PRN, Jarett Ocampo, , 15 mL at 10/02/20 1159    mineral oil (topical), , , PRN, Jarett Ocampo, DO, 5 mL at 20 0820    dilTIAZem ER (CARDIZEM CD) capsule 120 mg, 120 mg, Oral, DAILY, Claudeen Harp, MD, 120 mg at 10/03/20 0854    0.9% sodium chloride infusion 250 mL, 250 mL, IntraVENous, PRN, Dequan Mauro MD    atorvastatin (LIPITOR) tablet 20 mg, 20 mg, Oral, DAILY, Dequan Mauro MD, 20 mg at 10/02/20 2126    enoxaparin (LOVENOX) injection 40 mg, 40 mg, SubCUTAneous, Q24H, Dequan Mauro MD, 40 mg at 10/03/20 0855    losartan (COZAAR) tablet 100 mg, 100 mg, Oral, DAILY, Dequan Mauro MD, 100 mg at 10/03/20 0854    piperacillin-tazobactam (ZOSYN) 3.375 g in 0.9% sodium chloride (MBP/ADV) 100 mL MBP, 3.375 g, IntraVENous, Q8H, Dequan Mauro MD, Last Rate: 25 mL/hr at 10/03/20 1503, 3.375 g at 10/03/20 1503    pantoprazole (PROTONIX) tablet 40 mg, 40 mg, Oral, DAILY, Dequan Mauro MD, 40 mg at 10/03/20 0615    acetaminophen (TYLENOL) tablet 650 mg, 650 mg, Oral, Q4H PRN, Dequan Mauro MD, 650 mg at 20 2319    alum-mag hydroxide-simeth (MYLANTA) oral suspension 30 mL, 30 mL, Oral, Q4H PRN, Dequan Mauro MD, 30 mL at 20 3932    magnesium hydroxide (MILK OF MAGNESIA) 400 mg/5 mL oral suspension 30 mL, 30 mL, Oral, DAILY PRN, Dequan Mauro MD    ondansetron [facility]) injection 4 mg, 4 mg, IntraVENous, Q8H PRN, Dequan Mauro MD    Objective:     Visit Vitals  /70   Pulse 100   Temp 99 °F (37.2 °C)   Resp 20   Ht 6' 0.99\" (1.854 m)   Wt 82.5 kg (181 lb 14.1 oz)   SpO2 97%   BMI 24.00 kg/m²    O2 Flow Rate (L/min): 3 l/min O2 Device: Nasal cannula     Temp (24hrs), Av.6 °F (37 °C), Min:97.2 °F (36.2 °C), Max:99.1 °F (37.3 °C)        No intake/output data recorded. 09/28 1901 - 09/30 0700  In: 2100 [P.O.:1300; I.V.:800]  Out: 2500 [Urine:2500]      Physical Exam:   General : Appearance:  no respiratory distress noted  HEENT : PERRLA, normal oral mucosa, atraumatic normocephalic, Normal ear and nose. Neck : Supple, no JVD, no masses noted, no carotid bruit  Lungs : dinished bs left, not labored. CVS : Rhythm rate regular, S1+, S2+, no murmur or gallop  Abdomen : Soft, nontender, ++ventral hernia. bowel sounds active  Extremities : No edema noted,  pedal pulses palpable  Musculoskeletal : Fair range of motion, no joint swelling or effusion, muscle tone and power appears normal,   Skin : Moist, warm, skin turgor, no pathological rash  Lymphatic : No cervical lymphadenopathy.   Neurological : Awake, alert, oriented to time place person.  No neurological deficits.    Psychiatric : Mood and affect appears appropriate to the situation.     Lab/Data Review:  Recent Results (from the past 24 hour(s))   METABOLIC PANEL, BASIC    Collection Time: 10/03/20 10:00 AM   Result Value Ref Range    Sodium 139 136 - 145 mmol/L    Potassium 3.8 3.5 - 5.1 mmol/L    Chloride 96 (L) 97 - 108 mmol/L    CO2 40 (H) 21 - 32 mmol/L    Anion gap 3 (L) 5 - 15 mmol/L    Glucose 90 65 - 100 mg/dL    BUN 15 6 - 20 mg/dL    Creatinine 0.90 0.70 - 1.30 mg/dL    BUN/Creatinine ratio 17 12 - 20      GFR est AA >60 >60 ml/min/1.73m2    GFR est non-AA >60 >60 ml/min/1.73m2    Calcium 8.4 (L) 8.5 - 10.1 mg/dL   CBC WITH AUTOMATED DIFF    Collection Time: 10/03/20 10:00 AM   Result Value Ref Range    WBC 12.8 (H) 4.1 - 11.1 K/uL    RBC 2.87 (L) 4.10 - 5.70 M/uL    HGB 8.3 (L) 12.1 - 17.0 g/dL    HCT 27.1 (L) 36.6 - 50.3 %    MCV 94.4 80.0 - 99.0 FL    MCH 28.9 26.0 - 34.0 PG    MCHC 30.6 30.0 - 36.5 g/dL    RDW 19.2 (H) 11.5 - 14.5 %    PLATELET 159 324 - 922 K/uL    MPV 9.2 8.9 - 12.9 FL    NEUTROPHILS 69 32 - 75 %    LYMPHOCYTES 18 12 - 49 % MONOCYTES 12 5 - 13 %    EOSINOPHILS 0 0 - 7 %    BASOPHILS 0 0 - 1 %    IMMATURE GRANULOCYTES 1 (H) 0.0 - 0.5 %    ABS. NEUTROPHILS 9.0 (H) 1.8 - 8.0 K/UL    ABS. LYMPHOCYTES 2.3 0.8 - 3.5 K/UL    ABS. MONOCYTES 1.5 (H) 0.0 - 1.0 K/UL    ABS. EOSINOPHILS 0.0 0.0 - 0.4 K/UL    ABS. BASOPHILS 0.0 0.0 - 0.1 K/UL    ABS. IMM. GRANS. 0.1 (H) 0.00 - 0.04 K/UL    DF AUTOMATED           Assessment and plan:      (1) acute hypoxic respiratory failure : s/t 2,3. On 3L NC     (2) Left lung collapse: s/p multiple bronch's, left lung remains largely opacified, cxr 10/3 little change. Chest PT, Q6H nebs, guaifenesin, lasix with negative fluid target. Bal cs multiple =nl xiomara. Cytology x 4 unrevealing for malignancy. (3) Pneumonia : same as #2. Continue Zosyn/levaquin, stopped Azithro 9/23. 14 d abx total is recommended. Bal cs multiple =nl xiomara. Cytology x 4 unrevealing for malignancy. (4) COPD: LAMA/ LABA, OP PFT, pulm following. Wean steroids.     (5) Anemia: AOCI. stable     (6) GERD: protonix. Complicates #1.      (7) HTN : on losartan and CCB     (8) HLD: simvastatin    (9) Massive abd hernia, chronic 6+ years, admits neglected follow up. Surgery cx appreciated, follow ct abd. Pt at increased risk for procedure with resp compromise in addition to anatomical considerations of reducing massive hernia. Pt aware of inc mortality states \"I don't care if it kills me but it has to be done\".      DVT ppx: lovenox     DISPO: SNF pending course. follow ct abd. Cont per pulmonology.        Signed By: Wm Cervantes MD     October 3, 2020

## 2020-10-04 PROCEDURE — 3331090003 HH PPS REVENUE ADJ

## 2020-10-04 PROCEDURE — 74011250637 HC RX REV CODE- 250/637: Performed by: INTERNAL MEDICINE

## 2020-10-04 PROCEDURE — 74011250637 HC RX REV CODE- 250/637: Performed by: HOSPITALIST

## 2020-10-04 PROCEDURE — 65270000029 HC RM PRIVATE

## 2020-10-04 PROCEDURE — 3331090001 HH PPS REVENUE CREDIT

## 2020-10-04 PROCEDURE — 74011000258 HC RX REV CODE- 258: Performed by: INTERNAL MEDICINE

## 2020-10-04 PROCEDURE — 74011000250 HC RX REV CODE- 250: Performed by: INTERNAL MEDICINE

## 2020-10-04 PROCEDURE — 94760 N-INVAS EAR/PLS OXIMETRY 1: CPT

## 2020-10-04 PROCEDURE — 74011250636 HC RX REV CODE- 250/636: Performed by: INTERNAL MEDICINE

## 2020-10-04 PROCEDURE — 3331090002 HH PPS REVENUE DEBIT

## 2020-10-04 PROCEDURE — 77010033678 HC OXYGEN DAILY

## 2020-10-04 PROCEDURE — 94640 AIRWAY INHALATION TREATMENT: CPT

## 2020-10-04 PROCEDURE — 74011250636 HC RX REV CODE- 250/636: Performed by: HOSPITALIST

## 2020-10-04 RX ORDER — DIPHENHYDRAMINE HCL 25 MG
25 CAPSULE ORAL
Status: DISCONTINUED | OUTPATIENT
Start: 2020-10-04 | End: 2020-10-24

## 2020-10-04 RX ADMIN — GLYCOPYRROLATE 1 MG: 1 TABLET ORAL at 15:14

## 2020-10-04 RX ADMIN — PANTOPRAZOLE SODIUM 40 MG: 40 TABLET, DELAYED RELEASE ORAL at 05:59

## 2020-10-04 RX ADMIN — LOSARTAN POTASSIUM 100 MG: 50 TABLET, FILM COATED ORAL at 08:57

## 2020-10-04 RX ADMIN — OXYCODONE HYDROCHLORIDE AND ACETAMINOPHEN 1 TABLET: 5; 325 TABLET ORAL at 16:43

## 2020-10-04 RX ADMIN — ACETYLCYSTEINE 600 MG: 200 SOLUTION ORAL; RESPIRATORY (INHALATION) at 11:18

## 2020-10-04 RX ADMIN — GUAIFENESIN 400 MG: 200 SOLUTION ORAL at 00:12

## 2020-10-04 RX ADMIN — PIPERACILLIN SODIUM AND TAZOBACTAM SODIUM 3.38 G: 3; .375 INJECTION, POWDER, LYOPHILIZED, FOR SOLUTION INTRAVENOUS at 15:14

## 2020-10-04 RX ADMIN — OXYCODONE HYDROCHLORIDE AND ACETAMINOPHEN 1 TABLET: 5; 325 TABLET ORAL at 05:59

## 2020-10-04 RX ADMIN — ENOXAPARIN SODIUM 40 MG: 40 INJECTION SUBCUTANEOUS at 08:57

## 2020-10-04 RX ADMIN — GLYCOPYRROLATE 1 MG: 1 TABLET ORAL at 08:57

## 2020-10-04 RX ADMIN — OXYCODONE HYDROCHLORIDE AND ACETAMINOPHEN 1 TABLET: 5; 325 TABLET ORAL at 20:54

## 2020-10-04 RX ADMIN — DIPHENHYDRAMINE HYDROCHLORIDE 25 MG: 25 CAPSULE ORAL at 20:54

## 2020-10-04 RX ADMIN — DIPHENHYDRAMINE HYDROCHLORIDE 25 MG: 50 INJECTION, SOLUTION INTRAMUSCULAR; INTRAVENOUS at 00:12

## 2020-10-04 RX ADMIN — GUAIFENESIN 400 MG: 200 SOLUTION ORAL at 16:42

## 2020-10-04 RX ADMIN — GLYCOPYRROLATE 1 MG: 1 TABLET ORAL at 21:00

## 2020-10-04 RX ADMIN — IPRATROPIUM BROMIDE AND ALBUTEROL SULFATE 3 ML: .5; 3 SOLUTION RESPIRATORY (INHALATION) at 11:19

## 2020-10-04 RX ADMIN — LEVOFLOXACIN 500 MG: 5 INJECTION, SOLUTION INTRAVENOUS at 08:57

## 2020-10-04 RX ADMIN — GUAIFENESIN 400 MG: 200 SOLUTION ORAL at 06:00

## 2020-10-04 RX ADMIN — DILTIAZEM HYDROCHLORIDE 120 MG: 120 CAPSULE, COATED, EXTENDED RELEASE ORAL at 08:57

## 2020-10-04 RX ADMIN — DIPHENHYDRAMINE HYDROCHLORIDE 25 MG: 50 INJECTION, SOLUTION INTRAMUSCULAR; INTRAVENOUS at 05:59

## 2020-10-04 RX ADMIN — ATORVASTATIN CALCIUM 20 MG: 20 TABLET, FILM COATED ORAL at 20:54

## 2020-10-04 RX ADMIN — PIPERACILLIN SODIUM AND TAZOBACTAM SODIUM 3.38 G: 3; .375 INJECTION, POWDER, LYOPHILIZED, FOR SOLUTION INTRAVENOUS at 08:57

## 2020-10-04 RX ADMIN — GUAIFENESIN 400 MG: 200 SOLUTION ORAL at 11:41

## 2020-10-04 RX ADMIN — OXYCODONE HYDROCHLORIDE AND ACETAMINOPHEN 1 TABLET: 5; 325 TABLET ORAL at 11:41

## 2020-10-04 RX ADMIN — PIPERACILLIN SODIUM AND TAZOBACTAM SODIUM 3.38 G: 3; .375 INJECTION, POWDER, LYOPHILIZED, FOR SOLUTION INTRAVENOUS at 00:12

## 2020-10-04 NOTE — PROGRESS NOTES
Progress Note    Patient: Kristian Savage MRN: [id number]  SSN: [id number]    YOB: 1960  Age: 63 y.o. Sex: male      Admit Date: 9/10/2020    LOS: 24 days     Subjective:     59M, H/o COPD not on oxygen with with shortness of breath s/t pneumonia complicated by left lung collapse. Seen at bedside,  Does not appear distress but feels resp more difficult, can't cough or clear secretiions 2/2 abd hernia. O2 nc 96%  Appears nad  Pulmonary/surgery input noted          Prior to Admission Medications   Prescriptions Last Dose Informant Patient Reported? Taking? cefdinir (OMNICEF) 300 mg capsule   No No   Sig: Take 1 Cap by mouth two (2) times a day for 10 days. losartan (COZAAR) 100 mg tablet   No No   Sig: TAKE 1 TABLET BY MOUTH EVERY DAY   omeprazole (PRILOSEC) 20 mg capsule   Yes Yes   Sig: Take 20 mg by mouth daily. pantoprazole (PROTONIX) 40 mg tablet   No No   Sig: Take 1 Tab by mouth daily.    simvastatin (ZOCOR) 40 mg tablet   No No   Sig: TAKE 1 TABLET BY MOUTH EVERY DAY AT NIGHT      Facility-Administered Medications: None       Current Facility-Administered Medications:     diphenhydrAMINE (BENADRYL) capsule 25 mg, 25 mg, Oral, Q6H PRN, Jesus Medrano MD    glycopyrrolate (ROBINUL) tablet 1 mg, 1 mg, Oral, TID, Duong Ruiz MD, 1 mg at 10/04/20 0857    mineral oil (topical), , , PRN, Duong Ruiz MD, 5 mL at 10/02/20 1125    acetylcysteine (MUCOMYST) 200 mg/mL (20 %) solution, , , PRN, Duong Ruiz MD, 5 mL at 10/02/20 1159    levoFLOXacin (LEVAQUIN) 500 mg in D5W IVPB, 500 mg, IntraVENous, Q24H, Duong Ruiz MD, Last Rate: 100 mL/hr at 10/04/20 0857, 500 mg at 10/04/20 0857    acetylcysteine (MUCOMYST) 200 mg/mL (20 %) solution 600 mg, 600 mg, Nebulization, QID RT, Duong Ruiz MD, 600 mg at 10/04/20 1118    oxyCODONE-acetaminophen (PERCOCET) 5-325 mg per tablet 1 Tab, 1 Tab, Oral, Q4H PRN, Mesha Egan MD, 1 Tab at 10/04/20 1141   albuterol-ipratropium (DUO-NEB) 2.5 MG-0.5 MG/3 ML, 3 mL, Nebulization, QID RT, Jarett Ocampo, DO, 3 mL at 10/04/20 1119    guaiFENesin (ROBITUSSIN) 100 mg/5 mL oral liquid 400 mg, 400 mg, Oral, Q6H, Jarett Ocampo, DO, 400 mg at 10/04/20 1141    lidocaine (XYLOCAINE) 10 mg/mL (1 %) injection, , , PRN, Jarett Ocampo, DO, 15 mL at 10/02/20 1159    mineral oil (topical), , , PRN, Terrence, Jarett, DO, 5 mL at 09/23/20 0820    dilTIAZem ER (CARDIZEM CD) capsule 120 mg, 120 mg, Oral, DAILY, Opal Fletcher MD, 120 mg at 10/04/20 0857    0.9% sodium chloride infusion 250 mL, 250 mL, IntraVENous, PRN, Jessica Hobson MD    atorvastatin (LIPITOR) tablet 20 mg, 20 mg, Oral, DAILY, Jessica Hobson MD, 20 mg at 10/03/20 2115    enoxaparin (LOVENOX) injection 40 mg, 40 mg, SubCUTAneous, Q24H, Jessica Hobson MD, 40 mg at 10/04/20 0857    losartan (COZAAR) tablet 100 mg, 100 mg, Oral, DAILY, Jessica Hobson MD, 100 mg at 10/04/20 0857    piperacillin-tazobactam (ZOSYN) 3.375 g in 0.9% sodium chloride (MBP/ADV) 100 mL MBP, 3.375 g, IntraVENous, Q8H, Jessica Hobson MD, Last Rate: 25 mL/hr at 10/04/20 0857, 3.375 g at 10/04/20 0857    pantoprazole (PROTONIX) tablet 40 mg, 40 mg, Oral, DAILY, Jessica Hobson MD, 40 mg at 10/04/20 0559    acetaminophen (TYLENOL) tablet 650 mg, 650 mg, Oral, Q4H PRN, Jessica Hobson MD, 650 mg at 09/24/20 2319    alum-mag hydroxide-simeth (MYLANTA) oral suspension 30 mL, 30 mL, Oral, Q4H PRN, Jessica Hobson MD, 30 mL at 09/22/20 0865    magnesium hydroxide (MILK OF MAGNESIA) 400 mg/5 mL oral suspension 30 mL, 30 mL, Oral, DAILY PRN, Jessica Hobson MD    ondansetron [facility]) injection 4 mg, 4 mg, IntraVENous, Q8H PRN, Jessica Hobson MD    Objective:     Visit Vitals  /70   Pulse 100   Temp 99 °F (37.2 °C)   Resp 20   Ht 6' 0.99\" (1.854 m)   Wt 82.5 kg (181 lb 14.1 oz)   SpO2 97%   BMI 24.00 kg/m²    O2 Flow Rate (L/min): 3 l/min O2 Device: Nasal cannula     Temp (24hrs), Av.6 °F (37 °C), Min:97.2 °F (36.2 °C), Max:99.1 °F (37.3 °C)        No intake/output data recorded. 1901 -  0700  In: 2100 [P.O.:1300; I.V.:800]  Out: 2500 [Urine:2500]      Physical Exam:   General : Appearance:  no respiratory distress noted  HEENT : PERRLA, normal oral mucosa, atraumatic normocephalic, Normal ear and nose. Neck : Supple, no JVD, no masses noted, no carotid bruit  Lungs : dinished bs left, not labored. CVS : Rhythm rate regular, S1+, S2+, no murmur or gallop  Abdomen : Soft, nontender, ++ventral hernia. bowel sounds active  Extremities : No edema noted,  pedal pulses palpable  Musculoskeletal : Fair range of motion, no joint swelling or effusion, muscle tone and power appears normal,   Skin : Moist, warm, skin turgor, no pathological rash  Lymphatic : No cervical lymphadenopathy. Neurological : Awake, alert, oriented to time place person.  No neurological deficits.    Psychiatric : Mood and affect appears appropriate to the situation.     Lab/Data Review:  No results found for this or any previous visit (from the past 24 hour(s)). Assessment and plan:      (1) acute hypoxic respiratory failure : s/t 2,3. On 3L NC     (2) Left lung collapse: s/p multiple bronch's, left lung remains largely opacified, cxr 10/3 little change. Chest PT, Q6H nebs, guaifenesin, lasix with negative fluid target. Bal cs multiple =nl xiomara. Cytology x 4 unrevealing for malignancy. (3) Pneumonia : same as #2. Continue Zosyn/levaquin, stopped Azithro . 14 d abx total is recommended. Bal cs multiple =nl xiomara. Cytology x 4 unrevealing for malignancy. (4) COPD: LAMA/ LABA, OP PFT, pulm following. Wean steroids.     (5) Anemia: AOCI. stable     (6) GERD: protonix. Complicates #1.      (7) HTN : on losartan and CCB     (8) HLD: simvastatin    (9) Massive abd hernia, chronic 6+ years, admits neglected follow up. Surgery cx appreciated, follow ct abd.  Pt at increased risk for procedure with resp compromise in addition to anatomical considerations of reducing massive hernia. Pt aware of inc mortality states \"I don't care if it kills me but it has to be done\". Abd ct in am.     DVT ppx: lovenox     DISPO: SNF anticipated when stable. follow ct abd. Cont per pulmonology. D/w Dr Derrell Kelly. Follow cxr am.      Signed By: Chilo Rome MD     October 4, 2020

## 2020-10-04 NOTE — PROGRESS NOTES
Pulm/CC Progress Note    Subjective:   Daily Progress Note: 10/4/2020     Patient seen and examined at th bedside today, no acute events overnight. Looks comfortable. Without any distress. On 2 L of nasal cannula oxygen. He is on  EZ-PAP at night    Chest x-ray 10/ 3 results reviewed      Review of Systems  has complains of shortness of breath. He is on nasal cannula oxygen. Denied any nausea vomiting. Has fair appetite    Objective:     Visit Vitals  /72   Pulse 96   Temp 97.9 °F (36.6 °C)   Resp 20   Ht 6' 0.99\" (1.854 m)   Wt 81.8 kg (180 lb 5.4 oz)   SpO2 96%   BMI 23.80 kg/m²    O2 Flow Rate (L/min): 3 l/min O2 Device: Nasal cannula    Temp (24hrs), Av °F (36.7 °C), Min:97.8 °F (36.6 °C), Max:98.1 °F (36.7 °C)      No intake/output data recorded. 10/02 1901 - 10/04 0700  In: 36 [P.O.:720;  I.V.:100]  Out: 951 [Urine:950]    Current Facility-Administered Medications   Medication Dose Route Frequency    diphenhydrAMINE (BENADRYL) capsule 25 mg  25 mg Oral Q6H PRN    glycopyrrolate (ROBINUL) tablet 1 mg  1 mg Oral TID    mineral oil (topical)    PRN    acetylcysteine (MUCOMYST) 200 mg/mL (20 %) solution    PRN    levoFLOXacin (LEVAQUIN) 500 mg in D5W IVPB  500 mg IntraVENous Q24H    acetylcysteine (MUCOMYST) 200 mg/mL (20 %) solution 600 mg  600 mg Nebulization QID RT    oxyCODONE-acetaminophen (PERCOCET) 5-325 mg per tablet 1 Tab  1 Tab Oral Q4H PRN    albuterol-ipratropium (DUO-NEB) 2.5 MG-0.5 MG/3 ML  3 mL Nebulization QID RT    guaiFENesin (ROBITUSSIN) 100 mg/5 mL oral liquid 400 mg  400 mg Oral Q6H    lidocaine (XYLOCAINE) 10 mg/mL (1 %) injection    PRN    mineral oil (topical)    PRN    dilTIAZem ER (CARDIZEM CD) capsule 120 mg  120 mg Oral DAILY    0.9% sodium chloride infusion 250 mL  250 mL IntraVENous PRN    atorvastatin (LIPITOR) tablet 20 mg  20 mg Oral DAILY    enoxaparin (LOVENOX) injection 40 mg  40 mg SubCUTAneous Q24H    losartan (COZAAR) tablet 100 mg  100 mg Oral DAILY    piperacillin-tazobactam (ZOSYN) 3.375 g in 0.9% sodium chloride (MBP/ADV) 100 mL MBP  3.375 g IntraVENous Q8H    pantoprazole (PROTONIX) tablet 40 mg  40 mg Oral DAILY    acetaminophen (TYLENOL) tablet 650 mg  650 mg Oral Q4H PRN    alum-mag hydroxide-simeth (MYLANTA) oral suspension 30 mL  30 mL Oral Q4H PRN    magnesium hydroxide (MILK OF MAGNESIA) 400 mg/5 mL oral suspension 30 mL  30 mL Oral DAILY PRN    ondansetron (ZOFRAN) injection 4 mg  4 mg IntraVENous Q8H PRN       Physical Exam:  General: Alert and oriented x3.  2 L nasal cannula. Pleasant. HEENT: atraumatic, PERRL, moist mucosa,     Neck: Trachea midline, no carotid bruit, no masses. Supple but thick. Respiratory: Has decreased air entry at left lung base. Cardiovascular: RRR, no m/r/g, Normal S1 and S2   abdomen: Has ventral abdominal hernia, evidence of surgical scars soft,   Rectal: deferred  Extremities: no cyanosis or clubbing. Trace edema. Integumentary: warm, dry, and pink, with no rash, purpura, or petechia. Heme/Lymph: no lymphadenopathy, no bruises  Neurological: No focal motor deficit   psychiatric: cooperative with normal mood, affect, and cognition      Additional comments: Chest x-rays reviewed    Data Review    No results found for this or any previous visit (from the past 24 hour(s)). Today's CXR read is currently pending. 6 results from 10/3/2020 were reviewed  Patient has left basal atelectasis. XR CHEST PORT   Final Result   IMPRESSION: No significant change. XR CHEST AP LAT   Final Result      XR CHEST AP LAT   Final Result   IMPRESSION:   1. Persistent opacification of the left hemithorax with volume loss. 2.  Moderate right pleural effusion with probable associated right basilar   atelectasis. XR CHEST PORT   Final Result   Impression: Increased volume loss left lung. Otherwise stable. XR CHEST PORT   Final Result   IMPRESSION:   1.  Improved aeration of the left lung with persistent basilar consolidative   opacities and probable pleural effusions. 2. Other extensive interstitial and alveolar opacities are nonspecific, but   potentially related to pulmonary edema or infection. XR CHEST PORT   Final Result   Impression:Left lung collapse without improvement from prior study. XR CHEST PORT   Final Result   IMPRESSION:   1. There is milder enlargement of the cardiac silhouette as well as increasing   pulmonary vascular ill definition suspect for mild pulmonary edema. This could   be related to cardiogenic edema or fluid overload. 2.  There is been an improvement in the overall aeration of the left lung with   and improved visualization of vascular markings within the left upper lung zone. There still remains significant partial collapse of the left lung. 3.  There is increased patchy airspace disease laterally within the right lower   lobe just above the right lateral costophrenic angle. 4.  There are persistent moderate-sized bilateral pleural effusions. XR CHEST AP LAT   Final Result   IMPRESSION: Persistent collapse of the left lung with bilateral pleural   effusions. Increasing vascular congestion on the right. XR CHEST PA LAT   Final Result   Impression:   Ongoing near complete white out of the left lung. Little interval change since   the prior examination. CT CHEST WO CONT   Final Result   IMPRESSION: Complete collapse of the left lung due to complete bronchial   obstruction, possibly aspiration. Fluid overload also noted      XR ABD ACUTE W 1 V CHEST   Final Result   IMPRESSION: Opacification of the left hemithorax, questioned for remote   pneumonectomy or lung collapse with pleural fluid. Prominent right parenchymal/interstitial lung markings compatible with fibrosis   and possible partial vascular congestion. Small bowel gas, nonobstructive pattern. Assessment/Plan:      This is a middle-aged male who was admitted because of increasing symptoms of shortness of breath. He is getting treated in hospital for pneumonia with IV Zosyn/Azithromycin. He is noted to be increasingly tachypneic and short of breath. He has been on supplemental oxygen. His chest x-ray is showing left lung opacification likely from mucous plugging. This is slowly improving with aggressive pulmonary hygiene and three suction bronchoscopies.     Plan:     1.)    Left lung collapse/shortness of breath:  - Shortness of breath and hypoxia improved with diuresis and antibiotics,  Patient's repeated chest x-ray from last 4 days were reviewed, he continues to have left basal atelectasis. He had multiple bronchoscopies done along with lavage but he continues to have left basal atelectasis. Part of it is because of his body habitus, his large abdominal hernia pushes against his diaphragm. The way anatomy of left lower lobe bronchus is, it would cause atelectasis if patient continues to be in bed. Plans: EZ Pap at night  Continues to have significant improvement in his chest excursions which can be achieved by starting on physical therapy. Ordered physical therapy evaluation which would involve coming out of bed, ambulating with help. Patient agreed to work with physical therapy. Chest physical therapy focusing on left side. Patient is encouraged to sleep on his right side. Supplemental O2 needs improved to 1L NC today. Please wean supplemental oxygen for sats greater than 92%. Continue aggressive pulmonary hygiene with nebulizers every 6 hours;  Aggressive chest PT with vest every 6 hours, EZ-PAP therapy q6, acapella device as well as incentive spirometer use. Added guaifenesin 400 mg q6.    2.)  COPD:  He has a history of COPD based on chronic smoking. Continue scheduled bronchodilators. Patient should be discharged with a LAMA/LABA such as Anoro and needs f/u in our office for PFT's and further management.   Weaned prednisone to 10 mg daily on 9/24, likely can stop tomorrow. 3.)  Pneumonia:   He is on IV Zosyn, stopped Zithromax on 9/23/2020. We will start him on Levaquin given persistent atelectasis/infiltrate/secretions    4.)  Hypertension:  He is on antihypertensive medications, BP's stable. 5.)  Status post ex lap:  His abdominal examination shows significant surgical scars and ventral abdominal hernia. Patient wishes to proceed for abdominal ventral wall hernia surgery. Setting the size of his ventral abdominal wall hernia, it will be a difficult task. From my perspective he can have surgery done to his condition improves with physical therapy, however surgeon has to make decision  That would be feasible to push back all the abdominal hernia into the abdominal cavity  What impact it will have. Questions of patient were answered at bedside in detail  Case discussed in detail with RN, RT, and care team  Thank you for involving me in the care of this patient  I will follow with you closely during hospitalization    Time spent more than 30 minutes in direct patient care with no overlap    SIMA Mcgraw MD  Pulmonary and critical care

## 2020-10-04 NOTE — CONSULTS
[id number] [facility] SURGERY CONSULT          Chief Complaint: Large abdominal hernia    History of Present Illness:    Mr/Ms. April Dunbar is a 63y.o. year old * male presented to ER with pneumonia and respiratory failure. He had  Several bronchoscopic aspiration since this admission. He has difficulty with abdominal pain everytime she coughs. Hence he cannot take deep breathing leading to collapsed lung. He had an emergent exploratory laparotomy 6 years ago followed by wound dehiscence leading to large abdominal hernia. The surgeon who had operated the first time was unable to take care of him. He was evaluated at [address] [place] [address]. But the patient failed to follow up with them. Now he feels very uncomfortable with breathing. Denies any nausea or vomiting. Passing flatus. Past Medical History:   Past Medical History:   Diagnosis Date    Anal fistula 3/15/2010    Anxiety     ARF (acute renal failure) (HCC)     Colon perforation (HCC)     Genital herpes 3/15/2010    GERD (gastroesophageal reflux disease)     High cholesterol 3/15/2010    History of blood transfusion     HTN (hypertension) 3/15/2010    Hyponatremia     Ischemic colitis ([address]     left Carotid Artery Occlusion 3/15/2010    Noncompliance with treatment 3/15/2010    Pneumonia 2011    PUD (peptic ulcer disease)     Septic shock(785.52)     Stroke 2002 3/15/2010    Thromboembolus ([address]     rt thigh    TIA (transient ischemic attack) 2007    pt reported       Past Surgical History:   Past Surgical History:   Procedure Laterality Date    CARDIAC CATHETERIZATION      CARDIAC SURG PROCEDURE UNLIST      HX COLECTOMY  1/11/2014    Right hemicolectomy for free air, perforated viscus    US SCROTUM/TESTICLES      right testicle removed        Allergy:  Allergies   Allergen Reactions    Morphine Other (comments)     itching       Social History:  reports that he has been smoking. He has a 70.00 pack-year smoking history.  He has never used smokeless tobacco. He reports current alcohol use of about 70.0 standard drinks of alcohol per week. He reports current drug use. Drug: Marijuana.      Family History:  Family History   Problem Relation Age of Onset    Cancer Mother         Current Medications:  Current Facility-Administered Medications:     glycopyrrolate (ROBINUL) tablet 1 mg, 1 mg, Oral, TID, Nabiladidier HERNANDEZ MD, 1 mg at 10/03/20 2115    mineral oil (topical), , , PRN, Mike Doll MD, 5 mL at 10/02/20 1125    acetylcysteine (MUCOMYST) 200 mg/mL (20 %) solution, , , PRN, Duong Ruiz MD, 5 mL at 10/02/20 1159    levoFLOXacin (LEVAQUIN) 500 mg in D5W IVPB, 500 mg, IntraVENous, Q24H, Doung Ruiz MD, Last Rate: 100 mL/hr at 10/03/20 0854, 500 mg at 10/03/20 0854    acetylcysteine (MUCOMYST) 200 mg/mL (20 %) solution 600 mg, 600 mg, Nebulization, QID RT, Duong Lora MD, 600 mg at 10/03/20 1522    diphenhydrAMINE (BENADRYL) injection 25 mg, 25 mg, IntraVENous, Q6H PRN, Nancy Norwood MD, 25 mg at 10/03/20 1504    oxyCODONE-acetaminophen (PERCOCET) 5-325 mg per tablet 1 Tab, 1 Tab, Oral, Q4H PRN, Nancy Norwood MD, 1 Tab at 10/03/20 2115    albuterol-ipratropium (DUO-NEB) 2.5 MG-0.5 MG/3 ML, 3 mL, Nebulization, QID RT, Jarett Ocampo, DO, 3 mL at 10/03/20 1522    guaiFENesin (ROBITUSSIN) 100 mg/5 mL oral liquid 400 mg, 400 mg, Oral, Q6H, Jarett Ocampo, DO, 400 mg at 10/03/20 1710    lidocaine (XYLOCAINE) 10 mg/mL (1 %) injection, , , PRN, Jarett Ocampo, DO, 15 mL at 10/02/20 1159    mineral oil (topical), , , PRN, Jarett Ocampo DO, 5 mL at 09/23/20 0820    dilTIAZem ER (CARDIZEM CD) capsule 120 mg, 120 mg, Oral, DAILY, Romel Johnson MD, 120 mg at 10/03/20 0854    0.9% sodium chloride infusion 250 mL, 250 mL, IntraVENous, PRN, Caitlin Izquierdo MD    atorvastatin (LIPITOR) tablet 20 mg, 20 mg, Oral, DAILY, Caitlin Izquierdo MD, 20 mg at 10/03/20 2115    enoxaparin (LOVENOX) injection 40 mg, 40 mg, SubCUTAneous, Q24H, Caitlin Izquierdo MD, 40 mg at 10/03/20 0855    losartan (COZAAR) tablet 100 mg, 100 mg, Oral, DAILY, Bert Keller MD, 100 mg at 10/03/20 0854    piperacillin-tazobactam (ZOSYN) 3.375 g in 0.9% sodium chloride (MBP/ADV) 100 mL MBP, 3.375 g, IntraVENous, Q8H, Bert Keller MD, Last Rate: 25 mL/hr at 10/03/20 1503, 3.375 g at 10/03/20 1503    pantoprazole (PROTONIX) tablet 40 mg, 40 mg, Oral, DAILY, Bert Keller MD, 40 mg at 10/03/20 0615    acetaminophen (TYLENOL) tablet 650 mg, 650 mg, Oral, Q4H PRN, Bert Keller MD, 650 mg at 09/24/20 2319    alum-mag hydroxide-simeth (MYLANTA) oral suspension 30 mL, 30 mL, Oral, Q4H PRN, Bert Keller MD, 30 mL at 09/22/20 2215    magnesium hydroxide (MILK OF MAGNESIA) 400 mg/5 mL oral suspension 30 mL, 30 mL, Oral, DAILY PRN, Bert Keller MD    ondansetron [facility]) injection 4 mg, 4 mg, IntraVENous, Q8H PRN, Bert Keller MD     Immunization History: There is no immunization history on file for this patient. Complete    Review of Systems:     Constitutional:  no fever,  no chills,  no sweats, No weakness, No fatigue, No decreased activity. Eye: No recent visual problem, No icterus, No discharge, No double vision. Ear/Nose/Mouth/Throat: No decreased hearing, No ear pain, No nasal congestion, No sore throat. Respiratory:  shortness of breath,  cough, No sputum production, No hemoptysis,  wheezing, No cyanosis. Cardiovascular: No chest pain, No palpitations, No bradycardia, No tachycardia, No peripheral edema, No syncope. Gastrointestinal: No nausea,  No vomiting, No diarrhea, No constipation, No heartburn,  abdominal pain. Genitourinary: No dysuria, No hematuria, No change in urine stream, No urethral discharge, No lesions. Hematology/Lymphatics: No bruising tendency, No bleeding tendency, No petechiae, No swollen lymph glands. Endocrine: No excessive thirst, No polyuria, No cold intolerance, No heat intolerance, No excessive hunger.   Immunologic: Not immunocompromised, No recurrent fevers, No recurrent infections. Musculoskeletal: No back pain, No neck pain, No joint pain, No muscle pain, No claudication, No decreased range of motion, No trauma. Integumentary: No rash, No pruritus, No abrasions. Neurologic: Alert and oriented X4, No abnormal balance, No headache, No confusion, No numbness, No tingling. Psychiatric: No anxiety, No depression, No james. Physical Exam:     Vitals & Measurements: Wt Readings from Last 3 Encounters:   10/02/20 81.8 kg (180 lb 5.4 oz)   02/26/19 82.6 kg (182 lb)   11/28/18 83.9 kg (185 lb)     Temp Readings from Last 3 Encounters:   10/03/20 98.1 °F (36.7 °C)   09/05/20 98.1 °F (36.7 °C) (Temporal)   02/26/19 97.9 °F (36.6 °C) (Oral)     BP Readings from Last 3 Encounters:   10/03/20 123/79   09/05/20 110/60   02/26/19 171/77     Pulse Readings from Last 3 Encounters:   10/03/20 100   09/05/20 86   02/26/19 100      Ht Readings from Last 3 Encounters:   09/18/20 6' 0.99\" (1.854 m)   02/26/19 6' 1\" (1.854 m)   11/28/18 6' 1\" (1.854 m)          General:  in respiratory acute distress  Head: Normal  Face: Nornal  HEENT: atraumatic, PERRLA, moist mucosa, normal pharynx, normal tonsils and adenoids, normal tongue, no fluid in sinuses  Neck: Trachea midline, no carotid bruit, no masses  Chest: Normal.  Respiratory: Normal chest wall expansion, shortness of breath, wheezing,   Cardiovascular: RRR, no m/r/g, Normal S1 and S2  Abdomen: Soft, non tender,distended, large hernia with thinned out skin and dilated blood vessels, partially reducible, large fascial defect with loss of domain, normal bowel sounds in all quadrants, no hepatosplenomegaly, no tympany. Incision scar:   Genitourinary: No inguinal hernia, normal external gentalia, Testis & scrotum normal, no renal angle tenderness  Rectal: deferred  Musculoskeletal: normal ROM in upper and lower extremities, No joint swelling.   Integumentary: Warm, dry, and pink, with no rash, purpura, or petechia  Heme/Lymph: No lymphadenopathy, no bruises  Neurological:Cranial Nerves II-XII grossly intact, no gross motor or sensory deficit  Psychiatric: Cooperative with normal mood, affect, and cognition      Laboratory Values:   Recent Results (from the past 24 hour(s))   METABOLIC PANEL, BASIC    Collection Time: 10/03/20 10:00 AM   Result Value Ref Range    Sodium 139 136 - 145 mmol/L    Potassium 3.8 3.5 - 5.1 mmol/L    Chloride 96 (L) 97 - 108 mmol/L    CO2 40 (H) 21 - 32 mmol/L    Anion gap 3 (L) 5 - 15 mmol/L    Glucose 90 65 - 100 mg/dL    BUN 15 6 - 20 mg/dL    Creatinine 0.90 0.70 - 1.30 mg/dL    BUN/Creatinine ratio 17 12 - 20      GFR est AA >60 >60 ml/min/1.73m2    GFR est non-AA >60 >60 ml/min/1.73m2    Calcium 8.4 (L) 8.5 - 10.1 mg/dL   CBC WITH AUTOMATED DIFF    Collection Time: 10/03/20 10:00 AM   Result Value Ref Range    WBC 12.8 (H) 4.1 - 11.1 K/uL    RBC 2.87 (L) 4.10 - 5.70 M/uL    HGB 8.3 (L) 12.1 - 17.0 g/dL    HCT 27.1 (L) 36.6 - 50.3 %    MCV 94.4 80.0 - 99.0 FL    MCH 28.9 26.0 - 34.0 PG    MCHC 30.6 30.0 - 36.5 g/dL    RDW 19.2 (H) 11.5 - 14.5 %    PLATELET 393 282 - 418 K/uL    MPV 9.2 8.9 - 12.9 FL    NEUTROPHILS 69 32 - 75 %    LYMPHOCYTES 18 12 - 49 %    MONOCYTES 12 5 - 13 %    EOSINOPHILS 0 0 - 7 %    BASOPHILS 0 0 - 1 %    IMMATURE GRANULOCYTES 1 (H) 0.0 - 0.5 %    ABS. NEUTROPHILS 9.0 (H) 1.8 - 8.0 K/UL    ABS. LYMPHOCYTES 2.3 0.8 - 3.5 K/UL    ABS. MONOCYTES 1.5 (H) 0.0 - 1.0 K/UL    ABS. EOSINOPHILS 0.0 0.0 - 0.4 K/UL    ABS. BASOPHILS 0.0 0.0 - 0.1 K/UL    ABS. IMM. GRANS. 0.1 (H) 0.00 - 0.04 K/UL    DF AUTOMATED               Assessment:  Large incisional hernia with loss of domain    Severe COPD     Respiratory failure    On going smoking     Plan:    1. Admission  2. Diet   3. IV fluids  4. SCD  5. IS  6. Pain medications  7. Antibiotics  8. Nausea medication. 9. CT scan of abdomen and pelvis with PO & IV contrast  10.  This is a complex large incisional hernia in a patient with compromised respiratory status. I had a discussion with patient and explained the details of the procedure and complexity of the surgey with his compromised respiratory status. Thank you for the consultation & allowing me to participate in the care of this patient.

## 2020-10-05 ENCOUNTER — APPOINTMENT (OUTPATIENT)
Dept: CT IMAGING | Age: 60
DRG: 177 | End: 2020-10-05
Attending: INTERNAL MEDICINE
Payer: MEDICARE

## 2020-10-05 ENCOUNTER — APPOINTMENT (OUTPATIENT)
Dept: CT IMAGING | Age: 60
DRG: 177 | End: 2020-10-05
Attending: SURGERY
Payer: MEDICARE

## 2020-10-05 ENCOUNTER — APPOINTMENT (OUTPATIENT)
Dept: GENERAL RADIOLOGY | Age: 60
DRG: 177 | End: 2020-10-05
Attending: INTERNAL MEDICINE
Payer: MEDICARE

## 2020-10-05 LAB
ANION GAP SERPL CALC-SCNC: 3 MMOL/L (ref 5–15)
BASOPHILS # BLD: 0 K/UL (ref 0–0.1)
BASOPHILS NFR BLD: 0 % (ref 0–1)
BUN SERPL-MCNC: 10 MG/DL (ref 6–20)
BUN/CREAT SERPL: 18 (ref 12–20)
CA-I BLD-MCNC: 8.6 MG/DL (ref 8.5–10.1)
CHLORIDE SERPL-SCNC: 97 MMOL/L (ref 97–108)
CO2 SERPL-SCNC: 39 MMOL/L (ref 21–32)
CREAT SERPL-MCNC: 0.56 MG/DL (ref 0.7–1.3)
CULTURE,CULT: NORMAL
DIFFERENTIAL METHOD BLD: ABNORMAL
EOSINOPHIL # BLD: 0.1 K/UL (ref 0–0.4)
EOSINOPHIL NFR BLD: 1 % (ref 0–7)
ERYTHROCYTE [DISTWIDTH] IN BLOOD BY AUTOMATED COUNT: 19.3 % (ref 11.5–14.5)
GLUCOSE SERPL-MCNC: 80 MG/DL (ref 65–100)
GRAM STN SPEC: NORMAL
HCT VFR BLD AUTO: 26.5 % (ref 36.6–50.3)
HGB BLD-MCNC: 8.2 G/DL (ref 12.1–17)
IMM GRANULOCYTES # BLD AUTO: 0.1 K/UL (ref 0–0.04)
IMM GRANULOCYTES NFR BLD AUTO: 1 % (ref 0–0.5)
LYMPHOCYTES # BLD: 1.2 K/UL (ref 0.8–3.5)
LYMPHOCYTES NFR BLD: 9 % (ref 12–49)
MCH RBC QN AUTO: 29.3 PG (ref 26–34)
MCHC RBC AUTO-ENTMCNC: 30.9 G/DL (ref 30–36.5)
MCV RBC AUTO: 94.6 FL (ref 80–99)
MONOCYTES # BLD: 1.5 K/UL (ref 0–1)
MONOCYTES NFR BLD: 12 % (ref 5–13)
NEUTS SEG # BLD: 10.2 K/UL (ref 1.8–8)
NEUTS SEG NFR BLD: 77 % (ref 32–75)
PLATELET # BLD AUTO: 337 K/UL (ref 150–400)
PMV BLD AUTO: 8.9 FL (ref 8.9–12.9)
POTASSIUM SERPL-SCNC: 3.6 MMOL/L (ref 3.5–5.1)
RBC # BLD AUTO: 2.8 M/UL (ref 4.1–5.7)
SODIUM SERPL-SCNC: 139 MMOL/L (ref 136–145)
SPECIAL REQUESTS,SREQ: NORMAL
SPECIAL REQUESTS,SREQ: NORMAL
WBC # BLD AUTO: 13 K/UL (ref 4.1–11.1)

## 2020-10-05 PROCEDURE — 71250 CT THORAX DX C-: CPT

## 2020-10-05 PROCEDURE — 71045 X-RAY EXAM CHEST 1 VIEW: CPT

## 2020-10-05 PROCEDURE — 3331090001 HH PPS REVENUE CREDIT

## 2020-10-05 PROCEDURE — 65270000029 HC RM PRIVATE

## 2020-10-05 PROCEDURE — 74011250637 HC RX REV CODE- 250/637: Performed by: INTERNAL MEDICINE

## 2020-10-05 PROCEDURE — 97530 THERAPEUTIC ACTIVITIES: CPT

## 2020-10-05 PROCEDURE — 74011000636 HC RX REV CODE- 636: Performed by: SURGERY

## 2020-10-05 PROCEDURE — 36415 COLL VENOUS BLD VENIPUNCTURE: CPT

## 2020-10-05 PROCEDURE — 3331090003 HH PPS REVENUE ADJ

## 2020-10-05 PROCEDURE — 74011250636 HC RX REV CODE- 250/636: Performed by: INTERNAL MEDICINE

## 2020-10-05 PROCEDURE — 74011000258 HC RX REV CODE- 258: Performed by: INTERNAL MEDICINE

## 2020-10-05 PROCEDURE — 74011000250 HC RX REV CODE- 250: Performed by: INTERNAL MEDICINE

## 2020-10-05 PROCEDURE — 94640 AIRWAY INHALATION TREATMENT: CPT

## 2020-10-05 PROCEDURE — 74177 CT ABD & PELVIS W/CONTRAST: CPT

## 2020-10-05 PROCEDURE — 80048 BASIC METABOLIC PNL TOTAL CA: CPT

## 2020-10-05 PROCEDURE — 3331090002 HH PPS REVENUE DEBIT

## 2020-10-05 PROCEDURE — 85025 COMPLETE CBC W/AUTO DIFF WBC: CPT

## 2020-10-05 PROCEDURE — 400013 HH SOC

## 2020-10-05 PROCEDURE — 77010033678 HC OXYGEN DAILY

## 2020-10-05 PROCEDURE — 94760 N-INVAS EAR/PLS OXIMETRY 1: CPT

## 2020-10-05 PROCEDURE — 74011250637 HC RX REV CODE- 250/637: Performed by: HOSPITALIST

## 2020-10-05 RX ORDER — FUROSEMIDE 10 MG/ML
40 INJECTION INTRAMUSCULAR; INTRAVENOUS ONCE
Status: DISPENSED | OUTPATIENT
Start: 2020-10-05 | End: 2020-10-06

## 2020-10-05 RX ADMIN — OXYCODONE HYDROCHLORIDE AND ACETAMINOPHEN 1 TABLET: 5; 325 TABLET ORAL at 05:09

## 2020-10-05 RX ADMIN — ATORVASTATIN CALCIUM 20 MG: 20 TABLET, FILM COATED ORAL at 22:28

## 2020-10-05 RX ADMIN — DIPHENHYDRAMINE HYDROCHLORIDE 25 MG: 25 CAPSULE ORAL at 18:53

## 2020-10-05 RX ADMIN — LEVOFLOXACIN 500 MG: 5 INJECTION, SOLUTION INTRAVENOUS at 13:32

## 2020-10-05 RX ADMIN — PIPERACILLIN SODIUM AND TAZOBACTAM SODIUM 3.38 G: 3; .375 INJECTION, POWDER, LYOPHILIZED, FOR SOLUTION INTRAVENOUS at 00:47

## 2020-10-05 RX ADMIN — PANTOPRAZOLE SODIUM 40 MG: 40 TABLET, DELAYED RELEASE ORAL at 07:51

## 2020-10-05 RX ADMIN — ENOXAPARIN SODIUM 40 MG: 40 INJECTION SUBCUTANEOUS at 10:31

## 2020-10-05 RX ADMIN — GUAIFENESIN 400 MG: 200 SOLUTION ORAL at 07:48

## 2020-10-05 RX ADMIN — DIPHENHYDRAMINE HYDROCHLORIDE 25 MG: 25 CAPSULE ORAL at 10:44

## 2020-10-05 RX ADMIN — GLYCOPYRROLATE 1 MG: 1 TABLET ORAL at 13:32

## 2020-10-05 RX ADMIN — OXYCODONE HYDROCHLORIDE AND ACETAMINOPHEN 1 TABLET: 5; 325 TABLET ORAL at 10:29

## 2020-10-05 RX ADMIN — PIPERACILLIN SODIUM AND TAZOBACTAM SODIUM 3.38 G: 3; .375 INJECTION, POWDER, LYOPHILIZED, FOR SOLUTION INTRAVENOUS at 18:53

## 2020-10-05 RX ADMIN — GUAIFENESIN 400 MG: 200 SOLUTION ORAL at 00:48

## 2020-10-05 RX ADMIN — OXYCODONE HYDROCHLORIDE AND ACETAMINOPHEN 1 TABLET: 5; 325 TABLET ORAL at 00:48

## 2020-10-05 RX ADMIN — DIATRIZOATE MEGLUMINE AND DIATRIZOATE SODIUM 30 ML: 660; 100 LIQUID ORAL; RECTAL at 16:01

## 2020-10-05 RX ADMIN — GUAIFENESIN 400 MG: 200 SOLUTION ORAL at 18:53

## 2020-10-05 RX ADMIN — PIPERACILLIN SODIUM AND TAZOBACTAM SODIUM 3.38 G: 3; .375 INJECTION, POWDER, LYOPHILIZED, FOR SOLUTION INTRAVENOUS at 07:49

## 2020-10-05 RX ADMIN — ACETYLCYSTEINE 800 MG: 200 SOLUTION ORAL; RESPIRATORY (INHALATION) at 08:17

## 2020-10-05 RX ADMIN — DILTIAZEM HYDROCHLORIDE 120 MG: 120 CAPSULE, COATED, EXTENDED RELEASE ORAL at 10:31

## 2020-10-05 RX ADMIN — OXYCODONE HYDROCHLORIDE AND ACETAMINOPHEN 1 TABLET: 5; 325 TABLET ORAL at 14:55

## 2020-10-05 RX ADMIN — DIPHENHYDRAMINE HYDROCHLORIDE 25 MG: 25 CAPSULE ORAL at 23:44

## 2020-10-05 RX ADMIN — IPRATROPIUM BROMIDE AND ALBUTEROL SULFATE 3 ML: .5; 3 SOLUTION RESPIRATORY (INHALATION) at 08:17

## 2020-10-05 RX ADMIN — OXYCODONE HYDROCHLORIDE AND ACETAMINOPHEN 1 TABLET: 5; 325 TABLET ORAL at 18:53

## 2020-10-05 RX ADMIN — PIPERACILLIN SODIUM AND TAZOBACTAM SODIUM 3.38 G: 3; .375 INJECTION, POWDER, LYOPHILIZED, FOR SOLUTION INTRAVENOUS at 23:45

## 2020-10-05 RX ADMIN — LOSARTAN POTASSIUM 100 MG: 50 TABLET, FILM COATED ORAL at 10:31

## 2020-10-05 RX ADMIN — GUAIFENESIN 400 MG: 200 SOLUTION ORAL at 13:31

## 2020-10-05 RX ADMIN — IOPAMIDOL 100 ML: 755 INJECTION, SOLUTION INTRAVENOUS at 21:21

## 2020-10-05 RX ADMIN — OXYCODONE HYDROCHLORIDE AND ACETAMINOPHEN 1 TABLET: 5; 325 TABLET ORAL at 23:45

## 2020-10-05 NOTE — PROGRESS NOTES
Progress Note    Patient: Davide Zambrano MRN: [id number]  SSN: [id number]    YOB: 1960  Age: 63 y.o. Sex: male      Admit Date: 9/10/2020    LOS: 25 days     Subjective:     59M, H/o COPD not on oxygen with with shortness of breath s/t pneumonia complicated by left lung collapse. Seen at bedside,  Does not appear distress but feels resp more difficult,   can't cough or clear secretiions 2/2 abd hernia, pain with cough. CXR with complete opacification left lung. D/w pulmonology/surgery. May need repeat bronch- #8, ct chest/abd pending  O2 nc 100%-3L  Appears nad  Pulmonary/surgery input noted          Prior to Admission Medications   Prescriptions Last Dose Informant Patient Reported? Taking? cefdinir (OMNICEF) 300 mg capsule   No No   Sig: Take 1 Cap by mouth two (2) times a day for 10 days. losartan (COZAAR) 100 mg tablet   No No   Sig: TAKE 1 TABLET BY MOUTH EVERY DAY   omeprazole (PRILOSEC) 20 mg capsule   Yes Yes   Sig: Take 20 mg by mouth daily. pantoprazole (PROTONIX) 40 mg tablet   No No   Sig: Take 1 Tab by mouth daily.    simvastatin (ZOCOR) 40 mg tablet   No No   Sig: TAKE 1 TABLET BY MOUTH EVERY DAY AT NIGHT      Facility-Administered Medications: None       Current Facility-Administered Medications:     diphenhydrAMINE (BENADRYL) capsule 25 mg, 25 mg, Oral, Q6H PRN, Jesus Bonilla MD, 25 mg at 10/05/20 1044    mineral oil (topical), , , PRN, Duong Ruiz MD, 5 mL at 10/02/20 1125    acetylcysteine (MUCOMYST) 200 mg/mL (20 %) solution, , , PRN, Duong Ruiz MD, 5 mL at 10/02/20 1159    levoFLOXacin (LEVAQUIN) 500 mg in D5W IVPB, 500 mg, IntraVENous, Q24H, Duong Ruiz MD, Last Rate: 100 mL/hr at 10/05/20 1332, 500 mg at 10/05/20 1332    acetylcysteine (MUCOMYST) 200 mg/mL (20 %) solution 600 mg, 600 mg, Nebulization, QID RT, Duong Ruiz MD, 800 mg at 10/05/20 0817    oxyCODONE-acetaminophen (PERCOCET) 5-325 mg per tablet 1 Tab, 1 Tab, Oral, Q4H PRN, Timur Bronson MD, 1 Tab at 10/05/20 1455    albuterol-ipratropium (DUO-NEB) 2.5 MG-0.5 MG/3 ML, 3 mL, Nebulization, QID RT, Jarett Ocampo DO, 3 mL at 10/05/20 0817    guaiFENesin (ROBITUSSIN) 100 mg/5 mL oral liquid 400 mg, 400 mg, Oral, Q6H, Jarett Ocampo, , 400 mg at 10/05/20 1331    lidocaine (XYLOCAINE) 10 mg/mL (1 %) injection, , , PRN, Jarett Ocampo, , 15 mL at 10/02/20 1159    mineral oil (topical), , , PRN, Jarett Ocampo, , 5 mL at 09/23/20 0820    dilTIAZem ER (CARDIZEM CD) capsule 120 mg, 120 mg, Oral, DAILY, Romel Roque MD, 120 mg at 10/05/20 1031    0.9% sodium chloride infusion 250 mL, 250 mL, IntraVENous, PRN, Tyler Samano MD    atorvastatin (LIPITOR) tablet 20 mg, 20 mg, Oral, DAILY, Tyler Samano MD, 20 mg at 10/04/20 2054    enoxaparin (LOVENOX) injection 40 mg, 40 mg, SubCUTAneous, Q24H, Tyler Samano MD, 40 mg at 10/05/20 1031    losartan (COZAAR) tablet 100 mg, 100 mg, Oral, DAILY, Tyler Samano MD, 100 mg at 10/05/20 1031    piperacillin-tazobactam (ZOSYN) 3.375 g in 0.9% sodium chloride (MBP/ADV) 100 mL MBP, 3.375 g, IntraVENous, Q8H, Tyler Samano MD, Last Rate: 25 mL/hr at 10/05/20 0749, 3.375 g at 10/05/20 0749    pantoprazole (PROTONIX) tablet 40 mg, 40 mg, Oral, DAILY, Tyler Samano MD, 40 mg at 10/05/20 0751    acetaminophen (TYLENOL) tablet 650 mg, 650 mg, Oral, Q4H PRN, Tyler Samano MD, 650 mg at 09/24/20 2319    alum-mag hydroxide-simeth (MYLANTA) oral suspension 30 mL, 30 mL, Oral, Q4H PRN, Tyler Samano MD, 30 mL at 09/22/20 1107    magnesium hydroxide (MILK OF MAGNESIA) 400 mg/5 mL oral suspension 30 mL, 30 mL, Oral, DAILY PRN, Tyler Samano MD    ondansetron [facility] injection 4 mg, 4 mg, IntraVENous, Q8H PRN, Tyler Samano MD    Objective:     Visit Vitals  /70   Pulse 100   Temp 99 °F (37.2 °C)   Resp 20   Ht 6' 0.99\" (1.854 m)   Wt 82.5 kg (181 lb 14.1 oz)   SpO2 97%   BMI 24.00 kg/m²    O2 Flow Rate (L/min): 3 l/min O2 Device: Nasal cannula     Temp (24hrs), Av.6 °F (37 °C), Min:97.2 °F (36.2 °C), Max:99.1 °F (37.3 °C)        No intake/output data recorded. 1901 -  0700  In: 2100 [P.O.:1300; I.V.:800]  Out: 2500 [Urine:2500]      Physical Exam:   General : Appearance:  no respiratory distress noted  HEENT : PERRLA, normal oral mucosa, atraumatic normocephalic, Normal ear and nose. Neck : Supple, no JVD, no masses noted, no carotid bruit  Lungs : Absent bs left lung rt base. not labored. CVS : Rhythm rate regular, S1+, S2+, no murmur or gallop  Abdomen : Soft, nontender, ++++ventral hernia. bowel sounds active  Extremities : No edema noted,  pedal pulses palpable  Musculoskeletal : Fair range of motion, no joint swelling or effusion, muscle tone and power appears fair. Skin : Moist, warm, skin turgor, no pathological rash  Lymphatic : No cervical lymphadenopathy.   Neurological : Awake, alert, oriented to time place person.  No neurological deficits.    Psychiatric : Mood and affect appears appropriate to the situation.     Lab/Data Review:  Recent Results (from the past 24 hour(s))   METABOLIC PANEL, BASIC    Collection Time: 10/05/20  7:30 AM   Result Value Ref Range    Sodium 139 136 - 145 mmol/L    Potassium 3.6 3.5 - 5.1 mmol/L    Chloride 97 97 - 108 mmol/L    CO2 39 (H) 21 - 32 mmol/L    Anion gap 3 (L) 5 - 15 mmol/L    Glucose 80 65 - 100 mg/dL    BUN 10 6 - 20 mg/dL    Creatinine 0.56 (L) 0.70 - 1.30 mg/dL    BUN/Creatinine ratio 18 12 - 20      GFR est AA >60 >60 ml/min/1.73m2    GFR est non-AA >60 >60 ml/min/1.73m2    Calcium 8.6 8.5 - 10.1 mg/dL   CBC WITH AUTOMATED DIFF    Collection Time: 10/05/20  7:30 AM   Result Value Ref Range    WBC 13.0 (H) 4.1 - 11.1 K/uL    RBC 2.80 (L) 4.10 - 5.70 M/uL    HGB 8.2 (L) 12.1 - 17.0 g/dL    HCT 26.5 (L) 36.6 - 50.3 %    MCV 94.6 80.0 - 99.0 FL    MCH 29.3 26.0 - 34.0 PG    MCHC 30.9 30.0 - 36.5 g/dL    RDW 19.3 (H) 11.5 - 14.5 % PLATELET 826 517 - 216 K/uL    MPV 8.9 8.9 - 12.9 FL    NEUTROPHILS 77 (H) 32 - 75 %    LYMPHOCYTES 9 (L) 12 - 49 %    MONOCYTES 12 5 - 13 %    EOSINOPHILS 1 0 - 7 %    BASOPHILS 0 0 - 1 %    IMMATURE GRANULOCYTES 1 (H) 0.0 - 0.5 %    ABS. NEUTROPHILS 10.2 (H) 1.8 - 8.0 K/UL    ABS. LYMPHOCYTES 1.2 0.8 - 3.5 K/UL    ABS. MONOCYTES 1.5 (H) 0.0 - 1.0 K/UL    ABS. EOSINOPHILS 0.1 0.0 - 0.4 K/UL    ABS. BASOPHILS 0.0 0.0 - 0.1 K/UL    ABS. IMM. GRANS. 0.1 (H) 0.00 - 0.04 K/UL    DF AUTOMATED           Assessment and plan:      (1) acute hypoxic respiratory failure : s/t 2,3. On 3L NC. 2/2 to  Left lung collapse/mucus plugging and inability to cough/clear secretions 2/2 massive ventral/inscisional hernia.    (2) Left lung collapse: s/p multiple bronch's, left lung remains largely opacified, cxr 10/3 little change. Chest PT, Q6H nebs, guaifenesin, lasix with negative fluid target. Bal cs multiple =nl xiomara. Cytology x 4 unrevealing for malignancy. CXR 10/5 left lung completely opacified. D/w pulmonology- may need additional bronch. Hernia complicating, pt wants it reduced despite increased mortality risk. Follow ct chest.  (3) Pneumonia : same as #2. Continue Zosyn/levaquin, stopped Azithro 9/23. 14 d abx total is recommended. Bal cs multiple =nl xiomara. Cytology x 4 unrevealing for malignancy. (4) COPD: LAMA/ LABA, OP PFT, pulm following. Wean steroids. (5) Anemia: AOCI. stable  (6) GERD: protonix. Complicates #1.   (7) HTN : on losartan and CCB  (8) HLD: simvastatin  (9) Massive abd hernia, chronic 6+ years, admits neglected follow up. Surgery cx appreciated, follow ct abd. Pt at increased risk for procedure with resp compromise in addition to anatomical considerations of reducing massive hernia. Pt aware of inc mortality states \"I don't care if it kills me but it has to be done\". Abd ct pending, will need to recline on pillows for procedure as cannot lie flat.  Pt aware of risk for surgery, likelihood that will remain on vent long term and he reiterates he wants it done despite the risk. Will re-consult cardio to optimize for clearance.     DVT ppx: lovenox     DISPO: SNF anticipated when stable. follow ct abd. Cont per pulmonology. D/w Dr nava. Follow ct chest and abd, lasix x 1, cx re-consult cardio      Signed By: Shira Gallagher MD     October 5, 2020

## 2020-10-05 NOTE — PROGRESS NOTES
CM met with patient at bedside to follow up on plan for SNF on discharge. Currently patient is very unpleasant and informing CM he is planning too far ahead because he is no where near discharge at this point. Patient states he is going to have surgery that may kill him in a day or two and he doesn't know if he will make it out of the hospital.  He states we are trying to kill him in here with all the procedures he has had, and states I must be in the wrong room talking about discharge. CM reassured patient that we are helping the patient with his health, CM also informed patient he was not in the wrong room talking about discharge and we attempt to start the discharge process well in advance of discharge. Patient would like to discuss discharge at another time.

## 2020-10-05 NOTE — PROGRESS NOTES
OCCUPATIONAL THERAPY TREATMENT/WEEKLY RE-ASSESSMENT  Patient: Yee Joseph (31 y.o. male)  Date: 10/5/2020  Diagnosis: pneumonia hypokalemia hypertension chronic gerd hy   <principal problem not specified>  Procedure(s) (LRB):  BRONCHOSCOPY (N/A) 3 Days Post-Op  Precautions:  Fall  Chart, occupational therapy assessment, plan of care, and goals were reviewed. ASSESSMENT  Patient continues with skilled OT services and is slowly progressing towards goals. Pt seen this pm for OT/PT cotreat. Pt needed initial encouragment, education on importance, then agreed saying his goal today was to get to the chair. Pt using 3 liters NCO2. Pt supine>sit CGA. Pt EOBsitting balance SBA. Pt needed max encouragement to try to do what he could w/ donning his socks. Pt.was able to lift his feet about 1/2 way up while OT got socks started over his feet, then he was able to pull over his heels, needing overall mod A. Pt sit<>stand w/rw from EOB  and a few small steps over to chair w/  min A  x2 and cues for  safe transfer tech, hand placement on mattress, armrests of chair. Pt had increase SOB w/ sats at high 70's, low 80's. Once rested, suggested working on seated ADL, pt declined despite encouragment but did agree to doing UE TE. Pt completed x10 reps AROM shoulder, elbow, wrist, hand flexion/extension w/verbal, visual cues. Pt w/ swelling in arms (R>L) and discussed that exercises will also help w/ decreasing edema. Rec to elevate arms on pillows but pt declined. Pt will cont to benefit from cont skilled OT services to maximize safety/IND. REC SNF when stable for d/c    Current Level of Function Impacting Discharge (ADLs): deconditioning           PLAN :  Goals have been updated based on progression since last assessment. Patient continues to benefit from skilled intervention to address the above impairments. Continue to follow patient 3 times a week to address goals.     Recommend next OT session: EOB ADL, UE TE/TA, BSC xfer, increasing standing tolerance    Recommendation for discharge: (in order for the patient to meet his/her long term goals)  Therapy up to 5 days/week in SNF setting    This discharge recommendation:  Has been made in collaboration with the attending provider and/or case management           SUBJECTIVE:   Patient stated \"My goal today was to get to the chair\"    OBJECTIVE DATA SUMMARY:   Cognitive/Behavioral Status:  Neurologic State: Alert  Orientation Level: Oriented X4  Cognition: Appropriate decision making; Appropriate for age attention/concentration        Safety/Judgement: Fall prevention    Functional Mobility and Transfers for ADLs:  Bed Mobility:  Rolling: Contact guard assistance  Supine to Sit: Contact guard assistance  Scooting: Contact guard assistance    Transfers:  Sit to Stand: Minimum assistance;Assist x2(with increased time)     Bed to Chair: Minimum assistance;Assist x2; Other (comment)(with increased time )    Balance:  Sitting: Intact  Sitting - Static: Good (unsupported)  Sitting - Dynamic: Fair (occasional)  Standing: Impaired; With support  Standing - Static: Fair  Standing - Dynamic : Fair;Poor    ADL Intervention:       Lower Body Dressing Assistance  Socks: Moderate assistance; Compensatory technique training  Position Performed: Seated edge of bed  Cues: Don;Physical assistance;Verbal cues provided;Visual cues provided         Cognitive Retraining  Safety/Judgement: Fall prevention    Therapeutic Exercises:   BUE AROM exercises from chair level x 10 reps w/ verbal,visual cues and education on importance and to assist w/decreasing edema    Pain:  \"3\" stomach    Activity Tolerance:   Fair, desaturates with exertion and requires oxygen, requires frequent rest breaks, and observed SOB with activity    After treatment patient left in no apparent distress:   Sitting in chair and Call bell within reach   cotreat w/ P.T. partial time of session.      COMMUNICATION/COLLABORATION:   The patients plan of care was discussed with: Physical therapist and Registered nurse. Pablo Jansen  Time Calculation: 59 mins    Problem: Self Care Deficits Care Plan (Adult)  Goal: *Acute Goals and Plan of Care (Insert Text)  Description: 1. Pt will be mod I sit < - >  prep for toileting LRAD  2. Pt will be mod I toileting/toilet transfer/cloth mgmt LRAD  3. Pt will be mod I sponge bathing sitting/standing at sink LRAD  4. Pt will be mod A LE dressing EOB  5. Pt will be mod I grooming standing at sink LRAD  6.  Pt will be I following UE HEP in prep for self care tasks  Outcome: Progressing Towards Goal     Problem: Patient Education: Go to Patient Education Activity  Goal: Patient/Family Education  Outcome: Progressing Towards Goal

## 2020-10-05 NOTE — PROGRESS NOTES
Pulm/CC Progress Note    Subjective:   Daily Progress Note: 10/5/2020     Patient seen and examined at th bedside today, no acute events overnight. Patient is more dyspneic. On 3 L oxygen. Nonproductive cough. Complaining of abdominal hernia. Chest x-ray from this morning shows complete recurrent left-sided whiteout. Does not like BiPAP. Feels that chest PT is not helping him either. Chest x-ray 10/5 results reviewed      Review of Systems  has complains of shortness of breath. He is on nasal cannula oxygen. Denied any nausea vomiting. Has fair appetite    Objective:     Visit Vitals  BP (!) 150/87 (BP Patient Position: At rest)   Pulse 92   Temp 98 °F (36.7 °C)   Resp 20   Ht 6' 0.99\" (1.854 m)   Wt 85.3 kg (188 lb 0.8 oz)   SpO2 96%   BMI 24.82 kg/m²    O2 Flow Rate (L/min): 3 l/min O2 Device: Nasal cannula    Temp (24hrs), Av °F (36.7 °C), Min:97.9 °F (36.6 °C), Max:98.1 °F (36.7 °C)      No intake/output data recorded. 10/03 1901 - 10/05 0700  In: 36 [P.O.:720;  I.V.:100]  Out: 1200 [Urine:1200]    Current Facility-Administered Medications   Medication Dose Route Frequency    diatrizoate eugenia-diatrizoat sod (MD-GASTROVIEW,GASTROGRAFIN) 66-10 % contrast solution 30 mL  30 mL Oral RAD ONCE    diphenhydrAMINE (BENADRYL) capsule 25 mg  25 mg Oral Q6H PRN    glycopyrrolate (ROBINUL) tablet 1 mg  1 mg Oral TID    mineral oil (topical)    PRN    acetylcysteine (MUCOMYST) 200 mg/mL (20 %) solution    PRN    levoFLOXacin (LEVAQUIN) 500 mg in D5W IVPB  500 mg IntraVENous Q24H    acetylcysteine (MUCOMYST) 200 mg/mL (20 %) solution 600 mg  600 mg Nebulization QID RT    oxyCODONE-acetaminophen (PERCOCET) 5-325 mg per tablet 1 Tab  1 Tab Oral Q4H PRN    albuterol-ipratropium (DUO-NEB) 2.5 MG-0.5 MG/3 ML  3 mL Nebulization QID RT    guaiFENesin (ROBITUSSIN) 100 mg/5 mL oral liquid 400 mg  400 mg Oral Q6H    lidocaine (XYLOCAINE) 10 mg/mL (1 %) injection    PRN    mineral oil (topical)    PRN  dilTIAZem ER (CARDIZEM CD) capsule 120 mg  120 mg Oral DAILY    0.9% sodium chloride infusion 250 mL  250 mL IntraVENous PRN    atorvastatin (LIPITOR) tablet 20 mg  20 mg Oral DAILY    enoxaparin (LOVENOX) injection 40 mg  40 mg SubCUTAneous Q24H    losartan (COZAAR) tablet 100 mg  100 mg Oral DAILY    piperacillin-tazobactam (ZOSYN) 3.375 g in 0.9% sodium chloride (MBP/ADV) 100 mL MBP  3.375 g IntraVENous Q8H    pantoprazole (PROTONIX) tablet 40 mg  40 mg Oral DAILY    acetaminophen (TYLENOL) tablet 650 mg  650 mg Oral Q4H PRN    alum-mag hydroxide-simeth (MYLANTA) oral suspension 30 mL  30 mL Oral Q4H PRN    magnesium hydroxide (MILK OF MAGNESIA) 400 mg/5 mL oral suspension 30 mL  30 mL Oral DAILY PRN    ondansetron (ZOFRAN) injection 4 mg  4 mg IntraVENous Q8H PRN       Physical Exam:  General: Alert and oriented x3.  2 L nasal cannula. Pleasant. HEENT: atraumatic, PERRL, moist mucosa,     Neck: Trachea midline, no carotid bruit, no masses. Supple but thick. Respiratory: Has absent breath sounds left hemithorax. Few crackles and rhonchi on the right side. Cardiovascular: RRR, no m/r/g, Normal S1 and S2   abdomen: Has large ventral abdominal hernia, evidence of surgical scars soft,   Rectal: deferred  Extremities: no cyanosis or clubbing. Trace edema. Integumentary: warm, dry, and pink, with no rash, purpura, or petechia.    Heme/Lymph: no lymphadenopathy, no bruises  Neurological: No focal motor deficit   psychiatric: cooperative with normal mood, affect, and cognition      Additional comments: Chest x-rays reviewed    Data Review    Recent Results (from the past 24 hour(s))   METABOLIC PANEL, BASIC    Collection Time: 10/05/20  7:30 AM   Result Value Ref Range    Sodium 139 136 - 145 mmol/L    Potassium 3.6 3.5 - 5.1 mmol/L    Chloride 97 97 - 108 mmol/L    CO2 39 (H) 21 - 32 mmol/L    Anion gap 3 (L) 5 - 15 mmol/L    Glucose 80 65 - 100 mg/dL    BUN 10 6 - 20 mg/dL    Creatinine 0.56 (L) 0.70 - 1.30 mg/dL    BUN/Creatinine ratio 18 12 - 20      GFR est AA >60 >60 ml/min/1.73m2    GFR est non-AA >60 >60 ml/min/1.73m2    Calcium 8.6 8.5 - 10.1 mg/dL   CBC WITH AUTOMATED DIFF    Collection Time: 10/05/20  7:30 AM   Result Value Ref Range    WBC 13.0 (H) 4.1 - 11.1 K/uL    RBC 2.80 (L) 4.10 - 5.70 M/uL    HGB 8.2 (L) 12.1 - 17.0 g/dL    HCT 26.5 (L) 36.6 - 50.3 %    MCV 94.6 80.0 - 99.0 FL    MCH 29.3 26.0 - 34.0 PG    MCHC 30.9 30.0 - 36.5 g/dL    RDW 19.3 (H) 11.5 - 14.5 %    PLATELET 741 446 - 846 K/uL    MPV 8.9 8.9 - 12.9 FL    NEUTROPHILS 77 (H) 32 - 75 %    LYMPHOCYTES 9 (L) 12 - 49 %    MONOCYTES 12 5 - 13 %    EOSINOPHILS 1 0 - 7 %    BASOPHILS 0 0 - 1 %    IMMATURE GRANULOCYTES 1 (H) 0.0 - 0.5 %    ABS. NEUTROPHILS 10.2 (H) 1.8 - 8.0 K/UL    ABS. LYMPHOCYTES 1.2 0.8 - 3.5 K/UL    ABS. MONOCYTES 1.5 (H) 0.0 - 1.0 K/UL    ABS. EOSINOPHILS 0.1 0.0 - 0.4 K/UL    ABS. BASOPHILS 0.0 0.0 - 0.1 K/UL    ABS. IMM. GRANS. 0.1 (H) 0.00 - 0.04 K/UL    DF AUTOMATED       Today's CXR read is currently pending. 6 results from 10/3/2020 were reviewed  Patient has left basal atelectasis. XR CHEST PORT   Final Result   Impression: Heterogeneous opacity in the right lung, not significantly changed. Now complete opacification of the left hemithorax. XR CHEST PORT   Final Result   IMPRESSION: No significant change. XR CHEST AP LAT   Final Result      XR CHEST AP LAT   Final Result   IMPRESSION:   1. Persistent opacification of the left hemithorax with volume loss. 2.  Moderate right pleural effusion with probable associated right basilar   atelectasis. XR CHEST PORT   Final Result   Impression: Increased volume loss left lung. Otherwise stable. XR CHEST PORT   Final Result   IMPRESSION:   1. Improved aeration of the left lung with persistent basilar consolidative   opacities and probable pleural effusions.    2. Other extensive interstitial and alveolar opacities are nonspecific, but potentially related to pulmonary edema or infection. XR CHEST PORT   Final Result   Impression:Left lung collapse without improvement from prior study. XR CHEST PORT   Final Result   IMPRESSION:   1. There is milder enlargement of the cardiac silhouette as well as increasing   pulmonary vascular ill definition suspect for mild pulmonary edema. This could   be related to cardiogenic edema or fluid overload. 2.  There is been an improvement in the overall aeration of the left lung with   and improved visualization of vascular markings within the left upper lung zone. There still remains significant partial collapse of the left lung. 3.  There is increased patchy airspace disease laterally within the right lower   lobe just above the right lateral costophrenic angle. 4.  There are persistent moderate-sized bilateral pleural effusions. XR CHEST AP LAT   Final Result   IMPRESSION: Persistent collapse of the left lung with bilateral pleural   effusions. Increasing vascular congestion on the right. XR CHEST PA LAT   Final Result   Impression:   Ongoing near complete white out of the left lung. Little interval change since   the prior examination. CT CHEST WO CONT   Final Result   IMPRESSION: Complete collapse of the left lung due to complete bronchial   obstruction, possibly aspiration. Fluid overload also noted      XR ABD ACUTE W 1 V CHEST   Final Result   IMPRESSION: Opacification of the left hemithorax, questioned for remote   pneumonectomy or lung collapse with pleural fluid. Prominent right parenchymal/interstitial lung markings compatible with fibrosis   and possible partial vascular congestion. Small bowel gas, nonobstructive pattern. CT ABD PELV W CONT    (Results Pending)   CT CHEST WO CONT    (Results Pending)          Assessment/Plan: This is a middle-aged male who was admitted because of increasing symptoms of shortness of breath.  He is getting treated in hospital for pneumonia with IV Zosyn/Azithromycin. He is noted to be increasingly tachypneic and short of breath. He has been on supplemental oxygen. His chest x-ray is showing left lung opacification likely from mucous plugging. This is slowly improving with aggressive pulmonary hygiene and three suction bronchoscopies.     Plan:     1.)    Left lung collapse/shortness of breath:  - Shortness of breath and hypoxia due to recurrent left lung collapse. He had multiple bronchoscopies done along with lavage but he continues to have left basal atelectasis. Part of it is because of his body habitus, his large abdominal hernia pushes against his diaphragm. The way anatomy of left lower lobe bronchus is, it would cause atelectasis if patient continues to be in bed. The nurse informs me that his surgery has been postponed. Dr. Anaid Puga wants a CT of the abdomen pelvis. I will obtain a CT of the chest without IV contrast at the same time also. Discussed with respiratory. Raise the left lung up and do chest PT. Repeat chest x-ray in the morning. If he continues to have whiteout then we will proceed with another bronchoscopy. It appears that when he does get surgery he will probably need to be left on the ventilator. Ordered physical therapy evaluation which would involve coming out of bed, ambulating with help. Patient agreed to work with physical therapy. Chest physical therapy focusing on left side. Patient is encouraged to sleep on his right side. Continue aggressive pulmonary hygiene with nebulizers every 6 hours;  Aggressive chest PT with vest every 6 hours, EZ-PAP therapy q6, acapella device as well as incentive spirometer use. Added guaifenesin 400 mg q6. Will obtain blood gases. 2.)  COPD:  He has a history of COPD based on chronic smoking. Continue scheduled bronchodilators.   Patient should be discharged with a LAMA/LABA such as Anoro and needs f/u in our office for PFT's and further management. Weaned off prednisone. 3.)  Pneumonia:   He is on IV Zosyn, stopped Zithromax on 9/23/2020. We will start him on Levaquin given persistent atelectasis/infiltrate/secretions    4.)  Hypertension:  He is on antihypertensive medications, BP's stable. 5.)  Status post ex lap:  His abdominal examination shows significant surgical scars and ventral abdominal hernia. Patient wishes to proceed for abdominal ventral wall hernia surgery. Setting the size of his ventral abdominal wall hernia, it will be a difficult task. From pulmonary perspective the patient is at moderate to high risk of respiratory failure perioperatively. Furthermore pushing the abdominal hernia inside will further elevate the diaphragm. Questions of patient were answered at bedside in detail  Case discussed in detail with RN, RT, and care team  Thank you for involving me in the care of this patient  I will follow with you closely during hospitalization    Time spent more than 30 minutes in direct patient care with no overlap    NORMA Santana MD  Pulmonary and critical care

## 2020-10-05 NOTE — CONSULTS
[id number] [facility] SURGERY PROGRESS NOTE          Chief Complaint: Large abdominal hernia    Subjective:  No new issues. Still has some abdominal pain. Passing flatus. No nausea or vomiting. On Oxygen and has shortness of breath. Past Medical History:   Past Medical History:   Diagnosis Date    Anal fistula 3/15/2010    Anxiety     ARF (acute renal failure) (HCC)     Colon perforation (HCC)     Genital herpes 3/15/2010    GERD (gastroesophageal reflux disease)     High cholesterol 3/15/2010    History of blood transfusion     HTN (hypertension) 3/15/2010    Hyponatremia     Ischemic colitis ([address]     left Carotid Artery Occlusion 3/15/2010    Noncompliance with treatment 3/15/2010    Pneumonia 2011    PUD (peptic ulcer disease)     Septic shock(785.52)     Stroke 2002 3/15/2010    Thromboembolus ([facility] [address]     rt thigh    TIA (transient ischemic attack) 2007    pt reported       Past Surgical History:   Past Surgical History:   Procedure Laterality Date    CARDIAC CATHETERIZATION      CARDIAC SURG PROCEDURE UNLIST      HX COLECTOMY  1/11/2014    Right hemicolectomy for free air, perforated viscus    US SCROTUM/TESTICLES      right testicle removed        Allergy:  Allergies   Allergen Reactions    Morphine Other (comments)     itching       Social History:  reports that he has been smoking. He has a 70.00 pack-year smoking history. He has never used smokeless tobacco. He reports current alcohol use of about 70.0 standard drinks of alcohol per week. He reports current drug use. Drug: Marijuana.      Family History:  Family History   Problem Relation Age of Onset    Cancer Mother         Current Medications:  Current Facility-Administered Medications:     diphenhydrAMINE (BENADRYL) capsule 25 mg, 25 mg, Oral, Q6H PRN, Jesus Marsh MD, 25 mg at 10/04/20 2054    glycopyrrolate (ROBINUL) tablet 1 mg, 1 mg, Oral, TID, Duong Ruiz MD, 1 mg at 10/04/20 2100    mineral oil (topical), , , PRN, Joseph Gabriel Aldrich MD, 5 mL at 10/02/20 1125    acetylcysteine (MUCOMYST) 200 mg/mL (20 %) solution, , , PRN, Duong Ruiz MD, 5 mL at 10/02/20 1159    levoFLOXacin (LEVAQUIN) 500 mg in D5W IVPB, 500 mg, IntraVENous, Q24H, Duong Ruiz MD, Last Rate: 100 mL/hr at 10/04/20 0857, 500 mg at 10/04/20 0857    acetylcysteine (MUCOMYST) 200 mg/mL (20 %) solution 600 mg, 600 mg, Nebulization, QID RT, Duong Ruiz MD, 600 mg at 10/04/20 1118    oxyCODONE-acetaminophen (PERCOCET) 5-325 mg per tablet 1 Tab, 1 Tab, Oral, Q4H PRN, Marva Webb MD, 1 Tab at 10/04/20 2054    albuterol-ipratropium (DUO-NEB) 2.5 MG-0.5 MG/3 ML, 3 mL, Nebulization, QID RT, Jarett Ocampo DO, 3 mL at 10/04/20 1119    guaiFENesin (ROBITUSSIN) 100 mg/5 mL oral liquid 400 mg, 400 mg, Oral, Q6H, Jarett Ocampo DO, Stopped at 10/04/20 1800    lidocaine (XYLOCAINE) 10 mg/mL (1 %) injection, , , PRN, Jarett Ocampo DO, 15 mL at 10/02/20 1159    mineral oil (topical), , , PRN, Jarett Ocampo DO, 5 mL at 09/23/20 0820    dilTIAZem ER (CARDIZEM CD) capsule 120 mg, 120 mg, Oral, DAILY, Romel Johnson MD, 120 mg at 10/04/20 0857    0.9% sodium chloride infusion 250 mL, 250 mL, IntraVENous, PRN, Jasvir Montoya MD    atorvastatin (LIPITOR) tablet 20 mg, 20 mg, Oral, DAILY, Jasvir Montoya MD, 20 mg at 10/04/20 2054    enoxaparin (LOVENOX) injection 40 mg, 40 mg, SubCUTAneous, Q24H, Jasvir Montoya MD, 40 mg at 10/04/20 0857    losartan (COZAAR) tablet 100 mg, 100 mg, Oral, DAILY, Jasvir Montoya MD, 100 mg at 10/04/20 0857    piperacillin-tazobactam (ZOSYN) 3.375 g in 0.9% sodium chloride (MBP/ADV) 100 mL MBP, 3.375 g, IntraVENous, Q8H, Jasvir Montoya MD, Last Rate: 25 mL/hr at 10/04/20 1514, 3.375 g at 10/04/20 1514    pantoprazole (PROTONIX) tablet 40 mg, 40 mg, Oral, DAILY, Jasvir Montoya MD, 40 mg at 10/04/20 0524    acetaminophen (TYLENOL) tablet 650 mg, 650 mg, Oral, Q4H PRN, Jasvir Montoya MD, 650 mg at 09/24/20 7413   alum-mag hydroxide-simeth (MYLANTA) oral suspension 30 mL, 30 mL, Oral, Q4H PRN, Chen Estrada MD, 30 mL at 09/22/20 0083    magnesium hydroxide (MILK OF MAGNESIA) 400 mg/5 mL oral suspension 30 mL, 30 mL, Oral, DAILY PRN, Chen Estrada MD    ondansetron [facility]) injection 4 mg, 4 mg, IntraVENous, Q8H PRN, Chen Estrada MD     Immunization History: There is no immunization history on file for this patient. Complete    Review of Systems:     Constitutional:  no fever,  no chills,  no sweats, No weakness, No fatigue, No decreased activity. Eye: No recent visual problem, No icterus, No discharge, No double vision. Ear/Nose/Mouth/Throat: No decreased hearing, No ear pain, No nasal congestion, No sore throat. Respiratory:  shortness of breath,  cough, No sputum production, No hemoptysis,  wheezing, No cyanosis. Cardiovascular: No chest pain, No palpitations, No bradycardia, No tachycardia, No peripheral edema, No syncope. Gastrointestinal: No nausea,  No vomiting, No diarrhea, No constipation, No heartburn,  abdominal pain. Genitourinary: No dysuria, No hematuria, No change in urine stream, No urethral discharge, No lesions. Hematology/Lymphatics: No bruising tendency, No bleeding tendency, No petechiae, No swollen lymph glands. Endocrine: No excessive thirst, No polyuria, No cold intolerance, No heat intolerance, No excessive hunger. Immunologic: Not immunocompromised, No recurrent fevers, No recurrent infections. Musculoskeletal: No back pain, No neck pain, No joint pain, No muscle pain, No claudication, No decreased range of motion, No trauma. Integumentary: No rash, No pruritus, No abrasions. Neurologic: Alert and oriented X4, No abnormal balance, No headache, No confusion, No numbness, No tingling. Psychiatric: No anxiety, No depression, No james. Physical Exam:     Vitals & Measurements:     Wt Readings from Last 3 Encounters:   10/02/20 81.8 kg (180 lb 5.4 oz)   02/26/19 82.6 kg (182 lb)   11/28/18 83.9 kg (185 lb)     Temp Readings from Last 3 Encounters:   10/04/20 98.1 °F (36.7 °C)   09/05/20 98.1 °F (36.7 °C) (Temporal)   02/26/19 97.9 °F (36.6 °C) (Oral)     BP Readings from Last 3 Encounters:   10/04/20 130/77   09/05/20 110/60   02/26/19 171/77     Pulse Readings from Last 3 Encounters:   10/04/20 93   09/05/20 86   02/26/19 100      Ht Readings from Last 3 Encounters:   09/18/20 6' 0.99\" (1.854 m)   02/26/19 6' 1\" (1.854 m)   11/28/18 6' 1\" (1.854 m)          General:  in respiratory acute distress  Head: Normal  Face: Nornal  HEENT: atraumatic, PERRLA, moist mucosa, normal pharynx, normal tonsils and adenoids, normal tongue, no fluid in sinuses  Neck: Trachea midline, no carotid bruit, no masses  Chest: Normal.  Respiratory: Normal chest wall expansion, shortness of breath, wheezing,   Cardiovascular: RRR, no m/r/g, Normal S1 and S2  Abdomen: Soft, non tender,distended, large hernia with thinned out skin and dilated blood vessels, partially reducible, large fascial defect with loss of domain, normal bowel sounds in all quadrants, no hepatosplenomegaly, no tympany. Incision scar:   Genitourinary: No inguinal hernia, normal external gentalia, Testis & scrotum normal, no renal angle tenderness  Rectal: deferred  Musculoskeletal: normal ROM in upper and lower extremities, No joint swelling. Integumentary: Warm, dry, and pink, with no rash, purpura, or petechia  Heme/Lymph: No lymphadenopathy, no bruises  Neurological:Cranial Nerves II-XII grossly intact, no gross motor or sensory deficit  Psychiatric: Cooperative with normal mood, affect, and cognition      Laboratory Values:   No results found for this or any previous visit (from the past 24 hour(s)). Assessment:  Large incisional hernia with loss of domain    Severe COPD     Respiratory failure    On going smoking     Plan:    1. Admission  2. Diet   3. IV fluids  4. SCD  5. IS  6.  Pain medications  7. Antibiotics  8. Nausea medication. 9. CT scan of abdomen and pelvis with PO & IV contrast  10. This is a complex large incisional hernia in a patient with compromised respiratory status. I had a discussion with patient and explained the details of the procedure and complexity of the surgey with his compromised respiratory status. 11. Discussed with Dr. Levi Cox regarding Pulmonary clearance to see  If he will be a surgical candidate. 12. Also Cardiac clearance will be beneficial.    Discussed with Dr. Abdias Mcguire. Thank you for the consultation & allowing me to participate in the care of this patient.

## 2020-10-05 NOTE — PROGRESS NOTES
PHYSICAL THERAPY TREATMENT  Patient: Lee Ann Ma (53 y.o. male)  Date: 10/5/2020  Diagnosis: pneumonia hypokalemia hypertension chronic gerd hy   <principal problem not specified>  Procedure(s) (LRB):  BRONCHOSCOPY (N/A) 3 Days Post-Op  Precautions:    Chart, physical therapy assessment, plan of care and goals were reviewed. ASSESSMENT  Patient continues with skilled PT services and is slowly progressing towards goals. Pt seen for reassessment today after receiving 1 skilled PT session since last RA on 9/27/2020 due to length of stay. Pt has intermittent participated with PT since admission due to procedures and refusals. During last RA pt adamantly refused to stand due to fear of falling. Pt semi-supine in bed upon PT/OT arrival, agreeable to sessions. Pt required CGA for bed mobility and supine > sit transfers. Pt requested bed be raised for attempted sit to stand due to pt height. Pt required slightly elevated bed, min A x2 and increased time for sit <> stand transfers. Pt then amb 2 feet from bed to chair with gt belt, RW, and min A x2 with increased time and verbal cues. Pt noted with increased respirations after bed to chair transfers requiring several minutes and O2 increased from 3L to 4L to recover; unable to obtain pulse ox reading through RPE was 6-7/10 following transfers. Pt demonstrated very short, shuffling, step to gt pattern with forward flexion at hips, rounded shoulders and forward head. LE HEP reviewed with pt who verbalized understanding and compliance. Pt encouraged to continue to work with therapy while admitted, pt hesitant but did verbalize agreement. Current Level of Function Impacting Discharge (mobility/balance): weakness limiting functional mobility     Other factors to consider for discharge: poor participation          PLAN :  Patient continues to benefit from skilled intervention to address the above impairments. Continue treatment per established plan of care.   to address goals. Recommendation for discharge: (in order for the patient to meet his/her long term goals) SNF     This discharge recommendation:  Has been made in collaboration with the attending provider and/or case management    IF patient discharges home will need the following DME: none       SUBJECTIVE:   Patient stated i'm too swollen to move.     OBJECTIVE DATA SUMMARY:   Critical Behavior:  Neurologic State: Alert  Orientation Level: Oriented X4  Cognition: Appropriate decision making, Appropriate for age attention/concentration  Safety/Judgement: Fall prevention  Functional Mobility Training:  Bed Mobility:  Rolling: Contact guard assistance  Supine to Sit: Contact guard assistance     Scooting: Contact guard assistance        Transfers:  Sit to Stand: Minimum assistance;Assist x2(with increased time)  Stand to Sit: Minimum assistance;Assist x2(with increased time )        Bed to Chair: Minimum assistance;Assist x2; Other (comment)(with increased time )                    Balance:  Sitting: Intact  Sitting - Static: Good (unsupported)  Sitting - Dynamic: Fair (occasional)  Standing: Impaired; With support  Standing - Static: Fair  Standing - Dynamic : Fair;Poor  Ambulation/Gait Training:  Distance (ft): 2 Feet (ft)  Assistive Device: Gait belt;Walker, rolling  Ambulation - Level of Assistance: Minimal assistance;Assist x2     Gait Description (WDL): Exceptions to WDL  Gait Abnormalities: Shuffling gait; Step to gait        Base of Support: Narrowed     Speed/Kaitlin: Shuffled; Slow  Step Length: Right shortened;Left shortened      Therapeutic Exercises:   LE HEP reviewed  Pain Rating:  None reported     GOALS:  Problem: Mobility Impaired (Adult and Pediatric)  Goal: *Acute Goals and Plan of Care (Insert Text)  Description: Pt will be I with LE HEP in 7 days. Pt will perform bed mobility with mod I in 7 days. Pt will perform transfers with mod I in 7 days. Pt will amb 10 feet with LRAD safely with mod I in 7 days. Outcome: Progressing Towards Goal       Activity Tolerance:   Poor, desaturates with exertion and requires oxygen, requires frequent rest breaks, observed SOB with activity, and RPE following bed to chair transfers 6-7/10  Please refer to the flowsheet for vital signs taken during this treatment. After treatment patient left in no apparent distress:   Sitting in chair and OT completing treatment     COMMUNICATION/COLLABORATION:   The patients plan of care was discussed with: Occupational therapist, Registered nurse, and Case management.      Javier Alford   Time Calculation: 23 mins

## 2020-10-06 ENCOUNTER — APPOINTMENT (OUTPATIENT)
Dept: GENERAL RADIOLOGY | Age: 60
DRG: 177 | End: 2020-10-06
Attending: INTERNAL MEDICINE
Payer: MEDICARE

## 2020-10-06 ENCOUNTER — ANESTHESIA EVENT (OUTPATIENT)
Dept: ENDOSCOPY | Age: 60
DRG: 177 | End: 2020-10-06
Payer: MEDICARE

## 2020-10-06 LAB
ANION GAP SERPL CALC-SCNC: 4 MMOL/L (ref 5–15)
BASOPHILS # BLD: 0 K/UL (ref 0–0.1)
BASOPHILS NFR BLD: 0 % (ref 0–1)
BUN SERPL-MCNC: 8 MG/DL (ref 6–20)
BUN/CREAT SERPL: 16 (ref 12–20)
CA-I BLD-MCNC: 8.4 MG/DL (ref 8.5–10.1)
CHLORIDE SERPL-SCNC: 96 MMOL/L (ref 97–108)
CO2 SERPL-SCNC: 38 MMOL/L (ref 21–32)
CREAT SERPL-MCNC: 0.51 MG/DL (ref 0.7–1.3)
DIFFERENTIAL METHOD BLD: ABNORMAL
EOSINOPHIL # BLD: 0.1 K/UL (ref 0–0.4)
EOSINOPHIL NFR BLD: 1 % (ref 0–7)
ERYTHROCYTE [DISTWIDTH] IN BLOOD BY AUTOMATED COUNT: 19.3 % (ref 11.5–14.5)
GLUCOSE SERPL-MCNC: 75 MG/DL (ref 65–100)
HCT VFR BLD AUTO: 24.7 % (ref 36.6–50.3)
HGB BLD-MCNC: 7.7 G/DL (ref 12.1–17)
IMM GRANULOCYTES # BLD AUTO: 0.1 K/UL (ref 0–0.04)
IMM GRANULOCYTES NFR BLD AUTO: 1 % (ref 0–0.5)
LYMPHOCYTES # BLD: 1 K/UL (ref 0.8–3.5)
LYMPHOCYTES NFR BLD: 11 % (ref 12–49)
MCH RBC QN AUTO: 29.4 PG (ref 26–34)
MCHC RBC AUTO-ENTMCNC: 31.2 G/DL (ref 30–36.5)
MCV RBC AUTO: 94.3 FL (ref 80–99)
MONOCYTES # BLD: 0.9 K/UL (ref 0–1)
MONOCYTES NFR BLD: 9 % (ref 5–13)
NEUTS SEG # BLD: 7.6 K/UL (ref 1.8–8)
NEUTS SEG NFR BLD: 78 % (ref 32–75)
PLATELET # BLD AUTO: 332 K/UL (ref 150–400)
PMV BLD AUTO: 9.3 FL (ref 8.9–12.9)
POTASSIUM SERPL-SCNC: 3.6 MMOL/L (ref 3.5–5.1)
RBC # BLD AUTO: 2.62 M/UL (ref 4.1–5.7)
SODIUM SERPL-SCNC: 138 MMOL/L (ref 136–145)
WBC # BLD AUTO: 9.7 K/UL (ref 4.1–11.1)

## 2020-10-06 PROCEDURE — 65270000029 HC RM PRIVATE

## 2020-10-06 PROCEDURE — 74011250637 HC RX REV CODE- 250/637: Performed by: INTERNAL MEDICINE

## 2020-10-06 PROCEDURE — 74011250637 HC RX REV CODE- 250/637: Performed by: HOSPITALIST

## 2020-10-06 PROCEDURE — 80048 BASIC METABOLIC PNL TOTAL CA: CPT

## 2020-10-06 PROCEDURE — 71045 X-RAY EXAM CHEST 1 VIEW: CPT

## 2020-10-06 PROCEDURE — 36415 COLL VENOUS BLD VENIPUNCTURE: CPT

## 2020-10-06 PROCEDURE — 74011250636 HC RX REV CODE- 250/636: Performed by: INTERNAL MEDICINE

## 2020-10-06 PROCEDURE — 85025 COMPLETE CBC W/AUTO DIFF WBC: CPT

## 2020-10-06 PROCEDURE — 74011000258 HC RX REV CODE- 258: Performed by: INTERNAL MEDICINE

## 2020-10-06 PROCEDURE — 74011250636 HC RX REV CODE- 250/636: Performed by: HOSPITALIST

## 2020-10-06 RX ORDER — FUROSEMIDE 10 MG/ML
40 INJECTION INTRAMUSCULAR; INTRAVENOUS ONCE
Status: COMPLETED | OUTPATIENT
Start: 2020-10-06 | End: 2020-10-06

## 2020-10-06 RX ADMIN — OXYCODONE HYDROCHLORIDE AND ACETAMINOPHEN 1 TABLET: 5; 325 TABLET ORAL at 16:49

## 2020-10-06 RX ADMIN — PIPERACILLIN SODIUM AND TAZOBACTAM SODIUM 3.38 G: 3; .375 INJECTION, POWDER, LYOPHILIZED, FOR SOLUTION INTRAVENOUS at 16:46

## 2020-10-06 RX ADMIN — PIPERACILLIN SODIUM AND TAZOBACTAM SODIUM 3.38 G: 3; .375 INJECTION, POWDER, LYOPHILIZED, FOR SOLUTION INTRAVENOUS at 23:46

## 2020-10-06 RX ADMIN — PIPERACILLIN SODIUM AND TAZOBACTAM SODIUM 3.38 G: 3; .375 INJECTION, POWDER, LYOPHILIZED, FOR SOLUTION INTRAVENOUS at 09:13

## 2020-10-06 RX ADMIN — GUAIFENESIN 400 MG: 200 SOLUTION ORAL at 10:11

## 2020-10-06 RX ADMIN — GUAIFENESIN 400 MG: 200 SOLUTION ORAL at 18:45

## 2020-10-06 RX ADMIN — DIPHENHYDRAMINE HYDROCHLORIDE 25 MG: 25 CAPSULE ORAL at 18:45

## 2020-10-06 RX ADMIN — DIPHENHYDRAMINE HYDROCHLORIDE 25 MG: 25 CAPSULE ORAL at 07:13

## 2020-10-06 RX ADMIN — OXYCODONE HYDROCHLORIDE AND ACETAMINOPHEN 1 TABLET: 5; 325 TABLET ORAL at 23:46

## 2020-10-06 RX ADMIN — LEVOFLOXACIN 500 MG: 5 INJECTION, SOLUTION INTRAVENOUS at 09:14

## 2020-10-06 RX ADMIN — DILTIAZEM HYDROCHLORIDE 120 MG: 120 CAPSULE, COATED, EXTENDED RELEASE ORAL at 10:11

## 2020-10-06 RX ADMIN — PANTOPRAZOLE SODIUM 40 MG: 40 TABLET, DELAYED RELEASE ORAL at 05:00

## 2020-10-06 RX ADMIN — GUAIFENESIN 400 MG: 200 SOLUTION ORAL at 23:46

## 2020-10-06 RX ADMIN — LOSARTAN POTASSIUM 100 MG: 50 TABLET, FILM COATED ORAL at 10:11

## 2020-10-06 RX ADMIN — FUROSEMIDE 40 MG: 10 INJECTION, SOLUTION INTRAMUSCULAR; INTRAVENOUS at 11:28

## 2020-10-06 RX ADMIN — ATORVASTATIN CALCIUM 20 MG: 20 TABLET, FILM COATED ORAL at 20:11

## 2020-10-06 RX ADMIN — OXYCODONE HYDROCHLORIDE AND ACETAMINOPHEN 1 TABLET: 5; 325 TABLET ORAL at 11:38

## 2020-10-06 RX ADMIN — OXYCODONE HYDROCHLORIDE AND ACETAMINOPHEN 1 TABLET: 5; 325 TABLET ORAL at 04:56

## 2020-10-06 RX ADMIN — ENOXAPARIN SODIUM 40 MG: 40 INJECTION SUBCUTANEOUS at 10:11

## 2020-10-06 NOTE — PROGRESS NOTES
Progress Note    Patient: Kasey Moncada MRN: [id number]  SSN: [id number]    YOB: 1960  Age: 63 y.o. Sex: male      Admit Date: 9/10/2020    LOS: 26 days     Subjective:     59M, H/o COPD not on oxygen with with shortness of breath s/t pneumonia complicated by left lung collapse. Seen at bedside, case d/w RN bronch planned tomorrow. He is wanting to have PICC line and tired of blood draws and iv sticks. He has chr diarrhea bit denies cp/n/v to me.                 Current Facility-Administered Medications:     diphenhydrAMINE (BENADRYL) capsule 25 mg, 25 mg, Oral, Q6H PRN, Jesus Yang MD, 25 mg at 10/06/20 0713    mineral oil (topical), , , PRN, Duong Ruiz MD, 5 mL at 10/02/20 1125    acetylcysteine (MUCOMYST) 200 mg/mL (20 %) solution, , , PRN, Duong Ruiz MD, 5 mL at 10/02/20 1159    levoFLOXacin (LEVAQUIN) 500 mg in D5W IVPB, 500 mg, IntraVENous, Q24H, Duong Ruiz MD, Last Rate: 100 mL/hr at 10/06/20 0914, 500 mg at 10/06/20 0914    acetylcysteine (MUCOMYST) 200 mg/mL (20 %) solution 600 mg, 600 mg, Nebulization, QID RT, Duong Ruiz MD, 800 mg at 10/05/20 0817    oxyCODONE-acetaminophen (PERCOCET) 5-325 mg per tablet 1 Tab, 1 Tab, Oral, Q4H PRN, Nancy Norwood MD, 1 Tab at 10/06/20 1138    albuterol-ipratropium (DUO-NEB) 2.5 MG-0.5 MG/3 ML, 3 mL, Nebulization, QID RT, Jarett Ocampo DO, 3 mL at 10/05/20 0817    guaiFENesin (ROBITUSSIN) 100 mg/5 mL oral liquid 400 mg, 400 mg, Oral, Q6H, Jarett Ocampo DO, 400 mg at 10/06/20 1011    lidocaine (XYLOCAINE) 10 mg/mL (1 %) injection, , , PRN, Jarett Ocampo DO, 15 mL at 10/02/20 1159    mineral oil (topical), , , PRN, Jarett Ocampo DO, 5 mL at 09/23/20 0820    dilTIAZem ER (CARDIZEM CD) capsule 120 mg, 120 mg, Oral, DAILY, Romel Johnson MD, 120 mg at 10/06/20 1011    0.9% sodium chloride infusion 250 mL, 250 mL, IntraVENous, PRN, Caitlin Izquierdo MD    atorvastatin (LIPITOR) tablet 20 mg, 20 mg, Oral, DAILY, Sherie Gray MD, 20 mg at 10/05/20 2228    enoxaparin (LOVENOX) injection 40 mg, 40 mg, SubCUTAneous, Q24H, Sherie Gray MD, 40 mg at 10/06/20 1011    losartan (COZAAR) tablet 100 mg, 100 mg, Oral, DAILY, Sherie Gray MD, 100 mg at 10/06/20 1011    piperacillin-tazobactam (ZOSYN) 3.375 g in 0.9% sodium chloride (MBP/ADV) 100 mL MBP, 3.375 g, IntraVENous, Q8H, Sherie Gray MD, Last Rate: 25 mL/hr at 10/06/20 0913, 3.375 g at 10/06/20 0913    pantoprazole (PROTONIX) tablet 40 mg, 40 mg, Oral, DAILY, Sherie Gray MD, 40 mg at 10/06/20 0500    acetaminophen (TYLENOL) tablet 650 mg, 650 mg, Oral, Q4H PRN, Sherie Gray MD, 650 mg at 20 2319    alum-mag hydroxide-simeth (MYLANTA) oral suspension 30 mL, 30 mL, Oral, Q4H PRN, Sherie Gray MD, 30 mL at 20 2215    magnesium hydroxide (MILK OF MAGNESIA) 400 mg/5 mL oral suspension 30 mL, 30 mL, Oral, DAILY PRN, Sherie Gray MD    [facility]) injection 4 mg, 4 mg, IntraVENous, Q8H PRN, Sherie Gray MD    Objective:     Visit Vitals  /70   Pulse 100   Temp 99 °F (37.2 °C)   Resp 20   Ht 6' 0.99\" (1.854 m)   Wt 82.5 kg (181 lb 14.1 oz)   SpO2 97%   BMI 24.00 kg/m²    O2 Flow Rate (L/min): 3 l/min O2 Device: Nasal cannula     Temp (24hrs), Av.6 °F (37 °C), Min:97.2 °F (36.2 °C), Max:99.1 °F (37.3 °C)         Physical Exam:   General :  no respiratory distress noted  HEENT : normal oral mucosa, atraumatic normocephalic,   Neck : Supple,  Lungs : Absent bs left lung not labored.   CVS :  S1+, S2+, no murur or gallop  Abdomen : Soft, nontender, ++++ventral hernia  Extremities : No edema noted,  Musculoskeletal : no joint swelling or effusion,  Skin : Moist, warm,  Neurological : Awake, alert, oriented to time place person.  No neurological deficits.    Psychiatric : Mood and affect normal    Lab/Data Review:  Recent Results (from the past 24 hour(s))   METABOLIC PANEL, BASIC    Collection Time: 10/06/20  7:50 AM   Result Value Ref Range    Sodium 138 136 - 145 mmol/L    Potassium 3.6 3.5 - 5.1 mmol/L    Chloride 96 (L) 97 - 108 mmol/L    CO2 38 (H) 21 - 32 mmol/L    Anion gap 4 (L) 5 - 15 mmol/L    Glucose 75 65 - 100 mg/dL    BUN 8 6 - 20 mg/dL    Creatinine 0.51 (L) 0.70 - 1.30 mg/dL    BUN/Creatinine ratio 16 12 - 20      GFR est AA >60 >60 ml/min/1.73m2    GFR est non-AA >60 >60 ml/min/1.73m2    Calcium 8.4 (L) 8.5 - 10.1 mg/dL   CBC WITH AUTOMATED DIFF    Collection Time: 10/06/20  7:50 AM   Result Value Ref Range    WBC 9.7 4.1 - 11.1 K/uL    RBC 2.62 (L) 4.10 - 5.70 M/uL    HGB 7.7 (L) 12.1 - 17.0 g/dL    HCT 24.7 (L) 36.6 - 50.3 %    MCV 94.3 80.0 - 99.0 FL    MCH 29.4 26.0 - 34.0 PG    MCHC 31.2 30.0 - 36.5 g/dL    RDW 19.3 (H) 11.5 - 14.5 %    PLATELET 678 590 - 036 K/uL    MPV 9.3 8.9 - 12.9 FL    NEUTROPHILS 78 (H) 32 - 75 %    LYMPHOCYTES 11 (L) 12 - 49 %    MONOCYTES 9 5 - 13 %    EOSINOPHILS 1 0 - 7 %    BASOPHILS 0 0 - 1 %    IMMATURE GRANULOCYTES 1 (H) 0.0 - 0.5 %    ABS. NEUTROPHILS 7.6 1.8 - 8.0 K/UL    ABS. LYMPHOCYTES 1.0 0.8 - 3.5 K/UL    ABS. MONOCYTES 0.9 0.0 - 1.0 K/UL    ABS. EOSINOPHILS 0.1 0.0 - 0.4 K/UL    ABS. BASOPHILS 0.0 0.0 - 0.1 K/UL    ABS. IMM. GRANS. 0.1 (H) 0.00 - 0.04 K/UL    DF AUTOMATED           Assessment and plan:         acute hypoxic respiratory failure : s/t 2,3. On 3L NC. 2/2 to  Left lung collapse/mucus plugging and inability to cough/clear secretions 2/2 massive ventral/inscisional hernia.       Left lung collapse/PNA:   s/p multiple bronch's,   left lung remains largely opacified  Chest PT, Q6H nebs, guaifenesin, lasix with negative fluid target. Bal cs multiple =nl xiomara. Cytology x 4 unrevealing for malignancy. pulmonology-  additional bronch tomorrow  Hernia complicating, pt wants it reduced despite increased mortality risk. onZosyn/levaquin, stopped Azithro 9/23. 14 d abx total is recommended. Bal cs multiple =nl xiomara.  Cytology x 4 unrevealing for malignancy. CT chest 10/5 Impression:   1. Increased now complete opacification of the left bronchi with low density  material.  2. Slightly increased patchy airspace pneumonia in the right lung. 3. Unchanged loculated and nonloculated left pleural effusion, complete left  lung consolidation, right lung pleural effusion. Massive abd hernia,  chronic 6+ years neglected follow up. Surgery cx appreciated,  CT abd 10/5. IMPRESSION:  1. Large ventral hernia containing nonobstructed small and large intestine. 2. Moderate to large right pleural effusion and mild to moderate left pleural  effusion. There is near complete collapse of the visualized portions of the left  lower lobe and there is pleural thickening in the left hemithorax. 3. Patchy airspace disease in the right lower lobe along with right basilar  atelectasis. 4. Nonspecific left adrenal nodule. 5. Other findings as described. Pt at increased risk for procedure with resp compromise in addition to anatomical considerations of reducing massive hernia. Pt aware of inc mortality states \"I don't care if it kills me but it has to be done\". Abd ct pending, will need to recline on pillows for procedure as cannot lie flat. Pt aware of risk for surgery, likelihood that will remain on vent long term and he reiterates he wants it done despite the risk. Cardio to optimize for clearance. COPD: LAMA/ LABA, OP PFT, pulm following. Wean steroids.   Anemia: AOCI. stable  HTN : on losartan and CCB       DVT ppx: lovenox     DISPO: SNF anticipated when stable      Signed By: Carrie Hammond MD     October 6, 2020

## 2020-10-06 NOTE — PROGRESS NOTES
Pulm/CC Progress Note    Subjective:   Daily Progress Note: 10/6/2020     Patient seen and examined at the bedside today, no acute events overnight. His dyspnea is about the same. On 3 L oxygen. Nonproductive cough. Complaining of abdominal hernia. Chest x-ray from this morning still shows complete recurrent left-sided whiteout. Does not like BiPAP. Feels that chest PT is not helping him either. The nurse informed me that he declined chest PT. Chest x-ray 10/5 along with a CT scan of the chest abdomen pelvis were reviewed. Review of Systems  Has complains of shortness of breath. He is on nasal cannula oxygen. Denied any nausea vomiting. Has poor appetite    Objective:     Visit Vitals  /77   Pulse 92   Temp 97.9 °F (36.6 °C)   Resp 18   Ht 6' 0.99\" (1.854 m)   Wt 85.3 kg (188 lb 0.8 oz)   SpO2 94%   BMI 24.82 kg/m²    O2 Flow Rate (L/min): 3 l/min O2 Device: Nasal cannula    Temp (24hrs), Av.9 °F (36.6 °C), Min:97.7 °F (36.5 °C), Max:98 °F (36.7 °C)      No intake/output data recorded.   10/04 1901 - 10/06 0700  In: 725 [P.O.:725]  Out: 1301 [Urine:1300]    Current Facility-Administered Medications   Medication Dose Route Frequency    furosemide (LASIX) injection 40 mg  40 mg IntraVENous ONCE    diphenhydrAMINE (BENADRYL) capsule 25 mg  25 mg Oral Q6H PRN    mineral oil (topical)    PRN    acetylcysteine (MUCOMYST) 200 mg/mL (20 %) solution    PRN    levoFLOXacin (LEVAQUIN) 500 mg in D5W IVPB  500 mg IntraVENous Q24H    acetylcysteine (MUCOMYST) 200 mg/mL (20 %) solution 600 mg  600 mg Nebulization QID RT    oxyCODONE-acetaminophen (PERCOCET) 5-325 mg per tablet 1 Tab  1 Tab Oral Q4H PRN    albuterol-ipratropium (DUO-NEB) 2.5 MG-0.5 MG/3 ML  3 mL Nebulization QID RT    guaiFENesin (ROBITUSSIN) 100 mg/5 mL oral liquid 400 mg  400 mg Oral Q6H    lidocaine (XYLOCAINE) 10 mg/mL (1 %) injection    PRN    mineral oil (topical)    PRN    dilTIAZem ER (CARDIZEM CD) capsule 120 mg 120 mg Oral DAILY    0.9% sodium chloride infusion 250 mL  250 mL IntraVENous PRN    atorvastatin (LIPITOR) tablet 20 mg  20 mg Oral DAILY    enoxaparin (LOVENOX) injection 40 mg  40 mg SubCUTAneous Q24H    losartan (COZAAR) tablet 100 mg  100 mg Oral DAILY    piperacillin-tazobactam (ZOSYN) 3.375 g in 0.9% sodium chloride (MBP/ADV) 100 mL MBP  3.375 g IntraVENous Q8H    pantoprazole (PROTONIX) tablet 40 mg  40 mg Oral DAILY    acetaminophen (TYLENOL) tablet 650 mg  650 mg Oral Q4H PRN    alum-mag hydroxide-simeth (MYLANTA) oral suspension 30 mL  30 mL Oral Q4H PRN    magnesium hydroxide (MILK OF MAGNESIA) 400 mg/5 mL oral suspension 30 mL  30 mL Oral DAILY PRN    ondansetron (ZOFRAN) injection 4 mg  4 mg IntraVENous Q8H PRN       Physical Exam:  General: Alert and oriented x3.  2 L nasal cannula. Pleasant. HEENT: atraumatic, PERRL, moist mucosa,     Neck: Trachea midline, no carotid bruit, no masses. Supple but thick. Respiratory: Has absent breath sounds left hemithorax. Few crackles and rhonchi on the right side. Cardiovascular: RRR, no m/r/g, Normal S1 and S2   abdomen: Has large ventral abdominal hernia, evidence of surgical scars soft,   Rectal: deferred  Extremities: no cyanosis or clubbing. He has significant pitting edema in the dependent portions and lower extremities. Integumentary: warm, dry, and pink, with no rash, purpura, or petechia.    Heme/Lymph: no lymphadenopathy, no bruises  Neurological: No focal motor deficit   psychiatric: cooperative with normal mood, affect, and cognition      Additional comments: Chest x-rays reviewed    Data Review    Recent Results (from the past 24 hour(s))   METABOLIC PANEL, BASIC    Collection Time: 10/06/20  7:50 AM   Result Value Ref Range    Sodium 138 136 - 145 mmol/L    Potassium 3.6 3.5 - 5.1 mmol/L    Chloride 96 (L) 97 - 108 mmol/L    CO2 38 (H) 21 - 32 mmol/L    Anion gap 4 (L) 5 - 15 mmol/L    Glucose 75 65 - 100 mg/dL    BUN 8 6 - 20 mg/dL    Creatinine 0.51 (L) 0.70 - 1.30 mg/dL    BUN/Creatinine ratio 16 12 - 20      GFR est AA >60 >60 ml/min/1.73m2    GFR est non-AA >60 >60 ml/min/1.73m2    Calcium 8.4 (L) 8.5 - 10.1 mg/dL   CBC WITH AUTOMATED DIFF    Collection Time: 10/06/20  7:50 AM   Result Value Ref Range    WBC 9.7 4.1 - 11.1 K/uL    RBC 2.62 (L) 4.10 - 5.70 M/uL    HGB 7.7 (L) 12.1 - 17.0 g/dL    HCT 24.7 (L) 36.6 - 50.3 %    MCV 94.3 80.0 - 99.0 FL    MCH 29.4 26.0 - 34.0 PG    MCHC 31.2 30.0 - 36.5 g/dL    RDW 19.3 (H) 11.5 - 14.5 %    PLATELET 043 641 - 514 K/uL    MPV 9.3 8.9 - 12.9 FL    NEUTROPHILS 78 (H) 32 - 75 %    LYMPHOCYTES 11 (L) 12 - 49 %    MONOCYTES 9 5 - 13 %    EOSINOPHILS 1 0 - 7 %    BASOPHILS 0 0 - 1 %    IMMATURE GRANULOCYTES 1 (H) 0.0 - 0.5 %    ABS. NEUTROPHILS 7.6 1.8 - 8.0 K/UL    ABS. LYMPHOCYTES 1.0 0.8 - 3.5 K/UL    ABS. MONOCYTES 0.9 0.0 - 1.0 K/UL    ABS. EOSINOPHILS 0.1 0.0 - 0.4 K/UL    ABS. BASOPHILS 0.0 0.0 - 0.1 K/UL    ABS. IMM. GRANS. 0.1 (H) 0.00 - 0.04 K/UL    DF AUTOMATED       Today's CXR read is currently pending. 6 results from 10/3/2020 were reviewed  Patient has left basal atelectasis. XR CHEST PORT   Final Result   FINDINGS: Impression: Frontal single view chest.      Continued opacification of the left hemithorax, obscuring the cardiac   silhouette. Right lung interstitial thickening, airspace disease, bronchial thickening,   pleural effusion similar to previous. No pneumothorax. CT CHEST WO CONT   Final Result   Impression:    1. Increased now complete opacification of the left bronchi with low density   material.   2. Slightly increased patchy airspace pneumonia in the right lung. 3. Unchanged loculated and nonloculated left pleural effusion, complete left   lung consolidation, right lung pleural effusion. XR CHEST PORT   Final Result   Impression: Heterogeneous opacity in the right lung, not significantly changed.     Now complete opacification of the left hemithorax. XR CHEST PORT   Final Result   IMPRESSION: No significant change. XR CHEST AP LAT   Final Result      XR CHEST AP LAT   Final Result   IMPRESSION:   1. Persistent opacification of the left hemithorax with volume loss. 2.  Moderate right pleural effusion with probable associated right basilar   atelectasis. XR CHEST PORT   Final Result   Impression: Increased volume loss left lung. Otherwise stable. XR CHEST PORT   Final Result   IMPRESSION:   1. Improved aeration of the left lung with persistent basilar consolidative   opacities and probable pleural effusions. 2. Other extensive interstitial and alveolar opacities are nonspecific, but   potentially related to pulmonary edema or infection. XR CHEST PORT   Final Result   Impression:Left lung collapse without improvement from prior study. XR CHEST PORT   Final Result   IMPRESSION:   1. There is milder enlargement of the cardiac silhouette as well as increasing   pulmonary vascular ill definition suspect for mild pulmonary edema. This could   be related to cardiogenic edema or fluid overload. 2.  There is been an improvement in the overall aeration of the left lung with   and improved visualization of vascular markings within the left upper lung zone. There still remains significant partial collapse of the left lung. 3.  There is increased patchy airspace disease laterally within the right lower   lobe just above the right lateral costophrenic angle. 4.  There are persistent moderate-sized bilateral pleural effusions. XR CHEST AP LAT   Final Result   IMPRESSION: Persistent collapse of the left lung with bilateral pleural   effusions. Increasing vascular congestion on the right. XR CHEST PA LAT   Final Result   Impression:   Ongoing near complete white out of the left lung. Little interval change since   the prior examination.       CT CHEST WO CONT   Final Result   IMPRESSION: Complete collapse of the left lung due to complete bronchial   obstruction, possibly aspiration. Fluid overload also noted      XR ABD ACUTE W 1 V CHEST   Final Result   IMPRESSION: Opacification of the left hemithorax, questioned for remote   pneumonectomy or lung collapse with pleural fluid. Prominent right parenchymal/interstitial lung markings compatible with fibrosis   and possible partial vascular congestion. Small bowel gas, nonobstructive pattern. CT ABD PELV W CONT    (Results Pending)          Assessment/Plan: This is a middle-aged male who was admitted because of increasing symptoms of shortness of breath. He is getting treated in hospital for pneumonia with IV Zosyn/Azithromycin. He is noted to be increasingly tachypneic and short of breath. He has been on supplemental oxygen. His chest x-ray is showing left lung opacification likely from mucous plugging. This is slowly improving with aggressive pulmonary hygiene and three suction bronchoscopies.     Plan:     1.)    Left lung collapse/shortness of breath:  - Shortness of breath and hypoxia due to recurrent left lung collapse. He had multiple bronchoscopies done along with lavage but he continues to have left lung collapse. Part of it is because of his body habitus, his large abdominal hernia pushes against his diaphragm. Chest x-ray from this morning shows ongoing complete left lung whiteout. This is same from yesterday. CT of the chest done on 10/5 without IV contrast was personally reviewed with radiologist.  Left main bronchus shows mucus. Left lung is completely collapsed. Small left-sided pleural effusion. Larger right-sided pleural effusion. Right lung pneumonia. CT of the abdomen pelvis shows a large hernia. Discussed with the patient. I recommended bronchoscopy again. He wants to do it in the morning. This has been scheduled for 1030.     I also discussed with the pathologist.  Cytology from the previous bronchoscopy showing atypical squamous cells. I will obtain more brushings and possibly biopsies tomorrow for further evaluation. Cultures have been negative. The patient was also informed that when he has the surgery done he will probably end up on the ventilator. There is a small possibility that he will need a tracheostomy. He is prepared for the consequences and wants to pursue surgery to fix the hernia. Discussed with respiratory. Raise the left lung up and do chest PT. Repeat chest x-ray in the morning. I will obtain blood gases also. Continue aggressive pulmonary hygiene with nebulizers every 6 hours;  Aggressive chest PT with vest every 6 hours, EZ-PAP therapy q6, acapella device as well as incentive spirometer use. Added guaifenesin 400 mg q6.    2.)  COPD:  He has a history of COPD based on chronic smoking. Continue scheduled bronchodilators. Patient should be discharged with a LAMA/LABA such as Anoro and needs f/u in our office for PFT's and further management. Weaned off prednisone. 3.)  Pneumonia:   He is on IV Zosyn, stopped Zithromax on 9/23/2020.     4.)  Hypertension:  He is on antihypertensive medications, BP's stable. Patient also has clinically fluid overload, congestive heart failure. Echocardiogram done on 9/16 showed an EF of 60 to 82% grade 2 diastolic dysfunction and moderate to severe aortic stenosis. Right ventricle is normal in size and function. On Lasix. Agree with cardiology evaluation. 5.)  Status post ex lap:  His abdominal examination shows significant surgical scars and ventral abdominal hernia. Patient wishes to proceed for abdominal ventral wall hernia surgery. Setting the size of his ventral abdominal wall hernia, it will be a difficult task. From pulmonary perspective the patient is at moderate to high risk of respiratory failure perioperatively. Furthermore pushing the abdominal hernia inside will further elevate the diaphragm.   Questions of patient were answered at bedside in detail  Case discussed in detail with RN, RT, and care team  Thank you for involving me in the care of this patient  I will follow with you closely during hospitalization    Time spent more than 30 minutes in direct patient care with no overlap  Discussed with Dr. Catarino Mccracken.     Ariela Montoya MD  Pulmonary and critical care

## 2020-10-06 NOTE — ANESTHESIA PREPROCEDURE EVALUATION
Relevant Problems   No relevant active problems       Anesthetic History   No history of anesthetic complications            Review of Systems / Medical History  Patient summary reviewed, nursing notes reviewed and pertinent labs reviewed    Pulmonary          Pneumonia (ASPIRATIOMN PNEUMONIA)         Neuro/Psych       CVA (right sided weakness. )  TIA     Cardiovascular    Hypertension                Comments: ECHO: · LV: Estimated LVEF is 60 - 65%. Biplane method used to measure ejection fraction. Normal cavity size and systolic function (ejection fraction normal). Mild concentric hypertrophy. Wall motion: normal. Moderate (grade 2) left ventricular diastolic dysfunction. · AV: Probably trileaflet aortic valve. Moderate aortic valve leaflet calcification present. Severely reduced right leaflet mobility of the aortic valve. Aortic valve area is 1 cm2. Moderate to severe aortic valve stenosis is present. Mild aortic valve regurgitation is present. · MV: Mitral annular calcification. · TV: Mild tricuspid valve regurgitation is present. GI/Hepatic/Renal     GERD      PUD     Endo/Other        Arthritis and anemia (Hb/Hct 7.7/24.7)     Other Findings   Comments: \"CAN NOT LAY FLAT, SITS ON \"  LARGE ABDOMINAL HERNIA. Physical Exam    Airway  Mallampati: I  TM Distance: > 6 cm  Neck ROM: normal range of motion        Cardiovascular               Dental    Dentition: Poor dentition     Pulmonary    Rhonchi:bilateral            Comments: Labored breathing.   Abdominal         Other Findings            Anesthetic Plan    ASA: 3  Anesthesia type: general          Induction: Intravenous  Anesthetic plan and risks discussed with: Patient

## 2020-10-06 NOTE — PROGRESS NOTES
Patient due one time dose of 40mg IV lasix at 1900. Patient refused, stated \"I don't want to be peeing all night\". Patient also refused 12am dose of guaifenesin. Rescheduled meds for 5am per patient's request.     Patient again refused Lasix and Guaifenesin at 5am, wanting to sleep. Rescheduled for 8am. Educated patient on importance of medication.

## 2020-10-07 ENCOUNTER — APPOINTMENT (OUTPATIENT)
Dept: ENDOSCOPY | Age: 60
DRG: 177 | End: 2020-10-07
Attending: INTERNAL MEDICINE
Payer: MEDICARE

## 2020-10-07 ENCOUNTER — ANESTHESIA (OUTPATIENT)
Dept: ENDOSCOPY | Age: 60
DRG: 177 | End: 2020-10-07
Payer: MEDICARE

## 2020-10-07 ENCOUNTER — APPOINTMENT (OUTPATIENT)
Dept: GENERAL RADIOLOGY | Age: 60
DRG: 177 | End: 2020-10-07
Attending: INTERNAL MEDICINE
Payer: MEDICARE

## 2020-10-07 LAB
ARTERIAL PATENCY WRIST A: ABNORMAL
BASE EXCESS BLDA CALC-SCNC: 14.8 MMOL/L (ref 0–2)
BDY SITE: ABNORMAL
GAS FLOW.O2 O2 DELIVERY SYS: 3 L/MIN
HCO3 BLDA-SCNC: 39 MMOL/L (ref 22–26)
PCO2 BLDA: 65 MMHG (ref 35–45)
PH BLDA: 7.41 [PH] (ref 7.35–7.45)
PO2 BLDA: 61 MMHG (ref 75–100)
SAO2 % BLD: 92 %
SAO2% DEVICE SAO2% SENSOR NAME: ABNORMAL
SPECIMEN SITE: ABNORMAL

## 2020-10-07 PROCEDURE — 74011250637 HC RX REV CODE- 250/637: Performed by: HOSPITALIST

## 2020-10-07 PROCEDURE — 0BD88ZX EXTRACTION OF LEFT UPPER LOBE BRONCHUS, VIA NATURAL OR ARTIFICIAL OPENING ENDOSCOPIC, DIAGNOSTIC: ICD-10-PCS | Performed by: INTERNAL MEDICINE

## 2020-10-07 PROCEDURE — 88305 TISSUE EXAM BY PATHOLOGIST: CPT

## 2020-10-07 PROCEDURE — 77030037041 HC FCPS HOT BIOP ENDOSC RAD JAW DISP BSC -B: Performed by: INTERNAL MEDICINE

## 2020-10-07 PROCEDURE — 82803 BLOOD GASES ANY COMBINATION: CPT

## 2020-10-07 PROCEDURE — 0B9F8ZX DRAINAGE OF RIGHT LOWER LUNG LOBE, VIA NATURAL OR ARTIFICIAL OPENING ENDOSCOPIC, DIAGNOSTIC: ICD-10-PCS | Performed by: INTERNAL MEDICINE

## 2020-10-07 PROCEDURE — 74011250637 HC RX REV CODE- 250/637: Performed by: INTERNAL MEDICINE

## 2020-10-07 PROCEDURE — 65270000029 HC RM PRIVATE

## 2020-10-07 PROCEDURE — 88108 CYTOPATH CONCENTRATE TECH: CPT

## 2020-10-07 PROCEDURE — 74011000258 HC RX REV CODE- 258: Performed by: INTERNAL MEDICINE

## 2020-10-07 PROCEDURE — 0BDB8ZX EXTRACTION OF LEFT LOWER LOBE BRONCHUS, VIA NATURAL OR ARTIFICIAL OPENING ENDOSCOPIC, DIAGNOSTIC: ICD-10-PCS | Performed by: INTERNAL MEDICINE

## 2020-10-07 PROCEDURE — 71045 X-RAY EXAM CHEST 1 VIEW: CPT

## 2020-10-07 PROCEDURE — 76040000009: Performed by: INTERNAL MEDICINE

## 2020-10-07 PROCEDURE — 74011000250 HC RX REV CODE- 250: Performed by: INTERNAL MEDICINE

## 2020-10-07 PROCEDURE — 76000 FLUOROSCOPY <1 HR PHYS/QHP: CPT

## 2020-10-07 PROCEDURE — 0BD98ZX EXTRACTION OF LINGULA BRONCHUS, VIA NATURAL OR ARTIFICIAL OPENING ENDOSCOPIC, DIAGNOSTIC: ICD-10-PCS | Performed by: INTERNAL MEDICINE

## 2020-10-07 PROCEDURE — 2709999900 HC NON-CHARGEABLE SUPPLY: Performed by: INTERNAL MEDICINE

## 2020-10-07 PROCEDURE — 77030012699 HC VLV SUC CNTRL OCOA -A: Performed by: INTERNAL MEDICINE

## 2020-10-07 PROCEDURE — 76060000034 HC ANESTHESIA 1.5 TO 2 HR: Performed by: INTERNAL MEDICINE

## 2020-10-07 PROCEDURE — 87070 CULTURE OTHR SPECIMN AEROBIC: CPT

## 2020-10-07 PROCEDURE — 94640 AIRWAY INHALATION TREATMENT: CPT

## 2020-10-07 PROCEDURE — 74011000250 HC RX REV CODE- 250: Performed by: NURSE ANESTHETIST, CERTIFIED REGISTERED

## 2020-10-07 PROCEDURE — 87102 FUNGUS ISOLATION CULTURE: CPT

## 2020-10-07 PROCEDURE — 74011250636 HC RX REV CODE- 250/636: Performed by: INTERNAL MEDICINE

## 2020-10-07 PROCEDURE — 88104 CYTOPATH FL NONGYN SMEARS: CPT

## 2020-10-07 PROCEDURE — 74011250636 HC RX REV CODE- 250/636: Performed by: NURSE ANESTHETIST, CERTIFIED REGISTERED

## 2020-10-07 PROCEDURE — 87116 MYCOBACTERIA CULTURE: CPT

## 2020-10-07 RX ORDER — PHENYLEPHRINE HCL IN 0.9% NACL 1 MG/10 ML
SYRINGE (ML) INTRAVENOUS
Status: DISPENSED
Start: 2020-10-07 | End: 2020-10-07

## 2020-10-07 RX ORDER — DEXAMETHASONE SODIUM PHOSPHATE 4 MG/ML
INJECTION, SOLUTION INTRA-ARTICULAR; INTRALESIONAL; INTRAMUSCULAR; INTRAVENOUS; SOFT TISSUE
Status: DISPENSED
Start: 2020-10-07 | End: 2020-10-08

## 2020-10-07 RX ORDER — LIDOCAINE HYDROCHLORIDE 20 MG/ML
INJECTION, SOLUTION EPIDURAL; INFILTRATION; INTRACAUDAL; PERINEURAL
Status: COMPLETED
Start: 2020-10-07 | End: 2020-10-07

## 2020-10-07 RX ORDER — DEXAMETHASONE SODIUM PHOSPHATE 4 MG/ML
INJECTION, SOLUTION INTRA-ARTICULAR; INTRALESIONAL; INTRAMUSCULAR; INTRAVENOUS; SOFT TISSUE AS NEEDED
Status: DISCONTINUED | OUTPATIENT
Start: 2020-10-07 | End: 2020-10-07 | Stop reason: HOSPADM

## 2020-10-07 RX ORDER — FENTANYL CITRATE 50 UG/ML
INJECTION, SOLUTION INTRAMUSCULAR; INTRAVENOUS
Status: COMPLETED
Start: 2020-10-07 | End: 2020-10-07

## 2020-10-07 RX ORDER — GLYCOPYRROLATE 0.2 MG/ML
INJECTION INTRAMUSCULAR; INTRAVENOUS AS NEEDED
Status: DISCONTINUED | OUTPATIENT
Start: 2020-10-07 | End: 2020-10-07 | Stop reason: HOSPADM

## 2020-10-07 RX ORDER — ROCURONIUM BROMIDE 10 MG/ML
INJECTION, SOLUTION INTRAVENOUS
Status: COMPLETED
Start: 2020-10-07 | End: 2020-10-07

## 2020-10-07 RX ORDER — ROCURONIUM BROMIDE 10 MG/ML
INJECTION, SOLUTION INTRAVENOUS AS NEEDED
Status: DISCONTINUED | OUTPATIENT
Start: 2020-10-07 | End: 2020-10-07 | Stop reason: HOSPADM

## 2020-10-07 RX ORDER — ACETYLCYSTEINE 200 MG/ML
600 SOLUTION ORAL; RESPIRATORY (INHALATION) 2 TIMES DAILY
Status: DISCONTINUED | OUTPATIENT
Start: 2020-10-07 | End: 2020-10-27 | Stop reason: HOSPADM

## 2020-10-07 RX ORDER — PROPOFOL 10 MG/ML
INJECTION, EMULSION INTRAVENOUS AS NEEDED
Status: DISCONTINUED | OUTPATIENT
Start: 2020-10-07 | End: 2020-10-07 | Stop reason: HOSPADM

## 2020-10-07 RX ORDER — FENTANYL CITRATE 50 UG/ML
INJECTION, SOLUTION INTRAMUSCULAR; INTRAVENOUS AS NEEDED
Status: DISCONTINUED | OUTPATIENT
Start: 2020-10-07 | End: 2020-10-07 | Stop reason: HOSPADM

## 2020-10-07 RX ORDER — LIDOCAINE HYDROCHLORIDE 20 MG/ML
INJECTION, SOLUTION EPIDURAL; INFILTRATION; INTRACAUDAL; PERINEURAL AS NEEDED
Status: DISCONTINUED | OUTPATIENT
Start: 2020-10-07 | End: 2020-10-07 | Stop reason: HOSPADM

## 2020-10-07 RX ORDER — ONDANSETRON 2 MG/ML
INJECTION INTRAMUSCULAR; INTRAVENOUS AS NEEDED
Status: DISCONTINUED | OUTPATIENT
Start: 2020-10-07 | End: 2020-10-07 | Stop reason: HOSPADM

## 2020-10-07 RX ORDER — PROPOFOL 10 MG/ML
INJECTION, EMULSION INTRAVENOUS
Status: COMPLETED
Start: 2020-10-07 | End: 2020-10-07

## 2020-10-07 RX ORDER — SODIUM CHLORIDE 9 MG/ML
INJECTION, SOLUTION INTRAVENOUS
Status: DISCONTINUED | OUTPATIENT
Start: 2020-10-07 | End: 2020-10-07 | Stop reason: HOSPADM

## 2020-10-07 RX ORDER — SUCCINYLCHOLINE CHLORIDE 20 MG/ML
INJECTION INTRAMUSCULAR; INTRAVENOUS AS NEEDED
Status: DISCONTINUED | OUTPATIENT
Start: 2020-10-07 | End: 2020-10-07 | Stop reason: HOSPADM

## 2020-10-07 RX ORDER — ONDANSETRON 2 MG/ML
INJECTION INTRAMUSCULAR; INTRAVENOUS
Status: DISPENSED
Start: 2020-10-07 | End: 2020-10-08

## 2020-10-07 RX ORDER — SUCCINYLCHOLINE CHLORIDE 20 MG/ML INJECTION SOLUTION
SOLUTION
Status: COMPLETED
Start: 2020-10-07 | End: 2020-10-07

## 2020-10-07 RX ADMIN — GLYCOPYRROLATE 0.2 MG: 0.2 INJECTION, SOLUTION INTRAMUSCULAR; INTRAVENOUS at 11:12

## 2020-10-07 RX ADMIN — DEXAMETHASONE SODIUM PHOSPHATE 4 MG: 4 INJECTION, SOLUTION INTRA-ARTICULAR; INTRALESIONAL; INTRAMUSCULAR; INTRAVENOUS; SOFT TISSUE at 11:24

## 2020-10-07 RX ADMIN — PHENYLEPHRINE HYDROCHLORIDE 200 MCG: 10 INJECTION INTRAVENOUS at 11:34

## 2020-10-07 RX ADMIN — OXYCODONE HYDROCHLORIDE AND ACETAMINOPHEN 1 TABLET: 5; 325 TABLET ORAL at 08:50

## 2020-10-07 RX ADMIN — FENTANYL CITRATE 50 MCG: 50 INJECTION, SOLUTION INTRAMUSCULAR; INTRAVENOUS at 10:45

## 2020-10-07 RX ADMIN — ONDANSETRON 4 MG: 2 INJECTION INTRAMUSCULAR; INTRAVENOUS at 11:24

## 2020-10-07 RX ADMIN — GUAIFENESIN 400 MG: 200 SOLUTION ORAL at 05:44

## 2020-10-07 RX ADMIN — OXYCODONE HYDROCHLORIDE AND ACETAMINOPHEN 1 TABLET: 5; 325 TABLET ORAL at 18:08

## 2020-10-07 RX ADMIN — OXYCODONE HYDROCHLORIDE AND ACETAMINOPHEN 1 TABLET: 5; 325 TABLET ORAL at 13:42

## 2020-10-07 RX ADMIN — PHENYLEPHRINE HYDROCHLORIDE 200 MCG: 10 INJECTION INTRAVENOUS at 10:53

## 2020-10-07 RX ADMIN — OXYCODONE HYDROCHLORIDE AND ACETAMINOPHEN 1 TABLET: 5; 325 TABLET ORAL at 22:26

## 2020-10-07 RX ADMIN — DIPHENHYDRAMINE HYDROCHLORIDE 25 MG: 25 CAPSULE ORAL at 18:08

## 2020-10-07 RX ADMIN — DILTIAZEM HYDROCHLORIDE 120 MG: 120 CAPSULE, COATED, EXTENDED RELEASE ORAL at 08:50

## 2020-10-07 RX ADMIN — PHENYLEPHRINE HYDROCHLORIDE 200 MCG: 10 INJECTION INTRAVENOUS at 11:29

## 2020-10-07 RX ADMIN — LEVOFLOXACIN 500 MG: 5 INJECTION, SOLUTION INTRAVENOUS at 08:51

## 2020-10-07 RX ADMIN — PANTOPRAZOLE SODIUM 40 MG: 40 TABLET, DELAYED RELEASE ORAL at 05:45

## 2020-10-07 RX ADMIN — GUAIFENESIN 400 MG: 200 SOLUTION ORAL at 13:42

## 2020-10-07 RX ADMIN — FENTANYL CITRATE 50 MCG: 50 INJECTION, SOLUTION INTRAMUSCULAR; INTRAVENOUS at 11:14

## 2020-10-07 RX ADMIN — PHENYLEPHRINE HYDROCHLORIDE 100 MCG: 10 INJECTION INTRAVENOUS at 11:24

## 2020-10-07 RX ADMIN — GUAIFENESIN 400 MG: 200 SOLUTION ORAL at 18:08

## 2020-10-07 RX ADMIN — SODIUM CHLORIDE: 9 INJECTION, SOLUTION INTRAVENOUS at 10:30

## 2020-10-07 RX ADMIN — ACETYLCYSTEINE 600 MG: 200 SOLUTION ORAL; RESPIRATORY (INHALATION) at 16:14

## 2020-10-07 RX ADMIN — ROCURONIUM BROMIDE 5 MG: 10 SOLUTION INTRAVENOUS at 10:44

## 2020-10-07 RX ADMIN — ATORVASTATIN CALCIUM 20 MG: 20 TABLET, FILM COATED ORAL at 22:26

## 2020-10-07 RX ADMIN — LIDOCAINE HYDROCHLORIDE 60 MG: 20 INJECTION, SOLUTION EPIDURAL; INFILTRATION; INTRACAUDAL; PERINEURAL at 10:45

## 2020-10-07 RX ADMIN — OXYCODONE HYDROCHLORIDE AND ACETAMINOPHEN 1 TABLET: 5; 325 TABLET ORAL at 03:58

## 2020-10-07 RX ADMIN — PROPOFOL 100 MG: 10 INJECTION, EMULSION INTRAVENOUS at 10:45

## 2020-10-07 RX ADMIN — LOSARTAN POTASSIUM 100 MG: 50 TABLET, FILM COATED ORAL at 08:51

## 2020-10-07 RX ADMIN — DIPHENHYDRAMINE HYDROCHLORIDE 25 MG: 25 CAPSULE ORAL at 03:58

## 2020-10-07 RX ADMIN — PHENYLEPHRINE HYDROCHLORIDE 100 MCG: 10 INJECTION INTRAVENOUS at 11:16

## 2020-10-07 RX ADMIN — PIPERACILLIN SODIUM AND TAZOBACTAM SODIUM 3.38 G: 3; .375 INJECTION, POWDER, LYOPHILIZED, FOR SOLUTION INTRAVENOUS at 18:08

## 2020-10-07 RX ADMIN — IPRATROPIUM BROMIDE AND ALBUTEROL SULFATE 3 ML: .5; 3 SOLUTION RESPIRATORY (INHALATION) at 16:14

## 2020-10-07 RX ADMIN — SODIUM CHLORIDE: 9 INJECTION, SOLUTION INTRAVENOUS at 11:16

## 2020-10-07 RX ADMIN — SUCCINYLCHOLINE CHLORIDE 100 MG: 20 INJECTION, SOLUTION INTRAMUSCULAR; INTRAVENOUS at 10:45

## 2020-10-07 NOTE — ANESTHESIA POSTPROCEDURE EVALUATION
Procedure(s):  BRONCHOSCOPY.     general    Anesthesia Post Evaluation      Multimodal analgesia: multimodal analgesia used between 6 hours prior to anesthesia start to PACU discharge  Patient location during evaluation: PACU  Patient participation: complete - patient participated  Level of consciousness: awake  Pain score: 0  Pain management: adequate  Airway patency: patent  Anesthetic complications: no  Cardiovascular status: acceptable  Respiratory status: acceptable  Hydration status: acceptable  Post anesthesia nausea and vomiting:  controlled  Final Post Anesthesia Temperature Assessment:  Normothermia (36.0-37.5 degrees C)      INITIAL Post-op Vital signs:   Vitals Value Taken Time   BP 97/61 10/7/2020 12:38 PM   Temp 36.7 °C (98.1 °F) 10/7/2020 12:07 PM   Pulse 85 10/7/2020 12:38 PM   Resp 16 10/7/2020 12:38 PM   SpO2 92 % 10/7/2020 12:38 PM

## 2020-10-07 NOTE — CONSULTS
[id number] [facility] SURGERY PROGRESS NOTE          Chief Complaint: Large abdominal hernia    Subjective:  No new issues. Still has some abdominal pain. Passing flatus and having bowel movement. No nausea or vomiting. On Oxygen and has shortness of breath. CT scan shows no bowel obstruction/hernia with left lung collapse. Just completed another flx brochoscopy which showed polypoid growth suspicious for malignancy & final report awaited. Past Medical History:   Past Medical History:   Diagnosis Date    Anal fistula 3/15/2010    Anxiety     ARF (acute renal failure) (HCC)     Colon perforation (HCC)     Genital herpes 3/15/2010    GERD (gastroesophageal reflux disease)     High cholesterol 3/15/2010    History of blood transfusion     HTN (hypertension) 3/15/2010    Hyponatremia     Ischemic colitis ([address]     left Carotid Artery Occlusion 3/15/2010    Noncompliance with treatment 3/15/2010    Pneumonia 2011    PUD (peptic ulcer disease)     Septic shock(785.52)     Stroke 2002 3/15/2010    Thromboembolus ([address]     rt thigh    TIA (transient ischemic attack) 2007    pt reported       Past Surgical History:   Past Surgical History:   Procedure Laterality Date    CARDIAC CATHETERIZATION      CARDIAC SURG PROCEDURE UNLIST      HX COLECTOMY  1/11/2014    Right hemicolectomy for free air, perforated viscus    US SCROTUM/TESTICLES      right testicle removed        Allergy:  Allergies   Allergen Reactions    Morphine Other (comments)     itching       Social History:  reports that he has been smoking. He has a 70.00 pack-year smoking history. He has never used smokeless tobacco. He reports current alcohol use of about 70.0 standard drinks of alcohol per week. He reports current drug use. Drug: Marijuana.      Family History:  Family History   Problem Relation Age of Onset    Cancer Mother         Current Medications:  Current Facility-Administered Medications:     PHENYLephrine (ROXY-SYNEPHRINE) 1 mg/10 mL (100 mcg/mL) 100 mcg/mL in NS syringe, , , , , Stopped at 10/07/20 0900    PHENYLephrine (ROXY-SYNEPHRINE) 1 mg/10 mL (100 mcg/mL) 100 mcg/mL in NS syringe, , , , , Stopped at 10/07/20 1200    dexamethasone (DECADRON) 4 mg/mL injection, , , , , Stopped at 10/07/20 1300    diphenhydrAMINE (BENADRYL) capsule 25 mg, 25 mg, Oral, Q6H PRN, Jesus Murphy MD, 25 mg at 10/07/20 0358    mineral oil (topical), , , PRN, Duong Ruiz MD, 5 mL at 10/02/20 1125    acetylcysteine (MUCOMYST) 200 mg/mL (20 %) solution, , , PRN, Duong Ruiz MD, 5 mL at 10/02/20 1159    levoFLOXacin (LEVAQUIN) 500 mg in D5W IVPB, 500 mg, IntraVENous, Q24H, Duong Ruiz MD, Last Rate: 100 mL/hr at 10/07/20 0851, 500 mg at 10/07/20 0851    acetylcysteine (MUCOMYST) 200 mg/mL (20 %) solution 600 mg, 600 mg, Nebulization, QID RT, Duong Ruiz MD, 800 mg at 10/05/20 0817    oxyCODONE-acetaminophen (PERCOCET) 5-325 mg per tablet 1 Tab, 1 Tab, Oral, Q4H PRN, Mi Kiran MD, 1 Tab at 10/07/20 1342    albuterol-ipratropium (DUO-NEB) 2.5 MG-0.5 MG/3 ML, 3 mL, Nebulization, QID RT, Jarett Ocampo DO, 3 mL at 10/05/20 0817    guaiFENesin (ROBITUSSIN) 100 mg/5 mL oral liquid 400 mg, 400 mg, Oral, Q6H, Jarett Ocampo DO, 400 mg at 10/07/20 1342    lidocaine (XYLOCAINE) 10 mg/mL (1 %) injection, , , PRN, Jarett Ocampo DO, 15 mL at 10/02/20 1159    mineral oil (topical), , , PRN, Jarett Ocampo DO, 5 mL at 09/23/20 0820    dilTIAZem ER (CARDIZEM CD) capsule 120 mg, 120 mg, Oral, DAILY, Romel Landin MD, 120 mg at 10/07/20 0850    0.9% sodium chloride infusion 250 mL, 250 mL, IntraVENous, PRN, Doree Frankel, MD    atorvastatin (LIPITOR) tablet 20 mg, 20 mg, Oral, DAILY, Doree Frankel, MD, 20 mg at 10/06/20 2011    losartan (COZAAR) tablet 100 mg, 100 mg, Oral, DAILY, Doree Frankel, MD, 100 mg at 10/07/20 0851    piperacillin-tazobactam (ZOSYN) 3.375 g in 0.9% sodium chloride (MBP/ADV) 100 mL MBP, 3.375 g, IntraVENous, Q8H, Doree Frankel, MD, Last Rate: 25 mL/hr at 10/06/20 2346, 3.375 g at 10/06/20 2346    pantoprazole (PROTONIX) tablet 40 mg, 40 mg, Oral, DAILY, Doree Frankel, MD, 40 mg at 10/07/20 0545    acetaminophen (TYLENOL) tablet 650 mg, 650 mg, Oral, Q4H PRN, Doree Frankel, MD, 650 mg at 09/24/20 2319    alum-mag hydroxide-simeth (MYLANTA) oral suspension 30 mL, 30 mL, Oral, Q4H PRN, Doree Frankel, MD, 30 mL at 09/22/20 2215    magnesium hydroxide (MILK OF MAGNESIA) 400 mg/5 mL oral suspension 30 mL, 30 mL, Oral, DAILY PRN, Doree Frankel, MD    ondansetron [facility]) injection 4 mg, 4 mg, IntraVENous, Q8H PRN, Doree Frankel, MD, Stopped at 10/07/20 1300     Immunization History: There is no immunization history on file for this patient. Complete    Review of Systems:     Constitutional:  no fever,  no chills,  no sweats, No weakness, No fatigue, No decreased activity. Eye: No recent visual problem, No icterus, No discharge, No double vision. Ear/Nose/Mouth/Throat: No decreased hearing, No ear pain, No nasal congestion, No sore throat. Respiratory:  shortness of breath,  cough, No sputum production, No hemoptysis,  wheezing, No cyanosis. Cardiovascular: No chest pain, No palpitations, No bradycardia, No tachycardia, No peripheral edema, No syncope. Gastrointestinal: No nausea,  No vomiting, No diarrhea, No constipation, No heartburn,  abdominal pain. Genitourinary: No dysuria, No hematuria, No change in urine stream, No urethral discharge, No lesions. Hematology/Lymphatics: No bruising tendency, No bleeding tendency, No petechiae, No swollen lymph glands. Endocrine: No excessive thirst, No polyuria, No cold intolerance, No heat intolerance, No excessive hunger. Immunologic: Not immunocompromised, No recurrent fevers, No recurrent infections.   Musculoskeletal: No back pain, No neck pain, No joint pain, No muscle pain, No claudication, No decreased range of motion, No trauma. Integumentary: No rash, No pruritus, No abrasions. Neurologic: Alert and oriented X4, No abnormal balance, No headache, No confusion, No numbness, No tingling. Psychiatric: No anxiety, No depression, No james. Physical Exam:     Vitals & Measurements: Wt Readings from Last 3 Encounters:   10/06/20 84.5 kg (186 lb 4.6 oz)   02/26/19 82.6 kg (182 lb)   11/28/18 83.9 kg (185 lb)     Temp Readings from Last 3 Encounters:   10/07/20 98.1 °F (36.7 °C)   09/05/20 98.1 °F (36.7 °C) (Temporal)   02/26/19 97.9 °F (36.6 °C) (Oral)     BP Readings from Last 3 Encounters:   10/07/20 97/61   09/05/20 110/60   02/26/19 171/77     Pulse Readings from Last 3 Encounters:   10/07/20 85   09/05/20 86   02/26/19 100      Ht Readings from Last 3 Encounters:   09/18/20 6' 0.99\" (1.854 m)   02/26/19 6' 1\" (1.854 m)   11/28/18 6' 1\" (1.854 m)          General:  in respiratory acute distress  Head: Normal  Face: Nornal  HEENT: atraumatic, PERRLA, moist mucosa, normal pharynx, normal tonsils and adenoids, normal tongue, no fluid in sinuses  Neck: Trachea midline, no carotid bruit, no masses  Chest: Normal.  Respiratory: Normal chest wall expansion, shortness of breath, wheezing,   Cardiovascular: RRR, no m/r/g, Normal S1 and S2  Abdomen: Soft, non tender,distended, large hernia with thinned out skin and dilated blood vessels, partially reducible, large fascial defect with loss of domain, normal bowel sounds in all quadrants, no hepatosplenomegaly, no tympany. Incision scar:   Genitourinary: No inguinal hernia, normal external gentalia, Testis & scrotum normal, no renal angle tenderness  Rectal: deferred  Musculoskeletal: normal ROM in upper and lower extremities, No joint swelling.   Integumentary: Warm, dry, and pink, with no rash, purpura, or petechia  Heme/Lymph: No lymphadenopathy, no bruises  Neurological:Cranial Nerves II-XII grossly intact, no gross motor or sensory deficit  Psychiatric: Cooperative with normal mood, affect, and cognition      Laboratory Values:   Recent Results (from the past 24 hour(s))   BLOOD GAS, ARTERIAL    Collection Time: 10/07/20  5:00 AM   Result Value Ref Range    pH 7.413 7.35 - 7.45      PCO2 65 (H) 35 - 45 mmHg    PO2 61 (L) 75 - 100 mmHg    O2 SAT 92 (L) >95 %    BICARBONATE 39 (H) 22 - 26 mmol/L    BASE EXCESS 14.8 (H) 0 - 2 mmol/L    O2 METHOD Nasal Cannula      O2 FLOW RATE 3 L/min    Sample source Arterial      SITE Right Radial      RAQUEL'S TEST PASS               Assessment:  Large incisional hernia with loss of domain    Severe COPD     Respiratory failure -left lung collapse- recurrent bronchoscopy- possible underlying mass    On going smoking     Plan:    1. Admission  2. Diet   3. IV fluids  4. SCD  5. IS  6. Pain medications  7. Antibiotics  8. Nausea medication. 9. CT scan of abdomen and pelvis with PO & IV contrast  10. This is a complex large incisional hernia in a patient with compromised respiratory status. I had a discussion with patient and explained the details of the procedure and complexity of the surgey with his compromised respiratory status. Recommend to hold off off on any surgery at this time until his pulmonary status improves & final pathology report is available. 6. Discussed with Dr. Pankaj Chahal regarding Pulmonary clearance to see  If he will be a surgical candidate. 12. Also Cardiac clearance will be beneficial.    Need to address his left lung collapse & respiratory failure before proceeding with any type of surgical intervention since this large chronic incisional hernia is not obstructed. Thank you for the consultation & allowing me to participate in the care of this patient.

## 2020-10-07 NOTE — CONSULTS
[id number] [facility] SURGERY PROGRESS NOTE          Chief Complaint: Large abdominal hernia    Subjective:  No new issues. Still has some abdominal pain. Passing flatus. No nausea or vomiting. Had a large bowel movement. On Oxygen and has shortness of breath. CT scan shows no bowel obstruction/hernia with left lung collapse. Past Medical History:   Past Medical History:   Diagnosis Date    Anal fistula 3/15/2010    Anxiety     ARF (acute renal failure) (HCC)     Colon perforation (HCC)     Genital herpes 3/15/2010    GERD (gastroesophageal reflux disease)     High cholesterol 3/15/2010    History of blood transfusion     HTN (hypertension) 3/15/2010    Hyponatremia     [address]ic [address] [address]     left Carotid Artery Occlusion 3/15/2010    Noncompliance with treatment 3/15/2010    Pneumonia 2011    PUD (peptic ulcer disease)     Septic shock(785.52)     Stroke 2002 3/15/2010    Thrombo[address] ([facility] [facility]     rt thigh    TIA (transient ischemic attack) 2007    pt reported       Past Surgical History:   Past Surgical History:   Procedure Laterality Date    CARDIAC CATHETERIZATION      CARDIAC SURG PROCEDURE UNLIST      HX COLECTOMY  1/11/2014    Right hemicolectomy for free air, perforated viscus    US SCROTUM/TESTICLES      right testicle removed        Allergy:  Allergies   Allergen Reactions    Morphine Other (comments)     itching       Social History:  reports that he has been smoking. He has a 70.00 pack-year smoking history. He has never used smokeless tobacco. He reports current alcohol use of about 70.0 standard drinks of alcohol per week. He reports current drug use. Drug: Marijuana.      Family History:  Family History   Problem Relation Age of Onset    Cancer Mother         Current Medications:  Current Facility-Administered Medications:     diphenhydrAMINE (BENADRYL) capsule 25 mg, 25 mg, Oral, Q6H PRN, Jesus Lakhani MD, 25 mg at 10/06/20 3213    mineral oil (topical), , , PRN, Alberto Nunes MD DAVID, 5 mL at 10/02/20 1125    acetylcysteine (MUCOMYST) 200 mg/mL (20 %) solution, , , PRN, Duong Ruiz MD, 5 mL at 10/02/20 1159    levoFLOXacin (LEVAQUIN) 500 mg in D5W IVPB, 500 mg, IntraVENous, Q24H, Duong Ruiz MD, Last Rate: 100 mL/hr at 10/06/20 0914, 500 mg at 10/06/20 0914    acetylcysteine (MUCOMYST) 200 mg/mL (20 %) solution 600 mg, 600 mg, Nebulization, QID RT, Duong Ruiz MD, 800 mg at 10/05/20 0817    oxyCODONE-acetaminophen (PERCOCET) 5-325 mg per tablet 1 Tab, 1 Tab, Oral, Q4H PRN, Arnaldo Davis MD, 1 Tab at 10/06/20 1649    albuterol-ipratropium (DUO-NEB) 2.5 MG-0.5 MG/3 ML, 3 mL, Nebulization, QID RT, Jarett Ocampo DO, 3 mL at 10/05/20 0817    guaiFENesin (ROBITUSSIN) 100 mg/5 mL oral liquid 400 mg, 400 mg, Oral, Q6H, Jarett Ocampo DO, 400 mg at 10/06/20 1845    lidocaine (XYLOCAINE) 10 mg/mL (1 %) injection, , , PRN, Jarett Ocampo DO, 15 mL at 10/02/20 1159    mineral oil (topical), , , PRN, Jarett Ocampo DO, 5 mL at 09/23/20 0820    dilTIAZem ER (CARDIZEM CD) capsule 120 mg, 120 mg, Oral, DAILY, Romel Johnson MD, 120 mg at 10/06/20 1011    0.9% sodium chloride infusion 250 mL, 250 mL, IntraVENous, PRN, Chen Estrada MD    atorvastatin (LIPITOR) tablet 20 mg, 20 mg, Oral, DAILY, Chen Estrada MD, 20 mg at 10/06/20 2011    losartan (COZAAR) tablet 100 mg, 100 mg, Oral, DAILY, Chen Estrada MD, 100 mg at 10/06/20 1011    piperacillin-tazobactam (ZOSYN) 3.375 g in 0.9% sodium chloride (MBP/ADV) 100 mL MBP, 3.375 g, IntraVENous, Q8H, Chen Estrada MD, Last Rate: 25 mL/hr at 10/06/20 1646, 3.375 g at 10/06/20 1646    pantoprazole (PROTONIX) tablet 40 mg, 40 mg, Oral, DAILY, Chen Estrada MD, 40 mg at 10/06/20 0500    acetaminophen (TYLENOL) tablet 650 mg, 650 mg, Oral, Q4H PRN, Chen Estrada MD, 650 mg at 09/24/20 2319    alum-mag hydroxide-simeth (MYLANTA) oral suspension 30 mL, 30 mL, Oral, Q4H PRN, Chen Estrada MD, 30 mL at 09/22/20 2215    magnesium hydroxide (MILK OF MAGNESIA) 400 mg/5 mL oral suspension 30 mL, 30 mL, Oral, DAILY PRN, Blanca Weber MD    ondansetron [facility]) injection 4 mg, 4 mg, IntraVENous, Q8H PRN, Blanca Weber MD     Immunization History: There is no immunization history on file for this patient. Complete    Review of Systems:     Constitutional:  no fever,  no chills,  no sweats, No weakness, No fatigue, No decreased activity. Eye: No recent visual problem, No icterus, No discharge, No double vision. Ear/Nose/Mouth/Throat: No decreased hearing, No ear pain, No nasal congestion, No sore throat. Respiratory:  shortness of breath,  cough, No sputum production, No hemoptysis,  wheezing, No cyanosis. Cardiovascular: No chest pain, No palpitations, No bradycardia, No tachycardia, No peripheral edema, No syncope. Gastrointestinal: No nausea,  No vomiting, No diarrhea, No constipation, No heartburn,  abdominal pain. Genitourinary: No dysuria, No hematuria, No change in urine stream, No urethral discharge, No lesions. Hematology/Lymphatics: No bruising tendency, No bleeding tendency, No petechiae, No swollen lymph glands. Endocrine: No excessive thirst, No polyuria, No cold intolerance, No heat intolerance, No excessive hunger. Immunologic: Not immunocompromised, No recurrent fevers, No recurrent infections. Musculoskeletal: No back pain, No neck pain, No joint pain, No muscle pain, No claudication, No decreased range of motion, No trauma. Integumentary: No rash, No pruritus, No abrasions. Neurologic: Alert and oriented X4, No abnormal balance, No headache, No confusion, No numbness, No tingling. Psychiatric: No anxiety, No depression, No james. Physical Exam:     Vitals & Measurements:     Wt Readings from Last 3 Encounters:   10/06/20 84.5 kg (186 lb 4.6 oz)   02/26/19 82.6 kg (182 lb)   11/28/18 83.9 kg (185 lb)     Temp Readings from Last 3 Encounters:   10/06/20 97.9 °F (36.6 °C) 09/05/20 98.1 °F (36.7 °C) (Temporal)   02/26/19 97.9 °F (36.6 °C) (Oral)     BP Readings from Last 3 Encounters:   10/06/20 138/70   09/05/20 110/60   02/26/19 171/77     Pulse Readings from Last 3 Encounters:   10/06/20 91   09/05/20 86   02/26/19 100      Ht Readings from Last 3 Encounters:   09/18/20 6' 0.99\" (1.854 m)   02/26/19 6' 1\" (1.854 m)   11/28/18 6' 1\" (1.854 m)          General:  in respiratory acute distress  Head: Normal  Face: Nornal  HEENT: atraumatic, PERRLA, moist mucosa, normal pharynx, normal tonsils and adenoids, normal tongue, no fluid in sinuses  Neck: Trachea midline, no carotid bruit, no masses  Chest: Normal.  Respiratory: Normal chest wall expansion, shortness of breath, wheezing,   Cardiovascular: RRR, no m/r/g, Normal S1 and S2  Abdomen: Soft, non tender,distended, large hernia with thinned out skin and dilated blood vessels, partially reducible, large fascial defect with loss of domain, normal bowel sounds in all quadrants, no hepatosplenomegaly, no tympany. Incision scar:   Genitourinary: No inguinal hernia, normal external gentalia, Testis & scrotum normal, no renal angle tenderness  Rectal: deferred  Musculoskeletal: normal ROM in upper and lower extremities, No joint swelling.   Integumentary: Warm, dry, and pink, with no rash, purpura, or petechia  Heme/Lymph: No lymphadenopathy, no bruises  Neurological:Cranial Nerves II-XII grossly intact, no gross motor or sensory deficit  Psychiatric: Cooperative with normal mood, affect, and cognition      Laboratory Values:   Recent Results (from the past 24 hour(s))   METABOLIC PANEL, BASIC    Collection Time: 10/06/20  7:50 AM   Result Value Ref Range    Sodium 138 136 - 145 mmol/L    Potassium 3.6 3.5 - 5.1 mmol/L    Chloride 96 (L) 97 - 108 mmol/L    CO2 38 (H) 21 - 32 mmol/L    Anion gap 4 (L) 5 - 15 mmol/L    Glucose 75 65 - 100 mg/dL    BUN 8 6 - 20 mg/dL    Creatinine 0.51 (L) 0.70 - 1.30 mg/dL    BUN/Creatinine ratio 16 12 - 20 GFR est AA >60 >60 ml/min/1.73m2    GFR est non-AA >60 >60 ml/min/1.73m2    Calcium 8.4 (L) 8.5 - 10.1 mg/dL   CBC WITH AUTOMATED DIFF    Collection Time: 10/06/20  7:50 AM   Result Value Ref Range    WBC 9.7 4.1 - 11.1 K/uL    RBC 2.62 (L) 4.10 - 5.70 M/uL    HGB 7.7 (L) 12.1 - 17.0 g/dL    HCT 24.7 (L) 36.6 - 50.3 %    MCV 94.3 80.0 - 99.0 FL    MCH 29.4 26.0 - 34.0 PG    MCHC 31.2 30.0 - 36.5 g/dL    RDW 19.3 (H) 11.5 - 14.5 %    PLATELET 976 570 - 046 K/uL    MPV 9.3 8.9 - 12.9 FL    NEUTROPHILS 78 (H) 32 - 75 %    LYMPHOCYTES 11 (L) 12 - 49 %    MONOCYTES 9 5 - 13 %    EOSINOPHILS 1 0 - 7 %    BASOPHILS 0 0 - 1 %    IMMATURE GRANULOCYTES 1 (H) 0.0 - 0.5 %    ABS. NEUTROPHILS 7.6 1.8 - 8.0 K/UL    ABS. LYMPHOCYTES 1.0 0.8 - 3.5 K/UL    ABS. MONOCYTES 0.9 0.0 - 1.0 K/UL    ABS. EOSINOPHILS 0.1 0.0 - 0.4 K/UL    ABS. BASOPHILS 0.0 0.0 - 0.1 K/UL    ABS. IMM. GRANS. 0.1 (H) 0.00 - 0.04 K/UL    DF AUTOMATED               Assessment:  Large incisional hernia with loss of domain    Severe COPD     Respiratory failure -left lung collapse    On going smoking     Plan:    1. Admission  2. Diet   3. IV fluids  4. SCD  5. IS  6. Pain medications  7. Antibiotics  8. Nausea medication. 9. CT scan of abdomen and pelvis with PO & IV contrast  10. This is a complex large incisional hernia in a patient with compromised respiratory status. I had a discussion with patient and explained the details of the procedure and complexity of the surgey with his compromised respiratory status. 6. Discussed with Dr. Isidro Fraser regarding Pulmonary clearance to see  If he will be a surgical candidate. 12. Also Cardiac clearance will be beneficial.    Need to address his left lung collapse & respiratory failure before proceeding with any type of surgical intervention since this large chronic incisional hernia is not obstructed. Thank you for the consultation & allowing me to participate in the care of this patient.

## 2020-10-07 NOTE — PROGRESS NOTES
Progress Note    Patient: Alba Lenz MRN: [id number]  SSN: [id number]    YOB: 1960  Age: 63 y.o. Sex: male      Admit Date: 9/10/2020    LOS: 27 days     Subjective:     59M, H/o COPD not on oxygen with with shortness of breath s/t pneumonia complicated by left lung collapse. He is followed bronchoscopy today.   No other acute events reported by nursing staff                Current Facility-Administered Medications:     PHENYLephrine (ROXY-SYNEPHRINE) 1 mg/10 mL (100 mcg/mL) 100 mcg/mL in NS syringe, , , ,     PHENYLephrine (ROXY-SYNEPHRINE) 1 mg/10 mL (100 mcg/mL) 100 mcg/mL in NS syringe, , , ,     ondansetron (ZOFRAN) 4 mg/2 mL injection, , , ,     dexamethasone (DECADRON) 4 mg/mL injection, , , ,     diphenhydrAMINE (BENADRYL) capsule 25 mg, 25 mg, Oral, Q6H PRN, Jesus Valero MD, 25 mg at 10/07/20 0358    mineral oil (topical), , , PRN, Duong Ruiz MD, 5 mL at 10/02/20 1125    acetylcysteine (MUCOMYST) 200 mg/mL (20 %) solution, , , PRN, Duong Ruiz MD, 5 mL at 10/02/20 1159    levoFLOXacin (LEVAQUIN) 500 mg in D5W IVPB, 500 mg, IntraVENous, Q24H, Duong Ruiz MD, Last Rate: 100 mL/hr at 10/07/20 0851, 500 mg at 10/07/20 0851    acetylcysteine (MUCOMYST) 200 mg/mL (20 %) solution 600 mg, 600 mg, Nebulization, QID RT, Duong Ruiz MD, 800 mg at 10/05/20 0817    oxyCODONE-acetaminophen (PERCOCET) 5-325 mg per tablet 1 Tab, 1 Tab, Oral, Q4H PRN, Roberta Hutson MD, 1 Tab at 10/07/20 0850    albuterol-ipratropium (DUO-NEB) 2.5 MG-0.5 MG/3 ML, 3 mL, Nebulization, QID RT, Jarett Ocampo DO, 3 mL at 10/05/20 0817    guaiFENesin (ROBITUSSIN) 100 mg/5 mL oral liquid 400 mg, 400 mg, Oral, Q6H, Jarett Ocampo DO, 400 mg at 10/07/20 0544    lidocaine (XYLOCAINE) 10 mg/mL (1 %) injection, , , PRN, Jarett Ocampo DO, 15 mL at 10/02/20 1159    mineral oil (topical), , , PRN, Jarett Ocampo DO, 5 mL at 09/23/20 0820    dilTIAZem ER (CARDIZEM CD) capsule 120 mg, 120 mg, Oral, DAILY, Brown Galvez MD, 120 mg at 10/07/20 0850    0.9% sodium chloride infusion 250 mL, 250 mL, IntraVENous, PRN, Ken Abebe MD    atorvastatin (LIPITOR) tablet 20 mg, 20 mg, Oral, DAILY, Ken Abebe MD, 20 mg at 10/06/20 2011    losartan (COZAAR) tablet 100 mg, 100 mg, Oral, DAILY, Ken Abebe MD, 100 mg at 10/07/20 0851    piperacillin-tazobactam (ZOSYN) 3.375 g in 0.9% sodium chloride (MBP/ADV) 100 mL MBP, 3.375 g, IntraVENous, Q8H, Ken Abebe MD, Last Rate: 25 mL/hr at 10/06/20 2346, 3.375 g at 10/06/20 2346    pantoprazole (PROTONIX) tablet 40 mg, 40 mg, Oral, DAILY, Ken Abebe MD, 40 mg at 10/07/20 0545    acetaminophen (TYLENOL) tablet 650 mg, 650 mg, Oral, Q4H PRN, Ken Abebe MD, 650 mg at 20 2319    alum-mag hydroxide-simeth (MYLANTA) oral suspension 30 mL, 30 mL, Oral, Q4H PRN, Ken Abebe MD, 30 mL at 20 2215    magnesium hydroxide (MILK OF MAGNESIA) 400 mg/5 mL oral suspension 30 mL, 30 mL, Oral, DAILY PRN, Ken Abebe MD    ondansetron [facility]) injection 4 mg, 4 mg, IntraVENous, Q8H PRN, Ken Abebe MD    Objective:     Visit Vitals  /70   Pulse 100   Temp 99 °F (37.2 °C)   Resp 20   Ht 6' 0.99\" (1.854 m)   Wt 82.5 kg (181 lb 14.1 oz)   SpO2 97%   BMI 24.00 kg/m²    O2 Flow Rate (L/min): 3 l/min O2 Device: Nasal cannula     Temp (24hrs), Av.6 °F (37 °C), Min:97.2 °F (36.2 °C), Max:99.1 °F (37.3 °C)         Physical Exam:   General :  no respiratory distress noted  Lungs : Absent bs left lung not labored.   CVS :  no gallop  Abdomen :  ++++ventral hernia  Extremities : No edema noted,  Neurological : Awake, alert, oriented to time place person.  No neurological deficits.    Psychiatric : Mood and affect normal    Lab/Data Review:  Recent Results (from the past 24 hour(s))   BLOOD GAS, ARTERIAL    Collection Time: 10/07/20  5:00 AM   Result Value Ref Range    pH 7.413 7.35 - 7.45      PCO2 65 (H) 35 - 45 mmHg    PO2 61 (L) 75 - 100 mmHg    O2 SAT 92 (L) >95 %    BICARBONATE 39 (H) 22 - 26 mmol/L    BASE EXCESS 14.8 (H) 0 - 2 mmol/L    O2 METHOD Nasal Cannula      O2 FLOW RATE 3 L/min    Sample source Arterial      SITE Right Radial      RAQUEL'S TEST PASS           Assessment and plan:         acute hypoxic respiratory failure : s/t 2,3. On 3L NC. 2/2 to  Left lung collapse/mucus plugging and inability to cough/clear secretions 2/2 massive ventral/inscisional hernia.       Left lung collapse/PNA:   s/p multiple bronch's,   left lung remains largely opacified  Chest PT, Q6H nebs, guaifenesin, lasix with negative fluid target. Bal cs multiple =nl xiomara. Cytology x 4 unrevealing for malignancy. pulmonology-  additional bronch today 74/8   Hernia complicating, pt wants it reduced despite increased mortality risk. onZosyn/levaquin, stopped Azithro 9/23. 14 d abx total is recommended. Bal cs multiple =nl xiomara. Cytology x 4 unrevealing for malignancy. CT chest 10/5 Impression:   1. Increased now complete opacification of the left bronchi with low density  material.  2. Slightly increased patchy airspace pneumonia in the right lung. 3. Unchanged loculated and nonloculated left pleural effusion, complete left  lung consolidation, right lung pleural effusion. Massive abd hernia,  chronic 6+ years neglected follow up. Surgery cx appreciated Dr. Liana Liu,    CT abd 10/5. IMPRESSION:  1. Large ventral hernia containing nonobstructed small and large intestine. 2. Moderate to large right pleural effusion and mild to moderate left pleural  effusion. There is near complete collapse of the visualized portions of the left  lower lobe and there is pleural thickening in the left hemithorax. 3. Patchy airspace disease in the right lower lobe along with right basilar  atelectasis. 4. Nonspecific left adrenal nodule. 5. Other findings as described.      Pt at increased risk for procedure with resp compromise in addition to anatomical considerations of reducing massive hernia. Pt aware of inc mortality states \"I don't care if it kills me but it has to be done\". Abd ct pending, will need to recline on pillows for procedure as cannot lie flat. Pt aware of risk for surgery, likelihood that will remain on vent long term and he reiterates he wants it done despite the risk. Cardio to optimize for clearance. Surgery on case f/u recomendations    COPD: LAMA/ LABA, OP PFT, pulm following. Wean steroids. Anemia: AOCI.  Stable, check cbc in AM  HTN : on losartan and CCB       DVT ppx: lovenox     DISPO: SNF anticipated when stable      Signed By: Vic Navarrete MD     October 7, 2020

## 2020-10-07 NOTE — PROGRESS NOTES
PT treatment attempted. Currently,pt. off the floor - PACU. PT will try to reattempt for PT treatment at a later time . Thanks

## 2020-10-07 NOTE — OP NOTES
Bronchoscopy done. Full note dictated. [id number]. Procedure done under general anesthesia. Extensive mucus plugging of the entire left lung. After extensive lavage and cleaning there are multiple endobronchial polypoid-like lesions. Cytology brushings and biopsies were taken. I suspect that the patient has underlying malignancy. Will further discuss with pathologist.  Overall patient tolerated the procedure well. Left lung is now open.

## 2020-10-07 NOTE — PROGRESS NOTES
Attempted to see patient for OT treatment. Patient off the floor at PACU. Will attempt treatment next visit.

## 2020-10-07 NOTE — PROGRESS NOTES
Pulm/CC Progress Note    Subjective:   Daily Progress Note: 10/7/2020     Patient seen and examined at the bedside. Earlier today he underwent bronchoscopy under general anesthesia. He is now awake and alert. On 3-1/2 L of oxygen. Findings of the bronchoscopy discussed with the patient, further elaborated below. Discussed with RT at the bedside. Complaining of abdominal hernia. Chest x-ray from this morning still shows complete recurrent left-sided whiteout. Does not like BiPAP. Feels that chest PT is not helping him either. The nurse informed me that he declined chest PT. Chest x-ray 10/5 along with a CT scan of the chest abdomen pelvis were reviewed. Review of Systems  Has complains of shortness of breath. He is on nasal cannula oxygen. Denied any nausea vomiting. Has poor appetite    Objective:     Visit Vitals  BP 94/83   Pulse 89   Temp 97.7 °F (36.5 °C)   Resp 16   Ht 6' 0.99\" (1.854 m)   Wt 84.5 kg (186 lb 4.6 oz)   SpO2 92%   BMI 24.58 kg/m²    O2 Flow Rate (L/min): 3 l/min O2 Device: Nasal cannula    Temp (24hrs), Av.2 °F (36.8 °C), Min:97.7 °F (36.5 °C), Max:98.9 °F (37.2 °C)      10/07 0701 - 10/07 1900  In: 550 [I.V.:550]  Out: -   10/05 1901 - 10/07 0700  In: 593 [P.O.:725;  I.V.:100]  Out: 1576 [Urine:1575]    Current Facility-Administered Medications   Medication Dose Route Frequency    PHENYLephrine (ROXY-SYNEPHRINE) 1 mg/10 mL (100 mcg/mL) 100 mcg/mL in NS syringe        PHENYLephrine (ROXY-SYNEPHRINE) 1 mg/10 mL (100 mcg/mL) 100 mcg/mL in NS syringe        dexamethasone (DECADRON) 4 mg/mL injection        diphenhydrAMINE (BENADRYL) capsule 25 mg  25 mg Oral Q6H PRN    mineral oil (topical)    PRN    acetylcysteine (MUCOMYST) 200 mg/mL (20 %) solution    PRN    levoFLOXacin (LEVAQUIN) 500 mg in D5W IVPB  500 mg IntraVENous Q24H    acetylcysteine (MUCOMYST) 200 mg/mL (20 %) solution 600 mg  600 mg Nebulization QID RT    oxyCODONE-acetaminophen (PERCOCET) 5-325 mg per tablet 1 Tab  1 Tab Oral Q4H PRN    albuterol-ipratropium (DUO-NEB) 2.5 MG-0.5 MG/3 ML  3 mL Nebulization QID RT    guaiFENesin (ROBITUSSIN) 100 mg/5 mL oral liquid 400 mg  400 mg Oral Q6H    lidocaine (XYLOCAINE) 10 mg/mL (1 %) injection    PRN    mineral oil (topical)    PRN    dilTIAZem ER (CARDIZEM CD) capsule 120 mg  120 mg Oral DAILY    0.9% sodium chloride infusion 250 mL  250 mL IntraVENous PRN    atorvastatin (LIPITOR) tablet 20 mg  20 mg Oral DAILY    losartan (COZAAR) tablet 100 mg  100 mg Oral DAILY    piperacillin-tazobactam (ZOSYN) 3.375 g in 0.9% sodium chloride (MBP/ADV) 100 mL MBP  3.375 g IntraVENous Q8H    pantoprazole (PROTONIX) tablet 40 mg  40 mg Oral DAILY    acetaminophen (TYLENOL) tablet 650 mg  650 mg Oral Q4H PRN    alum-mag hydroxide-simeth (MYLANTA) oral suspension 30 mL  30 mL Oral Q4H PRN    magnesium hydroxide (MILK OF MAGNESIA) 400 mg/5 mL oral suspension 30 mL  30 mL Oral DAILY PRN    ondansetron (ZOFRAN) injection 4 mg  4 mg IntraVENous Q8H PRN       Physical Exam:  General: Alert and oriented x3.  2 L nasal cannula. Pleasant. HEENT: atraumatic, PERRL, moist mucosa,     Neck: Trachea midline, no carotid bruit, no masses. Supple but thick. Respiratory: He has increased aeration of the left upper lung zone. Few crackles and rhonchi on the right side. Cardiovascular: RRR, no m/r/g, Normal S1 and S2   abdomen: Has large ventral abdominal hernia, evidence of surgical scars soft,   Rectal: deferred  Extremities: no cyanosis or clubbing. He has significant pitting edema in the dependent portions and lower extremities. Integumentary: warm, dry, and pink, with no rash, purpura, or petechia.    Heme/Lymph: no lymphadenopathy, no bruises  Neurological: No focal motor deficit   psychiatric: cooperative with normal mood, affect, and cognition      Additional comments: Chest x-rays reviewed    Data Review    Recent Results (from the past 24 hour(s))   BLOOD GAS, ARTERIAL Collection Time: 10/07/20  5:00 AM   Result Value Ref Range    pH 7.413 7.35 - 7.45      PCO2 65 (H) 35 - 45 mmHg    PO2 61 (L) 75 - 100 mmHg    O2 SAT 92 (L) >95 %    BICARBONATE 39 (H) 22 - 26 mmol/L    BASE EXCESS 14.8 (H) 0 - 2 mmol/L    O2 METHOD Nasal Cannula      O2 FLOW RATE 3 L/min    Sample source Arterial      SITE Right Radial      RAQUEL'S TEST PASS       Today's CXR read is currently pending. 6 results from 10/3/2020 were reviewed  Patient has left basal atelectasis. XR CHEST PORT   Final Result      XR FLUOROSCOPY UNDER 60 MINUTES   Final Result      XR CHEST PORT   Final Result      XR CHEST PORT   Final Result   FINDINGS: Impression: Frontal single view chest.      Continued opacification of the left hemithorax, obscuring the cardiac   silhouette. Right lung interstitial thickening, airspace disease, bronchial thickening,   pleural effusion similar to previous. No pneumothorax. CT CHEST WO CONT   Final Result   Impression:    1. Increased now complete opacification of the left bronchi with low density   material.   2. Slightly increased patchy airspace pneumonia in the right lung. 3. Unchanged loculated and nonloculated left pleural effusion, complete left   lung consolidation, right lung pleural effusion. CT ABD PELV W CONT   Final Result   IMPRESSION:   1. Large ventral hernia containing nonobstructed small and large intestine. 2. Moderate to large right pleural effusion and mild to moderate left pleural   effusion. There is near complete collapse of the visualized portions of the left   lower lobe and there is pleural thickening in the left hemithorax. 3. Patchy airspace disease in the right lower lobe along with right basilar   atelectasis. 4. Nonspecific left adrenal nodule. 5. Other findings as described. XR CHEST PORT   Final Result   Impression: Heterogeneous opacity in the right lung, not significantly changed.     Now complete opacification of the left hemithorax. XR CHEST PORT   Final Result   IMPRESSION: No significant change. XR CHEST AP LAT   Final Result      XR CHEST AP LAT   Final Result   IMPRESSION:   1. Persistent opacification of the left hemithorax with volume loss. 2.  Moderate right pleural effusion with probable associated right basilar   atelectasis. XR CHEST PORT   Final Result   Impression: Increased volume loss left lung. Otherwise stable. XR CHEST PORT   Final Result   IMPRESSION:   1. Improved aeration of the left lung with persistent basilar consolidative   opacities and probable pleural effusions. 2. Other extensive interstitial and alveolar opacities are nonspecific, but   potentially related to pulmonary edema or infection. XR CHEST PORT   Final Result   Impression:Left lung collapse without improvement from prior study. XR CHEST PORT   Final Result   IMPRESSION:   1. There is milder enlargement of the cardiac silhouette as well as increasing   pulmonary vascular ill definition suspect for mild pulmonary edema. This could   be related to cardiogenic edema or fluid overload. 2.  There is been an improvement in the overall aeration of the left lung with   and improved visualization of vascular markings within the left upper lung zone. There still remains significant partial collapse of the left lung. 3.  There is increased patchy airspace disease laterally within the right lower   lobe just above the right lateral costophrenic angle. 4.  There are persistent moderate-sized bilateral pleural effusions. XR CHEST AP LAT   Final Result   IMPRESSION: Persistent collapse of the left lung with bilateral pleural   effusions. Increasing vascular congestion on the right. XR CHEST PA LAT   Final Result   Impression:   Ongoing near complete white out of the left lung. Little interval change since   the prior examination.       CT CHEST WO CONT   Final Result   IMPRESSION: Complete collapse of the left lung due to complete bronchial   obstruction, possibly aspiration. Fluid overload also noted      XR ABD ACUTE W 1 V CHEST   Final Result   IMPRESSION: Opacification of the left hemithorax, questioned for remote   pneumonectomy or lung collapse with pleural fluid. Prominent right parenchymal/interstitial lung markings compatible with fibrosis   and possible partial vascular congestion. Small bowel gas, nonobstructive pattern. Assessment/Plan: This is a middle-aged male who was admitted because of increasing symptoms of shortness of breath. He is getting treated in hospital for pneumonia with IV Zosyn/Azithromycin. He is noted to be increasingly tachypneic and short of breath. He has been on supplemental oxygen. His chest x-ray is showing left lung opacification likely from mucous plugging. This is slowly improving with aggressive pulmonary hygiene and three suction bronchoscopies.     Plan:     1.)    Left lung collapse/shortness of breath:  - Shortness of breath and hypoxia due to recurrent left lung collapse. He had multiple bronchoscopies done along with lavage but he continues to have left lung collapse. The patient underwent bronchoscopy today under general anesthesia. There was complete collapse from mucous plugging of the left lung. Mucous was up to the main vinaey. He was suctioned aggressively. Post suctioning there appears to be multiple endobronchial lesions in the distal left main bronchus in the beginning of the left lower lobe bronchus. Brushings and biopsies were obtained. Postprocedure chest x-ray shows better aeration of the left upper lung. He appears to be more comfortable. On 3-1/2 L of oxygen. Blood gases on 3 L oxygen this morning showed 7.413/65/61. Recurrent collapse is due to multiple reasons including large abdominal hernia, weak cough, COPD and multiple endobronchial lesions although they are not causing significant obstruction.       CT of the chest done on 10/5 without IV contrast was personally reviewed with radiologist.  Left main bronchus shows mucus. Left lung is completely collapsed. Small left-sided pleural effusion. Larger right-sided pleural effusion. Right lung pneumonia. CT of the abdomen pelvis shows a large hernia. I also discussed with the pathologist.  Cytology from the previous bronchoscopy showing atypical squamous cells. Cultures have been negative. The patient was also informed that when he has the surgery done he will probably end up on the ventilator. There is a possibility that he will need a tracheostomy. He is prepared for the consequences and wants to pursue surgery to fix the hernia. Discussed with respiratory. Raise the left lung up and do chest PT. Repeat chest x-ray in the morning. Continue aggressive pulmonary hygiene with nebulizers every 6 hours;  Aggressive chest PT, EZ-PAP therapy q6, acapella device as well as incentive spirometer use. Added guaifenesin 400 mg q6.    2.)  COPD:  He has a history of COPD based on chronic smoking. Continue scheduled bronchodilators. Patient should be discharged with a LAMA/LABA such as Anoro and needs f/u in our office for PFT's and further management. Weaned off prednisone. 3.)  Pneumonia:   He is on IV Zosyn, stopped Zithromax on 9/23/2020. We will check cultures from bronchoscopy today. 4.)  Hypertension:  He is on antihypertensive medications, BP's stable. Patient also has clinically fluid overload, congestive heart failure. Echocardiogram done on 9/16 showed an EF of 60 to 18% grade 2 diastolic dysfunction and moderate to severe aortic stenosis. Right ventricle is normal in size and function. On Lasix. Agree with cardiology evaluation. From pulmonary perspective the patient is at moderate to high risk of respiratory failure perioperatively. Furthermore pushing the abdominal hernia inside will further elevate the diaphragm.   Questions of patient were answered at bedside in detail  Case discussed in detail with RN, RT, and care team  Thank you for involving me in the care of this patient  I will follow with you closely during hospitalization    Time spent more than 30 minutes in direct patient care with no overlap      NORMA Cohn MD  Pulmonary and critical care

## 2020-10-08 ENCOUNTER — APPOINTMENT (OUTPATIENT)
Dept: GENERAL RADIOLOGY | Age: 60
DRG: 177 | End: 2020-10-08
Attending: INTERNAL MEDICINE
Payer: MEDICARE

## 2020-10-08 LAB
ANION GAP SERPL CALC-SCNC: 1 MMOL/L (ref 5–15)
BASOPHILS # BLD: 0 K/UL (ref 0–0.1)
BASOPHILS NFR BLD: 0 % (ref 0–1)
BUN SERPL-MCNC: 9 MG/DL (ref 6–20)
BUN/CREAT SERPL: 14 (ref 12–20)
CA-I BLD-MCNC: 8.2 MG/DL (ref 8.5–10.1)
CHLORIDE SERPL-SCNC: 96 MMOL/L (ref 97–108)
CO2 SERPL-SCNC: 39 MMOL/L (ref 21–32)
CREAT SERPL-MCNC: 0.64 MG/DL (ref 0.7–1.3)
DIFFERENTIAL METHOD BLD: ABNORMAL
EOSINOPHIL # BLD: 0 K/UL (ref 0–0.4)
EOSINOPHIL NFR BLD: 0 % (ref 0–7)
ERYTHROCYTE [DISTWIDTH] IN BLOOD BY AUTOMATED COUNT: 18.4 % (ref 11.5–14.5)
GLUCOSE SERPL-MCNC: 107 MG/DL (ref 65–100)
HCT VFR BLD AUTO: 26.3 % (ref 36.6–50.3)
HGB BLD-MCNC: 7.9 G/DL (ref 12.1–17)
IMM GRANULOCYTES # BLD AUTO: 0.1 K/UL (ref 0–0.04)
IMM GRANULOCYTES NFR BLD AUTO: 1 % (ref 0–0.5)
LYMPHOCYTES # BLD: 0.7 K/UL (ref 0.8–3.5)
LYMPHOCYTES NFR BLD: 9 % (ref 12–49)
MAGNESIUM SERPL-MCNC: 1.5 MG/DL (ref 1.6–2.4)
MCH RBC QN AUTO: 30.5 PG (ref 26–34)
MCHC RBC AUTO-ENTMCNC: 30 G/DL (ref 30–36.5)
MCV RBC AUTO: 101.5 FL (ref 80–99)
MONOCYTES # BLD: 0.5 K/UL (ref 0–1)
MONOCYTES NFR BLD: 6 % (ref 5–13)
NEUTS SEG # BLD: 6.7 K/UL (ref 1.8–8)
NEUTS SEG NFR BLD: 84 % (ref 32–75)
PLATELET # BLD AUTO: 267 K/UL (ref 150–400)
PMV BLD AUTO: 9.7 FL (ref 8.9–12.9)
POTASSIUM SERPL-SCNC: 3.8 MMOL/L (ref 3.5–5.1)
RBC # BLD AUTO: 2.59 M/UL (ref 4.1–5.7)
SODIUM SERPL-SCNC: 136 MMOL/L (ref 136–145)
WBC # BLD AUTO: 8 K/UL (ref 4.1–11.1)

## 2020-10-08 PROCEDURE — 74011000258 HC RX REV CODE- 258: Performed by: INTERNAL MEDICINE

## 2020-10-08 PROCEDURE — 94760 N-INVAS EAR/PLS OXIMETRY 1: CPT

## 2020-10-08 PROCEDURE — 74011250637 HC RX REV CODE- 250/637: Performed by: HOSPITALIST

## 2020-10-08 PROCEDURE — 74011250636 HC RX REV CODE- 250/636: Performed by: INTERNAL MEDICINE

## 2020-10-08 PROCEDURE — 36415 COLL VENOUS BLD VENIPUNCTURE: CPT

## 2020-10-08 PROCEDURE — 71045 X-RAY EXAM CHEST 1 VIEW: CPT

## 2020-10-08 PROCEDURE — 65270000029 HC RM PRIVATE

## 2020-10-08 PROCEDURE — 74011250637 HC RX REV CODE- 250/637: Performed by: INTERNAL MEDICINE

## 2020-10-08 PROCEDURE — 85025 COMPLETE CBC W/AUTO DIFF WBC: CPT

## 2020-10-08 PROCEDURE — 83735 ASSAY OF MAGNESIUM: CPT

## 2020-10-08 PROCEDURE — 80048 BASIC METABOLIC PNL TOTAL CA: CPT

## 2020-10-08 PROCEDURE — 77010033678 HC OXYGEN DAILY

## 2020-10-08 RX ADMIN — DIPHENHYDRAMINE HYDROCHLORIDE 25 MG: 25 CAPSULE ORAL at 00:12

## 2020-10-08 RX ADMIN — DIPHENHYDRAMINE HYDROCHLORIDE 25 MG: 25 CAPSULE ORAL at 20:34

## 2020-10-08 RX ADMIN — OXYCODONE HYDROCHLORIDE AND ACETAMINOPHEN 1 TABLET: 5; 325 TABLET ORAL at 02:30

## 2020-10-08 RX ADMIN — PANTOPRAZOLE SODIUM 40 MG: 40 TABLET, DELAYED RELEASE ORAL at 06:03

## 2020-10-08 RX ADMIN — GUAIFENESIN 400 MG: 200 SOLUTION ORAL at 06:03

## 2020-10-08 RX ADMIN — DILTIAZEM HYDROCHLORIDE 120 MG: 120 CAPSULE, COATED, EXTENDED RELEASE ORAL at 09:07

## 2020-10-08 RX ADMIN — GUAIFENESIN 400 MG: 200 SOLUTION ORAL at 11:55

## 2020-10-08 RX ADMIN — OXYCODONE HYDROCHLORIDE AND ACETAMINOPHEN 1 TABLET: 5; 325 TABLET ORAL at 16:39

## 2020-10-08 RX ADMIN — LEVOFLOXACIN 500 MG: 5 INJECTION, SOLUTION INTRAVENOUS at 09:07

## 2020-10-08 RX ADMIN — PIPERACILLIN SODIUM AND TAZOBACTAM SODIUM 3.38 G: 3; .375 INJECTION, POWDER, LYOPHILIZED, FOR SOLUTION INTRAVENOUS at 16:39

## 2020-10-08 RX ADMIN — OXYCODONE HYDROCHLORIDE AND ACETAMINOPHEN 1 TABLET: 5; 325 TABLET ORAL at 06:03

## 2020-10-08 RX ADMIN — OXYCODONE HYDROCHLORIDE AND ACETAMINOPHEN 1 TABLET: 5; 325 TABLET ORAL at 20:34

## 2020-10-08 RX ADMIN — PIPERACILLIN SODIUM AND TAZOBACTAM SODIUM 3.38 G: 3; .375 INJECTION, POWDER, LYOPHILIZED, FOR SOLUTION INTRAVENOUS at 00:12

## 2020-10-08 RX ADMIN — DIPHENHYDRAMINE HYDROCHLORIDE 25 MG: 25 CAPSULE ORAL at 06:03

## 2020-10-08 RX ADMIN — GUAIFENESIN 400 MG: 200 SOLUTION ORAL at 00:12

## 2020-10-08 RX ADMIN — ATORVASTATIN CALCIUM 20 MG: 20 TABLET, FILM COATED ORAL at 20:34

## 2020-10-08 RX ADMIN — PIPERACILLIN SODIUM AND TAZOBACTAM SODIUM 3.38 G: 3; .375 INJECTION, POWDER, LYOPHILIZED, FOR SOLUTION INTRAVENOUS at 09:07

## 2020-10-08 RX ADMIN — OXYCODONE HYDROCHLORIDE AND ACETAMINOPHEN 1 TABLET: 5; 325 TABLET ORAL at 10:11

## 2020-10-08 RX ADMIN — DIPHENHYDRAMINE HYDROCHLORIDE 25 MG: 25 CAPSULE ORAL at 11:55

## 2020-10-08 RX ADMIN — GUAIFENESIN 400 MG: 200 SOLUTION ORAL at 16:39

## 2020-10-08 RX ADMIN — LOSARTAN POTASSIUM 100 MG: 50 TABLET, FILM COATED ORAL at 09:07

## 2020-10-08 NOTE — OP NOTES
[address] [facility]  OPERATIVE REPORT    Name:  Liyah Tabares  MR#:  [id number]  :  1960  ACCOUNT #:  [de-identified]  DATE OF SERVICE:  10/07/2020    ATTENDING PHYSICIAN:  Dr. Senthil Lamas:  A 64-year-old male presenting with recurrent left lung complete collapse and mucus plugging. Need for therapeutic bronchoscopy. POSTOPERATIVE DIAGNOSES:  A 64-year-old male presenting with recurrent left lung complete collapse and mucus plugging. Need for therapeutic bronchoscopy. Additionally, multiple endobronchial lesions were identified in the left lung. See details below. PROCEDURE PERFORMED:  Flexible bronchoscopy under general anesthesia with lavage, brushings, and endobronchial biopsies. SURGEON:  Ebonie Koroma MD    ANESTHESIA:  The patient was intubated and given general anesthesia. Please see separate anesthesia note. COMPLICATIONS:  None. Chest x-ray is pending. SPECIMENS REMOVED:  1. Washings from the left lung collected in two separate traps. 2.  Cytology brushing from the left main bronchus distally x2.  3.  Endobronchial biopsies x6. ESTIMATED BLOOD LOSS:  Minimal.    DESCRIPTION OF PROCEDURE:  The patient is a 64-year-old white male with a history of extensive tobacco abuse. He has a large ventral hernia. He has ineffective cough. He continues to have complete left lung collapse. He had this weekend left lung collapse, complete white-out on the chest x-ray since yesterday. He had a CT scan done as well, which was reviewed. Additionally, from his prior bronchoscopy, his brushings and biopsies are showing highly atypical cells. Bronchoscopy was then pursued again mainly for therapeutic reasons, but also to obtain further specimens for cytological analysis. General anesthesia was given. He was intubated by the anesthesiologist with an 8.0 endotracheal tube. Please see separate anesthesia note.   It should be mentioned that a time-out was also conducted prior to the procedure and informed consent was obtained. The risks, alternatives, benefits, and complications had been reviewed. The bronchoscope was lubricated and passed through the bronchoscopy adapter into the endotracheal tube. The distal end of the endotracheal tube was well above the vianey. From the very beginning, I could see copious amount of purulent secretions, which were emanating from the left main bronchus spilling into the right lung. I tried to suction the secretions, but it clogged the bronchoscopy channel. I withdrew the bronchoscope. I then inserted a nasopharyngeal suction catheter, 8-Chadian, through the endotracheal tube. This was attached to suction and a trap. Copious amount of purulent secretions was suctioned. They were somewhat blood-tinged towards the end. Intermittently, he had a couple of episodes of desaturation and bradycardia but improved. After suctioning him through the catheter, I then re-introduced the bronchoscope. The main vianey was sharp. The right lung was inspected. The right upper lobe segments were normal.  There was an endobronchial lesion at about 11 o'clock position at the origin of the right middle lobe bronchus. The right lower lobe segments were all patent. There were some mucoid secretions, which were lavaged. Thereafter, attention was focused in the left lung. In the distal left main bronchus, there were multiple endobronchial lesions. There was some mucoid plugging. More lavage was obtained. These multiple polypoid endobronchial lesions were seen spreading into the lingular segment as well as the lower lobe bronchus. Two cytology brushings were obtained. Epinephrine solution was also instilled. More lavage was obtained. More purulent secretions were suctioned. This revealed that all the segments were open. Apparently, there was normal anatomy except for the multiple endobronchial lesions.   I then obtained endobronchial biopsies, mainly from the distal left main bronchus and the proximal left lower lobe bronchus. Six endobronchial biopsies were obtained. More lavage was obtained. I instilled epinephrine again and iced saline. After obtaining more lavage and securing hemostasis, the bronchoscope was withdrawn and the procedure was terminated. Altogether, he tolerated the procedure very well. He will be recovered in the recovery room. He was extubated in the bronchoscopy suite. The specimen will be sent for appropriate analysis including microbiology, cytology, and pathology. A stat portable upright chest x-ray is ordered and pending. Pictures were also taken during the procedure.       Asher Swain MD      [id number]  D:  10/07/2020 12:23  T:  10/07/2020 23:35  JOB #:  [id number]  CC:

## 2020-10-08 NOTE — PROGRESS NOTES
Pulm/CC Progress Note    Subjective:   Daily Progress Note: 10/8/2020     Patient seen and examined at the bedside. He is awake and alert. On 3-1/2 L of oxygen. Apparently patient has been declining treatments by RT. Also does not want to sit in the chair because he thinks that he swells up sitting in the chair. Complaining of abdominal hernia. Does not like BiPAP. Feels that chest PT is not helping him either. The nurse informed me that he declined chest PT. Chest x-ray 10/5 along with a CT scan of the chest abdomen pelvis were reviewed. Review of Systems  Has complains of shortness of breath. He is on nasal cannula oxygen. Denied any nausea vomiting. Has poor appetite    Objective:     Visit Vitals  BP 93/60 (BP 1 Location: Right arm)   Pulse 79   Temp 98.1 °F (36.7 °C)   Resp 16   Ht 6' 0.99\" (1.854 m)   Wt 85 kg (187 lb 6.3 oz)   SpO2 95%   BMI 24.73 kg/m²    O2 Flow Rate (L/min): 3 l/min O2 Device: Nasal cannula    Temp (24hrs), Av.7 °F (36.5 °C), Min:97.3 °F (36.3 °C), Max:98.1 °F (36.7 °C)      No intake/output data recorded.   10/06 1901 - 10/08 0700  In: 650 [I.V.:650]  Out: 725 [Urine:725]    Current Facility-Administered Medications   Medication Dose Route Frequency    acetylcysteine (MUCOMYST) 200 mg/mL (20 %) solution 600 mg  600 mg Nebulization BID    diphenhydrAMINE (BENADRYL) capsule 25 mg  25 mg Oral Q6H PRN    mineral oil (topical)    PRN    acetylcysteine (MUCOMYST) 200 mg/mL (20 %) solution    PRN    levoFLOXacin (LEVAQUIN) 500 mg in D5W IVPB  500 mg IntraVENous Q24H    oxyCODONE-acetaminophen (PERCOCET) 5-325 mg per tablet 1 Tab  1 Tab Oral Q4H PRN    albuterol-ipratropium (DUO-NEB) 2.5 MG-0.5 MG/3 ML  3 mL Nebulization QID RT    guaiFENesin (ROBITUSSIN) 100 mg/5 mL oral liquid 400 mg  400 mg Oral Q6H    lidocaine (XYLOCAINE) 10 mg/mL (1 %) injection    PRN    mineral oil (topical)    PRN    dilTIAZem ER (CARDIZEM CD) capsule 120 mg  120 mg Oral DAILY    0.9% sodium chloride infusion 250 mL  250 mL IntraVENous PRN    atorvastatin (LIPITOR) tablet 20 mg  20 mg Oral DAILY    losartan (COZAAR) tablet 100 mg  100 mg Oral DAILY    piperacillin-tazobactam (ZOSYN) 3.375 g in 0.9% sodium chloride (MBP/ADV) 100 mL MBP  3.375 g IntraVENous Q8H    pantoprazole (PROTONIX) tablet 40 mg  40 mg Oral DAILY    acetaminophen (TYLENOL) tablet 650 mg  650 mg Oral Q4H PRN    alum-mag hydroxide-simeth (MYLANTA) oral suspension 30 mL  30 mL Oral Q4H PRN    magnesium hydroxide (MILK OF MAGNESIA) 400 mg/5 mL oral suspension 30 mL  30 mL Oral DAILY PRN    ondansetron (ZOFRAN) injection 4 mg  4 mg IntraVENous Q8H PRN       Physical Exam:  General: Alert and oriented x3.  2 L nasal cannula. Pleasant. HEENT: atraumatic, PERRL, moist mucosa,     Neck: Trachea midline, no carotid bruit, no masses. Supple but thick. Respiratory: He has some aeration of the left upper lung zone. Few crackles and rhonchi on the right side. Cardiovascular: RRR, no m/r/g, Normal S1 and S2   abdomen: Has large ventral abdominal hernia, evidence of surgical scars soft,   Rectal: deferred  Extremities: no cyanosis or clubbing. He has significant pitting edema in the dependent portions and lower extremities. Integumentary: warm, dry, and pink, with no rash, purpura, or petechia.    Heme/Lymph: no lymphadenopathy, no bruises  Neurological: No focal motor deficit   psychiatric: cooperative with normal mood, affect, and cognition      Additional comments: Chest x-rays reviewed    Data Review    Recent Results (from the past 24 hour(s))   CBC WITH AUTOMATED DIFF    Collection Time: 10/08/20  6:30 AM   Result Value Ref Range    WBC 8.0 4.1 - 11.1 K/uL    RBC 2.59 (L) 4.10 - 5.70 M/uL    HGB 7.9 (L) 12.1 - 17.0 g/dL    HCT 26.3 (L) 36.6 - 50.3 %    .5 (H) 80.0 - 99.0 FL    MCH 30.5 26.0 - 34.0 PG    MCHC 30.0 30.0 - 36.5 g/dL    RDW 18.4 (H) 11.5 - 14.5 %    PLATELET 019 018 - 517 K/uL    MPV 9.7 8.9 - 12.9 FL    NEUTROPHILS 84 (H) 32 - 75 %    LYMPHOCYTES 9 (L) 12 - 49 %    MONOCYTES 6 5 - 13 %    EOSINOPHILS 0 0 - 7 %    BASOPHILS 0 0 - 1 %    IMMATURE GRANULOCYTES 1 (H) 0.0 - 0.5 %    ABS. NEUTROPHILS 6.7 1.8 - 8.0 K/UL    ABS. LYMPHOCYTES 0.7 (L) 0.8 - 3.5 K/UL    ABS. MONOCYTES 0.5 0.0 - 1.0 K/UL    ABS. EOSINOPHILS 0.0 0.0 - 0.4 K/UL    ABS. BASOPHILS 0.0 0.0 - 0.1 K/UL    ABS. IMM. GRANS. 0.1 (H) 0.00 - 0.04 K/UL    DF AUTOMATED     METABOLIC PANEL, BASIC    Collection Time: 10/08/20  6:30 AM   Result Value Ref Range    Sodium 136 136 - 145 mmol/L    Potassium 3.8 3.5 - 5.1 mmol/L    Chloride 96 (L) 97 - 108 mmol/L    CO2 39 (H) 21 - 32 mmol/L    Anion gap 1 (L) 5 - 15 mmol/L    Glucose 107 (H) 65 - 100 mg/dL    BUN 9 6 - 20 mg/dL    Creatinine 0.64 (L) 0.70 - 1.30 mg/dL    BUN/Creatinine ratio 14 12 - 20      GFR est AA >60 >60 ml/min/1.73m2    GFR est non-AA >60 >60 ml/min/1.73m2    Calcium 8.2 (L) 8.5 - 10.1 mg/dL   MAGNESIUM    Collection Time: 10/08/20  6:30 AM   Result Value Ref Range    Magnesium 1.5 (L) 1.6 - 2.4 mg/dL     Today's CXR read is currently pending. 6 results from 10/3/2020 were reviewed  Patient has left basal atelectasis. XR CHEST PORT   Final Result   Impression:      Stable aeration of the left upper lung with atelectasis. Stable appearing   bilateral pleural effusions. No significant change compared to the prior study from 10/7/2020. XR CHEST PORT   Final Result      XR FLUOROSCOPY UNDER 60 MINUTES   Final Result      XR CHEST PORT   Final Result      XR CHEST PORT   Final Result   FINDINGS: Impression: Frontal single view chest.      Continued opacification of the left hemithorax, obscuring the cardiac   silhouette. Right lung interstitial thickening, airspace disease, bronchial thickening,   pleural effusion similar to previous. No pneumothorax. CT CHEST WO CONT   Final Result   Impression:    1.  Increased now complete opacification of the left bronchi with low density   material.   2. Slightly increased patchy airspace pneumonia in the right lung. 3. Unchanged loculated and nonloculated left pleural effusion, complete left   lung consolidation, right lung pleural effusion. CT ABD PELV W CONT   Final Result   IMPRESSION:   1. Large ventral hernia containing nonobstructed small and large intestine. 2. Moderate to large right pleural effusion and mild to moderate left pleural   effusion. There is near complete collapse of the visualized portions of the left   lower lobe and there is pleural thickening in the left hemithorax. 3. Patchy airspace disease in the right lower lobe along with right basilar   atelectasis. 4. Nonspecific left adrenal nodule. 5. Other findings as described. XR CHEST PORT   Final Result   Impression: Heterogeneous opacity in the right lung, not significantly changed. Now complete opacification of the left hemithorax. XR CHEST PORT   Final Result   IMPRESSION: No significant change. XR CHEST AP LAT   Final Result      XR CHEST AP LAT   Final Result   IMPRESSION:   1. Persistent opacification of the left hemithorax with volume loss. 2.  Moderate right pleural effusion with probable associated right basilar   atelectasis. XR CHEST PORT   Final Result   Impression: Increased volume loss left lung. Otherwise stable. XR CHEST PORT   Final Result   IMPRESSION:   1. Improved aeration of the left lung with persistent basilar consolidative   opacities and probable pleural effusions. 2. Other extensive interstitial and alveolar opacities are nonspecific, but   potentially related to pulmonary edema or infection. XR CHEST PORT   Final Result   Impression:Left lung collapse without improvement from prior study. XR CHEST PORT   Final Result   IMPRESSION:   1. There is milder enlargement of the cardiac silhouette as well as increasing   pulmonary vascular ill definition suspect for mild pulmonary edema.  This could   be related to cardiogenic edema or fluid overload. 2.  There is been an improvement in the overall aeration of the left lung with   and improved visualization of vascular markings within the left upper lung zone. There still remains significant partial collapse of the left lung. 3.  There is increased patchy airspace disease laterally within the right lower   lobe just above the right lateral costophrenic angle. 4.  There are persistent moderate-sized bilateral pleural effusions. XR CHEST AP LAT   Final Result   IMPRESSION: Persistent collapse of the left lung with bilateral pleural   effusions. Increasing vascular congestion on the right. XR CHEST PA LAT   Final Result   Impression:   Ongoing near complete white out of the left lung. Little interval change since   the prior examination. CT CHEST WO CONT   Final Result   IMPRESSION: Complete collapse of the left lung due to complete bronchial   obstruction, possibly aspiration. Fluid overload also noted      XR ABD ACUTE W 1 V CHEST   Final Result   IMPRESSION: Opacification of the left hemithorax, questioned for remote   pneumonectomy or lung collapse with pleural fluid. Prominent right parenchymal/interstitial lung markings compatible with fibrosis   and possible partial vascular congestion. Small bowel gas, nonobstructive pattern. Assessment/Plan: This is a middle-aged male who was admitted because of increasing symptoms of shortness of breath. He is getting treated in hospital for pneumonia with IV Zosyn/Azithromycin. He is noted to be increasingly tachypneic and short of breath. He has been on supplemental oxygen. His chest x-ray is showing left lung opacification likely from mucous plugging.   This is slowly improving with aggressive pulmonary hygiene and three suction bronchoscopies.     Plan:     1.)    Left lung collapse/shortness of breath:  - Shortness of breath and hypoxia due to recurrent left lung collapse. He had multiple bronchoscopies done along with lavage but he continues to have left lung collapse. The patient underwent bronchoscopy on 10/7/2020 under general anesthesia. There was complete collapse from mucous plugging of the left lung. Mucous was up to the main vianey. He was suctioned aggressively. Post suctioning there appears to be multiple endobronchial lesions in the distal left main bronchus in the beginning of the left lower lobe bronchus. Brushings and biopsies were obtained. Postprocedure chest x-ray shows better aeration of the left upper lung. Chest x-ray from this morning shows stable left upper lung aeration. He appears to be more comfortable. On 3-1/2 L of oxygen. Blood gases on 3 L oxygen this morning showed 7.413/65/61. Recurrent collapse is due to multiple reasons including large abdominal hernia, weak cough, COPD and multiple endobronchial lesions although they are not causing significant obstruction. CT of the chest done on 10/5 without IV contrast was personally reviewed with radiologist.  Left main bronchus shows mucus. Left lung is completely collapsed. Small left-sided pleural effusion. Larger right-sided pleural effusion. Right lung pneumonia. CT of the abdomen pelvis shows a large hernia. I also discussed with the pathologist.  Cytology from the previous bronchoscopy showing atypical squamous cells. Cultures have been negative. The patient was also informed that when he has the surgery done he will probably end up on the ventilator. There is a possibility that he will need a tracheostomy. He is prepared for the consequences and wants to pursue surgery to fix the hernia. Discussed with respiratory. Raise the left lung up and do chest PT. Continue aggressive pulmonary hygiene with nebulizers every 6 hours;  Aggressive chest PT, EZ-PAP therapy q6, acapella device as well as incentive spirometer use. Added guaifenesin 400 mg q6. However he is declining most therapy. 2.)  COPD:  He has a history of COPD based on chronic smoking. Continue scheduled bronchodilators. Patient should be discharged with a LAMA/LABA such as Anoro and needs f/u in our office for PFT's and further management. Weaned off prednisone. 3.)  Pneumonia:   He is on IV Zosyn, stopped Zithromax on 9/23/2020. We will check cultures from bronchoscopy today. 4.)  Hypertension:  He is on antihypertensive medications, BP's stable. Patient also has clinically fluid overload, congestive heart failure. Echocardiogram done on 9/16 showed an EF of 60 to 21% grade 2 diastolic dysfunction and moderate to severe aortic stenosis. Right ventricle is normal in size and function. On Lasix. Agree with cardiology evaluation. From pulmonary perspective the patient is at moderate to high risk of respiratory failure perioperatively. Furthermore pushing the abdominal hernia inside will further elevate the diaphragm. Questions of patient were answered at bedside in detail  Case discussed in detail with RN, RT, and care team  Thank you for involving me in the care of this patient  I will follow with you closely during hospitalization    Time spent more than 30 minutes in direct patient care with no overlap      ZMaster Lewis MD  Pulmonary and critical care

## 2020-10-08 NOTE — ANTIMICROBIAL STEWARDSHIP
The Antimicrobial Stewardship Team has reviewed current therapy. Patient is on Levaquin and Zosyn for pneumonia. Patient is on day #27 of Zosyn therapy and day #8 of Levaquin. Sputum and bronch wash from 10/1 were negative. Consider d/c Levaquin, as patient has completed adequate duration of therapy. Levaquin is associated with significant risks, including: QT prolongation, tendon rupture, and aortic dissection.

## 2020-10-08 NOTE — PROGRESS NOTES
Comprehensive Nutrition Assessment    Type and Reason for Visit: RD nutrition re-screen/LOS(Follow up)    Nutrition Recommendations/Plan:     Continue Regular diet  D/c Ensure Pdg  Allow snacks daily - preferences updated in diet order  Honor additional preferences    Nursing to document %meal and snack intakes in I/Os      Nutrition Assessment:  Admitted for PNA, hypokalemia. LL lung collapse s/p several Bronch procedures. Plans to have surgical intervention for large hiatal hernia. Ordered Regular diet, intakes much improved since earlier this admit. Continues to eat well, RD visualized 100% intake at lunch (turkey and cheese sandwich), pt reported >75% intake at breakfast (pandya, eggs). Has snacks from home of pudding, Cheez-its and fruit cups, also receiving snacks from the unit of pb crackers, cookies, puddings. Reports being much happier with menu selections on liberalized diet. Agreeable to to d/c Ensure pdg d/t adequate intakes without as many interruptions. Earlier this admit, intakes were decreased d/t dislike on Cardiac diet. Malnutrition Assessment:  Malnutrition Status:  Mild malnutrition    Context:  Chronic illness     Findings of the 6 clinical characteristics of malnutrition:   Energy Intake:  Mild decrease in energy intake (specify)  Weight Loss:  No significant weight loss     Body Fat Loss:  No significant body fat loss,     Muscle Mass Loss:  7 - Severe muscle mass loss, Calf (gastrocnemius), Thigh (quadraceps), Temples (temporalis)  Fluid Accumulation:  No significant fluid accumulation,        Estimated Daily Nutrient Needs:  Energy (kcal):  2010(HBE x1.2)  Protein (g):  94(1.2g/kg)       Fluid (ml/day):  2010(1ml/kcal)    Nutrition Related Findings:  Adequate dentition, pt reported issues with dry mouth, tolerates reg texture diet. Pt reports BM today; diarrhea resolved. Large protruding hernia to abd - not interfering with bowel fx.       Wounds:    Moisture associate skin damage(buttocks) Current Nutrition Therapies:  DIET REGULAR  DIET SNACKS Lunch, Dinner; Vanilla pdg    Anthropometric Measures:  · Height:  6' 0.99\" (185.4 cm)  · Current Body Wt:  75.6 kg (166 lb 10.7 oz)   · Admission Body Wt:  182 lb 1.6 oz    · Usual Body Wt:  143 lb(1 year ago)     · Ideal Body Wt:  184 lbs:  93.6 %   · BMI Category:  Normal weight (BMI 18.5-24. 9)       Nutrition Diagnosis:   No nutrition diagnosis at this time     Nutrition Interventions:   Food and/or Nutrient Delivery: Continue current diet, Continue oral nutrition supplement  Nutrition Education and Counseling: No recommendations at this time  Coordination of Nutrition Care: Continued inpatient monitoring    Goals:  Intakes >/=75% of EENs (met, discontinue)  Wt maintenance with in +/-0.5kg (not met d/c)  BMs every 1-2 days in 7 days   (met d/c)    Nutrition Monitoring and Evaluation:   Behavioral-Environmental Outcomes: Readiness for change  Food/Nutrient Intake Outcomes: Food and nutrient intake  Physical Signs/Symptoms Outcomes: GI status, Weight    Discharge Planning:    No discharge needs at this time     Electronically signed by Pérez Hoffman on 10/8/2020 at 4:08 PM    Contact: [id number]

## 2020-10-08 NOTE — PROGRESS NOTES
Progress Note    Patient: Gabrielle Davenport MRN: [id number]  SSN: [id number]    YOB: 1960  Age: 63 y.o. Sex: male      Admit Date: 9/10/2020    LOS: 28 days     Subjective:     59M, H/o COPD not on oxygen with with shortness of breath s/t pneumonia complicated by left lung collapse.   Per pathology report no malignancy,post  Bronchoscopy,  No other acute events reported by nursing staff, and patient also complained of a large abdominal hernia, per patient he wants to be fixed general surgeon on                Current Facility-Administered Medications:     acetylcysteine (MUCOMYST) 200 mg/mL (20 %) solution 600 mg, 600 mg, Nebulization, BID, Darby Boswell MD    diphenhydrAMINE (BENADRYL) capsule 25 mg, 25 mg, Oral, Q6H PRN, Giselle James MD, 25 mg at 10/08/20 1155    mineral oil (topical), , , PRN, Megan Flores MD, 5 mL at 10/02/20 1125    acetylcysteine (MUCOMYST) 200 mg/mL (20 %) solution, , , PRN, Megan Flores MD, 5 mL at 10/02/20 1159    levoFLOXacin (LEVAQUIN) 500 mg in D5W IVPB, 500 mg, IntraVENous, Q24H, Duong Ruiz MD, Last Rate: 100 mL/hr at 10/08/20 0907, 500 mg at 10/08/20 0907    oxyCODONE-acetaminophen (PERCOCET) 5-325 mg per tablet 1 Tab, 1 Tab, Oral, Q4H PRN, Arnaldo Davis MD, 1 Tab at 10/08/20 1011    albuterol-ipratropium (DUO-NEB) 2.5 MG-0.5 MG/3 ML, 3 mL, Nebulization, QID RT, Jarett Ocampo DO, 3 mL at 10/07/20 1614    guaiFENesin (ROBITUSSIN) 100 mg/5 mL oral liquid 400 mg, 400 mg, Oral, Q6H, Jarett Ocampo DO, 400 mg at 10/08/20 1155    lidocaine (XYLOCAINE) 10 mg/mL (1 %) injection, , , PRN, Jarett Ocampo DO, 15 mL at 10/02/20 1159    mineral oil (topical), , , PRN, Jarett Ocampo DO, 5 mL at 09/23/20 0820    dilTIAZem ER (CARDIZEM CD) capsule 120 mg, 120 mg, Oral, DAILY, Romel Johnson MD, 120 mg at 10/08/20 0907    0.9% sodium chloride infusion 250 mL, 250 mL, IntraVENous, PRN, Chen Estrada MD    atorvastatin (LIPITOR) tablet 20 mg, 20 mg, Oral, DAILY, Oneyda Briseno MD, 20 mg at 10/07/20 2226    losartan (COZAAR) tablet 100 mg, 100 mg, Oral, DAILY, Oneyda Briseno MD, 100 mg at 10/08/20 0907    piperacillin-tazobactam (ZOSYN) 3.375 g in 0.9% sodium chloride (MBP/ADV) 100 mL MBP, 3.375 g, IntraVENous, Q8H, Oneyda Briseno MD, Last Rate: 25 mL/hr at 10/08/20 0907, 3.375 g at 10/08/20 0907    pantoprazole (PROTONIX) tablet 40 mg, 40 mg, Oral, DAILY, Oneyda Briseno MD, 40 mg at 10/08/20 0603    acetaminophen (TYLENOL) tablet 650 mg, 650 mg, Oral, Q4H PRN, Oneyda Briseno MD, 650 mg at 20 2319    alum-mag hydroxide-simeth (MYLANTA) oral suspension 30 mL, 30 mL, Oral, Q4H PRN, Oneyda Briseno MD, 30 mL at 20 2215    magnesium hydroxide (MILK OF MAGNESIA) 400 mg/5 mL oral suspension 30 mL, 30 mL, Oral, DAILY PRN, Oneyda Briseno MD    ondansetron [facility]) injection 4 mg, 4 mg, IntraVENous, Q8H PRN, Oneyda Briseno MD, Stopped at 10/07/20 1300    Objective:     Visit Vitals  /70   Pulse 100   Temp 99 °F (37.2 °C)   Resp 20   Ht 6' 0.99\" (1.854 m)   Wt 82.5 kg (181 lb 14.1 oz)   SpO2 97%   BMI 24.00 kg/m²    O2 Flow Rate (L/min): 3 l/min O2 Device: Nasal cannula     Temp (24hrs), Av.6 °F (37 °C), Min:97.2 °F (36.2 °C), Max:99.1 °F (37.3 °C)         Physical Exam:   General :  no respiratory distress noted  Lungs : Absent bs left lung not labored.   CVS :  no gallop  Abdomen :  ++++ventral hernia positive bowel sounds  Extremities : No edema noted,  Neurological : Awake, alert, oriented to time place person.  No neurological deficits.    Psychiatric : Mood and affect normal    Lab/Data Review:  Recent Results (from the past 24 hour(s))   CBC WITH AUTOMATED DIFF    Collection Time: 10/08/20  6:30 AM   Result Value Ref Range    WBC 8.0 4.1 - 11.1 K/uL    RBC 2.59 (L) 4.10 - 5.70 M/uL    HGB 7.9 (L) 12.1 - 17.0 g/dL    HCT 26.3 (L) 36.6 - 50.3 %    .5 (H) 80.0 - 99.0 FL    MCH 30.5 26.0 - 34.0 PG MCHC 30.0 30.0 - 36.5 g/dL    RDW 18.4 (H) 11.5 - 14.5 %    PLATELET 209 704 - 736 K/uL    MPV 9.7 8.9 - 12.9 FL    NEUTROPHILS 84 (H) 32 - 75 %    LYMPHOCYTES 9 (L) 12 - 49 %    MONOCYTES 6 5 - 13 %    EOSINOPHILS 0 0 - 7 %    BASOPHILS 0 0 - 1 %    IMMATURE GRANULOCYTES 1 (H) 0.0 - 0.5 %    ABS. NEUTROPHILS 6.7 1.8 - 8.0 K/UL    ABS. LYMPHOCYTES 0.7 (L) 0.8 - 3.5 K/UL    ABS. MONOCYTES 0.5 0.0 - 1.0 K/UL    ABS. EOSINOPHILS 0.0 0.0 - 0.4 K/UL    ABS. BASOPHILS 0.0 0.0 - 0.1 K/UL    ABS. IMM. GRANS. 0.1 (H) 0.00 - 0.04 K/UL    DF AUTOMATED     METABOLIC PANEL, BASIC    Collection Time: 10/08/20  6:30 AM   Result Value Ref Range    Sodium 136 136 - 145 mmol/L    Potassium 3.8 3.5 - 5.1 mmol/L    Chloride 96 (L) 97 - 108 mmol/L    CO2 39 (H) 21 - 32 mmol/L    Anion gap 1 (L) 5 - 15 mmol/L    Glucose 107 (H) 65 - 100 mg/dL    BUN 9 6 - 20 mg/dL    Creatinine 0.64 (L) 0.70 - 1.30 mg/dL    BUN/Creatinine ratio 14 12 - 20      GFR est AA >60 >60 ml/min/1.73m2    GFR est non-AA >60 >60 ml/min/1.73m2    Calcium 8.2 (L) 8.5 - 10.1 mg/dL   MAGNESIUM    Collection Time: 10/08/20  6:30 AM   Result Value Ref Range    Magnesium 1.5 (L) 1.6 - 2.4 mg/dL         Assessment and plan:        Acute hypoxic respiratory failure : s/t 2,3. On 3L NC. 2/2 to  Left lung collapse/mucus plugging and inability to cough/clear secretions 2/2 massive ventral/inscisional hernia.  Pathology negative     Left lung collapse/PNA:     left lung remains largely opacified by pathology negative for malignancy  Chest PT, Q6H nebs, guaifenesin, lasix with negative fluid target. Bal cs multiple =nl xiomara. Cytology x 4 unrevealing for malignancy. pulmonology- Post additional bronch on 22/6   Hernia complicating, pt wants it reduced despite increased mortality risk. Currently stable  On Zosyn/levaquin,   14 d abx total is recommended. Bal cs multiple =nl xiomara. Cytology x 4 unrevealing for malignancy. CT chest 10/5 Impression:   1.  Increased now complete opacification of the left bronchi with low density  material.  2. Slightly increased patchy airspace pneumonia in the right lung. 3. Unchanged loculated and nonloculated left pleural effusion, complete left  lung consolidation, right lung pleural effusion. Massive abd hernia,  chronic 6+ years neglected follow up. Surgery cx appreciated Dr. Yessi Connelly,    CT abd 10/5. IMPRESSION:  1. Large ventral hernia containing nonobstructed small and large intestine. 2. Moderate to large right pleural effusion and mild to moderate left pleural  effusion. There is near complete collapse of the visualized portions of the left  lower lobe and there is pleural thickening in the left hemithorax. 3. Patchy airspace disease in the right lower lobe along with right basilar  atelectasis. 4. Nonspecific left adrenal nodule. 5. Other findings as described. Pt at increased risk for procedure with resp compromise in addition to anatomical considerations of reducing massive hernia. Pt aware of inc mortality states \"I don't care if it kills me but it has to be done\". Abd ct pending, will need to recline on pillows for procedure as cannot lie flat. Pt aware of risk for surgery, likelihood that will remain on vent long term and he reiterates he wants it done despite the risk. Cardio to optimize for clearance. Surgery on case f/u recomendations    COPD: LAMA/ LABA, OP PFT, pulm following. Wean steroids. Anemia: AOCI.  Stable, check cbc in AM  HTN : on losartan and CCB  May consult interventional radiology for thoracentesis follow-up chest x-ray in the morning     DVT ppx: lovenox     DISPO: SNF anticipated when stable      Signed By: Georgette Russo MD     October 8, 2020

## 2020-10-09 ENCOUNTER — APPOINTMENT (OUTPATIENT)
Dept: GENERAL RADIOLOGY | Age: 60
DRG: 177 | End: 2020-10-09
Attending: FAMILY MEDICINE
Payer: MEDICARE

## 2020-10-09 PROBLEM — I10 HYPERTENSION: Status: ACTIVE | Noted: 2020-10-09

## 2020-10-09 LAB
BACTERIA SPEC CULT: NORMAL
GRAM STN SPEC: NORMAL
GRAM STN SPEC: NORMAL
SPECIAL REQUESTS,SREQ: NORMAL

## 2020-10-09 PROCEDURE — 74011000250 HC RX REV CODE- 250: Performed by: INTERNAL MEDICINE

## 2020-10-09 PROCEDURE — 74011250636 HC RX REV CODE- 250/636: Performed by: INTERNAL MEDICINE

## 2020-10-09 PROCEDURE — 94668 MNPJ CHEST WALL SBSQ: CPT

## 2020-10-09 PROCEDURE — 74011000258 HC RX REV CODE- 258: Performed by: INTERNAL MEDICINE

## 2020-10-09 PROCEDURE — 65270000029 HC RM PRIVATE

## 2020-10-09 PROCEDURE — 71046 X-RAY EXAM CHEST 2 VIEWS: CPT

## 2020-10-09 PROCEDURE — 36415 COLL VENOUS BLD VENIPUNCTURE: CPT

## 2020-10-09 PROCEDURE — 82607 VITAMIN B-12: CPT

## 2020-10-09 PROCEDURE — 74011250637 HC RX REV CODE- 250/637: Performed by: INTERNAL MEDICINE

## 2020-10-09 PROCEDURE — 94640 AIRWAY INHALATION TREATMENT: CPT

## 2020-10-09 PROCEDURE — 94760 N-INVAS EAR/PLS OXIMETRY 1: CPT

## 2020-10-09 PROCEDURE — 74011250637 HC RX REV CODE- 250/637: Performed by: FAMILY MEDICINE

## 2020-10-09 PROCEDURE — 74011250637 HC RX REV CODE- 250/637: Performed by: HOSPITALIST

## 2020-10-09 RX ORDER — OXYCODONE AND ACETAMINOPHEN 5; 325 MG/1; MG/1
1 TABLET ORAL
Status: DISCONTINUED | OUTPATIENT
Start: 2020-10-09 | End: 2020-10-11

## 2020-10-09 RX ORDER — FUROSEMIDE 40 MG/1
40 TABLET ORAL DAILY
Status: DISCONTINUED | OUTPATIENT
Start: 2020-10-10 | End: 2020-10-11

## 2020-10-09 RX ORDER — LOSARTAN POTASSIUM 25 MG/1
25 TABLET ORAL DAILY
Status: DISCONTINUED | OUTPATIENT
Start: 2020-10-10 | End: 2020-10-10

## 2020-10-09 RX ADMIN — GUAIFENESIN 400 MG: 200 SOLUTION ORAL at 23:36

## 2020-10-09 RX ADMIN — GUAIFENESIN 400 MG: 200 SOLUTION ORAL at 05:44

## 2020-10-09 RX ADMIN — OXYCODONE HYDROCHLORIDE AND ACETAMINOPHEN 1 TABLET: 5; 325 TABLET ORAL at 05:44

## 2020-10-09 RX ADMIN — DIPHENHYDRAMINE HYDROCHLORIDE 25 MG: 25 CAPSULE ORAL at 02:44

## 2020-10-09 RX ADMIN — PIPERACILLIN SODIUM AND TAZOBACTAM SODIUM 3.38 G: 3; .375 INJECTION, POWDER, LYOPHILIZED, FOR SOLUTION INTRAVENOUS at 00:06

## 2020-10-09 RX ADMIN — PIPERACILLIN SODIUM AND TAZOBACTAM SODIUM 3.38 G: 3; .375 INJECTION, POWDER, LYOPHILIZED, FOR SOLUTION INTRAVENOUS at 15:59

## 2020-10-09 RX ADMIN — PIPERACILLIN SODIUM AND TAZOBACTAM SODIUM 3.38 G: 3; .375 INJECTION, POWDER, LYOPHILIZED, FOR SOLUTION INTRAVENOUS at 14:56

## 2020-10-09 RX ADMIN — OXYCODONE HYDROCHLORIDE AND ACETAMINOPHEN 1 TABLET: 5; 325 TABLET ORAL at 00:06

## 2020-10-09 RX ADMIN — GUAIFENESIN 400 MG: 200 SOLUTION ORAL at 00:06

## 2020-10-09 RX ADMIN — DIPHENHYDRAMINE HYDROCHLORIDE 25 MG: 25 CAPSULE ORAL at 09:44

## 2020-10-09 RX ADMIN — OXYCODONE HYDROCHLORIDE AND ACETAMINOPHEN 1 TABLET: 5; 325 TABLET ORAL at 14:53

## 2020-10-09 RX ADMIN — DIPHENHYDRAMINE HYDROCHLORIDE 25 MG: 25 CAPSULE ORAL at 18:03

## 2020-10-09 RX ADMIN — LOSARTAN POTASSIUM 100 MG: 50 TABLET, FILM COATED ORAL at 08:05

## 2020-10-09 RX ADMIN — OXYCODONE HYDROCHLORIDE AND ACETAMINOPHEN 1 TABLET: 5; 325 TABLET ORAL at 21:11

## 2020-10-09 RX ADMIN — PIPERACILLIN SODIUM AND TAZOBACTAM SODIUM 3.38 G: 3; .375 INJECTION, POWDER, LYOPHILIZED, FOR SOLUTION INTRAVENOUS at 08:05

## 2020-10-09 RX ADMIN — DILTIAZEM HYDROCHLORIDE 120 MG: 120 CAPSULE, COATED, EXTENDED RELEASE ORAL at 08:05

## 2020-10-09 RX ADMIN — ACETYLCYSTEINE 600 MG: 200 SOLUTION ORAL; RESPIRATORY (INHALATION) at 20:00

## 2020-10-09 RX ADMIN — LEVOFLOXACIN 500 MG: 5 INJECTION, SOLUTION INTRAVENOUS at 12:47

## 2020-10-09 RX ADMIN — IPRATROPIUM BROMIDE AND ALBUTEROL SULFATE 3 ML: .5; 3 SOLUTION RESPIRATORY (INHALATION) at 19:56

## 2020-10-09 RX ADMIN — PIPERACILLIN SODIUM AND TAZOBACTAM SODIUM 3.38 G: 3; .375 INJECTION, POWDER, LYOPHILIZED, FOR SOLUTION INTRAVENOUS at 23:29

## 2020-10-09 RX ADMIN — GUAIFENESIN 400 MG: 200 SOLUTION ORAL at 12:43

## 2020-10-09 RX ADMIN — PANTOPRAZOLE SODIUM 40 MG: 40 TABLET, DELAYED RELEASE ORAL at 06:15

## 2020-10-09 RX ADMIN — ATORVASTATIN CALCIUM 20 MG: 20 TABLET, FILM COATED ORAL at 21:11

## 2020-10-09 RX ADMIN — OXYCODONE HYDROCHLORIDE AND ACETAMINOPHEN 1 TABLET: 5; 325 TABLET ORAL at 09:44

## 2020-10-09 RX ADMIN — GUAIFENESIN 400 MG: 200 SOLUTION ORAL at 18:03

## 2020-10-09 NOTE — PROGRESS NOTES
Progress Note Patient: Brayden Powell MRN: [id number]  SSN: [id number] YOB: 1960  Age: 63 y.o. Sex: male Admit Date: 9/10/2020 LOS: 29 days Subjective:  
 
59M, H/o COPD not on oxygen with with shortness of breath s/t pneumonia complicated by left lung collapse. Per pathology report no malignancy,post  Bronchoscopy,  No other acute events reported by nursing staff, and patient also complained of a large abdominal hernia, per patient he wants to be fixed general surgeon on, overnight  Stable, repeated chest x-ray  Pending Current Facility-Administered Medications:  
  acetylcysteine (MUCOMYST) 200 mg/mL (20 %) solution 600 mg, 600 mg, Nebulization, BID, Anusha Damon MD, Stopped at 10/08/20 2100   diphenhydrAMINE (BENADRYL) capsule 25 mg, 25 mg, Oral, Q6H PRN, pM HERNANDEZ MD, 25 mg at 10/09/20 0944 
  mineral oil (topical), , , PRN, Duong Ruiz MD, 5 mL at 10/02/20 1125   acetylcysteine (MUCOMYST) 200 mg/mL (20 %) solution, , , PRN, Duong Ruiz MD, 5 mL at 10/02/20 1159   levoFLOXacin (LEVAQUIN) 500 mg in D5W IVPB, 500 mg, IntraVENous, Q24H, Duong Ruiz MD, Last Rate: 100 mL/hr at 10/09/20 0943, 500 mg at 10/09/20 9617   oxyCODONE-acetaminophen (PERCOCET) 5-325 mg per tablet 1 Tab, 1 Tab, Oral, Q4H PRN, Rick Hale MD, 1 Tab at 10/09/20 9748   albuterol-ipratropium (DUO-NEB) 2.5 MG-0.5 MG/3 ML, 3 mL, Nebulization, QID RT, Jarett Ocampo DO, 3 mL at 10/07/20 1614 
  guaiFENesin (ROBITUSSIN) 100 mg/5 mL oral liquid 400 mg, 400 mg, Oral, Q6H, Jarett Ocampo DO, 400 mg at 10/09/20 0544   lidocaine (XYLOCAINE) 10 mg/mL (1 %) injection, , , PRN, Jarett Ocampo DO, 15 mL at 10/02/20 1159 
  mineral oil (topical), , , PRN, Jarett Ocampo DO, 5 mL at 09/23/20 0820 
  dilTIAZem ER (CARDIZEM CD) capsule 120 mg, 120 mg, Oral, DAILY, Luis Vaughn MD, 120 mg at 10/09/20 6087   0.9% sodium chloride infusion 250 mL, 250 mL, IntraVENous, PRN, Dequan Mauro MD 
  atorvastatin (LIPITOR) tablet 20 mg, 20 mg, Oral, DAILY, Dequan Mauro MD, 20 mg at 10/08/20 2034   losartan (COZAAR) tablet 100 mg, 100 mg, Oral, DAILY, Dequan Mauro MD, 100 mg at 10/09/20 3201   piperacillin-tazobactam (ZOSYN) 3.375 g in 0.9% sodium chloride (MBP/ADV) 100 mL MBP, 3.375 g, IntraVENous, Q8H, Dequan Mauro MD, Last Rate: 25 mL/hr at 10/09/20 0805, 3.375 g at 10/09/20 3131   pantoprazole (PROTONIX) tablet 40 mg, 40 mg, Oral, DAILY, Dequan Mauro MD, 40 mg at 10/09/20 4806   acetaminophen (TYLENOL) tablet 650 mg, 650 mg, Oral, Q4H PRN, Dequan Mauro MD, 650 mg at 20 2319   alum-mag hydroxide-simeth (MYLANTA) oral suspension 30 mL, 30 mL, Oral, Q4H PRN, Dequan Mauro MD, 30 mL at 20 2215   magnesium hydroxide (MILK OF MAGNESIA) 400 mg/5 mL oral suspension 30 mL, 30 mL, Oral, DAILY PRN, Dequan Mauro MD 
  [facility]) injection 4 mg, 4 mg, IntraVENous, Q8H PRN, Dequan Mauro MD, Stopped at 10/07/20 1300 Objective:  
 
Visit Vitals /70 Pulse 100 Temp 99 °F (37.2 °C) Resp 20 Ht 6' 0.99\" (1.854 m) Wt 82.5 kg (181 lb 14.1 oz) SpO2 97% BMI 24.00 kg/m² O2 Flow Rate (L/min): 3 l/min O2 Device: Nasal cannula 
  
Temp (24hrs), Av.6 °F (37 °C), Min:97.2 °F (36.2 °C), Max:99.1 °F (37.3 °C) 
  
 
 
Physical Exam:  
General :  no respiratory distress noted, patient on nasal cannula oxygen Lungs : Absent bs left lung not labored. CVS :  no gallop Abdomen :  +++ventral hernia positive bowel sounds Extremities : No edema noted, Neurological : Awake, alert, oriented to time place person.  No neurological deficits.   
Psychiatric : Mood and affect normal 
 
Lab/Data Review: No results found for this or any previous visit (from the past 24 hour(s)). CT abd 10/5. IMPRESSION: 
 1. Large ventral hernia containing nonobstructed small and large intestine. 2. Moderate to large right pleural effusion and mild to moderate left pleural 
effusion. There is near complete collapse of the visualized portions of the left 
lower lobe and there is pleural thickening in the left hemithorax. 3. Patchy airspace disease in the right lower lobe along with right basilar 
atelectasis. 4. Nonspecific left adrenal nodule. 5. Other findings as described. Pt at increased risk for procedure with resp compromise in addition to anatomical considerations of reducing massive hernia. Pt aware of inc mortality states \"I don't care if it kills me but it has to be done\". Abd ct pending, will need to recline on pillows for procedure as cannot lie flat. Pt aware of risk for surgery, likelihood that will remain on vent long term and he reiterates he wants it done despite the risk. Cardio to optimize for clearance. Surgery on case f/u recomendations Assessment and plan:   
 
 
Acute hypoxic respiratory failure :, Resolving,currently patient on nasal cannula oxygen looks comfortable resting in bed Left lung collapse/PNA:   left lung remains largely opacified by pathology negative for malignancy Chest PT, Q6H nebs, guaifenesin, lasix with negative fluid target. Bal cs multiple =nl xiomara. Cytology x 4 unrevealing for malignancy. pulmonology- Post additional bronch on 10/7 . Currently stable On Zosyn/levaquin,   14 d abx total is recommended. Bal cs multiple =nl xiomara. Cytology x 4 unrevealing for malignancy. Recent bronchial wash cultures are pending Hernia complicating, pt wants it reduced despite increased mortality risk. General surgeon on board,chronic 6+ years neglected follow up. Surgery cx appreciated Dr. Kiara Pichardo. Pt aware of inc mortality states \"I don't care if it kills me but it has to be done\". Pt aware of risk for surgery, Cardio to optimize for clearance. COPD:  pulm following.  stable now Anemia: AOCI. Stable, check cbc in AM 
HTN : on losartan and CCB, with a low side BP decrease losartan to 25 mg May consult interventional radiology for thoracentesis follow-up chest x-ray  
  
DVT ppx: lovenox 
  
DISPO: SNF anticipated when stable Signed By: Ismael Ram MD   
 October 9, 2020

## 2020-10-09 NOTE — CONSULTS
Hematology/Oncology Consult    Patient: Delon Hamman MRN: [id number]     YOB: 1960  Age: 63 y.o. Sex: male      HPI      Delon Hamman is a 63 y.o. male who is being seen for left lung collapse and concern for underlying malignancy. Mr. Ronna Almazan is a 63 yr old male patient with multiple medical problems including COPD who presented to the hospital now over a month ago with worsening shortness of breath. He was noted to have a LLL pneumonia and then left lung collapse. He has had multiple bronchoscopy procedures to clear mucus plugs. Most recent bronchoscopy done by Dr. Nery Urena on 10/7 showed multiple endobronchial lesions. However, pathology was reportedly as no evidence of malignancy. He also has a large incision hernia in the upper abdomen for which he follows with Dr. Michael Christine. He is currently on supplemental oxygen.     Past Medical History:   Diagnosis Date    Anal fistula 3/15/2010    Anxiety     ARF (acute renal failure) (HCC)     Colon perforation (HCC)     Genital herpes 3/15/2010    GERD (gastroesophageal reflux disease)     High cholesterol 3/15/2010    History of blood transfusion     HTN (hypertension) 3/15/2010    Hyponatremia     Ischemic colitis ([address]     left Carotid Artery Occlusion 3/15/2010    Noncompliance with treatment 3/15/2010    Pneumonia 2011    PUD (peptic ulcer disease)     Septic shock(785.52)     Stroke 2002 3/15/2010    Thromboembolus ([address]     rt thigh    TIA (transient ischemic attack) 2007    pt reported     Past Surgical History:   Procedure Laterality Date    CARDIAC CATHETERIZATION      CARDIAC SURG PROCEDURE UNLIST      HX COLECTOMY  1/11/2014    Right hemicolectomy for free air, perforated viscus    US SCROTUM/TESTICLES      right testicle removed      Family History   Problem Relation Age of Onset    Cancer Mother      Social History     Tobacco Use    Smoking status: Current Every Day Smoker     Packs/day: 2.00     Years: 35.00 Pack years: 70.00    Smokeless tobacco: Never Used   Substance Use Topics    Alcohol use:  Yes     Alcohol/week: 70.0 standard drinks     Types: 84 Cans of beer per week     Comment: happy hour every day from 4 to 2am      Current Facility-Administered Medications   Medication Dose Route Frequency Provider Last Rate Last Dose    oxyCODONE-acetaminophen (PERCOCET) 5-325 mg per tablet 1 Tab  1 Tab Oral Q8H PRN David Cameron MD   1 Tab at 10/09/20 1453    [START ON 10/10/2020] losartan (COZAAR) tablet 25 mg  25 mg Oral DAILY David Cameron MD        Ye Major ON 10/10/2020] furosemide (LASIX) tablet 40 mg  40 mg Oral DAILY Xena Boswell MD        acetylcysteine (MUCOMYST) 200 mg/mL (20 %) solution 600 mg  600 mg Nebulization BID Krayn Rojas MD   Stopped at 10/08/20 2100    diphenhydrAMINE (BENADRYL) capsule 25 mg  25 mg Oral Q6H PRN Mary HERNANDEZ MD   25 mg at 10/09/20 1803    mineral oil (topical)    PRN Sylvia Palomino MD   5 mL at 10/02/20 1125    levoFLOXacin (LEVAQUIN) 500 mg in D5W IVPB  500 mg IntraVENous Q24H Duong Ruiz  mL/hr at 10/09/20 1247 500 mg at 10/09/20 1247    albuterol-ipratropium (DUO-NEB) 2.5 MG-0.5 MG/3 ML  3 mL Nebulization QID RT Jarett Ocampo DO   3 mL at 10/07/20 1614    guaiFENesin (ROBITUSSIN) 100 mg/5 mL oral liquid 400 mg  400 mg Oral Q6H Jarett Ocampo DO   400 mg at 10/09/20 1803    lidocaine (XYLOCAINE) 10 mg/mL (1 %) injection    PRN Debbie Gupta, DO   15 mL at 10/02/20 1159    mineral oil (topical)    PRN Debbie Hackettor, DO   5 mL at 09/23/20 0820    dilTIAZem ER (CARDIZEM CD) capsule 120 mg  120 mg Oral DAILY Byron Thompson MD   120 mg at 10/09/20 0805    0.9% sodium chloride infusion 250 mL  250 mL IntraVENous PRN Blanca Tia, MD        atorvastatin (LIPITOR) tablet 20 mg  20 mg Oral DAILY Blanca Weber MD   20 mg at 10/08/20 2034    piperacillin-tazobactam (ZOSYN) 3.375 g in 0.9% sodium chloride (MBP/ADV) 100 mL MBP  3.375 g IntraVENous Q8H Risa Shi MD 25 mL/hr at 10/09/20 1559 3.375 g at 10/09/20 1559    pantoprazole (PROTONIX) tablet 40 mg  40 mg Oral DAILY Risa Shi MD   40 mg at 10/09/20 0615    acetaminophen (TYLENOL) tablet 650 mg  650 mg Oral Q4H PRN Risa Shi MD   650 mg at 09/24/20 2319    alum-mag hydroxide-simeth (MYLANTA) oral suspension 30 mL  30 mL Oral Q4H PRN Risa Shi MD   30 mL at 09/22/20 2215    magnesium hydroxide (MILK OF MAGNESIA) 400 mg/5 mL oral suspension 30 mL  30 mL Oral DAILY PRN Risa Shi MD        ondansetron [facility]) injection 4 mg  4 mg IntraVENous Q8H PRN Risa Shi MD   Stopped at 10/07/20 1300        Allergies   Allergen Reactions    Morphine Other (comments)     itching       Review of Systems:  Constitutional No fevers, chills, night sweats, excessive fatigue or weight loss. Allergic/Immunologic No recent allergic reactions   Eyes No significant visual difficulties. No diplopia. ENMT No problems with hearing, no sore throat, no sinus drainage. Endocrine No hot flashes or night sweats. No cold intolerance, polyuria, or polydipsia   Hematologic/Lymphatic No easy bruising or bleeding. The patient denies any tender or palpable lymph nodes   Breasts No abnormal masses of breast, nipple discharge or pain. Respiratory No dyspnea on exertion, orthopnea, chest pain, cough or hemoptysis. Cardiovascular No anginal chest pain, irregular heart beat, tachycardia, palpitations or orthopnea. Gastrointestinal No nausea, vomiting, diarrhea, constipation, cramping, dysphagia, reflux, heartburn, GI bleeding, or early satiety. No change in bowel habits. Genitourinary (M) No hematuria, dysuria, increased frequency, urgency, hesitancy or incontinence. Musculoskeletal No joint pain, swelling or redness. No decreased range of motion. Integumentary No chronic rashes, inflammation, ulcerations, pruritus, petechiae, purpura, ecchymoses, or skin changes. Neurologic No headache, blurred vision, and no areas of focal weakness or numbness. Normal gait. No sensory problems. Psychiatric No insomnia, depression, james or mood swings. No psychotropic drugs. Objective:     Vitals:    10/09/20 0801 10/09/20 0805 10/09/20 0942 10/09/20 1559   BP: 119/71   118/71   Pulse: 84   94   Resp: 16   16   Temp: 97.6 °F (36.4 °C)   98.3 °F (36.8 °C)   SpO2: 97% 95%  95%   Weight:   89.5 kg (197 lb 5 oz)    Height:            Physical Exam:  Constitutional Alert, cooperative, oriented. Mood and affect appropriate. Appears close to chronological age. Well nourished. Well developed. Head Normocephalic; no scars   Eyes Conjunctivae and sclerae are clear and without icterus. Pupils are reactive and equal.   ENMT Sinuses are nontender. No oral exudates, ulcers, masses, thrush or mucositis. Oropharynx clear. Tongue normal.   Neck Supple without masses or thyromegaly. No jugular venous distension. Hematologic/Lymphatic No petechiae or purpura. No tender or palpable lymph nodes in the cervical, supraclavicular, axillary or inguinal area. Respiratory Lungs are clear to auscultation without rhonchi or wheezing. Cardiovascular Regular rate and rhythm of heart without murmurs, gallops or rubs. Chest / Line Site Chest is symmetric with no chest wall deformities. Abdomen Upper abdminal mass consistent with incisional hernia. Musculoskeletal No tenderness or swelling, normal range of motion without obvious weakness. Extremities No visible deformities, no cyanosis, clubbing or edema. Skin No rashes, scars, or lesions suggestive of malignancy. No petechiae, purpura, or ecchymoses. No excoriations. Neurologic No sensory or motor deficits, normal cerebellar function, normal gait, cranial nerves intact. Psychiatric Alert and oriented times three. Coherent speech. Verbalizes understanding of our discussions today.        No results found for this or any previous visit (from the past 24 hour(s)). Xr Chest Pa Lat    Result Date: 10/9/2020  Impression: Collapse of left lung again noted with decreased aeration of the upper lobe compared to yesterday increased opacity of the right lung may be due to portable technique. Ct Chest Wo Cont    Result Date: 10/6/2020  Impression: 1. Increased now complete opacification of the left bronchi with low density material. 2. Slightly increased patchy airspace pneumonia in the right lung. 3. Unchanged loculated and nonloculated left pleural effusion, complete left lung consolidation, right lung pleural effusion. Ct Abd Pelv W Cont    Result Date: 10/6/2020  IMPRESSION: 1. Large ventral hernia containing nonobstructed small and large intestine. 2. Moderate to large right pleural effusion and mild to moderate left pleural effusion. There is near complete collapse of the visualized portions of the left lower lobe and there is pleural thickening in the left hemithorax. 3. Patchy airspace disease in the right lower lobe along with right basilar atelectasis. 4. Nonspecific left adrenal nodule. 5. Other findings as described. Xr Chest Port    Result Date: 10/8/2020  Impression: Stable aeration of the left upper lung with atelectasis. Stable appearing bilateral pleural effusions. No significant change compared to the prior study from 10/7/2020. Xr Chest Port    Result Date: 10/6/2020  FINDINGS: Impression: Frontal single view chest. Continued opacification of the left hemithorax, obscuring the cardiac silhouette. Right lung interstitial thickening, airspace disease, bronchial thickening, pleural effusion similar to previous. No pneumothorax. Xr Chest Port    Result Date: 10/5/2020  Impression: Heterogeneous opacity in the right lung, not significantly changed. Now complete opacification of the left hemithorax. Xr Chest Port    Result Date: 10/3/2020  IMPRESSION: No significant change.     Xr Chest Ap Lat    Result Date: 9/30/2020  IMPRESSION: 1. Persistent opacification of the left hemithorax with volume loss. 2.  Moderate right pleural effusion with probable associated right basilar atelectasis. Assessment and Plan:     Hospital Problems  Date Reviewed: 10/2/2020          Codes Class Noted POA    Hypertension ICD-10-CM: I10  ICD-9-CM: 401.9  10/9/2020 Unknown        Pneumonia ICD-10-CM: J18.9  ICD-9-CM: 997  1/1/2014 Yes        Alcoholism ([facility] [address] ICD-10-CM: F10.20  ICD-9-CM: 303.90  1/1/2014 Yes        Coagulation disorder ([facility] [address] ICD-10-CM: D68.9  ICD-9-CM: 286.9  5/30/2012 Yes        HTN (hypertension) (Chronic) ICD-10-CM: I10  ICD-9-CM: 401.9  3/15/2010 Yes        High cholesterol (Chronic) ICD-10-CM: E78.00  ICD-9-CM: 272.0  3/15/2010 Yes            Mediastinal Adenopathy:  - Reported on most recent CT with a 2.4 cm x 1.4 cm.  - Multiple bronchoscopy and biopsy/washings- failed to show evidence of malignancy. - Per Dr. Lydia Villarreal of malignant remains high due to endobronchial lesions and recurrent collapse. - Options include EBUS and bx of the left paratracheal node, if feasible versus out-patient PET for further evaluation.  - Discussed case with Dr. Demi Lott.    Anemia:  - Check Iron, B12/Folate. - Likely due to chronic inflammation. Thank you for the consult.     Signed By: Mere Vidal MD     October 9, 2020

## 2020-10-09 NOTE — PROGRESS NOTES
Attempted to see pt for OT, pt declined services after encouragement. Received sitting up in bed, declined exercises/ADLs in bed/EOB. Pt reasoning \"Just took a Percocet, do not want to mess up bed because nurse just fixed it. \" OT discussed needs to perform UE exercises over the weekend, pt was agreeable.

## 2020-10-09 NOTE — PROGRESS NOTES
Pulm/CC Progress Note    Subjective:   Daily Progress Note: 10/9/2020     Patient seen and examined at the bedside. He is awake and alert. On 4 L of oxygen. Apparently patient has been declining treatments by RT. also declined physical therapy. He is agreeable when he is talked with but then he declines again. Also does not want to sit in the chair because he thinks that he swells up sitting in the chair. Does not like BiPAP. Feels that chest PT is not helping him either. Chest x-ray 10/5 and subsequent images along with a CT scan of the chest abdomen pelvis were reviewed. Review of Systems   Has complains of shortness of breath. He is on nasal cannula oxygen. Denied any nausea vomiting. Has poor appetite    Objective:     Visit Vitals  /71 (BP 1 Location: Right arm)   Pulse 94   Temp 98.3 °F (36.8 °C)   Resp 16   Ht 6' 0.99\" (1.854 m)   Wt 89.5 kg (197 lb 5 oz)   SpO2 95%   BMI 26.04 kg/m²    O2 Flow Rate (L/min): 3.5 l/min O2 Device: Nasal cannula    Temp (24hrs), Av.8 °F (36.6 °C), Min:97.4 °F (36.3 °C), Max:98.3 °F (36.8 °C)      No intake/output data recorded.   10/07 1901 - 10/09 0700  In: -   Out: 250 [Urine:250]    Current Facility-Administered Medications   Medication Dose Route Frequency    oxyCODONE-acetaminophen (PERCOCET) 5-325 mg per tablet 1 Tab  1 Tab Oral Q8H PRN    [START ON 10/10/2020] losartan (COZAAR) tablet 25 mg  25 mg Oral DAILY    acetylcysteine (MUCOMYST) 200 mg/mL (20 %) solution 600 mg  600 mg Nebulization BID    diphenhydrAMINE (BENADRYL) capsule 25 mg  25 mg Oral Q6H PRN    mineral oil (topical)    PRN    levoFLOXacin (LEVAQUIN) 500 mg in D5W IVPB  500 mg IntraVENous Q24H    albuterol-ipratropium (DUO-NEB) 2.5 MG-0.5 MG/3 ML  3 mL Nebulization QID RT    guaiFENesin (ROBITUSSIN) 100 mg/5 mL oral liquid 400 mg  400 mg Oral Q6H    lidocaine (XYLOCAINE) 10 mg/mL (1 %) injection    PRN    mineral oil (topical)    PRN    dilTIAZem ER (CARDIZEM CD) capsule 120 mg  120 mg Oral DAILY    0.9% sodium chloride infusion 250 mL  250 mL IntraVENous PRN    atorvastatin (LIPITOR) tablet 20 mg  20 mg Oral DAILY    piperacillin-tazobactam (ZOSYN) 3.375 g in 0.9% sodium chloride (MBP/ADV) 100 mL MBP  3.375 g IntraVENous Q8H    pantoprazole (PROTONIX) tablet 40 mg  40 mg Oral DAILY    acetaminophen (TYLENOL) tablet 650 mg  650 mg Oral Q4H PRN    alum-mag hydroxide-simeth (MYLANTA) oral suspension 30 mL  30 mL Oral Q4H PRN    magnesium hydroxide (MILK OF MAGNESIA) 400 mg/5 mL oral suspension 30 mL  30 mL Oral DAILY PRN    ondansetron (ZOFRAN) injection 4 mg  4 mg IntraVENous Q8H PRN       Physical Exam:  General: Alert and oriented x3.  4 L nasal cannula. Pleasant. HEENT: atraumatic, PERRL, moist mucosa,     Neck: Trachea midline, no carotid bruit, no masses. Supple but thick. Respiratory: He has some aeration of the left upper lung zone. Few crackles and rhonchi on the right side. No significant change. Cardiovascular: RRR, no m/r/g, Normal S1 and S2   abdomen: Has large ventral abdominal hernia, evidence of surgical scars soft,   Rectal: deferred  Extremities: no cyanosis or clubbing. He has significant pitting edema in the dependent portions and lower extremities. Integumentary: warm, dry, and pink, with no rash, purpura, or petechia. Heme/Lymph: no lymphadenopathy, no bruises  Neurological: No focal motor deficit   psychiatric: cooperative with normal mood, affect, and cognition      Additional comments: Chest x-rays reviewed    Data Review    No results found for this or any previous visit (from the past 24 hour(s)). Today's CXR read is currently pending. 6 results from 10/3/2020 were reviewed  Patient has left basal atelectasis.   XR CHEST PA LAT   Final Result   Impression:      Collapse of left lung again noted with decreased aeration of the upper lobe   compared to yesterday increased opacity of the right lung may be due to portable technique. XR CHEST PORT   Final Result   Impression:      Stable aeration of the left upper lung with atelectasis. Stable appearing   bilateral pleural effusions. No significant change compared to the prior study from 10/7/2020. XR CHEST PORT   Final Result      XR FLUOROSCOPY UNDER 60 MINUTES   Final Result      XR CHEST PORT   Final Result      XR CHEST PORT   Final Result   FINDINGS: Impression: Frontal single view chest.      Continued opacification of the left hemithorax, obscuring the cardiac   silhouette. Right lung interstitial thickening, airspace disease, bronchial thickening,   pleural effusion similar to previous. No pneumothorax. CT CHEST WO CONT   Final Result   Impression:    1. Increased now complete opacification of the left bronchi with low density   material.   2. Slightly increased patchy airspace pneumonia in the right lung. 3. Unchanged loculated and nonloculated left pleural effusion, complete left   lung consolidation, right lung pleural effusion. CT ABD PELV W CONT   Final Result   IMPRESSION:   1. Large ventral hernia containing nonobstructed small and large intestine. 2. Moderate to large right pleural effusion and mild to moderate left pleural   effusion. There is near complete collapse of the visualized portions of the left   lower lobe and there is pleural thickening in the left hemithorax. 3. Patchy airspace disease in the right lower lobe along with right basilar   atelectasis. 4. Nonspecific left adrenal nodule. 5. Other findings as described. XR CHEST PORT   Final Result   Impression: Heterogeneous opacity in the right lung, not significantly changed. Now complete opacification of the left hemithorax. XR CHEST PORT   Final Result   IMPRESSION: No significant change. XR CHEST AP LAT   Final Result      XR CHEST AP LAT   Final Result   IMPRESSION:   1. Persistent opacification of the left hemithorax with volume loss.    2. Moderate right pleural effusion with probable associated right basilar   atelectasis. XR CHEST PORT   Final Result   Impression: Increased volume loss left lung. Otherwise stable. XR CHEST PORT   Final Result   IMPRESSION:   1. Improved aeration of the left lung with persistent basilar consolidative   opacities and probable pleural effusions. 2. Other extensive interstitial and alveolar opacities are nonspecific, but   potentially related to pulmonary edema or infection. XR CHEST PORT   Final Result   Impression:Left lung collapse without improvement from prior study. XR CHEST PORT   Final Result   IMPRESSION:   1. There is milder enlargement of the cardiac silhouette as well as increasing   pulmonary vascular ill definition suspect for mild pulmonary edema. This could   be related to cardiogenic edema or fluid overload. 2.  There is been an improvement in the overall aeration of the left lung with   and improved visualization of vascular markings within the left upper lung zone. There still remains significant partial collapse of the left lung. 3.  There is increased patchy airspace disease laterally within the right lower   lobe just above the right lateral costophrenic angle. 4.  There are persistent moderate-sized bilateral pleural effusions. XR CHEST AP LAT   Final Result   IMPRESSION: Persistent collapse of the left lung with bilateral pleural   effusions. Increasing vascular congestion on the right. XR CHEST PA LAT   Final Result   Impression:   Ongoing near complete white out of the left lung. Little interval change since   the prior examination. CT CHEST WO CONT   Final Result   IMPRESSION: Complete collapse of the left lung due to complete bronchial   obstruction, possibly aspiration.  Fluid overload also noted      XR ABD ACUTE W 1 V CHEST   Final Result   IMPRESSION: Opacification of the left hemithorax, questioned for remote   pneumonectomy or lung collapse with pleural fluid. Prominent right parenchymal/interstitial lung markings compatible with fibrosis   and possible partial vascular congestion. Small bowel gas, nonobstructive pattern. Assessment/Plan: This is a middle-aged male who was admitted because of increasing symptoms of shortness of breath. He is getting treated in hospital for pneumonia with IV Zosyn/Azithromycin. He is noted to be increasingly tachypneic and short of breath. He has been on supplemental oxygen. His chest x-ray is showing left lung opacification likely from mucous plugging. This is slowly improving with aggressive pulmonary hygiene and three suction bronchoscopies.     Plan:     1.)    Left lung collapse/shortness of breath:  - Shortness of breath and hypoxia due to recurrent left lung collapse. He had multiple bronchoscopies done along with lavage but he continues to have left lung collapse. The patient underwent bronchoscopy on 10/7/2020 under general anesthesia. There was complete collapse from mucous plugging of the left lung. Mucous was up to the main vianey. He was suctioned aggressively. Post suctioning there appears to be multiple endobronchial lesions in the distal left main bronchus in the beginning of the left lower lobe bronchus. Brushings and biopsies were obtained. Postprocedure chest x-ray shows better aeration of the left upper lung. Chest x-ray from 10/8 morning shows stable left upper lung aeration. He appears to be more comfortable. On 4 L of oxygen. Blood gases on 3 L oxygen showed 7.413/65/61. Recurrent collapse is due to multiple reasons including large abdominal hernia, weak cough, COPD and multiple endobronchial lesions although they are not causing significant obstruction. CT of the chest done on 10/5 without IV contrast was personally reviewed with radiologist.  Left main bronchus shows mucus. Left lung is completely collapsed.   Small left-sided pleural effusion. Larger right-sided pleural effusion. Right lung pneumonia. CT of the abdomen pelvis shows a large hernia. I also discussed with the pathologist.  Cytology from the previous bronchoscopy showing atypical squamous cells. Cultures have been negative. Cytology and pathology again from this [facility] done on 10/7 is showing atypical cells with squamous metaplasia but no clear-cut evidence of malignancy. Clinically I believe that he has underlying malignancy. We will go ahead and consult oncology. The patient was also informed that when he has the surgery done he will probably end up on the ventilator. There is a possibility that he will need a tracheostomy. Discussed with surgeon. Surgery has been postponed at this time. Discussed with respiratory. Raise the left lung up and do chest PT. however he declines all treatment by RT. He has been ordered pulmonary hygiene with nebulizers every 6 hours;  Aggressive chest PT, EZ-PAP therapy q6, acapella device as well as incentive spirometer use. Added guaifenesin 400 mg q6. However he is declining most therapy. 2.)  COPD:  He has a history of COPD based on chronic smoking. Continue scheduled bronchodilators. Patient should be discharged with a LAMA/LABA such as Anoro and needs f/u in our office for PFT's and further management. Weaned off prednisone. 3.)  Pneumonia:   He is on IV Zosyn and Levaquin, stopped Zithromax on 9/23/2020. Cultures from recent bronchoscopy showing normal xiomara. 4.)  Hypertension:  He is on antihypertensive medications, BP's stable. Patient also has clinically fluid overload, congestive heart failure. Echocardiogram done on 9/16 showed an EF of 60 to 34% grade 2 diastolic dysfunction and moderate to severe aortic stenosis. Right ventricle is normal in size and function. Will resume Lasix. Agree with cardiology evaluation.     Questions of patient were answered at bedside in detail  Case discussed in detail with RN, RT, and care team  Thank you for involving me in the care of this patient  I will follow with you closely during hospitalization    Time spent more than 30 minutes in direct patient care with no overlap      NORMA Santamaria MD  Pulmonary and critical care

## 2020-10-09 NOTE — PROGRESS NOTES
Attempted to see pt for PT treatment; however, pt was off the floor in x-ray. Will continue to follow.

## 2020-10-09 NOTE — INTERDISCIPLINARY ROUNDS
Two person skin assessment performed by Darlene Blakely RN and Leslee Mar RN. Patient has a skin tear on the right buttocks and excoriation on the sacrum and groin area. Patient has an abdominal hernia that is buldging. Patient skin is poor on abdomen. No further skin breakdown is noted at this time.

## 2020-10-10 ENCOUNTER — APPOINTMENT (OUTPATIENT)
Dept: GENERAL RADIOLOGY | Age: 60
DRG: 177 | End: 2020-10-10
Attending: INTERNAL MEDICINE
Payer: MEDICARE

## 2020-10-10 LAB
ALBUMIN SERPL-MCNC: 1.8 G/DL (ref 3.5–5)
ALBUMIN/GLOB SERPL: 0.4 {RATIO} (ref 1.1–2.2)
ALP SERPL-CCNC: 92 U/L (ref 45–117)
ALT SERPL-CCNC: 11 U/L (ref 12–78)
ANION GAP SERPL CALC-SCNC: 0 MMOL/L (ref 5–15)
AST SERPL W P-5'-P-CCNC: 17 U/L (ref 15–37)
BASOPHILS # BLD: 0 K/UL (ref 0–0.1)
BASOPHILS NFR BLD: 0 % (ref 0–1)
BILIRUB SERPL-MCNC: 0.3 MG/DL (ref 0.2–1)
BUN SERPL-MCNC: 12 MG/DL (ref 6–20)
BUN/CREAT SERPL: 19 (ref 12–20)
CA-I BLD-MCNC: 8.3 MG/DL (ref 8.5–10.1)
CHLORIDE SERPL-SCNC: 98 MMOL/L (ref 97–108)
CO2 SERPL-SCNC: 40 MMOL/L (ref 21–32)
CREAT SERPL-MCNC: 0.62 MG/DL (ref 0.7–1.3)
CRP SERPL-MCNC: 11.2 MG/DL (ref 0–0.6)
DIFFERENTIAL METHOD BLD: ABNORMAL
EOSINOPHIL # BLD: 0 K/UL (ref 0–0.4)
EOSINOPHIL NFR BLD: 0 % (ref 0–7)
ERYTHROCYTE [DISTWIDTH] IN BLOOD BY AUTOMATED COUNT: 20.2 % (ref 11.5–14.5)
FERRITIN SERPL-MCNC: 64 NG/ML (ref 26–388)
FOLATE SERPL-MCNC: 5.9 NG/ML (ref 5–21)
GLOBULIN SER CALC-MCNC: 5 G/DL (ref 2–4)
GLUCOSE BLD STRIP.AUTO-MCNC: 93 MG/DL (ref 65–100)
GLUCOSE SERPL-MCNC: 88 MG/DL (ref 65–100)
HCT VFR BLD AUTO: 24.4 % (ref 36.6–50.3)
HGB BLD-MCNC: 7.5 G/DL (ref 12.1–17)
IMM GRANULOCYTES # BLD AUTO: 0 K/UL (ref 0–0.04)
IMM GRANULOCYTES NFR BLD AUTO: 0 % (ref 0–0.5)
LYMPHOCYTES # BLD: 0.8 K/UL (ref 0.8–3.5)
LYMPHOCYTES NFR BLD: 8 % (ref 12–49)
MAGNESIUM SERPL-MCNC: 1.2 MG/DL (ref 1.6–2.4)
MCH RBC QN AUTO: 29.8 PG (ref 26–34)
MCHC RBC AUTO-ENTMCNC: 30.7 G/DL (ref 30–36.5)
MCV RBC AUTO: 96.8 FL (ref 80–99)
MONOCYTES # BLD: 1.5 K/UL (ref 0–1)
MONOCYTES NFR BLD: 14 % (ref 5–13)
NEUTS SEG # BLD: 8 K/UL (ref 1.8–8)
NEUTS SEG NFR BLD: 78 % (ref 32–75)
PERFORMED BY, TECHID: NORMAL
PLATELET # BLD AUTO: 267 K/UL (ref 150–400)
PMV BLD AUTO: 9.1 FL (ref 8.9–12.9)
POTASSIUM SERPL-SCNC: 3.5 MMOL/L (ref 3.5–5.1)
PROT SERPL-MCNC: 6.8 G/DL (ref 6.4–8.2)
RBC # BLD AUTO: 2.52 M/UL (ref 4.1–5.7)
SODIUM SERPL-SCNC: 138 MMOL/L (ref 136–145)
VIT B12 SERPL-MCNC: 278 PG/ML (ref 193–986)
WBC # BLD AUTO: 10.4 K/UL (ref 4.1–11.1)

## 2020-10-10 PROCEDURE — 80074 ACUTE HEPATITIS PANEL: CPT

## 2020-10-10 PROCEDURE — 74011250637 HC RX REV CODE- 250/637: Performed by: INTERNAL MEDICINE

## 2020-10-10 PROCEDURE — 65270000029 HC RM PRIVATE

## 2020-10-10 PROCEDURE — 36415 COLL VENOUS BLD VENIPUNCTURE: CPT

## 2020-10-10 PROCEDURE — 74011000258 HC RX REV CODE- 258: Performed by: INTERNAL MEDICINE

## 2020-10-10 PROCEDURE — 82962 GLUCOSE BLOOD TEST: CPT

## 2020-10-10 PROCEDURE — 71045 X-RAY EXAM CHEST 1 VIEW: CPT

## 2020-10-10 PROCEDURE — 74011250636 HC RX REV CODE- 250/636: Performed by: INTERNAL MEDICINE

## 2020-10-10 PROCEDURE — 99222 1ST HOSP IP/OBS MODERATE 55: CPT | Performed by: INTERNAL MEDICINE

## 2020-10-10 PROCEDURE — 74011000250 HC RX REV CODE- 250: Performed by: INTERNAL MEDICINE

## 2020-10-10 PROCEDURE — 83735 ASSAY OF MAGNESIUM: CPT

## 2020-10-10 PROCEDURE — 80053 COMPREHEN METABOLIC PANEL: CPT

## 2020-10-10 PROCEDURE — 74011250637 HC RX REV CODE- 250/637: Performed by: FAMILY MEDICINE

## 2020-10-10 PROCEDURE — 86140 C-REACTIVE PROTEIN: CPT

## 2020-10-10 PROCEDURE — 82728 ASSAY OF FERRITIN: CPT

## 2020-10-10 PROCEDURE — 85025 COMPLETE CBC W/AUTO DIFF WBC: CPT

## 2020-10-10 PROCEDURE — 94640 AIRWAY INHALATION TREATMENT: CPT

## 2020-10-10 RX ORDER — DILTIAZEM HYDROCHLORIDE 120 MG/1
120 CAPSULE, COATED, EXTENDED RELEASE ORAL DAILY
Status: DISCONTINUED | OUTPATIENT
Start: 2020-10-11 | End: 2020-10-11

## 2020-10-10 RX ORDER — IPRATROPIUM BROMIDE AND ALBUTEROL SULFATE 2.5; .5 MG/3ML; MG/3ML
3 SOLUTION RESPIRATORY (INHALATION)
Status: DISCONTINUED | OUTPATIENT
Start: 2020-10-10 | End: 2020-10-10

## 2020-10-10 RX ORDER — DILTIAZEM HYDROCHLORIDE 120 MG/1
90 CAPSULE, COATED, EXTENDED RELEASE ORAL DAILY
Status: DISCONTINUED | OUTPATIENT
Start: 2020-10-11 | End: 2020-10-10 | Stop reason: CLARIF

## 2020-10-10 RX ORDER — ALBUTEROL SULFATE 2.5 MG/.5ML
SOLUTION RESPIRATORY (INHALATION)
Status: DISPENSED
Start: 2020-10-10 | End: 2020-10-11

## 2020-10-10 RX ORDER — IPRATROPIUM BROMIDE AND ALBUTEROL SULFATE 2.5; .5 MG/3ML; MG/3ML
3 SOLUTION RESPIRATORY (INHALATION)
Status: DISCONTINUED | OUTPATIENT
Start: 2020-10-11 | End: 2020-10-27 | Stop reason: HOSPADM

## 2020-10-10 RX ADMIN — OXYCODONE HYDROCHLORIDE AND ACETAMINOPHEN 1 TABLET: 5; 325 TABLET ORAL at 21:56

## 2020-10-10 RX ADMIN — IPRATROPIUM BROMIDE AND ALBUTEROL SULFATE 3 ML: .5; 3 SOLUTION RESPIRATORY (INHALATION) at 19:29

## 2020-10-10 RX ADMIN — GUAIFENESIN 400 MG: 200 SOLUTION ORAL at 06:01

## 2020-10-10 RX ADMIN — PANTOPRAZOLE SODIUM 40 MG: 40 TABLET, DELAYED RELEASE ORAL at 06:01

## 2020-10-10 RX ADMIN — DIPHENHYDRAMINE HYDROCHLORIDE 25 MG: 25 CAPSULE ORAL at 02:18

## 2020-10-10 RX ADMIN — PIPERACILLIN SODIUM AND TAZOBACTAM SODIUM 3.38 G: 3; .375 INJECTION, POWDER, LYOPHILIZED, FOR SOLUTION INTRAVENOUS at 21:56

## 2020-10-10 RX ADMIN — ACETYLCYSTEINE 600 MG: 200 SOLUTION ORAL; RESPIRATORY (INHALATION) at 21:00

## 2020-10-10 RX ADMIN — DIPHENHYDRAMINE HYDROCHLORIDE 25 MG: 25 CAPSULE ORAL at 14:00

## 2020-10-10 RX ADMIN — ATORVASTATIN CALCIUM 20 MG: 20 TABLET, FILM COATED ORAL at 21:56

## 2020-10-10 RX ADMIN — PIPERACILLIN SODIUM AND TAZOBACTAM SODIUM 3.38 G: 3; .375 INJECTION, POWDER, LYOPHILIZED, FOR SOLUTION INTRAVENOUS at 14:00

## 2020-10-10 RX ADMIN — OXYCODONE HYDROCHLORIDE AND ACETAMINOPHEN 1 TABLET: 5; 325 TABLET ORAL at 14:00

## 2020-10-10 RX ADMIN — DILTIAZEM HYDROCHLORIDE 120 MG: 120 CAPSULE, COATED, EXTENDED RELEASE ORAL at 09:05

## 2020-10-10 RX ADMIN — ACETYLCYSTEINE 200 MG: 200 SOLUTION ORAL; RESPIRATORY (INHALATION) at 19:30

## 2020-10-10 RX ADMIN — FUROSEMIDE 40 MG: 40 TABLET ORAL at 09:05

## 2020-10-10 RX ADMIN — GUAIFENESIN 400 MG: 200 SOLUTION ORAL at 11:17

## 2020-10-10 RX ADMIN — DIPHENHYDRAMINE HYDROCHLORIDE 25 MG: 25 CAPSULE ORAL at 21:56

## 2020-10-10 RX ADMIN — LOSARTAN POTASSIUM 25 MG: 25 TABLET, FILM COATED ORAL at 09:05

## 2020-10-10 RX ADMIN — GUAIFENESIN 400 MG: 200 SOLUTION ORAL at 18:02

## 2020-10-10 RX ADMIN — LEVOFLOXACIN 500 MG: 5 INJECTION, SOLUTION INTRAVENOUS at 11:17

## 2020-10-10 RX ADMIN — OXYCODONE HYDROCHLORIDE AND ACETAMINOPHEN 1 TABLET: 5; 325 TABLET ORAL at 06:01

## 2020-10-10 RX ADMIN — PIPERACILLIN SODIUM AND TAZOBACTAM SODIUM 3.38 G: 3; .375 INJECTION, POWDER, LYOPHILIZED, FOR SOLUTION INTRAVENOUS at 09:04

## 2020-10-10 NOTE — PROGRESS NOTES
Problem: Patient Education: Go to Patient Education Activity  Goal: Patient/Family Education  Description: LE HEP to improve strength and functional mobility       Outcome: Progressing Towards Goal     Problem: Falls - Risk of  Goal: *Absence of Falls  Description: Document Bola Queenccasin Fall Risk and appropriate interventions in the flowsheet.   Outcome: Progressing Towards Goal  Note: Fall Risk Interventions:  Mobility Interventions: Bed/chair exit alarm    Mentation Interventions: Bed/chair exit alarm    Medication Interventions: Bed/chair exit alarm    Elimination Interventions: Call light in reach    History of Falls Interventions: Bed/chair exit alarm         Problem: Patient Education: Go to Patient Education Activity  Goal: Patient/Family Education  Outcome: Progressing Towards Goal     Problem: Patient Education: Go to Patient Education Activity  Goal: Patient/Family Education  Outcome: Progressing Towards Goal     Problem: Patient Education: Go to Patient Education Activity  Goal: Patient/Family Education  Outcome: Progressing Towards Goal     Problem: Nutrition Deficit  Goal: *Optimize nutritional status  Outcome: Progressing Towards Goal     Problem: Airway Clearance - Ineffective  Goal: *Patent airway  Outcome: Progressing Towards Goal  Goal: *Absence of airway secretions  Outcome: Progressing Towards Goal  Goal: *Able to cough effectively  Outcome: Progressing Towards Goal

## 2020-10-10 NOTE — CONSULTS
[address] [facility] SURGERY PROGRESS NOTE          Chief Complaint: Large abdominal hernia    Subjective:  No new issues. Still has some abdominal pain. Passing flatus and having bowel movement. No nausea or vomiting. On supplemental Oxygen and has shortness of breath. CT scan shows no bowel obstruction/hernia with left lung collapse. Just completed another flex brochoscopy which showed multiple endobroncheal polypoid growth suspicious for malignancy, even though final report stated no malignancy. Past Medical History:   Past Medical History:   Diagnosis Date    Anal fistula 3/15/2010    Anxiety     ARF (acute renal failure) (HCC)     Colon perforation (HCC)     Genital herpes 3/15/2010    GERD (gastroesophageal reflux disease)     High cholesterol 3/15/2010    History of blood transfusion     HTN (hypertension) 3/15/2010    Hyponatremia     Ischemic colitis ([address]     left Carotid Artery Occlusion 3/15/2010    Noncompliance with treatment 3/15/2010    Pneumonia 2011    PUD (peptic ulcer disease)     Septic shock(785.52)     Stroke 2002 3/15/2010    Thromboembolus ([address]     rt thigh    TIA (transient ischemic attack) 2007    pt reported       Past Surgical History:   Past Surgical History:   Procedure Laterality Date    CARDIAC CATHETERIZATION      CARDIAC SURG PROCEDURE UNLIST      HX COLECTOMY  1/11/2014    Right hemicolectomy for free air, perforated viscus    US SCROTUM/TESTICLES      right testicle removed        Allergy:  Allergies   Allergen Reactions    Morphine Other (comments)     itching       Social History:  reports that he has been smoking. He has a 70.00 pack-year smoking history. He has never used smokeless tobacco. He reports current alcohol use of about 70.0 standard drinks of alcohol per week. He reports current drug use. Drug: Marijuana.      Family History:  Family History   Problem Relation Age of Onset    Cancer Mother         Current Medications:  Current Facility-Administered Medications:     oxyCODONE-acetaminophen (PERCOCET) 5-325 mg per tablet 1 Tab, 1 Tab, Oral, Q8H PRN, Brinda Ford MD, 1 Tab at 10/09/20 2111    [START ON 10/10/2020] losartan (COZAAR) tablet 25 mg, 25 mg, Oral, DAILY, Brinda Ford MD    Renata Hummer ON 10/10/2020] furosemide (LASIX) tablet 40 mg, 40 mg, Oral, DAILY, Darby Boswell MD    acetylcysteine (MUCOMYST) 200 mg/mL (20 %) solution 600 mg, 600 mg, Nebulization, BID, Darby Boswell MD, 600 mg at 10/09/20 2000    diphenhydrAMINE (BENADRYL) capsule 25 mg, 25 mg, Oral, Q6H PRN, Beckie HERNANDEZ MD, 25 mg at 10/09/20 1803    mineral oil (topical), , , PRN, Duong Ruiz MD, 5 mL at 10/02/20 1125    levoFLOXacin (LEVAQUIN) 500 mg in D5W IVPB, 500 mg, IntraVENous, Q24H, Duong Ruiz MD, Last Rate: 100 mL/hr at 10/09/20 1247, 500 mg at 10/09/20 1247    albuterol-ipratropium (DUO-NEB) 2.5 MG-0.5 MG/3 ML, 3 mL, Nebulization, QID RT, Jarett Ocampo DO, 3 mL at 10/09/20 1956    guaiFENesin (ROBITUSSIN) 100 mg/5 mL oral liquid 400 mg, 400 mg, Oral, Q6H, Jarett Ocampo DO, 400 mg at 10/09/20 1803    lidocaine (XYLOCAINE) 10 mg/mL (1 %) injection, , , PRN, Jarett Ocampo DO, 15 mL at 10/02/20 1159    mineral oil (topical), , , PRN, Jarett Ocampo DO, 5 mL at 09/23/20 0820    dilTIAZem ER (CARDIZEM CD) capsule 120 mg, 120 mg, Oral, DAILY, Romel Johnson MD, 120 mg at 10/09/20 0805    0.9% sodium chloride infusion 250 mL, 250 mL, IntraVENous, PRN, Dennis Carranza MD    atorvastatin (LIPITOR) tablet 20 mg, 20 mg, Oral, DAILY, Dennis Carranza MD, 20 mg at 10/09/20 2111    piperacillin-tazobactam (ZOSYN) 3.375 g in 0.9% sodium chloride (MBP/ADV) 100 mL MBP, 3.375 g, IntraVENous, Q8H, Dennis Carranza MD, Last Rate: 25 mL/hr at 10/09/20 1559, 3.375 g at 10/09/20 1559    pantoprazole (PROTONIX) tablet 40 mg, 40 mg, Oral, DAILY, Dennis Carranza MD, 40 mg at 10/09/20 0615    acetaminophen (TYLENOL) tablet 650 mg, 650 mg, Oral, Q4H PRN, Kat Diaz MD, 650 mg at 09/24/20 2319    alum-mag hydroxide-simeth (MYLANTA) oral suspension 30 mL, 30 mL, Oral, Q4H PRN, Kat Diaz MD, 30 mL at 09/22/20 2215    magnesium hydroxide (MILK OF MAGNESIA) 400 mg/5 mL oral suspension 30 mL, 30 mL, Oral, DAILY PRN, Kat Diaz MD    ondansetron [facility] injection 4 mg, 4 mg, IntraVENous, Q8H PRN, Kat Diaz MD, Stopped at 10/07/20 1300     Immunization History: There is no immunization history on file for this patient. Complete    Review of Systems:     Constitutional:  no fever,  no chills,  no sweats, No weakness, No fatigue, No decreased activity. Eye: No recent visual problem, No icterus, No discharge, No double vision. Ear/Nose/Mouth/Throat: No decreased hearing, No ear pain, No nasal congestion, No sore throat. Respiratory:  shortness of breath,  cough, No sputum production, No hemoptysis,  wheezing, No cyanosis. Cardiovascular: No chest pain, No palpitations, No bradycardia, No tachycardia, No peripheral edema, No syncope. Gastrointestinal: No nausea,  No vomiting, No diarrhea, No constipation, No heartburn,  abdominal pain. Genitourinary: No dysuria, No hematuria, No change in urine stream, No urethral discharge, No lesions. Hematology/Lymphatics: No bruising tendency, No bleeding tendency, No petechiae, No swollen lymph glands. Endocrine: No excessive thirst, No polyuria, No cold intolerance, No heat intolerance, No excessive hunger. Immunologic: Not immunocompromised, No recurrent fevers, No recurrent infections. Musculoskeletal: No back pain, No neck pain, No joint pain, No muscle pain, No claudication, No decreased range of motion, No trauma. Integumentary: No rash, No pruritus, No abrasions. Neurologic: Alert and oriented X4, No abnormal balance, No headache, No confusion, No numbness, No tingling. Psychiatric: No anxiety, No depression, No james. Physical Exam:     Vitals & Measurements:     Wt Readings from Last 3 Encounters:   10/09/20 89.5 kg (197 lb 5 oz)   02/26/19 82.6 kg (182 lb)   11/28/18 83.9 kg (185 lb)     Temp Readings from Last 3 Encounters:   10/09/20 97.8 °F (36.6 °C)   09/05/20 98.1 °F (36.7 °C) (Temporal)   02/26/19 97.9 °F (36.6 °C) (Oral)     BP Readings from Last 3 Encounters:   10/09/20 132/82   09/05/20 110/60   02/26/19 171/77     Pulse Readings from Last 3 Encounters:   10/09/20 96   09/05/20 86   02/26/19 100      Ht Readings from Last 3 Encounters:   09/18/20 6' 0.99\" (1.854 m)   02/26/19 6' 1\" (1.854 m)   11/28/18 6' 1\" (1.854 m)          General:  in respiratory distress  Head: Normal  Face: Nornal  HEENT: atraumatic, PERRLA, moist mucosa, normal pharynx, normal tonsils and adenoids, normal tongue, no fluid in sinuses  Neck: Trachea midline, no carotid bruit, no masses  Chest: Normal.  Respiratory: Normal chest wall expansion, shortness of breath, wheezing, left side rhonchi. Cardiovascular: RRR, no m/r/g, Normal S1 and S2  Abdomen: Soft, non tender,distended, large hernia with thinned out skin and dilated blood vessels, partially reducible, large fascial defect with loss of domain, normal bowel sounds in all quadrants, no hepatosplenomegaly, no tympany. Incision scar +  Genitourinary: No inguinal hernia, normal external gentalia, Testis & scrotum normal, no renal angle tenderness  Rectal: deferred  Musculoskeletal: normal ROM in upper and lower extremities, No joint swelling. Integumentary: Warm, dry, and pink, with no rash, purpura, or petechia  Heme/Lymph: No lymphadenopathy, no bruises  Neurological:Cranial Nerves II-XII grossly intact, no gross motor or sensory deficit  Psychiatric: Cooperative with normal mood, affect, and cognition      Laboratory Values:   No results found for this or any previous visit (from the past 24 hour(s)).           Assessment:  Large incisional hernia with loss of domain    Severe COPD with endobronchial growth in bronchus      Respiratory failure -left lung collapse- recurrent bronchoscopy- possible underlying mass    On going smoking     Plan:    1. Admission  2. Diet   3. IV fluids  4. SCD  5. IS  6. Pain medications  7. Antibiotics  8. Nausea medication. 9. CT scan of abdomen and pelvis with PO & IV contrast -This is a complex large incisional hernia in a patient with compromised respiratory status. 10.  I had a discussion with patient and explained the details of the procedure and complexity of the surgery with his compromised respiratory status. 11. I had discussion with Dr. Felipa Spicer who feels that the endobronchial nodules are still suspicious for malignant nodules. Recommendation will be to hold off on surgical intervention for a non obstructed incisional hernia with compromised respiratory status particularly given the history of suspicious endobronchial nodules suspicious for malignancy. Thank you for the consultation & allowing me to participate in the care of this patient.

## 2020-10-10 NOTE — PROGRESS NOTES
Progress Note    Patient: Lorenzo Sanz MRN: [id number]  SSN: [id number]    YOB: 1960  Age: 63 y.o. Sex: male      Admit Date: 9/10/2020    LOS: 30 days     Subjective:     59M, H/o COPD not on oxygen with with shortness of breath s/t pneumonia complicated by left lung collapse.   Per pathology report no malignancy,post  Bronchoscopy,  No other acute events reported by nursing staff, and patient also complained of a large abdominal hernia, per patient he wants to be fixed general surgeon on, overnight  Stable, repeated chest x-ray still showed left lung collapse patient looks much comfortable on nasal cannula oxygen no new complaints overnight             Current Facility-Administered Medications:     oxyCODONE-acetaminophen (PERCOCET) 5-325 mg per tablet 1 Tab, 1 Tab, Oral, Q8H PRN, Parag Vásquez MD, 1 Tab at 10/10/20 0601    losartan (COZAAR) tablet 25 mg, 25 mg, Oral, DAILY, Parag Vásquez MD, 25 mg at 10/10/20 0905    furosemide (LASIX) tablet 40 mg, 40 mg, Oral, DAILY, Darby Boswell MD, 40 mg at 10/10/20 0905    acetylcysteine (MUCOMYST) 200 mg/mL (20 %) solution 600 mg, 600 mg, Nebulization, BID, Darby Boswell MD, 600 mg at 10/09/20 2000    diphenhydrAMINE (BENADRYL) capsule 25 mg, 25 mg, Oral, Q6H PRN, Dipti HERNANDEZ MD, 25 mg at 10/10/20 0218    mineral oil (topical), , , PRN, Duong Ruiz MD, 5 mL at 10/02/20 1125    levoFLOXacin (LEVAQUIN) 500 mg in D5W IVPB, 500 mg, IntraVENous, Q24H, Duong Ruiz MD, Last Rate: 100 mL/hr at 10/10/20 1117, 500 mg at 10/10/20 1117    albuterol-ipratropium (DUO-NEB) 2.5 MG-0.5 MG/3 ML, 3 mL, Nebulization, QID RT, Jarett Ocampo DO, 3 mL at 10/09/20 1956    guaiFENesin (ROBITUSSIN) 100 mg/5 mL oral liquid 400 mg, 400 mg, Oral, Q6H, Jarett Ocampo, , 400 mg at 10/10/20 1117    lidocaine (XYLOCAINE) 10 mg/mL (1 %) injection, , , PRN, Jarett Ocampo DO, 15 mL at 10/02/20 1159    mineral oil (topical), , , PRN, Jarett Ocampo DO, 5 mL at 20 0820    dilTIAZem ER (CARDIZEM CD) capsule 120 mg, 120 mg, Oral, DAILY, Francia Braswell MD, 120 mg at 10/10/20 0905    0.9% sodium chloride infusion 250 mL, 250 mL, IntraVENous, PRN, Austin Barker MD    atorvastatin (LIPITOR) tablet 20 mg, 20 mg, Oral, DAILY, Austin Barker MD, 20 mg at 10/09/20 211    piperacillin-tazobactam (ZOSYN) 3.375 g in 0.9% sodium chloride (MBP/ADV) 100 mL MBP, 3.375 g, IntraVENous, Q8H, Austin Barker MD, Last Rate: 25 mL/hr at 10/10/20 0904, 3.375 g at 10/10/20 0904    pantoprazole (PROTONIX) tablet 40 mg, 40 mg, Oral, DAILY, Austin Barker MD, 40 mg at 10/10/20 0601    acetaminophen (TYLENOL) tablet 650 mg, 650 mg, Oral, Q4H PRN, Austin Barker MD, 650 mg at 20 2319    alum-mag hydroxide-simeth (MYLANTA) oral suspension 30 mL, 30 mL, Oral, Q4H PRN, Austin Barker MD, 30 mL at 20 2215    magnesium hydroxide (MILK OF MAGNESIA) 400 mg/5 mL oral suspension 30 mL, 30 mL, Oral, DAILY PRN, Ausitn Barker MD    ondansetron [facility]) injection 4 mg, 4 mg, IntraVENous, Q8H PRN, Autsin Barker MD, Stopped at 10/07/20 1300    Objective:     Visit Vitals  /70   Pulse 100   Temp 99 °F (37.2 °C)   Resp 20   Ht 6' 0.99\" (1.854 m)   Wt 82.5 kg (181 lb 14.1 oz)   SpO2 97%   BMI 24.00 kg/m²    O2 Flow Rate (L/min): 3 l/min O2 Device: Nasal cannula     Temp (24hrs), Av.6 °F (37 °C), Min:97.2 °F (36.2 °C), Max:99.1 °F (37.3 °C)         Physical Exam:   General :  no respiratory distress noted, patient on nasal cannula oxygen  Lungs : Absent bs left lung not labored.   CVS :  no gallop  Abdomen :  +++ventral hernia positive bowel sounds  Extremities : No edema noted,  Neurological : Awake, alert, oriented to time place person.  No neurological deficits.    Psychiatric : Mood and affect normal    Lab/Data Review:  Recent Results (from the past 24 hour(s))   CBC WITH AUTOMATED DIFF    Collection Time: 10/10/20  8:45 AM   Result Value Ref Range    WBC 10.4 4.1 - 11.1 K/uL    RBC 2.52 (L) 4.10 - 5.70 M/uL    HGB 7.5 (L) 12.1 - 17.0 g/dL    HCT 24.4 (L) 36.6 - 50.3 %    MCV 96.8 80.0 - 99.0 FL    MCH 29.8 26.0 - 34.0 PG    MCHC 30.7 30.0 - 36.5 g/dL    RDW 20.2 (H) 11.5 - 14.5 %    PLATELET 203 500 - 089 K/uL    MPV 9.1 8.9 - 12.9 FL    NEUTROPHILS 78 (H) 32 - 75 %    LYMPHOCYTES 8 (L) 12 - 49 %    MONOCYTES 14 (H) 5 - 13 %    EOSINOPHILS 0 0 - 7 %    BASOPHILS 0 0 - 1 %    IMMATURE GRANULOCYTES 0 0.0 - 0.5 %    ABS. NEUTROPHILS 8.0 1.8 - 8.0 K/UL    ABS. LYMPHOCYTES 0.8 0.8 - 3.5 K/UL    ABS. MONOCYTES 1.5 (H) 0.0 - 1.0 K/UL    ABS. EOSINOPHILS 0.0 0.0 - 0.4 K/UL    ABS. BASOPHILS 0.0 0.0 - 0.1 K/UL    ABS. IMM. GRANS. 0.0 0.00 - 0.04 K/UL    DF AUTOMATED     METABOLIC PANEL, COMPREHENSIVE    Collection Time: 10/10/20  8:45 AM   Result Value Ref Range    Sodium 138 136 - 145 mmol/L    Potassium 3.5 3.5 - 5.1 mmol/L    Chloride 98 97 - 108 mmol/L    CO2 40 (H) 21 - 32 mmol/L    Anion gap 0 (L) 5 - 15 mmol/L    Glucose 88 65 - 100 mg/dL    BUN 12 6 - 20 mg/dL    Creatinine 0.62 (L) 0.70 - 1.30 mg/dL    BUN/Creatinine ratio 19 12 - 20      GFR est AA >60 >60 ml/min/1.73m2    GFR est non-AA >60 >60 ml/min/1.73m2    Calcium 8.3 (L) 8.5 - 10.1 mg/dL    Bilirubin, total 0.3 0.2 - 1.0 mg/dL    AST (SGOT) 17 15 - 37 U/L    ALT (SGPT) 11 (L) 12 - 78 U/L    Alk. phosphatase 92 45 - 117 U/L    Protein, total 6.8 6.4 - 8.2 g/dL    Albumin 1.8 (L) 3.5 - 5.0 g/dL    Globulin 5.0 (H) 2.0 - 4.0 g/dL    A-G Ratio 0.4 (L) 1.1 - 2.2     GLUCOSE, POC    Collection Time: 10/10/20  8:59 AM   Result Value Ref Range    Glucose (POC) 93 65 - 100 mg/dL    Performed by TESS JOHNSON abd 10/5. IMPRESSION:  1. Large ventral hernia containing nonobstructed small and large intestine. 2. Moderate to large right pleural effusion and mild to moderate left pleural  effusion.  There is near complete collapse of the visualized portions of the left  lower lobe and there is pleural thickening in the left hemithorax. 3. Patchy airspace disease in the right lower lobe along with right basilar  atelectasis. 4. Nonspecific left adrenal nodule. 5. Other findings as described. Pt at increased risk for procedure with resp compromise in addition to anatomical considerations of reducing massive hernia. Pt aware of inc mortality states \"I don't care if it kills me but it has to be done\". Abd ct pending, will need to recline on pillows for procedure as cannot lie flat. Pt aware of risk for surgery, likelihood that will remain on vent long term and he reiterates he wants it done despite the risk. Cardio to optimize for clearance. Surgery on case f/u recommendations  Next x-ray  AP upright view of the chest compared to 10/9/2020. Complete opacification of  the left hemithorax is again noted. Small right pleural effusion and diffuse  right-sided airspace disease suggesting pulmonary edema are again noted. No  pneumothorax. Cardiac silhouette is obscured.     IMPRESSION  IMPRESSION: No significant change. See above. Assessment and plan:        Acute hypoxic respiratory failure :, Resolving,currently patient on nasal cannula oxygen looks comfortable resting in bed ,patient has a left lung collapse and right lung infiltrate decreased pleural effusion per chest x-ray  Left lung collapse/PNA:   left lung remains largely opacified by pathology negative for malignancy, patient currently on Levaquin and Zosyn, so far blood culture negative  Chest PT, Q6H nebs, guaifenesin, lasix with negative fluid target. Hernia complicating, pt wants it reduced despite increased mortality risk. General surgeon on board,chronic 6+ years neglected follow up. Surgery cx appreciated Dr. Rowan Abebe. Case discussed with , no surgery planned until the pneumonia resolved  discussed with patient again,he understand  COPD:  pulm following.  stable now  Anemia: AOCI.  Stable, check cbc in AM  HTN : on losartan and CCB, with a low side BP, will discontinue losartan  We will consult ID for input for pneumonia, chronic lung inflammation infection   cjeck BNP in am  DVT ppx: lovenox     DISPO: SNF anticipated when stable      Signed By: Devorah Kennedy MD     October 10, 2020

## 2020-10-10 NOTE — PROGRESS NOTES
Pulm/CC Progress Note    Subjective:   Daily Progress Note: 10/10/2020     Patient seen and examined at the bedside this evening. He is awake and alert. On 3 L of oxygen via nasal cannula. Had a long discussion with the patient regarding the care plan today at the bedside. He states that he has been doing the pulmonary hygiene therapies as ordered, but per my partner's record, he has been declining in the past.  Several RT notes state that he has been declining as well. Patient states that he is very tired and is getting sick of being in the hospital.  The last, patient states that he does not feel he is ready for something like Hospice. Chest x-ray morning reviewed, persistent collapse of the entire left lung with mucus. General surgery is following given his significantly large ventral hernia which is clearly decreasing his ability to cough up his mucus. Unfortunately, given a questionable diagnosis of malignancy, they postponed any surgical management at this time. CT chest/abdomen/pelvis reviewed from 10/5/2020. Patient does have a left paratracheal node measuring 2.4 x 1.4 cm, otherwise no other evidence of definitive malignancy on CT imaging. Is undergone 8 bronchoscopies with mucus suctioning and has had several biopsies of the lesions within his tracheobronchial tree as well as brushings. He has had squamous metaplasia return, but no definitive malignancy. Oncology has been consulted and are asking if an EBUS would be warranted in this case in an attempt to rule in/out a malignancy. Review of Systems   Has complains of shortness of breath. He is on nasal cannula oxygen. Denied any nausea vomiting.   Has poor appetite    Objective:     Visit Vitals  /61   Pulse 91   Temp 98.4 °F (36.9 °C)   Resp 18   Ht 6' 0.99\" (1.854 m)   Wt 89.5 kg (197 lb 5 oz)   SpO2 100%   BMI 26.04 kg/m²    O2 Flow Rate (L/min): 3 l/min O2 Device: Nasal cannula    Temp (24hrs), Av.1 °F (36.7 °C), Min:97.8 °F (36.6 °C), Max:98.4 °F (36.9 °C)      No intake/output data recorded. 10/08 1901 - 10/10 0700  In: -   Out: 800 [Urine:800]    Current Facility-Administered Medications   Medication Dose Route Frequency    [START ON 10/11/2020] dilTIAZem ER (CARDIZEM CD) capsule 120 mg  120 mg Oral DAILY    oxyCODONE-acetaminophen (PERCOCET) 5-325 mg per tablet 1 Tab  1 Tab Oral Q8H PRN    furosemide (LASIX) tablet 40 mg  40 mg Oral DAILY    acetylcysteine (MUCOMYST) 200 mg/mL (20 %) solution 600 mg  600 mg Nebulization BID    diphenhydrAMINE (BENADRYL) capsule 25 mg  25 mg Oral Q6H PRN    mineral oil (topical)    PRN    levoFLOXacin (LEVAQUIN) 500 mg in D5W IVPB  500 mg IntraVENous Q24H    albuterol-ipratropium (DUO-NEB) 2.5 MG-0.5 MG/3 ML  3 mL Nebulization QID RT    guaiFENesin (ROBITUSSIN) 100 mg/5 mL oral liquid 400 mg  400 mg Oral Q6H    lidocaine (XYLOCAINE) 10 mg/mL (1 %) injection    PRN    mineral oil (topical)    PRN    0.9% sodium chloride infusion 250 mL  250 mL IntraVENous PRN    atorvastatin (LIPITOR) tablet 20 mg  20 mg Oral DAILY    piperacillin-tazobactam (ZOSYN) 3.375 g in 0.9% sodium chloride (MBP/ADV) 100 mL MBP  3.375 g IntraVENous Q8H    pantoprazole (PROTONIX) tablet 40 mg  40 mg Oral DAILY    acetaminophen (TYLENOL) tablet 650 mg  650 mg Oral Q4H PRN    alum-mag hydroxide-simeth (MYLANTA) oral suspension 30 mL  30 mL Oral Q4H PRN    magnesium hydroxide (MILK OF MAGNESIA) 400 mg/5 mL oral suspension 30 mL  30 mL Oral DAILY PRN    ondansetron (ZOFRAN) injection 4 mg  4 mg IntraVENous Q8H PRN       Physical Exam:  General: Alert and oriented x3.  3 L nasal cannula. Relatively pleasant. HEENT: atraumatic, PERRL, moist mucosa,     Neck: Trachea midline, no carotid bruit, no masses. Supple but thick. Respiratory: No breath sounds on the left side. Few crackles and rhonchi on the right side. No significant change.   Cardiovascular: RRR, no m/r/g, Normal S1 and S2   abdomen: Has large ventral abdominal hernia, evidence of surgical scars soft,   Rectal: deferred  Extremities: no cyanosis or clubbing. He has significant pitting edema in the dependent portions and lower extremities. Integumentary: warm, dry, and pink, with no rash, purpura, or petechia. Heme/Lymph: no lymphadenopathy, no bruises  Neurological: No focal motor deficit   psychiatric: cooperative with normal mood, affect, and cognition      Additional comments: Chest x-rays reviewed    Data Review    Recent Results (from the past 24 hour(s))   VITAMIN B12 & FOLATE    Collection Time: 10/09/20  8:57 PM   Result Value Ref Range    Vitamin B12 278 193 - 986 pg/mL    Folate 5.9 5.0 - 21.0 ng/mL   CBC WITH AUTOMATED DIFF    Collection Time: 10/10/20  8:45 AM   Result Value Ref Range    WBC 10.4 4.1 - 11.1 K/uL    RBC 2.52 (L) 4.10 - 5.70 M/uL    HGB 7.5 (L) 12.1 - 17.0 g/dL    HCT 24.4 (L) 36.6 - 50.3 %    MCV 96.8 80.0 - 99.0 FL    MCH 29.8 26.0 - 34.0 PG    MCHC 30.7 30.0 - 36.5 g/dL    RDW 20.2 (H) 11.5 - 14.5 %    PLATELET 748 311 - 884 K/uL    MPV 9.1 8.9 - 12.9 FL    NEUTROPHILS 78 (H) 32 - 75 %    LYMPHOCYTES 8 (L) 12 - 49 %    MONOCYTES 14 (H) 5 - 13 %    EOSINOPHILS 0 0 - 7 %    BASOPHILS 0 0 - 1 %    IMMATURE GRANULOCYTES 0 0.0 - 0.5 %    ABS. NEUTROPHILS 8.0 1.8 - 8.0 K/UL    ABS. LYMPHOCYTES 0.8 0.8 - 3.5 K/UL    ABS. MONOCYTES 1.5 (H) 0.0 - 1.0 K/UL    ABS. EOSINOPHILS 0.0 0.0 - 0.4 K/UL    ABS. BASOPHILS 0.0 0.0 - 0.1 K/UL    ABS. IMM.  GRANS. 0.0 0.00 - 0.04 K/UL    DF AUTOMATED     METABOLIC PANEL, COMPREHENSIVE    Collection Time: 10/10/20  8:45 AM   Result Value Ref Range    Sodium 138 136 - 145 mmol/L    Potassium 3.5 3.5 - 5.1 mmol/L    Chloride 98 97 - 108 mmol/L    CO2 40 (H) 21 - 32 mmol/L    Anion gap 0 (L) 5 - 15 mmol/L    Glucose 88 65 - 100 mg/dL    BUN 12 6 - 20 mg/dL    Creatinine 0.62 (L) 0.70 - 1.30 mg/dL    BUN/Creatinine ratio 19 12 - 20      GFR est AA >60 >60 ml/min/1.73m2    GFR est non-AA >60 >60 ml/min/1.73m2    Calcium 8.3 (L) 8.5 - 10.1 mg/dL    Bilirubin, total 0.3 0.2 - 1.0 mg/dL    AST (SGOT) 17 15 - 37 U/L    ALT (SGPT) 11 (L) 12 - 78 U/L    Alk. phosphatase 92 45 - 117 U/L    Protein, total 6.8 6.4 - 8.2 g/dL    Albumin 1.8 (L) 3.5 - 5.0 g/dL    Globulin 5.0 (H) 2.0 - 4.0 g/dL    A-G Ratio 0.4 (L) 1.1 - 2.2     FERRITIN    Collection Time: 10/10/20  8:45 AM   Result Value Ref Range    Ferritin 64 26 - 388 ng/mL   GLUCOSE, POC    Collection Time: 10/10/20  8:59 AM   Result Value Ref Range    Glucose (POC) 93 65 - 100 mg/dL    Performed by TESS Mcwilliams's CXR read is currently pending. 6 results from 10/3/2020 were reviewed  Patient has left basal atelectasis. XR CHEST PORT   Final Result   IMPRESSION: No significant change. See above. XR CHEST PA LAT   Final Result   Impression:      Collapse of left lung again noted with decreased aeration of the upper lobe   compared to yesterday increased opacity of the right lung may be due to portable   technique. XR CHEST PORT   Final Result   Impression:      Stable aeration of the left upper lung with atelectasis. Stable appearing   bilateral pleural effusions. No significant change compared to the prior study from 10/7/2020. XR CHEST PORT   Final Result      XR FLUOROSCOPY UNDER 60 MINUTES   Final Result      XR CHEST PORT   Final Result      XR CHEST PORT   Final Result   FINDINGS: Impression: Frontal single view chest.      Continued opacification of the left hemithorax, obscuring the cardiac   silhouette. Right lung interstitial thickening, airspace disease, bronchial thickening,   pleural effusion similar to previous. No pneumothorax. CT CHEST WO CONT   Final Result   Impression:    1. Increased now complete opacification of the left bronchi with low density   material.   2. Slightly increased patchy airspace pneumonia in the right lung.    3. Unchanged loculated and nonloculated left pleural effusion, complete left   lung consolidation, right lung pleural effusion. CT ABD PELV W CONT   Final Result   IMPRESSION:   1. Large ventral hernia containing nonobstructed small and large intestine. 2. Moderate to large right pleural effusion and mild to moderate left pleural   effusion. There is near complete collapse of the visualized portions of the left   lower lobe and there is pleural thickening in the left hemithorax. 3. Patchy airspace disease in the right lower lobe along with right basilar   atelectasis. 4. Nonspecific left adrenal nodule. 5. Other findings as described. XR CHEST PORT   Final Result   Impression: Heterogeneous opacity in the right lung, not significantly changed. Now complete opacification of the left hemithorax. XR CHEST PORT   Final Result   IMPRESSION: No significant change. XR CHEST AP LAT   Final Result      XR CHEST AP LAT   Final Result   IMPRESSION:   1. Persistent opacification of the left hemithorax with volume loss. 2.  Moderate right pleural effusion with probable associated right basilar   atelectasis. XR CHEST PORT   Final Result   Impression: Increased volume loss left lung. Otherwise stable. XR CHEST PORT   Final Result   IMPRESSION:   1. Improved aeration of the left lung with persistent basilar consolidative   opacities and probable pleural effusions. 2. Other extensive interstitial and alveolar opacities are nonspecific, but   potentially related to pulmonary edema or infection. XR CHEST PORT   Final Result   Impression:Left lung collapse without improvement from prior study. XR CHEST PORT   Final Result   IMPRESSION:   1. There is milder enlargement of the cardiac silhouette as well as increasing   pulmonary vascular ill definition suspect for mild pulmonary edema. This could   be related to cardiogenic edema or fluid overload.    2.  There is been an improvement in the overall aeration of the left lung with   and improved visualization of vascular markings within the left upper lung zone. There still remains significant partial collapse of the left lung. 3.  There is increased patchy airspace disease laterally within the right lower   lobe just above the right lateral costophrenic angle. 4.  There are persistent moderate-sized bilateral pleural effusions. XR CHEST AP LAT   Final Result   IMPRESSION: Persistent collapse of the left lung with bilateral pleural   effusions. Increasing vascular congestion on the right. XR CHEST PA LAT   Final Result   Impression:   Ongoing near complete white out of the left lung. Little interval change since   the prior examination. CT CHEST WO CONT   Final Result   IMPRESSION: Complete collapse of the left lung due to complete bronchial   obstruction, possibly aspiration. Fluid overload also noted      XR ABD ACUTE W 1 V CHEST   Final Result   IMPRESSION: Opacification of the left hemithorax, questioned for remote   pneumonectomy or lung collapse with pleural fluid. Prominent right parenchymal/interstitial lung markings compatible with fibrosis   and possible partial vascular congestion. Small bowel gas, nonobstructive pattern. Assessment/Plan: This is a middle-aged male who was admitted because of increasing symptoms of shortness of breath. He is getting treated in hospital for pneumonia with IV Zosyn, was on Azithromycin. He is noted to be increasingly tachypneic and short of breath. He has been on supplemental oxygen. His chest x-ray is showing persistent left lung opacification from mucous plugging. This has not improved with aggressive pulmonary hygiene and 8 suction bronchoscopies. Additionally, there is concern that the patient may have a primary lung malignancy, work-up ongoing.     Plan:     1.)    Left lung collapse/shortness of breath:  - Shortness of breath and hypoxia due to recurrent left lung collapse.   He had multiple bronchoscopies done along with lavage but he continues to have left lung collapse. The patient's most recent bronchoscopy was on 10/7/2020 under general anesthesia. There was complete collapse from mucous plugging of the left lung. Mucous was up to the main vianey. He was suctioned aggressively. Post suctioning there appears to be multiple endobronchial lesions in the distal left main bronchus in the beginning of the left lower lobe bronchus. Brushings and biopsies were obtained. Chest x-ray from 10/10 shows persistent left lung collapse from mucous plugging. He appears comfortable. On 3L of oxygen. Recurrent collapse is due to multiple reasons including large abdominal hernia, weak cough, COPD and multiple endobronchial lesions although they are not causing significant obstruction. CT of the chest done on 10/5 without IV contrast was personally reviewed with radiologist.  Left main bronchus shows mucus. Left lung is completely collapsed. Small left-sided pleural effusion. Larger right-sided pleural effusion. Right lung pneumonia. Additionally there is a left paratracheal lymph node measuring 2.4 x 1.4 cm. CT of the abdomen pelvis shows a large hernia. Dr. Mai Santamaria also discussed with the pathologist.  Cytology from the previous bronchoscopy showing atypical squamous cells. Cultures have been negative. Cytology and pathology again from this bronch done on 10/7 is showing atypical cells with squamous metaplasia but no clear-cut evidence of malignancy. Clinically it's certainly possible that he has an underlying malignancy. Oncology has been consulted, appreciate their recommendations. The patient was also informed that when he has the surgery done he will probably end up on the ventilator. There is a possibility that he will need a tracheostomy. Discussed with surgeon. Surgery has been postponed at this time due to a possible malignancy diagnosis.     I feel that at this time it is most prudent to rule in/out malignancy. Given that he does have an accessible paratracheal lymph node, he will need endobronchial ultrasound with fine-needle aspiration biopsy of this lymph node. This may be our only way to diagnose definitively a malignancy. Generally, the benefit of a repeat bronchoscopy that we can perform aggressive suctioning of mucus again, I can send copious mucus for cytology in case this is a mucinous adenocarcinoma and bronchorrhea is his main symptom (however bronchorrhea is typically thinner respiratory secretions not thick mucus causing collapse of the lung). I will plan for EBUS this Monday/Tuesday, patient is agreeable. If the biopsy is positive for malignancy, then palliative options are the most likely management at that time. If it is negative for malignancy, then it warrants further discussion with surgery regarding pursuing a ventral hernia surgery. Recommend Infectious Disease consultation given that the patient's pneumonia is simply not improving even though he has been on Zosyn for 4 weeks. We are if we are missing something, it seems that my first bronchoalveolar lavage was unrevealing for a true infectious pathogen. There was questionable yeast, however I felt it was very unlikely that he has been suffering from candidal pneumonia this entire time, he has never been on antifungal therapy. Discussed with respiratory. Raise the left lung up and do chest PT. however he declines some treatment by RT. He has been ordered pulmonary hygiene with nebulizers every 6 hours;  Aggressive chest PT, EZ-PAP therapy q6, acapella device as well as incentive spirometer use. Added guaifenesin 400 mg q6. Mucomyst also ordered. 2.)  COPD:  He has a history of COPD based on chronic smoking. Continue scheduled bronchodilators. Patient should be discharged with a LAMA/LABA such as Anoro and needs f/u in our office for PFT's and further management. Weaned off prednisone.     3.) Pneumonia:   He is on IV Zosyn and Levaquin, stopped Zithromax on 9/23/2020. Cultures from recent bronchoscopy showing normal xiomara. Infectious disease consultation. 4.)  Hypertension:  He is on antihypertensive medications, BP's stable. Patient also has clinically fluid overload, congestive heart failure. Echocardiogram done on 9/16 showed an EF of 60 to 78% grade 2 diastolic dysfunction and moderate to severe aortic stenosis. Right ventricle is normal in size and function. Will resume Lasix. Agree with cardiology evaluation. Questions of patient were answered at bedside in detail  Case discussed in detail with RN, RT, and care team  Thank you for involving me in the care of this patient  I will follow with you closely during hospitalization    Time spent more than 30 minutes in direct patient care with no overlap      Z.  Maria Ines Wagoner MD  Pulmonary and critical care

## 2020-10-10 NOTE — PROGRESS NOTES
Hematology/Oncology   Progress Note    Patient: Kelsie Martin MRN: [id number]     YOB: 1960  Age: 63 y.o. Sex: male      Admit Date: 9/10/2020    LOS: 30 days     Chief Complaint: Admitted with worsening shortness of breath. Subjective:     Reports fatigue and exertional shortness of breath. Constitutional No fevers, chills, night sweats, excessive fatigue or weight loss. Allergic/Immunologic No recent allergic reactions   Eyes No significant visual difficulties. No diplopia. ENMT No problems with hearing, no sore throat, no sinus drainage. Endocrine No hot flashes or night sweats. No cold intolerance, polyuria, or polydipsia   Hematologic/Lymphatic No easy bruising or bleeding. The patient denies any tender or palpable lymph nodes   Breasts No abnormal masses of breast, nipple discharge or pain. Respiratory No dyspnea on exertion, orthopnea, chest pain, cough or hemoptysis. Cardiovascular No anginal chest pain, irregular heart beat, tachycardia, palpitations or orthopnea. Gastrointestinal No nausea, vomiting, diarrhea, constipation, cramping, dysphagia, reflux, heartburn, GI bleeding, or early satiety. No change in bowel habits. Genitourinary (M) No hematuria, dysuria, increased frequency, urgency, hesitancy or incontinence. Musculoskeletal No joint pain, swelling or redness. No decreased range of motion. Integumentary No chronic rashes, inflammation, ulcerations, pruritus, petechiae, purpura, ecchymoses, or skin changes. Neurologic No headache, blurred vision, and no areas of focal weakness or numbness. Normal gait. No sensory problems. Psychiatric No insomnia, depression, james or mood swings. No psychotropic drugs.         Current Facility-Administered Medications:     [START ON 10/11/2020] dilTIAZem ER (CARDIZEM CD) capsule 120 mg, 120 mg, Oral, DAILY, Byron Dunn MD    oxyCODONE-acetaminophen (PERCOCET) 5-325 mg per tablet 1 Tab, 1 Tab, Oral, Q8H PRN, Aurora Pabon Lloyd Payne MD, 1 Tab at 10/10/20 1400    furosemide (LASIX) tablet 40 mg, 40 mg, Oral, DAILY, Darby Boswell MD, 40 mg at 10/10/20 0905    acetylcysteine (MUCOMYST) 200 mg/mL (20 %) solution 600 mg, 600 mg, Nebulization, BID, Darby Boswell MD, 600 mg at 10/09/20 2000    diphenhydrAMINE (BENADRYL) capsule 25 mg, 25 mg, Oral, Q6H PRN, Jesus Morelos MD, 25 mg at 10/10/20 1400    mineral oil (topical), , , PRN, Dheeraj HERNANDEZ MD, 5 mL at 10/02/20 1125    levoFLOXacin (LEVAQUIN) 500 mg in D5W IVPB, 500 mg, IntraVENous, Q24H, Duong Ruiz MD, Last Rate: 100 mL/hr at 10/10/20 1117, 500 mg at 10/10/20 1117    albuterol-ipratropium (DUO-NEB) 2.5 MG-0.5 MG/3 ML, 3 mL, Nebulization, QID RT, Jarett Ocampo DO, 3 mL at 10/09/20 1956    guaiFENesin (ROBITUSSIN) 100 mg/5 mL oral liquid 400 mg, 400 mg, Oral, Q6H, Jarett Ocampo DO, 400 mg at 10/10/20 1117    lidocaine (XYLOCAINE) 10 mg/mL (1 %) injection, , , PRN, Jarett Ocampo DO, 15 mL at 10/02/20 1159    mineral oil (topical), , , PRN, Jarett Ocampo DO, 5 mL at 09/23/20 0820    0.9% sodium chloride infusion 250 mL, 250 mL, IntraVENous, PRN, Lisbeth Velasquez MD    atorvastatin (LIPITOR) tablet 20 mg, 20 mg, Oral, DAILY, Lisbeth Velasquez MD, 20 mg at 10/09/20 2111    piperacillin-tazobactam (ZOSYN) 3.375 g in 0.9% sodium chloride (MBP/ADV) 100 mL MBP, 3.375 g, IntraVENous, Q8H, Lisbeth Velasquez MD, Last Rate: 25 mL/hr at 10/10/20 1400, 3.375 g at 10/10/20 1400    pantoprazole (PROTONIX) tablet 40 mg, 40 mg, Oral, DAILY, Lisbeth Velasquez MD, 40 mg at 10/10/20 0601    acetaminophen (TYLENOL) tablet 650 mg, 650 mg, Oral, Q4H PRN, Lisbeth Velasquez MD, 650 mg at 09/24/20 2319    alum-mag hydroxide-simeth (MYLANTA) oral suspension 30 mL, 30 mL, Oral, Q4H PRN, Lisbeth Velasquez MD, 30 mL at 09/22/20 2215    magnesium hydroxide (MILK OF MAGNESIA) 400 mg/5 mL oral suspension 30 mL, 30 mL, Oral, DAILY PRN, Lisbeth Velasquez MD    ondansetron [facility] injection 4 mg, 4 mg, IntraVENous, Q8H PRN, Courtney Babin MD, Stopped at 10/07/20 1300     Objective:     Vitals:    10/09/20 2001 10/09/20 2337 10/10/20 0810 10/10/20 1402   BP:  134/69 126/72 107/61   Pulse:  94 92 91   Resp:  16 16 18   Temp:  98 °F (36.7 °C) 98.2 °F (36.8 °C) 98.4 °F (36.9 °C)   SpO2: 97% 93% 93% 100%   Weight:       Height:              Physical Exam:   Constitutional Alert, cooperative, oriented. Mood and affect appropriate. Appears close to chronological age. Well nourished. Well developed. Head Normocephalic; no scars   Eyes Conjunctivae and sclerae are clear and without icterus. Pupils are reactive and equal.   ENMT Sinuses are nontender. No oral exudates, ulcers, masses, thrush or mucositis. Oropharynx clear. Tongue normal.   Neck Supple without masses or thyromegaly. No jugular venous distension. Hematologic/Lymphatic No petechiae or purpura. No tender or palpable lymph nodes in the cervical, supraclavicular, axillary or inguinal area. Respiratory Lungs are clear to auscultation without rhonchi or wheezing. Cardiovascular Regular rate and rhythm of heart without murmurs, gallops or rubs. Chest / Line Site Chest is symmetric with no chest wall deformities. Abdomen Non-tender, non-distended, no masses, ascites or hepatosplenomegaly. Good bowel sounds. No guarding or rebound tenderness. No pulsatile masses. Musculoskeletal No tenderness or swelling, normal range of motion without obvious weakness. Extremities No visible deformities, no cyanosis, clubbing or edema. Skin No rashes, scars, or lesions suggestive of malignancy. No petechiae, purpura, or ecchymoses. No excoriations. Neurologic No sensory or motor deficits, normal cerebellar function, normal gait, cranial nerves intact. Psychiatric Alert and oriented times three. Coherent speech. Verbalizes understanding of our discussions today.        Lab/Data Review:  Recent Results (from the past 24 hour(s))   CBC WITH AUTOMATED DIFF    Collection Time: 10/10/20  8:45 AM   Result Value Ref Range    WBC 10.4 4.1 - 11.1 K/uL    RBC 2.52 (L) 4.10 - 5.70 M/uL    HGB 7.5 (L) 12.1 - 17.0 g/dL    HCT 24.4 (L) 36.6 - 50.3 %    MCV 96.8 80.0 - 99.0 FL    MCH 29.8 26.0 - 34.0 PG    MCHC 30.7 30.0 - 36.5 g/dL    RDW 20.2 (H) 11.5 - 14.5 %    PLATELET 994 037 - 861 K/uL    MPV 9.1 8.9 - 12.9 FL    NEUTROPHILS 78 (H) 32 - 75 %    LYMPHOCYTES 8 (L) 12 - 49 %    MONOCYTES 14 (H) 5 - 13 %    EOSINOPHILS 0 0 - 7 %    BASOPHILS 0 0 - 1 %    IMMATURE GRANULOCYTES 0 0.0 - 0.5 %    ABS. NEUTROPHILS 8.0 1.8 - 8.0 K/UL    ABS. LYMPHOCYTES 0.8 0.8 - 3.5 K/UL    ABS. MONOCYTES 1.5 (H) 0.0 - 1.0 K/UL    ABS. EOSINOPHILS 0.0 0.0 - 0.4 K/UL    ABS. BASOPHILS 0.0 0.0 - 0.1 K/UL    ABS. IMM. GRANS. 0.0 0.00 - 0.04 K/UL    DF AUTOMATED     METABOLIC PANEL, COMPREHENSIVE    Collection Time: 10/10/20  8:45 AM   Result Value Ref Range    Sodium 138 136 - 145 mmol/L    Potassium 3.5 3.5 - 5.1 mmol/L    Chloride 98 97 - 108 mmol/L    CO2 40 (H) 21 - 32 mmol/L    Anion gap 0 (L) 5 - 15 mmol/L    Glucose 88 65 - 100 mg/dL    BUN 12 6 - 20 mg/dL    Creatinine 0.62 (L) 0.70 - 1.30 mg/dL    BUN/Creatinine ratio 19 12 - 20      GFR est AA >60 >60 ml/min/1.73m2    GFR est non-AA >60 >60 ml/min/1.73m2    Calcium 8.3 (L) 8.5 - 10.1 mg/dL    Bilirubin, total 0.3 0.2 - 1.0 mg/dL    AST (SGOT) 17 15 - 37 U/L    ALT (SGPT) 11 (L) 12 - 78 U/L    Alk.  phosphatase 92 45 - 117 U/L    Protein, total 6.8 6.4 - 8.2 g/dL    Albumin 1.8 (L) 3.5 - 5.0 g/dL    Globulin 5.0 (H) 2.0 - 4.0 g/dL    A-G Ratio 0.4 (L) 1.1 - 2.2     GLUCOSE, POC    Collection Time: 10/10/20  8:59 AM   Result Value Ref Range    Glucose (POC) 93 65 - 100 mg/dL    Performed by TESS CALDERON           Radiology:   Xr Chest Pa Lat    Result Date: 10/9/2020  Impression: Collapse of left lung again noted with decreased aeration of the upper lobe compared to yesterday increased opacity of the right lung may be due to portable technique. Ct Chest Wo Cont    Result Date: 10/6/2020  Impression: 1. Increased now complete opacification of the left bronchi with low density material. 2. Slightly increased patchy airspace pneumonia in the right lung. 3. Unchanged loculated and nonloculated left pleural effusion, complete left lung consolidation, right lung pleural effusion. Ct Abd Pelv W Cont    Result Date: 10/6/2020  IMPRESSION: 1. Large ventral hernia containing nonobstructed small and large intestine. 2. Moderate to large right pleural effusion and mild to moderate left pleural effusion. There is near complete collapse of the visualized portions of the left lower lobe and there is pleural thickening in the left hemithorax. 3. Patchy airspace disease in the right lower lobe along with right basilar atelectasis. 4. Nonspecific left adrenal nodule. 5. Other findings as described. Xr Chest Port    Result Date: 10/10/2020  IMPRESSION: No significant change. See above. Xr Chest Port    Result Date: 10/8/2020  Impression: Stable aeration of the left upper lung with atelectasis. Stable appearing bilateral pleural effusions. No significant change compared to the prior study from 10/7/2020. Xr Chest Port    Result Date: 10/6/2020  FINDINGS: Impression: Frontal single view chest. Continued opacification of the left hemithorax, obscuring the cardiac silhouette. Right lung interstitial thickening, airspace disease, bronchial thickening, pleural effusion similar to previous. No pneumothorax. Xr Chest Port    Result Date: 10/5/2020  Impression: Heterogeneous opacity in the right lung, not significantly changed. Now complete opacification of the left hemithorax. Xr Chest Port    Result Date: 10/3/2020  IMPRESSION: No significant change. Xr Chest Ap Lat    Result Date: 9/30/2020  IMPRESSION: 1. Persistent opacification of the left hemithorax with volume loss.  2.  Moderate right pleural effusion with probable associated right basilar atelectasis. Assessment and Plan: Active Problems:    HTN (hypertension) (3/15/2010)      High cholesterol (3/15/2010)      Coagulation disorder ([facility] (5/30/2012)      Pneumonia (1/1/2014)      Alcoholism ([facility] (1/1/2014)      Hypertension (10/9/2020)    Mediastinal Adenopathy:  - Reported on most recent CT with a 2.4 cm x 1.4 cm.  - Multiple bronchoscopy and biopsy/washings- failed to show evidence of malignancy. - Per Dr. Darling Perdomo of malignant remains high due to endobronchial lesions and recurrent collapse. - Options include EBUS and bx of the left paratracheal node, if feasible versus out-patient PET for further evaluation.  - Discussed case with Dr. Kwabena Boo yesterday and today with Dr. Pryor Phalen.     Anemia:  - Check Iron, B12/Folate. - Likely due to chronic inflammation.       Signed By: Leah Sánchez MD     October 10, 2020

## 2020-10-10 NOTE — CONSULTS
Consult Date: 10/10/2020    Consults:  Persistent pneumonia x 4 weeks on IV Zosyn with collapsed lung    Subjective      This is a 63year old male who apparently has been hospitalized for one month because of refractory pneumonia despite IV Zosyn. Multiple respiratory cultures have only grown normal respiratory xiomara, including bronchial washings. AFB smear was negative and fungal culture is still pending. Cytology has been negative for malignancy. Earlier specimens did show acute inflammation but most findings are benign. Most recent CXR from today showed complete opacification of the left hemithorax again with small right pleural effusion and diffuse right-sided airspace disease suggesting pulmonary edema (images reviewed by me). Patient states that he was hospitalized for about 2 months earlier this year at [facility] also for pneumonia, but he was not able to provide any details. Main complaint now is still SOB but minimal cough. No travel history.     Past Medical History:   Diagnosis Date    Anal fistula 3/15/2010    Anxiety     ARF (acute renal failure) (HCC)     Colon perforation (HCC)     Genital herpes 3/15/2010    GERD (gastroesophageal reflux disease)     High cholesterol 3/15/2010    History of blood transfusion     HTN (hypertension) 3/15/2010    Hyponatremia     Ischemic colitis ([address]     left Carotid Artery Occlusion 3/15/2010    Noncompliance with treatment 3/15/2010    Pneumonia 2011    PUD (peptic ulcer disease)     Septic shock(785.52)     Stroke 2002 3/15/2010    Thromboembolus ([address]     rt thigh    TIA (transient ischemic attack) 2007    pt reported      Past Surgical History:   Procedure Laterality Date    CARDIAC CATHETERIZATION      CARDIAC SURG PROCEDURE UNLIST      HX COLECTOMY  1/11/2014    Right hemicolectomy for free air, perforated viscus    US SCROTUM/TESTICLES      right testicle removed     Family History   Problem Relation Age of Onset    Cancer Mother       Social History     Tobacco Use    Smoking status: Current Every Day Smoker     Packs/day: 2.00     Years: 35.00     Pack years: 70.00    Smokeless tobacco: Never Used   Substance Use Topics    Alcohol use:  Yes     Alcohol/week: 70.0 standard drinks     Types: 84 Cans of beer per week     Comment: happy hour every day from 4 to 2am       Current Facility-Administered Medications   Medication Dose Route Frequency Provider Last Rate Last Dose    albuterol sulfate (PROVENTIL;VENTOLIN) 2.5 mg/0.5 mL nebulizing solution             albuterol-ipratropium (DUO-NEB) 2.5 MG-0.5 MG/3 ML  3 mL Nebulization Q6H RT Jarett Ocampo DO        oxyCODONE-acetaminophen (PERCOCET) 5-325 mg per tablet 1 Tab  1 Tab Oral Q8H PRN Tessa Lechuga MD   1 Tab at 10/10/20 1400    furosemide (LASIX) tablet 40 mg  40 mg Oral DAILY Saida Briggs MD   40 mg at 10/10/20 0905    acetylcysteine (MUCOMYST) 200 mg/mL (20 %) solution 600 mg  600 mg Nebulization BID Saida Briggs MD   600 mg at 10/09/20 2000    diphenhydrAMINE (BENADRYL) capsule 25 mg  25 mg Oral Q6H PRN Julieth HERNANDEZ MD   25 mg at 10/10/20 1400    mineral oil (topical)    PRN Tonya Arciniega MD   5 mL at 10/02/20 1125    levoFLOXacin (LEVAQUIN) 500 mg in D5W IVPB  500 mg IntraVENous Q24H Duong Ruiz  mL/hr at 10/10/20 1117 500 mg at 10/10/20 1117    guaiFENesin (ROBITUSSIN) 100 mg/5 mL oral liquid 400 mg  400 mg Oral Q6H Jarett Ocampo DO   400 mg at 10/10/20 1802    lidocaine (XYLOCAINE) 10 mg/mL (1 %) injection    PRN Bjorn Huang DO   15 mL at 10/02/20 1159    mineral oil (topical)    PRN Bjorn Huang DO   5 mL at 09/23/20 0820    0.9% sodium chloride infusion 250 mL  250 mL IntraVENous PRN Niesha Duran MD        atorvastatin (LIPITOR) tablet 20 mg  20 mg Oral DAILY Niesha Duran MD   20 mg at 10/09/20 2111    piperacillin-tazobactam (ZOSYN) 3.375 g in 0.9% sodium chloride (MBP/ADV) 100 mL MBP  3.375 g IntraVENous Q8H Emy, Ramírez Tejeda MD 25 mL/hr at 10/10/20 1400 3.375 g at 10/10/20 1400    pantoprazole (PROTONIX) tablet 40 mg  40 mg Oral DAILY Caitlin Izquierdo MD   40 mg at 10/10/20 0601    acetaminophen (TYLENOL) tablet 650 mg  650 mg Oral Q4H PRN Caitlin Izquierdo MD   650 mg at 09/24/20 2319    alum-mag hydroxide-simeth (MYLANTA) oral suspension 30 mL  30 mL Oral Q4H PRN Caitlin Izquierdo MD   30 mL at 09/22/20 2215    magnesium hydroxide (MILK OF MAGNESIA) 400 mg/5 mL oral suspension 30 mL  30 mL Oral DAILY PRN Caitlin Izquierdo MD        ondansetron [facility]) injection 4 mg  4 mg IntraVENous Q8H PRN Caitlin Izquierdo MD   Stopped at 10/07/20 1300        Review of Systems   Constitutional: Negative for chills and fever. HENT: Negative. Respiratory: Positive for cough and shortness of breath. Negative for chest tightness and wheezing. Cardiovascular: Negative. Gastrointestinal: Negative. Endocrine: Negative. Genitourinary: Negative. Musculoskeletal: Negative. Skin: Negative. Allergic/Immunologic: Negative. Neurological: Negative. Hematological: Negative. Psychiatric/Behavioral: Negative. Objective     Vital signs for last 24 hours:  Visit Vitals  /61   Pulse 91   Temp 98.4 °F (36.9 °C)   Resp 18   Ht 6' 0.99\" (1.854 m)   Wt 197 lb 5 oz (89.5 kg)   SpO2 100%   BMI 26.04 kg/m²       Intake/Output this shift:  Current Shift: No intake/output data recorded.   Last 3 Shifts: 10/08 1901 - 10/10 0700  In: -   Out: 800 [Urine:800]    Data Review:   Recent Results (from the past 24 hour(s))   VITAMIN B12 & FOLATE    Collection Time: 10/09/20  8:57 PM   Result Value Ref Range    Vitamin B12 278 193 - 986 pg/mL    Folate 5.9 5.0 - 21.0 ng/mL   CBC WITH AUTOMATED DIFF    Collection Time: 10/10/20  8:45 AM   Result Value Ref Range    WBC 10.4 4.1 - 11.1 K/uL    RBC 2.52 (L) 4.10 - 5.70 M/uL    HGB 7.5 (L) 12.1 - 17.0 g/dL    HCT 24.4 (L) 36.6 - 50.3 %    MCV 96.8 80.0 - 99.0 FL    MCH 29.8 26.0 - 34.0 PG    MCHC 30.7 30.0 - 36.5 g/dL    RDW 20.2 (H) 11.5 - 14.5 %    PLATELET 317 693 - 388 K/uL    MPV 9.1 8.9 - 12.9 FL    NEUTROPHILS 78 (H) 32 - 75 %    LYMPHOCYTES 8 (L) 12 - 49 %    MONOCYTES 14 (H) 5 - 13 %    EOSINOPHILS 0 0 - 7 %    BASOPHILS 0 0 - 1 %    IMMATURE GRANULOCYTES 0 0.0 - 0.5 %    ABS. NEUTROPHILS 8.0 1.8 - 8.0 K/UL    ABS. LYMPHOCYTES 0.8 0.8 - 3.5 K/UL    ABS. MONOCYTES 1.5 (H) 0.0 - 1.0 K/UL    ABS. EOSINOPHILS 0.0 0.0 - 0.4 K/UL    ABS. BASOPHILS 0.0 0.0 - 0.1 K/UL    ABS. IMM. GRANS. 0.0 0.00 - 0.04 K/UL    DF AUTOMATED     METABOLIC PANEL, COMPREHENSIVE    Collection Time: 10/10/20  8:45 AM   Result Value Ref Range    Sodium 138 136 - 145 mmol/L    Potassium 3.5 3.5 - 5.1 mmol/L    Chloride 98 97 - 108 mmol/L    CO2 40 (H) 21 - 32 mmol/L    Anion gap 0 (L) 5 - 15 mmol/L    Glucose 88 65 - 100 mg/dL    BUN 12 6 - 20 mg/dL    Creatinine 0.62 (L) 0.70 - 1.30 mg/dL    BUN/Creatinine ratio 19 12 - 20      GFR est AA >60 >60 ml/min/1.73m2    GFR est non-AA >60 >60 ml/min/1.73m2    Calcium 8.3 (L) 8.5 - 10.1 mg/dL    Bilirubin, total 0.3 0.2 - 1.0 mg/dL    AST (SGOT) 17 15 - 37 U/L    ALT (SGPT) 11 (L) 12 - 78 U/L    Alk. phosphatase 92 45 - 117 U/L    Protein, total 6.8 6.4 - 8.2 g/dL    Albumin 1.8 (L) 3.5 - 5.0 g/dL    Globulin 5.0 (H) 2.0 - 4.0 g/dL    A-G Ratio 0.4 (L) 1.1 - 2.2     FERRITIN    Collection Time: 10/10/20  8:45 AM   Result Value Ref Range    Ferritin 64 26 - 388 ng/mL   GLUCOSE, POC    Collection Time: 10/10/20  8:59 AM   Result Value Ref Range    Glucose (POC) 93 65 - 100 mg/dL    Performed by TESS CALDERON      CXR (10/10)     Physical Exam  Vitals signs and nursing note reviewed. Constitutional:       General: He is not in acute distress. Appearance: He is ill-appearing. He is not toxic-appearing or diaphoretic. HENT:      Head: Normocephalic and atraumatic. Eyes:      Extraocular Movements: Extraocular movements intact.       Pupils: Pupils are equal, round, and reactive to light. Neck:      Musculoskeletal: Neck supple. Cardiovascular:      Rate and Rhythm: Regular rhythm. Tachycardia present. Heart sounds: Murmur present. Pulmonary:      Breath sounds: Rhonchi and rales present. No wheezing. Comments: Decreased breath sounds left lung field  Abdominal:      General: There is distension (ventral hernia with scarring and crusting). Tenderness: There is no abdominal tenderness. There is no guarding. Genitourinary:     Penis: Normal.       Comments: No Padilla  Musculoskeletal:      Right lower leg: Edema present. Left lower leg: Edema present. Skin:     Findings: Erythema (both calves) present. Neurological:      General: No focal deficit present. Mental Status: He is alert and oriented to person, place, and time. Psychiatric:         Mood and Affect: Mood normal.         Behavior: Behavior normal.         Thought Content: Thought content normal.         Judgment: Judgment normal.       1. Chronic pneumonia, ?recurrent, etiology unclear, Day #29 IV Zosyn, Day #11 Levaquin. 2. Chronic venous insufficiency with stasis dermatitis  3. Large ventral hernia    Comment: Question at this point is whether his lung problem is related to chronic infection or lung anatomy. He is afebrile with normal WBC, but would like to assess other inflammatory markers. We have no positive cultures to confirm it, however, there could always be resistant organisms, especially Gram negative rods. 1. Continue Zosyn and Levaquin  2. In am, check CRP, procalcitonin and ESR  3. Review medical records from [address] [place] [address].  Mary Ann Ramos MD

## 2020-10-11 LAB
BASOPHILS # BLD: 0 K/UL (ref 0–0.1)
BASOPHILS NFR BLD: 0 % (ref 0–1)
BNP SERPL-MCNC: 3133 PG/ML
CRP SERPL-MCNC: 12.5 MG/DL (ref 0–0.6)
DIFFERENTIAL METHOD BLD: ABNORMAL
EOSINOPHIL # BLD: 0.1 K/UL (ref 0–0.4)
EOSINOPHIL NFR BLD: 1 % (ref 0–7)
ERYTHROCYTE [DISTWIDTH] IN BLOOD BY AUTOMATED COUNT: 20.3 % (ref 11.5–14.5)
ERYTHROCYTE [SEDIMENTATION RATE] IN BLOOD: 129 MM/HR
HCT VFR BLD AUTO: 27.5 % (ref 36.6–50.3)
HGB BLD-MCNC: 8.4 G/DL (ref 12.1–17)
IMM GRANULOCYTES # BLD AUTO: 0 K/UL (ref 0–0.04)
IMM GRANULOCYTES NFR BLD AUTO: 0 % (ref 0–0.5)
LYMPHOCYTES # BLD: 1.3 K/UL (ref 0.8–3.5)
LYMPHOCYTES NFR BLD: 14 % (ref 12–49)
MCH RBC QN AUTO: 29.8 PG (ref 26–34)
MCHC RBC AUTO-ENTMCNC: 30.5 G/DL (ref 30–36.5)
MCV RBC AUTO: 97.5 FL (ref 80–99)
MONOCYTES # BLD: 0.9 K/UL (ref 0–1)
MONOCYTES NFR BLD: 10 % (ref 5–13)
NEUTS SEG # BLD: 6.7 K/UL (ref 1.8–8)
NEUTS SEG NFR BLD: 75 % (ref 32–75)
PLATELET # BLD AUTO: 304 K/UL (ref 150–400)
PMV BLD AUTO: 9.5 FL (ref 8.9–12.9)
PROCALCITONIN SERPL-MCNC: <0.05 NG/ML
RBC # BLD AUTO: 2.82 M/UL (ref 4.1–5.7)
WBC # BLD AUTO: 9 K/UL (ref 4.1–11.1)

## 2020-10-11 PROCEDURE — 74011250637 HC RX REV CODE- 250/637: Performed by: INTERNAL MEDICINE

## 2020-10-11 PROCEDURE — 85652 RBC SED RATE AUTOMATED: CPT

## 2020-10-11 PROCEDURE — 74011250637 HC RX REV CODE- 250/637: Performed by: FAMILY MEDICINE

## 2020-10-11 PROCEDURE — 74011250636 HC RX REV CODE- 250/636: Performed by: INTERNAL MEDICINE

## 2020-10-11 PROCEDURE — 74011000258 HC RX REV CODE- 258: Performed by: INTERNAL MEDICINE

## 2020-10-11 PROCEDURE — 74011000250 HC RX REV CODE- 250: Performed by: INTERNAL MEDICINE

## 2020-10-11 PROCEDURE — 83880 ASSAY OF NATRIURETIC PEPTIDE: CPT

## 2020-10-11 PROCEDURE — 86140 C-REACTIVE PROTEIN: CPT

## 2020-10-11 PROCEDURE — 83540 ASSAY OF IRON: CPT

## 2020-10-11 PROCEDURE — 85025 COMPLETE CBC W/AUTO DIFF WBC: CPT

## 2020-10-11 PROCEDURE — 82607 VITAMIN B-12: CPT

## 2020-10-11 PROCEDURE — 82728 ASSAY OF FERRITIN: CPT

## 2020-10-11 PROCEDURE — 84145 PROCALCITONIN (PCT): CPT

## 2020-10-11 PROCEDURE — 94640 AIRWAY INHALATION TREATMENT: CPT

## 2020-10-11 PROCEDURE — 65270000029 HC RM PRIVATE

## 2020-10-11 PROCEDURE — 99232 SBSQ HOSP IP/OBS MODERATE 35: CPT | Performed by: INTERNAL MEDICINE

## 2020-10-11 RX ORDER — AMLODIPINE BESYLATE 5 MG/1
5 TABLET ORAL DAILY
Status: DISCONTINUED | OUTPATIENT
Start: 2020-10-12 | End: 2020-10-24

## 2020-10-11 RX ORDER — FUROSEMIDE 10 MG/ML
40 INJECTION INTRAMUSCULAR; INTRAVENOUS 2 TIMES DAILY
Status: DISCONTINUED | OUTPATIENT
Start: 2020-10-11 | End: 2020-10-20

## 2020-10-11 RX ORDER — OXYCODONE AND ACETAMINOPHEN 5; 325 MG/1; MG/1
1 TABLET ORAL
Status: DISCONTINUED | OUTPATIENT
Start: 2020-10-11 | End: 2020-10-15

## 2020-10-11 RX ADMIN — ACETYLCYSTEINE 600 MG: 200 SOLUTION ORAL; RESPIRATORY (INHALATION) at 15:12

## 2020-10-11 RX ADMIN — PIPERACILLIN SODIUM AND TAZOBACTAM SODIUM 3.38 G: 3; .375 INJECTION, POWDER, LYOPHILIZED, FOR SOLUTION INTRAVENOUS at 15:44

## 2020-10-11 RX ADMIN — IPRATROPIUM BROMIDE AND ALBUTEROL SULFATE 3 ML: .5; 3 SOLUTION RESPIRATORY (INHALATION) at 15:12

## 2020-10-11 RX ADMIN — LEVOFLOXACIN 500 MG: 5 INJECTION, SOLUTION INTRAVENOUS at 11:57

## 2020-10-11 RX ADMIN — PIPERACILLIN SODIUM AND TAZOBACTAM SODIUM 3.38 G: 3; .375 INJECTION, POWDER, LYOPHILIZED, FOR SOLUTION INTRAVENOUS at 09:44

## 2020-10-11 RX ADMIN — GUAIFENESIN 400 MG: 200 SOLUTION ORAL at 06:25

## 2020-10-11 RX ADMIN — ATORVASTATIN CALCIUM 20 MG: 20 TABLET, FILM COATED ORAL at 20:35

## 2020-10-11 RX ADMIN — DIPHENHYDRAMINE HYDROCHLORIDE 25 MG: 25 CAPSULE ORAL at 06:25

## 2020-10-11 RX ADMIN — GUAIFENESIN 400 MG: 200 SOLUTION ORAL at 18:20

## 2020-10-11 RX ADMIN — GUAIFENESIN 400 MG: 200 SOLUTION ORAL at 11:57

## 2020-10-11 RX ADMIN — ACETYLCYSTEINE: 200 SOLUTION ORAL; RESPIRATORY (INHALATION) at 20:43

## 2020-10-11 RX ADMIN — GUAIFENESIN 400 MG: 200 SOLUTION ORAL at 23:31

## 2020-10-11 RX ADMIN — IPRATROPIUM BROMIDE AND ALBUTEROL SULFATE 3 ML: .5; 3 SOLUTION RESPIRATORY (INHALATION) at 20:42

## 2020-10-11 RX ADMIN — OXYCODONE HYDROCHLORIDE AND ACETAMINOPHEN 1 TABLET: 5; 325 TABLET ORAL at 06:25

## 2020-10-11 RX ADMIN — OXYCODONE HYDROCHLORIDE AND ACETAMINOPHEN 1 TABLET: 5; 325 TABLET ORAL at 15:44

## 2020-10-11 RX ADMIN — DILTIAZEM HYDROCHLORIDE 120 MG: 120 CAPSULE, COATED, EXTENDED RELEASE ORAL at 09:44

## 2020-10-11 RX ADMIN — DIPHENHYDRAMINE HYDROCHLORIDE 25 MG: 25 CAPSULE ORAL at 23:31

## 2020-10-11 RX ADMIN — PANTOPRAZOLE SODIUM 40 MG: 40 TABLET, DELAYED RELEASE ORAL at 06:25

## 2020-10-11 RX ADMIN — MEROPENEM 1 G: 1 INJECTION, POWDER, FOR SOLUTION INTRAVENOUS at 18:20

## 2020-10-11 RX ADMIN — DIPHENHYDRAMINE HYDROCHLORIDE 25 MG: 25 CAPSULE ORAL at 15:44

## 2020-10-11 NOTE — PROGRESS NOTES
Problem: Patient Education: Go to Patient Education Activity  Goal: Patient/Family Education  Description: LE HEP to improve strength and functional mobility       Outcome: Progressing Towards Goal     Problem: Falls - Risk of  Goal: *Absence of Falls  Description: Document Laura Peña Fall Risk and appropriate interventions in the flowsheet.   Outcome: Progressing Towards Goal  Note: Fall Risk Interventions:  Mobility Interventions: Bed/chair exit alarm, OT consult for ADLs, PT Consult for mobility concerns, Utilize walker, cane, or other assistive device, Strengthening exercises (ROM-active/passive)    Mentation Interventions: Adequate sleep, hydration, pain control, Bed/chair exit alarm, Door open when patient unattended, Reorient patient, Room close to nurse's station, Toileting rounds, Evaluate medications/consider consulting pharmacy, More frequent rounding    Medication Interventions: Bed/chair exit alarm    Elimination Interventions: Bed/chair exit alarm, Call light in reach    History of Falls Interventions: Bed/chair exit alarm, Door open when patient unattended, Room close to nurse's station         Problem: Patient Education: Go to Patient Education Activity  Goal: Patient/Family Education  Outcome: Progressing Towards Goal     Problem: Patient Education: Go to Patient Education Activity  Goal: Patient/Family Education  Outcome: Progressing Towards Goal     Problem: Patient Education: Go to Patient Education Activity  Goal: Patient/Family Education  Outcome: Progressing Towards Goal     Problem: Nutrition Deficit  Goal: *Optimize nutritional status  Outcome: Progressing Towards Goal     Problem: Airway Clearance - Ineffective  Goal: *Patent airway  Outcome: Progressing Towards Goal  Goal: *Absence of airway secretions  Outcome: Progressing Towards Goal  Goal: *Able to cough effectively  Outcome: Progressing Towards Goal

## 2020-10-11 NOTE — PROGRESS NOTES
Progress Note    Patient: Brian Miller MRN: [id number]  SSN: [id number]    YOB: 1960  Age: 63 y.o. Sex: male      Admit Date: 9/10/2020    LOS: 31 days      59M, H/o COPD not on oxygen with with shortness of breath s/t pneumonia complicated by left lung collapse.   Per pathology report no malignancy,post  Bronchoscopy,      Subjective:     Patient is seen for follow-up, increasing shortness of breath today and weakness patient denies fever chills, denies wheezing           Current Facility-Administered Medications:     oxyCODONE-acetaminophen (PERCOCET) 5-325 mg per tablet 1 Tab, 1 Tab, Oral, Q12H PRN, Leanna Lu MD  [facility] ON 10/12/2020] amLODIPine (NORVASC) tablet 5 mg, 5 mg, Oral, DAILY, Leanna Lu MD    furosemide (LASIX) injection 40 mg, 40 mg, IntraVENous, BID, Leanna Lu MD    albuterol-ipratropium (DUO-NEB) 2.5 MG-0.5 MG/3 ML, 3 mL, Nebulization, Q6HWA RT, Jarett Ocampo DO, 3 mL at 10/11/20 1512    acetylcysteine (MUCOMYST) 200 mg/mL (20 %) solution 600 mg, 600 mg, Nebulization, BID, Darby Boswell MD, 600 mg at 10/11/20 1512    diphenhydrAMINE (BENADRYL) capsule 25 mg, 25 mg, Oral, Q6H PRN, Jesus Carmona MD, 25 mg at 10/11/20 1544    mineral oil (topical), , , PRN, Duong Ruiz MD, 5 mL at 10/02/20 1125    levoFLOXacin (LEVAQUIN) 500 mg in D5W IVPB, 500 mg, IntraVENous, Q24H, Duong Ruiz MD, Last Rate: 100 mL/hr at 10/11/20 1157, 500 mg at 10/11/20 1157    guaiFENesin (ROBITUSSIN) 100 mg/5 mL oral liquid 400 mg, 400 mg, Oral, Q6H, Jarett Ocampo DO, 400 mg at 10/11/20 1157    lidocaine (XYLOCAINE) 10 mg/mL (1 %) injection, , , PRN, Jarett Ocampo DO, 15 mL at 10/02/20 1159    mineral oil (topical), , , PRN, Jarett Ocampo DO, 5 mL at 09/23/20 0820    0.9% sodium chloride infusion 250 mL, 250 mL, IntraVENous, PRN, Allie Rome MD    atorvastatin (LIPITOR) tablet 20 mg, 20 mg, Oral, DAILY, Allie Rome MD, 20 mg at 10/10/20 3621   piperacillin-tazobactam (ZOSYN) 3.375 g in 0.9% sodium chloride (MBP/ADV) 100 mL MBP, 3.375 g, IntraVENous, Q8H, Aranza Larios MD, Last Rate: 25 mL/hr at 10/11/20 1544, 3.375 g at 10/11/20 1544    pantoprazole (PROTONIX) tablet 40 mg, 40 mg, Oral, DAILY, Aranza Larios MD, 40 mg at 10/11/20 9570    acetaminophen (TYLENOL) tablet 650 mg, 650 mg, Oral, Q4H PRN, Aranza Larios MD, 650 mg at 20 2319    alum-mag hydroxide-simeth (MYLANTA) oral suspension 30 mL, 30 mL, Oral, Q4H PRN, Aranza Larios MD, 30 mL at 20 2215    magnesium hydroxide (MILK OF MAGNESIA) 400 mg/5 mL oral suspension 30 mL, 30 mL, Oral, DAILY PRN, Aranza Larios MD    ondansetron [facility]) injection 4 mg, 4 mg, IntraVENous, Q8H PRN, Aranza Larios MD, Stopped at 10/07/20 1300    Objective:     Visit Vitals  /70   Pulse 100   Temp 99 °F (37.2 °C)   Resp 20   Ht 6' 0.99\" (1.854 m)   Wt 82.5 kg (181 lb 14.1 oz)   SpO2 97%   BMI 24.00 kg/m²    O2 Flow Rate (L/min): 3 l/min O2 Device: Nasal cannula     Temp (24hrs), Av.6 °F (37 °C), Min:97.2 °F (36.2 °C), Max:99.1 °F (37.3 °C)         Physical Exam:   General :  no respiratory distress noted, patient on nasal cannula oxygen  Lungs : Absent bs left lung not labored.   CVS :  no gallop  Abdomen :  +++ventral hernia positive bowel sounds  Extremities : No edema noted,  Neurological : Awake, alert, oriented to time place person.  No neurological deficits.    Psychiatric : Mood and affect normal    Lab/Data Review:  Recent Results (from the past 24 hour(s))   MAGNESIUM    Collection Time: 10/10/20  8:41 PM   Result Value Ref Range    Magnesium 1.2 (L) 1.6 - 2.4 mg/dL   C REACTIVE PROTEIN, QT    Collection Time: 10/10/20  8:41 PM   Result Value Ref Range    C-Reactive protein 11.20 (H) 0.00 - 0.60 mg/dL   BNP    Collection Time: 10/11/20 12:49 PM   Result Value Ref Range    NT pro-BNP 3,133 (H) <125 pg/mL   CBC WITH AUTOMATED DIFF    Collection Time: 10/11/20 12:49 PM   Result Value Ref Range    WBC 9.0 4.1 - 11.1 K/uL    RBC 2.82 (L) 4.10 - 5.70 M/uL    HGB 8.4 (L) 12.1 - 17.0 g/dL    HCT 27.5 (L) 36.6 - 50.3 %    MCV 97.5 80.0 - 99.0 FL    MCH 29.8 26.0 - 34.0 PG    MCHC 30.5 30.0 - 36.5 g/dL    RDW 20.3 (H) 11.5 - 14.5 %    PLATELET 415 898 - 400 K/uL    MPV 9.5 8.9 - 12.9 FL    NEUTROPHILS 75 32 - 75 %    LYMPHOCYTES 14 12 - 49 %    MONOCYTES 10 5 - 13 %    EOSINOPHILS 1 0 - 7 %    BASOPHILS 0 0 - 1 %    IMMATURE GRANULOCYTES 0 0.0 - 0.5 %    ABS. NEUTROPHILS 6.7 1.8 - 8.0 K/UL    ABS. LYMPHOCYTES 1.3 0.8 - 3.5 K/UL    ABS. MONOCYTES 0.9 0.0 - 1.0 K/UL    ABS. EOSINOPHILS 0.1 0.0 - 0.4 K/UL    ABS. BASOPHILS 0.0 0.0 - 0.1 K/UL    ABS. IMM. GRANS. 0.0 0.00 - 0.04 K/UL    DF AUTOMATED     C REACTIVE PROTEIN, QT    Collection Time: 10/11/20 12:49 PM   Result Value Ref Range    C-Reactive protein 12.50 (H) 0.00 - 0.60 mg/dL   PROCALCITONIN    Collection Time: 10/11/20 12:49 PM   Result Value Ref Range    Procalcitonin <0.05 (H) 0 ng/mL   SED RATE, AUTOMATED    Collection Time: 10/11/20 12:49 PM   Result Value Ref Range    Sed rate, automated 129 mm/hr     CT abd 10/5. IMPRESSION:  1. Large ventral hernia containing nonobstructed small and large intestine. 2. Moderate to large right pleural effusion and mild to moderate left pleural  effusion. There is near complete collapse of the visualized portions of the left  lower lobe and there is pleural thickening in the left hemithorax. 3. Patchy airspace disease in the right lower lobe along with right basilar  atelectasis. 4. Nonspecific left adrenal nodule. 5. Other findings as described. Pt at increased risk for procedure with resp compromise in addition to anatomical considerations of reducing massive hernia. Pt aware of inc mortality states \"I don't care if it kills me but it has to be done\". Abd ct pending, will need to recline on pillows for procedure as cannot lie flat.  Pt aware of risk for surgery, likelihood that will remain on vent long term and he reiterates he wants it done despite the risk. Cardio to optimize for clearance. Surgery on case f/u recommendations  Next x-ray  AP upright view of the chest compared to 10/9/2020. Complete opacification of  the left hemithorax is again noted. Small right pleural effusion and diffuse  right-sided airspace disease suggesting pulmonary edema are again noted. No  pneumothorax. Cardiac silhouette is obscured.     IMPRESSION  IMPRESSION: No significant change. See above. Assessment and plan:        Acute hypoxic respiratory failure :, Resolving,currently patient on nasal cannula worsening shortness of breath today, x-ray showed some pulmonary edema elevated BNP   left lung collapse/PNA:   left lung remains largely opacified by pathology negative for malignancy, questionable malignancy per pulmonology and patient may not a candidate for further procedure. patient currently on Levaquin and Zosyn, so far blood culture negative  Chest PT, Q6H nebs, guaifenesin, lasix with negative fluid target. Hernia complicating, pt wants it reduced despite increased mortality risk. General surgeon on board,chronic 6+ years neglected follow up. Surgery cx appreciated Dr. Doyle Brown. Case discussed with , no surgery planned until the pneumonia resolved  discussed with patient again,he understand  COPD:  pulm following.  stable now  Anemia: AOCI.  Stable, check cbc in AM  HTN : Controlled, ith a low side BP,BP meds adjusted we  consulted ID for input for pneumonia, chronic lung inflammation infection   BNP 3133   change Lasix to IV, follow-up pulmonary status  DVT ppx: lovenox     DISPO: SNF anticipated when stable      Signed By: Melanie Chinchilla MD     October 11, 2020

## 2020-10-11 NOTE — PROGRESS NOTES
Problem: Falls - Risk of  Goal: *Absence of Falls  Description: Document Kristy Wellington Fall Risk and appropriate interventions in the flowsheet.   Outcome: Progressing Towards Goal  Note: Fall Risk Interventions:  Mobility Interventions: Bed/chair exit alarm, Patient to call before getting OOB, Strengthening exercises (ROM-active/passive)    Mentation Interventions: Bed/chair exit alarm, Door open when patient unattended, Room close to nurse's station, More frequent rounding    Medication Interventions: Bed/chair exit alarm, Patient to call before getting OOB, Teach patient to arise slowly    Elimination Interventions: Bed/chair exit alarm, Call light in reach, Toilet paper/wipes in reach, Toileting schedule/hourly rounds    History of Falls Interventions: Bed/chair exit alarm, Door open when patient unattended, Room close to nurse's station         Problem: Patient Education: Go to Patient Education Activity  Goal: Patient/Family Education  Outcome: Progressing Towards Goal     Problem: Airway Clearance - Ineffective  Goal: *Patent airway  Outcome: Progressing Towards Goal

## 2020-10-11 NOTE — PROGRESS NOTES
Progress Note    Patient: Wei Coats MRN: [id number]  SSN: [id number]    YOB: 1960  Age: 63 y.o. Sex: male      Admit Date: 9/10/2020    LOS: 31 days     Subjective:   Patient followed for chronic, refractory pneumonia of undetermined etiology despite extensive microbiologic work-up. He remains afebrile with normal WBC and procalcitonin, but his CRP and ESR are markedly elevated. Bronchial washing cultures remain negative or insignificant. He remains on Zosyn and Levaquin. Objective:     Vitals:    10/11/20 0010 10/11/20 0715 10/11/20 1513 10/11/20 1542   BP: 119/73 126/69  120/65   Pulse: 90 80  91   Resp: 16 18  18   Temp: 98.4 °F (36.9 °C) 97.7 °F (36.5 °C)  98.1 °F (36.7 °C)   SpO2: 96% 98% 92% 93%   Weight:       Height:            Intake and Output:  Current Shift: 10/11 0701 - 10/11 1900  In: -   Out: 276 [Urine:275]  Last three shifts: 10/09 1901 - 10/11 0700  In: -   Out: 550 [Urine:550]    Physical Exam:    Vitals signs and nursing note reviewed. Constitutional:       General: He is not in acute distress. Appearance: He is ill-appearing. He is not toxic-appearing or diaphoretic. HENT:      Head: Normocephalic and atraumatic. Eyes:      Extraocular Movements: Extraocular movements intact. Pupils: Pupils are equal, round, and reactive to light. Neck:      Musculoskeletal: Neck supple. Cardiovascular:      Rate and Rhythm: Regular rhythm. Tachycardia present. Heart sounds: Murmur present. Pulmonary:      Breath sounds: Rhonchi and rales present. No wheezing. Comments: Decreased breath sounds left lung field  Abdominal:      General: There is distension (ventral hernia with scarring and crusting). Tenderness: There is no abdominal tenderness. There is no guarding. Genitourinary:     Penis: Normal.       Comments: No Padilla  Musculoskeletal:      Right lower leg: Edema present. Left lower leg: Edema present.    Skin:     Findings: Erythema (both calves) present. Neurological:      General: No focal deficit present. Mental Status: He is alert and oriented to person, place, and time. Psychiatric:         Mood and Affect: Mood normal.         Behavior: Behavior normal.         Thought Content: Thought content normal.         Judgment: Judgment normal.        Lab/Data Review:    WBC 9,000  Procalcitonin <0.05  CRP 12.50 (11.20)      Bronchial washing fungus (10/7)  Pending  Bronchial washing AFB (10/7) smear negative  Bronchial washing culture (10/7) Normal respiratory xiomara  Assessment:     1. Chronic pneumonia, ?recurrent, etiology unclear, Day #30 IV Zosyn, Day #12 Levaquin. 2. Markedly elevated CRP and ESR  3. Chronic venous insufficiency with stasis dermatitis  4. Large ventral hernia     Comment:  WBC and procalcitonin normal, but CRP and ESR markedly elevated suggesting ongoing infection and/or inflammation. I think that this warrants at least change in antibiotic. Fungal infection less likely but still a possibility. Elevated ESR raises question of possible vasculitis, eg. Wegener's. Plan:   1. Discontinue Zosyn and Levaquin  2. Start Meropenem IV  3. In am, repeat CRP, ESR, check C-ANCA, P-ANCA, serum fungitell  4.  Awaiting medical records from [facility]       Signed By: Wolf Serna MD     October 11, 2020

## 2020-10-11 NOTE — PROGRESS NOTES
Pulm/CC Progress Note    Subjective:   Daily Progress Note: 10/11/2020     Patient seen and examined at the bedside this evening. He is awake and alert. On 3 L of oxygen via nasal cannula. Had a long discussion with the patient and his roommate at the bedside regarding the care plan today at the bedside. He states that the pulmonary hygiene therapies are better timed today and not with his meals. He has a history of declining these therapies in the past.  Patient states that he is very tired and is getting sick of being in the hospital.  The last, patient states that he does not feel he is ready for something like Hospice. Chest x-ray yesterday morning reviewed, persistent collapse of the entire left lung with mucus. General surgery is following given his significantly large ventral hernia which is clearly decreasing his ability to cough up his mucus. Unfortunately, given a questionable diagnosis of malignancy, they postponed any surgical management at this time. CT chest/abdomen/pelvis reviewed from 10/5/2020. Patient does have a left paratracheal node measuring 2.4 x 1.4 cm, otherwise no other evidence of definitive malignancy on CT imaging. He has undergone 8 bronchoscopies with mucus suctioning and has had several biopsies of the lesions within his tracheobronchial tree as well as brushings. He has had squamous metaplasia return, but no definitive malignancy. Oncology also following given concern of malignancy. We will plan for EBUS biopsy sometime early this week. Review of Systems   Has complains of shortness of breath. He is on nasal cannula oxygen. Denied any nausea vomiting.   Has poor appetite    Objective:     Visit Vitals  /65   Pulse 91   Temp 98.1 °F (36.7 °C)   Resp 18   Ht 6' 0.99\" (1.854 m)   Wt 89.5 kg (197 lb 5 oz)   SpO2 93%   BMI 26.04 kg/m²    O2 Flow Rate (L/min): 4 l/min O2 Device: Nasal cannula    Temp (24hrs), Av.3 °F (36.8 °C), Min:97.7 °F (36.5 °C), Max:98.8 °F (37.1 °C)      10/11 0701 - 10/11 1900  In: -   Out: 276 [Urine:275]  10/09 1901 - 10/11 0700  In: -   Out: 550 [Urine:550]    Current Facility-Administered Medications   Medication Dose Route Frequency    oxyCODONE-acetaminophen (PERCOCET) 5-325 mg per tablet 1 Tab  1 Tab Oral Q12H PRN    [START ON 10/12/2020] amLODIPine (NORVASC) tablet 5 mg  5 mg Oral DAILY    furosemide (LASIX) injection 40 mg  40 mg IntraVENous BID    meropenem (MERREM) 1 g in sterile water (preservative free) 20 mL IV syringe  1 g IntraVENous Q8H    albuterol-ipratropium (DUO-NEB) 2.5 MG-0.5 MG/3 ML  3 mL Nebulization Q6HWA RT    acetylcysteine (MUCOMYST) 200 mg/mL (20 %) solution 600 mg  600 mg Nebulization BID    diphenhydrAMINE (BENADRYL) capsule 25 mg  25 mg Oral Q6H PRN    mineral oil (topical)    PRN    guaiFENesin (ROBITUSSIN) 100 mg/5 mL oral liquid 400 mg  400 mg Oral Q6H    lidocaine (XYLOCAINE) 10 mg/mL (1 %) injection    PRN    mineral oil (topical)    PRN    0.9% sodium chloride infusion 250 mL  250 mL IntraVENous PRN    atorvastatin (LIPITOR) tablet 20 mg  20 mg Oral DAILY    pantoprazole (PROTONIX) tablet 40 mg  40 mg Oral DAILY    acetaminophen (TYLENOL) tablet 650 mg  650 mg Oral Q4H PRN    alum-mag hydroxide-simeth (MYLANTA) oral suspension 30 mL  30 mL Oral Q4H PRN    magnesium hydroxide (MILK OF MAGNESIA) 400 mg/5 mL oral suspension 30 mL  30 mL Oral DAILY PRN    ondansetron (ZOFRAN) injection 4 mg  4 mg IntraVENous Q8H PRN       Physical Exam:  General: Alert and oriented x3.  3 L nasal cannula. Relatively pleasant. HEENT: atraumatic, PERRL, moist mucosa,     Neck: Trachea midline, no carotid bruit, no masses. Supple but thick. Respiratory: No breath sounds on the left side. Few crackles and rhonchi on the right side. No significant change.   Cardiovascular: RRR, no m/r/g, Normal S1 and S2   abdomen: Has large ventral abdominal hernia, evidence of surgical scars soft,   Rectal: deferred  Extremities: no cyanosis or clubbing. He has significant pitting edema in the dependent portions and lower extremities. Integumentary: warm, dry, and pink, with no rash, purpura, or petechia. Heme/Lymph: no lymphadenopathy, no bruises  Neurological: No focal motor deficit   psychiatric: cooperative with normal mood, affect, and cognition      Additional comments: Chest x-rays reviewed    Data Review    Recent Results (from the past 24 hour(s))   MAGNESIUM    Collection Time: 10/10/20  8:41 PM   Result Value Ref Range    Magnesium 1.2 (L) 1.6 - 2.4 mg/dL   C REACTIVE PROTEIN, QT    Collection Time: 10/10/20  8:41 PM   Result Value Ref Range    C-Reactive protein 11.20 (H) 0.00 - 0.60 mg/dL   BNP    Collection Time: 10/11/20 12:49 PM   Result Value Ref Range    NT pro-BNP 3,133 (H) <125 pg/mL   CBC WITH AUTOMATED DIFF    Collection Time: 10/11/20 12:49 PM   Result Value Ref Range    WBC 9.0 4.1 - 11.1 K/uL    RBC 2.82 (L) 4.10 - 5.70 M/uL    HGB 8.4 (L) 12.1 - 17.0 g/dL    HCT 27.5 (L) 36.6 - 50.3 %    MCV 97.5 80.0 - 99.0 FL    MCH 29.8 26.0 - 34.0 PG    MCHC 30.5 30.0 - 36.5 g/dL    RDW 20.3 (H) 11.5 - 14.5 %    PLATELET 975 825 - 891 K/uL    MPV 9.5 8.9 - 12.9 FL    NEUTROPHILS 75 32 - 75 %    LYMPHOCYTES 14 12 - 49 %    MONOCYTES 10 5 - 13 %    EOSINOPHILS 1 0 - 7 %    BASOPHILS 0 0 - 1 %    IMMATURE GRANULOCYTES 0 0.0 - 0.5 %    ABS. NEUTROPHILS 6.7 1.8 - 8.0 K/UL    ABS. LYMPHOCYTES 1.3 0.8 - 3.5 K/UL    ABS. MONOCYTES 0.9 0.0 - 1.0 K/UL    ABS. EOSINOPHILS 0.1 0.0 - 0.4 K/UL    ABS. BASOPHILS 0.0 0.0 - 0.1 K/UL    ABS. IMM.  GRANS. 0.0 0.00 - 0.04 K/UL    DF AUTOMATED     C REACTIVE PROTEIN, QT    Collection Time: 10/11/20 12:49 PM   Result Value Ref Range    C-Reactive protein 12.50 (H) 0.00 - 0.60 mg/dL   PROCALCITONIN    Collection Time: 10/11/20 12:49 PM   Result Value Ref Range    Procalcitonin <0.05 (H) 0 ng/mL   SED RATE, AUTOMATED    Collection Time: 10/11/20 12:49 PM   Result Value Ref Range    Sed rate, automated 129 mm/hr     Today's CXR read is currently pending. 6 results from 10/3/2020 were reviewed  Patient has left basal atelectasis. XR CHEST PORT   Final Result   IMPRESSION: No significant change. See above. XR CHEST PA LAT   Final Result   Impression:      Collapse of left lung again noted with decreased aeration of the upper lobe   compared to yesterday increased opacity of the right lung may be due to portable   technique. XR CHEST PORT   Final Result   Impression:      Stable aeration of the left upper lung with atelectasis. Stable appearing   bilateral pleural effusions. No significant change compared to the prior study from 10/7/2020. XR CHEST PORT   Final Result      XR FLUOROSCOPY UNDER 60 MINUTES   Final Result      XR CHEST PORT   Final Result      XR CHEST PORT   Final Result   FINDINGS: Impression: Frontal single view chest.      Continued opacification of the left hemithorax, obscuring the cardiac   silhouette. Right lung interstitial thickening, airspace disease, bronchial thickening,   pleural effusion similar to previous. No pneumothorax. CT CHEST WO CONT   Final Result   Impression:    1. Increased now complete opacification of the left bronchi with low density   material.   2. Slightly increased patchy airspace pneumonia in the right lung. 3. Unchanged loculated and nonloculated left pleural effusion, complete left   lung consolidation, right lung pleural effusion. CT ABD PELV W CONT   Final Result   IMPRESSION:   1. Large ventral hernia containing nonobstructed small and large intestine. 2. Moderate to large right pleural effusion and mild to moderate left pleural   effusion. There is near complete collapse of the visualized portions of the left   lower lobe and there is pleural thickening in the left hemithorax. 3. Patchy airspace disease in the right lower lobe along with right basilar   atelectasis.    4. Nonspecific left adrenal nodule. 5. Other findings as described. XR CHEST PORT   Final Result   Impression: Heterogeneous opacity in the right lung, not significantly changed. Now complete opacification of the left hemithorax. XR CHEST PORT   Final Result   IMPRESSION: No significant change. XR CHEST AP LAT   Final Result      XR CHEST AP LAT   Final Result   IMPRESSION:   1. Persistent opacification of the left hemithorax with volume loss. 2.  Moderate right pleural effusion with probable associated right basilar   atelectasis. XR CHEST PORT   Final Result   Impression: Increased volume loss left lung. Otherwise stable. XR CHEST PORT   Final Result   IMPRESSION:   1. Improved aeration of the left lung with persistent basilar consolidative   opacities and probable pleural effusions. 2. Other extensive interstitial and alveolar opacities are nonspecific, but   potentially related to pulmonary edema or infection. XR CHEST PORT   Final Result   Impression:Left lung collapse without improvement from prior study. XR CHEST PORT   Final Result   IMPRESSION:   1. There is milder enlargement of the cardiac silhouette as well as increasing   pulmonary vascular ill definition suspect for mild pulmonary edema. This could   be related to cardiogenic edema or fluid overload. 2.  There is been an improvement in the overall aeration of the left lung with   and improved visualization of vascular markings within the left upper lung zone. There still remains significant partial collapse of the left lung. 3.  There is increased patchy airspace disease laterally within the right lower   lobe just above the right lateral costophrenic angle. 4.  There are persistent moderate-sized bilateral pleural effusions. XR CHEST AP LAT   Final Result   IMPRESSION: Persistent collapse of the left lung with bilateral pleural   effusions. Increasing vascular congestion on the right.       XR CHEST PA LAT   Final Result   Impression:   Ongoing near complete white out of the left lung. Little interval change since   the prior examination. CT CHEST WO CONT   Final Result   IMPRESSION: Complete collapse of the left lung due to complete bronchial   obstruction, possibly aspiration. Fluid overload also noted      XR ABD ACUTE W 1 V CHEST   Final Result   IMPRESSION: Opacification of the left hemithorax, questioned for remote   pneumonectomy or lung collapse with pleural fluid. Prominent right parenchymal/interstitial lung markings compatible with fibrosis   and possible partial vascular congestion. Small bowel gas, nonobstructive pattern. Assessment/Plan: This is a middle-aged male who was admitted because of increasing symptoms of shortness of breath. He is getting treated in hospital for pneumonia with IV Zosyn, was on Azithromycin. He is noted to be increasingly tachypneic and short of breath. He has been on supplemental oxygen. His chest x-ray is showing persistent left lung opacification from mucous plugging. This has not improved with aggressive pulmonary hygiene and 8 suction bronchoscopies. Additionally, there is concern that the patient may have a primary lung malignancy, work-up ongoing.     Plan:     1.)    Left lung collapse/shortness of breath:  - Shortness of breath and hypoxia due to recurrent left lung collapse. He had multiple bronchoscopies done along with lavage but he continues to have left lung collapse. The patient's most recent bronchoscopy was on 10/7/2020 under general anesthesia. There was complete collapse from mucous plugging of the left lung. Mucous was up to the main vianey. He was suctioned aggressively. Post suctioning there appears to be multiple endobronchial lesions in the distal left main bronchus in the beginning of the left lower lobe bronchus. Brushings and biopsies were obtained.     Chest x-ray from 10/10 shows persistent left lung collapse from mucous plugging. He appears comfortable. On 3L of oxygen. Recurrent collapse is due to multiple reasons including large abdominal hernia, weak cough, COPD and multiple endobronchial lesions although they are not causing significant obstruction. CT of the chest done on 10/5 without IV contrast was personally reviewed with radiologist.  Left main bronchus shows mucus. Left lung is completely collapsed. Small left-sided pleural effusion. Larger right-sided pleural effusion. Right lung pneumonia. Additionally there is a left paratracheal lymph node measuring 2.4 x 1.4 cm. CT of the abdomen pelvis shows a large hernia, no evidence of malignant process in the a/p. Dr. Thomas Hardin also discussed with the pathologist.  Cytology from the previous bronchoscopy showing atypical squamous cells. Cultures have been negative. Cytology and pathology again from this bronch done on 10/7 is showing atypical cells with squamous metaplasia but no clear-cut evidence of malignancy. Clinically it's certainly possible that he has an underlying malignancy. Oncology has been consulted, appreciate their recommendations. The patient was also informed that when he has the surgery done he will probably end up on the ventilator. There is a possibility that he will need a tracheostomy. Discussed with surgeon. Surgery has been postponed at this time due to a possible malignancy diagnosis. I feel that at this time it is most prudent to rule in/out malignancy. Given that he does have an accessible paratracheal lymph node, he will need endobronchial ultrasound with fine-needle aspiration biopsy of this lymph node. This may be our only way to diagnose definitively a malignancy.   Additionally, there will be benefit of a repeat bronchoscopy where we can perform aggressive suctioning of mucus again, I can send copious mucus for cytology in case this is a mucinous adenocarcinoma and bronchorrhea is his main symptom (however bronchorrhea is typically thinner respiratory secretions not thick mucus causing collapse of the lung). I will plan for EBUS this Tuesday/Wednesday, patient is agreeable. If the biopsy is positive for malignancy, then palliative options are the most likely management at that time. If it is negative for malignancy, then it warrants further discussion with surgery regarding pursuing a ventral hernia surgery. Appreciate assistance from infectious disease colleagues, Zosyn/Levaquin has been switched to meropenem. Checking an HIV antibody test today    Discussed with respiratory. Raise the left lung up and do chest PT. however he declines some treatment by RT. He has been ordered pulmonary hygiene with nebulizers every 6 hours;  Aggressive chest PT, EZ-PAP therapy q6, acapella device as well as incentive spirometer use. Added guaifenesin 400 mg q6. Mucomyst also ordered. 2.)  COPD:  He has a history of COPD based on chronic smoking. Continue scheduled bronchodilators. Patient should be discharged with a LAMA/LABA such as Anoro and needs f/u in our office for PFT's and further management. Weaned off prednisone. 3.)  Pneumonia:   He is on IV Zosyn and Levaquin, stopped Zithromax on 9/23/2020. Cultures from recent bronchoscopy showing normal xiomara. Infectious disease consultation appreciated. Switched to Meropenem on 10/11/20.    4.)  Hypertension:  He is on antihypertensive medications, BP's stable. Patient also has clinically fluid overload, congestive heart failure. Echocardiogram done on 9/16 showed an EF of 60 to 89% grade 2 diastolic dysfunction and moderate to severe aortic stenosis. Right ventricle is normal in size and function. Will resume Lasix. Agree with cardiology evaluation.     Questions of patient were answered at bedside in detail  Case discussed in detail with RN, RT, and care team  Thank you for involving me in the care of this patient  I will follow with you closely during hospitalization    Time spent more than 30 minutes in direct patient care with no overlap      Reshma Cornejo DO  Pulmonary and critical care

## 2020-10-11 NOTE — PROGRESS NOTES
Hematology/Oncology   Progress Note    Patient: Kelsie Martin MRN: [id number]     YOB: 1960  Age: 63 y.o. Sex: male      Admit Date: 9/10/2020    LOS: 31 days     Chief Complaint: Admitted with worsening shortness of breath.       Subjective:     Reports shortness of breath +    Constitutional No fevers, chills, night sweats, excessive fatigue or weight loss. Allergic/Immunologic No recent allergic reactions   Eyes No significant visual difficulties. No diplopia. ENMT No problems with hearing, no sore throat, no sinus drainage. Endocrine No hot flashes or night sweats. No cold intolerance, polyuria, or polydipsia   Hematologic/Lymphatic No easy bruising or bleeding. The patient denies any tender or palpable lymph nodes   Breasts No abnormal masses of breast, nipple discharge or pain. Respiratory No dyspnea on exertion, orthopnea, chest pain, cough or hemoptysis. Cardiovascular No anginal chest pain, irregular heart beat, tachycardia, palpitations or orthopnea. Gastrointestinal No nausea, vomiting, diarrhea, constipation, cramping, dysphagia, reflux, heartburn, GI bleeding, or early satiety. No change in bowel habits. Genitourinary (M) No hematuria, dysuria, increased frequency, urgency, hesitancy or incontinence. Musculoskeletal No joint pain, swelling or redness. No decreased range of motion. Integumentary No chronic rashes, inflammation, ulcerations, pruritus, petechiae, purpura, ecchymoses, or skin changes. Neurologic No headache, blurred vision, and no areas of focal weakness or numbness. Normal gait. No sensory problems. Psychiatric No insomnia, depression, james or mood swings. No psychotropic drugs.         Current Facility-Administered Medications:     dilTIAZem ER (CARDIZEM CD) capsule 120 mg, 120 mg, Oral, DAILY, Byron Dunn MD, 120 mg at 10/11/20 0944    albuterol-ipratropium (DUO-NEB) 2.5 MG-0.5 MG/3 ML, 3 mL, Nebulization, Q6HWA RT, Jarett Ocampo DO, 3 mL at 10/11/20 1512    oxyCODONE-acetaminophen (PERCOCET) 5-325 mg per tablet 1 Tab, 1 Tab, Oral, Q8H PRN, Jodi Villarreal MD, 1 Tab at 10/11/20 1544    furosemide (LASIX) tablet 40 mg, 40 mg, Oral, DAILY, Darby Boswell MD, 40 mg at 10/10/20 0905    acetylcysteine (MUCOMYST) 200 mg/mL (20 %) solution 600 mg, 600 mg, Nebulization, BID, Darby Boswell MD, 600 mg at 10/11/20 1512    diphenhydrAMINE (BENADRYL) capsule 25 mg, 25 mg, Oral, Q6H PRN, Frank HERNANDEZ MD, 25 mg at 10/11/20 1544    mineral oil (topical), , , PRN, Duong Ruiz MD, 5 mL at 10/02/20 1125    levoFLOXacin (LEVAQUIN) 500 mg in D5W IVPB, 500 mg, IntraVENous, Q24H, Duong Ruiz MD, Last Rate: 100 mL/hr at 10/11/20 1157, 500 mg at 10/11/20 1157    guaiFENesin (ROBITUSSIN) 100 mg/5 mL oral liquid 400 mg, 400 mg, Oral, Q6H, Jarett Ocampo DO, 400 mg at 10/11/20 1157    lidocaine (XYLOCAINE) 10 mg/mL (1 %) injection, , , PRN, Jarett Ocampo DO, 15 mL at 10/02/20 1159    mineral oil (topical), , , PRN, Jarett Ocampo DO, 5 mL at 09/23/20 0820    0.9% sodium chloride infusion 250 mL, 250 mL, IntraVENous, PRN, Jose L Watkins MD    atorvastatin (LIPITOR) tablet 20 mg, 20 mg, Oral, DAILY, Jose L Watkins MD, 20 mg at 10/10/20 2156    piperacillin-tazobactam (ZOSYN) 3.375 g in 0.9% sodium chloride (MBP/ADV) 100 mL MBP, 3.375 g, IntraVENous, Q8H, Jose L Watkins MD, Last Rate: 25 mL/hr at 10/11/20 1544, 3.375 g at 10/11/20 1544    pantoprazole (PROTONIX) tablet 40 mg, 40 mg, Oral, DAILY, Jose L Watkins MD, 40 mg at 10/11/20 8682    acetaminophen (TYLENOL) tablet 650 mg, 650 mg, Oral, Q4H PRN, Jose L Watkins MD, 650 mg at 09/24/20 2319    alum-mag hydroxide-simeth (MYLANTA) oral suspension 30 mL, 30 mL, Oral, Q4H PRN, Jose L Watkins MD, 30 mL at 09/22/20 2215    magnesium hydroxide (MILK OF MAGNESIA) 400 mg/5 mL oral suspension 30 mL, 30 mL, Oral, DAILY PRN, Jose L Watkins MD    ondansetron [facility]) injection 4 mg, 4 mg, IntraVENous, Q8H PRN, Jessica Hobson MD, Stopped at 10/07/20 1300     Objective:     Vitals:    10/11/20 0010 10/11/20 0715 10/11/20 1513 10/11/20 1542   BP: 119/73 126/69  120/65   Pulse: 90 80  91   Resp: 16 18  18   Temp: 98.4 °F (36.9 °C) 97.7 °F (36.5 °C)  98.1 °F (36.7 °C)   SpO2: 96% 98% 92% 93%   Weight:       Height:              Physical Exam:   Constitutional Chronically ill appearing +   Head Normocephalic; no scars   Eyes Conjunctivae and sclerae are clear and without icterus. Pupils are reactive and equal.   ENMT Sinuses are nontender. No oral exudates, ulcers, masses, thrush or mucositis. Oropharynx clear. Tongue normal.   Neck Supple without masses or thyromegaly. No jugular venous distension. Hematologic/Lymphatic No petechiae or purpura. No tender or palpable lymph nodes in the cervical, supraclavicular, axillary or inguinal area. Respiratory Lungs are clear to auscultation without rhonchi or wheezing. Cardiovascular Regular rate and rhythm of heart without murmurs, gallops or rubs. Chest / Line Site Chest is symmetric with no chest wall deformities. Abdomen Large incisional hernia +   Musculoskeletal No tenderness or swelling, normal range of motion without obvious weakness. Extremities Leg swelling +   Skin No rashes, scars, or lesions suggestive of malignancy. No petechiae, purpura, or ecchymoses. No excoriations. Neurologic No sensory or motor deficits, normal cerebellar function, normal gait, cranial nerves intact. Psychiatric Alert and oriented times three. Coherent speech. Verbalizes understanding of our discussions today.        Lab/Data Review:  Recent Results (from the past 24 hour(s))   MAGNESIUM    Collection Time: 10/10/20  8:41 PM   Result Value Ref Range    Magnesium 1.2 (L) 1.6 - 2.4 mg/dL   C REACTIVE PROTEIN, QT    Collection Time: 10/10/20  8:41 PM   Result Value Ref Range    C-Reactive protein 11.20 (H) 0.00 - 0.60 mg/dL   BNP    Collection Time: 10/11/20 12:49 PM   Result Value Ref Range    NT pro-BNP 3,133 (H) <125 pg/mL   CBC WITH AUTOMATED DIFF    Collection Time: 10/11/20 12:49 PM   Result Value Ref Range    WBC 9.0 4.1 - 11.1 K/uL    RBC 2.82 (L) 4.10 - 5.70 M/uL    HGB 8.4 (L) 12.1 - 17.0 g/dL    HCT 27.5 (L) 36.6 - 50.3 %    MCV 97.5 80.0 - 99.0 FL    MCH 29.8 26.0 - 34.0 PG    MCHC 30.5 30.0 - 36.5 g/dL    RDW 20.3 (H) 11.5 - 14.5 %    PLATELET 586 847 - 888 K/uL    MPV 9.5 8.9 - 12.9 FL    NEUTROPHILS 75 32 - 75 %    LYMPHOCYTES 14 12 - 49 %    MONOCYTES 10 5 - 13 %    EOSINOPHILS 1 0 - 7 %    BASOPHILS 0 0 - 1 %    IMMATURE GRANULOCYTES 0 0.0 - 0.5 %    ABS. NEUTROPHILS 6.7 1.8 - 8.0 K/UL    ABS. LYMPHOCYTES 1.3 0.8 - 3.5 K/UL    ABS. MONOCYTES 0.9 0.0 - 1.0 K/UL    ABS. EOSINOPHILS 0.1 0.0 - 0.4 K/UL    ABS. BASOPHILS 0.0 0.0 - 0.1 K/UL    ABS. IMM. GRANS. 0.0 0.00 - 0.04 K/UL    DF AUTOMATED     PROCALCITONIN    Collection Time: 10/11/20 12:49 PM   Result Value Ref Range    Procalcitonin <0.05 (H) 0 ng/mL   SED RATE, AUTOMATED    Collection Time: 10/11/20 12:49 PM   Result Value Ref Range    Sed rate, automated PENDING mm/hr          Radiology:      Assessment and Plan: Active Problems:    HTN (hypertension) (3/15/2010)      High cholesterol (3/15/2010)      Coagulation disorder ([facility] [address] (5/30/2012)      Pneumonia (1/1/2014)      [facility] (1/1/2014)      Hypertension (10/9/2020)    Mediastinal Adenopathy:  - Reported on most recent CT with a 2.4 cm x 1.4 cm left paratracheal lymph node. - Multiple bronchoscopy and biopsy/washings- failed to show evidence of malignancy. - Per Dr. Granados Praveen remains high due to endobronchial lesions and recurrent left lung collapse. - Options include EBUS and bx of the left paratracheal node, if feasible versus out-patient PET for further evaluation.  - EBUS  this week with Dr. Israel Granados.   - Does not appear to be a candidate for systemic treatment, if malignancy is diagnosed.     Anemia:  - Ferritin is normal, although could be elevated due to inflammation. B12 is low normal- will start oral Iron and B12. Chronic inflammation also contributing    Lower Extremity Swelling:  - Check venous doppler.       Signed By: Yajaira Lugo MD     October 11, 2020

## 2020-10-11 NOTE — PROGRESS NOTES
Problem: Patient Education: Go to Patient Education Activity  Goal: Patient/Family Education  Description: LE HEP to improve strength and functional mobility       Outcome: Progressing Towards Goal     Problem: Falls - Risk of  Goal: *Absence of Falls  Description: Document Yun Carreno Fall Risk and appropriate interventions in the flowsheet.   Outcome: Progressing Towards Goal  Note: Fall Risk Interventions:  Mobility Interventions: Bed/chair exit alarm, OT consult for ADLs, PT Consult for mobility concerns, Utilize walker, cane, or other assistive device, Strengthening exercises (ROM-active/passive)    Mentation Interventions: Adequate sleep, hydration, pain control, Bed/chair exit alarm, Door open when patient unattended, Reorient patient, Room close to nurse's station, Toileting rounds, Evaluate medications/consider consulting pharmacy, More frequent rounding    Medication Interventions: Bed/chair exit alarm    Elimination Interventions: Bed/chair exit alarm, Call light in reach    History of Falls Interventions: Bed/chair exit alarm, Door open when patient unattended, Room close to nurse's station         Problem: Patient Education: Go to Patient Education Activity  Goal: Patient/Family Education  Outcome: Progressing Towards Goal     Problem: Patient Education: Go to Patient Education Activity  Goal: Patient/Family Education  Outcome: Progressing Towards Goal     Problem: Patient Education: Go to Patient Education Activity  Goal: Patient/Family Education  Outcome: Progressing Towards Goal     Problem: Nutrition Deficit  Goal: *Optimize nutritional status  Outcome: Progressing Towards Goal     Problem: Airway Clearance - Ineffective  Goal: *Patent airway  Outcome: Progressing Towards Goal  Goal: *Absence of airway secretions  Outcome: Progressing Towards Goal  Goal: *Able to cough effectively  Outcome: Progressing Towards Goal

## 2020-10-12 ENCOUNTER — APPOINTMENT (OUTPATIENT)
Dept: NON INVASIVE DIAGNOSTICS | Age: 60
DRG: 177 | End: 2020-10-12
Attending: INTERNAL MEDICINE
Payer: MEDICARE

## 2020-10-12 LAB
BACTERIA SPEC CULT: NORMAL
BACTERIA SPEC CULT: NORMAL
CRP SERPL-MCNC: 11.6 MG/DL (ref 0–0.6)
ERYTHROCYTE [SEDIMENTATION RATE] IN BLOOD: 128 MM/HR
HIV1 P24 AG SERPL QL IA: NEGATIVE
HIV1+2 AB SERPL QL IA: NEGATIVE
SPECIAL REQUESTS,SREQ: NORMAL

## 2020-10-12 PROCEDURE — 36415 COLL VENOUS BLD VENIPUNCTURE: CPT

## 2020-10-12 PROCEDURE — 74011250636 HC RX REV CODE- 250/636: Performed by: INTERNAL MEDICINE

## 2020-10-12 PROCEDURE — 87449 NOS EACH ORGANISM AG IA: CPT

## 2020-10-12 PROCEDURE — 74011250637 HC RX REV CODE- 250/637: Performed by: FAMILY MEDICINE

## 2020-10-12 PROCEDURE — 74011250637 HC RX REV CODE- 250/637: Performed by: INTERNAL MEDICINE

## 2020-10-12 PROCEDURE — 74011000250 HC RX REV CODE- 250: Performed by: INTERNAL MEDICINE

## 2020-10-12 PROCEDURE — 87389 HIV-1 AG W/HIV-1&-2 AB AG IA: CPT

## 2020-10-12 PROCEDURE — 93970 EXTREMITY STUDY: CPT

## 2020-10-12 PROCEDURE — 86256 FLUORESCENT ANTIBODY TITER: CPT

## 2020-10-12 PROCEDURE — 94640 AIRWAY INHALATION TREATMENT: CPT

## 2020-10-12 PROCEDURE — 99232 SBSQ HOSP IP/OBS MODERATE 35: CPT | Performed by: INTERNAL MEDICINE

## 2020-10-12 PROCEDURE — 65270000029 HC RM PRIVATE

## 2020-10-12 PROCEDURE — 74011250636 HC RX REV CODE- 250/636: Performed by: FAMILY MEDICINE

## 2020-10-12 PROCEDURE — 86140 C-REACTIVE PROTEIN: CPT

## 2020-10-12 PROCEDURE — 85652 RBC SED RATE AUTOMATED: CPT

## 2020-10-12 RX ADMIN — MEROPENEM 1 G: 1 INJECTION, POWDER, FOR SOLUTION INTRAVENOUS at 03:28

## 2020-10-12 RX ADMIN — IPRATROPIUM BROMIDE AND ALBUTEROL SULFATE 3 ML: .5; 3 SOLUTION RESPIRATORY (INHALATION) at 19:53

## 2020-10-12 RX ADMIN — OXYCODONE HYDROCHLORIDE AND ACETAMINOPHEN 1 TABLET: 5; 325 TABLET ORAL at 03:28

## 2020-10-12 RX ADMIN — ACETYLCYSTEINE 600 MG: 200 SOLUTION ORAL; RESPIRATORY (INHALATION) at 19:53

## 2020-10-12 RX ADMIN — DIPHENHYDRAMINE HYDROCHLORIDE 25 MG: 25 CAPSULE ORAL at 16:37

## 2020-10-12 RX ADMIN — GUAIFENESIN 400 MG: 200 SOLUTION ORAL at 11:31

## 2020-10-12 RX ADMIN — PANTOPRAZOLE SODIUM 40 MG: 40 TABLET, DELAYED RELEASE ORAL at 06:06

## 2020-10-12 RX ADMIN — GUAIFENESIN 400 MG: 200 SOLUTION ORAL at 06:09

## 2020-10-12 RX ADMIN — GUAIFENESIN 400 MG: 200 SOLUTION ORAL at 17:58

## 2020-10-12 RX ADMIN — DIPHENHYDRAMINE HYDROCHLORIDE 25 MG: 25 CAPSULE ORAL at 09:09

## 2020-10-12 RX ADMIN — DIPHENHYDRAMINE HYDROCHLORIDE 25 MG: 25 CAPSULE ORAL at 23:19

## 2020-10-12 RX ADMIN — IPRATROPIUM BROMIDE AND ALBUTEROL SULFATE 3 ML: .5; 3 SOLUTION RESPIRATORY (INHALATION) at 15:09

## 2020-10-12 RX ADMIN — OXYCODONE HYDROCHLORIDE AND ACETAMINOPHEN 1 TABLET: 5; 325 TABLET ORAL at 15:33

## 2020-10-12 RX ADMIN — FUROSEMIDE 40 MG: 10 INJECTION, SOLUTION INTRAMUSCULAR; INTRAVENOUS at 20:20

## 2020-10-12 RX ADMIN — AMLODIPINE BESYLATE 5 MG: 5 TABLET ORAL at 08:58

## 2020-10-12 RX ADMIN — MEROPENEM 1 G: 1 INJECTION, POWDER, FOR SOLUTION INTRAVENOUS at 17:58

## 2020-10-12 RX ADMIN — MEROPENEM 1 G: 1 INJECTION, POWDER, FOR SOLUTION INTRAVENOUS at 08:58

## 2020-10-12 RX ADMIN — FUROSEMIDE 40 MG: 10 INJECTION, SOLUTION INTRAMUSCULAR; INTRAVENOUS at 08:58

## 2020-10-12 RX ADMIN — ATORVASTATIN CALCIUM 20 MG: 20 TABLET, FILM COATED ORAL at 20:20

## 2020-10-12 NOTE — PROGRESS NOTES
Discussed with the OR staff this morning, we are planning to perform an EBUS tomorrow (10/13/20) at 07:30AM. I have ordered a CXR to occur in the morning and I have made him NPO after midnight except meds. Thank you.     Jamila Hernandez DO  [facility]

## 2020-10-12 NOTE — PROGRESS NOTES
Progress Note    Patient: Renata Yung MRN: [id number]  SSN: [id number]    YOB: 1960  Age: 63 y.o. Sex: male      Admit Date: 9/10/2020    LOS: 28 days      59M, H/o COPD not on oxygen with with shortness of breath s/t pneumonia complicated by left lung collapse.   Per pathology report no malignancy,post  Bronchoscopy,      Subjective:     Patient is seen for follow-up,  shortness of breath tbetter and weakness patient denies fever chills, denies wheezing           Current Facility-Administered Medications:     oxyCODONE-acetaminophen (PERCOCET) 5-325 mg per tablet 1 Tab, 1 Tab, Oral, Q12H PRN, Sonia Virk MD, 1 Tab at 10/12/20 0328    amLODIPine (NORVASC) tablet 5 mg, 5 mg, Oral, DAILY, Sonia Virk MD, 5 mg at 10/12/20 0858    furosemide (LASIX) injection 40 mg, 40 mg, IntraVENous, BID, Sonia Virk MD, 40 mg at 10/12/20 0858    meropenem (MERREM) 1 g in sterile water (preservative free) 20 mL IV syringe, 1 g, IntraVENous, Q8H, Samuel Claudio MD, 1 g at 10/12/20 0858    albuterol-ipratropium (DUO-NEB) 2.5 MG-0.5 MG/3 ML, 3 mL, Nebulization, Q6HWA RT, Jarett Ocampo DO, 3 mL at 10/11/20 2042    acetylcysteine (MUCOMYST) 200 mg/mL (20 %) solution 600 mg, 600 mg, Nebulization, BID, Darby Boswell MD    diphenhydrAMINE (BENADRYL) capsule 25 mg, 25 mg, Oral, Q6H PRN, Isela HERNANDEZ MD, 25 mg at 10/12/20 0909    mineral oil (topical), , , PRN, Duong Ruiz MD, 5 mL at 10/02/20 1125    guaiFENesin (ROBITUSSIN) 100 mg/5 mL oral liquid 400 mg, 400 mg, Oral, Q6H, Jarett Ocampo DO, 400 mg at 10/12/20 1131    lidocaine (XYLOCAINE) 10 mg/mL (1 %) injection, , , PRN, Jarett Ocampo DO, 15 mL at 10/02/20 1159    mineral oil (topical), , , PRN, Jarett Ocampo DO, 5 mL at 09/23/20 0820    0.9% sodium chloride infusion 250 mL, 250 mL, IntraVENous, PRN, Dequan Mauro MD    atorvastatin (LIPITOR) tablet 20 mg, 20 mg, Oral, DAILY, Dequan Mauro MD, 20 mg at 10/11/20 0352   pantoprazole (PROTONIX) tablet 40 mg, 40 mg, Oral, DAILY, Josie Downs MD, 40 mg at 10/12/20 0606    acetaminophen (TYLENOL) tablet 650 mg, 650 mg, Oral, Q4H PRN, Josie Downs MD, 650 mg at 20 2319    alum-mag hydroxide-simeth (MYLANTA) oral suspension 30 mL, 30 mL, Oral, Q4H PRN, Josie Downs MD, 30 mL at 20 2215    magnesium hydroxide (MILK OF MAGNESIA) 400 mg/5 mL oral suspension 30 mL, 30 mL, Oral, DAILY PRN, Josie Downs MD    ondansetron [facility]) injection 4 mg, 4 mg, IntraVENous, Q8H PRN, Josie Downs MD, Stopped at 10/07/20 1300    Objective:     Visit Vitals  /70   Pulse 100   Temp 99 °F (37.2 °C)   Resp 20   Ht 6' 0.99\" (1.854 m)   Wt 82.5 kg (181 lb 14.1 oz)   SpO2 97%   BMI 24.00 kg/m²    O2 Flow Rate (L/min): 3 l/min O2 Device: Nasal cannula     Temp (24hrs), Av.6 °F (37 °C), Min:97.2 °F (36.2 °C), Max:99.1 °F (37.3 °C)         Physical Exam:   General :  no respiratory distress noted, patient on nasal cannula oxygen  Lungs : Absent bs left lung not labored. CVS :  no gallop  Abdomen :  +++ventral hernia positive bowel sounds  Extremities : No edema noted,  Neurological : Awake, alert, oriented to time place person.  No neurological deficits.    Psychiatric : Mood and affect normal    Lab/Data Review:  No results found for this or any previous visit (from the past 24 hour(s)). CT abd 10/5. IMPRESSION:  1. Large ventral hernia containing nonobstructed small and large intestine. 2. Moderate to large right pleural effusion and mild to moderate left pleural  effusion. There is near complete collapse of the visualized portions of the left  lower lobe and there is pleural thickening in the left hemithorax. 3. Patchy airspace disease in the right lower lobe along with right basilar  atelectasis. 4. Nonspecific left adrenal nodule. 5. Other findings as described.      Pt at increased risk for procedure with resp compromise in addition to anatomical considerations of reducing massive hernia. Pt aware of inc mortality states \"I don't care if it kills me but it has to be done\". Abd ct pending, will need to recline on pillows for procedure as cannot lie flat. Pt aware of risk for surgery, likelihood that will remain on vent long term and he reiterates he wants it done despite the risk. Cardio to optimize for clearance. Surgery on case f/u recommendations  Next x-ray  AP upright view of the chest compared to 10/9/2020. Complete opacification of  the left hemithorax is again noted. Small right pleural effusion and diffuse  right-sided airspace disease suggesting pulmonary edema are again noted. No  pneumothorax. Cardiac silhouette is obscured.     IMPRESSION  IMPRESSION: No significant change. See above. Assessment and plan:        Acute hypoxic respiratory failure :, Resolving,currently patient on nasal cannula worsening shortness of breath today, x-ray showed some pulmonary edema elevated BNP ,Complete opacification of the left hemithorax is again noted  left lung collapse/PNA:   left lung remains largely opacified by pathology questionable malignancy per pulmonology biopsy, adenocarcinoma and patient may not a candidate for further procedure. patient currently on Levaquin and Zosyn, so far blood culture negative  Chest PT, Q6H nebs, guaifenesin, lasix with negative fluid target. Hernia complicating, pt wants it reduced despite increased mortality risk. General surgeon on board,chronic 6+ years neglected follow up. Surgery cx appreciated Dr. Liana Liu. Case discussed with , no surgery planned until the pneumonia resolved  discussed with patient again,he understand  COPD:  pulm following.  stable now  Anemia: AOCI.  Stable, check cbc in AM  HTN : Controlled, ith a low side BP,BP meds adjusted we  consulted ID for input for pneumonia, chronic lung inflammation infection   BNP 3133   change Lasix to IV, follow-up pulmonary status  Edema: Bilateral lower extremity may due to CHF, check  duplex ultrasound to rule out a DVT  DVT ppx: lovenox     DISPO: SNF anticipated when stable      Signed By: Raisa Vaz MD     October 12, 2020

## 2020-10-12 NOTE — PROGRESS NOTES
Progress Note    Patient: Eber Saint MRN: [id number]  SSN: [id number]    YOB: 1960  Age: 63 y.o. Sex: male      Admit Date: 9/10/2020    LOS: 32 days     Subjective:   Patient followed for chronic, refractory pneumonia of undetermined etiology despite extensive microbiologic work-up. He remains afebrile with normal WBC and procalcitonin, but his CRP and ESR are markedly elevated. Bronchial washing cultures remain negative or insignificant. He is now on IV Meropenem. Patient resting comfortably. Objective:     Vitals:    10/11/20 2045 10/12/20 0700 10/12/20 1123 10/12/20 1436   BP:  133/80 133/80 109/61   Pulse:  82 82 96   Resp:  18 18 18   Temp:  97.5 °F (36.4 °C) 97.5 °F (36.4 °C) 97.8 °F (36.6 °C)   SpO2: 96% 100%     Weight:       Height:            Intake and Output:  Current Shift: 10/12 0701 - 10/12 1900  In: -   Out: 1000 [Urine:1000]  Last three shifts: 10/10 1901 - 10/12 0700  In: -   Out: 276 [Urine:275]    Physical Exam:    Vitals signs and nursing note reviewed. Constitutional:       General: He is not in acute distress. Appearance: He is ill-appearing. He is not toxic-appearing or diaphoretic. HENT:      Head: Normocephalic and atraumatic. Eyes:      Extraocular Movements: Extraocular movements intact. Pupils: Pupils are equal, round, and reactive to light. Neck:      Musculoskeletal: Neck supple. Cardiovascular:      Rate and Rhythm: Regular rhythm. Tachycardia present. Heart sounds: Murmur present. Pulmonary:      Breath sounds: Rhonchi and rales present. No wheezing. Comments: Decreased breath sounds left lung field  Abdominal:      General: There is distension (ventral hernia with scarring and crusting). Tenderness: There is no abdominal tenderness. There is no guarding. Genitourinary:     Penis: Normal.       Comments: No Padilla  Musculoskeletal:      Right lower leg: Edema present. Left lower leg: Edema present.    Skin: Findings: Erythema (both calves) present. Neurological:      General: No focal deficit present. Mental Status: He is alert and oriented to person, place, and time. Psychiatric:         Mood and Affect: Mood normal.         Behavior: Behavior normal.         Thought Content: Thought content normal.         Judgment: Judgment normal.        Lab/Data Review:    WBC 9,000  Procalcitonin <0.05  CRP Pending (12.50 (11.20)      Bronchial washing fungus (10/7)  Pending  Bronchial washing AFB (10/7) smear negative  Bronchial washing culture (10/7) Normal respiratory xiomara  Assessment:     1. Chronic pneumonia, ?recurrent, etiology unclear, Day #31 IV Antibiotics, Day #2 IV Meropenem. 2. Markedly elevated CRP and ESR  3. Chronic venous insufficiency with stasis dermatitis  4. Large ventral hernia     Comment:       Plan:   1. Continue IV Meropenem  2. In am, repeat CRP, ESR, done  3.  Follow-up C-ANCA, P-ANCA, serum fungitell         Signed By: Wei Millan MD     October 12, 2020

## 2020-10-12 NOTE — PROGRESS NOTES
Unable to locate reason for picc called the floor and spoke with Elsy the primary nurse and she stated she didn't believe he was getting a picc and would call the picc team if something changed.

## 2020-10-12 NOTE — PROGRESS NOTES
Pt chart reviewed and PT re-assessment attempted at 1350. Pt refused therapy despite max encouragement tating \"I'm not going to get out of bed or even move my arms and legs. I'm sleepy. \" Pt educated on the importance of mobility. Will continue to monitor and attempt at later date. Thank you.

## 2020-10-12 NOTE — PROGRESS NOTES
Problem: Patient Education: Go to Patient Education Activity  Goal: Patient/Family Education  Description: LE HEP to improve strength and functional mobility       Outcome: Progressing Towards Goal     Problem: Falls - Risk of  Goal: *Absence of Falls  Description: Document Jacobo Click Fall Risk and appropriate interventions in the flowsheet.   Outcome: Progressing Towards Goal  Note: Fall Risk Interventions:  Mobility Interventions: Bed/chair exit alarm    Mentation Interventions: Adequate sleep, hydration, pain control    Medication Interventions: Bed/chair exit alarm    Elimination Interventions: Bed/chair exit alarm    History of Falls Interventions: Bed/chair exit alarm         Problem: Patient Education: Go to Patient Education Activity  Goal: Patient/Family Education  Outcome: Progressing Towards Goal     Problem: Patient Education: Go to Patient Education Activity  Goal: Patient/Family Education  Outcome: Progressing Towards Goal     Problem: Patient Education: Go to Patient Education Activity  Goal: Patient/Family Education  Outcome: Progressing Towards Goal     Problem: Nutrition Deficit  Goal: *Optimize nutritional status  Outcome: Progressing Towards Goal     Problem: Airway Clearance - Ineffective  Goal: *Patent airway  Outcome: Progressing Towards Goal  Goal: *Absence of airway secretions  Outcome: Progressing Towards Goal  Goal: *Able to cough effectively  Outcome: Progressing Towards Goal

## 2020-10-12 NOTE — PROGRESS NOTES
Pulm/CC Progress Note    Subjective:   Daily Progress Note: 10/12/2020     Patient seen and examined at the bedside this evening. He is awake and alert. On 3 L of oxygen via nasal cannula. Is comfortable. Patient states that he is very tired and is getting sick of being in the hospital.  patient states that he does not feel he is ready for something like Hospice. Chest x-ray morning reviewed, persistent collapse of the entire left lung with mucus. General surgery is following given his significantly large ventral hernia which is clearly decreasing his ability to cough up his mucus. Unfortunately, given a questionable diagnosis of malignancy, they postponed any surgical management at this time. CT chest/abdomen/pelvis reviewed from 10/5/2020. Patient does have a left paratracheal node measuring 2.4 x 1.4 cm, otherwise no other evidence of definitive malignancy on CT imaging. Is undergone 8 bronchoscopies with mucus suctioning and has had several biopsies of the lesions within his tracheobronchial tree as well as brushings. He has had squamous metaplasia return, but no definitive malignancy. Oncology has been consulted and are asking if an EBUS would be warranted in this case in an attempt to rule in/out a malignancy. Review of Systems   Has complains of shortness of breath. He is on nasal cannula oxygen. Denied any nausea vomiting.   Has poor appetite    Objective:     Visit Vitals  /80   Pulse 82   Temp 97.5 °F (36.4 °C)   Resp 18   Ht 6' 0.99\" (1.854 m)   Wt 84.1 kg (185 lb 6.5 oz)   SpO2 100%   BMI 24.47 kg/m²    O2 Flow Rate (L/min): 4 l/min O2 Device: Nasal cannula    Temp (24hrs), Av.8 °F (36.6 °C), Min:97.5 °F (36.4 °C), Max:98.1 °F (36.7 °C)      10/12 0701 - 10/12 1900  In: -   Out: 1000 [Urine:1000]  10/10 1901 - 10/12 0700  In: -   Out: 276 [Urine:275]    Current Facility-Administered Medications   Medication Dose Route Frequency    oxyCODONE-acetaminophen (PERCOCET) 5-325 mg per tablet 1 Tab  1 Tab Oral Q12H PRN    amLODIPine (NORVASC) tablet 5 mg  5 mg Oral DAILY    furosemide (LASIX) injection 40 mg  40 mg IntraVENous BID    meropenem (MERREM) 1 g in sterile water (preservative free) 20 mL IV syringe  1 g IntraVENous Q8H    albuterol-ipratropium (DUO-NEB) 2.5 MG-0.5 MG/3 ML  3 mL Nebulization Q6HWA RT    acetylcysteine (MUCOMYST) 200 mg/mL (20 %) solution 600 mg  600 mg Nebulization BID    diphenhydrAMINE (BENADRYL) capsule 25 mg  25 mg Oral Q6H PRN    mineral oil (topical)    PRN    guaiFENesin (ROBITUSSIN) 100 mg/5 mL oral liquid 400 mg  400 mg Oral Q6H    lidocaine (XYLOCAINE) 10 mg/mL (1 %) injection    PRN    mineral oil (topical)    PRN    0.9% sodium chloride infusion 250 mL  250 mL IntraVENous PRN    atorvastatin (LIPITOR) tablet 20 mg  20 mg Oral DAILY    pantoprazole (PROTONIX) tablet 40 mg  40 mg Oral DAILY    acetaminophen (TYLENOL) tablet 650 mg  650 mg Oral Q4H PRN    alum-mag hydroxide-simeth (MYLANTA) oral suspension 30 mL  30 mL Oral Q4H PRN    magnesium hydroxide (MILK OF MAGNESIA) 400 mg/5 mL oral suspension 30 mL  30 mL Oral DAILY PRN    ondansetron (ZOFRAN) injection 4 mg  4 mg IntraVENous Q8H PRN       Physical Exam:  General: Alert and oriented x3.  3 L nasal cannula. Relatively pleasant. HEENT: atraumatic, PERRL, moist mucosa,     Neck: Trachea midline, no carotid bruit, no masses. Supple but thick. Respiratory: No breath sounds on the left side. Few crackles and rhonchi on the right side. No significant change. Cardiovascular: RRR, no m/r/g, Normal S1 and S2   abdomen: Has large ventral abdominal hernia, evidence of surgical scars soft,   Rectal: deferred  Extremities: no cyanosis or clubbing. He has significant pitting edema in the dependent portions and lower extremities. Integumentary: warm, dry, and pink, with no rash, purpura, or petechia.    Heme/Lymph: no lymphadenopathy, no bruises  Neurological: No focal motor deficit   psychiatric: cooperative with normal mood, affect, and cognition      Additional comments: Chest x-rays reviewed    Data Review    No results found for this or any previous visit (from the past 24 hour(s)). 6 results from 10/3/2020 were reviewed  Patient has left basal atelectasis. XR CHEST PORT   Final Result   IMPRESSION: No significant change. See above. XR CHEST PA LAT   Final Result   Impression:      Collapse of left lung again noted with decreased aeration of the upper lobe   compared to yesterday increased opacity of the right lung may be due to portable   technique. XR CHEST PORT   Final Result   Impression:      Stable aeration of the left upper lung with atelectasis. Stable appearing   bilateral pleural effusions. No significant change compared to the prior study from 10/7/2020. XR CHEST PORT   Final Result      XR FLUOROSCOPY UNDER 60 MINUTES   Final Result      XR CHEST PORT   Final Result      XR CHEST PORT   Final Result   FINDINGS: Impression: Frontal single view chest.      Continued opacification of the left hemithorax, obscuring the cardiac   silhouette. Right lung interstitial thickening, airspace disease, bronchial thickening,   pleural effusion similar to previous. No pneumothorax. CT CHEST WO CONT   Final Result   Impression:    1. Increased now complete opacification of the left bronchi with low density   material.   2. Slightly increased patchy airspace pneumonia in the right lung. 3. Unchanged loculated and nonloculated left pleural effusion, complete left   lung consolidation, right lung pleural effusion. CT ABD PELV W CONT   Final Result   IMPRESSION:   1. Large ventral hernia containing nonobstructed small and large intestine. 2. Moderate to large right pleural effusion and mild to moderate left pleural   effusion.  There is near complete collapse of the visualized portions of the left   lower lobe and there is pleural thickening in the left hemithorax. 3. Patchy airspace disease in the right lower lobe along with right basilar   atelectasis. 4. Nonspecific left adrenal nodule. 5. Other findings as described. XR CHEST PORT   Final Result   Impression: Heterogeneous opacity in the right lung, not significantly changed. Now complete opacification of the left hemithorax. XR CHEST PORT   Final Result   IMPRESSION: No significant change. XR CHEST AP LAT   Final Result      XR CHEST AP LAT   Final Result   IMPRESSION:   1. Persistent opacification of the left hemithorax with volume loss. 2.  Moderate right pleural effusion with probable associated right basilar   atelectasis. XR CHEST PORT   Final Result   Impression: Increased volume loss left lung. Otherwise stable. XR CHEST PORT   Final Result   IMPRESSION:   1. Improved aeration of the left lung with persistent basilar consolidative   opacities and probable pleural effusions. 2. Other extensive interstitial and alveolar opacities are nonspecific, but   potentially related to pulmonary edema or infection. XR CHEST PORT   Final Result   Impression:Left lung collapse without improvement from prior study. XR CHEST PORT   Final Result   IMPRESSION:   1. There is milder enlargement of the cardiac silhouette as well as increasing   pulmonary vascular ill definition suspect for mild pulmonary edema. This could   be related to cardiogenic edema or fluid overload. 2.  There is been an improvement in the overall aeration of the left lung with   and improved visualization of vascular markings within the left upper lung zone. There still remains significant partial collapse of the left lung. 3.  There is increased patchy airspace disease laterally within the right lower   lobe just above the right lateral costophrenic angle. 4.  There are persistent moderate-sized bilateral pleural effusions.       XR CHEST AP LAT   Final Result   IMPRESSION: Persistent collapse of the left lung with bilateral pleural   effusions. Increasing vascular congestion on the right. XR CHEST PA LAT   Final Result   Impression:   Ongoing near complete white out of the left lung. Little interval change since   the prior examination. CT CHEST WO CONT   Final Result   IMPRESSION: Complete collapse of the left lung due to complete bronchial   obstruction, possibly aspiration. Fluid overload also noted      XR ABD ACUTE W 1 V CHEST   Final Result   IMPRESSION: Opacification of the left hemithorax, questioned for remote   pneumonectomy or lung collapse with pleural fluid. Prominent right parenchymal/interstitial lung markings compatible with fibrosis   and possible partial vascular congestion. Small bowel gas, nonobstructive pattern. XR CHEST PORT    (Results Pending)          Assessment/Plan: This is a middle-aged male who was admitted because of increasing symptoms of shortness of breath. He is getting treated in hospital for pneumonia with IV Zosyn, was on Azithromycin. He is noted to be increasingly tachypneic and short of breath. He has been on supplemental oxygen. His chest x-ray is showing persistent left lung opacification from mucous plugging. This has not improved with aggressive pulmonary hygiene and 8 suction bronchoscopies. Additionally, there is concern that the patient may have a primary lung malignancy, work-up ongoing.     Plan:     1.)    Left lung collapse/shortness of breath:  - Shortness of breath and hypoxia due to recurrent left lung collapse. He had multiple bronchoscopies done along with lavage but he continues to have left lung collapse. The patient's most recent bronchoscopy was on 10/7/2020 under general anesthesia. There was complete collapse from mucous plugging of the left lung. Mucous was up to the main vianey. He was suctioned aggressively.   Post suctioning there appears to be multiple endobronchial lesions in the distal left main bronchus in the beginning of the left lower lobe bronchus. Brushings and biopsies were obtained. Chest x-ray from 10/10 shows persistent left lung collapse from mucous plugging. He appears comfortable. On 3L of oxygen. Recurrent collapse is due to multiple reasons including large abdominal hernia, weak cough, COPD and multiple endobronchial lesions although they are not causing significant obstruction. CT of the chest done on 10/5 without IV contrast was personally reviewed with radiologist.  Left main bronchus shows mucus. Left lung is completely collapsed. Small left-sided pleural effusion. Larger right-sided pleural effusion. Right lung pneumonia. Additionally there is a left paratracheal lymph node measuring 2.4 x 1.4 cm. CT of the abdomen pelvis shows a large hernia. Dr. Glass Nicely also discussed with the pathologist.  Cytology from the previous bronchoscopy showing atypical squamous cells. Cultures have been negative. Cytology and pathology again from this bronch done on 10/7 is showing atypical cells with squamous metaplasia but no clear-cut evidence of malignancy. Clinically it's certainly possible that he has an underlying malignancy. Oncology has been consulted, appreciate their recommendations. The patient was also informed that when he has the surgery done he will probably end up on the ventilator. There is a possibility that he will need a tracheostomy. Discussed with surgeon. Surgery has been postponed at this time due to a possible malignancy diagnosis. I feel that at this time it is most prudent to rule in/out malignancy. Given that he does have an accessible paratracheal lymph node, he will need endobronchial ultrasound with fine-needle aspiration biopsy of this lymph node. This may be our only way to diagnose definitively a malignancy.   Generally, the benefit of a repeat bronchoscopy that we can perform aggressive suctioning of mucus again, I can send copious mucus for cytology in case this is a mucinous adenocarcinoma and bronchorrhea is his main symptom (however bronchorrhea is typically thinner respiratory secretions not thick mucus causing collapse of the lung). Patient is scheduled for EBUS on Tuesday, patient is agreeable. If the biopsy is positive for malignancy, then palliative options are the most likely management at that time. If it is negative for malignancy, then it warrants further discussion with surgery regarding pursuing a ventral hernia surgery. Recommend Infectious Disease consultation given that the patient's pneumonia is simply not improving even though he has been on Zosyn for 4 weeks. We are if we are missing something, it seems that my first bronchoalveolar lavage was unrevealing for a true infectious pathogen. There was questionable yeast, however I felt it was very unlikely that he has been suffering from candidal pneumonia this entire time, he has never been on antifungal therapy. Discussed with respiratory. Raise the left lung up and do chest PT. however he declines some treatment by RT. He has been ordered pulmonary hygiene with nebulizers every 6 hours;  Aggressive chest PT, EZ-PAP therapy q6, acapella device as well as incentive spirometer use. Added guaifenesin 400 mg q6. Mucomyst also ordered. 2.)  COPD:  He has a history of COPD based on chronic smoking. Continue scheduled bronchodilators. Patient should be discharged with a LAMA/LABA such as Anoro and needs f/u in our office for PFT's and further management. Weaned off prednisone. 3.)  Pneumonia:   He is on IV Zosyn and Levaquin, stopped Zithromax on 9/23/2020. Cultures from recent bronchoscopy showing normal xiomara. Infectious disease consultation. 4.)  Hypertension:  He is on antihypertensive medications, BP's stable. Patient also has clinically fluid overload, congestive heart failure.   Echocardiogram done on 9/16 showed an EF of 60 to 65% grade 2 diastolic dysfunction and moderate to severe aortic stenosis. Right ventricle is normal in size and function. Will resume Lasix. Agree with cardiology evaluation. Questions of patient were answered at bedside in detail  Case discussed in detail with RN, RT, and care team  Thank you for involving me in the care of this patient  I will follow with you closely during hospitalization    Time spent more than 30 minutes in direct patient care with no overlap      SIMA Sanchez MD  Pulmonary and critical care

## 2020-10-12 NOTE — PROGRESS NOTES
Hematology/Oncology   Progress Note    Patient: Uday Greenfield MRN: [id number]     YOB: 1960  Age: 63 y.o. Sex: male      Chief Complaint: Admitted with worsening shortness of breath,    Subjective:     Reports shortness of breath +  , He is requiring 4 L of oxygen via nasal canula. States seen by pulmonary and reprots EBUS and biopsy scheduled for tomorrow. Constitutional No fevers, chills, night sweats, excessive fatigue or weight loss. Allergic/Immunologic No recent allergic reactions   Eyes No significant visual difficulties. No diplopia. ENMT No problems with hearing, no sore throat, no sinus drainage. Endocrine No hot flashes or night sweats. No cold intolerance, polyuria, or polydipsia   Hematologic/Lymphatic No easy bruising or bleeding. The patient denies any tender or palpable lymph nodes   Breasts No abnormal masses of breast, nipple discharge or pain. Respiratory No dyspnea on exertion, orthopnea, chest pain, cough or hemoptysis. Cardiovascular No anginal chest pain, irregular heart beat, tachycardia, palpitations or orthopnea. Gastrointestinal No nausea, vomiting, diarrhea, constipation, cramping, dysphagia, reflux, heartburn, GI bleeding, or early satiety. No change in bowel habits. Genitourinary (M) No hematuria, dysuria, increased frequency, urgency, hesitancy or incontinence. Musculoskeletal No joint pain, swelling or redness. No decreased range of motion. Integumentary No chronic rashes, inflammation, ulcerations, pruritus, petechiae, purpura, ecchymoses, or skin changes. Neurologic No headache, blurred vision, and no areas of focal weakness or numbness. Normal gait. No sensory problems. Psychiatric No insomnia, depression, james or mood swings. No psychotropic drugs.         Current Facility-Administered Medications:     oxyCODONE-acetaminophen (PERCOCET) 5-325 mg per tablet 1 Tab, 1 Tab, Oral, Q12H PRN, Tessa Lechuga MD, 1 Tab at 10/12/20 1015   amLODIPine (NORVASC) tablet 5 mg, 5 mg, Oral, DAILY, Reg Mena MD, 5 mg at 10/12/20 0858    furosemide (LASIX) injection 40 mg, 40 mg, IntraVENous, BID, Reg Mena MD, 40 mg at 10/12/20 0858    meropenem (MERREM) 1 g in sterile water (preservative free) 20 mL IV syringe, 1 g, IntraVENous, Q8H, Ney Tobar MD, 1 g at 10/12/20 1758    albuterol-ipratropium (DUO-NEB) 2.5 MG-0.5 MG/3 ML, 3 mL, Nebulization, Q6HWA RT, Jarett Ocampo DO, 3 mL at 10/12/20 1509    acetylcysteine (MUCOMYST) 200 mg/mL (20 %) solution 600 mg, 600 mg, Nebulization, BID, Darby Boswell MD    diphenhydrAMINE (BENADRYL) capsule 25 mg, 25 mg, Oral, Q6H PRN, Mp HERNANDEZ MD, 25 mg at 10/12/20 1637    mineral oil (topical), , , PRN, Gisselle Amado MD, 5 mL at 10/02/20 1125    guaiFENesin (ROBITUSSIN) 100 mg/5 mL oral liquid 400 mg, 400 mg, Oral, Q6H, Jarett Ocampo DO, 400 mg at 10/12/20 1758    lidocaine (XYLOCAINE) 10 mg/mL (1 %) injection, , , PRN, Jarett Ocampo DO, 15 mL at 10/02/20 1159    mineral oil (topical), , , PRN, Jarett Ocampo DO, 5 mL at 09/23/20 0820    0.9% sodium chloride infusion 250 mL, 250 mL, IntraVENous, PRN, Kristen Sparks MD    atorvastatin (LIPITOR) tablet 20 mg, 20 mg, Oral, DAILY, Kristen Sparks MD, 20 mg at 10/11/20 2035    pantoprazole (PROTONIX) tablet 40 mg, 40 mg, Oral, DAILY, Kristen Sparks MD, 40 mg at 10/12/20 0606    acetaminophen (TYLENOL) tablet 650 mg, 650 mg, Oral, Q4H PRN, Kristen Sparks MD, 650 mg at 09/24/20 2319    alum-mag hydroxide-simeth (MYLANTA) oral suspension 30 mL, 30 mL, Oral, Q4H PRN, Kristen Sparks MD, 30 mL at 09/22/20 2215    magnesium hydroxide (MILK OF MAGNESIA) 400 mg/5 mL oral suspension 30 mL, 30 mL, Oral, DAILY PRN, Kristen Sparks MD    ondansetron [facility]) injection 4 mg, 4 mg, IntraVENous, Q8H PRN, Kristen Sparks MD, Stopped at 10/07/20 1300     Objective:     Vitals:    10/12/20 0700 10/12/20 1123 10/12/20 1436 10/12/20 1507 BP: 133/80 133/80 109/61    Pulse: 82 82 96    Resp: 18 18 18    Temp: 97.5 °F (36.4 °C) 97.5 °F (36.4 °C) 97.8 °F (36.6 °C)    SpO2: 100%  98% 97%   Weight:       Height:              Physical Exam:   Constitutional Chronically ill appearing +   Head Normocephalic; no scars   Eyes Conjunctivae and sclerae are clear and without icterus. Pupils are reactive and equal.   ENMT Sinuses are nontender. No oral exudates, ulcers, masses, thrush or mucositis. Oropharynx clear. Tongue normal.   Neck Supple without masses or thyromegaly. No jugular venous distension. Hematologic/Lymphatic No petechiae or purpura. No tender or palpable lymph nodes in the cervical, supraclavicular, axillary or inguinal area. Respiratory Lungs are clear to auscultation without rhonchi or wheezing. Cardiovascular Regular rate and rhythm of heart without murmurs, gallops or rubs. Chest / Line Site Chest is symmetric with no chest wall deformities. Abdomen Large incisional hernia +   Musculoskeletal No tenderness or swelling, normal range of motion without obvious weakness. Extremities Leg swelling +   Skin No rashes, scars, or lesions suggestive of malignancy. No petechiae, purpura, or ecchymoses. No excoriations. Neurologic No sensory or motor deficits, normal cerebellar function, normal gait, cranial nerves intact. Psychiatric Alert and oriented times three. Coherent speech. Verbalizes understanding of our discussions today.        Lab/Data Review:  Recent Results (from the past 24 hour(s))   C REACTIVE PROTEIN, QT    Collection Time: 10/12/20  3:45 PM   Result Value Ref Range    C-Reactive protein 11.60 (H) 0.00 - 0.60 mg/dL   SED RATE, AUTOMATED    Collection Time: 10/12/20  3:45 PM   Result Value Ref Range    Sed rate, automated 128 mm/hr   HIV-1,2 P24 AG/AB SCREEN    Collection Time: 10/12/20  3:45 PM   Result Value Ref Range    p24 Antigen Negative (A) Nonreactive      HIV-1,2 Ab Negative (A) Nonreactive Radiology:      Assessment and Plan: Active Problems:    HTN (hypertension) (3/15/2010)      High cholesterol (3/15/2010)      Coagulation disorder ([facility] [address] (5/30/2012)      Pneumonia (1/1/2014)      Alcoholism ([facility] [address] (1/1/2014)      Hypertension (10/9/2020)    Mediastinal Adenopathy:  - Reported on most recent CT with a 2.4 cm x 1.4 cm left paratracheal lymph node. - Multiple bronchoscopy and biopsy/washings- failed to show evidence of malignancy. - Per Dr. Majel Halsted remains high due to endobronchial lesions and recurrent left lung collapse. - Options include EBUS and bx of the left paratracheal node, if feasible versus out-patient PET for further evaluation.  - EBUS  tomorrow with Dr. Arnaldo Marie. - overall appears to have poor performance status.      Anemia:  - Ferritin is normal, although could be elevated due to inflammation. B12 is low normal- will start oral Iron and B12. Chronic inflammation also contributing    Lower Extremity Swelling:  - venous doppler negative for DVT.          Signed By: Sanya Salguero MD     October 12, 2020

## 2020-10-13 ENCOUNTER — ANESTHESIA EVENT (OUTPATIENT)
Dept: SURGERY | Age: 60
DRG: 177 | End: 2020-10-13
Payer: MEDICARE

## 2020-10-13 ENCOUNTER — APPOINTMENT (OUTPATIENT)
Dept: GENERAL RADIOLOGY | Age: 60
DRG: 177 | End: 2020-10-13
Attending: INTERNAL MEDICINE
Payer: MEDICARE

## 2020-10-13 ENCOUNTER — ANESTHESIA (OUTPATIENT)
Dept: SURGERY | Age: 60
DRG: 177 | End: 2020-10-13
Payer: MEDICARE

## 2020-10-13 LAB
ANION GAP SERPL CALC-SCNC: ABNORMAL MMOL/L (ref 5–15)
BASOPHILS # BLD: 0 K/UL (ref 0–0.1)
BASOPHILS NFR BLD: 0 % (ref 0–1)
BNP SERPL-MCNC: 4386 PG/ML
BUN SERPL-MCNC: 12 MG/DL (ref 6–20)
BUN/CREAT SERPL: 14 (ref 12–20)
CA-I BLD-MCNC: 8.1 MG/DL (ref 8.5–10.1)
CHLORIDE SERPL-SCNC: 93 MMOL/L (ref 97–108)
CO2 SERPL-SCNC: >45 MMOL/L (ref 21–32)
CREAT SERPL-MCNC: 0.84 MG/DL (ref 0.7–1.3)
CRP SERPL-MCNC: 8.04 MG/DL (ref 0–0.6)
DIFFERENTIAL METHOD BLD: ABNORMAL
EOSINOPHIL # BLD: 0 K/UL (ref 0–0.4)
EOSINOPHIL NFR BLD: 0 % (ref 0–7)
ERYTHROCYTE [DISTWIDTH] IN BLOOD BY AUTOMATED COUNT: 19.7 % (ref 11.5–14.5)
FERRITIN SERPL-MCNC: 90 NG/ML (ref 26–388)
FOLATE SERPL-MCNC: 6.2 NG/ML (ref 5–21)
GLUCOSE SERPL-MCNC: 130 MG/DL (ref 65–100)
HCT VFR BLD AUTO: 25.7 % (ref 36.6–50.3)
HGB BLD-MCNC: 7.8 G/DL (ref 12.1–17)
IMM GRANULOCYTES # BLD AUTO: 0 K/UL (ref 0–0.04)
IMM GRANULOCYTES NFR BLD AUTO: 0 % (ref 0–0.5)
IRON SATN MFR SERPL: 17 % (ref 20–50)
IRON SERPL-MCNC: 38 UG/DL (ref 35–150)
LYMPHOCYTES # BLD: 0.4 K/UL (ref 0.8–3.5)
LYMPHOCYTES NFR BLD: 5 % (ref 12–49)
MCH RBC QN AUTO: 29.8 PG (ref 26–34)
MCHC RBC AUTO-ENTMCNC: 30.4 G/DL (ref 30–36.5)
MCV RBC AUTO: 98.1 FL (ref 80–99)
MONOCYTES # BLD: 0.4 K/UL (ref 0–1)
MONOCYTES NFR BLD: 5 % (ref 5–13)
NEUTS SEG # BLD: 7 K/UL (ref 1.8–8)
NEUTS SEG NFR BLD: 90 % (ref 32–75)
PLATELET # BLD AUTO: 339 K/UL (ref 150–400)
PMV BLD AUTO: 9.1 FL (ref 8.9–12.9)
POTASSIUM SERPL-SCNC: 3.7 MMOL/L (ref 3.5–5.1)
RBC # BLD AUTO: 2.62 M/UL (ref 4.1–5.7)
SODIUM SERPL-SCNC: 138 MMOL/L (ref 136–145)
TIBC SERPL-MCNC: 224 UG/DL (ref 250–450)
VIT B12 SERPL-MCNC: 239 PG/ML (ref 193–986)
WBC # BLD AUTO: 7.7 K/UL (ref 4.1–11.1)

## 2020-10-13 PROCEDURE — 87070 CULTURE OTHR SPECIMN AEROBIC: CPT

## 2020-10-13 PROCEDURE — 36415 COLL VENOUS BLD VENIPUNCTURE: CPT

## 2020-10-13 PROCEDURE — 2709999900 HC NON-CHARGEABLE SUPPLY: Performed by: INTERNAL MEDICINE

## 2020-10-13 PROCEDURE — 76010000149 HC OR TIME 1 TO 1.5 HR: Performed by: INTERNAL MEDICINE

## 2020-10-13 PROCEDURE — 74011000250 HC RX REV CODE- 250: Performed by: NURSE ANESTHETIST, CERTIFIED REGISTERED

## 2020-10-13 PROCEDURE — 74011000250 HC RX REV CODE- 250: Performed by: INTERNAL MEDICINE

## 2020-10-13 PROCEDURE — 85025 COMPLETE CBC W/AUTO DIFF WBC: CPT

## 2020-10-13 PROCEDURE — 77030003406 HC NDL ASPIR BIOP OCOA -C: Performed by: INTERNAL MEDICINE

## 2020-10-13 PROCEDURE — 71045 X-RAY EXAM CHEST 1 VIEW: CPT

## 2020-10-13 PROCEDURE — 87102 FUNGUS ISOLATION CULTURE: CPT

## 2020-10-13 PROCEDURE — 86140 C-REACTIVE PROTEIN: CPT

## 2020-10-13 PROCEDURE — 94760 N-INVAS EAR/PLS OXIMETRY 1: CPT

## 2020-10-13 PROCEDURE — 74011000258 HC RX REV CODE- 258: Performed by: NURSE ANESTHETIST, CERTIFIED REGISTERED

## 2020-10-13 PROCEDURE — 74011250636 HC RX REV CODE- 250/636: Performed by: FAMILY MEDICINE

## 2020-10-13 PROCEDURE — 94762 N-INVAS EAR/PLS OXIMTRY CONT: CPT

## 2020-10-13 PROCEDURE — 99232 SBSQ HOSP IP/OBS MODERATE 35: CPT | Performed by: INTERNAL MEDICINE

## 2020-10-13 PROCEDURE — 88173 CYTOPATH EVAL FNA REPORT: CPT

## 2020-10-13 PROCEDURE — 74011250637 HC RX REV CODE- 250/637: Performed by: FAMILY MEDICINE

## 2020-10-13 PROCEDURE — 88108 CYTOPATH CONCENTRATE TECH: CPT

## 2020-10-13 PROCEDURE — 83880 ASSAY OF NATRIURETIC PEPTIDE: CPT

## 2020-10-13 PROCEDURE — BB4CZZZ ULTRASONOGRAPHY OF MEDIASTINUM: ICD-10-PCS | Performed by: INTERNAL MEDICINE

## 2020-10-13 PROCEDURE — 74011250637 HC RX REV CODE- 250/637: Performed by: INTERNAL MEDICINE

## 2020-10-13 PROCEDURE — 97530 THERAPEUTIC ACTIVITIES: CPT

## 2020-10-13 PROCEDURE — 77030041703 HC SLV COMPR DVT ARJO -B: Performed by: INTERNAL MEDICINE

## 2020-10-13 PROCEDURE — 0BJ08ZZ INSPECTION OF TRACHEOBRONCHIAL TREE, VIA NATURAL OR ARTIFICIAL OPENING ENDOSCOPIC: ICD-10-PCS | Performed by: INTERNAL MEDICINE

## 2020-10-13 PROCEDURE — 88172 CYTP DX EVAL FNA 1ST EA SITE: CPT

## 2020-10-13 PROCEDURE — 85652 RBC SED RATE AUTOMATED: CPT

## 2020-10-13 PROCEDURE — 77030012699 HC VLV SUC CNTRL OCOA -A: Performed by: INTERNAL MEDICINE

## 2020-10-13 PROCEDURE — 76060000033 HC ANESTHESIA 1 TO 1.5 HR: Performed by: INTERNAL MEDICINE

## 2020-10-13 PROCEDURE — 65270000029 HC RM PRIVATE

## 2020-10-13 PROCEDURE — 77010033678 HC OXYGEN DAILY

## 2020-10-13 PROCEDURE — 74011250636 HC RX REV CODE- 250/636: Performed by: INTERNAL MEDICINE

## 2020-10-13 PROCEDURE — 77030041719 HC TRNSDUC KT EDWD -A: Performed by: INTERNAL MEDICINE

## 2020-10-13 PROCEDURE — 80048 BASIC METABOLIC PNL TOTAL CA: CPT

## 2020-10-13 PROCEDURE — 88342 IMHCHEM/IMCYTCHM 1ST ANTB: CPT

## 2020-10-13 PROCEDURE — 76210000001 HC OR PH I REC 2.5 TO 3 HR: Performed by: INTERNAL MEDICINE

## 2020-10-13 PROCEDURE — 74011250636 HC RX REV CODE- 250/636: Performed by: NURSE ANESTHETIST, CERTIFIED REGISTERED

## 2020-10-13 PROCEDURE — 87106 FUNGI IDENTIFICATION YEAST: CPT

## 2020-10-13 PROCEDURE — 77030009046 HC CATH BRNCH BLLN OCOA -B: Performed by: INTERNAL MEDICINE

## 2020-10-13 PROCEDURE — 94640 AIRWAY INHALATION TREATMENT: CPT

## 2020-10-13 PROCEDURE — 88341 IMHCHEM/IMCYTCHM EA ADD ANTB: CPT

## 2020-10-13 RX ORDER — ROCURONIUM BROMIDE 10 MG/ML
INJECTION, SOLUTION INTRAVENOUS AS NEEDED
Status: DISCONTINUED | OUTPATIENT
Start: 2020-10-13 | End: 2020-10-13 | Stop reason: HOSPADM

## 2020-10-13 RX ORDER — SODIUM CHLORIDE 0.9 % (FLUSH) 0.9 %
5-40 SYRINGE (ML) INJECTION EVERY 8 HOURS
Status: DISCONTINUED | OUTPATIENT
Start: 2020-10-13 | End: 2020-10-13 | Stop reason: HOSPADM

## 2020-10-13 RX ORDER — PROPOFOL 10 MG/ML
INJECTION, EMULSION INTRAVENOUS
Status: DISCONTINUED | OUTPATIENT
Start: 2020-10-13 | End: 2020-10-13

## 2020-10-13 RX ORDER — MIDAZOLAM HYDROCHLORIDE 1 MG/ML
0.5 INJECTION, SOLUTION INTRAMUSCULAR; INTRAVENOUS
Status: DISCONTINUED | OUTPATIENT
Start: 2020-10-13 | End: 2020-10-13 | Stop reason: HOSPADM

## 2020-10-13 RX ORDER — ONDANSETRON 2 MG/ML
INJECTION INTRAMUSCULAR; INTRAVENOUS AS NEEDED
Status: DISCONTINUED | OUTPATIENT
Start: 2020-10-13 | End: 2020-10-13 | Stop reason: HOSPADM

## 2020-10-13 RX ORDER — LIDOCAINE HYDROCHLORIDE 10 MG/ML
0.1 INJECTION, SOLUTION EPIDURAL; INFILTRATION; INTRACAUDAL; PERINEURAL AS NEEDED
Status: CANCELLED | OUTPATIENT
Start: 2020-10-13

## 2020-10-13 RX ORDER — SODIUM CHLORIDE 0.9 % (FLUSH) 0.9 %
5-40 SYRINGE (ML) INJECTION AS NEEDED
Status: CANCELLED | OUTPATIENT
Start: 2020-10-13

## 2020-10-13 RX ORDER — FENTANYL CITRATE 50 UG/ML
INJECTION, SOLUTION INTRAMUSCULAR; INTRAVENOUS AS NEEDED
Status: DISCONTINUED | OUTPATIENT
Start: 2020-10-13 | End: 2020-10-13 | Stop reason: HOSPADM

## 2020-10-13 RX ORDER — FENTANYL CITRATE 50 UG/ML
50 INJECTION, SOLUTION INTRAMUSCULAR; INTRAVENOUS
Status: DISCONTINUED | OUTPATIENT
Start: 2020-10-13 | End: 2020-10-13 | Stop reason: HOSPADM

## 2020-10-13 RX ORDER — ONDANSETRON 2 MG/ML
4 INJECTION INTRAMUSCULAR; INTRAVENOUS AS NEEDED
Status: DISCONTINUED | OUTPATIENT
Start: 2020-10-13 | End: 2020-10-13 | Stop reason: HOSPADM

## 2020-10-13 RX ORDER — HYDROCODONE BITARTRATE AND ACETAMINOPHEN 5; 325 MG/1; MG/1
1 TABLET ORAL AS NEEDED
Status: DISCONTINUED | OUTPATIENT
Start: 2020-10-13 | End: 2020-10-13 | Stop reason: HOSPADM

## 2020-10-13 RX ORDER — SODIUM CHLORIDE 0.9 % (FLUSH) 0.9 %
5-40 SYRINGE (ML) INJECTION EVERY 8 HOURS
Status: CANCELLED | OUTPATIENT
Start: 2020-10-13

## 2020-10-13 RX ORDER — HYDROMORPHONE HYDROCHLORIDE 1 MG/ML
0.4 INJECTION, SOLUTION INTRAMUSCULAR; INTRAVENOUS; SUBCUTANEOUS
Status: DISCONTINUED | OUTPATIENT
Start: 2020-10-13 | End: 2020-10-13 | Stop reason: HOSPADM

## 2020-10-13 RX ORDER — MORPHINE SULFATE 10 MG/ML
2 INJECTION, SOLUTION INTRAMUSCULAR; INTRAVENOUS
Status: DISCONTINUED | OUTPATIENT
Start: 2020-10-13 | End: 2020-10-13 | Stop reason: HOSPADM

## 2020-10-13 RX ORDER — DIPHENHYDRAMINE HYDROCHLORIDE 50 MG/ML
12.5 INJECTION, SOLUTION INTRAMUSCULAR; INTRAVENOUS AS NEEDED
Status: DISCONTINUED | OUTPATIENT
Start: 2020-10-13 | End: 2020-10-13 | Stop reason: HOSPADM

## 2020-10-13 RX ORDER — PROPOFOL 10 MG/ML
INJECTION, EMULSION INTRAVENOUS AS NEEDED
Status: DISCONTINUED | OUTPATIENT
Start: 2020-10-13 | End: 2020-10-13 | Stop reason: HOSPADM

## 2020-10-13 RX ORDER — REMIFENTANIL HYDROCHLORIDE 1 MG/ML
INJECTION, POWDER, LYOPHILIZED, FOR SOLUTION INTRAVENOUS
Status: DISCONTINUED | OUTPATIENT
Start: 2020-10-13 | End: 2020-10-13 | Stop reason: HOSPADM

## 2020-10-13 RX ORDER — LIDOCAINE HYDROCHLORIDE 20 MG/ML
INJECTION, SOLUTION EPIDURAL; INFILTRATION; INTRACAUDAL; PERINEURAL AS NEEDED
Status: DISCONTINUED | OUTPATIENT
Start: 2020-10-13 | End: 2020-10-13 | Stop reason: HOSPADM

## 2020-10-13 RX ORDER — SODIUM CHLORIDE, SODIUM LACTATE, POTASSIUM CHLORIDE, CALCIUM CHLORIDE 600; 310; 30; 20 MG/100ML; MG/100ML; MG/100ML; MG/100ML
INJECTION, SOLUTION INTRAVENOUS
Status: DISCONTINUED | OUTPATIENT
Start: 2020-10-13 | End: 2020-10-13 | Stop reason: HOSPADM

## 2020-10-13 RX ORDER — DEXAMETHASONE SODIUM PHOSPHATE 4 MG/ML
INJECTION, SOLUTION INTRA-ARTICULAR; INTRALESIONAL; INTRAMUSCULAR; INTRAVENOUS; SOFT TISSUE AS NEEDED
Status: DISCONTINUED | OUTPATIENT
Start: 2020-10-13 | End: 2020-10-13 | Stop reason: HOSPADM

## 2020-10-13 RX ORDER — SODIUM CHLORIDE 0.9 % (FLUSH) 0.9 %
5-40 SYRINGE (ML) INJECTION AS NEEDED
Status: DISCONTINUED | OUTPATIENT
Start: 2020-10-13 | End: 2020-10-13 | Stop reason: HOSPADM

## 2020-10-13 RX ORDER — NORETHINDRONE AND ETHINYL ESTRADIOL 0.5-0.035
5 KIT ORAL AS NEEDED
Status: DISCONTINUED | OUTPATIENT
Start: 2020-10-13 | End: 2020-10-13 | Stop reason: HOSPADM

## 2020-10-13 RX ORDER — FENTANYL CITRATE 50 UG/ML
50 INJECTION, SOLUTION INTRAMUSCULAR; INTRAVENOUS AS NEEDED
Status: CANCELLED | OUTPATIENT
Start: 2020-10-13

## 2020-10-13 RX ORDER — SUCCINYLCHOLINE CHLORIDE 20 MG/ML
INJECTION INTRAMUSCULAR; INTRAVENOUS AS NEEDED
Status: DISCONTINUED | OUTPATIENT
Start: 2020-10-13 | End: 2020-10-13 | Stop reason: HOSPADM

## 2020-10-13 RX ORDER — MIDAZOLAM HYDROCHLORIDE 1 MG/ML
1 INJECTION, SOLUTION INTRAMUSCULAR; INTRAVENOUS AS NEEDED
Status: CANCELLED | OUTPATIENT
Start: 2020-10-13

## 2020-10-13 RX ADMIN — REMIFENTANIL HYDROCHLORIDE 0.15 MCG/KG/MIN: 1 INJECTION, POWDER, LYOPHILIZED, FOR SOLUTION INTRAVENOUS at 08:13

## 2020-10-13 RX ADMIN — IPRATROPIUM BROMIDE AND ALBUTEROL SULFATE 3 ML: .5; 3 SOLUTION RESPIRATORY (INHALATION) at 21:05

## 2020-10-13 RX ADMIN — PHENYLEPHRINE HYDROCHLORIDE 200 MCG: 10 INJECTION INTRAVENOUS at 08:13

## 2020-10-13 RX ADMIN — IPRATROPIUM BROMIDE AND ALBUTEROL SULFATE 3 ML: .5; 3 SOLUTION RESPIRATORY (INHALATION) at 10:03

## 2020-10-13 RX ADMIN — DIPHENHYDRAMINE HYDROCHLORIDE 25 MG: 25 CAPSULE ORAL at 20:33

## 2020-10-13 RX ADMIN — DEXAMETHASONE SODIUM PHOSPHATE 4 MG: 4 INJECTION, SOLUTION INTRA-ARTICULAR; INTRALESIONAL; INTRAMUSCULAR; INTRAVENOUS; SOFT TISSUE at 08:01

## 2020-10-13 RX ADMIN — DIPHENHYDRAMINE HYDROCHLORIDE 25 MG: 25 CAPSULE ORAL at 12:08

## 2020-10-13 RX ADMIN — FUROSEMIDE 40 MG: 10 INJECTION, SOLUTION INTRAMUSCULAR; INTRAVENOUS at 12:08

## 2020-10-13 RX ADMIN — ROCURONIUM BROMIDE 5 MG: 10 SOLUTION INTRAVENOUS at 08:02

## 2020-10-13 RX ADMIN — FENTANYL CITRATE 50 MCG: 50 INJECTION, SOLUTION INTRAMUSCULAR; INTRAVENOUS at 08:01

## 2020-10-13 RX ADMIN — OXYCODONE HYDROCHLORIDE AND ACETAMINOPHEN 1 TABLET: 5; 325 TABLET ORAL at 16:37

## 2020-10-13 RX ADMIN — SODIUM CHLORIDE, POTASSIUM CHLORIDE, SODIUM LACTATE AND CALCIUM CHLORIDE: 600; 310; 30; 20 INJECTION, SOLUTION INTRAVENOUS at 07:48

## 2020-10-13 RX ADMIN — PROPOFOL 150 MG: 10 INJECTION, EMULSION INTRAVENOUS at 08:02

## 2020-10-13 RX ADMIN — ACETYLCYSTEINE 600 MG: 200 SOLUTION ORAL; RESPIRATORY (INHALATION) at 21:06

## 2020-10-13 RX ADMIN — ATORVASTATIN CALCIUM 20 MG: 20 TABLET, FILM COATED ORAL at 20:33

## 2020-10-13 RX ADMIN — PHENYLEPHRINE HYDROCHLORIDE 40 MCG/MIN: 10 INJECTION INTRAVENOUS at 08:26

## 2020-10-13 RX ADMIN — MEROPENEM 1 G: 1 INJECTION, POWDER, FOR SOLUTION INTRAVENOUS at 16:37

## 2020-10-13 RX ADMIN — GUAIFENESIN 400 MG: 200 SOLUTION ORAL at 16:37

## 2020-10-13 RX ADMIN — AMLODIPINE BESYLATE 5 MG: 5 TABLET ORAL at 12:08

## 2020-10-13 RX ADMIN — ONDANSETRON 4 MG: 2 INJECTION INTRAMUSCULAR; INTRAVENOUS at 08:51

## 2020-10-13 RX ADMIN — MEROPENEM 1 G: 1 INJECTION, POWDER, FOR SOLUTION INTRAVENOUS at 03:42

## 2020-10-13 RX ADMIN — GUAIFENESIN 400 MG: 200 SOLUTION ORAL at 05:27

## 2020-10-13 RX ADMIN — PHENYLEPHRINE HYDROCHLORIDE 200 MCG: 10 INJECTION INTRAVENOUS at 08:06

## 2020-10-13 RX ADMIN — PHENYLEPHRINE HYDROCHLORIDE 200 MCG: 10 INJECTION INTRAVENOUS at 08:20

## 2020-10-13 RX ADMIN — MEROPENEM 1 G: 1 INJECTION, POWDER, FOR SOLUTION INTRAVENOUS at 12:08

## 2020-10-13 RX ADMIN — GUAIFENESIN 400 MG: 200 SOLUTION ORAL at 12:08

## 2020-10-13 RX ADMIN — PHENYLEPHRINE HYDROCHLORIDE 100 MCG: 10 INJECTION INTRAVENOUS at 08:53

## 2020-10-13 RX ADMIN — PHENYLEPHRINE HYDROCHLORIDE 200 MCG: 10 INJECTION INTRAVENOUS at 08:17

## 2020-10-13 RX ADMIN — PANTOPRAZOLE SODIUM 40 MG: 40 TABLET, DELAYED RELEASE ORAL at 05:28

## 2020-10-13 RX ADMIN — GUAIFENESIN 400 MG: 200 SOLUTION ORAL at 00:00

## 2020-10-13 RX ADMIN — OXYCODONE HYDROCHLORIDE AND ACETAMINOPHEN 1 TABLET: 5; 325 TABLET ORAL at 03:42

## 2020-10-13 RX ADMIN — IPRATROPIUM BROMIDE AND ALBUTEROL SULFATE 3 ML: .5; 3 SOLUTION RESPIRATORY (INHALATION) at 14:53

## 2020-10-13 RX ADMIN — PHENYLEPHRINE HYDROCHLORIDE 200 MCG: 10 INJECTION INTRAVENOUS at 08:23

## 2020-10-13 RX ADMIN — SUCCINYLCHOLINE CHLORIDE 140 MG: 20 INJECTION, SOLUTION INTRAMUSCULAR; INTRAVENOUS at 08:03

## 2020-10-13 RX ADMIN — ACETYLCYSTEINE 600 MG: 200 SOLUTION ORAL; RESPIRATORY (INHALATION) at 10:03

## 2020-10-13 RX ADMIN — LIDOCAINE HYDROCHLORIDE 80 MG: 20 INJECTION, SOLUTION EPIDURAL; INFILTRATION; INTRACAUDAL; PERINEURAL at 08:02

## 2020-10-13 NOTE — PROGRESS NOTES
O2 sat. Dropped to 80s on 4L n/c. Encouraged pt. To cough and deep breathe. O2 increased to 5 L/min. Pt. Denies shortness of breath or difficulty breathing.

## 2020-10-13 NOTE — PROGRESS NOTES
Called Dr. Anderson Johnson cell to update and give chest x ray results. Assistant answered and will have him call.

## 2020-10-13 NOTE — PROGRESS NOTES
Problem: Patient Education: Go to Patient Education Activity  Goal: Patient/Family Education  Description: LE HEP to improve strength and functional mobility       Outcome: Progressing Towards Goal     Problem: Falls - Risk of  Goal: *Absence of Falls  Description: Document Anthony Julian Fall Risk and appropriate interventions in the flowsheet.   Outcome: Progressing Towards Goal  Note: Fall Risk Interventions:  Mobility Interventions: Bed/chair exit alarm, OT consult for ADLs, Patient to call before getting OOB, PT Consult for mobility concerns, PT Consult for assist device competence, Strengthening exercises (ROM-active/passive), Utilize walker, cane, or other assistive device    Mentation Interventions: Bed/chair exit alarm, Increase mobility, Toileting rounds, Update white board    Medication Interventions: Bed/chair exit alarm, Patient to call before getting OOB    Elimination Interventions: Bed/chair exit alarm, Call light in reach, Patient to call for help with toileting needs, Stay With Me (per policy), Toileting schedule/hourly rounds, Urinal in reach    History of Falls Interventions: Bed/chair exit alarm, Consult care management for discharge planning         Problem: Patient Education: Go to Patient Education Activity  Goal: Patient/Family Education  Outcome: Progressing Towards Goal     Problem: Patient Education: Go to Patient Education Activity  Goal: Patient/Family Education  Outcome: Progressing Towards Goal     Problem: Patient Education: Go to Patient Education Activity  Goal: Patient/Family Education  Outcome: Progressing Towards Goal     Problem: Nutrition Deficit  Goal: *Optimize nutritional status  Outcome: Progressing Towards Goal

## 2020-10-13 NOTE — PROGRESS NOTES
Attempted to call Dr. Malgorzata Ng to notify of dropping sat. And increased O2 to 5 L. No answer. Writer called Dr. Oc Campbell and notified him that pt. O2 sat. Was 99% on 4 L n/c on arrival to PACU. Sat. Dropped to 80s, increased O2 to 5 L/min. And can't get better than 91-92%, pt. Denies shortness of breath. Informed pt. Has neb. s ordered that looks like did not get this am.  Dr. Oc Campbell requests pt. Receive neb.s and chest x ray. Orders entered and xray and respiratory notified.

## 2020-10-13 NOTE — PROGRESS NOTES
Pulm/CC Progress Note    Subjective:   Daily Progress Note: 10/13/2020     Patient had EBUS done today. Did well. He is awake and alert. On 3 L of oxygen via nasal cannula. Is comfortable. Chest x-ray morning reviewed, persistent collapse of the entire left lung with mucus. General surgery is following given his significantly large ventral hernia which is clearly decreasing his ability to cough up his mucus. Unfortunately, given a questionable diagnosis of malignancy, they postponed any surgical management at this time. CT chest/abdomen/pelvis reviewed from 10/5/2020. Patient does have a left paratracheal node measuring 2.4 x 1.4 cm, otherwise no other evidence of definitive malignancy on CT imaging. Is undergone 8 bronchoscopies with mucus suctioning and has had several biopsies of the lesions within his tracheobronchial tree as well as brushings. He has had squamous metaplasia return, but no definitive malignancy. Oncology has been consulted and are asking if an EBUS would be warranted in this case in an attempt to rule in/out a malignancy. Review of Systems   Has complains of shortness of breath. He is on nasal cannula oxygen. Denied any nausea vomiting.   Has poor appetite    Objective:     Visit Vitals  /68 (BP 1 Location: Left arm, BP Patient Position: At rest;Supine)   Pulse (!) 102   Temp 98.2 °F (36.8 °C)   Resp 18   Ht 6' 0.99\" (1.854 m)   Wt 84.1 kg (185 lb 6.5 oz)   SpO2 98%   BMI 24.47 kg/m²    O2 Flow Rate (L/min): 4 l/min O2 Device: Nasal cannula    Temp (24hrs), Av °F (36.7 °C), Min:97.1 °F (36.2 °C), Max:98.6 °F (37 °C)      10/13 0701 - 10/13 1900  In: 700 [I.V.:700]  Out: -   10/11 1901 - 10/13 0700  In: -   Out: 2800 [Urine:2800]    Current Facility-Administered Medications   Medication Dose Route Frequency    oxyCODONE-acetaminophen (PERCOCET) 5-325 mg per tablet 1 Tab  1 Tab Oral Q12H PRN    amLODIPine (NORVASC) tablet 5 mg  5 mg Oral DAILY    furosemide (LASIX) injection 40 mg  40 mg IntraVENous BID    meropenem (MERREM) 1 g in sterile water (preservative free) 20 mL IV syringe  1 g IntraVENous Q8H    albuterol-ipratropium (DUO-NEB) 2.5 MG-0.5 MG/3 ML  3 mL Nebulization Q6HWA RT    acetylcysteine (MUCOMYST) 200 mg/mL (20 %) solution 600 mg  600 mg Nebulization BID    diphenhydrAMINE (BENADRYL) capsule 25 mg  25 mg Oral Q6H PRN    guaiFENesin (ROBITUSSIN) 100 mg/5 mL oral liquid 400 mg  400 mg Oral Q6H    0.9% sodium chloride infusion 250 mL  250 mL IntraVENous PRN    atorvastatin (LIPITOR) tablet 20 mg  20 mg Oral DAILY    pantoprazole (PROTONIX) tablet 40 mg  40 mg Oral DAILY    acetaminophen (TYLENOL) tablet 650 mg  650 mg Oral Q4H PRN    alum-mag hydroxide-simeth (MYLANTA) oral suspension 30 mL  30 mL Oral Q4H PRN    magnesium hydroxide (MILK OF MAGNESIA) 400 mg/5 mL oral suspension 30 mL  30 mL Oral DAILY PRN    ondansetron (ZOFRAN) injection 4 mg  4 mg IntraVENous Q8H PRN       Physical Exam:  General: Alert and oriented x3.  3 L nasal cannula. Relatively pleasant. HEENT: atraumatic, PERRL, moist mucosa,     Neck: Trachea midline, no carotid bruit, no masses. Supple but thick. Respiratory: No breath sounds on the left side. Few crackles and rhonchi on the right side. No significant change. Cardiovascular: RRR, no m/r/g, Normal S1 and S2   abdomen: Has large ventral abdominal hernia, evidence of surgical scars soft,   Rectal: deferred  Extremities: no cyanosis or clubbing. He has significant pitting edema in the dependent portions and lower extremities. Integumentary: warm, dry, and pink, with no rash, purpura, or petechia.    Heme/Lymph: no lymphadenopathy, no bruises  Neurological: No focal motor deficit   psychiatric: cooperative with normal mood, affect, and cognition      Additional comments: Chest x-rays reviewed    Data Review    Recent Results (from the past 24 hour(s))   C REACTIVE PROTEIN, QT    Collection Time: 10/12/20  3:45 PM Result Value Ref Range    C-Reactive protein 11.60 (H) 0.00 - 0.60 mg/dL   SED RATE, AUTOMATED    Collection Time: 10/12/20  3:45 PM   Result Value Ref Range    Sed rate, automated 128 mm/hr   HIV-1,2 P24 AG/AB SCREEN    Collection Time: 10/12/20  3:45 PM   Result Value Ref Range    p24 Antigen Negative (A) Nonreactive      HIV-1,2 Ab Negative (A) Nonreactive           6 results from 10/3/2020 were reviewed  Patient has left basal atelectasis. XR CHEST PORT   Final Result      XR CHEST PORT   Final Result   IMPRESSION: Persistent findings compared to single view chest October 10, 2020. XR CHEST PORT   Final Result   IMPRESSION: No significant change. See above. XR CHEST PA LAT   Final Result   Impression:      Collapse of left lung again noted with decreased aeration of the upper lobe   compared to yesterday increased opacity of the right lung may be due to portable   technique. XR CHEST PORT   Final Result   Impression:      Stable aeration of the left upper lung with atelectasis. Stable appearing   bilateral pleural effusions. No significant change compared to the prior study from 10/7/2020. XR CHEST PORT   Final Result      XR FLUOROSCOPY UNDER 60 MINUTES   Final Result      XR CHEST PORT   Final Result      XR CHEST PORT   Final Result   FINDINGS: Impression: Frontal single view chest.      Continued opacification of the left hemithorax, obscuring the cardiac   silhouette. Right lung interstitial thickening, airspace disease, bronchial thickening,   pleural effusion similar to previous. No pneumothorax. CT CHEST WO CONT   Final Result   Impression:    1. Increased now complete opacification of the left bronchi with low density   material.   2. Slightly increased patchy airspace pneumonia in the right lung. 3. Unchanged loculated and nonloculated left pleural effusion, complete left   lung consolidation, right lung pleural effusion.       CT ABD PELV W CONT   Final Result IMPRESSION:   1. Large ventral hernia containing nonobstructed small and large intestine. 2. Moderate to large right pleural effusion and mild to moderate left pleural   effusion. There is near complete collapse of the visualized portions of the left   lower lobe and there is pleural thickening in the left hemithorax. 3. Patchy airspace disease in the right lower lobe along with right basilar   atelectasis. 4. Nonspecific left adrenal nodule. 5. Other findings as described. XR CHEST PORT   Final Result   Impression: Heterogeneous opacity in the right lung, not significantly changed. Now complete opacification of the left hemithorax. XR CHEST PORT   Final Result   IMPRESSION: No significant change. XR CHEST AP LAT   Final Result      XR CHEST AP LAT   Final Result   IMPRESSION:   1. Persistent opacification of the left hemithorax with volume loss. 2.  Moderate right pleural effusion with probable associated right basilar   atelectasis. XR CHEST PORT   Final Result   Impression: Increased volume loss left lung. Otherwise stable. XR CHEST PORT   Final Result   IMPRESSION:   1. Improved aeration of the left lung with persistent basilar consolidative   opacities and probable pleural effusions. 2. Other extensive interstitial and alveolar opacities are nonspecific, but   potentially related to pulmonary edema or infection. XR CHEST PORT   Final Result   Impression:Left lung collapse without improvement from prior study. XR CHEST PORT   Final Result   IMPRESSION:   1. There is milder enlargement of the cardiac silhouette as well as increasing   pulmonary vascular ill definition suspect for mild pulmonary edema. This could   be related to cardiogenic edema or fluid overload. 2.  There is been an improvement in the overall aeration of the left lung with   and improved visualization of vascular markings within the left upper lung zone.    There still remains significant partial collapse of the left lung. 3.  There is increased patchy airspace disease laterally within the right lower   lobe just above the right lateral costophrenic angle. 4.  There are persistent moderate-sized bilateral pleural effusions. XR CHEST AP LAT   Final Result   IMPRESSION: Persistent collapse of the left lung with bilateral pleural   effusions. Increasing vascular congestion on the right. XR CHEST PA LAT   Final Result   Impression:   Ongoing near complete white out of the left lung. Little interval change since   the prior examination. CT CHEST WO CONT   Final Result   IMPRESSION: Complete collapse of the left lung due to complete bronchial   obstruction, possibly aspiration. Fluid overload also noted      XR ABD ACUTE W 1 V CHEST   Final Result   IMPRESSION: Opacification of the left hemithorax, questioned for remote   pneumonectomy or lung collapse with pleural fluid. Prominent right parenchymal/interstitial lung markings compatible with fibrosis   and possible partial vascular congestion. Small bowel gas, nonobstructive pattern. Assessment/Plan: This is a middle-aged male who was admitted because of increasing symptoms of shortness of breath. He is getting treated in hospital for pneumonia with IV Zosyn, was on Azithromycin. He is noted to be increasingly tachypneic and short of breath. He has been on supplemental oxygen. His chest x-ray is showing persistent left lung opacification from mucous plugging. This has not improved with aggressive pulmonary hygiene and 8 suction bronchoscopies. Additionally, there is concern that the patient may have a primary lung malignancy, work-up ongoing.     Plan:     1.)    Left lung collapse/shortness of breath:  - Shortness of breath and hypoxia due to recurrent left lung collapse. He had multiple bronchoscopies done along with lavage but he continues to have left lung collapse.    He underwent EBUS, mildly enlarged station 7 lymph node, significantly enlarged station 11 lymph node. Ultrasound-guided biopsy was done. Left tracheal tree was lavaged and cleared of mucous plugs. Will await results of the test.  Chest x-ray is ordered. Patient will be continued on 3 L of nasal cannula oxygen. Recurrent collapse is due to multiple reasons including large abdominal hernia, weak cough, COPD and multiple endobronchial lesions although they are not causing significant obstruction. CT of the chest done on 10/5 without IV contrast was personally reviewed with radiologist.  Left main bronchus shows mucus. Left lung is completely collapsed. Small left-sided pleural effusion. Larger right-sided pleural effusion. Right lung pneumonia. CT of the abdomen pelvis shows a large hernia. Dr. Yudelka Johnson also discussed with the pathologist.  Cytology from the previous bronchoscopy showing atypical squamous cells. Cultures have been negative. Cytology and pathology again from this bronch done on 10/7 is showing atypical cells with squamous metaplasia but no clear-cut evidence of malignancy. Clinically it's certainly possible that he has an underlying malignancy. Oncology has been consulted, appreciate their recommendations. Based on results of EBUS biopsy, it turns out to be malignancy then he would require patient for chemotherapy/palliative care. He could have mucinous adenocarcinoma versus squamous cell carcinoma. Discussed with respiratory. Raise the left lung up and do chest PT. however he declines some treatment by RT. He has been ordered pulmonary hygiene with nebulizers every 6 hours;  Aggressive chest PT, EZ-PAP therapy q6, acapella device as well as incentive spirometer use. Added guaifenesin 400 mg q6. Mucomyst also ordered. 2.)  COPD:  He has a history of COPD based on chronic smoking. Continue scheduled bronchodilators.   Patient should be discharged with a LAMA/LABA such as Anoro and needs f/u in our office for PFT's and further management. Weaned off prednisone. 3.)  Pneumonia:   He is on IV Zosyn and Levaquin, stopped Zithromax on 9/23/2020. Cultures from recent bronchoscopy showing normal xiomara. Infectious disease consultation. 4.)  Hypertension:  He is on antihypertensive medications, BP's stable. Patient also has clinically fluid overload, congestive heart failure. Echocardiogram done on 9/16 showed an EF of 60 to 29% grade 2 diastolic dysfunction and moderate to severe aortic stenosis. Right ventricle is normal in size and function. Will resume Lasix. Agree with cardiology evaluation. Questions of patient were answered at bedside in detail  Case discussed in detail with RN, RT, and care team  Thank you for involving me in the care of this patient  I will follow with you closely during hospitalization    Time spent more than 30 minutes in direct patient care with no overlap      SIMA Vasquez MD  Pulmonary and critical care

## 2020-10-13 NOTE — PROGRESS NOTES
Dr. Lorraine Kong returned call and notified that pt. Is back on 4L n/c and will sat. 96% then drop and go back up after coughing, received nebs. In pacu. Xray results read to Dr. Lorraine Kong, pt. Okay to go back to floor at this time.

## 2020-10-13 NOTE — ANESTHESIA PREPROCEDURE EVALUATION
Relevant Problems   No relevant active problems       Anesthetic History   No history of anesthetic complications            Review of Systems / Medical History  Patient summary reviewed, nursing notes reviewed and pertinent labs reviewed    Pulmonary          Pneumonia      Comments: Bilateral PNA   Neuro/Psych       CVA  TIA     Cardiovascular    Hypertension          CAD, PAD and hyperlipidemia      Comments: Carotid stenosis   GI/Hepatic/Renal     GERD      PUD     Endo/Other    Diabetes: type 2    Arthritis     Other Findings   Comments: Herpes  ETOH abuse         Physical Exam    Airway  Mallampati: I  TM Distance: 4 - 6 cm  Neck ROM: normal range of motion   Mouth opening: Normal     Cardiovascular    Rhythm: regular  Rate: normal         Dental    Dentition: Poor dentition     Pulmonary    Rhonchi:bilateral    Wheezes:bilateral         Abdominal  GI exam deferred       Other Findings            Anesthetic Plan    ASA: 4  Anesthesia type: general          Induction: Intravenous  Anesthetic plan and risks discussed with: Patient

## 2020-10-13 NOTE — PROGRESS NOTES
Progress Note    Patient: Julio C Abreu MRN: [id number]  SSN: [id number]    YOB: 1960  Age: 63 y.o. Sex: male      Admit Date: 9/10/2020    LOS: 33 days     Subjective:   Patient followed for chronic, refractory pneumonia of undetermined etiology despite extensive microbiologic work-up. He underwent repeat bronchoscopy earlier today showing multiple areas of abnormal nodularity throughout the left and right tracheobronchial tree with mildly enlarged station 7 lymph node, significantly enlarged 11L lymph node. In addition, the left tracheobronchial tree was successfully cleared of mucous plugs. Biopsies were taken. He remains afebrile with normal WBC and procalcitonin, but his CRP and ESR remain elevated. He is now on IV Meropenem. Patient resting comfortably. Patient resting comfortably at this time with no complaints. Objective:     Vitals:    10/13/20 1012 10/13/20 1015 10/13/20 1030 10/13/20 1045   BP:  109/69 107/70 106/67   Pulse:  (!) 102 (!) 102 (!) 101   Resp:  20 20 19   Temp:       SpO2: 94%  92% 94%   Weight:       Height:            Intake and Output:  Current Shift: 10/13 0701 - 10/13 1900  In: 700 [I.V.:700]  Out: -   Last three shifts: 10/11 1901 - 10/13 0700  In: -   Out: 2800 [Urine:2800]    Physical Exam:    Vitals signs and nursing note reviewed. Constitutional:       General: He is not in acute distress. Appearance: He is ill-appearing. He is not toxic-appearing or diaphoretic. Pulmonary:      Breath sounds: Rhonchi and rales present. No wheezing. Comments: Decreased breath sounds left lung field  Abdominal:      General: There is distension (ventral hernia with scarring and crusting). Tenderness: There is no abdominal tenderness. There is no guarding. Genitourinary:     Penis: Normal.       Comments: No Padilla  Musculoskeletal:      Right lower leg: Edema present. Left lower leg: Edema present.    Skin:     Findings: Erythema (both calves) present. Neurological:      General: No focal deficit present. Mental Status: He is alert and oriented to person, place, and time. Psychiatric:         Mood and Affect: Mood normal.         Behavior: Behavior normal.         Thought Content: Thought content normal.         Judgment: Judgment normal.        Lab/Data Review:    WBC 9,000  Procalcitonin <0.05  CRP Pending (11.60 (12.50 (11.20)  ESR Pending (129    Bronchial washing fungus (10/7)  Pending  Bronchial washing AFB (10/7) smear negative  Bronchial washing culture (10/7) Normal respiratory xiomara  Assessment:     1. Chronic pneumonia, ?recurrent, etiology unclear, Day #31 IV Antibiotics, Day #3 IV Meropenem. 2. Markedly elevated CRP and ESR  3. Abnormal bronchoscopy with nodular lesions left tracheobronchial tree, biopsys pending. 4. Chronic venous insufficiency with stasis dermatitis  5. Large ventral hernia     Comment:       Plan:   1. Continue IV Meropenem  2. In am, repeat CRP, ESR, done  3. Follow-up C-ANCA, P-ANCA, serum fungitell  4.  Follow-up endobronchial biopsies       Signed By: Siva Trejo MD     October 13, 2020

## 2020-10-13 NOTE — PROGRESS NOTES
OCCUPATIONAL THERAPY RE-EVALUATION  Patient: Darling Vidal (37 y.o. male)  Date: 10/13/2020  Diagnosis: Hypertension [I10]   <principal problem not specified>  Procedure(s) (LRB):  Endobronchial Ultrasound (EBUS) (N/A) Day of Surgery  Precautions: Fall   Chart, occupational therapy assessment, plan of care, and goals were reviewed. ASSESSMENT  Based on the objective data described below, pt presents with deficits in B UE AROM/strength, functional activity tolerance, dynamic sitting balance, static/dynamic standing balance, and pain impacting overall ADL performance and functional transfers/mobility. Since initial evaluation, pt has intermittently been participating with therapy making slow progress towards goals. Pt received in bed upon OT/PT arrival and pt agreeable to working with therapies today with encouragement. He currently requires mod A x2 for bed mobility, mod A for UB dressing, total A for bowel hygiene, and mod A x2 for functional sit><stand to/from EOB. He tolerates approximately 5 minutes standing with mod A x2 and RW before becoming fatigued. He remains on O2 via NC throughout session. Pt reports he likes when things are consistent and is motivated once he gets moving. He would benefit from continued skilled OT services during hospital stay and at next level of care to improve overall IND with ADLs and functional transfers/mobility upon d/c. Other factors to consider for discharge: Family support, time since onset         PLAN :  Recommendations and Planned Interventions: self care training, functional mobility training, therapeutic exercise, balance training, therapeutic activities, endurance activities, and patient education    Frequency/Duration: Patient will be followed by occupational therapy 5 times a week to address goals.     Recommend with staff: Encourage EOB ADLs as tolerated with staff present    Recommend next OT session: LB dressing, toilet transfer    Recommendation for discharge: (in order for the patient to meet his/her long term goals)  SNF    This discharge recommendation:  Has been made in collaboration with the attending provider and/or case management       SUBJECTIVE:   Patient stated I am particular. I don't like when different people come in my room and mess everything up all of the time    OBJECTIVE DATA SUMMARY:   Hospital course since last seen and reason for reevaluation: Since initial evaluation, pt has intermittently been participating with therapy making slow progress towards goals. He currently requires mod A x2 for bed mobility, mod A for UB dressing, total A for bowel hygiene, and mod A x2 for functional sit><stand to/from EOB. He was due for re-assessment today to update progress and POC as necessary. Cognitive/Behavioral Status:  Neurologic State: Alert  Orientation Level: Oriented X4  Cognition: Follows commands; Appropriate decision making    Hearing: Auditory  Auditory Impairment: None    Vision/Perceptual:       WFL    Range of Motion:  B UE AROM/strength generally decreased, L UE shoulder flexion to approximately 70 degrees, functional.    Strength:  B UE strength grossly 3+/5    Coordination:     Fine Motor Skills-Upper: Left Intact; Right Intact    Gross Motor Skills-Upper: Left Intact; Right Intact    Tone & Sensation:  WFL    Functional Mobility and Transfers for ADLs:  Bed Mobility:  Rolling: Minimum assistance  Supine to Sit: Moderate assistance;Assist x2  Sit to Supine: Moderate assistance;Assist x2  Scooting: Moderate assistance    Transfers:  Sit to Stand: Moderate assistance;Assist x2          Balance:  Sitting: Intact; Without support  Sitting - Static: Good (unsupported)  Sitting - Dynamic: Good (unsupported)  Standing: Impaired; With support  Standing - Static: Fair  Standing - Dynamic : Fair;Poor    ADL Intervention and task modifications:  Upper Body [address]: Moderate assistance    Toileting  Bowel Hygiene:  Total assistance (dependent)    Therapeutic Exercises:   Pt participated in shoulder raises x10 today, would benefit from further education to continue to improve overall B UE AROM/strength. Functional Measure:    [address] [place] [id number] AM-PACTM \"6 Clicks\"                                                       Daily Activity Inpatient Short Form  How much help from another person does the patient currently need. .. Total; A Lot A Little None   1. Putting on and taking off regular lower body clothing? []  1 [x]  2 []  3 []  4   2. Bathing (including washing, rinsing, drying)? []  1 [x]  2 []  3 []  4   3. Toileting, which includes using toilet, bedpan or urinal? [] 1 [x]  2 []  3 []  4   4. Putting on and taking off regular upper body clothing? []  1 [x]  2 []  3 []  4   5. Taking care of personal grooming such as brushing teeth? []  1 []  2 [x]  3 []  4   6. Eating meals? []  1 []  2 [x]  3 []  4   © 2007, Trustees of [address] [place] [address], under license to [employer]. All rights reserved     Score: 14/24     Interpretation of Tool:  Represents clinically-significant functional categories (i.e. Activities of daily living). Percentage of Impairment CH    0%   CI    1-19% CJ    20-39% CK    40-59% CL    60-79% CM    80-99% CN     100%   [facility]  Score 6-24 24 23 20-22 15-19 10-14 7-9 6        Pain:  0/10    Activity Tolerance:   Fair and requires rest breaks    After treatment patient left in no apparent distress:   Supine in bed and Call bell within reach    COMMUNICATION/COLLABORATION:   The patients plan of care was discussed with: Physical therapist and Registered nurse. Eamon Beat  Time Calculation: 38 mins   Problem: Self Care Deficits Care Plan (Adult)  Goal: *Acute Goals and Plan of Care (Insert Text)  Description: 1. Pt will be mod I sit < - >  prep for toileting LRAD  2. Pt will be mod I toileting/toilet transfer/cloth mgmt LRAD  3. Pt will be mod I sponge bathing sitting/standing at sink LRAD  4.  Pt will be mod A LE dressing EOB  5. Pt will be mod I grooming standing at sink LRAD  6.  Pt will be I following UE HEP in prep for self care tasks  Outcome: Progressing Towards Goal

## 2020-10-13 NOTE — PROGRESS NOTES
Progress Note    Patient: Yee Joseph MRN: [id number]  SSN: [id number]    YOB: 1960  Age: 63 y.o. Sex: male      Admit Date: 9/10/2020    LOS: 35 days      59M, H/o COPD not on oxygen with with shortness of breath s/t pneumonia complicated by left lung collapse.   Per pathology report no malignancy,post  Bronchoscopy,      Subjective:     Patient is seen for follow-up,    S/p bronch this am, #9  Comfortable at rest,  Weak  No fever/chills           Current Facility-Administered Medications:     oxyCODONE-acetaminophen (PERCOCET) 5-325 mg per tablet 1 Tab, 1 Tab, Oral, Q12H PRN, Nataliya Alfaro MD, 1 Tab at 10/13/20 0342    amLODIPine (NORVASC) tablet 5 mg, 5 mg, Oral, DAILY, Nataliya Alfaro MD, 5 mg at 10/13/20 1208    furosemide (LASIX) injection 40 mg, 40 mg, IntraVENous, BID, Nataliya Alfaro MD, 40 mg at 10/13/20 1208    meropenem (MERREM) 1 g in sterile water (preservative free) 20 mL IV syringe, 1 g, IntraVENous, Q8H, Jonas Monday, MD, 1 g at 10/13/20 1208    albuterol-ipratropium (DUO-NEB) 2.5 MG-0.5 MG/3 ML, 3 mL, Nebulization, Q6HWA RT, Jarett Ocampo DO, 3 mL at 10/13/20 1003    acetylcysteine (MUCOMYST) 200 mg/mL (20 %) solution 600 mg, 600 mg, Nebulization, BID, Darby Boswell MD, 600 mg at 10/13/20 1003    diphenhydrAMINE (BENADRYL) capsule 25 mg, 25 mg, Oral, Q6H PRN, Hamlet HERNANDEZ MD, 25 mg at 10/13/20 1208    guaiFENesin (ROBITUSSIN) 100 mg/5 mL oral liquid 400 mg, 400 mg, Oral, Q6H, Jarett Ocampo DO, 400 mg at 10/13/20 1208    0.9% sodium chloride infusion 250 mL, 250 mL, IntraVENous, PRN, Lisbeth Velasquez MD    atorvastatin (LIPITOR) tablet 20 mg, 20 mg, Oral, DAILY, Lisbeth Velasquez MD, 20 mg at 10/12/20 2020    pantoprazole (PROTONIX) tablet 40 mg, 40 mg, Oral, DAILY, Lisbeth Velasquez MD, Stopped at 10/13/20 0700    acetaminophen (TYLENOL) tablet 650 mg, 650 mg, Oral, Q4H PRN, Lisbeth Velasquez MD, 650 mg at 09/24/20 7667    alum-mag hydroxide-simeth (MYLANTA) oral suspension 30 mL, 30 mL, Oral, Q4H PRN, Neris Goddard MD, 30 mL at 20 2215    magnesium hydroxide (MILK OF MAGNESIA) 400 mg/5 mL oral suspension 30 mL, 30 mL, Oral, DAILY PRN, Neris Goddard MD    ondansetron [facility] injection 4 mg, 4 mg, IntraVENous, Q8H PRN, Neris Goddard MD, Stopped at 10/07/20 1300    Objective:     Visit Vitals  /70   Pulse 100   Temp 99 °F (37.2 °C)   Resp 20   Ht 6' 0.99\" (1.854 m)   Wt 82.5 kg (181 lb 14.1 oz)   SpO2 97%   BMI 24.00 kg/m²    O2 Flow Rate (L/min): 3 l/min O2 Device: Nasal cannula     Temp (24hrs), Av.6 °F (37 °C), Min:97.2 °F (36.2 °C), Max:99.1 °F (37.3 °C)         Physical Exam:   General :  no respiratory distress noted at rest, patient on nasal cannula oxygen  Lungs : BS increased left lung field vs prior. No wheeze. Not labored. CVS :  no gallop  Abdomen :  +ventral hernia positive bowel sounds  Extremities : No edema noted,  Neurological : Awake, alert, oriented to time place person.  No neurological deficits.    Psychiatric : Mood and affect normal    Lab/Data Review:  Recent Results (from the past 24 hour(s))   C REACTIVE PROTEIN, QT    Collection Time: 10/12/20  3:45 PM   Result Value Ref Range    C-Reactive protein 11.60 (H) 0.00 - 0.60 mg/dL   SED RATE, AUTOMATED    Collection Time: 10/12/20  3:45 PM   Result Value Ref Range    Sed rate, automated 128 mm/hr   HIV-1,2 P24 AG/AB SCREEN    Collection Time: 10/12/20  3:45 PM   Result Value Ref Range    p24 Antigen Negative (A) Nonreactive      HIV-1,2 Ab Negative (A) Nonreactive       CT abd 10/5. IMPRESSION:  1. Large ventral hernia containing nonobstructed small and large intestine. 2. Moderate to large right pleural effusion and mild to moderate left pleural  effusion. There is near complete collapse of the visualized portions of the left  lower lobe and there is pleural thickening in the left hemithorax.   3. Patchy airspace disease in the right lower lobe along with right basilar  atelectasis. 4. Nonspecific left adrenal nodule. 5. Other findings as described. Pt at increased risk for procedure with resp compromise in addition to anatomical considerations of reducing massive hernia. Pt aware of inc mortality states \"I don't care if it kills me but it has to be done\". Abd ct pending, will need to recline on pillows for procedure as cannot lie flat. Pt aware of risk for surgery, likelihood that will remain on vent long term and he reiterates he wants it done despite the risk. Cardio to optimize for clearance. Surgery on case f/u recommendations  Next x-ray  AP upright view of the chest compared to 10/9/2020. Complete opacification of  the left hemithorax is again noted. Small right pleural effusion and diffuse  right-sided airspace disease suggesting pulmonary edema are again noted. No  pneumothorax. Cardiac silhouette is obscured.     IMPRESSION  IMPRESSION: No significant change. See above. Assessment and plan:        Acute hypoxic respiratory failure : 2/2 persistent LL collapse s/p multiple bronchoscopies. Cytology/cs neg. Maintaining adequate sats on 4l.s/p bronh today. Cxr appears improved post procedure, follow am cxr.   left lung collapse/PNA:   persistent left lung collapse from mucous plugging. Recurrent collapse is due to multiple reasons including large abdominal hernia, weak cough, COPD and multiple endobronchial lesions although they are not causing significant obstruction. S/p brocnh x 9, ebus/bx paratracheal node today. ID, Pulmonology, oncology appreciated. Abx per ID. Hernia complicating, pt wants it reduced despite increased mortality risk. General surgeon on board,chronic 6+ years neglected follow up. Surgery cx appreciated Dr. Liana Liu. Case discussed with , no surgery planned until the pneumonia resolved, discussed with patient. COPD:  pulm following.  stable now  Anemia: AOCI.  Stable, follow  HTN : Controlled, lower side BP,BP meds adjusted   --Echocardiogram done on 9/16 showed an EF of 60 to 91% grade 2 diastolic dysfunction and moderate to severe aortic stenosis. Right ventricle is normal in size and function. Lasix continued.   --Follow venous doppler- r/o dv  DVT ppx: lovenox  PT/OT     DISPO: SNF anticipated when stable      Signed By: Shira Gallagher MD     October 13, 2020

## 2020-10-13 NOTE — ANESTHESIA POSTPROCEDURE EVALUATION
Procedure(s):  Endobronchial Ultrasound (EBUS).     general    Anesthesia Post Evaluation      Multimodal analgesia: multimodal analgesia used between 6 hours prior to anesthesia start to PACU discharge  Patient location during evaluation: PACU  Patient participation: complete - patient participated  Level of consciousness: awake  Pain score: 0  Pain management: adequate  Airway patency: patent  Anesthetic complications: no  Cardiovascular status: acceptable  Respiratory status: acceptable  Hydration status: acceptable  Post anesthesia nausea and vomiting:  controlled  Final Post Anesthesia Temperature Assessment:  Normothermia (36.0-37.5 degrees C)      INITIAL Post-op Vital signs:   Vitals Value Taken Time   /67 10/13/2020 10:45 AM   Temp 37 °C (98.6 °F) 10/13/2020  9:22 AM   Pulse 101 10/13/2020 10:45 AM   Resp 19 10/13/2020 10:45 AM   SpO2 94 % 10/13/2020 10:45 AM

## 2020-10-13 NOTE — PROGRESS NOTES
Problem: Mobility Impaired (Adult and Pediatric)  Goal: *Acute Goals and Plan of Care (Insert Text)  Description: Pt will be I with LE HEP in 7 days. Pt will perform bed mobility with mod I in 7 days. Pt will perform transfers with mod I in 7 days. Pt will amb 10 feet with LRAD safely with mod I in 7 days. Outcome: Progressing Towards Goal   PHYSICAL THERAPY REEVALUATION  Patient: Julio C Abreu (47 y.o. male)  Date: 10/13/2020  Primary Diagnosis: Hypertension [I10]  Procedure(s) (LRB):  Endobronchial Ultrasound (EBUS) (N/A) Day of Surgery   Precautions: fall         ASSESSMENT  Based on the objective data described below, the patient presents with decreased LE strength and ROM, difficulty with bed mobility and transfers requiring mod A x 2. Pt with good sitting balance but pt requiring mod A x 2 for standing balance. Pt able to stand for about 4 min today while nurse was cleaning pt, changing norbert pads and putting new Mepalex pad on his bottom. Pt also c/o pain today in his buttocks, and multiple open areas noted on buttocks that nursing was made aware of. Pt was hesistant to work with PT/OT today saying that \"10 min once a week isn't doing anything for me. \"  Explained to pt that we are not seeing him more often because he has refused to work with us. Spent considerable time speaking with pt about the importance of PT/OT and the importance of his participation with therapy to get better. Pt agreeable to participate with PT if we would come to see him more often so pt changed to 5x/week for a trial.  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 Pt did apologize for being irritable with us when we first arrived. Pt also with fear of falling but feels better with gait belt on, so gait belt was left in the room for nursing to use.       Other factors to consider for discharge: impaired mobility, pt participation     Patient will benefit from skilled therapy intervention to address the above noted impairments. PLAN :  Recommendations and Planned Interventions: bed mobility training, transfer training, gait training, therapeutic exercises, patient and family training/education, and therapeutic activities      Frequency/Duration: Patient will be followed by physical therapy:  5 times a week to address goals. Recommendation for discharge: (in order for the patient to meet his/her long term goals)  Therapy up to 5 days/week in SNF setting    This discharge recommendation:  Has been made in collaboration with the attending provider and/or case management    Equipment recommendations for successful discharge (if) home: tbd         SUBJECTIVE:   Patient stated I don't want to do this but I guess you want me to anyway.   Explained to pt the importance of participation and he reluctantly agreed.     OBJECTIVE DATA SUMMARY:   HISTORY:    Past Medical History:   Diagnosis Date    Anal fistula 3/15/2010    Anxiety     ARF (acute renal failure) (HCC)     Colon perforation (HCC)     Genital herpes 3/15/2010    GERD (gastroesophageal reflux disease)     High cholesterol 3/15/2010    History of blood transfusion     HTN (hypertension) 3/15/2010    Hyponatremia     Ischemic colitis ([address]     left Carotid Artery Occlusion 3/15/2010    Noncompliance with treatment 3/15/2010    Pneumonia 2011    PUD (peptic ulcer disease)     Septic shock(785.52)     Stroke 2002 3/15/2010    Thromboembolus ([address]     rt thigh    TIA (transient ischemic attack) 2007    pt reported     Past Surgical History:   Procedure Laterality Date    CARDIAC CATHETERIZATION      CARDIAC SURG PROCEDURE UNLIST      HX COLECTOMY  1/11/2014    Right hemicolectomy for free air, perforated viscus    US SCROTUM/TESTICLES      right testicle removed     Hospital course since last seen and reason for reevaluation: 7 days since last reevaluation    Personal factors and/or comorbidities impacting plan of care: poor participation, medical complexity    Home Situation  Home Environment: Private residence  One/Two Story Residence: One story  Living Alone: No  Support Systems: Spouse/Significant Other/Partner  Patient Expects to be Discharged to[de-identified] Rehabilitation facility  Current DME Used/Available at Home: None    EXAMINATION/PRESENTATION/DECISION MAKING:   Critical Behavior:  Neurologic State: Alert  Orientation Level: Oriented X4  Cognition: Follows commands, Appropriate decision making  Safety/Judgement: Fall prevention  Hearing: Auditory  Auditory Impairment: None    Range Of Motion:      WFL except B knee ext, minimally restricted                    Strength:    Grossly 3-/5 in LE's                       Functional Mobility:  Bed Mobility:  Rolling: Minimum assistance  Supine to Sit: Moderate assistance;Assist x2  Sit to Supine: Moderate assistance;Assist x2  Scooting: Moderate assistance  Transfers:  Sit to Stand: Moderate assistance;Assist x2  Stand to Sit: Moderate assistance;Assist x2    Pt stood for several minutes getting cleaned up by nursing and having new Mepalex pad placed on his buttocks                       Balance:   Sitting: Intact; Without support  Sitting - Static: Good (unsupported)  Sitting - Dynamic: Good (unsupported)  Standing: Impaired; With support  Standing - Static: Fair  Standing - Dynamic : Fair;Poor  Ambulation/Gait Training:                                                            Therapeutic Exercises:   Seated marches, LAQ, ankle pumps x 10 each         Pain Ratin/10 in buttocks    Activity Tolerance:   Fair  Please refer to the flowsheet for vital signs taken during this treatment.     After treatment patient left in no apparent distress:   Supine in bed, Call bell within reach, and Side rails x 3    COMMUNICATION/EDUCATION:   The patients plan of care was discussed with: Physical therapy assistant, Occupational therapist, and Registered nurse. Patient/family agree to work toward stated goals and plan of care. PT/OT sessions occurred together for increased safety of pt and clinician.      Thank you for this referral.  Janet Victoria   Time Calculation: 38 mins

## 2020-10-13 NOTE — PROCEDURES
[address] [place] [address] [facility]  Pulmonary, Critical Care, and Sleep Medicine    Name: Kasey Moncada MRN: [id number]   : 1960 Hospital: [address] [place] [address]   Date: 10/13/2020        Bronchoscopy Report    Procedure: EBUS     Indication: Abnormal chest imaging, Mucus Plugging and Pneumonia    Consent/Treatment: Informed consent was obtained from the  patient after risks, benefits and alternatives were explained. Timeout verified the correct patient and correct procedure. Anesthesia:   General anesthesia was performed by anesthesiology    Procedure Details:   -X- The bronchoscope was introduced through an endotracheal tube. -- The vocal cords were found to be normal.  -X- The trachea and vianey were completely inspected and were found to be Abnormal with multiple areas of abnormal nodularity throughout the left and right tracheobronchial tree. -X- Mildly enlarged station 7 lymph node, significantly enlarged 11L lymph node, 4R was examined and found to be normal.  -X- The left tracheobronchial tree was successfully cleared of mucous plugs prior to the EBUS. All bleeding had ceased prior to removal of the bronchoscope. Specimens:   TBNA performed at station 7 (4 passes) and TBNA performed at station 11L (6 passes)    Rapid On-Site Evaluation: No preliminary data available at this time. We will f/u results of the EBUS biopsies as well as bronchial washings sent for cytology, aerobic culture, and fungal culture.     Complications: none    Estimated Blood Loss: Joel Pabon DO  2020  8:55 AM

## 2020-10-13 NOTE — ROUTINE PROCESS
Pt. Transferred to room 583 in bed with O2 and IV. Bedside report given to primary RN, Shawna Tsang. SBAR given. Pt. In stable condition.

## 2020-10-14 ENCOUNTER — APPOINTMENT (OUTPATIENT)
Dept: GENERAL RADIOLOGY | Age: 60
DRG: 177 | End: 2020-10-14
Attending: INTERNAL MEDICINE
Payer: MEDICARE

## 2020-10-14 LAB
C-ANCA TITR SER IF: NORMAL TITER
CRP SERPL-MCNC: 6.15 MG/DL (ref 0–0.6)
ERYTHROCYTE [SEDIMENTATION RATE] IN BLOOD: 128 MM/HR
MYELOPEROXIDASE AB SER IA-ACNC: <9
P-ANCA ATYPICAL TITR SER IF: NORMAL TITER
P-ANCA TITR SER IF: NORMAL TITER
PROTEINASE3 AB SER IA-ACNC: <3.5 U/ML

## 2020-10-14 PROCEDURE — 94640 AIRWAY INHALATION TREATMENT: CPT

## 2020-10-14 PROCEDURE — 94760 N-INVAS EAR/PLS OXIMETRY 1: CPT

## 2020-10-14 PROCEDURE — 74011250637 HC RX REV CODE- 250/637: Performed by: INTERNAL MEDICINE

## 2020-10-14 PROCEDURE — 86140 C-REACTIVE PROTEIN: CPT

## 2020-10-14 PROCEDURE — 71045 X-RAY EXAM CHEST 1 VIEW: CPT

## 2020-10-14 PROCEDURE — 74011000250 HC RX REV CODE- 250: Performed by: INTERNAL MEDICINE

## 2020-10-14 PROCEDURE — 74011250637 HC RX REV CODE- 250/637: Performed by: FAMILY MEDICINE

## 2020-10-14 PROCEDURE — 77010033678 HC OXYGEN DAILY

## 2020-10-14 PROCEDURE — 99232 SBSQ HOSP IP/OBS MODERATE 35: CPT | Performed by: INTERNAL MEDICINE

## 2020-10-14 PROCEDURE — 65270000029 HC RM PRIVATE

## 2020-10-14 PROCEDURE — 74011250636 HC RX REV CODE- 250/636: Performed by: FAMILY MEDICINE

## 2020-10-14 PROCEDURE — 85652 RBC SED RATE AUTOMATED: CPT

## 2020-10-14 PROCEDURE — 74011250636 HC RX REV CODE- 250/636: Performed by: INTERNAL MEDICINE

## 2020-10-14 RX ORDER — LANOLIN ALCOHOL/MO/W.PET/CERES
500 CREAM (GRAM) TOPICAL DAILY
Status: DISCONTINUED | OUTPATIENT
Start: 2020-10-15 | End: 2020-10-27 | Stop reason: HOSPADM

## 2020-10-14 RX ORDER — LANOLIN ALCOHOL/MO/W.PET/CERES
1 CREAM (GRAM) TOPICAL
Status: DISCONTINUED | OUTPATIENT
Start: 2020-10-14 | End: 2020-10-24

## 2020-10-14 RX ADMIN — GUAIFENESIN 400 MG: 200 SOLUTION ORAL at 12:25

## 2020-10-14 RX ADMIN — IPRATROPIUM BROMIDE AND ALBUTEROL SULFATE 3 ML: .5; 3 SOLUTION RESPIRATORY (INHALATION) at 20:02

## 2020-10-14 RX ADMIN — DIPHENHYDRAMINE HYDROCHLORIDE 25 MG: 25 CAPSULE ORAL at 15:45

## 2020-10-14 RX ADMIN — ACETYLCYSTEINE 600 MG: 200 SOLUTION ORAL; RESPIRATORY (INHALATION) at 20:02

## 2020-10-14 RX ADMIN — IPRATROPIUM BROMIDE AND ALBUTEROL SULFATE 3 ML: .5; 3 SOLUTION RESPIRATORY (INHALATION) at 13:38

## 2020-10-14 RX ADMIN — FUROSEMIDE 40 MG: 10 INJECTION, SOLUTION INTRAMUSCULAR; INTRAVENOUS at 09:20

## 2020-10-14 RX ADMIN — MEROPENEM 1 G: 1 INJECTION, POWDER, FOR SOLUTION INTRAVENOUS at 17:35

## 2020-10-14 RX ADMIN — GUAIFENESIN 400 MG: 200 SOLUTION ORAL at 17:35

## 2020-10-14 RX ADMIN — ATORVASTATIN CALCIUM 20 MG: 20 TABLET, FILM COATED ORAL at 21:26

## 2020-10-14 RX ADMIN — OXYCODONE HYDROCHLORIDE AND ACETAMINOPHEN 1 TABLET: 5; 325 TABLET ORAL at 15:45

## 2020-10-14 RX ADMIN — GUAIFENESIN 400 MG: 200 SOLUTION ORAL at 23:30

## 2020-10-14 RX ADMIN — DIPHENHYDRAMINE HYDROCHLORIDE 25 MG: 25 CAPSULE ORAL at 21:25

## 2020-10-14 RX ADMIN — MEROPENEM 1 G: 1 INJECTION, POWDER, FOR SOLUTION INTRAVENOUS at 09:20

## 2020-10-14 RX ADMIN — AMLODIPINE BESYLATE 5 MG: 5 TABLET ORAL at 09:20

## 2020-10-14 RX ADMIN — FUROSEMIDE 40 MG: 10 INJECTION, SOLUTION INTRAMUSCULAR; INTRAVENOUS at 21:26

## 2020-10-14 RX ADMIN — OXYCODONE HYDROCHLORIDE AND ACETAMINOPHEN 1 TABLET: 5; 325 TABLET ORAL at 03:53

## 2020-10-14 RX ADMIN — GUAIFENESIN 400 MG: 200 SOLUTION ORAL at 03:53

## 2020-10-14 RX ADMIN — FERROUS SULFATE TAB 325 MG (65 MG ELEMENTAL FE) 325 MG: 325 (65 FE) TAB at 15:45

## 2020-10-14 RX ADMIN — PANTOPRAZOLE SODIUM 40 MG: 40 TABLET, DELAYED RELEASE ORAL at 03:53

## 2020-10-14 NOTE — PROGRESS NOTES
Pulm/CC Progress Note    Subjective:   Daily Progress Note: 10/14/2020     Patient had EBUS done yesterday. Did well. Results are still not available. He is awake and alert. On 3 L of oxygen via nasal cannula. Is comfortable. Looks depressed. He declined physical therapy today   Chest x-ray that was done showed partial collapse of left lung again. General surgery is following given his significantly large ventral hernia which is clearly decreasing his ability to cough up his mucus. Unfortunately, given a questionable diagnosis of malignancy, they postponed any surgical management at this time. CT chest/abdomen/pelvis reviewed from 10/5/2020. Patient does have a left paratracheal node measuring 2.4 x 1.4 cm, otherwise no other evidence of definitive malignancy on CT imaging. Is undergone 8 bronchoscopies with mucus suctioning and has had several biopsies of the lesions within his tracheobronchial tree as well as brushings. He has had squamous metaplasia return, but no definitive malignancy. Oncology has been consulted and are asking if an EBUS would be warranted in this case in an attempt to rule in/out a malignancy. Review of Systems   Has complains of shortness of breath. He is on nasal cannula oxygen. Denied any nausea vomiting. Has poor appetite    Objective:     Visit Vitals  /64 (BP 1 Location: Left arm)   Pulse 99   Temp 98 °F (36.7 °C)   Resp 20   Ht 6' 0.99\" (1.854 m)   Wt 84.1 kg (185 lb 6.5 oz)   SpO2 99%   BMI 24.47 kg/m²    O2 Flow Rate (L/min): 4 l/min O2 Device: Nasal cannula    Temp (24hrs), Av.2 °F (36.8 °C), Min:97.8 °F (36.6 °C), Max:98.6 °F (37 °C)      No intake/output data recorded.   10/12 1901 - 10/14 0700  In: 700 [I.V.:700]  Out: 2400 [Urine:2400]    Current Facility-Administered Medications   Medication Dose Route Frequency    oxyCODONE-acetaminophen (PERCOCET) 5-325 mg per tablet 1 Tab  1 Tab Oral Q12H PRN    amLODIPine (NORVASC) tablet 5 mg  5 mg Oral DAILY    furosemide (LASIX) injection 40 mg  40 mg IntraVENous BID    meropenem (MERREM) 1 g in sterile water (preservative free) 20 mL IV syringe  1 g IntraVENous Q8H    albuterol-ipratropium (DUO-NEB) 2.5 MG-0.5 MG/3 ML  3 mL Nebulization Q6HWA RT    acetylcysteine (MUCOMYST) 200 mg/mL (20 %) solution 600 mg  600 mg Nebulization BID    diphenhydrAMINE (BENADRYL) capsule 25 mg  25 mg Oral Q6H PRN    guaiFENesin (ROBITUSSIN) 100 mg/5 mL oral liquid 400 mg  400 mg Oral Q6H    0.9% sodium chloride infusion 250 mL  250 mL IntraVENous PRN    atorvastatin (LIPITOR) tablet 20 mg  20 mg Oral DAILY    pantoprazole (PROTONIX) tablet 40 mg  40 mg Oral DAILY    acetaminophen (TYLENOL) tablet 650 mg  650 mg Oral Q4H PRN    alum-mag hydroxide-simeth (MYLANTA) oral suspension 30 mL  30 mL Oral Q4H PRN    magnesium hydroxide (MILK OF MAGNESIA) 400 mg/5 mL oral suspension 30 mL  30 mL Oral DAILY PRN    ondansetron (ZOFRAN) injection 4 mg  4 mg IntraVENous Q8H PRN       Physical Exam:  General: Alert and oriented x3.  3 L nasal cannula. Relatively pleasant. HEENT: atraumatic, PERRL, moist mucosa,     Neck: Trachea midline, no carotid bruit, no masses. Supple but thick. Respiratory: No breath sounds on the left side. Few crackles and rhonchi on the right side. No significant change. Cardiovascular: RRR, no m/r/g, Normal S1 and S2   abdomen: Has large ventral abdominal hernia, evidence of surgical scars soft,   Rectal: deferred  Extremities: no cyanosis or clubbing. He has significant pitting edema in the dependent portions and lower extremities. Integumentary: warm, dry, and pink, with no rash, purpura, or petechia.    Heme/Lymph: no lymphadenopathy, no bruises  Neurological: No focal motor deficit   psychiatric: cooperative with normal mood, affect, and cognition      Additional comments: Chest x-rays reviewed    Data Review    Recent Results (from the past 24 hour(s))   METABOLIC PANEL, BASIC Collection Time: 10/13/20  6:40 PM   Result Value Ref Range    Sodium 138 136 - 145 mmol/L    Potassium 3.7 3.5 - 5.1 mmol/L    Chloride 93 (L) 97 - 108 mmol/L    CO2 >45 (HH) 21 - 32 mmol/L    Anion gap Not calculated 5 - 15 mmol/L    Glucose 130 (H) 65 - 100 mg/dL    BUN 12 6 - 20 mg/dL    Creatinine 0.84 0.70 - 1.30 mg/dL    BUN/Creatinine ratio 14 12 - 20      GFR est AA >60 >60 ml/min/1.73m2    GFR est non-AA >60 >60 ml/min/1.73m2    Calcium 8.1 (L) 8.5 - 10.1 mg/dL   BNP    Collection Time: 10/13/20  6:40 PM   Result Value Ref Range    NT pro-BNP 4,386 (H) <125 pg/mL   CBC WITH AUTOMATED DIFF    Collection Time: 10/13/20  6:40 PM   Result Value Ref Range    WBC 7.7 4.1 - 11.1 K/uL    RBC 2.62 (L) 4.10 - 5.70 M/uL    HGB 7.8 (L) 12.1 - 17.0 g/dL    HCT 25.7 (L) 36.6 - 50.3 %    MCV 98.1 80.0 - 99.0 FL    MCH 29.8 26.0 - 34.0 PG    MCHC 30.4 30.0 - 36.5 g/dL    RDW 19.7 (H) 11.5 - 14.5 %    PLATELET 875 722 - 258 K/uL    MPV 9.1 8.9 - 12.9 FL    NEUTROPHILS 90 (H) 32 - 75 %    LYMPHOCYTES 5 (L) 12 - 49 %    MONOCYTES 5 5 - 13 %    EOSINOPHILS 0 0 - 7 %    BASOPHILS 0 0 - 1 %    IMMATURE GRANULOCYTES 0 0.0 - 0.5 %    ABS. NEUTROPHILS 7.0 1.8 - 8.0 K/UL    ABS. LYMPHOCYTES 0.4 (L) 0.8 - 3.5 K/UL    ABS. MONOCYTES 0.4 0.0 - 1.0 K/UL    ABS. EOSINOPHILS 0.0 0.0 - 0.4 K/UL    ABS. BASOPHILS 0.0 0.0 - 0.1 K/UL    ABS. IMM. GRANS. 0.0 0.00 - 0.04 K/UL    DF AUTOMATED     C REACTIVE PROTEIN, QT    Collection Time: 10/13/20  6:40 PM   Result Value Ref Range    C-Reactive protein 8.04 (H) 0.00 - 0.60 mg/dL   C REACTIVE PROTEIN, QT    Collection Time: 10/14/20  8:50 AM   Result Value Ref Range    C-Reactive protein 6.15 (H) 0.00 - 0.60 mg/dL   SED RATE, AUTOMATED    Collection Time: 10/14/20  8:50 AM   Result Value Ref Range    Sed rate, automated 128 mm/hr         Chest x-ray from last evening was reviewed which showed recurrence of left partial collapse.   6 results from 10/3/2020 were reviewed  Patient has left basal atelectasis. XR CHEST PORT   Final Result      XR CHEST PORT   Final Result      XR CHEST PORT   Final Result   IMPRESSION: Persistent findings compared to single view chest October 10, 2020. XR CHEST PORT   Final Result   IMPRESSION: No significant change. See above. XR CHEST PA LAT   Final Result   Impression:      Collapse of left lung again noted with decreased aeration of the upper lobe   compared to yesterday increased opacity of the right lung may be due to portable   technique. XR CHEST PORT   Final Result   Impression:      Stable aeration of the left upper lung with atelectasis. Stable appearing   bilateral pleural effusions. No significant change compared to the prior study from 10/7/2020. XR CHEST PORT   Final Result      XR FLUOROSCOPY UNDER 60 MINUTES   Final Result      XR CHEST PORT   Final Result      XR CHEST PORT   Final Result   FINDINGS: Impression: Frontal single view chest.      Continued opacification of the left hemithorax, obscuring the cardiac   silhouette. Right lung interstitial thickening, airspace disease, bronchial thickening,   pleural effusion similar to previous. No pneumothorax. CT CHEST WO CONT   Final Result   Impression:    1. Increased now complete opacification of the left bronchi with low density   material.   2. Slightly increased patchy airspace pneumonia in the right lung. 3. Unchanged loculated and nonloculated left pleural effusion, complete left   lung consolidation, right lung pleural effusion. CT ABD PELV W CONT   Final Result   IMPRESSION:   1. Large ventral hernia containing nonobstructed small and large intestine. 2. Moderate to large right pleural effusion and mild to moderate left pleural   effusion. There is near complete collapse of the visualized portions of the left   lower lobe and there is pleural thickening in the left hemithorax.    3. Patchy airspace disease in the right lower lobe along with right basilar atelectasis. 4. Nonspecific left adrenal nodule. 5. Other findings as described. XR CHEST PORT   Final Result   Impression: Heterogeneous opacity in the right lung, not significantly changed. Now complete opacification of the left hemithorax. XR CHEST PORT   Final Result   IMPRESSION: No significant change. XR CHEST AP LAT   Final Result      XR CHEST AP LAT   Final Result   IMPRESSION:   1. Persistent opacification of the left hemithorax with volume loss. 2.  Moderate right pleural effusion with probable associated right basilar   atelectasis. XR CHEST PORT   Final Result   Impression: Increased volume loss left lung. Otherwise stable. XR CHEST PORT   Final Result   IMPRESSION:   1. Improved aeration of the left lung with persistent basilar consolidative   opacities and probable pleural effusions. 2. Other extensive interstitial and alveolar opacities are nonspecific, but   potentially related to pulmonary edema or infection. XR CHEST PORT   Final Result   Impression:Left lung collapse without improvement from prior study. XR CHEST PORT   Final Result   IMPRESSION:   1. There is milder enlargement of the cardiac silhouette as well as increasing   pulmonary vascular ill definition suspect for mild pulmonary edema. This could   be related to cardiogenic edema or fluid overload. 2.  There is been an improvement in the overall aeration of the left lung with   and improved visualization of vascular markings within the left upper lung zone. There still remains significant partial collapse of the left lung. 3.  There is increased patchy airspace disease laterally within the right lower   lobe just above the right lateral costophrenic angle. 4.  There are persistent moderate-sized bilateral pleural effusions. XR CHEST AP LAT   Final Result   IMPRESSION: Persistent collapse of the left lung with bilateral pleural   effusions.  Increasing vascular congestion on the right. XR CHEST PA LAT   Final Result   Impression:   Ongoing near complete white out of the left lung. Little interval change since   the prior examination. CT CHEST WO CONT   Final Result   IMPRESSION: Complete collapse of the left lung due to complete bronchial   obstruction, possibly aspiration. Fluid overload also noted      XR ABD ACUTE W 1 V CHEST   Final Result   IMPRESSION: Opacification of the left hemithorax, questioned for remote   pneumonectomy or lung collapse with pleural fluid. Prominent right parenchymal/interstitial lung markings compatible with fibrosis   and possible partial vascular congestion. Small bowel gas, nonobstructive pattern. XR CHEST PORT    (Results Pending)   XR CHEST PORT    (Results Pending)   XR CHEST PORT    (Results Pending)          Assessment/Plan: This is a middle-aged male who was admitted because of increasing symptoms of shortness of breath. He is getting treated in hospital for pneumonia with IV Zosyn, was on Azithromycin. He is noted to be increasingly tachypneic and short of breath. He has been on supplemental oxygen. His chest x-ray is showing persistent left lung opacification from mucous plugging. This has not improved with aggressive pulmonary hygiene and 8 suction bronchoscopies. Additionally, there is concern that the patient may have a primary lung malignancy, work-up ongoing.     Plan:     1.)    Left lung collapse/shortness of breath:  - Shortness of breath and hypoxia due to recurrent left lung collapse. He had multiple bronchoscopies done along with lavage but he continues to have left lung collapse. He underwent EBUS, mildly enlarged station 7 lymph node, significantly enlarged station 11 lymph node. Ultrasound-guided biopsy was done. Left tracheal tree was lavaged and cleared of mucous plugs. Will await results of the test.  Chest x-ray is ordered. Patient will be continued on 3 L of nasal cannula oxygen.   X-ray from last evening was reviewed. Recurrent collapse is due to multiple reasons including large abdominal hernia, weak cough, COPD and multiple endobronchial lesions although they are not causing significant obstruction. CT of the chest done on 10/5 without IV contrast was personally reviewed with radiologist.  Left main bronchus shows mucus. Left lung is completely collapsed. Small left-sided pleural effusion. Larger right-sided pleural effusion. Right lung pneumonia. CT of the abdomen pelvis shows a large hernia. Dr. Maria Ines Wagoner also discussed with the pathologist.  Cytology from the previous bronchoscopy showing atypical squamous cells. Cultures have been negative. Cytology and pathology again from this bronch done on 10/7 is showing atypical cells with squamous metaplasia but no clear-cut evidence of malignancy. Pathology results are still pending. I have called histopathology and his slides are getting reviewed. Clinically it's certainly possible that he has an underlying malignancy. Oncology has been consulted, appreciate their recommendations. Based on results of EBUS biopsy, if it turns out to be malignancy then he would require chemotherapy/palliative care. He could have mucinous adenocarcinoma versus squamous cell carcinoma. Discussed with respiratory. Raise the left lung up and do chest PT. however he declines some treatment by RT. He has been ordered pulmonary hygiene with nebulizers every 6 hours;  Aggressive chest PT, EZ-PAP therapy q6, acapella device as well as incentive spirometer use. Added guaifenesin 400 mg q6. Mucomyst also ordered. 2.)  COPD:  He has a history of COPD based on chronic smoking. Continue scheduled bronchodilators. Patient should be discharged with a LAMA/LABA such as Anoro and needs f/u in our office for PFT's and further management. Weaned off prednisone. 3.)  Pneumonia:   He is on IV Zosyn and Levaquin, stopped Zithromax on 9/23/2020. Cultures from recent bronchoscopy showing normal xiomara. Infectious disease consultation. 4.)  Hypertension:  He is on antihypertensive medications, BP's stable. Patient also has clinically fluid overload, congestive heart failure. Echocardiogram done on 9/16 showed an EF of 60 to 98% grade 2 diastolic dysfunction and moderate to severe aortic stenosis. Right ventricle is normal in size and function. Will resume Lasix. Patient is getting depressed. He declined physical therapy today. Questions of patient were answered at bedside in detail  Case discussed in detail with RN, RT, and care team  Thank you for involving me in the care of this patient  I will follow with you closely during hospitalization    Time spent more than 30 minutes in direct patient care with no overlap      SIMA Martins MD  Pulmonary and critical care

## 2020-10-14 NOTE — PROGRESS NOTES
OT tx attempted at [phone] however pt declining at this time stating he would rather work with therapies after lunch. Will continue to follow patient and attempt OT at a later time as schedule allows. Thank you.

## 2020-10-14 NOTE — PROGRESS NOTES
Patient was approached for therapy treatment. Patient declined stating that he is waiting fo [facility]. OT and PT encouraged patient to work with them but patient requested to come back after lunch. we will try to attempt after lunch.

## 2020-10-14 NOTE — PROGRESS NOTES
Problem: Patient Education: Go to Patient Education Activity  Goal: Patient/Family Education  Description: LE HEP to improve strength and functional mobility       Outcome: Progressing Towards Goal     Problem: Falls - Risk of  Goal: *Absence of Falls  Description: Document Samuel Fisher Fall Risk and appropriate interventions in the flowsheet.   Outcome: Progressing Towards Goal  Note: Fall Risk Interventions:  Mobility Interventions: Bed/chair exit alarm, OT consult for ADLs, Patient to call before getting OOB, PT Consult for mobility concerns, Utilize walker, cane, or other assistive device    Mentation Interventions: Bed/chair exit alarm, Adequate sleep, hydration, pain control, Door open when patient unattended, Room close to nurse's station, Update white board, Toileting rounds    Medication Interventions: Bed/chair exit alarm    Elimination Interventions: Bed/chair exit alarm, Call light in reach    History of Falls Interventions: Bed/chair exit alarm, Investigate reason for fall, Room close to nurse's station, Vital signs minimum Q4HRs X 24 hrs (comment for end date)         Problem: Patient Education: Go to Patient Education Activity  Goal: Patient/Family Education  Outcome: Progressing Towards Goal     Problem: Patient Education: Go to Patient Education Activity  Goal: Patient/Family Education  Outcome: Progressing Towards Goal     Problem: Patient Education: Go to Patient Education Activity  Goal: Patient/Family Education  Outcome: Progressing Towards Goal     Problem: Nutrition Deficit  Goal: *Optimize nutritional status  Outcome: Progressing Towards Goal     Problem: Airway Clearance - Ineffective  Goal: *Patent airway  Outcome: Progressing Towards Goal  Goal: *Absence of airway secretions  Outcome: Progressing Towards Goal  Goal: *Able to cough effectively  Outcome: Progressing Towards Goal

## 2020-10-14 NOTE — CONSULTS
[id number] [facility] SURGERY PROGRESS NOTE          Chief Complaint: Large abdominal hernia    Subjective:  No new issues. Still has some abdominal pain. Passing flatus and having bowel movement. No nausea or vomiting. On supplemental Oxygen and has shortness of breath. CT scan shows no bowel obstruction/hernia with left lung collapse. Flex brochoscopy which showed multiple endobroncheal polypoid growth suspicious for malignancy, even though final report stated no malignancy. He had an EBUS with FNA today, cytology results pending. Frustrated       Past Medical History:   Past Medical History:   Diagnosis Date    Anal fistula 3/15/2010    Anxiety     ARF (acute renal failure) (HCC)     Colon perforation (HCC)     Genital herpes 3/15/2010    GERD (gastroesophageal reflux disease)     High cholesterol 3/15/2010    History of blood transfusion     HTN (hypertension) 3/15/2010    Hyponatremia     Ischemic colitis ([address]     left Carotid Artery Occlusion 3/15/2010    Noncompliance with treatment 3/15/2010    Pneumonia 2011    PUD (peptic ulcer disease)     Septic shock(785.52)     Stroke 2002 3/15/2010    Thromboembolus ([address]     rt thigh    TIA (transient ischemic attack) 2007    pt reported       Past Surgical History:   Past Surgical History:   Procedure Laterality Date    CARDIAC CATHETERIZATION      CARDIAC SURG PROCEDURE UNLIST      HX COLECTOMY  1/11/2014    Right hemicolectomy for free air, perforated viscus    US SCROTUM/TESTICLES      right testicle removed        Allergy:  Allergies   Allergen Reactions    Morphine Other (comments)     itching       Social History:  reports that he has been smoking. He has a 70.00 pack-year smoking history. He has never used smokeless tobacco. He reports current alcohol use of about 70.0 standard drinks of alcohol per week. He reports current drug use. Drug: Marijuana.      Family History:  Family History   Problem Relation Age of Onset    Cancer Mother Current Medications:  Current Facility-Administered Medications:     oxyCODONE-acetaminophen (PERCOCET) 5-325 mg per tablet 1 Tab, 1 Tab, Oral, Q12H PRN, Anthony Gamboa MD, 1 Tab at 10/13/20 1637    amLODIPine (NORVASC) tablet 5 mg, 5 mg, Oral, DAILY, Anthony Gamboa MD, 5 mg at 10/13/20 1208    furosemide (LASIX) injection 40 mg, 40 mg, IntraVENous, BID, Anthony Gamboa MD, Stopped at 10/13/20 2100    meropenem (MERREM) 1 g in sterile water (preservative free) 20 mL IV syringe, 1 g, IntraVENous, Q8H, Darylene Patient, MD, 1 g at 10/13/20 1637    albuterol-ipratropium (DUO-NEB) 2.5 MG-0.5 MG/3 ML, 3 mL, Nebulization, Q6HWA RT, Jarett Ocampo DO, 3 mL at 10/13/20 2105    acetylcysteine (MUCOMYST) 200 mg/mL (20 %) solution 600 mg, 600 mg, Nebulization, BID, Darby Boswell MD, 600 mg at 10/13/20 2106    diphenhydrAMINE (BENADRYL) capsule 25 mg, 25 mg, Oral, Q6H PRN, Mike HERNANDEZ MD, 25 mg at 10/13/20 2033    guaiFENesin (ROBITUSSIN) 100 mg/5 mL oral liquid 400 mg, 400 mg, Oral, Q6H, Jarett Ocampo DO, 400 mg at 10/13/20 1637    0.9% sodium chloride infusion 250 mL, 250 mL, IntraVENous, PRN, Kat Diza MD    atorvastatin (LIPITOR) tablet 20 mg, 20 mg, Oral, DAILY, Kat Diaz MD, 20 mg at 10/13/20 2033    pantoprazole (PROTONIX) tablet 40 mg, 40 mg, Oral, DAILY, Kat Diaz MD, Stopped at 10/13/20 0700    acetaminophen (TYLENOL) tablet 650 mg, 650 mg, Oral, Q4H PRN, Kat Diaz MD, 650 mg at 09/24/20 2319    alum-mag hydroxide-simeth (MYLANTA) oral suspension 30 mL, 30 mL, Oral, Q4H PRN, Kat Diaz MD, 30 mL at 09/22/20 2216    magnesium hydroxide (MILK OF MAGNESIA) 400 mg/5 mL oral suspension 30 mL, 30 mL, Oral, DAILY PRN, Kat Diaz MD    ondansetron [facility]) injection 4 mg, 4 mg, IntraVENous, Q8H PRN, Kat Diaz MD, Stopped at 10/07/20 1300     Immunization History: There is no immunization history on file for this patient.    Complete    Review of Systems:     Constitutional:  no fever,  no chills,  no sweats, No weakness, No fatigue, No decreased activity. Eye: No recent visual problem, No icterus, No discharge, No double vision. Ear/Nose/Mouth/Throat: No decreased hearing, No ear pain, No nasal congestion, No sore throat. Respiratory:  shortness of breath,  cough, No sputum production, No hemoptysis,  wheezing, No cyanosis. Cardiovascular: No chest pain, No palpitations, No bradycardia, No tachycardia, No peripheral edema, No syncope. Gastrointestinal: No nausea,  No vomiting, No diarrhea, No constipation, No heartburn,  abdominal pain. Genitourinary: No dysuria, No hematuria, No change in urine stream, No urethral discharge, No lesions. Hematology/Lymphatics: No bruising tendency, No bleeding tendency, No petechiae, No swollen lymph glands. Endocrine: No excessive thirst, No polyuria, No cold intolerance, No heat intolerance, No excessive hunger. Immunologic: Not immunocompromised, No recurrent fevers, No recurrent infections. Musculoskeletal: No back pain, No neck pain, No joint pain, No muscle pain, No claudication, No decreased range of motion, No trauma. Integumentary: No rash, No pruritus, No abrasions. Neurologic: Alert and oriented X4, No abnormal balance, No headache, No confusion, No numbness, No tingling. Psychiatric: No anxiety, No depression, No james. Physical Exam:     Vitals & Measurements:     Wt Readings from Last 3 Encounters:   10/12/20 84.1 kg (185 lb 6.5 oz)   02/26/19 82.6 kg (182 lb)   11/28/18 83.9 kg (185 lb)     Temp Readings from Last 3 Encounters:   10/13/20 98.6 °F (37 °C)   09/05/20 98.1 °F (36.7 °C) (Temporal)   02/26/19 97.9 °F (36.6 °C) (Oral)     BP Readings from Last 3 Encounters:   10/13/20 (!) 91/56   09/05/20 110/60   02/26/19 171/77     Pulse Readings from Last 3 Encounters:   10/13/20 (!) 114   09/05/20 86   02/26/19 100      Ht Readings from Last 3 Encounters:   09/18/20 6' 0.99\" (1.854 m) 02/26/19 6' 1\" (1.854 m)   11/28/18 6' 1\" (1.854 m)          General:  in respiratory distress  Head: Normal  Face: Nornal  HEENT: atraumatic, PERRLA, moist mucosa, normal pharynx, normal tonsils and adenoids, normal tongue, no fluid in sinuses  Neck: Trachea midline, no carotid bruit, no masses  Chest: Normal.  Respiratory: Normal chest wall expansion, shortness of breath, wheezing, left side rhonchi. Cardiovascular: RRR, no m/r/g, Normal S1 and S2  Abdomen: Soft, non tender,distended, large hernia with thinned out skin and dilated blood vessels, partially reducible, large fascial defect with loss of domain, normal bowel sounds in all quadrants, no hepatosplenomegaly, no tympany. Incision scar +  Genitourinary: No inguinal hernia, normal external gentalia, Testis & scrotum normal, no renal angle tenderness  Rectal: deferred  Musculoskeletal: normal ROM in upper and lower extremities, No joint swelling.   Integumentary: Warm, dry, and pink, with no rash, purpura, or petechia  Heme/Lymph: No lymphadenopathy, no bruises  Neurological:Cranial Nerves II-XII grossly intact, no gross motor or sensory deficit  Psychiatric: Cooperative with normal mood, affect, and cognition      Laboratory Values:   Recent Results (from the past 24 hour(s))   METABOLIC PANEL, BASIC    Collection Time: 10/13/20  6:40 PM   Result Value Ref Range    Sodium 138 136 - 145 mmol/L    Potassium 3.7 3.5 - 5.1 mmol/L    Chloride 93 (L) 97 - 108 mmol/L    CO2 >45 (HH) 21 - 32 mmol/L    Anion gap Not calculated 5 - 15 mmol/L    Glucose 130 (H) 65 - 100 mg/dL    BUN 12 6 - 20 mg/dL    Creatinine 0.84 0.70 - 1.30 mg/dL    BUN/Creatinine ratio 14 12 - 20      GFR est AA >60 >60 ml/min/1.73m2    GFR est non-AA >60 >60 ml/min/1.73m2    Calcium 8.1 (L) 8.5 - 10.1 mg/dL   BNP    Collection Time: 10/13/20  6:40 PM   Result Value Ref Range    NT pro-BNP 4,386 (H) <125 pg/mL   CBC WITH AUTOMATED DIFF    Collection Time: 10/13/20  6:40 PM   Result Value Ref Range    WBC 7.7 4.1 - 11.1 K/uL    RBC 2.62 (L) 4.10 - 5.70 M/uL    HGB 7.8 (L) 12.1 - 17.0 g/dL    HCT 25.7 (L) 36.6 - 50.3 %    MCV 98.1 80.0 - 99.0 FL    MCH 29.8 26.0 - 34.0 PG    MCHC 30.4 30.0 - 36.5 g/dL    RDW 19.7 (H) 11.5 - 14.5 %    PLATELET 458 966 - 259 K/uL    MPV 9.1 8.9 - 12.9 FL    NEUTROPHILS 90 (H) 32 - 75 %    LYMPHOCYTES 5 (L) 12 - 49 %    MONOCYTES 5 5 - 13 %    EOSINOPHILS 0 0 - 7 %    BASOPHILS 0 0 - 1 %    IMMATURE GRANULOCYTES 0 0.0 - 0.5 %    ABS. NEUTROPHILS 7.0 1.8 - 8.0 K/UL    ABS. LYMPHOCYTES 0.4 (L) 0.8 - 3.5 K/UL    ABS. MONOCYTES 0.4 0.0 - 1.0 K/UL    ABS. EOSINOPHILS 0.0 0.0 - 0.4 K/UL    ABS. BASOPHILS 0.0 0.0 - 0.1 K/UL    ABS. IMM. GRANS. 0.0 0.00 - 0.04 K/UL    DF AUTOMATED     C REACTIVE PROTEIN, QT    Collection Time: 10/13/20  6:40 PM   Result Value Ref Range    C-Reactive protein 8.04 (H) 0.00 - 0.60 mg/dL             Assessment:  Large incisional hernia with loss of domain    Severe COPD with endobronchial growth in bronchus      Respiratory failure -left lung collapse- recurrent bronchoscopy- possible underlying mass    On going smoking     Plan:      I had a discussion with patient and explained the details of the procedure and complexity of the surgery with his compromised respiratory status. I had discussion with Dr. Paty Ferris who feels that the endobronchial nodules are still highly suspicious for malignant nodules. EBUS & cytology results are pending. Recommendation will be to hold off on surgical intervention for a non obstructed incisional hernia with compromised respiratory status particularly given the history of suspicious endobronchial nodules suspicious for malignancy. Patient wants transfer to [facility] [address] [place] [address]. I mentioned him to discuss with Dr. Darline Christensen. Thank you for the consultation & allowing me to participate in the care of this patient.

## 2020-10-14 NOTE — PROGRESS NOTES
Progress Note    Patient: Solange Fields MRN: [id number]  SSN: [id number]    YOB: 1960  Age: 63 y.o.   Sex: male      Admit Date: 9/10/2020    LOS: 34 days        Subjective:     Patient is seen for follow-up,    ebus yesterday-results pending  Comfortable at rest,  Weak  No fever/chills  cxr worse/partial collapse again           Current Facility-Administered Medications:     ferrous sulfate tablet 325 mg, 1 Tab, Oral, DAILY WITH BREAKFAST, Jeremiah Benton MD, 325 mg at 10/14/20 1545    [START ON 10/15/2020] cyanocobalamin (VITAMIN B12) tablet 500 mcg, 500 mcg, Oral, DAILY, Marii Benton MD    oxyCODONE-acetaminophen (PERCOCET) 5-325 mg per tablet 1 Tab, 1 Tab, Oral, Q12H PRN, Dariela Garrido MD, 1 Tab at 10/14/20 1545    amLODIPine (NORVASC) tablet 5 mg, 5 mg, Oral, DAILY, Dariela Garrido MD, 5 mg at 10/14/20 0920    furosemide (LASIX) injection 40 mg, 40 mg, IntraVENous, BID, Dariela Garrido MD, 40 mg at 10/14/20 0920    meropenem (MERREM) 1 g in sterile water (preservative free) 20 mL IV syringe, 1 g, IntraVENous, Q8H, Segundo Masters MD, 1 g at 10/14/20 0920    albuterol-ipratropium (DUO-NEB) 2.5 MG-0.5 MG/3 ML, 3 mL, Nebulization, Q6HWA RT, Jarett Ocampo DO, 3 mL at 10/14/20 1338    acetylcysteine (MUCOMYST) 200 mg/mL (20 %) solution 600 mg, 600 mg, Nebulization, BID, Darby Boswell MD, 600 mg at 10/13/20 2106    diphenhydrAMINE (BENADRYL) capsule 25 mg, 25 mg, Oral, Q6H PRN, Jeffery HERNANDEZ MD, 25 mg at 10/14/20 1545    guaiFENesin (ROBITUSSIN) 100 mg/5 mL oral liquid 400 mg, 400 mg, Oral, Q6H, Jarett Ocampo, , 400 mg at 10/14/20 1225    0.9% sodium chloride infusion 250 mL, 250 mL, IntraVENous, PRN, Reji Driver MD    atorvastatin (LIPITOR) tablet 20 mg, 20 mg, Oral, DAILY, Reji Driver MD, 20 mg at 10/13/20 2033    pantoprazole (PROTONIX) tablet 40 mg, 40 mg, Oral, DAILY, Reji Driver MD, 40 mg at 10/14/20 0353    acetaminophen (TYLENOL) tablet 650 mg, 650 mg, Oral, Q4H PRN, Caitlin Izquierdo MD, 650 mg at 20 2319    alum-mag hydroxide-simeth (MYLANTA) oral suspension 30 mL, 30 mL, Oral, Q4H PRN, Caitlin Izquierdo MD, 30 mL at 20 2215    magnesium hydroxide (MILK OF MAGNESIA) 400 mg/5 mL oral suspension 30 mL, 30 mL, Oral, DAILY PRN, Caitlin Izquierdo MD    ondansetron [facility]) injection 4 mg, 4 mg, IntraVENous, Q8H PRN, Caitlin Izquierdo MD, Stopped at 10/07/20 1300    Objective:     Visit Vitals  /70   Pulse 100   Temp 99 °F (37.2 °C)   Resp 20   Ht 6' 0.99\" (1.854 m)   Wt 82.5 kg (181 lb 14.1 oz)   SpO2 97%   BMI 24.00 kg/m²    O2 Flow Rate (L/min): 3 l/min O2 Device: Nasal cannula     Temp (24hrs), Av.6 °F (37 °C), Min:97.2 °F (36.2 °C), Max:99.1 °F (37.3 °C)         Physical Exam:   General :  no respiratory distress noted at rest, patient on nasal cannula oxygen  Lungs : BS increased left lung field vs prior. No wheeze. Not labored.   CVS :  no gallop  Abdomen :  +ventral hernia positive bowel sounds  Extremities : No edema noted,  Neurological : Awake, alert, oriented to time place person.  No neurological deficits.    Psychiatric : Mood and affect normal    Lab/Data Review:  Recent Results (from the past 24 hour(s))   METABOLIC PANEL, BASIC    Collection Time: 10/13/20  6:40 PM   Result Value Ref Range    Sodium 138 136 - 145 mmol/L    Potassium 3.7 3.5 - 5.1 mmol/L    Chloride 93 (L) 97 - 108 mmol/L    CO2 >45 (HH) 21 - 32 mmol/L    Anion gap Not calculated 5 - 15 mmol/L    Glucose 130 (H) 65 - 100 mg/dL    BUN 12 6 - 20 mg/dL    Creatinine 0.84 0.70 - 1.30 mg/dL    BUN/Creatinine ratio 14 12 - 20      GFR est AA >60 >60 ml/min/1.73m2    GFR est non-AA >60 >60 ml/min/1.73m2    Calcium 8.1 (L) 8.5 - 10.1 mg/dL   BNP    Collection Time: 10/13/20  6:40 PM   Result Value Ref Range    NT pro-BNP 4,386 (H) <125 pg/mL   CBC WITH AUTOMATED DIFF    Collection Time: 10/13/20  6:40 PM   Result Value Ref Range    WBC 7.7 4.1 - 11.1 K/uL RBC 2.62 (L) 4.10 - 5.70 M/uL    HGB 7.8 (L) 12.1 - 17.0 g/dL    HCT 25.7 (L) 36.6 - 50.3 %    MCV 98.1 80.0 - 99.0 FL    MCH 29.8 26.0 - 34.0 PG    MCHC 30.4 30.0 - 36.5 g/dL    RDW 19.7 (H) 11.5 - 14.5 %    PLATELET 911 394 - 107 K/uL    MPV 9.1 8.9 - 12.9 FL    NEUTROPHILS 90 (H) 32 - 75 %    LYMPHOCYTES 5 (L) 12 - 49 %    MONOCYTES 5 5 - 13 %    EOSINOPHILS 0 0 - 7 %    BASOPHILS 0 0 - 1 %    IMMATURE GRANULOCYTES 0 0.0 - 0.5 %    ABS. NEUTROPHILS 7.0 1.8 - 8.0 K/UL    ABS. LYMPHOCYTES 0.4 (L) 0.8 - 3.5 K/UL    ABS. MONOCYTES 0.4 0.0 - 1.0 K/UL    ABS. EOSINOPHILS 0.0 0.0 - 0.4 K/UL    ABS. BASOPHILS 0.0 0.0 - 0.1 K/UL    ABS. IMM. GRANS. 0.0 0.00 - 0.04 K/UL    DF AUTOMATED     C REACTIVE PROTEIN, QT    Collection Time: 10/13/20  6:40 PM   Result Value Ref Range    C-Reactive protein 8.04 (H) 0.00 - 0.60 mg/dL   C REACTIVE PROTEIN, QT    Collection Time: 10/14/20  8:50 AM   Result Value Ref Range    C-Reactive protein 6.15 (H) 0.00 - 0.60 mg/dL   SED RATE, AUTOMATED    Collection Time: 10/14/20  8:50 AM   Result Value Ref Range    Sed rate, automated 128 mm/hr     CT abd 10/5. IMPRESSION:  1. Large ventral hernia containing nonobstructed small and large intestine. 2. Moderate to large right pleural effusion and mild to moderate left pleural  effusion. There is near complete collapse of the visualized portions of the left  lower lobe and there is pleural thickening in the left hemithorax. 3. Patchy airspace disease in the right lower lobe along with right basilar  atelectasis. 4. Nonspecific left adrenal nodule. 5. Other findings as described. Pt at increased risk for procedure with resp compromise in addition to anatomical considerations of reducing massive hernia. Pt aware of inc mortality states \"I don't care if it kills me but it has to be done\". Abd ct pending, will need to recline on pillows for procedure as cannot lie flat.  Pt aware of risk for surgery, likelihood that will remain on vent long term and he reiterates he wants it done despite the risk. Cardio to optimize for clearance. Surgery on case f/u recommendations  Next x-ray  AP upright view of the chest compared to 10/9/2020. Complete opacification of  the left hemithorax is again noted. Small right pleural effusion and diffuse  right-sided airspace disease suggesting pulmonary edema are again noted. No  pneumothorax. Cardiac silhouette is obscured.     IMPRESSION  IMPRESSION: No significant change. See above. Assessment and plan:        Acute hypoxic respiratory failure : 2/2 persistent LL collapse s/p multiple bronchoscopies. Cytology/cs neg. Maintaining adequate sats on 4l.s/p bronh today. Cxr appears improved post procedure worse again today. EBUS 10/13, path pending. left lung collapse/PNA:   persistent left lung collapse from mucous plugging. Recurrent collapse is due to multiple reasons including large abdominal hernia, weak cough, COPD and multiple endobronchial lesions although they are not causing significant obstruction. S/p brocnh x 9, ebus/bx paratracheal node 10/13 follow path. Malignancy suspect despite neg cytologies. ID, Pulmonology, oncology appreciated. Abx per ID. Hernia complicating, pt wants it reduced despite increased mortality risk. General surgeon on board,chronic 6+ years neglected follow up. Surgery cx appreciated Dr. Idania Rojo. Case discussed with , no surgery planned until the pneumonia resolved, resp issues improvediscussed with patient. COPD:  pulm following.  stable now  Anemia: AOCI. Stable, follow  HTN : Controlled, lower side BP,BP meds adjusted   --Echocardiogram done on 9/16 showed an EF of 60 to 76% grade 2 diastolic dysfunction and moderate to severe aortic stenosis. Right ventricle is normal in size and function. Lasix continued.   --Follow venous doppler- r/o dv  DVT ppx: lovenox  PT/OT     DISPO: SNF anticipated when stable      Signed By: Zerita Schirmer, MD     October 14, 2020

## 2020-10-14 NOTE — PROGRESS NOTES
Hematology/Oncology   Progress Note    Patient: Ava Jenkins MRN: [id number]     YOB: 1960  Age: 63 y.o. Sex: male      Chief Complaint: Admitted with worsening shortness of breath,    Subjective:   He had EBUS and biopsy yesterday. Path results pending. Reports shortness of breath +  , He is requiring 4 L of oxygen via nasal canula. Reports good appetite. No new complaints. Constitutional No fevers, chills, night sweats, excessive fatigue or weight loss. Allergic/Immunologic No recent allergic reactions   Eyes No significant visual difficulties. No diplopia. ENMT No problems with hearing, no sore throat, no sinus drainage. Endocrine No hot flashes or night sweats. No cold intolerance, polyuria, or polydipsia   Hematologic/Lymphatic No easy bruising or bleeding. The patient denies any tender or palpable lymph nodes   Breasts No abnormal masses of breast, nipple discharge or pain. Respiratory No dyspnea on exertion, orthopnea, chest pain, cough or hemoptysis. Cardiovascular No anginal chest pain, irregular heart beat, tachycardia, palpitations or orthopnea. Gastrointestinal No nausea, vomiting, diarrhea, constipation, cramping, dysphagia, reflux, heartburn, GI bleeding, or early satiety. No change in bowel habits. Genitourinary (M) No hematuria, dysuria, increased frequency, urgency, hesitancy or incontinence. Musculoskeletal No joint pain, swelling or redness. No decreased range of motion. Integumentary No chronic rashes, inflammation, ulcerations, pruritus, petechiae, purpura, ecchymoses, or skin changes. Neurologic No headache, blurred vision, and no areas of focal weakness or numbness. Normal gait. No sensory problems. Psychiatric No insomnia, depression, james or mood swings. No psychotropic drugs.         Current Facility-Administered Medications:     oxyCODONE-acetaminophen (PERCOCET) 5-325 mg per tablet 1 Tab, 1 Tab, Oral, Q12H PRN, Tianna Reddy MD, 1 Tab at 10/14/20 0353    amLODIPine (NORVASC) tablet 5 mg, 5 mg, Oral, DAILY, Reg Mena MD, 5 mg at 10/14/20 0920    furosemide (LASIX) injection 40 mg, 40 mg, IntraVENous, BID, Reg Mena MD, 40 mg at 10/14/20 0920    meropenem (MERREM) 1 g in sterile water (preservative free) 20 mL IV syringe, 1 g, IntraVENous, Q8H, Ney Tobar MD, 1 g at 10/14/20 0920    albuterol-ipratropium (DUO-NEB) 2.5 MG-0.5 MG/3 ML, 3 mL, Nebulization, Q6HWA RT, Jartet Ocampo DO, 3 mL at 10/14/20 1338    acetylcysteine (MUCOMYST) 200 mg/mL (20 %) solution 600 mg, 600 mg, Nebulization, BID, Darby Boswell MD, 600 mg at 10/13/20 2106    diphenhydrAMINE (BENADRYL) capsule 25 mg, 25 mg, Oral, Q6H PRN, Mp HERNANDEZ MD, 25 mg at 10/13/20 2033    guaiFENesin (ROBITUSSIN) 100 mg/5 mL oral liquid 400 mg, 400 mg, Oral, Q6H, Jarett Ocampo DO, 400 mg at 10/14/20 1225    0.9% sodium chloride infusion 250 mL, 250 mL, IntraVENous, PRN, Kristen Sparks MD    atorvastatin (LIPITOR) tablet 20 mg, 20 mg, Oral, DAILY, Kristen Sparks MD, 20 mg at 10/13/20 2033    pantoprazole (PROTONIX) tablet 40 mg, 40 mg, Oral, DAILY, Kristen Sparks MD, 40 mg at 10/14/20 0353    acetaminophen (TYLENOL) tablet 650 mg, 650 mg, Oral, Q4H PRN, Kristen Sparks MD, 650 mg at 09/24/20 2319    alum-mag hydroxide-simeth (MYLANTA) oral suspension 30 mL, 30 mL, Oral, Q4H PRN, Kristen Sparks MD, 30 mL at 09/22/20 2215    magnesium hydroxide (MILK OF MAGNESIA) 400 mg/5 mL oral suspension 30 mL, 30 mL, Oral, DAILY PRN, Kristen Sparks MD    ondansetron [facility]) injection 4 mg, 4 mg, IntraVENous, Q8H PRN, Kristen Sparks MD, Stopped at 10/07/20 1300     Objective:     Vitals:    10/13/20 2344 10/13/20 2350 10/14/20 0913 10/14/20 1340   BP:  (!) 104/56 104/64    Pulse:  (!) 102 99    Resp:  22 20    Temp:  97.8 °F (36.6 °C) 98 °F (36.7 °C)    SpO2:  100% 100% 99%   Weight: 84.1 kg (185 lb 6.5 oz)      Height:              Physical Exam: Constitutional Chronically ill appearing +   Head Normocephalic; no scars   Eyes Conjunctivae and sclerae are clear and without icterus. Pupils are reactive and equal.   ENMT Sinuses are nontender. No oral exudates, ulcers, masses, thrush or mucositis. Oropharynx clear. Tongue normal.   Neck Supple without masses or thyromegaly. No jugular venous distension. Hematologic/Lymphatic No petechiae or purpura. No tender or palpable lymph nodes in the cervical, supraclavicular, axillary or inguinal area. Respiratory Lungs are clear to auscultation without rhonchi or wheezing. Cardiovascular Regular rate and rhythm of heart without murmurs, gallops or rubs. Chest / Line Site Chest is symmetric with no chest wall deformities. Abdomen Large incisional hernia +   Musculoskeletal No tenderness or swelling, normal range of motion without obvious weakness. Extremities Leg swelling +   Skin No rashes, scars, or lesions suggestive of malignancy. No petechiae, purpura, or ecchymoses. No excoriations. Neurologic No sensory or motor deficits, normal cerebellar function, normal gait, cranial nerves intact. Psychiatric Alert and oriented times three. Coherent speech. Verbalizes understanding of our discussions today.        Lab/Data Review:  Recent Results (from the past 24 hour(s))   METABOLIC PANEL, BASIC    Collection Time: 10/13/20  6:40 PM   Result Value Ref Range    Sodium 138 136 - 145 mmol/L    Potassium 3.7 3.5 - 5.1 mmol/L    Chloride 93 (L) 97 - 108 mmol/L    CO2 >45 (HH) 21 - 32 mmol/L    Anion gap Not calculated 5 - 15 mmol/L    Glucose 130 (H) 65 - 100 mg/dL    BUN 12 6 - 20 mg/dL    Creatinine 0.84 0.70 - 1.30 mg/dL    BUN/Creatinine ratio 14 12 - 20      GFR est AA >60 >60 ml/min/1.73m2    GFR est non-AA >60 >60 ml/min/1.73m2    Calcium 8.1 (L) 8.5 - 10.1 mg/dL   BNP    Collection Time: 10/13/20  6:40 PM   Result Value Ref Range    NT pro-BNP 4,386 (H) <125 pg/mL   CBC WITH AUTOMATED DIFF Collection Time: 10/13/20  6:40 PM   Result Value Ref Range    WBC 7.7 4.1 - 11.1 K/uL    RBC 2.62 (L) 4.10 - 5.70 M/uL    HGB 7.8 (L) 12.1 - 17.0 g/dL    HCT 25.7 (L) 36.6 - 50.3 %    MCV 98.1 80.0 - 99.0 FL    MCH 29.8 26.0 - 34.0 PG    MCHC 30.4 30.0 - 36.5 g/dL    RDW 19.7 (H) 11.5 - 14.5 %    PLATELET 484 876 - 197 K/uL    MPV 9.1 8.9 - 12.9 FL    NEUTROPHILS 90 (H) 32 - 75 %    LYMPHOCYTES 5 (L) 12 - 49 %    MONOCYTES 5 5 - 13 %    EOSINOPHILS 0 0 - 7 %    BASOPHILS 0 0 - 1 %    IMMATURE GRANULOCYTES 0 0.0 - 0.5 %    ABS. NEUTROPHILS 7.0 1.8 - 8.0 K/UL    ABS. LYMPHOCYTES 0.4 (L) 0.8 - 3.5 K/UL    ABS. MONOCYTES 0.4 0.0 - 1.0 K/UL    ABS. EOSINOPHILS 0.0 0.0 - 0.4 K/UL    ABS. BASOPHILS 0.0 0.0 - 0.1 K/UL    ABS. IMM. GRANS. 0.0 0.00 - 0.04 K/UL    DF AUTOMATED     C REACTIVE PROTEIN, QT    Collection Time: 10/13/20  6:40 PM   Result Value Ref Range    C-Reactive protein 8.04 (H) 0.00 - 0.60 mg/dL   C REACTIVE PROTEIN, QT    Collection Time: 10/14/20  8:50 AM   Result Value Ref Range    C-Reactive protein 6.15 (H) 0.00 - 0.60 mg/dL   SED RATE, AUTOMATED    Collection Time: 10/14/20  8:50 AM   Result Value Ref Range    Sed rate, automated 128 mm/hr          Radiology:      Assessment and Plan: Active Problems:    HTN (hypertension) (3/15/2010)      High cholesterol (3/15/2010)      Coagulation disorder ([facility] [address] (5/30/2012)      Pneumonia (1/1/2014)      [facility] (1/1/2014)      Hypertension (10/9/2020)    Mediastinal Adenopathy:  - Reported on most recent CT with a 2.4 cm x 1.4 cm left paratracheal lymph node. - Multiple bronchoscopy and biopsy/washings- failed to show evidence of malignancy. - Per Dr. Tapia Spar remains high due to endobronchial lesions and recurrent left lung collapse.   - Options include EBUS and bx of the left paratracheal node, if feasible versus out-patient PET for further evaluation.  - s/p EBUS  Today and path pending.   - overall appears to have poor performance status.      Anemia: Hb is 7.8gm/dl. - Ferritin is normal, although could be elevated due to inflammation. B12 is low normal- start on oral Iron and B12. Chronic inflammation also contributing  -repeat cbc in am. Transfuse for hemoglobin 7gm/dl or below. Lower Extremity Swelling:  - venous doppler negative for DVT.          Signed By: Zaina Hutchins MD     October 14, 2020

## 2020-10-14 NOTE — PROGRESS NOTES
LOS skin assessment performed by myself and Elbert Carrasco LPN. Pt has severe excoriation on sacrum and groin with skin tear on right buttock. Pt has an abdominal hernia with a skin abrasion/scab.

## 2020-10-14 NOTE — PROGRESS NOTES
Progress Note    Patient: April Dunbar MRN: [id number]  SSN: [id number]    YOB: 1960  Age: 63 y.o. Sex: male      Admit Date: 9/10/2020    LOS: 34 days     Subjective:   Patient followed for chronic, refractory pneumonia of undetermined etiology despite extensive microbiologic work-up. He underwent repeat bronchoscopy yesterday showing multiple areas of abnormal nodularity throughout the left and right tracheobronchial tree. Biopsies were taken and cultures sent. He remains afebrile with normal WBC. His CRP is decreasing but ESR unchanged. He is now on IV Meropenem. Patient resting comfortably at this time with mild subjective improvement. Objective:     Vitals:    10/13/20 2344 10/13/20 2350 10/14/20 0913 10/14/20 1340   BP:  (!) 104/56 104/64    Pulse:  (!) 102 99    Resp:  22 20    Temp:  97.8 °F (36.6 °C) 98 °F (36.7 °C)    SpO2:  100% 100% 99%   Weight: 185 lb 6.5 oz (84.1 kg)      Height:            Intake and Output:  Current Shift: No intake/output data recorded. Last three shifts: 10/12 1901 - 10/14 0700  In: 700 [I.V.:700]  Out: 2400 [Urine:2400]    Physical Exam:    Vitals signs and nursing note reviewed. Constitutional:       General: He is not in acute distress. Appearance: He is ill-appearing. He is not toxic-appearing or diaphoretic. Pulmonary:      Breath sounds: Rhonchi and rales present. No wheezing. Comments: Decreased breath sounds left lung field  Abdominal:      General: There is distension (ventral hernia with scarring and crusting). Tenderness: There is no abdominal tenderness. There is no guarding. Genitourinary:     Penis: Normal.       Comments: No Padilla  Musculoskeletal:      Right lower leg: Edema present. Left lower leg: Edema present. Skin:     Findings: Erythema (both calves) present. Neurological:      General: No focal deficit present. Mental Status: He is alert and oriented to person, place, and time.    Psychiatric: Mood and Affect: Mood normal.         Behavior: Behavior normal.         Thought Content: Thought content normal.         Judgment: Judgment normal.        Lab/Data Review:    WBC 7,700  Procalcitonin <0.05  CRP Pending 6.15 (8.04 (11.60 (12.50    (129    Bronchial washing fungus (10/7)  Pending  Bronchial washing [employer] (10/7) smear negative  Bronchial washing culture (10/7) Normal respiratory xiomara  Bronchial washing culture (10/13) Pending  Bronchial washing fungal (10/13) Pending  Assessment:     1. Chronic pneumonia, ?recurrent, etiology unclear, Day #31 IV Antibiotics, Day #4 IV Meropenem. 2. Markedly elevated CRP and ESR, CRP decreasing  3. Abnormal bronchoscopy with nodular lesions left tracheobronchial tree, biopsy pending. 4. Chronic venous insufficiency with stasis dermatitis  5. Large ventral hernia, seen by General Surgery     Comment:   CRP has steadily decreased after starting Meropenem but ESR is unchanged. Plan:   1. Continue IV Meropenem  2. In am, repeat CRP, done  3. Follow-up C-ANCA, P-ANCA, serum fungitell  4. Follow-up endobronchial biopsies  5.  Follow-up repeat bronchial cultures    Signed By: Octavio Pérez MD     October 14, 2020

## 2020-10-14 NOTE — PROGRESS NOTES
Hematology/Oncology   Progress Note    Patient: George Bains MRN: [id number]     YOB: 1960  Age: 63 y.o. Sex: male      Chief Complaint: Admitted with worsening shortness of breath,    Subjective:   He had EBUS and biopsy today. No prelim results available. Reports shortness of breath +  , He is requiring 4 L of oxygen via nasal canula. Constitutional No fevers, chills, night sweats, excessive fatigue or weight loss. Allergic/Immunologic No recent allergic reactions   Eyes No significant visual difficulties. No diplopia. ENMT No problems with hearing, no sore throat, no sinus drainage. Endocrine No hot flashes or night sweats. No cold intolerance, polyuria, or polydipsia   Hematologic/Lymphatic No easy bruising or bleeding. The patient denies any tender or palpable lymph nodes   Breasts No abnormal masses of breast, nipple discharge or pain. Respiratory No dyspnea on exertion, orthopnea, chest pain, cough or hemoptysis. Cardiovascular No anginal chest pain, irregular heart beat, tachycardia, palpitations or orthopnea. Gastrointestinal No nausea, vomiting, diarrhea, constipation, cramping, dysphagia, reflux, heartburn, GI bleeding, or early satiety. No change in bowel habits. Genitourinary (M) No hematuria, dysuria, increased frequency, urgency, hesitancy or incontinence. Musculoskeletal No joint pain, swelling or redness. No decreased range of motion. Integumentary No chronic rashes, inflammation, ulcerations, pruritus, petechiae, purpura, ecchymoses, or skin changes. Neurologic No headache, blurred vision, and no areas of focal weakness or numbness. Normal gait. No sensory problems. Psychiatric No insomnia, depression, james or mood swings. No psychotropic drugs.         Current Facility-Administered Medications:     oxyCODONE-acetaminophen (PERCOCET) 5-325 mg per tablet 1 Tab, 1 Tab, Oral, Q12H PRN, Belle Pickard MD, 1 Tab at 10/13/20 1577    amLODIPine (NORVASC) tablet 5 mg, 5 mg, Oral, DAILY, Ximena Valentino MD, 5 mg at 10/13/20 1208    furosemide (LASIX) injection 40 mg, 40 mg, IntraVENous, BID, Ximena Valentino MD, Stopped at 10/13/20 2100    meropenem (MERREM) 1 g in sterile water (preservative free) 20 mL IV syringe, 1 g, IntraVENous, Q8H, Evan Guzman MD, 1 g at 10/13/20 1637    albuterol-ipratropium (DUO-NEB) 2.5 MG-0.5 MG/3 ML, 3 mL, Nebulization, Q6HWA RT, Jarett Ocampo DO, 3 mL at 10/13/20 2105    acetylcysteine (MUCOMYST) 200 mg/mL (20 %) solution 600 mg, 600 mg, Nebulization, BID, Dabry Boswell MD, 600 mg at 10/13/20 2106    diphenhydrAMINE (BENADRYL) capsule 25 mg, 25 mg, Oral, Q6H PRN, Laura HERNANDEZ MD, 25 mg at 10/13/20 2033    guaiFENesin (ROBITUSSIN) 100 mg/5 mL oral liquid 400 mg, 400 mg, Oral, Q6H, Jarett Ocampo DO, 400 mg at 10/13/20 1637    0.9% sodium chloride infusion 250 mL, 250 mL, IntraVENous, PRN, Jasvir Montoya MD    atorvastatin (LIPITOR) tablet 20 mg, 20 mg, Oral, DAILY, Jasvir Montoya MD, 20 mg at 10/13/20 2033    pantoprazole (PROTONIX) tablet 40 mg, 40 mg, Oral, DAILY, Jasvir Montoya MD, Stopped at 10/13/20 0700    acetaminophen (TYLENOL) tablet 650 mg, 650 mg, Oral, Q4H PRN, Jasvir Montoya MD, 650 mg at 09/24/20 2319    alum-mag hydroxide-simeth (MYLANTA) oral suspension 30 mL, 30 mL, Oral, Q4H PRN, Jasvir Montoya MD, 30 mL at 09/22/20 2215    magnesium hydroxide (MILK OF MAGNESIA) 400 mg/5 mL oral suspension 30 mL, 30 mL, Oral, DAILY PRN, Jasvir Montoya MD    ondansetron [facility] injection 4 mg, 4 mg, IntraVENous, Q8H PRN, Jasvir Montoya MD, Stopped at 10/07/20 1300     Objective:     Vitals:    10/13/20 1533 10/13/20 2003 10/13/20 2029 10/13/20 2110   BP: (!) 89/50 (!) 78/53 98/60    Pulse: (!) 102 (!) 114     Resp: 18 18     Temp: 98.3 °F (36.8 °C) 98.6 °F (37 °C)     SpO2: 98% 99%  97%   Weight:       Height:              Physical Exam:   Constitutional Chronically ill appearing + Head Normocephalic; no scars   Eyes Conjunctivae and sclerae are clear and without icterus. Pupils are reactive and equal.   ENMT Sinuses are nontender. No oral exudates, ulcers, masses, thrush or mucositis. Oropharynx clear. Tongue normal.   Neck Supple without masses or thyromegaly. No jugular venous distension. Hematologic/Lymphatic No petechiae or purpura. No tender or palpable lymph nodes in the cervical, supraclavicular, axillary or inguinal area. Respiratory Lungs are clear to auscultation without rhonchi or wheezing. Cardiovascular Regular rate and rhythm of heart without murmurs, gallops or rubs. Chest / Line Site Chest is symmetric with no chest wall deformities. Abdomen Large incisional hernia +   Musculoskeletal No tenderness or swelling, normal range of motion without obvious weakness. Extremities Leg swelling +   Skin No rashes, scars, or lesions suggestive of malignancy. No petechiae, purpura, or ecchymoses. No excoriations. Neurologic No sensory or motor deficits, normal cerebellar function, normal gait, cranial nerves intact. Psychiatric Alert and oriented times three. Coherent speech. Verbalizes understanding of our discussions today. Lab/Data Review:  Recent Results (from the past 24 hour(s))   CBC WITH AUTOMATED DIFF    Collection Time: 10/13/20  6:40 PM   Result Value Ref Range    WBC 7.7 4.1 - 11.1 K/uL    RBC 2.62 (L) 4.10 - 5.70 M/uL    HGB 7.8 (L) 12.1 - 17.0 g/dL    HCT 25.7 (L) 36.6 - 50.3 %    MCV 98.1 80.0 - 99.0 FL    MCH 29.8 26.0 - 34.0 PG    MCHC 30.4 30.0 - 36.5 g/dL    RDW 19.7 (H) 11.5 - 14.5 %    PLATELET 836 264 - 448 K/uL    MPV 9.1 8.9 - 12.9 FL    NEUTROPHILS 90 (H) 32 - 75 %    LYMPHOCYTES 5 (L) 12 - 49 %    MONOCYTES 5 5 - 13 %    EOSINOPHILS 0 0 - 7 %    BASOPHILS 0 0 - 1 %    IMMATURE GRANULOCYTES 0 0.0 - 0.5 %    ABS. NEUTROPHILS 7.0 1.8 - 8.0 K/UL    ABS. LYMPHOCYTES 0.4 (L) 0.8 - 3.5 K/UL    ABS. MONOCYTES 0.4 0.0 - 1.0 K/UL    ABS. EOSINOPHILS 0.0 0.0 - 0.4 K/UL    ABS. BASOPHILS 0.0 0.0 - 0.1 K/UL    ABS. IMM. GRANS. 0.0 0.00 - 0.04 K/UL    DF AUTOMATED            Radiology:      Assessment and Plan: Active Problems:    HTN (hypertension) (3/15/2010)      High cholesterol (3/15/2010)      Coagulation disorder ([facility] [address] (5/30/2012)      Pneumonia (1/1/2014)      Alcoholism ([facility] (1/1/2014)      Hypertension (10/9/2020)    Mediastinal Adenopathy:  - Reported on most recent CT with a 2.4 cm x 1.4 cm left paratracheal lymph node. - Multiple bronchoscopy and biopsy/washings- failed to show evidence of malignancy. - Per Dr. Bergman Plume remains high due to endobronchial lesions and recurrent left lung collapse. - Options include EBUS and bx of the left paratracheal node, if feasible versus out-patient PET for further evaluation.  - s/p EBUS  Today and path pending.   - overall appears to have poor performance status.      Anemia: Hb is 7.8gm/dl. - Ferritin is normal, although could be elevated due to inflammation. B12 is low normal- will start oral Iron and B12. Chronic inflammation also contributing  -repeat cbc in am. Transfuse for hemoglobin 7gm/dl or below. Lower Extremity Swelling:  - venous doppler negative for DVT.          Signed By: Melissa Martins MD     October 13, 2020

## 2020-10-15 ENCOUNTER — APPOINTMENT (OUTPATIENT)
Dept: GENERAL RADIOLOGY | Age: 60
DRG: 177 | End: 2020-10-15
Attending: INTERNAL MEDICINE
Payer: MEDICARE

## 2020-10-15 LAB
BASOPHILS # BLD: 0 K/UL (ref 0–0.1)
BASOPHILS NFR BLD: 0 % (ref 0–1)
CRP SERPL-MCNC: 3.85 MG/DL (ref 0–0.6)
DIFFERENTIAL METHOD BLD: ABNORMAL
EOSINOPHIL # BLD: 0.1 K/UL (ref 0–0.4)
EOSINOPHIL NFR BLD: 1 % (ref 0–7)
ERYTHROCYTE [DISTWIDTH] IN BLOOD BY AUTOMATED COUNT: 19.5 % (ref 11.5–14.5)
HCT VFR BLD AUTO: 27.8 % (ref 36.6–50.3)
HGB BLD-MCNC: 8.3 G/DL (ref 12.1–17)
IMM GRANULOCYTES # BLD AUTO: 0 K/UL (ref 0–0.04)
IMM GRANULOCYTES NFR BLD AUTO: 0 % (ref 0–0.5)
LYMPHOCYTES # BLD: 1.3 K/UL (ref 0.8–3.5)
LYMPHOCYTES NFR BLD: 13 % (ref 12–49)
MCH RBC QN AUTO: 29.7 PG (ref 26–34)
MCHC RBC AUTO-ENTMCNC: 29.9 G/DL (ref 30–36.5)
MCV RBC AUTO: 99.6 FL (ref 80–99)
MONOCYTES # BLD: 1.2 K/UL (ref 0–1)
MONOCYTES NFR BLD: 12 % (ref 5–13)
NEUTS SEG # BLD: 7.2 K/UL (ref 1.8–8)
NEUTS SEG NFR BLD: 74 % (ref 32–75)
PLATELET # BLD AUTO: 339 K/UL (ref 150–400)
PMV BLD AUTO: 9.1 FL (ref 8.9–12.9)
RBC # BLD AUTO: 2.79 M/UL (ref 4.1–5.7)
WBC # BLD AUTO: 9.8 K/UL (ref 4.1–11.1)

## 2020-10-15 PROCEDURE — 74011250636 HC RX REV CODE- 250/636: Performed by: FAMILY MEDICINE

## 2020-10-15 PROCEDURE — 99232 SBSQ HOSP IP/OBS MODERATE 35: CPT | Performed by: INTERNAL MEDICINE

## 2020-10-15 PROCEDURE — 74011250637 HC RX REV CODE- 250/637: Performed by: INTERNAL MEDICINE

## 2020-10-15 PROCEDURE — 71045 X-RAY EXAM CHEST 1 VIEW: CPT

## 2020-10-15 PROCEDURE — 74011250636 HC RX REV CODE- 250/636: Performed by: INTERNAL MEDICINE

## 2020-10-15 PROCEDURE — 74011000250 HC RX REV CODE- 250: Performed by: INTERNAL MEDICINE

## 2020-10-15 PROCEDURE — 86140 C-REACTIVE PROTEIN: CPT

## 2020-10-15 PROCEDURE — 74011250637 HC RX REV CODE- 250/637: Performed by: FAMILY MEDICINE

## 2020-10-15 PROCEDURE — 94640 AIRWAY INHALATION TREATMENT: CPT

## 2020-10-15 PROCEDURE — 65270000029 HC RM PRIVATE

## 2020-10-15 PROCEDURE — 85025 COMPLETE CBC W/AUTO DIFF WBC: CPT

## 2020-10-15 PROCEDURE — 36415 COLL VENOUS BLD VENIPUNCTURE: CPT

## 2020-10-15 RX ORDER — OXYCODONE AND ACETAMINOPHEN 5; 325 MG/1; MG/1
1 TABLET ORAL
Status: DISCONTINUED | OUTPATIENT
Start: 2020-10-15 | End: 2020-10-25

## 2020-10-15 RX ADMIN — MEROPENEM 1 G: 1 INJECTION, POWDER, FOR SOLUTION INTRAVENOUS at 09:28

## 2020-10-15 RX ADMIN — FUROSEMIDE 40 MG: 10 INJECTION, SOLUTION INTRAMUSCULAR; INTRAVENOUS at 21:20

## 2020-10-15 RX ADMIN — IPRATROPIUM BROMIDE AND ALBUTEROL SULFATE 3 ML: .5; 3 SOLUTION RESPIRATORY (INHALATION) at 09:07

## 2020-10-15 RX ADMIN — GUAIFENESIN 400 MG: 200 SOLUTION ORAL at 17:54

## 2020-10-15 RX ADMIN — GUAIFENESIN 400 MG: 200 SOLUTION ORAL at 12:38

## 2020-10-15 RX ADMIN — IPRATROPIUM BROMIDE AND ALBUTEROL SULFATE 3 ML: .5; 3 SOLUTION RESPIRATORY (INHALATION) at 13:49

## 2020-10-15 RX ADMIN — AMLODIPINE BESYLATE 5 MG: 5 TABLET ORAL at 09:28

## 2020-10-15 RX ADMIN — OXYCODONE HYDROCHLORIDE AND ACETAMINOPHEN 1 TABLET: 5; 325 TABLET ORAL at 23:10

## 2020-10-15 RX ADMIN — GUAIFENESIN 400 MG: 200 SOLUTION ORAL at 06:10

## 2020-10-15 RX ADMIN — CYANOCOBALAMIN TAB 500 MCG 500 MCG: 500 TAB at 09:28

## 2020-10-15 RX ADMIN — MEROPENEM 1 G: 1 INJECTION, POWDER, FOR SOLUTION INTRAVENOUS at 02:05

## 2020-10-15 RX ADMIN — OXYCODONE HYDROCHLORIDE AND ACETAMINOPHEN 1 TABLET: 5; 325 TABLET ORAL at 15:30

## 2020-10-15 RX ADMIN — ATORVASTATIN CALCIUM 20 MG: 20 TABLET, FILM COATED ORAL at 21:20

## 2020-10-15 RX ADMIN — FERROUS SULFATE TAB 325 MG (65 MG ELEMENTAL FE) 325 MG: 325 (65 FE) TAB at 09:28

## 2020-10-15 RX ADMIN — FUROSEMIDE 40 MG: 10 INJECTION, SOLUTION INTRAMUSCULAR; INTRAVENOUS at 09:28

## 2020-10-15 RX ADMIN — DIPHENHYDRAMINE HYDROCHLORIDE 25 MG: 25 CAPSULE ORAL at 15:32

## 2020-10-15 RX ADMIN — MEROPENEM 1 G: 1 INJECTION, POWDER, FOR SOLUTION INTRAVENOUS at 17:54

## 2020-10-15 RX ADMIN — OXYCODONE HYDROCHLORIDE AND ACETAMINOPHEN 1 TABLET: 5; 325 TABLET ORAL at 03:00

## 2020-10-15 RX ADMIN — DIPHENHYDRAMINE HYDROCHLORIDE 25 MG: 25 CAPSULE ORAL at 03:00

## 2020-10-15 RX ADMIN — ACETYLCYSTEINE 600 MG: 200 SOLUTION ORAL; RESPIRATORY (INHALATION) at 09:07

## 2020-10-15 RX ADMIN — IPRATROPIUM BROMIDE AND ALBUTEROL SULFATE 3 ML: .5; 3 SOLUTION RESPIRATORY (INHALATION) at 20:50

## 2020-10-15 RX ADMIN — ACETYLCYSTEINE 600 MG: 200 SOLUTION ORAL; RESPIRATORY (INHALATION) at 20:50

## 2020-10-15 RX ADMIN — PANTOPRAZOLE SODIUM 40 MG: 40 TABLET, DELAYED RELEASE ORAL at 06:10

## 2020-10-15 RX ADMIN — DIPHENHYDRAMINE HYDROCHLORIDE 25 MG: 25 CAPSULE ORAL at 09:33

## 2020-10-15 RX ADMIN — GUAIFENESIN 400 MG: 200 SOLUTION ORAL at 23:10

## 2020-10-15 RX ADMIN — DIPHENHYDRAMINE HYDROCHLORIDE 25 MG: 25 CAPSULE ORAL at 23:09

## 2020-10-15 NOTE — PROGRESS NOTES
Comprehensive Nutrition Assessment    Type and Reason for Visit: RD nutrition re-screen/LOS    Nutrition Recommendations/Plan:   Continue current diet  Continue Snacks pudding - vanilla   Add food preference    D/c pb crackers and cookies    Nutrition Assessment:  Admitted for PNA, hypokalemia. Pt currently s/p 2 units PRBC (9/18), noted refractory pneumonia of undetermined etiology 2/2 left lung colapse (? malignancy), s/p EBUS (10/13), x9 Biopsies. Noted abd hernia-surgery postponed, awaiting cardiac clearance. Interviewed at bedside, pt endorses good appetite, only ~60% intakes d/t frequent post-prandial BMs, pt fear of incontinence/bothering nsg with need for frequent changing. DI assured pt he is not bothering nsg staff, and encouraged pt to eat to promote healing. Pt was receiving snacks from son, stated he has since stopped consuing cheez-its but is eating fruit cups and drinking pedialite from home. Food preference: Grilled cheese sandwich, grilled ham & cheese sandwich, Grilled ham & turkey sandwich. Snack: Chips, vanilla pudding, chocolate ice cream. Pt agreed to d/c pb crackers, and cookies, will continue vanilla pdg as ordered. Pt endorsed liking current diet. Labs: H/H 7.8/25.7, Cl 93, CO2 >45, Gluc 130, Ca 8.1, Mg 1.2, CRP 6. 15. Meds: Duo-Neb, Mucomyst, Norvasc, Vit B12, Benedryl, FeSU, Lasix, Robitussin, Merrem, Percocet, Protonix. Malnutrition Assessment:  Malnutrition Status:  Mild malnutrition    Context:  Chronic illness       Estimated Daily Nutrient Needs:  Energy (kcal):  2010(HBE x1.2)  Protein (g):  94(1.2g/kg)       Fluid (ml/day):  2010(1ml/kcal)   Needs based on COPD    Nutrition Related Findings:  Pt endorses lack of sleep. Pt reports a BM everytime he eats, ? if related to abx vs. sorbitol containing medications, discussed with MD; no N/V/C/D. No dysphagia, 1+ Edema LLE & RLE (10/13), improved to no edema on 10/14/ MD note.       Wounds:    Stage II, Partial thickness(Sacral wound; abd hernia w/ skin scab (10/14))       Current Nutrition Therapies:  DIET SNACKS Lunch, Dinner; Kay Cherry pdg  DIET REGULAR    Anthropometric Measures:  · Height:  6' 0.99\" (185.4 cm)  · Current Body Wt:  82.9 kg (182 lb 12.2 oz)   · Admission Body Wt:  182 lb 1.6 oz    · Usual Body Wt:  143 lb(1 year ago)     · Ideal Body Wt:  184 lbs:  99.3 %   · BMI Category:  Normal weight (BMI 18.5-24. 9)     Wt Hx: pt appears to have gained wt since time of admit; unsure Edema vs nutrition.     Nutrition Diagnosis:   No nutrition diagnosis at this time     Nutrition Interventions:   Food and/or Nutrient Delivery: Continue current diet, Continue oral nutrition supplement  Nutrition Education and Counseling: No recommendations at this time  Coordination of Nutrition Care: Continued inpatient monitoring       Nutrition Monitoring and Evaluation:   Behavioral-Environmental Outcomes: Readiness for change  Food/Nutrient Intake Outcomes: Food and nutrient intake  Physical Signs/Symptoms Outcomes: GI status, Weight, Skin    Discharge Planning:    No discharge needs at this time     Electronically signed by Megan Sosa on 10/15/2020 at 12:56 PM    Contact:

## 2020-10-15 NOTE — PROGRESS NOTES
Hematology/Oncology   Progress Note    Patient: Derick Homans MRN: [id number]     YOB: 1960  Age: 63 y.o. Sex: male      Chief Complaint: Admitted with worsening shortness of breath,    Subjective:   He had EBUS and biopsy . Path results pending. Reports shortness of breath +  , He is requiring 4 L of oxygen via nasal canula. Xray continues to show white out on the lung. Reports good appetite. No new complaints. No bleeding. Constitutional No fevers, chills, night sweats, excessive fatigue or weight loss. Allergic/Immunologic No recent allergic reactions   Eyes No significant visual difficulties. No diplopia. ENMT No problems with hearing, no sore throat, no sinus drainage. Endocrine No hot flashes or night sweats. No cold intolerance, polyuria, or polydipsia   Hematologic/Lymphatic No easy bruising or bleeding. The patient denies any tender or palpable lymph nodes   Breasts No abnormal masses of breast, nipple discharge or pain. Respiratory No dyspnea on exertion, orthopnea, chest pain, cough or hemoptysis. Cardiovascular No anginal chest pain, irregular heart beat, tachycardia, palpitations or orthopnea. Gastrointestinal No nausea, vomiting, diarrhea, constipation, cramping, dysphagia, reflux, heartburn, GI bleeding, or early satiety. No change in bowel habits. Genitourinary (M) No hematuria, dysuria, increased frequency, urgency, hesitancy or incontinence. Musculoskeletal No joint pain, swelling or redness. No decreased range of motion. Integumentary No chronic rashes, inflammation, ulcerations, pruritus, petechiae, purpura, ecchymoses, or skin changes. Neurologic No headache, blurred vision, and no areas of focal weakness or numbness. Normal gait. No sensory problems. Psychiatric No insomnia, depression, james or mood swings. No psychotropic drugs.         Current Facility-Administered Medications:     ferrous sulfate tablet 325 mg, 1 Tab, Oral, DAILY WITH BREAKFAST, Colten Ortiz MD, 325 mg at 10/15/20 3908    cyanocobalamin (VITAMIN B12) tablet 500 mcg, 500 mcg, Oral, DAILY, Vianney Benton MD, 500 mcg at 10/15/20 0928    oxyCODONE-acetaminophen (PERCOCET) 5-325 mg per tablet 1 Tab, 1 Tab, Oral, Q12H PRN, Anthony Gamboa MD, 1 Tab at 10/15/20 1530    amLODIPine (NORVASC) tablet 5 mg, 5 mg, Oral, DAILY, Anthony Gamboa MD, 5 mg at 10/15/20 4077    furosemide (LASIX) injection 40 mg, 40 mg, IntraVENous, BID, Anthony Gamboa MD, 40 mg at 10/15/20 0928    meropenem (MERREM) 1 g in sterile water (preservative free) 20 mL IV syringe, 1 g, IntraVENous, Q8H, Darylene Patient, MD, 1 g at 10/15/20 0928    albuterol-ipratropium (DUO-NEB) 2.5 MG-0.5 MG/3 ML, 3 mL, Nebulization, Q6HWA RT, Jarett Ocampo DO, 3 mL at 10/15/20 1349    acetylcysteine (MUCOMYST) 200 mg/mL (20 %) solution 600 mg, 600 mg, Nebulization, BID, Darby Boswell MD, 600 mg at 10/15/20 0907    diphenhydrAMINE (BENADRYL) capsule 25 mg, 25 mg, Oral, Q6H PRN, Mike HERNANDEZ MD, 25 mg at 10/15/20 1532    guaiFENesin (ROBITUSSIN) 100 mg/5 mL oral liquid 400 mg, 400 mg, Oral, Q6H, Jarett Ocampo DO, 400 mg at 10/15/20 1238    0.9% sodium chloride infusion 250 mL, 250 mL, IntraVENous, PRN, Kat Diaz MD    atorvastatin (LIPITOR) tablet 20 mg, 20 mg, Oral, DAILY, Kat Diaz MD, 20 mg at 10/14/20 2126    pantoprazole (PROTONIX) tablet 40 mg, 40 mg, Oral, DAILY, Kat Diaz MD, 40 mg at 10/15/20 0610    acetaminophen (TYLENOL) tablet 650 mg, 650 mg, Oral, Q4H PRN, Kat Diaz MD, 650 mg at 09/24/20 2319    alum-mag hydroxide-simeth (MYLANTA) oral suspension 30 mL, 30 mL, Oral, Q4H PRN, Kat Diaz MD, 30 mL at 09/22/20 2210    magnesium hydroxide (MILK OF MAGNESIA) 400 mg/5 mL oral suspension 30 mL, 30 mL, Oral, DAILY PRN, Kat Diaz MD    ondansetron [facility]) injection 4 mg, 4 mg, IntraVENous, Q8H PRN, Kat Diaz MD, Stopped at 10/07/20 1300 Objective:     Vitals:    10/15/20 1216 10/15/20 1350 10/15/20 1401 10/15/20 1506   BP:    109/65   Pulse:    100   Resp:    18   Temp:    98.6 °F (37 °C)   SpO2:  98% 98% 95%   Weight:       Height: 6' 0.99\" (1.854 m)             Physical Exam:   Constitutional Chronically ill appearing +   Head Normocephalic; no scars   Eyes Conjunctivae and sclerae are clear and without icterus. Pupils are reactive and equal.   ENMT Sinuses are nontender. No oral exudates, ulcers, masses, thrush or mucositis. Oropharynx clear. Tongue normal.   Neck Supple without masses or thyromegaly. No jugular venous distension. Hematologic/Lymphatic No petechiae or purpura. No tender or palpable lymph nodes in the cervical, supraclavicular, axillary or inguinal area. Respiratory Lungs are clear to auscultation without rhonchi or wheezing. Cardiovascular Regular rate and rhythm of heart without murmurs, gallops or rubs. Chest / Line Site Chest is symmetric with no chest wall deformities. Abdomen Large incisional hernia +   Musculoskeletal No tenderness or swelling, normal range of motion without obvious weakness. Extremities Leg swelling +   Skin No rashes, scars, or lesions suggestive of malignancy. No petechiae, purpura, or ecchymoses. No excoriations. Neurologic No sensory or motor deficits, normal cerebellar function, normal gait, cranial nerves intact. Psychiatric Alert and oriented times three. Coherent speech. Verbalizes understanding of our discussions today.        Lab/Data Review:  Recent Results (from the past 24 hour(s))   C REACTIVE PROTEIN, QT    Collection Time: 10/15/20 11:31 AM   Result Value Ref Range    C-Reactive protein 3.85 (H) 0.00 - 0.60 mg/dL   CBC WITH AUTOMATED DIFF    Collection Time: 10/15/20 11:31 AM   Result Value Ref Range    WBC 9.8 4.1 - 11.1 K/uL    RBC 2.79 (L) 4.10 - 5.70 M/uL    HGB 8.3 (L) 12.1 - 17.0 g/dL    HCT 27.8 (L) 36.6 - 50.3 %    MCV 99.6 (H) 80.0 - 99.0 FL    MCH 29.7 26.0 - 34.0 PG    MCHC 29.9 (L) 30.0 - 36.5 g/dL    RDW 19.5 (H) 11.5 - 14.5 %    PLATELET 192 101 - 071 K/uL    MPV 9.1 8.9 - 12.9 FL    NEUTROPHILS 74 32 - 75 %    LYMPHOCYTES 13 12 - 49 %    MONOCYTES 12 5 - 13 %    EOSINOPHILS 1 0 - 7 %    BASOPHILS 0 0 - 1 %    IMMATURE GRANULOCYTES 0 0.0 - 0.5 %    ABS. NEUTROPHILS 7.2 1.8 - 8.0 K/UL    ABS. LYMPHOCYTES 1.3 0.8 - 3.5 K/UL    ABS. MONOCYTES 1.2 (H) 0.0 - 1.0 K/UL    ABS. EOSINOPHILS 0.1 0.0 - 0.4 K/UL    ABS. BASOPHILS 0.0 0.0 - 0.1 K/UL    ABS. IMM. GRANS. 0.0 0.00 - 0.04 K/UL    DF AUTOMATED            Radiology:      Assessment and Plan: Active Problems:    HTN (hypertension) (3/15/2010)      High cholesterol (3/15/2010)      Coagulation disorder ([facility] [address] (5/30/2012)      Pneumonia (1/1/2014)      Alcoholism ([facility] [address] (1/1/2014)      Hypertension (10/9/2020)    Mediastinal Adenopathy:  - Reported on most recent CT with a 2.4 cm x 1.4 cm left paratracheal lymph node. - Multiple bronchoscopy and biopsy/washings- failed to show evidence of malignancy. - Per Dr. Marin Sleek remains high due to endobronchial lesions and recurrent left lung collapse. - Options include EBUS and bx of the left paratracheal node, if feasible versus out-patient PET for further evaluation.  - s/p EBUS  And biopsy and path pending.   - overall appears to have poor performance status.      Anemia:   - Ferritin is normal, although could be elevated due to inflammation. B12 is low normal- start on oral Iron and B12. Chronic inflammation also contributing  -repeat cbc today shows hb of 8.3gm/dl. . Transfuse for hemoglobin 7gm/dl or below. Lower Extremity Swelling:  - venous doppler negative for DVT.          Signed By: Nereyda Irvin MD     October 15, 2020

## 2020-10-15 NOTE — PROGRESS NOTES
Progress Note    Patient: Eber Saint MRN: [id number]  SSN: [id number]    YOB: 1960  Age: 63 y.o. Sex: male      Admit Date: 9/10/2020    LOS: 35 days        Subjective:     Patient is seen for follow-up,    ebus 10/13-results pending  Comfortable at rest, easily dyspneic minimal exertion  Weak  No fever/chills  cxr worse/lt side opacification.            Current Facility-Administered Medications:     ferrous sulfate tablet 325 mg, 1 Tab, Oral, DAILY WITH BREAKFAST, Stephane Benton MD, 325 mg at 10/15/20 1579    cyanocobalamin (VITAMIN B12) tablet 500 mcg, 500 mcg, Oral, DAILY, Stephane Benton MD, 500 mcg at 10/15/20 0928    oxyCODONE-acetaminophen (PERCOCET) 5-325 mg per tablet 1 Tab, 1 Tab, Oral, Q12H PRN, Jodi Villarreal MD, 1 Tab at 10/15/20 0300    amLODIPine (NORVASC) tablet 5 mg, 5 mg, Oral, DAILY, Jodi Villarreal MD, 5 mg at 10/15/20 9807    furosemide (LASIX) injection 40 mg, 40 mg, IntraVENous, BID, Jodi Villarreal MD, 40 mg at 10/15/20 0928    meropenem (MERREM) 1 g in sterile water (preservative free) 20 mL IV syringe, 1 g, IntraVENous, Q8H, Suni Johnson MD, 1 g at 10/15/20 0928    albuterol-ipratropium (DUO-NEB) 2.5 MG-0.5 MG/3 ML, 3 mL, Nebulization, Q6HWA RT, Jarett Ocampo DO, 3 mL at 10/15/20 0907    acetylcysteine (MUCOMYST) 200 mg/mL (20 %) solution 600 mg, 600 mg, Nebulization, BID, Darby Boswell MD, 600 mg at 10/15/20 0907    diphenhydrAMINE (BENADRYL) capsule 25 mg, 25 mg, Oral, Q6H PRN, Frank HERNANDEZ MD, 25 mg at 10/15/20 0933    guaiFENesin (ROBITUSSIN) 100 mg/5 mL oral liquid 400 mg, 400 mg, Oral, Q6H, Jarett Ocampo DO, 400 mg at 10/15/20 0610    0.9% sodium chloride infusion 250 mL, 250 mL, IntraVENous, PRN, Jose L Watkins MD    atorvastatin (LIPITOR) tablet 20 mg, 20 mg, Oral, DAILY, Jose L Watkins MD, 20 mg at 10/14/20 2126    pantoprazole (PROTONIX) tablet 40 mg, 40 mg, Oral, DAILY, Jose L Watkins MD, 40 mg at 10/15/20 0610   acetaminophen (TYLENOL) tablet 650 mg, 650 mg, Oral, Q4H PRN, Amparo Weber MD, 650 mg at 20 2319    alum-mag hydroxide-simeth (MYLANTA) oral suspension 30 mL, 30 mL, Oral, Q4H PRN, Amparo Weber MD, 30 mL at 20 2215    magnesium hydroxide (MILK OF MAGNESIA) 400 mg/5 mL oral suspension 30 mL, 30 mL, Oral, DAILY PRN, Amparo Weber MD    ondansetron [facility]) injection 4 mg, 4 mg, IntraVENous, Q8H PRN, Amparo Weber MD, Stopped at 10/07/20 1300    Objective:     Visit Vitals  /70   Pulse 100   Temp 99 °F (37.2 °C)   Resp 20   Ht 6' 0.99\" (1.854 m)   Wt 82.5 kg (181 lb 14.1 oz)   SpO2 97%   BMI 24.00 kg/m²    O2 Flow Rate (L/min): 3 l/min O2 Device: Nasal cannula     Temp (24hrs), Av.6 °F (37 °C), Min:97.2 °F (36.2 °C), Max:99.1 °F (37.3 °C)         Physical Exam:   General :  no respiratory distress noted at rest, patient on nasal cannula oxygen  Lungs : BS increased left lung field vs prior. No wheeze. Not labored. CVS :  no gallop  Abdomen :  +ventral hernia positive bowel sounds  Extremities : No edema noted,  Neurological : Awake, alert, oriented to time place person.  No neurological deficits.    Psychiatric : Mood and affect normal    Lab/Data Review:  No results found for this or any previous visit (from the past 24 hour(s)). CT abd 10/5. IMPRESSION:  1. Large ventral hernia containing nonobstructed small and large intestine. 2. Moderate to large right pleural effusion and mild to moderate left pleural  effusion. There is near complete collapse of the visualized portions of the left  lower lobe and there is pleural thickening in the left hemithorax. 3. Patchy airspace disease in the right lower lobe along with right basilar  atelectasis. 4. Nonspecific left adrenal nodule. 5. Other findings as described. Pt at increased risk for procedure with resp compromise in addition to anatomical considerations of reducing massive hernia.  Pt aware of inc mortality states \"I don't care if it kills me but it has to be done\". Abd ct pending, will need to recline on pillows for procedure as cannot lie flat. Pt aware of risk for surgery, likelihood that will remain on vent long term and he reiterates he wants it done despite the risk. Cardio to optimize for clearance. Surgery on case f/u recommendations  Next x-ray  AP upright view of the chest compared to 10/9/2020. Complete opacification of  the left hemithorax is again noted. Small right pleural effusion and diffuse  right-sided airspace disease suggesting pulmonary edema are again noted. No  pneumothorax. Cardiac silhouette is obscured.     IMPRESSION  IMPRESSION: No significant change. See above. Assessment and plan:        Acute hypoxic respiratory failure : 2/2 persistent LL collapse s/p multiple bronchoscopies. Cytology/cs neg. Maintaining adequate sats on 4l.s/p bronh today. Cxr appears improved post procedure worse again today. EBUS 10/13, path pending. left lung collapse/PNA:   persistent left lung collapse from mucous plugging. Recurrent collapse is due to multiple reasons including large abdominal hernia, weak cough, COPD and multiple endobronchial lesions although they are not causing significant obstruction. S/p brocnh x 9, ebus/bx paratracheal node 10/13 follow path. Malignancy suspect despite neg cytologies. ID, Pulmonology, oncology appreciated. Abx per ID. CXR 10/15=cpmplete Lt lung opacification again. Mediastinal Adenopathy:  Reported on most recent CT with a 2.4 cm x 1.4 cm left paratracheal lymph node. Multiple bronchoscopy and biopsy/washings with negative cytology,  failed to show evidence of malignancy. D/w dr Sarthak Mary, high suspicion remains for malignancy 2/2 endobronchial lesions observed. EBUS 10/13 of paratracheal node. Oncology following, anticipating OP pet scan. Nonethess prognosis is poor, performance status is poor . Hernia complicating, pt wants it reduced despite increased mortality risk. General surgeon on board,chronic 6+ years neglected follow up. Surgery cx appreciated Dr. Rowan Abebe. Case discussed with , no surgery planned until the pneumonia resolved, resp issues improve. discussed with patient. COPD:  pulm following. Anemia: AOCI. Stable, follow. Feso4/b12 supplements, follow. HTN : Controlled, lower side BP,BP meds adjusted   --Echocardiogram done on 9/16 showed an EF of 60 to 80% grade 2 diastolic dysfunction and moderate to severe aortic stenosis. Right ventricle is normal in size and function. Lasix continued. --Follow venous doppler- no dvt. 2.2 x 2.0 cm rt common femoral pseudoaneurysm  DVT ppx: lovenox  PT/OT     DISPO: SNF anticipated when stable.      Signed By: Augie Son MD     October 15, 2020

## 2020-10-15 NOTE — PROGRESS NOTES
Progress Note    Patient: Amalia Jordan MRN: [id number]  SSN: [id number]    YOB: 1960  Age: 63 y.o. Sex: male      Admit Date: 9/10/2020    LOS: 35 days     Subjective:   Patient followed for chronic, refractory pneumonia of undetermined etiology despite extensive microbiologic work-up. He underwent repeat bronchoscopy yesterday showing multiple areas of abnormal nodularity throughout the left and right tracheobronchial tree. Biopsies were taken and cultures sent. He remains afebrile with normal WBC. His CRP is decreasing but ESR unchanged. He is now on IV Meropenem. Patient resting comfortably at this time with no new complaints. Objective:     Vitals:    10/14/20 2330 10/15/20 0755 10/15/20 0911 10/15/20 1216   BP: 114/68 (!) 141/99     Pulse: (!) 102 99     Resp: 20 18     Temp: 98.5 °F (36.9 °C) 98 °F (36.7 °C)     SpO2: 97% 94% 95%    Weight:       Height:    6' 0.99\" (1.854 m)        Intake and Output:  Current Shift: No intake/output data recorded. Last three shifts: 10/13 1901 - 10/15 0700  In: 300 [P.O.:300]  Out: 1825 [Urine:1825]    Physical Exam:    Vitals signs and nursing note reviewed. Constitutional:       General: He is not in acute distress. Appearance: He is ill-appearing. He is not toxic-appearing or diaphoretic. Pulmonary:      Breath sounds: Rhonchi and rales present. No wheezing. Comments: Decreased breath sounds left lung field  Abdominal:      General: There is distension (ventral hernia with scarring and crusting). Tenderness: There is no abdominal tenderness. There is no guarding. Genitourinary:     Penis: Normal.       Comments: No Padilla  Musculoskeletal:      Right lower leg: Edema present. Left lower leg: Edema present. Skin:     Findings: Erythema (both calves) present. Neurological:      General: No focal deficit present. Mental Status: He is alert and oriented to person, place, and time.    Psychiatric:         Mood and Affect: Mood normal.         Behavior: Behavior normal.         Thought Content: Thought content normal.         Judgment: Judgment normal.        Lab/Data Review:    WBC 9.800  Procalcitonin <0.05  CRP 3.85 (6.15 (8.04 (11.60 (12.50    (129  ANCA panel Negative    Bronchial washing fungus (10/7)  Pending  Bronchial washing [employer] (10/7) smear negative  Bronchial washing culture (10/7) Normal respiratory xiomara  Bronchial washing culture (10/13) Normal respiratory xiomara  Bronchial washing fungal (10/13) Pending  Assessment:     1. Chronic pneumonia, ?recurrent, etiology unclear, Day #32 IV Antibiotics, Day #5 IV Meropenem. 2. Markedly elevated CRP, presumed secondary to #1, decreasing  3. Abnormal bronchoscopy with nodular lesions left tracheobronchial tree, biopsy pending. 4. Chronic venous insufficiency with stasis dermatitis  5. Large ventral hernia, seen by General Surgery     Comment:   CRP continues to decrease in empiric Meropenem while respiratory cultures still only showing normal respiratory xiomara. ANCA panel negative, mitigating against vasculitis. Plan:   1. Continue IV Meropenem  2. In am, repeat CRP, done  3. Follow-up endobronchial biopsies  4.  Follow-up repeat bronchial cultures    Signed By: Tamra Thomas MD     October 15, 2020

## 2020-10-15 NOTE — PROGRESS NOTES
Pulm/CC Progress Note    Subjective:   Daily Progress Note: 10/15/2020     Patient had EBUS done . Did well. Results are still not available. He is awake and alert. On 3 L of oxygen via nasal cannula. Is comfortable. Looks depressed. He declined physical therapy today   Chest x-ray that was done showed partial collapse of left lung again. General surgery is following given his significantly large ventral hernia which is clearly decreasing his ability to cough up his mucus. Unfortunately, given a questionable diagnosis of malignancy, they postponed any surgical management at this time. CT chest/abdomen/pelvis reviewed from 10/5/2020. Patient does have a left paratracheal node measuring 2.4 x 1.4 cm, otherwise no other evidence of definitive malignancy on CT imaging. Is undergone 8 bronchoscopies with mucus suctioning and has had several biopsies of the lesions within his tracheobronchial tree as well as brushings. He has had squamous metaplasia return, but no definitive malignancy. Oncology has been consulted and are asking if an EBUS would be warranted in this case in an attempt to rule in/out a malignancy. Review of Systems   Has complains of shortness of breath. He is on nasal cannula oxygen. Denied any nausea vomiting. Has poor appetite    Objective:     Visit Vitals  /65   Pulse 100   Temp 98.6 °F (37 °C)   Resp 18   Ht 6' 0.99\" (1.854 m)   Wt 84.1 kg (185 lb 6.5 oz)   SpO2 95%   BMI 24.47 kg/m²    O2 Flow Rate (L/min): 4 l/min O2 Device: Nasal cannula    Temp (24hrs), Av.2 °F (36.8 °C), Min:97.7 °F (36.5 °C), Max:98.6 °F (37 °C)      No intake/output data recorded.   10/13 1901 - 10/15 0700  In: 300 [P.O.:300]  Out: 1825 [Urine:1825]    Current Facility-Administered Medications   Medication Dose Route Frequency    ferrous sulfate tablet 325 mg  1 Tab Oral DAILY WITH BREAKFAST    cyanocobalamin (VITAMIN B12) tablet 500 mcg  500 mcg Oral DAILY    oxyCODONE-acetaminophen (PERCOCET) 5-325 mg per tablet 1 Tab  1 Tab Oral Q12H PRN    amLODIPine (NORVASC) tablet 5 mg  5 mg Oral DAILY    furosemide (LASIX) injection 40 mg  40 mg IntraVENous BID    meropenem (MERREM) 1 g in sterile water (preservative free) 20 mL IV syringe  1 g IntraVENous Q8H    albuterol-ipratropium (DUO-NEB) 2.5 MG-0.5 MG/3 ML  3 mL Nebulization Q6HWA RT    acetylcysteine (MUCOMYST) 200 mg/mL (20 %) solution 600 mg  600 mg Nebulization BID    diphenhydrAMINE (BENADRYL) capsule 25 mg  25 mg Oral Q6H PRN    guaiFENesin (ROBITUSSIN) 100 mg/5 mL oral liquid 400 mg  400 mg Oral Q6H    0.9% sodium chloride infusion 250 mL  250 mL IntraVENous PRN    atorvastatin (LIPITOR) tablet 20 mg  20 mg Oral DAILY    pantoprazole (PROTONIX) tablet 40 mg  40 mg Oral DAILY    acetaminophen (TYLENOL) tablet 650 mg  650 mg Oral Q4H PRN    alum-mag hydroxide-simeth (MYLANTA) oral suspension 30 mL  30 mL Oral Q4H PRN    magnesium hydroxide (MILK OF MAGNESIA) 400 mg/5 mL oral suspension 30 mL  30 mL Oral DAILY PRN    ondansetron (ZOFRAN) injection 4 mg  4 mg IntraVENous Q8H PRN       Physical Exam:  General: Alert and oriented x3.  3 L nasal cannula. Relatively pleasant. HEENT: atraumatic, PERRL, moist mucosa,     Neck: Trachea midline, no carotid bruit, no masses. Supple but thick. Respiratory: No breath sounds on the left side. Few crackles and rhonchi on the right side. No significant change. Cardiovascular: RRR, no m/r/g, Normal S1 and S2   abdomen: Has large ventral abdominal hernia, evidence of surgical scars soft,   Rectal: deferred  Extremities: no cyanosis or clubbing. He has significant pitting edema in the dependent portions and lower extremities. Integumentary: warm, dry, and pink, with no rash, purpura, or petechia.    Heme/Lymph: no lymphadenopathy, no bruises  Neurological: No focal motor deficit   psychiatric: cooperative with normal mood, affect, and cognition      Additional comments: Chest x-rays reviewed    Data Review    Recent Results (from the past 24 hour(s))   C REACTIVE PROTEIN, QT    Collection Time: 10/15/20 11:31 AM   Result Value Ref Range    C-Reactive protein 3.85 (H) 0.00 - 0.60 mg/dL   CBC WITH AUTOMATED DIFF    Collection Time: 10/15/20 11:31 AM   Result Value Ref Range    WBC 9.8 4.1 - 11.1 K/uL    RBC 2.79 (L) 4.10 - 5.70 M/uL    HGB 8.3 (L) 12.1 - 17.0 g/dL    HCT 27.8 (L) 36.6 - 50.3 %    MCV 99.6 (H) 80.0 - 99.0 FL    MCH 29.7 26.0 - 34.0 PG    MCHC 29.9 (L) 30.0 - 36.5 g/dL    RDW 19.5 (H) 11.5 - 14.5 %    PLATELET 254 841 - 449 K/uL    MPV 9.1 8.9 - 12.9 FL    NEUTROPHILS 74 32 - 75 %    LYMPHOCYTES 13 12 - 49 %    MONOCYTES 12 5 - 13 %    EOSINOPHILS 1 0 - 7 %    BASOPHILS 0 0 - 1 %    IMMATURE GRANULOCYTES 0 0.0 - 0.5 %    ABS. NEUTROPHILS 7.2 1.8 - 8.0 K/UL    ABS. LYMPHOCYTES 1.3 0.8 - 3.5 K/UL    ABS. MONOCYTES 1.2 (H) 0.0 - 1.0 K/UL    ABS. EOSINOPHILS 0.1 0.0 - 0.4 K/UL    ABS. BASOPHILS 0.0 0.0 - 0.1 K/UL    ABS. IMM. GRANS. 0.0 0.00 - 0.04 K/UL    DF AUTOMATED           Chest x-ray from last evening was reviewed which showed recurrence of left partial collapse. 6 results from 10/3/2020 were reviewed  Patient has left basal atelectasis. XR CHEST PORT   Final Result      XR CHEST PORT   Final Result      XR CHEST PORT   Final Result      XR CHEST PORT   Final Result   IMPRESSION: Persistent findings compared to single view chest October 10, 2020. XR CHEST PORT   Final Result   IMPRESSION: No significant change. See above. XR CHEST PA LAT   Final Result   Impression:      Collapse of left lung again noted with decreased aeration of the upper lobe   compared to yesterday increased opacity of the right lung may be due to portable   technique. XR CHEST PORT   Final Result   Impression:      Stable aeration of the left upper lung with atelectasis. Stable appearing   bilateral pleural effusions.    No significant change compared to the prior study from 10/7/2020. XR CHEST PORT   Final Result      XR FLUOROSCOPY UNDER 60 MINUTES   Final Result      XR CHEST PORT   Final Result      XR CHEST PORT   Final Result   FINDINGS: Impression: Frontal single view chest.      Continued opacification of the left hemithorax, obscuring the cardiac   silhouette. Right lung interstitial thickening, airspace disease, bronchial thickening,   pleural effusion similar to previous. No pneumothorax. CT CHEST WO CONT   Final Result   Impression:    1. Increased now complete opacification of the left bronchi with low density   material.   2. Slightly increased patchy airspace pneumonia in the right lung. 3. Unchanged loculated and nonloculated left pleural effusion, complete left   lung consolidation, right lung pleural effusion. CT ABD PELV W CONT   Final Result   IMPRESSION:   1. Large ventral hernia containing nonobstructed small and large intestine. 2. Moderate to large right pleural effusion and mild to moderate left pleural   effusion. There is near complete collapse of the visualized portions of the left   lower lobe and there is pleural thickening in the left hemithorax. 3. Patchy airspace disease in the right lower lobe along with right basilar   atelectasis. 4. Nonspecific left adrenal nodule. 5. Other findings as described. XR CHEST PORT   Final Result   Impression: Heterogeneous opacity in the right lung, not significantly changed. Now complete opacification of the left hemithorax. XR CHEST PORT   Final Result   IMPRESSION: No significant change. XR CHEST AP LAT   Final Result      XR CHEST AP LAT   Final Result   IMPRESSION:   1. Persistent opacification of the left hemithorax with volume loss. 2.  Moderate right pleural effusion with probable associated right basilar   atelectasis. XR CHEST PORT   Final Result   Impression: Increased volume loss left lung. Otherwise stable.       XR CHEST PORT   Final Result IMPRESSION:   1. Improved aeration of the left lung with persistent basilar consolidative   opacities and probable pleural effusions. 2. Other extensive interstitial and alveolar opacities are nonspecific, but   potentially related to pulmonary edema or infection. XR CHEST PORT   Final Result   Impression:Left lung collapse without improvement from prior study. XR CHEST PORT   Final Result   IMPRESSION:   1. There is milder enlargement of the cardiac silhouette as well as increasing   pulmonary vascular ill definition suspect for mild pulmonary edema. This could   be related to cardiogenic edema or fluid overload. 2.  There is been an improvement in the overall aeration of the left lung with   and improved visualization of vascular markings within the left upper lung zone. There still remains significant partial collapse of the left lung. 3.  There is increased patchy airspace disease laterally within the right lower   lobe just above the right lateral costophrenic angle. 4.  There are persistent moderate-sized bilateral pleural effusions. XR CHEST AP LAT   Final Result   IMPRESSION: Persistent collapse of the left lung with bilateral pleural   effusions. Increasing vascular congestion on the right. XR CHEST PA LAT   Final Result   Impression:   Ongoing near complete white out of the left lung. Little interval change since   the prior examination. CT CHEST WO CONT   Final Result   IMPRESSION: Complete collapse of the left lung due to complete bronchial   obstruction, possibly aspiration. Fluid overload also noted      XR ABD ACUTE W 1 V CHEST   Final Result   IMPRESSION: Opacification of the left hemithorax, questioned for remote   pneumonectomy or lung collapse with pleural fluid. Prominent right parenchymal/interstitial lung markings compatible with fibrosis   and possible partial vascular congestion. Small bowel gas, nonobstructive pattern.       XR CHEST PORT (Results Pending)   XR CHEST PORT    (Results Pending)   XR CHEST PORT    (Results Pending)          Assessment/Plan: This is a middle-aged male who was admitted because of increasing symptoms of shortness of breath. He is getting treated in hospital for pneumonia with IV Zosyn, was on Azithromycin. He is noted to be increasingly tachypneic and short of breath. He has been on supplemental oxygen. His chest x-ray is showing persistent left lung opacification from mucous plugging. This has not improved with aggressive pulmonary hygiene and 8 suction bronchoscopies. Additionally, there is concern that the patient may have a primary lung malignancy, work-up ongoing.     Plan:     1.)    Left lung collapse/shortness of breath:  - Shortness of breath and hypoxia due to recurrent left lung collapse. He had multiple bronchoscopies done along with lavage but he continues to have left lung collapse. He underwent EBUS, mildly enlarged station 7 lymph node, significantly enlarged station 11 lymph node. Ultrasound-guided biopsy was done. Left tracheal tree was lavaged and cleared of mucous plugs. Will await results of the test.  Patient will be continued on 3 L of nasal cannula oxygen. X-ray from last evening was reviewed. Recurrent collapse is due to multiple reasons including large abdominal hernia, weak cough, COPD and multiple endobronchial lesions although they are not causing significant obstruction. CT of the chest done on 10/5 without IV contrast was personally reviewed with radiologist.  Left main bronchus shows mucus. Left lung is completely collapsed. Small left-sided pleural effusion. Larger right-sided pleural effusion. Right lung pneumonia. CT of the abdomen pelvis shows a large hernia. Dr. Glass Nicely also discussed with the pathologist.  Cytology from the previous bronchoscopy showing atypical squamous cells. Cultures have been negative.   Cytology and pathology again from this bronch done on 10/7 is showing atypical cells with squamous metaplasia but no clear-cut evidence of malignancy. Pathology results are still pending. I have called histopathology and his slides are getting reviewed. Clinically it's certainly possible that he has an underlying malignancy. Oncology has been consulted, appreciate their recommendations. Based on results of EBUS biopsy, if it turns out to be malignancy then he would require chemotherapy/palliative care. He could have mucinous adenocarcinoma versus squamous cell carcinoma. Discussed with respiratory. Raise the left lung up and do chest PT. however he declines some treatment by RT. He has been ordered pulmonary hygiene with nebulizers every 6 hours;  Aggressive chest PT, EZ-PAP therapy q6, acapella device as well as incentive spirometer use. Added guaifenesin 400 mg q6. Mucomyst also ordered. 2.)  COPD:  He has a history of COPD based on chronic smoking. Continue scheduled bronchodilators. Patient should be discharged with a LAMA/LABA such as Anoro and needs f/u in our office for PFT's and further management. Weaned off prednisone. 3.)  Pneumonia:   He is on IV Zosyn and Levaquin, stopped Zithromax on 9/23/2020. Cultures from recent bronchoscopy showing normal xiomara. Infectious disease consultation. 4.)  Hypertension:  He is on antihypertensive medications, BP's stable. Patient also has clinically fluid overload, congestive heart failure. Echocardiogram done on 9/16 showed an EF of 60 to 00% grade 2 diastolic dysfunction and moderate to severe aortic stenosis. Right ventricle is normal in size and function. Will resume Lasix. Patient is getting depressed. He declined physical therapy today.     Questions of patient were answered at bedside in detail  Case discussed in detail with RN, RT, and care team  Thank you for involving me in the care of this patient  I will follow with you closely during hospitalization    Time spent more than 30 minutes in direct patient care with no overlap      SIMA Baker MD  Pulmonary and critical care

## 2020-10-15 NOTE — PROGRESS NOTES
Problem: Patient Education: Go to Patient Education Activity  Goal: Patient/Family Education  Description: LE HEP to improve strength and functional mobility       Outcome: Progressing Towards Goal     Problem: Falls - Risk of  Goal: *Absence of Falls  Description: Document Yamini Doll Fall Risk and appropriate interventions in the flowsheet.   Outcome: Progressing Towards Goal  Note: Fall Risk Interventions:  Mobility Interventions: Bed/chair exit alarm    Mentation Interventions: Bed/chair exit alarm    Medication Interventions: Patient to call before getting OOB, Bed/chair exit alarm    Elimination Interventions: Call light in reach, Bed/chair exit alarm    History of Falls Interventions: Bed/chair exit alarm         Problem: Patient Education: Go to Patient Education Activity  Goal: Patient/Family Education  Outcome: Progressing Towards Goal     Problem: Patient Education: Go to Patient Education Activity  Goal: Patient/Family Education  Outcome: Progressing Towards Goal     Problem: Patient Education: Go to Patient Education Activity  Goal: Patient/Family Education  Outcome: Progressing Towards Goal     Problem: Nutrition Deficit  Goal: *Optimize nutritional status  Outcome: Progressing Towards Goal     Problem: Airway Clearance - Ineffective  Goal: *Patent airway  Outcome: Progressing Towards Goal  Goal: *Absence of airway secretions  Outcome: Progressing Towards Goal  Goal: *Able to cough effectively  Outcome: Progressing Towards Goal

## 2020-10-16 ENCOUNTER — APPOINTMENT (OUTPATIENT)
Dept: GENERAL RADIOLOGY | Age: 60
DRG: 177 | End: 2020-10-16
Attending: INTERNAL MEDICINE
Payer: MEDICARE

## 2020-10-16 LAB
ANION GAP SERPL CALC-SCNC: 0 MMOL/L (ref 5–15)
BACTERIA SPEC CULT: NORMAL
BACTERIA SPEC CULT: NORMAL
BUN SERPL-MCNC: 12 MG/DL (ref 6–20)
BUN/CREAT SERPL: 20 (ref 12–20)
CA-I BLD-MCNC: 8.3 MG/DL (ref 8.5–10.1)
CHLORIDE SERPL-SCNC: 92 MMOL/L (ref 97–108)
CO2 SERPL-SCNC: 44 MMOL/L (ref 21–32)
CREAT SERPL-MCNC: 0.6 MG/DL (ref 0.7–1.3)
CRP SERPL-MCNC: 5.59 MG/DL (ref 0–0.6)
CRP SERPL-MCNC: 5.72 MG/DL (ref 0–0.6)
GLUCOSE SERPL-MCNC: 80 MG/DL (ref 65–100)
GRAM STN SPEC: NORMAL
POTASSIUM SERPL-SCNC: 3.5 MMOL/L (ref 3.5–5.1)
SODIUM SERPL-SCNC: 136 MMOL/L (ref 136–145)
SPECIAL REQUESTS,SREQ: NORMAL

## 2020-10-16 PROCEDURE — 99232 SBSQ HOSP IP/OBS MODERATE 35: CPT | Performed by: INTERNAL MEDICINE

## 2020-10-16 PROCEDURE — 80048 BASIC METABOLIC PNL TOTAL CA: CPT

## 2020-10-16 PROCEDURE — 74011250637 HC RX REV CODE- 250/637: Performed by: INTERNAL MEDICINE

## 2020-10-16 PROCEDURE — 36415 COLL VENOUS BLD VENIPUNCTURE: CPT

## 2020-10-16 PROCEDURE — 97530 THERAPEUTIC ACTIVITIES: CPT

## 2020-10-16 PROCEDURE — 74011000250 HC RX REV CODE- 250: Performed by: INTERNAL MEDICINE

## 2020-10-16 PROCEDURE — 74011250637 HC RX REV CODE- 250/637: Performed by: FAMILY MEDICINE

## 2020-10-16 PROCEDURE — 86140 C-REACTIVE PROTEIN: CPT

## 2020-10-16 PROCEDURE — 65270000029 HC RM PRIVATE

## 2020-10-16 PROCEDURE — 94760 N-INVAS EAR/PLS OXIMETRY 1: CPT

## 2020-10-16 PROCEDURE — 71045 X-RAY EXAM CHEST 1 VIEW: CPT

## 2020-10-16 PROCEDURE — 94640 AIRWAY INHALATION TREATMENT: CPT

## 2020-10-16 PROCEDURE — 74011250636 HC RX REV CODE- 250/636: Performed by: INTERNAL MEDICINE

## 2020-10-16 PROCEDURE — 74011250636 HC RX REV CODE- 250/636: Performed by: FAMILY MEDICINE

## 2020-10-16 PROCEDURE — 77010033678 HC OXYGEN DAILY

## 2020-10-16 RX ADMIN — DIPHENHYDRAMINE HYDROCHLORIDE 25 MG: 25 CAPSULE ORAL at 06:31

## 2020-10-16 RX ADMIN — OXYCODONE HYDROCHLORIDE AND ACETAMINOPHEN 1 TABLET: 5; 325 TABLET ORAL at 13:54

## 2020-10-16 RX ADMIN — ACETYLCYSTEINE 600 MG: 200 SOLUTION ORAL; RESPIRATORY (INHALATION) at 08:07

## 2020-10-16 RX ADMIN — MEROPENEM 1 G: 1 INJECTION, POWDER, FOR SOLUTION INTRAVENOUS at 09:52

## 2020-10-16 RX ADMIN — PANTOPRAZOLE SODIUM 40 MG: 40 TABLET, DELAYED RELEASE ORAL at 06:31

## 2020-10-16 RX ADMIN — IPRATROPIUM BROMIDE AND ALBUTEROL SULFATE 3 ML: .5; 3 SOLUTION RESPIRATORY (INHALATION) at 08:07

## 2020-10-16 RX ADMIN — IPRATROPIUM BROMIDE AND ALBUTEROL SULFATE 3 ML: .5; 3 SOLUTION RESPIRATORY (INHALATION) at 13:21

## 2020-10-16 RX ADMIN — GUAIFENESIN 400 MG: 200 SOLUTION ORAL at 18:40

## 2020-10-16 RX ADMIN — MEROPENEM 1 G: 1 INJECTION, POWDER, FOR SOLUTION INTRAVENOUS at 02:31

## 2020-10-16 RX ADMIN — FERROUS SULFATE TAB 325 MG (65 MG ELEMENTAL FE) 325 MG: 325 (65 FE) TAB at 09:52

## 2020-10-16 RX ADMIN — ACETYLCYSTEINE 600 MG: 200 SOLUTION ORAL; RESPIRATORY (INHALATION) at 19:22

## 2020-10-16 RX ADMIN — FUROSEMIDE 20 MG: 10 INJECTION, SOLUTION INTRAMUSCULAR; INTRAVENOUS at 20:15

## 2020-10-16 RX ADMIN — ATORVASTATIN CALCIUM 20 MG: 20 TABLET, FILM COATED ORAL at 20:15

## 2020-10-16 RX ADMIN — IPRATROPIUM BROMIDE AND ALBUTEROL SULFATE 3 ML: .5; 3 SOLUTION RESPIRATORY (INHALATION) at 19:19

## 2020-10-16 RX ADMIN — CYANOCOBALAMIN TAB 500 MCG 500 MCG: 500 TAB at 09:52

## 2020-10-16 RX ADMIN — AMLODIPINE BESYLATE 5 MG: 5 TABLET ORAL at 09:52

## 2020-10-16 RX ADMIN — OXYCODONE HYDROCHLORIDE AND ACETAMINOPHEN 1 TABLET: 5; 325 TABLET ORAL at 21:45

## 2020-10-16 RX ADMIN — DIPHENHYDRAMINE HYDROCHLORIDE 25 MG: 25 CAPSULE ORAL at 13:53

## 2020-10-16 RX ADMIN — MEROPENEM 1 G: 1 INJECTION, POWDER, FOR SOLUTION INTRAVENOUS at 18:40

## 2020-10-16 RX ADMIN — OXYCODONE HYDROCHLORIDE AND ACETAMINOPHEN 1 TABLET: 5; 325 TABLET ORAL at 06:31

## 2020-10-16 RX ADMIN — GUAIFENESIN 400 MG: 200 SOLUTION ORAL at 13:53

## 2020-10-16 RX ADMIN — DIPHENHYDRAMINE HYDROCHLORIDE 25 MG: 25 CAPSULE ORAL at 21:45

## 2020-10-16 RX ADMIN — GUAIFENESIN 400 MG: 200 SOLUTION ORAL at 06:31

## 2020-10-16 NOTE — PROGRESS NOTES
OCCUPATIONAL THERAPY TREATMENT  Patient: Osiris Guajardo (55 y.o. male)  Date: 10/16/2020  Diagnosis: Hypertension [I10]   <principal problem not specified>  Procedure(s) (LRB):  Endobronchial Ultrasound (EBUS) (N/A) 3 Days Post-Op  Precautions:    Chart, occupational therapy assessment, plan of care, and goals were reviewed. ASSESSMENT  Patient continues with skilled OT services and is progressing towards goals. Pt received supine in bed, agreeable to working with OT/PT with encouragement. He continues to present with deficits in functional activity tolerance, dynamic sitting balance, static/dynamic standing balance, generalized strength and pain impacting performance of ADLs/functional mobility. He requires min A for rolling, min A x2 supine>sit and mod A x2 for sit><stand from EOB with RW tolerating approximately 3 minutes in standing before requesting to sit back onto bedside 2' to fatigue (Pt on 4L via NC) Pt declines participation in basic grooming activities today, however simulates doffing/donning of socks from EOB with mod A. Pt mostly participative with therapy, however does appear irritable while expressing his feelings regarding his current medical condition to which OT/PT provided emotional support. Pt would benefit from continued skilled OT services during hospital stay and at next level of care to address impairments and improve overall IND with ADLs and functional mobility upon d/c. Current Level of Function Impacting Discharge (ADLs): mod A x2 sit><stand, decline from PLOF. Other factors to consider for discharge: Family/social support, time since onset         PLAN :  Patient continues to benefit from skilled intervention to address the above impairments. Continue treatment per established plan of care. to address goals.     Recommend with staff: Encourage participation in bed level ADLs as appropriate     Recommend next OT session: transfer to chair or BSC if pt willing, LB dressing, EOB ADL    Recommendation for discharge: (in order for the patient to meet his/her long term goals)  SNF    This discharge recommendation:  Has been made in collaboration with the attending provider and/or case management       SUBJECTIVE:   Patient stated it feels good to sit up, but i'm not getting up in that chair.     OBJECTIVE DATA SUMMARY:   Cognitive/Behavioral Status:  Neurologic State: Alert  Orientation Level: Oriented X4  Cognition: Follows commands; Appropriate for age attention/concentration    Functional Mobility and Transfers for ADLs:  Bed Mobility:  Rolling: Minimum assistance  Supine to Sit: Minimum assistance;Assist x2  Sit to Supine: Minimum assistance;Assist x2  Scooting: Minimum assistance    Transfers:  Sit to Stand: Moderate assistance;Assist x2    Balance:  Sitting: Intact; Without support  Sitting - Static: Good (unsupported)  Sitting - Dynamic: Fair (occasional)  Standing: Intact; With support  Standing - Static: Fair  Standing - Dynamic : Fair    ADL Intervention:    Lower Body Dressing Assistance  Socks: Moderate assistance(Simulated EOB level)  Cues: Verbal cues provided    Therapeutic Exercises:   Pt participated in UE HEP shoulder raises x10 while seated EOB    Pain:  Pt reports generalized pain (back, stomach). No formal rating provided at this time. Activity Tolerance:   Fair and requires rest breaks    After treatment patient left in no apparent distress:   Supine in bed, Heels elevated for pressure relief, Call bell within reach, and Bed / chair alarm activated    COMMUNICATION/COLLABORATION:   The patients plan of care was discussed with: Physical therapist and Registered nurse. OT/PT sessions occurred together for increased patient and clinician safety as pt requires A of 2 for mobility at this time    Elsie Quiles  Time Calculation: 30 mins   Problem: Self Care Deficits Care Plan (Adult)  Goal: *Acute Goals and Plan of Care (Insert Text)  Description: 1.  Pt will be mod I sit < - >  prep for toileting LRAD  2. Pt will be mod I toileting/toilet transfer/cloth mgmt LRAD  3. Pt will be mod I sponge bathing sitting/standing at sink LRAD  4. Pt will be mod A LE dressing EOB  5. Pt will be mod I grooming standing at sink LRAD  6.  Pt will be I following UE HEP in prep for self care tasks  Outcome: Progressing Towards Goal

## 2020-10-16 NOTE — CONSULTS
[id number] [facility] SURGERY PROGRESS NOTE          Chief Complaint: Large abdominal hernia    Subjective:  No new issues. Still has some abdominal pain. Passing flatus and having bowel movement. No nausea or vomiting. On supplemental Oxygen and has shortness of breath. CT scan shows no bowel obstruction/hernia with left lung collapse. Flex brochoscopy which showed multiple endobroncheal polypoid growth suspicious for malignancy, even though final report stated no malignancy. He had an EBUS with FNA today, cytology results pending. Frustrated       Past Medical History:   Past Medical History:   Diagnosis Date    Anal fistula 3/15/2010    Anxiety     ARF (acute renal failure) (HCC)     Colon perforation (HCC)     Genital herpes 3/15/2010    GERD (gastroesophageal reflux disease)     High cholesterol 3/15/2010    History of blood transfusion     HTN (hypertension) 3/15/2010    Hyponatremia     Ischemic colitis ([address]     left Carotid Artery Occlusion 3/15/2010    Noncompliance with treatment 3/15/2010    Pneumonia 2011    PUD (peptic ulcer disease)     Septic shock(785.52)     Stroke 2002 3/15/2010    Thromboembolus ([address]     rt thigh    TIA (transient ischemic attack) 2007    pt reported       Past Surgical History:   Past Surgical History:   Procedure Laterality Date    CARDIAC CATHETERIZATION      CARDIAC SURG PROCEDURE UNLIST      HX COLECTOMY  1/11/2014    Right hemicolectomy for free air, perforated viscus    US SCROTUM/TESTICLES      right testicle removed        Allergy:  Allergies   Allergen Reactions    Morphine Other (comments)     itching       Social History:  reports that he has been smoking. He has a 70.00 pack-year smoking history. He has never used smokeless tobacco. He reports current alcohol use of about 70.0 standard drinks of alcohol per week. He reports current drug use. Drug: Marijuana.      Family History:  Family History   Problem Relation Age of Onset    Cancer Mother Current Medications:  Current Facility-Administered Medications:     oxyCODONE-acetaminophen (PERCOCET) 5-325 mg per tablet 1 Tab, 1 Tab, Oral, Q8H PRN, Jesus Poe MD    ferrous sulfate tablet 325 mg, 1 Tab, Oral, DAILY WITH BREAKFAST, Venkatesh Benton MD, 325 mg at 10/15/20 3584    cyanocobalamin (VITAMIN B12) tablet 500 mcg, 500 mcg, Oral, DAILY, Karen Benton MD, 500 mcg at 10/15/20 0928    amLODIPine (NORVASC) tablet 5 mg, 5 mg, Oral, DAILY, Michael Mayfield MD, 5 mg at 10/15/20 3193    furosemide (LASIX) injection 40 mg, 40 mg, IntraVENous, BID, Michael Mayfield MD, 40 mg at 10/15/20 2120    meropenem (MERREM) 1 g in sterile water (preservative free) 20 mL IV syringe, 1 g, IntraVENous, Q8H, Felix Benavidez MD, 1 g at 10/15/20 1754    albuterol-ipratropium (DUO-NEB) 2.5 MG-0.5 MG/3 ML, 3 mL, Nebulization, Q6HWA RT, Jarett Ocampo DO, 3 mL at 10/15/20 2050    acetylcysteine (MUCOMYST) 200 mg/mL (20 %) solution 600 mg, 600 mg, Nebulization, BID, Darby Boswell MD, 600 mg at 10/15/20 2050    diphenhydrAMINE (BENADRYL) capsule 25 mg, 25 mg, Oral, Q6H PRN, Lizzy HERNANDEZ MD, 25 mg at 10/15/20 1532    guaiFENesin (ROBITUSSIN) 100 mg/5 mL oral liquid 400 mg, 400 mg, Oral, Q6H, Jarett Ocampo DO, 400 mg at 10/15/20 1754    0.9% sodium chloride infusion 250 mL, 250 mL, IntraVENous, PRN, Montana Richards MD    atorvastatin (LIPITOR) tablet 20 mg, 20 mg, Oral, DAILY, Montana Richards MD, 20 mg at 10/15/20 2120    pantoprazole (PROTONIX) tablet 40 mg, 40 mg, Oral, DAILY, Montana Richards MD, 40 mg at 10/15/20 0610    acetaminophen (TYLENOL) tablet 650 mg, 650 mg, Oral, Q4H PRN, Montana Richards MD, 650 mg at 09/24/20 2319    alum-mag hydroxide-simeth (MYLANTA) oral suspension 30 mL, 30 mL, Oral, Q4H PRN, Montana Richards MD, 30 mL at 09/22/20 2215    magnesium hydroxide (MILK OF MAGNESIA) 400 mg/5 mL oral suspension 30 mL, 30 mL, Oral, DAILY PRN, MD Cady Falcon ondansetron (ZOFRAN) injection 4 mg, 4 mg, IntraVENous, Q8H PRN, Risa Shi MD, Stopped at 10/07/20 1300     Immunization History: There is no immunization history on file for this patient. Complete    Review of Systems:     Constitutional:  no fever,  no chills,  no sweats, No weakness, No fatigue, No decreased activity. Eye: No recent visual problem, No icterus, No discharge, No double vision. Ear/Nose/Mouth/Throat: No decreased hearing, No ear pain, No nasal congestion, No sore throat. Respiratory:  shortness of breath,  cough, No sputum production, No hemoptysis,  wheezing, No cyanosis. Cardiovascular: No chest pain, No palpitations, No bradycardia, No tachycardia, No peripheral edema, No syncope. Gastrointestinal: No nausea,  No vomiting, No diarrhea, No constipation, No heartburn,  abdominal pain. Genitourinary: No dysuria, No hematuria, No change in urine stream, No urethral discharge, No lesions. Hematology/Lymphatics: No bruising tendency, No bleeding tendency, No petechiae, No swollen lymph glands. Endocrine: No excessive thirst, No polyuria, No cold intolerance, No heat intolerance, No excessive hunger. Immunologic: Not immunocompromised, No recurrent fevers, No recurrent infections. Musculoskeletal: No back pain, No neck pain, No joint pain, No muscle pain, No claudication, No decreased range of motion, No trauma. Integumentary: No rash, No pruritus, No abrasions. Neurologic: Alert and oriented X4, No abnormal balance, No headache, No confusion, No numbness, No tingling. Psychiatric: No anxiety, No depression, No james. Physical Exam:     Vitals & Measurements:     Wt Readings from Last 3 Encounters:   10/15/20 82.4 kg (181 lb 10.5 oz)   02/26/19 82.6 kg (182 lb)   11/28/18 83.9 kg (185 lb)     Temp Readings from Last 3 Encounters:   10/15/20 97.7 °F (36.5 °C)   09/05/20 98.1 °F (36.7 °C) (Temporal)   02/26/19 97.9 °F (36.6 °C) (Oral)     BP Readings from Last 3 Encounters: 10/15/20 135/81   09/05/20 110/60   02/26/19 171/77     Pulse Readings from Last 3 Encounters:   10/15/20 93   09/05/20 86   02/26/19 100      Ht Readings from Last 3 Encounters:   10/15/20 6' 0.99\" (1.854 m)   02/26/19 6' 1\" (1.854 m)   11/28/18 6' 1\" (1.854 m)          General:  in respiratory distress  Head: Normal  Face: Nornal  HEENT: atraumatic, PERRLA, moist mucosa, normal pharynx, normal tonsils and adenoids, normal tongue, no fluid in sinuses  Neck: Trachea midline, no carotid bruit, no masses  Chest: Normal.  Respiratory: Normal chest wall expansion, shortness of breath, wheezing, left side rhonchi. Cardiovascular: RRR, no m/r/g, Normal S1 and S2  Abdomen: Soft, non tender,distended, large hernia with thinned out skin and dilated blood vessels, partially reducible, large fascial defect with loss of domain, normal bowel sounds in all quadrants, no hepatosplenomegaly, no tympany. Incision scar +  Genitourinary: No inguinal hernia, normal external gentalia, Testis & scrotum normal, no renal angle tenderness  Rectal: deferred  Musculoskeletal: normal ROM in upper and lower extremities, No joint swelling.   Integumentary: Warm, dry, and pink, with no rash, purpura, or petechia  Heme/Lymph: No lymphadenopathy, no bruises  Neurological:Cranial Nerves II-XII grossly intact, no gross motor or sensory deficit  Psychiatric: Cooperative with normal mood, affect, and cognition      Laboratory Values:   Recent Results (from the past 24 hour(s))   C REACTIVE PROTEIN, QT    Collection Time: 10/15/20 11:31 AM   Result Value Ref Range    C-Reactive protein 3.85 (H) 0.00 - 0.60 mg/dL   CBC WITH AUTOMATED DIFF    Collection Time: 10/15/20 11:31 AM   Result Value Ref Range    WBC 9.8 4.1 - 11.1 K/uL    RBC 2.79 (L) 4.10 - 5.70 M/uL    HGB 8.3 (L) 12.1 - 17.0 g/dL    HCT 27.8 (L) 36.6 - 50.3 %    MCV 99.6 (H) 80.0 - 99.0 FL    MCH 29.7 26.0 - 34.0 PG    MCHC 29.9 (L) 30.0 - 36.5 g/dL    RDW 19.5 (H) 11.5 - 14.5 %    PLATELET 339 150 - 400 K/uL    MPV 9.1 8.9 - 12.9 FL    NEUTROPHILS 74 32 - 75 %    LYMPHOCYTES 13 12 - 49 %    MONOCYTES 12 5 - 13 %    EOSINOPHILS 1 0 - 7 %    BASOPHILS 0 0 - 1 %    IMMATURE GRANULOCYTES 0 0.0 - 0.5 %    ABS. NEUTROPHILS 7.2 1.8 - 8.0 K/UL    ABS. LYMPHOCYTES 1.3 0.8 - 3.5 K/UL    ABS. MONOCYTES 1.2 (H) 0.0 - 1.0 K/UL    ABS. EOSINOPHILS 0.1 0.0 - 0.4 K/UL    ABS. BASOPHILS 0.0 0.0 - 0.1 K/UL    ABS. IMM. GRANS. 0.0 0.00 - 0.04 K/UL    DF AUTOMATED               Assessment:  Large incisional hernia with loss of domain    Severe COPD with endobronchial growth in bronchus      Respiratory failure -left lung collapse- recurrent bronchoscopy- possible underlying mass    On going smoking     Plan:      I had a discussion with patient and explained the details of the procedure and complexity of the surgery with his compromised respiratory status. I had discussion with Dr. Mai Santamaria who feels that the endobronchial nodules are still highly suspicious for malignant nodules. EBUS & cytology results are pending. Recommendation will be to hold off on surgical intervention for a non obstructed incisional hernia with compromised respiratory status particularly given the history of suspicious endobronchial nodules suspicious for malignancy. Patient [facility] to [facility]. I mentioned him to discuss with Dr. Abilio Ngo. Thank you for the consultation & allowing me to participate in the care of this patient.

## 2020-10-16 NOTE — PROGRESS NOTES
Hematology/Oncology   Progress Note    Patient: Lorenzo Sanz MRN: [id number]     YOB: 1960  Age: 63 y.o. Sex: male      Chief Complaint: Admitted with worsening shortness of breath,    Subjective:   He had EBUS and biopsy . Pathology shows no malignancy. Reports shortness of breath +  , He is requiring 4 L of oxygen via nasal canula. Xray continues to show white out on the lung. No new complaints. Constitutional No fevers, chills, night sweats, excessive fatigue or weight loss. Allergic/Immunologic No recent allergic reactions   Eyes No significant visual difficulties. No diplopia. ENMT No problems with hearing, no sore throat, no sinus drainage. Endocrine No hot flashes or night sweats. No cold intolerance, polyuria, or polydipsia   Hematologic/Lymphatic No easy bruising or bleeding. The patient denies any tender or palpable lymph nodes   Breasts No abnormal masses of breast, nipple discharge or pain. Respiratory No dyspnea on exertion, orthopnea, chest pain, cough or hemoptysis. Cardiovascular No anginal chest pain, irregular heart beat, tachycardia, palpitations or orthopnea. Gastrointestinal No nausea, vomiting, diarrhea, constipation, cramping, dysphagia, reflux, heartburn, GI bleeding, or early satiety. No change in bowel habits. Genitourinary (M) No hematuria, dysuria, increased frequency, urgency, hesitancy or incontinence. Musculoskeletal No joint pain, swelling or redness. No decreased range of motion. Integumentary No chronic rashes, inflammation, ulcerations, pruritus, petechiae, purpura, ecchymoses, or skin changes. Neurologic No headache, blurred vision, and no areas of focal weakness or numbness. Normal gait. No sensory problems. Psychiatric No insomnia, depression, james or mood swings. No psychotropic drugs.         Current Facility-Administered Medications:     oxyCODONE-acetaminophen (PERCOCET) 5-325 mg per tablet 1 Tab, 1 Tab, Oral, Q8H PRN, Hayden Joe MD, 1 Tab at 10/16/20 2083    ferrous sulfate tablet 325 mg, 1 Tab, Oral, DAILY WITH BREAKFAST, Vianney Benton MD, 325 mg at 10/16/20 3662    cyanocobalamin (VITAMIN B12) tablet 500 mcg, 500 mcg, Oral, DAILY, Channing Benton MD, 500 mcg at 10/16/20 0952    amLODIPine (NORVASC) tablet 5 mg, 5 mg, Oral, DAILY, Anthony Gamboa MD, 5 mg at 10/16/20 3776    furosemide (LASIX) injection 40 mg, 40 mg, IntraVENous, BID, Anthony Gamboa MD, 40 mg at 10/15/20 2120    meropenem (MERREM) 1 g in sterile water (preservative free) 20 mL IV syringe, 1 g, IntraVENous, Q8H, Darylene Patient, MD, 1 g at 10/16/20 0952    albuterol-ipratropium (DUO-NEB) 2.5 MG-0.5 MG/3 ML, 3 mL, Nebulization, Q6HWA RT, Jarett Ocampo DO, 3 mL at 10/16/20 0807    acetylcysteine (MUCOMYST) 200 mg/mL (20 %) solution 600 mg, 600 mg, Nebulization, BID, Darby Boswell MD, 600 mg at 10/16/20 0807    diphenhydrAMINE (BENADRYL) capsule 25 mg, 25 mg, Oral, Q6H PRN, Mike HERNANDEZ MD, 25 mg at 10/16/20 0631    guaiFENesin (ROBITUSSIN) 100 mg/5 mL oral liquid 400 mg, 400 mg, Oral, Q6H, Jarett Ocampo DO, 400 mg at 10/16/20 0631    0.9% sodium chloride infusion 250 mL, 250 mL, IntraVENous, PRN, Kat Diaz MD    atorvastatin (LIPITOR) tablet 20 mg, 20 mg, Oral, DAILY, Kat Diaz MD, 20 mg at 10/15/20 2120    pantoprazole (PROTONIX) tablet 40 mg, 40 mg, Oral, DAILY, Kat Diaz MD, 40 mg at 10/16/20 0631    acetaminophen (TYLENOL) tablet 650 mg, 650 mg, Oral, Q4H PRN, Kat Diaz MD, 650 mg at 09/24/20 2319    alum-mag hydroxide-simeth (MYLANTA) oral suspension 30 mL, 30 mL, Oral, Q4H PRN, Kat Diaz MD, 30 mL at 09/22/20 2215    magnesium hydroxide (MILK OF MAGNESIA) 400 mg/5 mL oral suspension 30 mL, 30 mL, Oral, DAILY PRN, Kat Diaz MD    ondansetron [facility]) injection 4 mg, 4 mg, IntraVENous, Q8H PRN, Kat Diaz MD, Stopped at 10/07/20 1300     Objective:     Vitals: 10/15/20 2116 10/15/20 2119 10/16/20 0801 10/16/20 0809   BP: 135/81  136/78    Pulse: 93  86    Resp: 20  22    Temp: 97.7 °F (36.5 °C)  97.3 °F (36.3 °C)    SpO2: 94% 94% 99% 99%   Weight:       Height:              Physical Exam:   Constitutional Chronically ill appearing +   Head Normocephalic; no scars   Eyes Conjunctivae and sclerae are clear and without icterus. Pupils are reactive and equal.   ENMT Sinuses are nontender. No oral exudates, ulcers, masses, thrush or mucositis. Oropharynx clear. Tongue normal.   Neck Supple without masses or thyromegaly. No jugular venous distension. Hematologic/Lymphatic No petechiae or purpura. No tender or palpable lymph nodes in the cervical, supraclavicular, axillary or inguinal area. Respiratory Lungs are clear to auscultation without rhonchi or wheezing. Cardiovascular Regular rate and rhythm of heart without murmurs, gallops or rubs. Chest / Line Site Chest is symmetric with no chest wall deformities. Abdomen Large incisional hernia +   Musculoskeletal No tenderness or swelling, normal range of motion without obvious weakness. Extremities Leg swelling +   Skin No rashes, scars, or lesions suggestive of malignancy. No petechiae, purpura, or ecchymoses. No excoriations. Neurologic No sensory or motor deficits, normal cerebellar function, normal gait, cranial nerves intact. Psychiatric Alert and oriented times three. Coherent speech. Verbalizes understanding of our discussions today. Lab/Data Review:  Recent Results (from the past 24 hour(s))   C REACTIVE PROTEIN, QT    Collection Time: 10/16/20  9:33 AM   Result Value Ref Range    C-Reactive protein 5.59 (H) 0.00 - 0.60 mg/dL      Pathology:  FINAL PATHOLOGIC DIAGNOSIS   Station 7 lymph node, endoscopic needle aspiration (4 passes):   Acute inflammation. No definitive evidence of malignancy. See diagnosis comment.         Station 11L lymph nodes, endoscopic aspiration (6 passes):   No evidence of malignancy. Bronchial washing: Negative (benign). Acute inflammation. Radiology:      Assessment and Plan:     Mediastinal Adenopathy:  - Reported on most recent CT with a 2.4 cm x 1.4 cm left paratracheal lymph node. - Multiple bronchoscopy and biopsy/washings- failed to show evidence of malignancy. - Per Dr. Grullon Bonds remains high due to endobronchial lesions and recurrent left lung collapse.  -Status post EBUS and l biopsies of the mediastinal nodes were performed. All of these including bronchial washings are negative for malignancy. Discussed these results with the patient  - overall appears to have poor performance status.   -Could consider an outpatient PET scan if the clinical suspicion continues after discharge from the hospital.     Anemia:   - Ferritin is normal, although could be elevated due to inflammation. B12 is low normal- start on oral Iron and B12. Chronic inflammation also contributing  -repeat cbc today shows hb of 8.3gm/dl. . Transfuse for hemoglobin 7gm/dl or below. Lower Extremity Swelling:  - venous doppler negative for DVT. We will follow the patient as needed please call for any questions.       Signed By: Tayler Antonio MD     October 16, 2020

## 2020-10-16 NOTE — PROGRESS NOTES
Problem: Mobility Impaired (Adult and Pediatric)  Goal: *Acute Goals and Plan of Care (Insert Text)  Description: Pt will be I with LE HEP in 7 days. Pt will perform bed mobility with mod I in 7 days. Pt will perform transfers with mod I in 7 days. Pt will amb 10 feet with LRAD safely with mod I in 7 days. Outcome: Progressing Towards Goal  PHYSICAL THERAPY TREATMENT  Patient: Davide Zambrano (18 y.o. male)  Date: 10/16/2020  Diagnosis: Hypertension [I10]   <principal problem not specified>  Procedure(s) (LRB):  Endobronchial Ultrasound (EBUS) (N/A) 3 Days Post-Op  Precautions:    Chart, physical therapy assessment, plan of care and goals were reviewed. ASSESSMENT  Patient continues with skilled PT services and is progressing towards goals. Pt reluctantly agreeable to work with PT/OT today. Pt fluctuates back and forth during treatment with being angry and saying he doesn't want to participate to agreeing to participate and being emotionally transparent with his feelings. Pt did admit to giving up and saying there is no point to therapy. Pt would like to be able to do more, but at this time, doesn't feel that he will ever return to his PLOF. Pt requires a lot of encouragement and active listening by PT/OT in order to participate with treatments. Pt often comments that OT and I are not strong enough to help pt to stand EOB; however, he did fine with this activity with us today. Pt did perform exercises and performed sit to stand transfer and stood for about 1 min before having to sit. Explained to pt that the next step would be marching in place while standing. Educated pt on the importance of mobility to maintain his quality of life. Other factors to consider for discharge: willingness to participate, mental state         PLAN :  Patient continues to benefit from skilled intervention to address the above impairments. Continue treatment per established plan of care.   to address goals. Recommendation for discharge: (in order for the patient to meet his/her long term goals)  Therapy up to 5 days/week in SNF setting    This discharge recommendation:  Has been made in collaboration with the attending provider and/or case management    IF patient discharges home will need the following DME: rolling walker       SUBJECTIVE:   Patient stated not you again.     OBJECTIVE DATA SUMMARY:   Critical Behavior:  Neurologic State: Alert  Orientation Level: Oriented X4  Cognition: Follows commands, Appropriate for age attention/concentration  Safety/Judgement: Fall prevention  Functional Mobility Training:  Bed Mobility:  Rolling: Minimum assistance  Supine to Sit: Minimum assistance;Assist x2  Sit to Supine: Minimum assistance;Assist x2  Scooting: Minimum assistance        Transfers:  Sit to Stand: Moderate assistance;Assist x2  Stand to Sit: Moderate assistance;Assist x2      Pt stood for about 1 min before having to sit down to rest.  Pt reports dizziness upon standing                       Balance:  Sitting: Intact; Without support  Sitting - Static: Good (unsupported)  Sitting - Dynamic: Fair (occasional)  Standing: Intact; With support  Standing - Static: Fair  Standing - Dynamic : Fair  Ambulation/Gait Training:                                                                Therapeutic Exercises:   Seated marches and LAQ x 10 each  Pain Rating:  Pt reports generalized pain in buttocks and stomach but unable to give a number. Activity Tolerance:   Fair, requires rest breaks, and observed SOB with activity  Please refer to the flowsheet for vital signs taken during this treatment. After treatment patient left in no apparent distress:   Supine in bed, Call bell within reach, and Bed / chair alarm activated    COMMUNICATION/COLLABORATION:   The patients plan of care was discussed with: Occupational therapist and Registered nurse.      Francesca Do   Time Calculation: 30 mins

## 2020-10-16 NOTE — PROGRESS NOTES
Progress Note    Patient: Delon Hamman MRN: [id number]  SSN: [id number]    YOB: 1960  Age: 63 y.o. Sex: male      Admit Date: 9/10/2020    LOS: 36 days     Subjective:   Patient followed for chronic, refractory pneumonia of undetermined etiology despite extensive microbiologic work-up. Bronchial washings again only showed normal respiratory xiomara. CXR today again showing complete opacification of left hemithorax. CRP increased today. He remains afebrile. Currently on Meropenem. Patient resting comfortably at this time with no new complaints, but frustrated. Objective:     Vitals:    10/16/20 0801 10/16/20 0809 10/16/20 1322 10/16/20 1513   BP: 136/78   101/63   Pulse: 86   92   Resp: 22   20   Temp: 97.3 °F (36.3 °C)   97.7 °F (36.5 °C)   SpO2: 99% 99% 98% 95%   Weight:       Height:            Intake and Output:  Current Shift: No intake/output data recorded. Last three shifts: 10/14 1901 - 10/16 0700  In: 1011 [P.O.:900; I.V.:111]  Out: 3575 [Urine:3575]    Physical Exam:    Vitals signs and nursing note reviewed. Constitutional:       General: He is not in acute distress. Appearance: He is ill-appearing. He is not toxic-appearing or diaphoretic. Pulmonary:      Breath sounds: Rhonchi and rales present. No wheezing. Comments: Decreased breath sounds left lung field  Abdominal:      General: There is distension (ventral hernia with scarring and crusting). Tenderness: There is no abdominal tenderness. There is no guarding. Genitourinary:     Penis: Normal.       Comments: No Padilla  Musculoskeletal:      Right lower leg: Edema present. Left lower leg: Edema present. Skin:     Findings: Erythema (both calves) present. Neurological:      General: No focal deficit present. Mental Status: He is alert and oriented to person, place, and time.    Psychiatric:         Mood and Affect: Mood normal.         Behavior: Behavior normal.         Thought Content: Thought content normal.         Judgment: Judgment normal.        Lab/Data Review:    WBC 9.800  Procalcitonin <0.05  CRP 5.59 (3.85 (6.15 (8.04 (11.60 (12.50    (129  ANCA panel Negative    Bronchial washing fungus (10/7)  Pending  Bronchial washing [employer] (10/7) smear negative  Bronchial washing culture (10/7) Normal respiratory xiomara  Bronchial washing culture (10/13) Normal respiratory xiomara FINAL  Bronchial washing fungal (10/13) Pending    CXR (10/16) No change compared to a prior single view chest October 15, 2020. Persistent complete opacification of the left hemithorax. Assessment:     1. Chronic pneumonia, likely post-obstructive, etiology unclear, Day #33 IV Antibiotics, Day #6 IV Meropenem. 2. Markedly elevated CRP, presumed secondary to #1, decreasing, but increased today  3. Abnormal bronchoscopy with nodular lesions left tracheobronchial tree, biopsy pending. 4. Chronic venous insufficiency with stasis dermatitis  5. Large ventral hernia, seen by General Surgery     Comment:   CRP trending upward today, significance unclear. Bronchial washing culture once again only grew normal respiratory xiomara. Fungal cultures pending. Likely post-obstructive pneumonia. Plan:   1. Continue IV Meropenem  2. Continue to monitor CRP, done  3. Follow-up endobronchial biopsies  4. If CRP continues to trend upward, next empiric strategy would be anti-fungal coverage, though would feel more comfortably if invasive fungal infection identified on biopsy.   5. Follow-up  Serum Fungitell    Signed By: Igor Bowens MD     October 16, 2020

## 2020-10-16 NOTE — PROGRESS NOTES
Pulm/CC Progress Note    Subjective:   Daily Progress Note: 10/16/2020     Patient had EBUS done . Did well. Results not show any malignancy. He is awake and alert. On 3 L of oxygen via nasal cannula. Is comfortable. Looks depressed. He declined physical therapy today   Chest x-ray that was done showed partial collapse of left lung again. Is undergone 8 bronchoscopies with mucus suctioning and has had several biopsies of the lesions within his tracheobronchial tree as well as brushings. He has had squamous metaplasia return, but no definitive malignancy. Oncology has been consulted and are asking if an EBUS would be warranted in this case in an attempt to rule in/out a malignancy. Review of Systems   Has complains of shortness of breath. He is on nasal cannula oxygen. Denied any nausea vomiting. Has poor appetite    Objective:     Visit Vitals  /78 (BP 1 Location: Left arm, BP Patient Position: At rest)   Pulse 86   Temp 97.3 °F (36.3 °C)   Resp 22   Ht 6' 0.99\" (1.854 m)   Wt 82.4 kg (181 lb 10.5 oz)   SpO2 98%   BMI 23.97 kg/m²    O2 Flow Rate (L/min): 4 l/min O2 Device: Nasal cannula    Temp (24hrs), Av.9 °F (36.6 °C), Min:97.3 °F (36.3 °C), Max:98.6 °F (37 °C)      No intake/output data recorded. 10/14 1901 - 10/16 0700  In: 1011 [P.O.:900;  I.V.:111]  Out: 3575 [Urine:3575]    Current Facility-Administered Medications   Medication Dose Route Frequency    oxyCODONE-acetaminophen (PERCOCET) 5-325 mg per tablet 1 Tab  1 Tab Oral Q8H PRN    ferrous sulfate tablet 325 mg  1 Tab Oral DAILY WITH BREAKFAST    cyanocobalamin (VITAMIN B12) tablet 500 mcg  500 mcg Oral DAILY    amLODIPine (NORVASC) tablet 5 mg  5 mg Oral DAILY    furosemide (LASIX) injection 40 mg  40 mg IntraVENous BID    meropenem (MERREM) 1 g in sterile water (preservative free) 20 mL IV syringe  1 g IntraVENous Q8H    albuterol-ipratropium (DUO-NEB) 2.5 MG-0.5 MG/3 ML  3 mL Nebulization Q6HWA RT    acetylcysteine (MUCOMYST) 200 mg/mL (20 %) solution 600 mg  600 mg Nebulization BID    diphenhydrAMINE (BENADRYL) capsule 25 mg  25 mg Oral Q6H PRN    guaiFENesin (ROBITUSSIN) 100 mg/5 mL oral liquid 400 mg  400 mg Oral Q6H    0.9% sodium chloride infusion 250 mL  250 mL IntraVENous PRN    atorvastatin (LIPITOR) tablet 20 mg  20 mg Oral DAILY    pantoprazole (PROTONIX) tablet 40 mg  40 mg Oral DAILY    acetaminophen (TYLENOL) tablet 650 mg  650 mg Oral Q4H PRN    alum-mag hydroxide-simeth (MYLANTA) oral suspension 30 mL  30 mL Oral Q4H PRN    magnesium hydroxide (MILK OF MAGNESIA) 400 mg/5 mL oral suspension 30 mL  30 mL Oral DAILY PRN    ondansetron (ZOFRAN) injection 4 mg  4 mg IntraVENous Q8H PRN       Physical Exam:  General: Alert and oriented x3.  3 L nasal cannula. Relatively pleasant. HEENT: atraumatic, PERRL, moist mucosa,     Neck: Trachea midline, no carotid bruit, no masses. Supple but thick. Respiratory: No breath sounds on the left side. Few crackles and rhonchi on the right side. No significant change. Cardiovascular: RRR, no m/r/g, Normal S1 and S2   abdomen: Has large ventral abdominal hernia, evidence of surgical scars soft,   Rectal: deferred  Extremities: no cyanosis or clubbing. He has significant pitting edema in the dependent portions and lower extremities. Integumentary: warm, dry, and pink, with no rash, purpura, or petechia. Heme/Lymph: no lymphadenopathy, no bruises  Neurological: No focal motor deficit   psychiatric: cooperative with normal mood, affect, and cognition      Additional comments: Chest x-rays reviewed    Data Review    Recent Results (from the past 24 hour(s))   C REACTIVE PROTEIN, QT    Collection Time: 10/16/20  9:33 AM   Result Value Ref Range    C-Reactive protein 5.59 (H) 0.00 - 0.60 mg/dL         Chest x-ray from last evening was reviewed which showed recurrence of left partial collapse.   6 results from 10/3/2020 were reviewed  Patient has left basal atelectasis. XR CHEST PORT   Final Result      XR CHEST PORT   Final Result      XR CHEST PORT   Final Result      XR CHEST PORT   Final Result      XR CHEST PORT   Final Result   IMPRESSION: Persistent findings compared to single view chest October 10, 2020. XR CHEST PORT   Final Result   IMPRESSION: No significant change. See above. XR CHEST PA LAT   Final Result   Impression:      Collapse of left lung again noted with decreased aeration of the upper lobe   compared to yesterday increased opacity of the right lung may be due to portable   technique. XR CHEST PORT   Final Result   Impression:      Stable aeration of the left upper lung with atelectasis. Stable appearing   bilateral pleural effusions. No significant change compared to the prior study from 10/7/2020. XR CHEST PORT   Final Result      XR FLUOROSCOPY UNDER 60 MINUTES   Final Result      XR CHEST PORT   Final Result      XR CHEST PORT   Final Result   FINDINGS: Impression: Frontal single view chest.      Continued opacification of the left hemithorax, obscuring the cardiac   silhouette. Right lung interstitial thickening, airspace disease, bronchial thickening,   pleural effusion similar to previous. No pneumothorax. CT CHEST WO CONT   Final Result   Impression:    1. Increased now complete opacification of the left bronchi with low density   material.   2. Slightly increased patchy airspace pneumonia in the right lung. 3. Unchanged loculated and nonloculated left pleural effusion, complete left   lung consolidation, right lung pleural effusion. CT ABD PELV W CONT   Final Result   IMPRESSION:   1. Large ventral hernia containing nonobstructed small and large intestine. 2. Moderate to large right pleural effusion and mild to moderate left pleural   effusion. There is near complete collapse of the visualized portions of the left   lower lobe and there is pleural thickening in the left hemithorax.    3. Patchy airspace disease in the right lower lobe along with right basilar   atelectasis. 4. Nonspecific left adrenal nodule. 5. Other findings as described. XR CHEST PORT   Final Result   Impression: Heterogeneous opacity in the right lung, not significantly changed. Now complete opacification of the left hemithorax. XR CHEST PORT   Final Result   IMPRESSION: No significant change. XR CHEST AP LAT   Final Result      XR CHEST AP LAT   Final Result   IMPRESSION:   1. Persistent opacification of the left hemithorax with volume loss. 2.  Moderate right pleural effusion with probable associated right basilar   atelectasis. XR CHEST PORT   Final Result   Impression: Increased volume loss left lung. Otherwise stable. XR CHEST PORT   Final Result   IMPRESSION:   1. Improved aeration of the left lung with persistent basilar consolidative   opacities and probable pleural effusions. 2. Other extensive interstitial and alveolar opacities are nonspecific, but   potentially related to pulmonary edema or infection. XR CHEST PORT   Final Result   Impression:Left lung collapse without improvement from prior study. XR CHEST PORT   Final Result   IMPRESSION:   1. There is milder enlargement of the cardiac silhouette as well as increasing   pulmonary vascular ill definition suspect for mild pulmonary edema. This could   be related to cardiogenic edema or fluid overload. 2.  There is been an improvement in the overall aeration of the left lung with   and improved visualization of vascular markings within the left upper lung zone. There still remains significant partial collapse of the left lung. 3.  There is increased patchy airspace disease laterally within the right lower   lobe just above the right lateral costophrenic angle. 4.  There are persistent moderate-sized bilateral pleural effusions.       XR CHEST AP LAT   Final Result   IMPRESSION: Persistent collapse of the left lung with bilateral pleural   effusions. Increasing vascular congestion on the right. XR CHEST PA LAT   Final Result   Impression:   Ongoing near complete white out of the left lung. Little interval change since   the prior examination. CT CHEST WO CONT   Final Result   IMPRESSION: Complete collapse of the left lung due to complete bronchial   obstruction, possibly aspiration. Fluid overload also noted      XR ABD ACUTE W 1 V CHEST   Final Result   IMPRESSION: Opacification of the left hemithorax, questioned for remote   pneumonectomy or lung collapse with pleural fluid. Prominent right parenchymal/interstitial lung markings compatible with fibrosis   and possible partial vascular congestion. Small bowel gas, nonobstructive pattern. XR CHEST PORT    (Results Pending)   XR CHEST PORT    (Results Pending)   XR CHEST PORT    (Results Pending)          Assessment/Plan: This is a middle-aged male who was admitted because of increasing symptoms of shortness of breath. He is getting treated in hospital for pneumonia with IV Zosyn, was on Azithromycin. He is noted to be increasingly tachypneic and short of breath. He has been on supplemental oxygen. His chest x-ray is showing persistent left lung opacification from mucous plugging. This has not improved with aggressive pulmonary hygiene and 8 suction bronchoscopies. Additionally, there is concern that the patient may have a primary lung malignancy, work-up ongoing.     Plan:     1.)    Left lung collapse/shortness of breath:  - Shortness of breath and hypoxia due to recurrent left lung collapse. He had multiple bronchoscopies done along with lavage but he continues to have left lung collapse. He underwent EBUS, mildly enlarged station 7 lymph node, significantly enlarged station 11 lymph node. Ultrasound-guided biopsy was done. Left tracheal tree was lavaged and cleared of mucous plugs. Biopsy results did not show any malignancy.   This were discussed with patient. CT of the abdomen pelvis shows a large hernia. He has been ordered pulmonary hygiene with nebulizers every 6 hours;  Aggressive chest PT, EZ-PAP therapy q6, acapella device as well as incentive spirometer use. Added guaifenesin 400 mg q6. Mucomyst also ordered. 2.)  COPD:  He has a history of COPD based on chronic smoking. Continue scheduled bronchodilators. Patient should be discharged with a LAMA/LABA such as Anoro and needs f/u in our office for PFT's and further management. Weaned off prednisone. 3.)  Pneumonia:   He is on IV Zosyn and Levaquin, stopped Zithromax on 9/23/2020. Cultures from recent bronchoscopy showing normal xiomara. Infectious disease consultation. 4.)  Hypertension:  He is on antihypertensive medications, BP's stable. Patient also has clinically fluid overload, congestive heart failure. Echocardiogram done on 9/16 showed an EF of 60 to 78% grade 2 diastolic dysfunction and moderate to severe aortic stenosis. Right ventricle is normal in size and function. Will resume Lasix. 5.  Ventral abdominal hernia:  Patient wants to have his ventral abdominal hernia fixed, spoke to surgery service. This will be too extensive for for surgery, surgical service is recommending for him to be transferred to [facility] where surgery can be further pursued. Discussed with patient, he is going to further discuss with Dr. Juana Vasquez  before he makes his decision    Patient is getting depressed. He declined physical therapy today. Questions of patient were answered at bedside in detail  Case discussed in detail with RN, RT, and care team  Thank you for involving me in the care of this patient  I will follow with you closely during hospitalization    Time spent more than 30 minutes in direct patient care with no overlap      SIMA Norman MD  Pulmonary and critical care

## 2020-10-17 ENCOUNTER — APPOINTMENT (OUTPATIENT)
Dept: GENERAL RADIOLOGY | Age: 60
DRG: 177 | End: 2020-10-17
Attending: INTERNAL MEDICINE
Payer: MEDICARE

## 2020-10-17 PROCEDURE — 77010033678 HC OXYGEN DAILY

## 2020-10-17 PROCEDURE — 74011250637 HC RX REV CODE- 250/637: Performed by: INTERNAL MEDICINE

## 2020-10-17 PROCEDURE — 74011250637 HC RX REV CODE- 250/637: Performed by: FAMILY MEDICINE

## 2020-10-17 PROCEDURE — 74011000250 HC RX REV CODE- 250: Performed by: INTERNAL MEDICINE

## 2020-10-17 PROCEDURE — 74011250636 HC RX REV CODE- 250/636: Performed by: INTERNAL MEDICINE

## 2020-10-17 PROCEDURE — 65270000029 HC RM PRIVATE

## 2020-10-17 PROCEDURE — 74011250636 HC RX REV CODE- 250/636: Performed by: FAMILY MEDICINE

## 2020-10-17 PROCEDURE — 94760 N-INVAS EAR/PLS OXIMETRY 1: CPT

## 2020-10-17 PROCEDURE — 71045 X-RAY EXAM CHEST 1 VIEW: CPT

## 2020-10-17 PROCEDURE — 94640 AIRWAY INHALATION TREATMENT: CPT

## 2020-10-17 RX ADMIN — FUROSEMIDE 40 MG: 10 INJECTION, SOLUTION INTRAMUSCULAR; INTRAVENOUS at 09:36

## 2020-10-17 RX ADMIN — OXYCODONE HYDROCHLORIDE AND ACETAMINOPHEN 1 TABLET: 5; 325 TABLET ORAL at 22:54

## 2020-10-17 RX ADMIN — GUAIFENESIN 400 MG: 200 SOLUTION ORAL at 12:51

## 2020-10-17 RX ADMIN — OXYCODONE HYDROCHLORIDE AND ACETAMINOPHEN 1 TABLET: 5; 325 TABLET ORAL at 06:23

## 2020-10-17 RX ADMIN — DIPHENHYDRAMINE HYDROCHLORIDE 25 MG: 25 CAPSULE ORAL at 22:54

## 2020-10-17 RX ADMIN — GUAIFENESIN 400 MG: 200 SOLUTION ORAL at 00:11

## 2020-10-17 RX ADMIN — PANTOPRAZOLE SODIUM 40 MG: 40 TABLET, DELAYED RELEASE ORAL at 06:23

## 2020-10-17 RX ADMIN — DIPHENHYDRAMINE HYDROCHLORIDE 25 MG: 25 CAPSULE ORAL at 14:25

## 2020-10-17 RX ADMIN — IPRATROPIUM BROMIDE AND ALBUTEROL SULFATE 3 ML: .5; 3 SOLUTION RESPIRATORY (INHALATION) at 19:32

## 2020-10-17 RX ADMIN — OXYCODONE HYDROCHLORIDE AND ACETAMINOPHEN 1 TABLET: 5; 325 TABLET ORAL at 14:25

## 2020-10-17 RX ADMIN — GUAIFENESIN 400 MG: 200 SOLUTION ORAL at 06:23

## 2020-10-17 RX ADMIN — AMLODIPINE BESYLATE 5 MG: 5 TABLET ORAL at 09:36

## 2020-10-17 RX ADMIN — CYANOCOBALAMIN TAB 500 MCG 500 MCG: 500 TAB at 09:36

## 2020-10-17 RX ADMIN — FERROUS SULFATE TAB 325 MG (65 MG ELEMENTAL FE) 325 MG: 325 (65 FE) TAB at 09:36

## 2020-10-17 RX ADMIN — GUAIFENESIN 400 MG: 200 SOLUTION ORAL at 19:27

## 2020-10-17 RX ADMIN — MEROPENEM 1 G: 1 INJECTION, POWDER, FOR SOLUTION INTRAVENOUS at 18:00

## 2020-10-17 RX ADMIN — ACETYLCYSTEINE 200 MG: 200 SOLUTION ORAL; RESPIRATORY (INHALATION) at 19:33

## 2020-10-17 RX ADMIN — MEROPENEM 1 G: 1 INJECTION, POWDER, FOR SOLUTION INTRAVENOUS at 06:23

## 2020-10-17 RX ADMIN — ATORVASTATIN CALCIUM 20 MG: 20 TABLET, FILM COATED ORAL at 21:02

## 2020-10-17 RX ADMIN — IPRATROPIUM BROMIDE AND ALBUTEROL SULFATE 3 ML: .5; 3 SOLUTION RESPIRATORY (INHALATION) at 15:24

## 2020-10-17 RX ADMIN — DIPHENHYDRAMINE HYDROCHLORIDE 25 MG: 25 CAPSULE ORAL at 06:23

## 2020-10-17 RX ADMIN — FUROSEMIDE 40 MG: 10 INJECTION, SOLUTION INTRAMUSCULAR; INTRAVENOUS at 21:03

## 2020-10-17 NOTE — PROGRESS NOTES
Pulm/CC Progress Note    Subjective:   Daily Progress Note: 10/17/2020     Patient seen and examined  Overnight events noted  He is awake and alert. On 3 L of oxygen via nasal cannula. Is comfortable. Looks depressed. He declined physical therapy today   Chest x-ray that was done showed partial collapse of left lung again. Is undergone 8 bronchoscopies with mucus suctioning and has had several biopsies of the lesions within his tracheobronchial tree as well as brushings. He has had squamous metaplasia return, but no definitive malignancy. Patient had EBUS done . Did well. Results not show any malignancy on cytology  Station 7 cells being further stained to rule out malignancy     Review of Systems   Has complains of shortness of breath. He is on nasal cannula oxygen. Denied any nausea vomiting. Has poor appetite    Objective:     Visit Vitals  /61 (BP 1 Location: Right arm, BP Patient Position: Head of bed elevated (Comment degrees))   Pulse 90   Temp 97.7 °F (36.5 °C)   Resp 18   Ht 6' 0.99\" (1.854 m)   Wt 79.4 kg (175 lb 0.7 oz)   SpO2 99%   BMI 23.10 kg/m²    O2 Flow Rate (L/min): 3 l/min O2 Device: Nasal cannula    Temp (24hrs), Av.7 °F (36.5 °C), Min:97.3 °F (36.3 °C), Max:97.9 °F (36.6 °C)      10/17 07 - 10/17 190  In: -   Out: 2800 [Urine:2800]  10/15 1901 - 10/17 0700  In: 2305 [P.O.:1200;  I.V.:235]  Out: 5075 [Urine:5075]    Current Facility-Administered Medications   Medication Dose Route Frequency    oxyCODONE-acetaminophen (PERCOCET) 5-325 mg per tablet 1 Tab  1 Tab Oral Q8H PRN    ferrous sulfate tablet 325 mg  1 Tab Oral DAILY WITH BREAKFAST    cyanocobalamin (VITAMIN B12) tablet 500 mcg  500 mcg Oral DAILY    amLODIPine (NORVASC) tablet 5 mg  5 mg Oral DAILY    furosemide (LASIX) injection 40 mg  40 mg IntraVENous BID    meropenem (MERREM) 1 g in sterile water (preservative free) 20 mL IV syringe  1 g IntraVENous Q8H    albuterol-ipratropium (DUO-NEB) 2.5 MG-0.5 MG/3 ML  3 mL Nebulization Q6HWA RT    acetylcysteine (MUCOMYST) 200 mg/mL (20 %) solution 600 mg  600 mg Nebulization BID    diphenhydrAMINE (BENADRYL) capsule 25 mg  25 mg Oral Q6H PRN    guaiFENesin (ROBITUSSIN) 100 mg/5 mL oral liquid 400 mg  400 mg Oral Q6H    0.9% sodium chloride infusion 250 mL  250 mL IntraVENous PRN    atorvastatin (LIPITOR) tablet 20 mg  20 mg Oral DAILY    pantoprazole (PROTONIX) tablet 40 mg  40 mg Oral DAILY    acetaminophen (TYLENOL) tablet 650 mg  650 mg Oral Q4H PRN    alum-mag hydroxide-simeth (MYLANTA) oral suspension 30 mL  30 mL Oral Q4H PRN    magnesium hydroxide (MILK OF MAGNESIA) 400 mg/5 mL oral suspension 30 mL  30 mL Oral DAILY PRN    ondansetron (ZOFRAN) injection 4 mg  4 mg IntraVENous Q8H PRN       Physical Exam:  General: Alert and oriented x3.  3 L nasal cannula. Relatively pleasant. HEENT: atraumatic, PERRL, moist mucosa,     Neck: Trachea midline, no carotid bruit, no masses. Supple but thick. Respiratory: No breath sounds on the left side. Few crackles and rhonchi on the right side. No significant change. Cardiovascular: RRR, no m/r/g, Normal S1 and S2   abdomen: Has large ventral abdominal hernia, evidence of surgical scars soft,   Rectal: deferred  Extremities: no cyanosis or clubbing. He has significant pitting edema in the dependent portions and lower extremities. Integumentary: warm, dry, and pink, with no rash, purpura, or petechia.    Heme/Lymph: no lymphadenopathy, no bruises  Neurological: No focal motor deficit   psychiatric: cooperative with normal mood, affect, and cognition      Additional comments: Chest x-rays reviewed    Data Review    Recent Results (from the past 24 hour(s))   C REACTIVE PROTEIN, QT    Collection Time: 10/16/20  8:59 PM   Result Value Ref Range    C-Reactive protein 5.72 (H) 0.00 - 3.01 mg/dL   METABOLIC PANEL, BASIC    Collection Time: 10/16/20  8:59 PM   Result Value Ref Range    Sodium 136 136 - 145 mmol/L    Potassium 3.5 3.5 - 5.1 mmol/L    Chloride 92 (L) 97 - 108 mmol/L    CO2 44 (HH) 21 - 32 mmol/L    Anion gap 0 (L) 5 - 15 mmol/L    Glucose 80 65 - 100 mg/dL    BUN 12 6 - 20 mg/dL    Creatinine 0.60 (L) 0.70 - 1.30 mg/dL    BUN/Creatinine ratio 20 12 - 20      GFR est AA >60 >60 ml/min/1.73m2    GFR est non-AA >60 >60 ml/min/1.73m2    Calcium 8.3 (L) 8.5 - 10.1 mg/dL         Chest x-ray from last evening was reviewed which showed recurrence of left partial collapse. 6 results from 10/3/2020 were reviewed  Patient has left basal atelectasis. XR CHEST PORT   Final Result      XR CHEST PORT   Final Result      XR CHEST PORT   Final Result      XR CHEST PORT   Final Result      XR CHEST PORT   Final Result      XR CHEST PORT   Final Result   IMPRESSION: Persistent findings compared to single view chest October 10, 2020. XR CHEST PORT   Final Result   IMPRESSION: No significant change. See above. XR CHEST PA LAT   Final Result   Impression:      Collapse of left lung again noted with decreased aeration of the upper lobe   compared to yesterday increased opacity of the right lung may be due to portable   technique. XR CHEST PORT   Final Result   Impression:      Stable aeration of the left upper lung with atelectasis. Stable appearing   bilateral pleural effusions. No significant change compared to the prior study from 10/7/2020. XR CHEST PORT   Final Result      XR FLUOROSCOPY UNDER 60 MINUTES   Final Result      XR CHEST PORT   Final Result      XR CHEST PORT   Final Result   FINDINGS: Impression: Frontal single view chest.      Continued opacification of the left hemithorax, obscuring the cardiac   silhouette. Right lung interstitial thickening, airspace disease, bronchial thickening,   pleural effusion similar to previous. No pneumothorax. CT CHEST WO CONT   Final Result   Impression:    1.  Increased now complete opacification of the left bronchi with low density material.   2. Slightly increased patchy airspace pneumonia in the right lung. 3. Unchanged loculated and nonloculated left pleural effusion, complete left   lung consolidation, right lung pleural effusion. CT ABD PELV W CONT   Final Result   IMPRESSION:   1. Large ventral hernia containing nonobstructed small and large intestine. 2. Moderate to large right pleural effusion and mild to moderate left pleural   effusion. There is near complete collapse of the visualized portions of the left   lower lobe and there is pleural thickening in the left hemithorax. 3. Patchy airspace disease in the right lower lobe along with right basilar   atelectasis. 4. Nonspecific left adrenal nodule. 5. Other findings as described. XR CHEST PORT   Final Result   Impression: Heterogeneous opacity in the right lung, not significantly changed. Now complete opacification of the left hemithorax. XR CHEST PORT   Final Result   IMPRESSION: No significant change. XR CHEST AP LAT   Final Result      XR CHEST AP LAT   Final Result   IMPRESSION:   1. Persistent opacification of the left hemithorax with volume loss. 2.  Moderate right pleural effusion with probable associated right basilar   atelectasis. XR CHEST PORT   Final Result   Impression: Increased volume loss left lung. Otherwise stable. XR CHEST PORT   Final Result   IMPRESSION:   1. Improved aeration of the left lung with persistent basilar consolidative   opacities and probable pleural effusions. 2. Other extensive interstitial and alveolar opacities are nonspecific, but   potentially related to pulmonary edema or infection. XR CHEST PORT   Final Result   Impression:Left lung collapse without improvement from prior study. XR CHEST PORT   Final Result   IMPRESSION:   1. There is milder enlargement of the cardiac silhouette as well as increasing   pulmonary vascular ill definition suspect for mild pulmonary edema.  This could be related to cardiogenic edema or fluid overload. 2.  There is been an improvement in the overall aeration of the left lung with   and improved visualization of vascular markings within the left upper lung zone. There still remains significant partial collapse of the left lung. 3.  There is increased patchy airspace disease laterally within the right lower   lobe just above the right lateral costophrenic angle. 4.  There are persistent moderate-sized bilateral pleural effusions. XR CHEST AP LAT   Final Result   IMPRESSION: Persistent collapse of the left lung with bilateral pleural   effusions. Increasing vascular congestion on the right. XR CHEST PA LAT   Final Result   Impression:   Ongoing near complete white out of the left lung. Little interval change since   the prior examination. CT CHEST WO CONT   Final Result   IMPRESSION: Complete collapse of the left lung due to complete bronchial   obstruction, possibly aspiration. Fluid overload also noted      XR ABD ACUTE W 1 V CHEST   Final Result   IMPRESSION: Opacification of the left hemithorax, questioned for remote   pneumonectomy or lung collapse with pleural fluid. Prominent right parenchymal/interstitial lung markings compatible with fibrosis   and possible partial vascular congestion. Small bowel gas, nonobstructive pattern. XR CHEST PORT    (Results Pending)   XR CHEST PORT    (Results Pending)   XR CHEST PORT    (Results Pending)          Assessment/Plan: This is a middle-aged male who was admitted because of increasing symptoms of shortness of breath. He is getting treated in hospital for pneumonia with IV Zosyn, was on Azithromycin. He is noted to be increasingly tachypneic and short of breath. He has been on supplemental oxygen. His chest x-ray is showing persistent left lung opacification from mucous plugging. This has not improved with aggressive pulmonary hygiene and 8 suction bronchoscopies. Additionally, there is concern that the patient may have a primary lung malignancy, work-up ongoing.     Plan:     1.)    Left lung collapse/shortness of breath:  - Shortness of breath and hypoxia due to recurrent left lung collapse. He had multiple bronchoscopies done along with lavage but he continues to have left lung collapse. He underwent EBUS, mildly enlarged station 7 lymph node, significantly enlarged station 11 lymph node. Ultrasound-guided biopsy was done. Left tracheal tree was lavaged and cleared of mucous plugs. EBUS results did not show any malignancy though station 7 sample is being further stand  This were discussed with patient. CT of the abdomen pelvis shows a large hernia. He has been ordered pulmonary hygiene with nebulizers every 6 hours;  Aggressive chest PT, EZ-PAP therapy q6, acapella device as well as incentive spirometer use. Added guaifenesin 400 mg q6. Mucomyst also ordered. 2.)  COPD:  He has a history of COPD based on chronic smoking. Continue scheduled bronchodilators. Patient should be discharged with a LAMA/LABA such as Anoro and needs f/u in our office for PFT's and further management. Weaned off prednisone. 3.)  Pneumonia:   He is on IV Zosyn and Levaquin, stopped Zithromax on 9/23/2020. Cultures from recent bronchoscopy showing normal xiomara. Infectious disease consultation. 4.)  Hypertension:  He is on antihypertensive medications, BP's stable. Patient also has clinically fluid overload, congestive heart failure. Echocardiogram done on 9/16 showed an EF of 60 to 19% grade 2 diastolic dysfunction and moderate to severe aortic stenosis. Right ventricle is normal in size and function. Will resume Lasix. 5.  Ventral abdominal hernia:  Patient wants to have his ventral abdominal hernia fixed, spoke to surgery service.   This will be too extensive for for surgery, surgical service is recommending for him to be transferred to [facility] where surgery can be further pursued. Discussed with patient, he is going to further discuss with Dr. Mimi Maldonado  before he makes his decision    Patient is getting depressed. He declined physical therapy today.     Questions of patient were answered at bedside in detail  Case discussed in detail with RN, RT, and care team  Thank you for involving me in the care of this patient  I will follow with you closely during hospitalization    Time spent more than 30 minutes in direct patient care with no overlap      Radha Echevarria MD  Pulmonary and critical care

## 2020-10-17 NOTE — PROGRESS NOTES
Progress Note    Patient: Wei Coats MRN: [id number]  SSN: [id number]    YOB: 1960  Age: 63 y.o. Sex: male      Admit Date: 9/10/2020    LOS: 37 days        Subjective:     Patient is seen for follow-up,    ebus 10/13-path =neg  Comfortable at rest, easily dyspneic minimal exertion  Weak,states cant get up and refuses pt states wont help. No fever/chills  cxr worse/lt side opacification. Frustration persists.            Current Facility-Administered Medications:     oxyCODONE-acetaminophen (PERCOCET) 5-325 mg per tablet 1 Tab, 1 Tab, Oral, Q8H PRN, Jomar HERNANDEZ MD, 1 Tab at 10/17/20 1425    ferrous sulfate tablet 325 mg, 1 Tab, Oral, DAILY WITH BREAKFAST, Lakesha Benton MD, 325 mg at 10/17/20 0936    cyanocobalamin (VITAMIN B12) tablet 500 mcg, 500 mcg, Oral, DAILY, July Benton MD, 500 mcg at 10/17/20 0936    amLODIPine (NORVASC) tablet 5 mg, 5 mg, Oral, DAILY, Nilton Mandujano MD, 5 mg at 10/17/20 0936    furosemide (LASIX) injection 40 mg, 40 mg, IntraVENous, BID, Nilton Mandujano MD, 40 mg at 10/17/20 0936    meropenem (MERREM) 1 g in sterile water (preservative free) 20 mL IV syringe, 1 g, IntraVENous, Q8H, Carla Lawson MD, 1 g at 10/17/20 0623    albuterol-ipratropium (DUO-NEB) 2.5 MG-0.5 MG/3 ML, 3 mL, Nebulization, Q6HWA RT, Jarett Ocampo DO, 3 mL at 10/17/20 1524    acetylcysteine (MUCOMYST) 200 mg/mL (20 %) solution 600 mg, 600 mg, Nebulization, BID, Darby Boswell MD, 600 mg at 10/16/20 1922    diphenhydrAMINE (BENADRYL) capsule 25 mg, 25 mg, Oral, Q6H PRN, Jomar HERNANDEZ MD, 25 mg at 10/17/20 1425    guaiFENesin (ROBITUSSIN) 100 mg/5 mL oral liquid 400 mg, 400 mg, Oral, Q6H, Jarett Ocampo DO, 400 mg at 10/17/20 1251    0.9% sodium chloride infusion 250 mL, 250 mL, IntraVENous, PRN, Calvin Nielsen MD    atorvastatin (LIPITOR) tablet 20 mg, 20 mg, Oral, DAILY, Calvin Nielsen MD, 20 mg at 10/16/20 2015    pantoprazole (PROTONIX) tablet 40 mg, 40 mg, Oral, DAILY, Jaleel Brewster MD, 40 mg at 10/17/20 0039    acetaminophen (TYLENOL) tablet 650 mg, 650 mg, Oral, Q4H PRN, Jaleel Brewster MD, 650 mg at 20 2319    alum-mag hydroxide-simeth (MYLANTA) oral suspension 30 mL, 30 mL, Oral, Q4H PRN, Jaleel Brewster MD, 30 mL at 20 2215    magnesium hydroxide (MILK OF MAGNESIA) 400 mg/5 mL oral suspension 30 mL, 30 mL, Oral, DAILY PRN, Jaleel Brewster MD    ondansetron [facility]) injection 4 mg, 4 mg, IntraVENous, Q8H PRN, Jaleel Brewster MD, Stopped at 10/07/20 1300    Objective:     Visit Vitals  /70   Pulse 100   Temp 99 °F (37.2 °C)   Resp 20   Ht 6' 0.99\" (1.854 m)   Wt 82.5 kg (181 lb 14.1 oz)   SpO2 97%   BMI 24.00 kg/m²    O2 Flow Rate (L/min): 3 l/min O2 Device: Nasal cannula     Temp (24hrs), Av.6 °F (37 °C), Min:97.2 °F (36.2 °C), Max:99.1 °F (37.3 °C)         Physical Exam:   General :  no respiratory distress noted at rest, patient on nasal cannula oxygen  Lungs : BS increased left lung field vs prior. No wheeze. Not labored.   CVS :  no gallop  Abdomen :  +ventral hernia positive bowel sounds  Extremities : No edema noted,  Neurological : Awake, alert, oriented to time place person.  No neurological deficits.    Psychiatric : Mood and affect normal    Lab/Data Review:  Recent Results (from the past 24 hour(s))   C REACTIVE PROTEIN, QT    Collection Time: 10/16/20  8:59 PM   Result Value Ref Range    C-Reactive protein 5.72 (H) 0.00 - 0.06 mg/dL   METABOLIC PANEL, BASIC    Collection Time: 10/16/20  8:59 PM   Result Value Ref Range    Sodium 136 136 - 145 mmol/L    Potassium 3.5 3.5 - 5.1 mmol/L    Chloride 92 (L) 97 - 108 mmol/L    CO2 44 (HH) 21 - 32 mmol/L    Anion gap 0 (L) 5 - 15 mmol/L    Glucose 80 65 - 100 mg/dL    BUN 12 6 - 20 mg/dL    Creatinine 0.60 (L) 0.70 - 1.30 mg/dL    BUN/Creatinine ratio 20 12 - 20      GFR est AA >60 >60 ml/min/1.73m2    GFR est non-AA >60 >60 ml/min/1.73m2    Calcium 8.3 (L) 8.5 - 10.1 mg/dL     CT abd 10/5. IMPRESSION:  1. Large ventral hernia containing nonobstructed small and large intestine. 2. Moderate to large right pleural effusion and mild to moderate left pleural  effusion. There is near complete collapse of the visualized portions of the left  lower lobe and there is pleural thickening in the left hemithorax. 3. Patchy airspace disease in the right lower lobe along with right basilar  atelectasis. 4. Nonspecific left adrenal nodule. 5. Other findings as described. Pt at increased risk for procedure with resp compromise in addition to anatomical considerations of reducing massive hernia. Pt aware of inc mortality states \"I don't care if it kills me but it has to be done\". Abd ct pending, will need to recline on pillows for procedure as cannot lie flat. Pt aware of risk for surgery, likelihood that will remain on vent long term and he reiterates he wants it done despite the risk. Cardio to optimize for clearance. Surgery on case f/u recommendations  Next x-ray  AP upright view of the chest compared to 10/9/2020. Complete opacification of  the left hemithorax is again noted. Small right pleural effusion and diffuse  right-sided airspace disease suggesting pulmonary edema are again noted. No  pneumothorax. Cardiac silhouette is obscured.     IMPRESSION  IMPRESSION: No significant change. See above. Assessment and plan:        Acute hypoxic respiratory failure : 2/2 persistent LL collapse s/p multiple bronchoscopies. Cytology/cs neg. Maintaining adequate sats on 4l.s/p bronh today. Cxr appears  Same with lt side opacification. Conchetta Peaks EBUS 10/13, path =neg  left lung collapse/PNA:   persistent left lung collapse from mucous plugging. Recurrent collapse is due to multiple reasons including large abdominal hernia, weak cough, COPD and multiple endobronchial lesions although they are not causing significant obstruction. S/p brocnh x 9, ebus/bx paratracheal node 10/13 follow path. Malignancy suspect despite neg cytologies. ID, Pulmonology, oncology appreciated. Abx per ID. CXR 10/15=cpmplete Lt lung opacification again. Fungitell pending  Mediastinal Adenopathy:  Reported on most recent CT with a 2.4 cm x 1.4 cm left paratracheal lymph node. Multiple bronchoscopy and biopsy/washings with negative cytology,  failed to show evidence of malignancy. D/w dr Kiesha Malloy, high suspicion remains for malignancy 2/2 endobronchial lesions observed. EBUS 10/13 of paratracheal node. Oncology following, anticipating OP pet scan. Nonethess prognosis is poor, performance status is poor . Hernia complicating, pt wants it reduced despite increased mortality risk. General surgeon on board,chronic 6+ years neglected follow up. Surgery cx appreciated Dr. Bakari Braswell. Case discussed with , no surgery planned until the pneumonia resolved, resp issues improve. discussed with patient. COPD:  pulm following. Anemia: AOCI. Stable, follow. Feso4/b12 supplements, follow. HTN : Controlled, lower side BP,BP meds adjusted   --Echocardiogram done on 9/16 showed an EF of 60 to 79% grade 2 diastolic dysfunction and moderate to severe aortic stenosis. Right ventricle is normal in size and function. Lasix continued. --Follow venous doppler- no dvt. 2.2 x 2.0 cm rt common femoral pseudoaneurysm  DVT ppx: lovenox  PT/OT     DISPO: SNF anticipated when stable.      Signed By: Liz Esteban MD     October 17, 2020

## 2020-10-18 ENCOUNTER — APPOINTMENT (OUTPATIENT)
Dept: GENERAL RADIOLOGY | Age: 60
DRG: 177 | End: 2020-10-18
Attending: INTERNAL MEDICINE
Payer: MEDICARE

## 2020-10-18 PROCEDURE — 74011250637 HC RX REV CODE- 250/637: Performed by: FAMILY MEDICINE

## 2020-10-18 PROCEDURE — 74011250636 HC RX REV CODE- 250/636: Performed by: INTERNAL MEDICINE

## 2020-10-18 PROCEDURE — 74011250637 HC RX REV CODE- 250/637: Performed by: INTERNAL MEDICINE

## 2020-10-18 PROCEDURE — 74011000250 HC RX REV CODE- 250: Performed by: INTERNAL MEDICINE

## 2020-10-18 PROCEDURE — 77010033678 HC OXYGEN DAILY

## 2020-10-18 PROCEDURE — 71045 X-RAY EXAM CHEST 1 VIEW: CPT

## 2020-10-18 PROCEDURE — 74011250636 HC RX REV CODE- 250/636: Performed by: FAMILY MEDICINE

## 2020-10-18 PROCEDURE — 65270000029 HC RM PRIVATE

## 2020-10-18 PROCEDURE — 94760 N-INVAS EAR/PLS OXIMETRY 1: CPT

## 2020-10-18 RX ADMIN — OXYCODONE HYDROCHLORIDE AND ACETAMINOPHEN 1 TABLET: 5; 325 TABLET ORAL at 06:42

## 2020-10-18 RX ADMIN — MEROPENEM 1 G: 1 INJECTION, POWDER, FOR SOLUTION INTRAVENOUS at 12:15

## 2020-10-18 RX ADMIN — FERROUS SULFATE TAB 325 MG (65 MG ELEMENTAL FE) 325 MG: 325 (65 FE) TAB at 10:13

## 2020-10-18 RX ADMIN — ALUMINUM HYDROXIDE, MAGNESIUM HYDROXIDE, AND SIMETHICONE 30 ML: 200; 200; 20 SUSPENSION ORAL at 10:14

## 2020-10-18 RX ADMIN — AMLODIPINE BESYLATE 5 MG: 5 TABLET ORAL at 10:14

## 2020-10-18 RX ADMIN — DIPHENHYDRAMINE HYDROCHLORIDE 25 MG: 25 CAPSULE ORAL at 06:42

## 2020-10-18 RX ADMIN — OXYCODONE HYDROCHLORIDE AND ACETAMINOPHEN 1 TABLET: 5; 325 TABLET ORAL at 15:14

## 2020-10-18 RX ADMIN — PANTOPRAZOLE SODIUM 40 MG: 40 TABLET, DELAYED RELEASE ORAL at 06:42

## 2020-10-18 RX ADMIN — GUAIFENESIN 400 MG: 200 SOLUTION ORAL at 06:43

## 2020-10-18 RX ADMIN — FUROSEMIDE 40 MG: 10 INJECTION, SOLUTION INTRAMUSCULAR; INTRAVENOUS at 10:13

## 2020-10-18 RX ADMIN — GUAIFENESIN 400 MG: 200 SOLUTION ORAL at 12:14

## 2020-10-18 RX ADMIN — OXYCODONE HYDROCHLORIDE AND ACETAMINOPHEN 1 TABLET: 5; 325 TABLET ORAL at 22:42

## 2020-10-18 RX ADMIN — GUAIFENESIN 400 MG: 200 SOLUTION ORAL at 01:06

## 2020-10-18 RX ADMIN — MEROPENEM 1 G: 1 INJECTION, POWDER, FOR SOLUTION INTRAVENOUS at 01:06

## 2020-10-18 RX ADMIN — DIPHENHYDRAMINE HYDROCHLORIDE 25 MG: 25 CAPSULE ORAL at 22:42

## 2020-10-18 RX ADMIN — ATORVASTATIN CALCIUM 20 MG: 20 TABLET, FILM COATED ORAL at 21:15

## 2020-10-18 RX ADMIN — DIPHENHYDRAMINE HYDROCHLORIDE 25 MG: 25 CAPSULE ORAL at 15:16

## 2020-10-18 RX ADMIN — GUAIFENESIN 400 MG: 200 SOLUTION ORAL at 18:56

## 2020-10-18 RX ADMIN — CYANOCOBALAMIN TAB 500 MCG 500 MCG: 500 TAB at 10:14

## 2020-10-18 RX ADMIN — MEROPENEM 1 G: 1 INJECTION, POWDER, FOR SOLUTION INTRAVENOUS at 18:00

## 2020-10-18 NOTE — PROGRESS NOTES
Pulm/CC Progress Note    Subjective:   Daily Progress Note: 10/18/2020     Patient seen and examined  Overnight events noted  He is awake and alert. On 3 L of oxygen via nasal cannula. Is comfortable. Looks depressed. He declined physical therapy today   Chest x-ray that was done showed partial collapse of left lung again. Is undergone 8 bronchoscopies with mucus suctioning and has had several biopsies of the lesions within his tracheobronchial tree as well as brushings. He has had squamous metaplasia return, but no definitive malignancy. Patient had EBUS done . Did well. Results not show any malignancy on cytology  Station 7 cells being further stained to rule out malignancy     Review of Systems   Has complains of shortness of breath. He is on nasal cannula oxygen. Denied any nausea vomiting. Has poor appetite    Objective:     Visit Vitals  /75 (BP 1 Location: Left arm)   Pulse 77   Temp 98 °F (36.7 °C)   Resp 18   Ht 6' 0.99\" (1.854 m)   Wt 77.6 kg (171 lb 1.2 oz)   SpO2 98%   BMI 22.58 kg/m²    O2 Flow Rate (L/min): 3 l/min O2 Device: Nasal cannula    Temp (24hrs), Av.7 °F (36.5 °C), Min:97.4 °F (36.3 °C), Max:98 °F (36.7 °C)      No intake/output data recorded. 10/16 1901 - 10/18 0700  In: 1601 [P.O.:1350;  I.V.:251]  Out: 7150 [Urine:7150]    Current Facility-Administered Medications   Medication Dose Route Frequency    oxyCODONE-acetaminophen (PERCOCET) 5-325 mg per tablet 1 Tab  1 Tab Oral Q8H PRN    ferrous sulfate tablet 325 mg  1 Tab Oral DAILY WITH BREAKFAST    cyanocobalamin (VITAMIN B12) tablet 500 mcg  500 mcg Oral DAILY    amLODIPine (NORVASC) tablet 5 mg  5 mg Oral DAILY    furosemide (LASIX) injection 40 mg  40 mg IntraVENous BID    meropenem (MERREM) 1 g in sterile water (preservative free) 20 mL IV syringe  1 g IntraVENous Q8H    albuterol-ipratropium (DUO-NEB) 2.5 MG-0.5 MG/3 ML  3 mL Nebulization Q6HWA RT    acetylcysteine (MUCOMYST) 200 mg/mL (20 %) solution 600 mg  600 mg Nebulization BID    diphenhydrAMINE (BENADRYL) capsule 25 mg  25 mg Oral Q6H PRN    guaiFENesin (ROBITUSSIN) 100 mg/5 mL oral liquid 400 mg  400 mg Oral Q6H    0.9% sodium chloride infusion 250 mL  250 mL IntraVENous PRN    atorvastatin (LIPITOR) tablet 20 mg  20 mg Oral DAILY    pantoprazole (PROTONIX) tablet 40 mg  40 mg Oral DAILY    acetaminophen (TYLENOL) tablet 650 mg  650 mg Oral Q4H PRN    alum-mag hydroxide-simeth (MYLANTA) oral suspension 30 mL  30 mL Oral Q4H PRN    magnesium hydroxide (MILK OF MAGNESIA) 400 mg/5 mL oral suspension 30 mL  30 mL Oral DAILY PRN    ondansetron (ZOFRAN) injection 4 mg  4 mg IntraVENous Q8H PRN       Physical Exam:  General: Alert and oriented x3.  3 L nasal cannula. Relatively pleasant. HEENT: atraumatic, PERRL, moist mucosa,     Neck: Trachea midline, no carotid bruit, no masses. Supple but thick. Respiratory: No breath sounds on the left side. Few crackles and rhonchi on the right side. No significant change. Cardiovascular: RRR, no m/r/g, Normal S1 and S2   abdomen: Has large ventral abdominal hernia, evidence of surgical scars soft,   Rectal: deferred  Extremities: no cyanosis or clubbing. He has significant pitting edema in the dependent portions and lower extremities. Integumentary: warm, dry, and pink, with no rash, purpura, or petechia. Heme/Lymph: no lymphadenopathy, no bruises  Neurological: No focal motor deficit   psychiatric: cooperative with normal mood, affect, and cognition      Additional comments: Chest x-rays reviewed    Data Review    No results found for this or any previous visit (from the past 24 hour(s)). Chest x-ray from last evening was reviewed which showed recurrence of left partial collapse. 6 results from 10/3/2020 were reviewed  Patient has left basal atelectasis. XR CHEST PORT   Final Result   Impression: No change compared to single view chest October 17, 2020.       XR CHEST PORT   Final Result      XR CHEST PORT   Final Result      XR CHEST PORT   Final Result      XR CHEST PORT   Final Result      XR CHEST PORT   Final Result      XR CHEST PORT   Final Result   IMPRESSION: Persistent findings compared to single view chest October 10, 2020. XR CHEST PORT   Final Result   IMPRESSION: No significant change. See above. XR CHEST PA LAT   Final Result   Impression:      Collapse of left lung again noted with decreased aeration of the upper lobe   compared to yesterday increased opacity of the right lung may be due to portable   technique. XR CHEST PORT   Final Result   Impression:      Stable aeration of the left upper lung with atelectasis. Stable appearing   bilateral pleural effusions. No significant change compared to the prior study from 10/7/2020. XR CHEST PORT   Final Result      XR FLUOROSCOPY UNDER 60 MINUTES   Final Result      XR CHEST PORT   Final Result      XR CHEST PORT   Final Result   FINDINGS: Impression: Frontal single view chest.      Continued opacification of the left hemithorax, obscuring the cardiac   silhouette. Right lung interstitial thickening, airspace disease, bronchial thickening,   pleural effusion similar to previous. No pneumothorax. CT CHEST WO CONT   Final Result   Impression:    1. Increased now complete opacification of the left bronchi with low density   material.   2. Slightly increased patchy airspace pneumonia in the right lung. 3. Unchanged loculated and nonloculated left pleural effusion, complete left   lung consolidation, right lung pleural effusion. CT ABD PELV W CONT   Final Result   IMPRESSION:   1. Large ventral hernia containing nonobstructed small and large intestine. 2. Moderate to large right pleural effusion and mild to moderate left pleural   effusion. There is near complete collapse of the visualized portions of the left   lower lobe and there is pleural thickening in the left hemithorax.    3. Patchy airspace disease in the right lower lobe along with right basilar   atelectasis. 4. Nonspecific left adrenal nodule. 5. Other findings as described. XR CHEST PORT   Final Result   Impression: Heterogeneous opacity in the right lung, not significantly changed. Now complete opacification of the left hemithorax. XR CHEST PORT   Final Result   IMPRESSION: No significant change. XR CHEST AP LAT   Final Result      XR CHEST AP LAT   Final Result   IMPRESSION:   1. Persistent opacification of the left hemithorax with volume loss. 2.  Moderate right pleural effusion with probable associated right basilar   atelectasis. XR CHEST PORT   Final Result   Impression: Increased volume loss left lung. Otherwise stable. XR CHEST PORT   Final Result   IMPRESSION:   1. Improved aeration of the left lung with persistent basilar consolidative   opacities and probable pleural effusions. 2. Other extensive interstitial and alveolar opacities are nonspecific, but   potentially related to pulmonary edema or infection. XR CHEST PORT   Final Result   Impression:Left lung collapse without improvement from prior study. XR CHEST PORT   Final Result   IMPRESSION:   1. There is milder enlargement of the cardiac silhouette as well as increasing   pulmonary vascular ill definition suspect for mild pulmonary edema. This could   be related to cardiogenic edema or fluid overload. 2.  There is been an improvement in the overall aeration of the left lung with   and improved visualization of vascular markings within the left upper lung zone. There still remains significant partial collapse of the left lung. 3.  There is increased patchy airspace disease laterally within the right lower   lobe just above the right lateral costophrenic angle. 4.  There are persistent moderate-sized bilateral pleural effusions.       XR CHEST AP LAT   Final Result   IMPRESSION: Persistent collapse of the left lung with bilateral pleural   effusions. Increasing vascular congestion on the right. XR CHEST PA LAT   Final Result   Impression:   Ongoing near complete white out of the left lung. Little interval change since   the prior examination. CT CHEST WO CONT   Final Result   IMPRESSION: Complete collapse of the left lung due to complete bronchial   obstruction, possibly aspiration. Fluid overload also noted      XR ABD ACUTE W 1 V CHEST   Final Result   IMPRESSION: Opacification of the left hemithorax, questioned for remote   pneumonectomy or lung collapse with pleural fluid. Prominent right parenchymal/interstitial lung markings compatible with fibrosis   and possible partial vascular congestion. Small bowel gas, nonobstructive pattern. XR CHEST PORT    (Results Pending)   XR CHEST PORT    (Results Pending)   XR CHEST PORT    (Results Pending)          Assessment/Plan: This is a middle-aged male who was admitted because of increasing symptoms of shortness of breath. He is getting treated in hospital for pneumonia with IV Zosyn, was on Azithromycin. He is noted to be increasingly tachypneic and short of breath. He has been on supplemental oxygen. His chest x-ray is showing persistent left lung opacification from mucous plugging. This has not improved with aggressive pulmonary hygiene and 8 suction bronchoscopies. Additionally, there is concern that the patient may have a primary lung malignancy, work-up ongoing.     Plan:     1.)    Left lung collapse/shortness of breath:  - Shortness of breath and hypoxia due to recurrent left lung collapse. He had multiple bronchoscopies done along with lavage but he continues to have left lung collapse. He underwent EBUS, mildly enlarged station 7 lymph node, significantly enlarged station 11 lymph node. Ultrasound-guided biopsy was done. Left tracheal tree was lavaged and cleared of mucous plugs.     EBUS results did not show any malignancy though station 7 sample is being further stained  This was discussed with patient. CT of the abdomen pelvis shows a large hernia. He has been ordered pulmonary hygiene with nebulizers every 6 hours;  Aggressive chest PT, EZ-PAP therapy q6, acapella device as well as incentive spirometer use. Added guaifenesin 400 mg q6. Mucomyst also ordered. 2.)  COPD:  He has a history of COPD based on chronic smoking. Continue scheduled bronchodilators. Patient should be discharged with a LAMA/LABA such as Anoro and needs f/u in our office for PFT's and further management. Weaned off prednisone. 3.)  Pneumonia:   He is on IV Zosyn and Levaquin, stopped Zithromax on 9/23/2020. Cultures from recent bronchoscopy showing normal xiomara. Infectious disease consultation. 4.)  Hypertension:  He is on antihypertensive medications, BP's stable. Patient also has clinically fluid overload, congestive heart failure. Echocardiogram done on 9/16 showed an EF of 60 to 67% grade 2 diastolic dysfunction and moderate to severe aortic stenosis. Right ventricle is normal in size and function. Will resume Lasix. 5.  Ventral abdominal hernia:  Patient wants to have his ventral abdominal hernia fixed, spoke to surgery service. This will be too extensive for for surgery, surgical service is recommending for him to be transferred to [facility] where surgery can be further pursued. Discussed with patient, he is going to further discuss with Dr. Merlin Sue  before he makes his decision    Patient is getting depressed. He declined physical therapy today.     Questions of patient were answered at bedside in detail  Case discussed in detail with RN, RT, and care team  Thank you for involving me in the care of this patient  I will follow with you closely during hospitalization    Time spent more than 30 minutes in direct patient care with no overlap      Cheli Mcghee MD  Pulmonary and critical care

## 2020-10-18 NOTE — PROGRESS NOTES
Progress Note    Patient: Chente Cassidy MRN: [id number]  SSN: [id number]    YOB: 1960  Age: 63 y.o. Sex: male      Admit Date: 9/10/2020    LOS: 38 days        Subjective:     Patient is seen for follow-up,    ebus 10/13-path =neg  Comfortable at rest, easily dyspneic minimal exertion  Weak, doubts mobilizing will help. No fever/chills  cxr worse/lt side opacification.   No acute events  Remains increasingly frustrated           Current Facility-Administered Medications:     oxyCODONE-acetaminophen (PERCOCET) 5-325 mg per tablet 1 Tab, 1 Tab, Oral, Q8H PRN, Cat Becerra MD, 1 Tab at 10/18/20 1514    ferrous sulfate tablet 325 mg, 1 Tab, Oral, DAILY WITH BREAKFAST, Edy Benton MD, 325 mg at 10/18/20 1013    cyanocobalamin (VITAMIN B12) tablet 500 mcg, 500 mcg, Oral, DAILY, Micki Benton MD, 500 mcg at 10/18/20 1014    amLODIPine (NORVASC) tablet 5 mg, 5 mg, Oral, DAILY, Shakira Shields MD, 5 mg at 10/18/20 1014    furosemide (LASIX) injection 40 mg, 40 mg, IntraVENous, BID, Shakira Shields MD, 40 mg at 10/18/20 1013    meropenem (MERREM) 1 g in sterile water (preservative free) 20 mL IV syringe, 1 g, IntraVENous, Q8H, Nanda Gonzalez MD, 1 g at 10/18/20 1800    albuterol-ipratropium (DUO-NEB) 2.5 MG-0.5 MG/3 ML, 3 mL, Nebulization, Q6HWA RT, Jarett Ocampo DO, 3 mL at 10/17/20 1932    acetylcysteine (MUCOMYST) 200 mg/mL (20 %) solution 600 mg, 600 mg, Nebulization, BID, Darby Boswell MD, 200 mg at 10/17/20 1933    diphenhydrAMINE (BENADRYL) capsule 25 mg, 25 mg, Oral, Q6H PRN, Anatoliy HERNANDEZ MD, 25 mg at 10/18/20 1516    guaiFENesin (ROBITUSSIN) 100 mg/5 mL oral liquid 400 mg, 400 mg, Oral, Q6H, Jarett Ocampo, , 400 mg at 10/18/20 1856    0.9% sodium chloride infusion 250 mL, 250 mL, IntraVENous, PRN, Austin Barker MD    atorvastatin (LIPITOR) tablet 20 mg, 20 mg, Oral, DAILY, Austin Barker MD, 20 mg at 10/17/20 2102    pantoprazole (PROTONIX) tablet 40 mg, 40 mg, Oral, DAILY, Montana Richards MD, 40 mg at 10/18/20 0319    acetaminophen (TYLENOL) tablet 650 mg, 650 mg, Oral, Q4H PRN, Montana Richards MD, 650 mg at 20 2319    alum-mag hydroxide-simeth (MYLANTA) oral suspension 30 mL, 30 mL, Oral, Q4H PRN, Montana Richards MD, 30 mL at 10/18/20 1014    magnesium hydroxide (MILK OF MAGNESIA) 400 mg/5 mL oral suspension 30 mL, 30 mL, Oral, DAILY PRN, Montana Richards MD    ondansetron [facility]) injection 4 mg, 4 mg, IntraVENous, Q8H PRN, Montana Richards MD, Stopped at 10/07/20 1300    Objective:     Visit Vitals  /70   Pulse 100   Temp 99 °F (37.2 °C)   Resp 20   Ht 6' 0.99\" (1.854 m)   Wt 82.5 kg (181 lb 14.1 oz)   SpO2 97%   BMI 24.00 kg/m²    O2 Flow Rate (L/min): 3 l/min O2 Device: Nasal cannula     Temp (24hrs), Av.6 °F (37 °C), Min:97.2 °F (36.2 °C), Max:99.1 °F (37.3 °C)         Physical Exam:   General :  no respiratory distress noted at rest, patient on nasal cannula oxygen  Lungs : BS increased left lung field vs prior. No wheeze. Not labored. CVS :  no gallop  Abdomen :  +ventral hernia positive bowel sounds  Extremities : No edema noted,  Neurological : Awake, alert, oriented to time place person.  No neurological deficits.    Psychiatric : Mood and affect normal    Lab/Data Review:  No results found for this or any previous visit (from the past 24 hour(s)). CT abd 10/5. IMPRESSION:  1. Large ventral hernia containing nonobstructed small and large intestine. 2. Moderate to large right pleural effusion and mild to moderate left pleural  effusion. There is near complete collapse of the visualized portions of the left  lower lobe and there is pleural thickening in the left hemithorax. 3. Patchy airspace disease in the right lower lobe along with right basilar  atelectasis. 4. Nonspecific left adrenal nodule. 5. Other findings as described.      Pt at increased risk for procedure with resp compromise in addition to anatomical considerations of reducing massive hernia. Pt aware of inc mortality states \"I don't care if it kills me but it has to be done\". Abd ct pending, will need to recline on pillows for procedure as cannot lie flat. Pt aware of risk for surgery, likelihood that will remain on vent long term and he reiterates he wants it done despite the risk. Cardio to optimize for clearance. Surgery on case f/u recommendations  Next x-ray  AP upright view of the chest compared to 10/9/2020. Complete opacification of  the left hemithorax is again noted. Small right pleural effusion and diffuse  right-sided airspace disease suggesting pulmonary edema are again noted. No  pneumothorax. Cardiac silhouette is obscured.     IMPRESSION  IMPRESSION: No significant change. See above. Assessment and plan:        Acute hypoxic respiratory failure : 2/2 persistent LL collapse s/p 9 bronchoscopies. NATALIE partially clears  Next day, quickly opacifies again. All cytologies/path from ebus no malignancy, fungal cs 10/7 mod yeast. Fungitel? Remains on abx. ID appreciated. No change in cxr with Lt side opacification, increasingly less mobile and less motovated for mobitlity. He is doinhg fine at resp on supplemental o2, dyspnic minimal exertion. Not sute many options left, will d/w pulmonology, surgery. Perhaps remair of his massive ventral hernia but high risk for surgey and complications from longstanding abd hernia which appears to be limiting resp mechanics. He is aware of high mortality risk, prolonged intubation prob trach. Maintaining adequate sats on 4l. Cxr appears  Same with lt side opacification. D/w pulmonology, limited options. D/w ID.   left lung collapse/PNA:   persistent left lung collapse from mucous plugging. Recurrent collapse is due to multiple reasons including large abdominal hernia, weak cough, COPD and multiple endobronchial lesions although they are not causing significant obstruction and path neg.   S/p brocnh x 9, ebus/bx paratracheal node 10/13 neg. ID, Pulmonology, oncology appreciated. Abx per ID. Fungitell pending. CXR pending. Mediastinal Adenopathy:  Ebus/path neg. Hernia complicating, pt wants it reduced despite increased mortality risk. General surgeon on board,chronic 6+ years neglected follow up. Surgery cx appreciated Dr. Yessi Connelly. Case discussed with , no surgery planned until the pneumonia resolved, resp issues improve. discussed with patient wants hernia repaired despite risk. COPD:  pulm following. Anemia: AOCI. Stable, follow. Feso4/b12 supplements, follow. HTN : Controlled, lower side BP,BP meds adjusted   --Echocardiogram done on 9/16 showed an EF of 60 to 87% grade 2 diastolic dysfunction and moderate to severe aortic stenosis. Right ventricle is normal in size and function. Lasix continued. --Follow venous doppler- no dvt. 2.2 x 2.0 cm rt common femoral pseudoaneurysm  DVT ppx: lovenox  PT/OT     DISPO: SNF anticipated when stable. D/w pulm/surgery/ID, options at this point? Will again address possibility of transfer and possible hernai repair. Intubation nonetheless post op would facilite pulm toilet though likely will need prolonged intubation/trach. ..     Signed By: David Song MD     October 18, 2020

## 2020-10-19 ENCOUNTER — APPOINTMENT (OUTPATIENT)
Dept: GENERAL RADIOLOGY | Age: 60
DRG: 177 | End: 2020-10-19
Attending: INTERNAL MEDICINE
Payer: MEDICARE

## 2020-10-19 LAB
1,3 BETA GLUCAN SER-MCNC: <31 PG/ML
CRP SERPL-MCNC: 4.54 MG/DL (ref 0–0.6)

## 2020-10-19 PROCEDURE — 74011250637 HC RX REV CODE- 250/637: Performed by: INTERNAL MEDICINE

## 2020-10-19 PROCEDURE — 65270000029 HC RM PRIVATE

## 2020-10-19 PROCEDURE — 74011250636 HC RX REV CODE- 250/636: Performed by: FAMILY MEDICINE

## 2020-10-19 PROCEDURE — 77010033678 HC OXYGEN DAILY

## 2020-10-19 PROCEDURE — 86140 C-REACTIVE PROTEIN: CPT

## 2020-10-19 PROCEDURE — 36415 COLL VENOUS BLD VENIPUNCTURE: CPT

## 2020-10-19 PROCEDURE — 74011250636 HC RX REV CODE- 250/636: Performed by: INTERNAL MEDICINE

## 2020-10-19 PROCEDURE — 94640 AIRWAY INHALATION TREATMENT: CPT

## 2020-10-19 PROCEDURE — 99232 SBSQ HOSP IP/OBS MODERATE 35: CPT | Performed by: INTERNAL MEDICINE

## 2020-10-19 PROCEDURE — 71045 X-RAY EXAM CHEST 1 VIEW: CPT

## 2020-10-19 PROCEDURE — 97110 THERAPEUTIC EXERCISES: CPT

## 2020-10-19 PROCEDURE — 74011000250 HC RX REV CODE- 250: Performed by: INTERNAL MEDICINE

## 2020-10-19 PROCEDURE — 74011250637 HC RX REV CODE- 250/637: Performed by: FAMILY MEDICINE

## 2020-10-19 PROCEDURE — 94760 N-INVAS EAR/PLS OXIMETRY 1: CPT

## 2020-10-19 RX ADMIN — OXYCODONE HYDROCHLORIDE AND ACETAMINOPHEN 1 TABLET: 5; 325 TABLET ORAL at 14:49

## 2020-10-19 RX ADMIN — DIPHENHYDRAMINE HYDROCHLORIDE 25 MG: 25 CAPSULE ORAL at 22:49

## 2020-10-19 RX ADMIN — IPRATROPIUM BROMIDE AND ALBUTEROL SULFATE 3 ML: .5; 3 SOLUTION RESPIRATORY (INHALATION) at 07:50

## 2020-10-19 RX ADMIN — AMLODIPINE BESYLATE 5 MG: 5 TABLET ORAL at 08:38

## 2020-10-19 RX ADMIN — FERROUS SULFATE TAB 325 MG (65 MG ELEMENTAL FE) 325 MG: 325 (65 FE) TAB at 08:38

## 2020-10-19 RX ADMIN — CYANOCOBALAMIN TAB 500 MCG 500 MCG: 500 TAB at 08:38

## 2020-10-19 RX ADMIN — MEROPENEM 1 G: 1 INJECTION, POWDER, FOR SOLUTION INTRAVENOUS at 17:28

## 2020-10-19 RX ADMIN — IPRATROPIUM BROMIDE AND ALBUTEROL SULFATE 3 ML: .5; 3 SOLUTION RESPIRATORY (INHALATION) at 13:33

## 2020-10-19 RX ADMIN — PANTOPRAZOLE SODIUM 40 MG: 40 TABLET, DELAYED RELEASE ORAL at 06:09

## 2020-10-19 RX ADMIN — MEROPENEM 1 G: 1 INJECTION, POWDER, FOR SOLUTION INTRAVENOUS at 08:39

## 2020-10-19 RX ADMIN — ATORVASTATIN CALCIUM 20 MG: 20 TABLET, FILM COATED ORAL at 21:28

## 2020-10-19 RX ADMIN — GUAIFENESIN 400 MG: 200 SOLUTION ORAL at 17:28

## 2020-10-19 RX ADMIN — GUAIFENESIN 400 MG: 200 SOLUTION ORAL at 11:54

## 2020-10-19 RX ADMIN — OXYCODONE HYDROCHLORIDE AND ACETAMINOPHEN 1 TABLET: 5; 325 TABLET ORAL at 06:09

## 2020-10-19 RX ADMIN — DIPHENHYDRAMINE HYDROCHLORIDE 25 MG: 25 CAPSULE ORAL at 14:49

## 2020-10-19 RX ADMIN — GUAIFENESIN 400 MG: 200 SOLUTION ORAL at 00:21

## 2020-10-19 RX ADMIN — FUROSEMIDE 40 MG: 10 INJECTION, SOLUTION INTRAMUSCULAR; INTRAVENOUS at 08:38

## 2020-10-19 RX ADMIN — DIPHENHYDRAMINE HYDROCHLORIDE 25 MG: 25 CAPSULE ORAL at 06:09

## 2020-10-19 RX ADMIN — GUAIFENESIN 400 MG: 200 SOLUTION ORAL at 06:09

## 2020-10-19 RX ADMIN — GUAIFENESIN 400 MG: 200 SOLUTION ORAL at 22:49

## 2020-10-19 RX ADMIN — OXYCODONE HYDROCHLORIDE AND ACETAMINOPHEN 1 TABLET: 5; 325 TABLET ORAL at 22:49

## 2020-10-19 RX ADMIN — MEROPENEM 1 G: 1 INJECTION, POWDER, FOR SOLUTION INTRAVENOUS at 02:29

## 2020-10-19 NOTE — PROGRESS NOTES
Pulm/CC Progress Note    Subjective:     Patient seen and examined in his room this afternoon. No acute issues overnight, he is working with PT currently. He is awake and alert. On 3 L of oxygen via nasal cannula. Is comfortable. Looks depressed but is encouraged about potentially getting his hernia fixed. Chest x-ray that was done showed partial collapse of left lung again. No need for further CXR's unless the clinical situation changes. Is undergone 9 bronchoscopies with mucus suctioning and has had several biopsies of the lesions within his tracheobronchial tree as well as brushings and EBUS biopsies of station 11L and station 7. He has had squamous metaplasia return, but no definitive malignancy. No malignancy on EBUS biopsies. I discussed the case with Dr. Adrienne Lakhani today, we have all final results back and there is no definitive malignancy. Review of Systems   Has complains of shortness of breath. He is on nasal cannula oxygen. Denied any nausea vomiting. Has poor appetite    Objective:     Visit Vitals  /67   Pulse 79   Temp 97.4 °F (36.3 °C)   Resp 18   Ht 6' 0.99\" (1.854 m)   Wt 77.4 kg (170 lb 10.2 oz)   SpO2 100%   BMI 22.52 kg/m²    O2 Flow Rate (L/min): 3 l/min O2 Device: Nasal cannula    Temp (24hrs), Av.6 °F (36.4 °C), Min:97.4 °F (36.3 °C), Max:97.8 °F (36.6 °C)      No intake/output data recorded. 10/17 1901 - 10/19 0700  In: 2949 [P.O.:1470;  I.V.:234]  Out: 2850 [Urine:2850]    Current Facility-Administered Medications   Medication Dose Route Frequency    oxyCODONE-acetaminophen (PERCOCET) 5-325 mg per tablet 1 Tab  1 Tab Oral Q8H PRN    ferrous sulfate tablet 325 mg  1 Tab Oral DAILY WITH BREAKFAST    cyanocobalamin (VITAMIN B12) tablet 500 mcg  500 mcg Oral DAILY    amLODIPine (NORVASC) tablet 5 mg  5 mg Oral DAILY    furosemide (LASIX) injection 40 mg  40 mg IntraVENous BID    meropenem (MERREM) 1 g in sterile water (preservative free) 20 mL IV syringe  1 g IntraVENous Q8H    albuterol-ipratropium (DUO-NEB) 2.5 MG-0.5 MG/3 ML  3 mL Nebulization Q6HWA RT    acetylcysteine (MUCOMYST) 200 mg/mL (20 %) solution 600 mg  600 mg Nebulization BID    diphenhydrAMINE (BENADRYL) capsule 25 mg  25 mg Oral Q6H PRN    guaiFENesin (ROBITUSSIN) 100 mg/5 mL oral liquid 400 mg  400 mg Oral Q6H    0.9% sodium chloride infusion 250 mL  250 mL IntraVENous PRN    atorvastatin (LIPITOR) tablet 20 mg  20 mg Oral DAILY    pantoprazole (PROTONIX) tablet 40 mg  40 mg Oral DAILY    acetaminophen (TYLENOL) tablet 650 mg  650 mg Oral Q4H PRN    alum-mag hydroxide-simeth (MYLANTA) oral suspension 30 mL  30 mL Oral Q4H PRN    magnesium hydroxide (MILK OF MAGNESIA) 400 mg/5 mL oral suspension 30 mL  30 mL Oral DAILY PRN    ondansetron (ZOFRAN) injection 4 mg  4 mg IntraVENous Q8H PRN       Physical Exam:  General: Alert and oriented x3.  3 L nasal cannula. Relatively pleasant. HEENT: atraumatic, PERRL, moist mucosa,     Neck: Trachea midline, no carotid bruit, no masses. Supple but thick. Respiratory: No breath sounds on the left side. Few crackles and rhonchi on the right side. No significant change. Cardiovascular: RRR, no m/r/g, Normal S1 and S2   abdomen: Has large ventral abdominal hernia, evidence of surgical scars soft,   Rectal: deferred  Extremities: no cyanosis or clubbing. He has significant pitting edema in the dependent portions and lower extremities. Integumentary: warm, dry, and pink, with no rash, purpura, or petechia.    Heme/Lymph: no lymphadenopathy, no bruises  Neurological: No focal motor deficit   psychiatric: cooperative with normal mood, affect, and cognition      Additional comments: Chest x-rays reviewed    Data Review    Recent Results (from the past 24 hour(s))   C REACTIVE PROTEIN, QT    Collection Time: 10/19/20  9:00 AM   Result Value Ref Range    C-Reactive protein 4.54 (H) 0.00 - 0.60 mg/dL         Chest x-ray from last evening was reviewed which showed recurrence of left partial collapse. 6 results from 10/3/2020 were reviewed  Patient has left basal atelectasis. XR CHEST PORT   Final Result      XR CHEST PORT   Final Result   Impression: No change compared to single view chest October 17, 2020. XR CHEST PORT   Final Result      XR CHEST PORT   Final Result      XR CHEST PORT   Final Result      XR CHEST PORT   Final Result      XR CHEST PORT   Final Result      XR CHEST PORT   Final Result   IMPRESSION: Persistent findings compared to single view chest October 10, 2020. XR CHEST PORT   Final Result   IMPRESSION: No significant change. See above. XR CHEST PA LAT   Final Result   Impression:      Collapse of left lung again noted with decreased aeration of the upper lobe   compared to yesterday increased opacity of the right lung may be due to portable   technique. XR CHEST PORT   Final Result   Impression:      Stable aeration of the left upper lung with atelectasis. Stable appearing   bilateral pleural effusions. No significant change compared to the prior study from 10/7/2020. XR CHEST PORT   Final Result      XR FLUOROSCOPY UNDER 60 MINUTES   Final Result      XR CHEST PORT   Final Result      XR CHEST PORT   Final Result   FINDINGS: Impression: Frontal single view chest.      Continued opacification of the left hemithorax, obscuring the cardiac   silhouette. Right lung interstitial thickening, airspace disease, bronchial thickening,   pleural effusion similar to previous. No pneumothorax. CT CHEST WO CONT   Final Result   Impression:    1. Increased now complete opacification of the left bronchi with low density   material.   2. Slightly increased patchy airspace pneumonia in the right lung. 3. Unchanged loculated and nonloculated left pleural effusion, complete left   lung consolidation, right lung pleural effusion. CT ABD PELV W CONT   Final Result   IMPRESSION:   1.  Large ventral hernia containing nonobstructed small and large intestine. 2. Moderate to large right pleural effusion and mild to moderate left pleural   effusion. There is near complete collapse of the visualized portions of the left   lower lobe and there is pleural thickening in the left hemithorax. 3. Patchy airspace disease in the right lower lobe along with right basilar   atelectasis. 4. Nonspecific left adrenal nodule. 5. Other findings as described. XR CHEST PORT   Final Result   Impression: Heterogeneous opacity in the right lung, not significantly changed. Now complete opacification of the left hemithorax. XR CHEST PORT   Final Result   IMPRESSION: No significant change. XR CHEST AP LAT   Final Result      XR CHEST AP LAT   Final Result   IMPRESSION:   1. Persistent opacification of the left hemithorax with volume loss. 2.  Moderate right pleural effusion with probable associated right basilar   atelectasis. XR CHEST PORT   Final Result   Impression: Increased volume loss left lung. Otherwise stable. XR CHEST PORT   Final Result   IMPRESSION:   1. Improved aeration of the left lung with persistent basilar consolidative   opacities and probable pleural effusions. 2. Other extensive interstitial and alveolar opacities are nonspecific, but   potentially related to pulmonary edema or infection. XR CHEST PORT   Final Result   Impression:Left lung collapse without improvement from prior study. XR CHEST PORT   Final Result   IMPRESSION:   1. There is milder enlargement of the cardiac silhouette as well as increasing   pulmonary vascular ill definition suspect for mild pulmonary edema. This could   be related to cardiogenic edema or fluid overload. 2.  There is been an improvement in the overall aeration of the left lung with   and improved visualization of vascular markings within the left upper lung zone. There still remains significant partial collapse of the left lung.    3.  There is increased patchy airspace disease laterally within the right lower   lobe just above the right lateral costophrenic angle. 4.  There are persistent moderate-sized bilateral pleural effusions. XR CHEST AP LAT   Final Result   IMPRESSION: Persistent collapse of the left lung with bilateral pleural   effusions. Increasing vascular congestion on the right. XR CHEST PA LAT   Final Result   Impression:   Ongoing near complete white out of the left lung. Little interval change since   the prior examination. CT CHEST WO CONT   Final Result   IMPRESSION: Complete collapse of the left lung due to complete bronchial   obstruction, possibly aspiration. Fluid overload also noted      XR ABD ACUTE W 1 V CHEST   Final Result   IMPRESSION: Opacification of the left hemithorax, questioned for remote   pneumonectomy or lung collapse with pleural fluid. Prominent right parenchymal/interstitial lung markings compatible with fibrosis   and possible partial vascular congestion. Small bowel gas, nonobstructive pattern. Assessment/Plan: This is a middle-aged male who was admitted because of increasing symptoms of shortness of breath. He is getting treated in hospital for pneumonia with IV Zosyn, was on Azithromycin. He is noted to be increasingly tachypneic and short of breath. He has been on supplemental oxygen. His chest x-ray is showing persistent left lung opacification from mucous plugging. This has not improved with aggressive pulmonary hygiene and 9 suction bronchoscopies. Patient is undergone multiple biopsies as well as transbronchial needle aspirations of multiple mediastinal nodes, all cytology/pathology has returned negative for malignancy thus far. .     Plan:     1.)    Left lung collapse/shortness of breath:  - Shortness of breath and hypoxia due to recurrent left lung collapse. He had multiple bronchoscopies done along with lavage but he continues to have left lung collapse. There is really no further need to continue doing this unless something else changes to improve his mucous clearance. He underwent EBUS on 10/13/20, mildly enlarged station 7 lymph node, significantly enlarged station 11 lymph node. Ultrasound-guided biopsy was done, negative for malignancy. Final results confirm no malignancy. CT of the abdomen pelvis shows a large hernia. Certainly affecting his ability to cough. He has been ordered pulmonary hygiene with nebulizers every 6 hours;  Aggressive chest PT, EZ-PAP therapy q6, acapella device as well as incentive spirometer use. Added guaifenesin 400 mg q6. Mucomyst also ordered. Patient has a history of refusing some of thee therapies. Likely due to developing depression from being admitted for so long. No need for daily CXR's unless his clinical situation changes. At this point, I feel we have exhausted our ability to sufficiently take care of this patient here at [facility]. Outside of an elective intubation with subsequent scheduled pulmonary hygiene as above and better ease to perform clean out bronchoscopies, I do not feel that this is appropriate without something else changing with his care. I would strongly recommend consideration of surgical management for his ventral hernia. If this is unable to be completed her at [facility], I would recommend consideration of transfer to [facility]. If surgery does occur, he will almost certainly require mechanical ventilation afterward. Higher chance he may require a tracheostomy, with which the patient is agreeable if necessary. Additionally, several days of mechanical ventilation will likely be a benefit for him from a pulmonary hygiene standpoint. He will not be able to refuse therapies and clean out bronchoscopies could be performed daily. .    2.)  COPD:  He has a history of COPD based on chronic smoking. Continue scheduled bronchodilators.   Patient should be discharged with a LAMA/LABA such as Anoro and needs f/u in our office for PFT's and further management after discharge. Weaned off prednisone. 3.)  Pneumonia:   He is on IV Meropenem, was on Zosyn for >1 month. All cultures from multiple bronchoscopies are no growth to date. AFBs are negative, fungal culture showed moderate yeast but otherwise nothing significant. Fungitell is still pending, but CRP is going down. Infectious disease consultation appreciated. No role for antifungal therapy at this time. 4.)  Hypertension:  He is on antihypertensive medications, BP's stable. Patient also has clinically fluid overload. Echocardiogram done on 9/16 showed an EF of 60 to 44% grade 2 diastolic dysfunction and moderate to severe aortic stenosis. Right ventricle is normal in size and function. Continue Lasix. 5.)  Ventral abdominal hernia:  Patient wants to have his ventral abdominal hernia fixed, my partners spoke to surgery service last week. This will be too extensive for surgery here at this hospital appparently, surgical service is recommending for him to be transferred to [facility] where surgery can be further pursued.    Discussed with patient, he is going to further discuss with Dr. Waylan Aase  before he makes his decision        Questions of patient were answered at bedside in detail  Case discussed in detail with RN, RT, and care team  Thank you for involving me in the care of this patient  I will follow with you closely during hospitalization    Time spent more than 30 minutes in direct patient care with no overlap      Valora Mail, DO  Pulmonary and critical care

## 2020-10-19 NOTE — PROGRESS NOTES
OCCUPATIONAL THERAPY TREATMENT  Patient: Giorgi Darby (24 y.o. male)  Date: 10/19/2020  Diagnosis: Hypertension [I10]   <principal problem not specified>  Procedure(s) (LRB):  Endobronchial Ultrasound (EBUS) (N/A) 6 Days Post-Op  Precautions: Fall    Chart, occupational therapy assessment, plan of care, and goals were reviewed. ASSESSMENT  Patient continues with skilled OT services and is progressing towards goals. Pt received supine in bed, agreeable to working at bed level with OT with increased encouragement today. He continues to present with generalized weakness and decreased functional activity tolerance as noted by simulated grooming task in long sitting, min A supine>long sit for reaching task at bed level, increased time and rest breaks for performance of UE HEP. Despite encouragement, pt decline participation in EOB ADLs or basic grooming/bathing tasks at bed level. He would benefit from continued skilled OT services to address overall impairments and improve IND and safety with ADLs and functional mobility, pending participation with therapy. Other factors to consider for discharge: Family/social support, DME, decrease in ADLs from PLOF         PLAN :  Patient continues to benefit from skilled intervention to address the above impairments. Continue treatment per established plan of care. to address goals. Recommend with staff: Encourage participation in bed level ADLs as able    Recommend next OT session: EOB ADLs    Recommendation for discharge: (in order for the patient to meet his/her long term goals)  SNF    This discharge recommendation:  Has been made in collaboration with the attending provider and/or case management       SUBJECTIVE:   Patient stated you can't get me to the edge of the bed by yourself. I'll do some exercises here in the bed, but that's it.     OBJECTIVE DATA SUMMARY:   Cognitive/Behavioral Status:  Neurologic State: Alert  Orientation Level: Oriented X4  Cognition: Follows commands; Appropriate decision making    Functional Mobility and Transfers for ADLs:  Bed Mobility:  Supine to Sit: Minimum assistance(Supine>long sit)    ADL Intervention:  Grooming  Grooming Assistance: Set-up  Brushing/Combing Hair: Set-up(Simulated)     Toileting  Toileting Assistance: Total assistance(dependent)    Therapeutic Exercises:   Pt participated in UE HEP shoulder raises x20, forearm supination/pronation x20, hand/finger flex/ext x40. Functional reaching activity performed x10 at bed level  today to improve reach, core strength, and endurance. Pain:  5/10 back pain; RN notified. Activity Tolerance:   Fair and requires rest breaks    After treatment patient left in no apparent distress:   Supine in bed and Call bell within reach    COMMUNICATION/COLLABORATION:   The patients plan of care was discussed with: Registered nurse. Becca Nguyen  Time Calculation: 25 mins   Problem: Self Care Deficits Care Plan (Adult)  Goal: *Acute Goals and Plan of Care (Insert Text)  Description: 1. Pt will be mod I sit < - >  prep for toileting LRAD  2. Pt will be mod I toileting/toilet transfer/cloth mgmt LRAD  3. Pt will be mod I sponge bathing sitting/standing at sink LRAD  4. Pt will be mod A LE dressing EOB  5. Pt will be mod I grooming standing at sink LRAD  6.  Pt will be I following UE HEP in prep for self care tasks  Outcome: Progressing Towards Goal

## 2020-10-19 NOTE — PROGRESS NOTES
Progress Note    Patient: Karel Nichols MRN: [id number]  SSN: [id number]    YOB: 1960  Age: 63 y.o. Sex: male      Admit Date: 9/10/2020    LOS: 39 days     Subjective:   Patient followed for chronic, refractory pneumonia of undetermined etiology despite extensive microbiologic work-up. Bronchial washings again only showed normal respiratory xiomara and moderate yeasts. CXR today again showing complete opacification of left hemithorax. CRP trending downward today. He remains afebrile. Currently on Meropenem. Patient asleep at this time. Objective:     Vitals:    10/19/20 0015 10/19/20 0750 10/19/20 0758 10/19/20 1516   BP: (!) 107/58  119/67 (!) 91/55   Pulse: 84  79 89   Resp: 18  18 20   Temp: 97.5 °F (36.4 °C)  97.4 °F (36.3 °C) 97.7 °F (36.5 °C)   SpO2: 100% 100% 100% 95%   Weight:       Height:            Intake and Output:  Current Shift: 10/19 0701 - 10/19 1900  In: -   Out: 2000 [Urine:2000]  Last three shifts: 10/17 1901 - 10/19 0700  In: 4135 [P.O.:1470; I.V.:234]  Out: 2850 [Urine:2850]    Physical Exam:    Patient not re-examined at this time  Vitals signs and nursing note reviewed. Constitutional:       General: He is not in acute distress. Appearance: He is ill-appearing. He is not toxic-appearing or diaphoretic. Pulmonary:      Breath sounds: Rhonchi and rales present. No wheezing. Comments: Decreased breath sounds left lung field  Abdominal:      General: There is distension (ventral hernia with scarring and crusting). Tenderness: There is no abdominal tenderness. There is no guarding. Genitourinary:     Penis: Normal.       Comments: No Padilla  Musculoskeletal:      Right lower leg: Edema present. Left lower leg: Edema present. Skin:     Findings: Erythema (both calves) present. Neurological:      General: No focal deficit present. Mental Status: He is alert and oriented to person, place, and time.    Psychiatric:         Mood and Affect: Mood normal.         Behavior: Behavior normal.         Thought Content: Thought content normal.         Judgment: Judgment normal.        Lab/Data Review:    WBC 9.800  Procalcitonin <0.05  CRP 4.54 (5.72 (8.04 (11.60 (12.50    (129  ANCA panel Negative    Serum fungitell <31 Negative    Bronchial washing fungus (10/7)  Pending  Bronchial washing AFB (10/7) smear negative  Bronchial washing culture (10/7) Normal respiratory xiomara  Bronchial washing culture (10/13) Normal respiratory xiomara FINAL  Bronchial washing fungal (10/13) Moderate yeasts consistent with colonization    CXR (10/19)  Complete opacification left hemithorax, persistent finding compared to priorimaging. Left mediastinal staples suggest prior surgery. Same site patchy  reticular markings right lung, these appear no worse compared to prior imaging. No pneumothorax      Assessment:     1. Chronic pneumonia, likely post-obstructive, etiology unclear, Day #36 IV Antibiotics, Day #9 IV Meropenem. 2. Markedly elevated CRP, presumed secondary to #1, decreasing again. 3. Abnormal bronchoscopy with nodular lesions left tracheobronchial tree, biopsy pending. 4. Chronic venous insufficiency with stasis dermatitis  5. Large ventral hernia, seen by General Surgery     Comment:   CRP trending downward. Yeasts probably represent colonization especially with negative serum fungitell. It appears that infection is probably secondary issue with mechanical factors responsible for his collapsed lung. Plan:   1. Continue IV Meropenem  2.  In am, repeat, done    Signed By: Lucía Ordonez MD     October 19, 2020

## 2020-10-19 NOTE — PROGRESS NOTES
Progress Note    Patient: Julio C Abreu MRN: [id number]  SSN: [id number]    YOB: 1960  Age: 63 y.o. Sex: male      Admit Date: 9/10/2020    LOS: 39 days        Subjective:     Patient is seen for follow-up,    ebus 10/13-path =neg  Remains Comfortable at rest, easily dyspneic minimal exertion    No fever/chills  cxr persistent lt side opacification.   No acute events  Remains increasingly frustrated           Current Facility-Administered Medications:     oxyCODONE-acetaminophen (PERCOCET) 5-325 mg per tablet 1 Tab, 1 Tab, Oral, Q8H PRN, Aldo HERNANDEZ MD, 1 Tab at 10/19/20 1449    ferrous sulfate tablet 325 mg, 1 Tab, Oral, DAILY WITH BREAKFAST, Avel Benton MD, 325 mg at 10/19/20 0838    cyanocobalamin (VITAMIN B12) tablet 500 mcg, 500 mcg, Oral, DAILY, Letitia Benton MD, 500 mcg at 10/19/20 0838    amLODIPine (NORVASC) tablet 5 mg, 5 mg, Oral, DAILY, Steve Allison MD, 5 mg at 10/19/20 9391    furosemide (LASIX) injection 40 mg, 40 mg, IntraVENous, BID, Steve Allison MD, 40 mg at 10/19/20 0838    meropenem (MERREM) 1 g in sterile water (preservative free) 20 mL IV syringe, 1 g, IntraVENous, Q8H, Andrew Alejandra MD, 1 g at 10/19/20 1728    albuterol-ipratropium (DUO-NEB) 2.5 MG-0.5 MG/3 ML, 3 mL, Nebulization, Q6HWA RT, Jarett Ocampo DO, 3 mL at 10/19/20 1333    acetylcysteine (MUCOMYST) 200 mg/mL (20 %) solution 600 mg, 600 mg, Nebulization, BID, Draby Boswell MD, 200 mg at 10/17/20 1933    diphenhydrAMINE (BENADRYL) capsule 25 mg, 25 mg, Oral, Q6H PRN, Aldo HERNANDEZ MD, 25 mg at 10/19/20 1449    guaiFENesin (ROBITUSSIN) 100 mg/5 mL oral liquid 400 mg, 400 mg, Oral, Q6H, Jarett Ocampo, , 400 mg at 10/19/20 1728    0.9% sodium chloride infusion 250 mL, 250 mL, IntraVENous, PRN, Neris Goddard MD    atorvastatin (LIPITOR) tablet 20 mg, 20 mg, Oral, DAILY, Neris Goddard MD, 20 mg at 10/18/20 2115    pantoprazole (PROTONIX) tablet 40 mg, 40 mg, Oral, Luciana Sandra MD, 40 mg at 10/19/20 7387    acetaminophen (TYLENOL) tablet 650 mg, 650 mg, Oral, Q4H PRN, Jose Alfredo Coleman MD, 650 mg at 20 2319    alum-mag hydroxide-simeth (MYLANTA) oral suspension 30 mL, 30 mL, Oral, Q4H PRN, JoseA lfredo Coleman MD, 30 mL at 10/18/20 1014    magnesium hydroxide (MILK OF MAGNESIA) 400 mg/5 mL oral suspension 30 mL, 30 mL, Oral, DAILY PRN, Jose Alfredo Coleman MD    ondansetron [facility]) injection 4 mg, 4 mg, IntraVENous, Q8H PRN, Jose Alfredo Coleman MD, Stopped at 10/07/20 1300    Objective:     Visit Vitals  /70   Pulse 100   Temp 99 °F (37.2 °C)   Resp 20   Ht 6' 0.99\" (1.854 m)   Wt 82.5 kg (181 lb 14.1 oz)   SpO2 97%   BMI 24.00 kg/m²    O2 Flow Rate (L/min): 3 l/min O2 Device: Nasal cannula     Temp (24hrs), Av.6 °F (37 °C), Min:97.2 °F (36.2 °C), Max:99.1 °F (37.3 °C)         Physical Exam:   General :  no respiratory distress noted at rest, patient on nasal cannula oxygen  Lungs : BS increased left lung field vs prior. No wheeze. Not labored. CVS :  no gallop  Abdomen :  +ventral hernia positive bowel sounds  Extremities : No edema noted,  Neurological : Awake, alert, oriented to time place person.  No neurological deficits.    Psychiatric : Mood and affect normal    Lab/Data Review:  Recent Results (from the past 24 hour(s))   C REACTIVE PROTEIN, QT    Collection Time: 10/19/20  9:00 AM   Result Value Ref Range    C-Reactive protein 4.54 (H) 0.00 - 0.60 mg/dL     CT abd 10/5. IMPRESSION:  1. Large ventral hernia containing nonobstructed small and large intestine. 2. Moderate to large right pleural effusion and mild to moderate left pleural  effusion. There is near complete collapse of the visualized portions of the left  lower lobe and there is pleural thickening in the left hemithorax. 3. Patchy airspace disease in the right lower lobe along with right basilar  atelectasis. 4. Nonspecific left adrenal nodule.   5. Other findings as described. Pt at increased risk for procedure with resp compromise in addition to anatomical considerations of reducing massive hernia. Pt aware of inc mortality states \"I don't care if it kills me but it has to be done\". Abd ct pending, will need to recline on pillows for procedure as cannot lie flat. Pt aware of risk for surgery, likelihood that will remain on vent long term and he reiterates he wants it done despite the risk. Cardio to optimize for clearance. Surgery on case f/u recommendations  Next x-ray  AP upright view of the chest compared to 10/9/2020. Complete opacification of  the left hemithorax is again noted. Small right pleural effusion and diffuse  right-sided airspace disease suggesting pulmonary edema are again noted. No  pneumothorax. Cardiac silhouette is obscured.     IMPRESSION  IMPRESSION: No significant change. See above. Assessment and plan:        Acute hypoxic respiratory failure : 2/2 persistent LL collapse s/p 9 bronchoscopies. NATALIE partially clears  Next day, quickly opacifies again. All cytologies/path from ebus no malignancy, fungal cs 10/7 mod yeast. Fungitel? Remains on abx. ID appreciated. No change in cxr with Lt side opacification, increasingly less mobile and less motovated for mobitlity. He is doinhg fine at resp on supplemental o2, dyspnic minimal exertion. Not sute many options left, will d/w pulmonology, surgery. Perhaps remair of his massive ventral hernia but high risk for surgey and complications from longstanding abd hernia which appears to be limiting resp mechanics. He is aware of high mortality risk, prolonged intubation prob trach. Maintaining adequate sats on 4l. Cxr appears  Same with lt side opacification. D/w pulmonology, limited options. D/w ID.   left lung collapse/PNA:   persistent left lung collapse from mucous plugging.  Recurrent collapse is due to multiple reasons including large abdominal hernia, weak cough, COPD and multiple endobronchial lesions although they are not causing significant obstruction and path neg. S/p brocnh x 9, ebus/bx paratracheal node 10/13 neg. ID, Pulmonology, oncology appreciated. Abx per ID. Fungitell pending. CXR pending. Mediastinal Adenopathy:  Ebus/path neg. Hernia complicating, pt wants it reduced despite increased mortality risk. General surgeon on board,chronic 6+ years neglected follow up. Surgery cx appreciated Dr. Liana Liu. Case discussed with , no surgery planned until the pneumonia resolved, resp issues improve. discussed with patient wants hernia repaired despite risk. COPD:  pulm following. Anemia: AOCI. Stable, follow. Feso4/b12 supplements, follow. HTN : Controlled, lower side BP,BP meds adjusted   --Echocardiogram done on 9/16 showed an EF of 60 to 64% grade 2 diastolic dysfunction and moderate to severe aortic stenosis. Right ventricle is normal in size and function. Lasix continued. --Follow venous doppler- no dvt. 2.2 x 2.0 cm rt common femoral pseudoaneurysm  DVT ppx: lovenox  PT/OT     DISPO: SNF anticipated when stable. D/w pulm/surgery/ID, options at this point? Will again address possibility of transfer and possible hernai repair. Intubation nonetheless post op would facilite pulm toilet though likely will need prolonged intubation/trach. ..     Signed By: Isaura Owens MD     October 19, 2020

## 2020-10-20 LAB
BACTERIA SPEC CULT: NORMAL
BASOPHILS # BLD: 0 K/UL (ref 0–0.1)
BASOPHILS NFR BLD: 0 % (ref 0–1)
CRP SERPL-MCNC: 5.91 MG/DL (ref 0–0.6)
DIFFERENTIAL METHOD BLD: ABNORMAL
EOSINOPHIL # BLD: 0.2 K/UL (ref 0–0.4)
EOSINOPHIL NFR BLD: 2 % (ref 0–7)
ERYTHROCYTE [DISTWIDTH] IN BLOOD BY AUTOMATED COUNT: 17.6 % (ref 11.5–14.5)
HCT VFR BLD AUTO: 27.6 % (ref 36.6–50.3)
HGB BLD-MCNC: 8.4 G/DL (ref 12.1–17)
IMM GRANULOCYTES # BLD AUTO: 0 K/UL (ref 0–0.04)
IMM GRANULOCYTES NFR BLD AUTO: 0 % (ref 0–0.5)
LYMPHOCYTES # BLD: 1.5 K/UL (ref 0.8–3.5)
LYMPHOCYTES NFR BLD: 18 % (ref 12–49)
MCH RBC QN AUTO: 29.4 PG (ref 26–34)
MCHC RBC AUTO-ENTMCNC: 30.4 G/DL (ref 30–36.5)
MCV RBC AUTO: 96.5 FL (ref 80–99)
MONOCYTES # BLD: 1.1 K/UL (ref 0–1)
MONOCYTES NFR BLD: 13 % (ref 5–13)
NEUTS SEG # BLD: 5.7 K/UL (ref 1.8–8)
NEUTS SEG NFR BLD: 67 % (ref 32–75)
PLATELET # BLD AUTO: 469 K/UL (ref 150–400)
PMV BLD AUTO: 9.6 FL (ref 8.9–12.9)
RBC # BLD AUTO: 2.86 M/UL (ref 4.1–5.7)
SPECIAL REQUESTS,SREQ: NORMAL
WBC # BLD AUTO: 8.6 K/UL (ref 4.1–11.1)

## 2020-10-20 PROCEDURE — 77010033678 HC OXYGEN DAILY

## 2020-10-20 PROCEDURE — 99232 SBSQ HOSP IP/OBS MODERATE 35: CPT | Performed by: INTERNAL MEDICINE

## 2020-10-20 PROCEDURE — 74011250637 HC RX REV CODE- 250/637: Performed by: INTERNAL MEDICINE

## 2020-10-20 PROCEDURE — 65270000029 HC RM PRIVATE

## 2020-10-20 PROCEDURE — 36415 COLL VENOUS BLD VENIPUNCTURE: CPT

## 2020-10-20 PROCEDURE — 74011000250 HC RX REV CODE- 250: Performed by: INTERNAL MEDICINE

## 2020-10-20 PROCEDURE — 94760 N-INVAS EAR/PLS OXIMETRY 1: CPT

## 2020-10-20 PROCEDURE — 74011250636 HC RX REV CODE- 250/636: Performed by: FAMILY MEDICINE

## 2020-10-20 PROCEDURE — 86140 C-REACTIVE PROTEIN: CPT

## 2020-10-20 PROCEDURE — 97530 THERAPEUTIC ACTIVITIES: CPT

## 2020-10-20 PROCEDURE — 85025 COMPLETE CBC W/AUTO DIFF WBC: CPT

## 2020-10-20 PROCEDURE — 74011250637 HC RX REV CODE- 250/637: Performed by: FAMILY MEDICINE

## 2020-10-20 PROCEDURE — 74011250636 HC RX REV CODE- 250/636: Performed by: INTERNAL MEDICINE

## 2020-10-20 PROCEDURE — 94640 AIRWAY INHALATION TREATMENT: CPT

## 2020-10-20 RX ORDER — FUROSEMIDE 10 MG/ML
40 INJECTION INTRAMUSCULAR; INTRAVENOUS DAILY
Status: DISCONTINUED | OUTPATIENT
Start: 2020-10-21 | End: 2020-10-23

## 2020-10-20 RX ADMIN — IPRATROPIUM BROMIDE AND ALBUTEROL SULFATE 3 ML: .5; 3 SOLUTION RESPIRATORY (INHALATION) at 20:08

## 2020-10-20 RX ADMIN — AMLODIPINE BESYLATE 5 MG: 5 TABLET ORAL at 09:23

## 2020-10-20 RX ADMIN — FUROSEMIDE 40 MG: 10 INJECTION, SOLUTION INTRAMUSCULAR; INTRAVENOUS at 09:23

## 2020-10-20 RX ADMIN — DIPHENHYDRAMINE HYDROCHLORIDE 25 MG: 25 CAPSULE ORAL at 06:39

## 2020-10-20 RX ADMIN — DIPHENHYDRAMINE HYDROCHLORIDE 25 MG: 25 CAPSULE ORAL at 14:18

## 2020-10-20 RX ADMIN — OXYCODONE HYDROCHLORIDE AND ACETAMINOPHEN 1 TABLET: 5; 325 TABLET ORAL at 06:39

## 2020-10-20 RX ADMIN — OXYCODONE HYDROCHLORIDE AND ACETAMINOPHEN 1 TABLET: 5; 325 TABLET ORAL at 22:06

## 2020-10-20 RX ADMIN — MEROPENEM 1 G: 1 INJECTION, POWDER, FOR SOLUTION INTRAVENOUS at 02:16

## 2020-10-20 RX ADMIN — OXYCODONE HYDROCHLORIDE AND ACETAMINOPHEN 1 TABLET: 5; 325 TABLET ORAL at 14:19

## 2020-10-20 RX ADMIN — MEROPENEM 1 G: 1 INJECTION, POWDER, FOR SOLUTION INTRAVENOUS at 17:07

## 2020-10-20 RX ADMIN — IPRATROPIUM BROMIDE AND ALBUTEROL SULFATE 3 ML: .5; 3 SOLUTION RESPIRATORY (INHALATION) at 13:36

## 2020-10-20 RX ADMIN — ATORVASTATIN CALCIUM 20 MG: 20 TABLET, FILM COATED ORAL at 22:06

## 2020-10-20 RX ADMIN — PANTOPRAZOLE SODIUM 40 MG: 40 TABLET, DELAYED RELEASE ORAL at 06:34

## 2020-10-20 RX ADMIN — DIPHENHYDRAMINE HYDROCHLORIDE 25 MG: 25 CAPSULE ORAL at 22:06

## 2020-10-20 RX ADMIN — GUAIFENESIN 400 MG: 200 SOLUTION ORAL at 17:07

## 2020-10-20 RX ADMIN — ACETYLCYSTEINE 600 MG: 200 SOLUTION ORAL; RESPIRATORY (INHALATION) at 20:09

## 2020-10-20 RX ADMIN — GUAIFENESIN 400 MG: 200 SOLUTION ORAL at 06:34

## 2020-10-20 RX ADMIN — CYANOCOBALAMIN TAB 500 MCG 500 MCG: 500 TAB at 09:23

## 2020-10-20 RX ADMIN — GUAIFENESIN 400 MG: 200 SOLUTION ORAL at 11:22

## 2020-10-20 RX ADMIN — GUAIFENESIN 400 MG: 200 SOLUTION ORAL at 22:06

## 2020-10-20 RX ADMIN — MEROPENEM 1 G: 1 INJECTION, POWDER, FOR SOLUTION INTRAVENOUS at 09:24

## 2020-10-20 RX ADMIN — SODIUM CHLORIDE 200 MG: 9 INJECTION, SOLUTION INTRAVENOUS at 17:08

## 2020-10-20 RX ADMIN — FERROUS SULFATE TAB 325 MG (65 MG ELEMENTAL FE) 325 MG: 325 (65 FE) TAB at 09:23

## 2020-10-20 NOTE — PROGRESS NOTES
Progress Note    Patient: Wei Coats MRN: [id number]  SSN: [id number]    YOB: 1960  Age: 63 y.o. Sex: male      Admit Date: 9/10/2020    LOS: 40 days        Subjective:     Patient comfortable in bed, has some cough, frustrated with ventral hernia, wants surgery done.               Current Facility-Administered Medications:     oxyCODONE-acetaminophen (PERCOCET) 5-325 mg per tablet 1 Tab, 1 Tab, Oral, Q8H PRN, Leo Sánchez MD, 1 Tab at 10/20/20 4477    ferrous sulfate tablet 325 mg, 1 Tab, Oral, DAILY WITH BREAKFAST, Lakesha Benton MD, 325 mg at 10/20/20 7610    cyanocobalamin (VITAMIN B12) tablet 500 mcg, 500 mcg, Oral, DAILY, July Benton MD, 500 mcg at 10/20/20 0923    amLODIPine (NORVASC) tablet 5 mg, 5 mg, Oral, DAILY, Nilton Mandujano MD, 5 mg at 10/20/20 2086    furosemide (LASIX) injection 40 mg, 40 mg, IntraVENous, BID, Nilton Mandujano MD, 40 mg at 10/20/20 0923    meropenem (MERREM) 1 g in sterile water (preservative free) 20 mL IV syringe, 1 g, IntraVENous, Q8H, Crala Lawson MD, 1 g at 10/20/20 0924    albuterol-ipratropium (DUO-NEB) 2.5 MG-0.5 MG/3 ML, 3 mL, Nebulization, Q6HWA RT, Jarett Ocampo DO, 3 mL at 10/19/20 1333    acetylcysteine (MUCOMYST) 200 mg/mL (20 %) solution 600 mg, 600 mg, Nebulization, BID, Darby Boswell MD, 200 mg at 10/17/20 1933    diphenhydrAMINE (BENADRYL) capsule 25 mg, 25 mg, Oral, Q6H PRN, Jomar HERNANDEZ MD, 25 mg at 10/20/20 0456    guaiFENesin (ROBITUSSIN) 100 mg/5 mL oral liquid 400 mg, 400 mg, Oral, Q6H, Jarett Ocampo DO, 400 mg at 10/20/20 1122    0.9% sodium chloride infusion 250 mL, 250 mL, IntraVENous, PRN, Calvin Nielsen MD    atorvastatin (LIPITOR) tablet 20 mg, 20 mg, Oral, DAILY, Calvin Nielsen MD, 20 mg at 10/19/20 2128    pantoprazole (PROTONIX) tablet 40 mg, 40 mg, Oral, DAILY, Calvin Nielsen MD, 40 mg at 10/20/20 4386    acetaminophen (TYLENOL) tablet 650 mg, 650 mg, Oral, Q4H PRN, Valentino Smoker, Stacie Tapia MD, 650 mg at 20 2319    alum-mag hydroxide-simeth (MYLANTA) oral suspension 30 mL, 30 mL, Oral, Q4H PRN, Calvin Nielsen MD, 30 mL at 10/18/20 1014    magnesium hydroxide (MILK OF MAGNESIA) 400 mg/5 mL oral suspension 30 mL, 30 mL, Oral, DAILY PRN, Calvin Nielsen MD    ondansetron [facility]) injection 4 mg, 4 mg, IntraVENous, Q8H PRN, Calvin Nielsen MD, Stopped at 10/07/20 1300    Objective:     Visit Vitals  /70   Pulse 100   Temp 99 °F (37.2 °C)   Resp 20   Ht 6' 0.99\" (1.854 m)   Wt 82.5 kg (181 lb 14.1 oz)   SpO2 97%   BMI 24.00 kg/m²    O2 Flow Rate (L/min): 3 l/min O2 Device: Nasal cannula     Temp (24hrs), Av.6 °F (37 °C), Min:97.2 °F (36.2 °C), Max:99.1 °F (37.3 °C)         Physical Exam:   General :  no respiratory distress noted   Lungs :  Not labored. CVS :  no gallop  Abdomen :  +ventral hernia    Extremities : No edema noted,  Neurological : Awake, alert, oriented to time place person.  No neurological deficits.    Psychiatric : Mood and affect normal    Lab/Data Review:  No results found for this or any previous visit (from the past 24 hour(s)). CT abd 10/5. IMPRESSION:  1. Large ventral hernia containing nonobstructed small and large intestine. 2. Moderate to large right pleural effusion and mild to moderate left pleural  effusion. There is near complete collapse of the visualized portions of the left  lower lobe and there is pleural thickening in the left hemithorax. 3. Patchy airspace disease in the right lower lobe along with right basilar  atelectasis. 4. Nonspecific left adrenal nodule. 5. Other findings as described. Pt at increased risk for procedure with resp compromise in addition to anatomical considerations of reducing massive hernia. Pt aware of inc mortality states \"I don't care if it kills me but it has to be done\". Abd ct pending, will need to recline on pillows for procedure as cannot lie flat.  Pt aware of risk for surgery, likelihood that will remain on vent long term and he reiterates he wants it done despite the risk. Cardio to optimize for clearance. Surgery on case f/u recommendations  Next x-ray  AP upright view of the chest compared to 10/9/2020. Complete opacification of  the left hemithorax is again noted. Small right pleural effusion and diffuse  right-sided airspace disease suggesting pulmonary edema are again noted. No  pneumothorax. Cardiac silhouette is obscured.     IMPRESSION  IMPRESSION: No significant change. See above. Assessment and plan:        Acute hypoxic respiratory failure :   from persistent LL collapse s/p 9 bronchoscopies. All cytologies/path from ebus no malignancy  fungal cs 10/7 mod yeast. Fungitel? Remains on abx. ID appreciated. supplemental O2  Lasix iv bid  Changed to daily     left lung collapse/PNA:      from mucous plugging. large abdominal herniacontributing   ebus/bx paratracheal node 10/13 neg  . ID, Pulmonology, oncology appreciated. Abx per ID. Fungitell pending. Mediastinal Adenopathy:Ebus/path neg. Hernia complicating, pt wants it reduced despite increased mortality risk. General surgeon on board,chronic 6+ years neglected follow up. Surgery cx appreciated Dr. Yoni Brush surgery planned until the pneumonia resolved, resp issues improve. discussed with patient wants hernia repaired despite risk. COPD:  pulm following. Anemia: AOCI. Stable, follow. Feso4/b12 supplements, follow. HTN : low soft BP holding parameters added. Lasix bid chane to daily    DVT ppx: lovenox  PT/OT  DISPO: SNF anticipated when stable.     Signed By: Rhina Bowen MD     October 20, 2020

## 2020-10-20 NOTE — PROGRESS NOTES
Problem: Mobility Impaired (Adult and Pediatric)  Goal: *Acute Goals and Plan of Care (Insert Text)  Description: Pt will be I with LE HEP in 7 days. Pt will perform bed mobility with mod I in 7 days. Pt will perform transfers with mod I in 7 days. Pt will amb 10 feet with LRAD safely with mod I in 7 days. Outcome: Not Progressing Towards Goal  PHYSICAL THERAPY REEVALUATION  Patient: Darling Vidal (80 y.o. male)  Date: 10/20/2020  Primary Diagnosis: Hypertension [I10]  Procedure(s) (LRB):  Endobronchial Ultrasound (EBUS) (N/A) 7 Days Post-Op   Precautions: fall         ASSESSMENT  Based on the objective data described below, the patient presents with decreased motivation and participation with pt requiring max encouragement to participate with PT. Upon entering pt's room today, he again shook his head and said \"not again. \"  Explained to pt the importance of PT and my reason for coming, and he said he would not be sitting up today but reluctantly agreed to bed level exercise by throwing his covers off and saying \"let's get this over with so I can go back to sleep. \"  Pt participated with a few exercises and on his third exercise, his condom catheter fell off and he became very upset saying \"it stays on for three days and then when you come in here, this happens. \"  Explained to pt, that the condom cath is not hard to change and went to get RN who came in immediately to fix the catheter. Pt participated in rolling side to side to have norbert pad changed and have his gown changed. Once replaced, pt was more irritable and not willing to participate with any more PT. Asked pt again if he wanted to come off of PT caseload and he talked with me for quite some time about his overall frustrations with life right now. He said he does want to stay on caseload, but he is also scared of standing or walking now for fear of falling.   Pt continues to bring up an event of being left in the chair for 3 hours by rehab (which didn't happen) and says he would like to get in the chair, but afraid to try because he does not want to be left there for a long time again. Explained to pt that we have plenty of people who can help get him back in the bed when he is ready to do so. Continue PT to work on progressing mobility. Other factors to consider for discharge: pt's willingness to participate, mental status, impaired mobility and deconditioning     Patient will benefit from skilled therapy intervention to address the above noted impairments. PLAN :  Recommendations and Planned Interventions: bed mobility training, transfer training, gait training, therapeutic exercises, patient and family training/education and therapeutic activities      Frequency/Duration: Patient will be followed by physical therapy:  3 times a week to address goals. Recommendation for discharge: (in order for the patient to meet his/her long term goals)  Therapy up to 5 days/week in SNF setting    This discharge recommendation:  Has been made in collaboration with the attending provider and/or case management    Equipment recommendations for successful discharge (if) home: none         SUBJECTIVE:   Patient stated Not you again.     OBJECTIVE DATA SUMMARY:   HISTORY:    Past Medical History:   Diagnosis Date    Anal fistula 3/15/2010    Anxiety     ARF (acute renal failure) (HCC)     Colon perforation (HCC)     Genital herpes 3/15/2010    GERD (gastroesophageal reflux disease)     High cholesterol 3/15/2010    History of blood transfusion     HTN (hypertension) 3/15/2010    Hyponatremia     Ischemic colitis ([address]     left Carotid Artery Occlusion 3/15/2010    Noncompliance with treatment 3/15/2010    Pneumonia 2011    PUD (peptic ulcer disease)     Septic shock(785.52)     Stroke 2002 3/15/2010    Thromboembolus ([address]     rt thigh    TIA (transient ischemic attack) 2007    pt reported     Past Surgical History:   Procedure Laterality Date    CARDIAC CATHETERIZATION      CARDIAC SURG PROCEDURE UNLIST      HX COLECTOMY  2014    Right hemicolectomy for free air, perforated viscus    US SCROTUM/TESTICLES      right testicle removed     Hospital course since last seen and reason for reevaluation: been 7 days since last reassessment    Personal factors and/or comorbidities impacting plan of care: low motivation level, pt appears depressed    Home Situation  Home Environment: Private residence  One/Two Story Residence: One story  Living Alone: No  Support Systems: Spouse/Significant Other/Partner  Patient Expects to be Discharged to[de-identified] Rehabilitation facility  Current DME Used/Available at Home: None    EXAMINATION/PRESENTATION/DECISION MAKING:   Critical Behavior:  Neurologic State: Alert  Orientation Level: Oriented X4  Cognition: Appropriate decision making, Follows commands  Safety/Judgement: Fall prevention  Hearing: Auditory  Auditory Impairment: None  Range Of Motion:  [facility]                             Functional Mobility:  Bed Mobility:  Rolling: Moderate assistance   Pt rolled side to side to have norbert pad changed and have gown changed. Pt refused further bed mobility or sitting EOB even with max encouragement. Therapeutic Exercises:   Supine ankle pumps x 20 reps, heel slides and SLR x 15 each. Pain Ratin/10 in stomach    Activity Tolerance:   Poor  Please refer to the flowsheet for vital signs taken during this treatment. After treatment patient left in no apparent distress:   Patient positioned in L sidelying for pressure relief, Call bell within reach and Side rails x 3    COMMUNICATION/EDUCATION:   The patients plan of care was discussed with: Registered nurse. Patient understands intent and goals of therapy, but is neutral about his/her participation.     Thank you for this referral.  Melissa Sanchez   Time Calculation: 40 mins

## 2020-10-20 NOTE — PROGRESS NOTES
Pulm/CC Progress Note    Subjective:     Patient seen and examined in his room this afternoon. No acute issues overnight; he is still intermittently refusing some respiratory therapies due to pain in his abdomen when he coughs. He is awake and alert. On 3 L of oxygen via nasal cannula, but satting in the high 90's. Is comfortable. Looks depressed but is encouraged about potentially getting his hernia fixed. Chest x-ray that was done on 10/19/20 showed continued collapse of left lung. No need for further CXR's unless the clinical situation changes. He has undergone 9 bronchoscopies with mucus suctioning and has had several biopsies of the lesions within his tracheobronchial tree as well as brushings and EBUS biopsies of station 11L and station 7. He has had squamous metaplasia return, but no definitive malignancy. No malignancy on EBUS biopsies. I discussed the case with Dr. Lionel Redding on 10/19/20, we have all final results back and there is no definitive malignancy. Review of Systems   Has complains of shortness of breath. He is on nasal cannula oxygen. Denied any nausea vomiting. Has poor appetite    Objective:     Visit Vitals  /71   Pulse 77   Temp 97.5 °F (36.4 °C)   Resp 18   Ht 6' 0.99\" (1.854 m)   Wt 77.4 kg (170 lb 10.2 oz)   SpO2 97%   BMI 22.52 kg/m²    O2 Flow Rate (L/min): 3 l/min O2 Device: Nasal cannula    Temp (24hrs), Av.1 °F (36.7 °C), Min:97.5 °F (36.4 °C), Max:98.6 °F (37 °C)      No intake/output data recorded. 10/18 1901 - 10/20 0700  In: 827 [P.O.:720;  I.V.:107]  Out: 3100 [Urine:3100]    Current Facility-Administered Medications   Medication Dose Route Frequency    [START ON 10/21/2020] furosemide (LASIX) injection 40 mg  40 mg IntraVENous DAILY    oxyCODONE-acetaminophen (PERCOCET) 5-325 mg per tablet 1 Tab  1 Tab Oral Q8H PRN    ferrous sulfate tablet 325 mg  1 Tab Oral DAILY WITH BREAKFAST    cyanocobalamin (VITAMIN B12) tablet 500 mcg  500 mcg Oral DAILY    amLODIPine (NORVASC) tablet 5 mg  5 mg Oral DAILY    meropenem (MERREM) 1 g in sterile water (preservative free) 20 mL IV syringe  1 g IntraVENous Q8H    albuterol-ipratropium (DUO-NEB) 2.5 MG-0.5 MG/3 ML  3 mL Nebulization Q6HWA RT    acetylcysteine (MUCOMYST) 200 mg/mL (20 %) solution 600 mg  600 mg Nebulization BID    diphenhydrAMINE (BENADRYL) capsule 25 mg  25 mg Oral Q6H PRN    guaiFENesin (ROBITUSSIN) 100 mg/5 mL oral liquid 400 mg  400 mg Oral Q6H    0.9% sodium chloride infusion 250 mL  250 mL IntraVENous PRN    atorvastatin (LIPITOR) tablet 20 mg  20 mg Oral DAILY    pantoprazole (PROTONIX) tablet 40 mg  40 mg Oral DAILY    acetaminophen (TYLENOL) tablet 650 mg  650 mg Oral Q4H PRN    alum-mag hydroxide-simeth (MYLANTA) oral suspension 30 mL  30 mL Oral Q4H PRN    magnesium hydroxide (MILK OF MAGNESIA) 400 mg/5 mL oral suspension 30 mL  30 mL Oral DAILY PRN    ondansetron (ZOFRAN) injection 4 mg  4 mg IntraVENous Q8H PRN       Physical Exam:  General: Alert and oriented x3.  3 L nasal cannula. Depressed. HEENT: atraumatic, PERRL, moist mucosa,     Neck: Trachea midline, no carotid bruit, no masses. Supple but thick. Respiratory: No breath sounds on the left side. Few crackles and rhonchi on the right side. No significant change. Cardiovascular: RRR, no m/r/g, Normal S1 and S2   abdomen: Has large ventral abdominal hernia, evidence of surgical scars soft,   Rectal: deferred  Extremities: no cyanosis or clubbing. He has significant pitting edema in the dependent portions and lower extremities. Integumentary: warm, dry, and pink, with no rash, purpura, or petechia.    Heme/Lymph: no lymphadenopathy, no bruises  Neurological: No focal motor deficit   psychiatric: cooperative with normal mood, affect, and cognition      Additional comments: Chest x-rays reviewed    Data Review    Recent Results (from the past 24 hour(s))   C REACTIVE PROTEIN, QT    Collection Time: 10/20/20 10:07 AM   Result Value Ref Range    C-Reactive protein 5.91 (H) 0.00 - 0.60 mg/dL   CBC WITH AUTOMATED DIFF    Collection Time: 10/20/20 10:07 AM   Result Value Ref Range    WBC 8.6 4.1 - 11.1 K/uL    RBC 2.86 (L) 4.10 - 5.70 M/uL    HGB 8.4 (L) 12.1 - 17.0 g/dL    HCT 27.6 (L) 36.6 - 50.3 %    MCV 96.5 80.0 - 99.0 FL    MCH 29.4 26.0 - 34.0 PG    MCHC 30.4 30.0 - 36.5 g/dL    RDW 17.6 (H) 11.5 - 14.5 %    PLATELET 506 (H) 609 - 400 K/uL    MPV 9.6 8.9 - 12.9 FL    NEUTROPHILS 67 32 - 75 %    LYMPHOCYTES 18 12 - 49 %    MONOCYTES 13 5 - 13 %    EOSINOPHILS 2 0 - 7 %    BASOPHILS 0 0 - 1 %    IMMATURE GRANULOCYTES 0 0.0 - 0.5 %    ABS. NEUTROPHILS 5.7 1.8 - 8.0 K/UL    ABS. LYMPHOCYTES 1.5 0.8 - 3.5 K/UL    ABS. MONOCYTES 1.1 (H) 0.0 - 1.0 K/UL    ABS. EOSINOPHILS 0.2 0.0 - 0.4 K/UL    ABS. BASOPHILS 0.0 0.0 - 0.1 K/UL    ABS. IMM. GRANS. 0.0 0.00 - 0.04 K/UL    DF AUTOMATED           Chest x-ray from last evening was reviewed which showed recurrence of left partial collapse. 6 results from 10/3/2020 were reviewed  Patient has left basal atelectasis. XR CHEST PORT   Final Result      XR CHEST PORT   Final Result   Impression: No change compared to single view chest October 17, 2020. XR CHEST PORT   Final Result      XR CHEST PORT   Final Result      XR CHEST PORT   Final Result      XR CHEST PORT   Final Result      XR CHEST PORT   Final Result      XR CHEST PORT   Final Result   IMPRESSION: Persistent findings compared to single view chest October 10, 2020. XR CHEST PORT   Final Result   IMPRESSION: No significant change. See above. XR CHEST PA LAT   Final Result   Impression:      Collapse of left lung again noted with decreased aeration of the upper lobe   compared to yesterday increased opacity of the right lung may be due to portable   technique. XR CHEST PORT   Final Result   Impression:      Stable aeration of the left upper lung with atelectasis.  Stable appearing   bilateral pleural effusions. No significant change compared to the prior study from 10/7/2020. XR CHEST PORT   Final Result      XR FLUOROSCOPY UNDER 60 MINUTES   Final Result      XR CHEST PORT   Final Result      XR CHEST PORT   Final Result   FINDINGS: Impression: Frontal single view chest.      Continued opacification of the left hemithorax, obscuring the cardiac   silhouette. Right lung interstitial thickening, airspace disease, bronchial thickening,   pleural effusion similar to previous. No pneumothorax. CT CHEST WO CONT   Final Result   Impression:    1. Increased now complete opacification of the left bronchi with low density   material.   2. Slightly increased patchy airspace pneumonia in the right lung. 3. Unchanged loculated and nonloculated left pleural effusion, complete left   lung consolidation, right lung pleural effusion. CT ABD PELV W CONT   Final Result   IMPRESSION:   1. Large ventral hernia containing nonobstructed small and large intestine. 2. Moderate to large right pleural effusion and mild to moderate left pleural   effusion. There is near complete collapse of the visualized portions of the left   lower lobe and there is pleural thickening in the left hemithorax. 3. Patchy airspace disease in the right lower lobe along with right basilar   atelectasis. 4. Nonspecific left adrenal nodule. 5. Other findings as described. XR CHEST PORT   Final Result   Impression: Heterogeneous opacity in the right lung, not significantly changed. Now complete opacification of the left hemithorax. XR CHEST PORT   Final Result   IMPRESSION: No significant change. XR CHEST AP LAT   Final Result      XR CHEST AP LAT   Final Result   IMPRESSION:   1. Persistent opacification of the left hemithorax with volume loss. 2.  Moderate right pleural effusion with probable associated right basilar   atelectasis.       XR CHEST PORT   Final Result   Impression: Increased volume loss left lung. Otherwise stable. XR CHEST PORT   Final Result   IMPRESSION:   1. Improved aeration of the left lung with persistent basilar consolidative   opacities and probable pleural effusions. 2. Other extensive interstitial and alveolar opacities are nonspecific, but   potentially related to pulmonary edema or infection. XR CHEST PORT   Final Result   Impression:Left lung collapse without improvement from prior study. XR CHEST PORT   Final Result   IMPRESSION:   1. There is milder enlargement of the cardiac silhouette as well as increasing   pulmonary vascular ill definition suspect for mild pulmonary edema. This could   be related to cardiogenic edema or fluid overload. 2.  There is been an improvement in the overall aeration of the left lung with   and improved visualization of vascular markings within the left upper lung zone. There still remains significant partial collapse of the left lung. 3.  There is increased patchy airspace disease laterally within the right lower   lobe just above the right lateral costophrenic angle. 4.  There are persistent moderate-sized bilateral pleural effusions. XR CHEST AP LAT   Final Result   IMPRESSION: Persistent collapse of the left lung with bilateral pleural   effusions. Increasing vascular congestion on the right. XR CHEST PA LAT   Final Result   Impression:   Ongoing near complete white out of the left lung. Little interval change since   the prior examination. CT CHEST WO CONT   Final Result   IMPRESSION: Complete collapse of the left lung due to complete bronchial   obstruction, possibly aspiration. Fluid overload also noted      XR ABD ACUTE W 1 V CHEST   Final Result   IMPRESSION: Opacification of the left hemithorax, questioned for remote   pneumonectomy or lung collapse with pleural fluid. Prominent right parenchymal/interstitial lung markings compatible with fibrosis   and possible partial vascular congestion.       Small bowel gas, nonobstructive pattern. Assessment/Plan: This is a middle-aged male who was admitted because of increasing symptoms of shortness of breath. He is getting treated in hospital for pneumonia with IV Zosyn, was on Azithromycin. He is noted to be increasingly tachypneic and short of breath. He has been on supplemental oxygen. His chest x-ray is showing persistent left lung opacification from mucous plugging. This has not improved with aggressive pulmonary hygiene and 9 suction bronchoscopies. Patient is undergone multiple biopsies as well as transbronchial needle aspirations of multiple mediastinal nodes, all cytology/pathology has returned negative for malignancy thus far. .     Plan:     1.)    Left lung collapse/shortness of breath:  - Shortness of breath and hypoxia due to recurrent left lung collapse. He had multiple bronchoscopies done along with lavage but he continues to have left lung collapse. There is really no further need to continue doing this unless something else changes to improve his mucous clearance. He underwent EBUS on 10/13/20, mildly enlarged station 7 lymph node, significantly enlarged station 11 lymph node. Ultrasound-guided biopsy was done, negative for malignancy. Final results confirm no malignancy. CT of the abdomen pelvis shows a large hernia. Certainly affecting his ability to cough. He has been ordered pulmonary hygiene with nebulizers every 6 hours;  Aggressive chest PT, EZ-PAP therapy q6, acapella device as well as incentive spirometer use. Added guaifenesin 400 mg q6. Mucomyst also ordered. Patient has a history of refusing some of thee therapies. Likely due to developing depression from being admitted for so long. No need for daily CXR's unless his clinical situation changes. At this point, I feel we have exhausted our ability to sufficiently take care of this patient here at [facility].  Outside of an elective intubation with subsequent scheduled pulmonary hygiene as above and better ease to perform clean out bronchoscopies, I do not feel that this is appropriate without something else changing with his care. I would strongly recommend consideration of surgical management for his ventral hernia. If this is unable to be completed her at [facility], I would recommend consideration of transfer to [address] [place] [address]. The hospitalist stated that they are planning on reaching out to Dr. Idania Rojo, I will also be happy to discuss the case with Dr. Idania Rojo over the phone as well. If surgery does occur, he will almost certainly require mechanical ventilation afterward. Higher chance he may require a tracheostomy, with which the patient is agreeable if necessary. Additionally, several days of mechanical ventilation will likely be a benefit for him from a pulmonary hygiene standpoint. He will not be able to refuse therapies and clean out bronchoscopies could be performed daily. .    2.)  COPD:  He has a history of COPD based on chronic smoking. Continue scheduled bronchodilators. Patient should be discharged with a LAMA/LABA such as Anoro and needs f/u in our office for PFT's and further management after discharge. Weaned off prednisone. 3.)  Pneumonia:   He is on IV Meropenem, was on Zosyn for >1 month. All cultures from multiple bronchoscopies are no growth to date. AFBs are negative, fungal culture showed moderate yeast but otherwise nothing significant. Fungitell is negative; overall trend in CRP is going down. Infectious disease consultation appreciated. No role for antifungal therapy at this time. 4.)  Hypertension:  He is on antihypertensive medications, BP's stable. Patient also has clinically fluid overload. Echocardiogram done on 9/16 showed an EF of 60 to 76% grade 2 diastolic dysfunction and moderate to severe aortic stenosis. Right ventricle is normal in size and function. Decreased Lasix to 40 mg IV daily today.      5.)  Ventral abdominal hernia:  Patient wants to have his ventral abdominal hernia fixed, my partners spoke to surgery service last week. This will be too extensive for surgery here at this hospital appparently, surgical service is recommending for him to be transferred to [facility] where surgery can be further pursued.    Discussed with patient, he is going to further discuss with Dr. Arturo Wolfe before he makes his decision        Questions of patient were answered at bedside in detail  Case discussed in detail with RN, RT, and care team  Thank you for involving me in the care of this patient  I will follow with you closely during hospitalization    Time spent more than 30 minutes in direct patient care with no overlap      Geryl Hollow, DO  Pulmonary and critical care

## 2020-10-20 NOTE — PROGRESS NOTES
Pamela Hummel [phone] would like to be notified if patient is transferred to another facility, has patient code

## 2020-10-20 NOTE — PROGRESS NOTES
Progress Note    Patient: Renata Yung MRN: [id number]  SSN: [id number]    YOB: 1960  Age: 63 y.o. Sex: male      Admit Date: 9/10/2020    LOS: 40 days        Subjective:     Patient is seen for follow-up,    ebus 10/13-path =neg  Remains Comfortable at rest, easily dyspneic minimal exertion  No changes  No fever/chills  cxr persistent lt side opacification.   Remains increasingly frustrated, decreasing motivation to participate           Current Facility-Administered Medications:     oxyCODONE-acetaminophen (PERCOCET) 5-325 mg per tablet 1 Tab, 1 Tab, Oral, Q8H PRN, Jn Islas MD, 1 Tab at 10/20/20 7134    ferrous sulfate tablet 325 mg, 1 Tab, Oral, DAILY WITH BREAKFAST, Abdias Benton MD, 325 mg at 10/19/20 0838    cyanocobalamin (VITAMIN B12) tablet 500 mcg, 500 mcg, Oral, DAILY, Katrina Benton MD, 500 mcg at 10/19/20 0838    amLODIPine (NORVASC) tablet 5 mg, 5 mg, Oral, DAILY, Sonia Virk MD, 5 mg at 10/19/20 9899    furosemide (LASIX) injection 40 mg, 40 mg, IntraVENous, BID, Sonia Virk MD, 40 mg at 10/19/20 0838    meropenem (MERREM) 1 g in sterile water (preservative free) 20 mL IV syringe, 1 g, IntraVENous, Q8H, Samuel Claudio MD, 1 g at 10/20/20 0216    albuterol-ipratropium (DUO-NEB) 2.5 MG-0.5 MG/3 ML, 3 mL, Nebulization, Q6HWA RT, Jarett Ocampo DO, 3 mL at 10/19/20 1333    acetylcysteine (MUCOMYST) 200 mg/mL (20 %) solution 600 mg, 600 mg, Nebulization, BID, Darby Boswell MD, 200 mg at 10/17/20 1933    diphenhydrAMINE (BENADRYL) capsule 25 mg, 25 mg, Oral, Q6H PRN, Isela HERNANDEZ MD, 25 mg at 10/20/20 3578    guaiFENesin (ROBITUSSIN) 100 mg/5 mL oral liquid 400 mg, 400 mg, Oral, Q6H, Jarett Ocampo, , 400 mg at 10/20/20 0634    0.9% sodium chloride infusion 250 mL, 250 mL, IntraVENous, PRN, Dequan Mauro MD    atorvastatin (LIPITOR) tablet 20 mg, 20 mg, Oral, DAILY, Dequan Mauro MD, 20 mg at 10/19/20 8939    pantoprazole (PROTONIX) tablet 40 mg, 40 mg, Oral, DAILY, Ken Abebe MD, 40 mg at 10/20/20 8487    acetaminophen (TYLENOL) tablet 650 mg, 650 mg, Oral, Q4H PRN, Ken Abebe MD, 650 mg at 20 2319    alum-mag hydroxide-simeth (MYLANTA) oral suspension 30 mL, 30 mL, Oral, Q4H PRN, Ken Abebe MD, 30 mL at 10/18/20 1014    magnesium hydroxide (MILK OF MAGNESIA) 400 mg/5 mL oral suspension 30 mL, 30 mL, Oral, DAILY PRN, Ken Abebe MD    ondansetron [facility]) injection 4 mg, 4 mg, IntraVENous, Q8H PRN, Ken Abebe MD, Stopped at 10/07/20 1300    Objective:     Visit Vitals  /70   Pulse 100   Temp 99 °F (37.2 °C)   Resp 20   Ht 6' 0.99\" (1.854 m)   Wt 82.5 kg (181 lb 14.1 oz)   SpO2 97%   BMI 24.00 kg/m²    O2 Flow Rate (L/min): 3 l/min O2 Device: Nasal cannula     Temp (24hrs), Av.6 °F (37 °C), Min:97.2 °F (36.2 °C), Max:99.1 °F (37.3 °C)         Physical Exam:   General :  no respiratory distress noted at rest, patient on nasal cannula oxygen  Lungs : BS increased left lung field vs prior. No wheeze. Not labored. CVS :  no gallop  Abdomen :  +ventral hernia positive bowel sounds  Extremities : No edema noted,  Neurological : Awake, alert, oriented to time place person.  No neurological deficits.    Psychiatric : Mood and affect normal    Lab/Data Review:  Recent Results (from the past 24 hour(s))   C REACTIVE PROTEIN, QT    Collection Time: 10/19/20  9:00 AM   Result Value Ref Range    C-Reactive protein 4.54 (H) 0.00 - 0.60 mg/dL     CT abd 10/5. IMPRESSION:  1. Large ventral hernia containing nonobstructed small and large intestine. 2. Moderate to large right pleural effusion and mild to moderate left pleural  effusion. There is near complete collapse of the visualized portions of the left  lower lobe and there is pleural thickening in the left hemithorax. 3. Patchy airspace disease in the right lower lobe along with right basilar  atelectasis. 4. Nonspecific left adrenal nodule.   5. Other findings as described. Pt at increased risk for procedure with resp compromise in addition to anatomical considerations of reducing massive hernia. Pt aware of inc mortality states \"I don't care if it kills me but it has to be done\". Abd ct pending, will need to recline on pillows for procedure as cannot lie flat. Pt aware of risk for surgery, likelihood that will remain on vent long term and he reiterates he wants it done despite the risk. Cardio to optimize for clearance. Surgery on case f/u recommendations  Next x-ray  AP upright view of the chest compared to 10/9/2020. Complete opacification of  the left hemithorax is again noted. Small right pleural effusion and diffuse  right-sided airspace disease suggesting pulmonary edema are again noted. No  pneumothorax. Cardiac silhouette is obscured.     IMPRESSION  IMPRESSION: No significant change. See above. Assessment and plan:        Acute hypoxic respiratory failure : 2/2 persistent LL collapse s/p 9 bronchoscopies. NATALIE partially clears  Next day, quickly opacifies again. All cytologies/path from ebus no malignancy, fungal cs 10/7 mod yeast. Fungitel? Remains on abx. ID appreciated. No change in cxr with Lt side opacification, increasingly less mobile and less motovated for mobitlity. He is doinhg fine at resp on supplemental o2, dyspnic minimal exertion. Not sure many options left,  d/w pulmonology, surgery. Perhaps repair of his massive ventral hernia but high risk for surgey and complications from longstanding abd hernia which appears to be limiting resp mechanics. He is aware of high mortality risk, prolonged intubation prob trach. Maintaining adequate sats on 4l. Cxr appears  Same with lt side opacification. D/w pulmonology, limited options. D/w ID. Cont abx, encouraged by descending crp.  left lung collapse/PNA:   persistent left lung collapse from mucous plugging.  Recurrent collapse is due to multiple reasons including large abdominal hernia, weak cough, COPD and multiple endobronchial lesions although they are not causing significant obstruction and path neg. S/p brocnh x 9, ebus/bx paratracheal node 10/13 neg. ID, Pulmonology, oncology appreciated. Abx per ID. Fungitell pending. Continue per pulmonology   Mediastinal Adenopathy:  Ebus/path neg. Hernia complicating, pt wants it reduced despite increased mortality risk. General surgeon on board,chronic 6+ years neglected follow up. Surgery cx appreciated Dr. Ramos Pham. Case discussed with , no surgery planned until the pneumonia resolved, resp issues improve. discussed with patient wants hernia repaired despite risk. COPD:  pulm following. Anemia: AOCI. Stable, follow. Feso4/b12 supplements, follow. HTN : Controlled, lower side BP,BP meds adjusted   --Echocardiogram done on 9/16 showed an EF of 60 to 22% grade 2 diastolic dysfunction and moderate to severe aortic stenosis. Right ventricle is normal in size and function. Lasix continued. --Follow venous doppler- no dvt. 2.2 x 2.0 cm rt common femoral pseudoaneurysm  DVT ppx: lovenox  PT/OT     DISPO: SNF anticipated when stable. D/w pulm/surgery/ID, options at this point?      Signed By: Wolf Mcneill MD     October 20, 2020

## 2020-10-20 NOTE — PROGRESS NOTES
Progress Note    Patient: Kelsie Martin MRN: [id number]  SSN: [id number]    YOB: 1960  Age: 63 y.o. Sex: male      Admit Date: 9/10/2020    LOS: 40 days     Subjective:   Patient followed for chronic, refractory pneumonia of undetermined etiology despite extensive microbiologic work-up. Bronchial washings again only showed normal respiratory xiomara and moderate yeasts. CXR yesterday showed complete opacification of left hemithorax. CRP trending downward back upward today. He remains afebrile. Currently on Meropenem. Patient resting comfortably but concerned about limitations of coughing because of his ventral hernia. Objective:     Vitals:    10/19/20 2004 10/19/20 2246 10/20/20 0227 10/20/20 0810   BP:  (!) 99/56 98/62 110/71   Pulse:  89  77   Resp:  20  18   Temp:  98.6 °F (37 °C)  97.5 °F (36.4 °C)   SpO2: 95% 95%  99%   Weight:       Height:            Intake and Output:  Current Shift: No intake/output data recorded. Last three shifts: 10/18 1901 - 10/20 0700  In: 827 [P.O.:720; I.V.:107]  Out: 3100 [Urine:3100]    Physical Exam:   Vitals signs and nursing note reviewed. Constitutional:       General: He is not in acute distress. Appearance: He is ill-appearing. He is not toxic-appearing or diaphoretic. Pulmonary: Decreased breath sounds left lung field  Abdominal:      General: There is distension (ventral hernia with scarring and crusting). Tenderness: There is no abdominal tenderness. There is no guarding. Genitourinary:     Penis: Normal.       Comments: No Padilla  Musculoskeletal:      Right lower leg: Edema present. Left lower leg: Edema present. Skin:     Findings: Erythema (both calves) present. Neurological:      General: No focal deficit present. Mental Status: He is alert and oriented to person, place, and time. Psychiatric:         Mood and Affect: Mood normal.         Behavior: Behavior normal.         Thought Content:  Thought content normal.         Judgment: Judgment normal.        Lab/Data Review:    WBC 9.800  Procalcitonin <0.05  CRP Pending (4.54 (5.72 (8.04 (11.60 (12.50    (129  ANCA panel Negative    Serum fungitell <31 Negative    Bronchial washing fungus (10/7)  Pending  Bronchial washing AFB (10/7) smear negative  Bronchial washing culture (10/7) Normal respiratory xiomara  Bronchial washing culture (10/13) Normal respiratory xiomara FINAL  Bronchial washing fungal (10/13) Moderate yeasts consistent with colonization    CXR (10/19)  Complete opacification left hemithorax, persistent finding compared to priorimaging. Left mediastinal staples suggest prior surgery. Same site patchy  reticular markings right lung, these appear no worse compared to prior imaging. No pneumothorax      Assessment:     1. Chronic pneumonia, likely post-obstructive, etiology unclear, Day #37 IV Antibiotics, Day #10 IV Meropenem. 2. Markedly elevated CRP, presumed secondary to #1, decreasing again. 3. Abnormal bronchoscopy with nodular lesions left tracheobronchial tree, biopsy pending. 4. Chronic venous insufficiency with stasis dermatitis  5. Large ventral hernia, seen by General Surgery     Comment:   CRP trending upward today. Though still feel yeasts probably represent colonization especially with negative serum fungitell, our last available therapeutic modality would be antifungal therapy. Plan:   1. Continue IV Meropenem  2. Start IV Andidulafungin  3. In am, repeat CRP, done  4.  Discussed plan with patient    Signed By: Daniela Glass MD     October 20, 2020

## 2020-10-21 PROCEDURE — 74011250637 HC RX REV CODE- 250/637: Performed by: INTERNAL MEDICINE

## 2020-10-21 PROCEDURE — 99232 SBSQ HOSP IP/OBS MODERATE 35: CPT | Performed by: INTERNAL MEDICINE

## 2020-10-21 PROCEDURE — 74011250636 HC RX REV CODE- 250/636: Performed by: INTERNAL MEDICINE

## 2020-10-21 PROCEDURE — 94640 AIRWAY INHALATION TREATMENT: CPT

## 2020-10-21 PROCEDURE — 74011000250 HC RX REV CODE- 250: Performed by: INTERNAL MEDICINE

## 2020-10-21 PROCEDURE — 94760 N-INVAS EAR/PLS OXIMETRY 1: CPT

## 2020-10-21 PROCEDURE — 74011250637 HC RX REV CODE- 250/637: Performed by: HOSPITALIST

## 2020-10-21 PROCEDURE — 74011000258 HC RX REV CODE- 258: Performed by: INTERNAL MEDICINE

## 2020-10-21 PROCEDURE — 65270000029 HC RM PRIVATE

## 2020-10-21 PROCEDURE — 74011250636 HC RX REV CODE- 250/636: Performed by: HOSPITALIST

## 2020-10-21 PROCEDURE — 77010033678 HC OXYGEN DAILY

## 2020-10-21 RX ADMIN — GUAIFENESIN 400 MG: 200 SOLUTION ORAL at 11:17

## 2020-10-21 RX ADMIN — DIPHENHYDRAMINE HYDROCHLORIDE 25 MG: 25 CAPSULE ORAL at 22:46

## 2020-10-21 RX ADMIN — MEROPENEM 1 G: 1 INJECTION, POWDER, FOR SOLUTION INTRAVENOUS at 08:28

## 2020-10-21 RX ADMIN — AMLODIPINE BESYLATE 5 MG: 5 TABLET ORAL at 08:26

## 2020-10-21 RX ADMIN — CYANOCOBALAMIN TAB 500 MCG 500 MCG: 500 TAB at 08:26

## 2020-10-21 RX ADMIN — OXYCODONE HYDROCHLORIDE AND ACETAMINOPHEN 1 TABLET: 5; 325 TABLET ORAL at 06:10

## 2020-10-21 RX ADMIN — SODIUM CHLORIDE 100 MG: 0.9 INJECTION INTRAVENOUS at 16:30

## 2020-10-21 RX ADMIN — IPRATROPIUM BROMIDE AND ALBUTEROL SULFATE 3 ML: .5; 3 SOLUTION RESPIRATORY (INHALATION) at 20:23

## 2020-10-21 RX ADMIN — DIPHENHYDRAMINE HYDROCHLORIDE 25 MG: 25 CAPSULE ORAL at 14:05

## 2020-10-21 RX ADMIN — GUAIFENESIN 400 MG: 200 SOLUTION ORAL at 16:30

## 2020-10-21 RX ADMIN — ATORVASTATIN CALCIUM 20 MG: 20 TABLET, FILM COATED ORAL at 20:06

## 2020-10-21 RX ADMIN — FERROUS SULFATE TAB 325 MG (65 MG ELEMENTAL FE) 325 MG: 325 (65 FE) TAB at 08:26

## 2020-10-21 RX ADMIN — OXYCODONE HYDROCHLORIDE AND ACETAMINOPHEN 1 TABLET: 5; 325 TABLET ORAL at 14:05

## 2020-10-21 RX ADMIN — OXYCODONE HYDROCHLORIDE AND ACETAMINOPHEN 1 TABLET: 5; 325 TABLET ORAL at 22:46

## 2020-10-21 RX ADMIN — ACETYLCYSTEINE 600 MG: 200 SOLUTION ORAL; RESPIRATORY (INHALATION) at 20:23

## 2020-10-21 RX ADMIN — ACETYLCYSTEINE 600 MG: 200 SOLUTION ORAL; RESPIRATORY (INHALATION) at 09:00

## 2020-10-21 RX ADMIN — PANTOPRAZOLE SODIUM 40 MG: 40 TABLET, DELAYED RELEASE ORAL at 06:10

## 2020-10-21 RX ADMIN — MEROPENEM 1 G: 1 INJECTION, POWDER, FOR SOLUTION INTRAVENOUS at 02:56

## 2020-10-21 RX ADMIN — ACETYLCYSTEINE 600 MG: 200 SOLUTION ORAL; RESPIRATORY (INHALATION) at 07:45

## 2020-10-21 RX ADMIN — MEROPENEM 1 G: 1 INJECTION, POWDER, FOR SOLUTION INTRAVENOUS at 16:30

## 2020-10-21 RX ADMIN — FUROSEMIDE 40 MG: 10 INJECTION, SOLUTION INTRAMUSCULAR; INTRAVENOUS at 08:27

## 2020-10-21 RX ADMIN — IPRATROPIUM BROMIDE AND ALBUTEROL SULFATE 3 ML: .5; 3 SOLUTION RESPIRATORY (INHALATION) at 07:44

## 2020-10-21 RX ADMIN — DIPHENHYDRAMINE HYDROCHLORIDE 25 MG: 25 CAPSULE ORAL at 06:10

## 2020-10-21 RX ADMIN — GUAIFENESIN 400 MG: 200 SOLUTION ORAL at 06:10

## 2020-10-21 NOTE — PROGRESS NOTES
OT tx attempted at 1100 however pt declined participation in any ADLs or OOB activity despite maximal encouragement stating, \"I don't wanna do that. You always have to come in here and bother me. \" Pt intermittent with  participation in OT during hospital stay; Educated on importance of participation, yet pt becomes agitated and continues to decline stating requesting for OT to try later after lunch. RN updated. Will continue to follow patient and attempt OT at a later time as schedule allows. Thank you.

## 2020-10-21 NOTE — PROGRESS NOTES
Progress Note    Patient: Elenita Dunbar MRN: [id number]  SSN: [id number]    YOB: 1960  Age: 63 y.o. Sex: male      Admit Date: 9/10/2020    LOS: 41 days        Subjective:     Patient comfortable in bed and feels the same. Explained in detail plan of care for surgery and possible transfer he verbalized understanding.               Current Facility-Administered Medications:     furosemide (LASIX) injection 40 mg, 40 mg, IntraVENous, DAILY, Maciel Henning MD, 40 mg at 10/21/20 0827    anidulafungin (ERAXIS) 100 mg in 0.9% sodium chloride 130 mL IVPB, 100 mg, IntraVENous, Q24H, Kandice Loyola MD    oxyCODONE-acetaminophen (PERCOCET) 5-325 mg per tablet 1 Tab, 1 Tab, Oral, Q8H PRN, Bruce Nash MD, 1 Tab at 10/21/20 0610    ferrous sulfate tablet 325 mg, 1 Tab, Oral, DAILY WITH BREAKFAST, Ria Benton MD, 325 mg at 10/21/20 8078    cyanocobalamin (VITAMIN B12) tablet 500 mcg, 500 mcg, Oral, DAILY, Asael Benton MD, 500 mcg at 10/21/20 0826    amLODIPine (NORVASC) tablet 5 mg, 5 mg, Oral, DAILY, Maciel Henning MD, 5 mg at 10/21/20 0826    meropenem (MERREM) 1 g in sterile water (preservative free) 20 mL IV syringe, 1 g, IntraVENous, Q8H, Kandice Loyola MD, 1 g at 10/21/20 0828    albuterol-ipratropium (DUO-NEB) 2.5 MG-0.5 MG/3 ML, 3 mL, Nebulization, Q6HWA RT, Jarett Ocampo DO, 3 mL at 10/21/20 0744    acetylcysteine (MUCOMYST) 200 mg/mL (20 %) solution 600 mg, 600 mg, Nebulization, BID, Darby Boswell MD, 600 mg at 10/21/20 0900    diphenhydrAMINE (BENADRYL) capsule 25 mg, 25 mg, Oral, Q6H PRN, Jesus Rodrigez Caro, MD, 25 mg at 10/21/20 0610    guaiFENesin (ROBITUSSIN) 100 mg/5 mL oral liquid 400 mg, 400 mg, Oral, Q6H, Jarett Ocampo DO, 400 mg at 10/21/20 1117    0.9% sodium chloride infusion 250 mL, 250 mL, IntraVENous, PRN, Uma Almodovar MD    atorvastatin (LIPITOR) tablet 20 mg, 20 mg, Oral, DAILY, Uma Almodovar MD, 20 mg at 10/20/20 2206    pantoprazole (PROTONIX) tablet 40 mg, 40 mg, Oral, DAILY, Kristen Sparks MD, 40 mg at 10/21/20 0610    acetaminophen (TYLENOL) tablet 650 mg, 650 mg, Oral, Q4H PRN, Kristen Sparks MD, 650 mg at 20 2319    alum-mag hydroxide-simeth (MYLANTA) oral suspension 30 mL, 30 mL, Oral, Q4H PRN, Kristen Sparks MD, 30 mL at 10/18/20 1014    magnesium hydroxide (MILK OF MAGNESIA) 400 mg/5 mL oral suspension 30 mL, 30 mL, Oral, DAILY PRN, Kristen Sparks MD    [facility]ron [facility]) injection 4 mg, 4 mg, IntraVENous, Q8H PRN, Kristen Sparks MD, Stopped at 10/07/20 1300    Objective:     Visit Vitals  /70   Pulse 100   Temp 99 °F (37.2 °C)   Resp 20   Ht 6' 0.99\" (1.854 m)   Wt 82.5 kg (181 lb 14.1 oz)   SpO2 97%   BMI 24.00 kg/m²    O2 Flow Rate (L/min): 3 l/min O2 Device: Nasal cannula     Temp (24hrs), Av.6 °F (37 °C), Min:97.2 °F (36.2 °C), Max:99.1 °F (37.3 °C)         Physical Exam:   General :  no respiratory distress noted   Lungs :  Not labored. CVS :  no gallop  Abdomen :  +ventral hernia    Extremities : No edema noted,  Neurological : Awake, alert, oriented to time place person.  No neurological deficits.    Psychiatric : Mood and affect normal    Lab/Data Review:  No results found for this or any previous visit (from the past 24 hour(s)). CT abd 10/5. IMPRESSION:  1. Large ventral hernia containing nonobstructed small and large intestine. 2. Moderate to large right pleural effusion and mild to moderate left pleural  effusion. There is near complete collapse of the visualized portions of the left  lower lobe and there is pleural thickening in the left hemithorax. 3. Patchy airspace disease in the right lower lobe along with right basilar  atelectasis. 4. Nonspecific left adrenal nodule. 5. Other findings as described. Pt at increased risk for procedure with resp compromise in addition to anatomical considerations of reducing massive hernia.  Pt aware of inc mortality states \"I don't care if it kills me but it has to be done\". Abd ct pending, will need to recline on pillows for procedure as cannot lie flat. Pt aware of risk for surgery, likelihood that will remain on vent long term and he reiterates he wants it done despite the risk. Cardio to optimize for clearance. Surgery on case f/u recommendations  Next x-ray  AP upright view of the chest compared to 10/9/2020. Complete opacification of  the left hemithorax is again noted. Small right pleural effusion and diffuse  right-sided airspace disease suggesting pulmonary edema are again noted. No  pneumothorax. Cardiac silhouette is obscured.     IMPRESSION  IMPRESSION: No significant change. See above. Assessment and plan:        Acute hypoxic respiratory failure :   from persistent LL collapse s/p 9 bronchoscopies. All cytologies/path from ebus no malignancy  fungal cs 10/7 mod yeast. Fungitel? Remains on abx. ID appreciated. supplemental O2  Lasix iv bid  Changed to daily     left lung collapse/PNA:      from mucous plugging. large abdominal hernia contributing   ebus/bx paratracheal node 10/13 neg  . ID, Pulmonology, oncology appreciated. Abx per ID. Mediastinal Adenopathy:Ebus/path neg. Hernia complicating, pt wants it reduced despite increased mortality risk. General surgeon on board,chronic 6+ years neglected follow up. Surgery cx appreciated Dr. Rowan Abebe no surgery planned until the pneumonia resolved, resp issues improve. discussed with patient wants hernia repaired despite risk. Will d/w Dr. Rowan Riosey about further plan    COPD:  pulm following. Anemia: AOCI. Stable, follow. Feso4/b12 supplements, follow. HTN : low soft BP holding parameters added.          DVT ppx: lovenox  PT/OT  DISPO: TBD    Signed By: Lorena Curry MD     October 21, 2020

## 2020-10-21 NOTE — PROGRESS NOTES
Progress Note    Patient: Delon Hamman MRN: [id number]  SSN: [id number]    YOB: 1960  Age: 63 y.o. Sex: male      Admit Date: 9/10/2020    LOS: 41 days     Subjective:   Patient followed for chronic, refractory pneumonia of undetermined etiology despite extensive microbiologic work-up. Bronchial washings again only showed normal respiratory xiomara and moderate yeasts. CXR yesterday showed complete opacification of left hemithorax. CRP trending downward back upward today. He remains afebrile. Currently on Meropenem and Anidulafungin. Objective:     Vitals:    10/21/20 0748 10/21/20 0749 10/21/20 1345 10/21/20 1515   BP:    108/67   Pulse:    95   Resp:    16   Temp:    98.8 °F (37.1 °C)   SpO2: 99% 99% 97% 95%   Weight:       Height:            Intake and Output:  Current Shift: No intake/output data recorded. Last three shifts: 10/19 1901 - 10/21 0700  In: -   Out: 700 [Urine:700]    Physical Exam:   Vitals signs and nursing note reviewed. Constitutional:       General: He is not in acute distress. Appearance: He is ill-appearing. He is not toxic-appearing or diaphoretic. Pulmonary: Decreased breath sounds left lung field  Abdominal:      General: There is distension (ventral hernia with scarring and crusting). Tenderness: There is no abdominal tenderness. There is no guarding. Genitourinary:     Penis: Normal.       Comments: No Padilla  Musculoskeletal:      Right lower leg: Edema present. Left lower leg: Edema present. Skin:     Findings: Erythema (both calves) present. Neurological:      General: No focal deficit present. Mental Status: He is alert and oriented to person, place, and time. Psychiatric:         Mood and Affect: Mood normal.         Behavior: Behavior normal.         Thought Content:  Thought content normal.         Judgment: Judgment normal.        Lab/Data Review:    WBC 8,600  Procalcitonin <0.05  CRP Pending (4.54 (5.72 (8.04 (11.60 (12.50  (129  ANCA panel Negative    Serum fungitell <31 Negative    Bronchial washing fungus (10/7)  Pending  Bronchial washing AFB (10/7) smear negative  Bronchial washing culture (10/7) Normal respiratory xiomara  Bronchial washing culture (10/13) Normal respiratory xiomara FINAL  Bronchial washing fungal (10/13) Moderate yeasts consistent with colonization    CXR (10/19)  Complete opacification left hemithorax, persistent finding compared to priorimaging. Left mediastinal staples suggest prior surgery. Same site patchy  reticular markings right lung, these appear no worse compared to prior imaging. No pneumothorax      Assessment:     1. Chronic pneumonia, likely post-obstructive, bronchial washing grew Autumn dublinensis, Day #38 IV Antibiotics, Day #11 IV Meropenem, Day #2 IV Anidulafungin. 2. Markedly elevated CRP, presumed secondary to #1, decreasing again. 3. Abnormal bronchoscopy with nodular lesions left tracheobronchial tree, biopsy pending. 4. Chronic venous insufficiency with stasis dermatitis  5. Large ventral hernia, seen by General Surgery     Comment:   CRP trending upward. Eva Cowden speciated as C. Dublinensis. Plan:   1. Continue IV Meropenem  2. Continue IV Andidulafungin  3.  In am, repeat CRP, done       Signed By: Siva Trejo MD     October 21, 2020

## 2020-10-21 NOTE — PROGRESS NOTES
Pulm/CC Progress Note    Subjective:     Patient seen and examined in his room this afternoon. No acute issues overnight; he is still intermittently refusing some respiratory therapies due to pain in his abdomen when he coughs. He is awake and alert. On 3 L of oxygen via nasal cannula, but satting in the high 90's. Appears as comfortable as he usually does. Looks depressed but is encouraged about potentially getting his hernia fixed. Chest x-ray that was done on 10/19/20 showed continued collapse of left lung. No need for further CXR's unless the clinical situation changes. He has undergone 9 bronchoscopies with mucus suctioning and has had several biopsies of the lesions within his tracheobronchial tree as well as brushings and EBUS biopsies of station 11L and station 7. He has had squamous metaplasia return, but no definitive malignancy. No malignancy on EBUS biopsies. I discussed the case with Dr. Adán Kumari on 10/19/20, we have all final results back and there is no definitive malignancy. Awaiting decision on possible transfer to another hospital for surgical evaluation of his hernia. Review of Systems   Has complains of shortness of breath. He is on nasal cannula oxygen. Denied any nausea vomiting. Has poor appetite    Objective:     Visit Vitals  /69   Pulse 100   Temp 98.6 °F (37 °C)   Resp 18   Ht 6' 0.99\" (1.854 m)   Wt 75.1 kg (165 lb 9.1 oz)   SpO2 99%   BMI 21.85 kg/m²    O2 Flow Rate (L/min): 3 l/min O2 Device: Nasal cannula    Temp (24hrs), Av °F (36.7 °C), Min:97.6 °F (36.4 °C), Max:98.6 °F (37 °C)      No intake/output data recorded.   10/19 1901 - 10/21 0700  In: -   Out: 700 [Urine:700]    Current Facility-Administered Medications   Medication Dose Route Frequency    furosemide (LASIX) injection 40 mg  40 mg IntraVENous DAILY    anidulafungin (ERAXIS) 100 mg in 0.9% sodium chloride 130 mL IVPB  100 mg IntraVENous Q24H    oxyCODONE-acetaminophen (PERCOCET) 5-325 mg per tablet 1 Tab  1 Tab Oral Q8H PRN    ferrous sulfate tablet 325 mg  1 Tab Oral DAILY WITH BREAKFAST    cyanocobalamin (VITAMIN B12) tablet 500 mcg  500 mcg Oral DAILY    amLODIPine (NORVASC) tablet 5 mg  5 mg Oral DAILY    meropenem (MERREM) 1 g in sterile water (preservative free) 20 mL IV syringe  1 g IntraVENous Q8H    albuterol-ipratropium (DUO-NEB) 2.5 MG-0.5 MG/3 ML  3 mL Nebulization Q6HWA RT    acetylcysteine (MUCOMYST) 200 mg/mL (20 %) solution 600 mg  600 mg Nebulization BID    diphenhydrAMINE (BENADRYL) capsule 25 mg  25 mg Oral Q6H PRN    guaiFENesin (ROBITUSSIN) 100 mg/5 mL oral liquid 400 mg  400 mg Oral Q6H    0.9% sodium chloride infusion 250 mL  250 mL IntraVENous PRN    atorvastatin (LIPITOR) tablet 20 mg  20 mg Oral DAILY    pantoprazole (PROTONIX) tablet 40 mg  40 mg Oral DAILY    acetaminophen (TYLENOL) tablet 650 mg  650 mg Oral Q4H PRN    alum-mag hydroxide-simeth (MYLANTA) oral suspension 30 mL  30 mL Oral Q4H PRN    magnesium hydroxide (MILK OF MAGNESIA) 400 mg/5 mL oral suspension 30 mL  30 mL Oral DAILY PRN    ondansetron (ZOFRAN) injection 4 mg  4 mg IntraVENous Q8H PRN       Physical Exam:  General: Alert and oriented x3.  3 L nasal cannula. Depressed. HEENT: atraumatic, PERRL, moist mucosa,     Neck: Trachea midline, no carotid bruit, no masses. Supple but thick. Respiratory: No breath sounds on the left side. Few crackles and rhonchi on the right side. No significant change. Cardiovascular: RRR, no m/r/g, Normal S1 and S2   abdomen: Has large ventral abdominal hernia, evidence of surgical scars soft,   Rectal: deferred  Extremities: no cyanosis or clubbing. He has significant pitting edema in the dependent portions and lower extremities. Integumentary: warm, dry, and pink, with no rash, purpura, or petechia.    Heme/Lymph: no lymphadenopathy, no bruises  Neurological: No focal motor deficit   psychiatric: cooperative with normal mood, affect, and cognition Additional comments: Chest x-rays reviewed    Data Review    No results found for this or any previous visit (from the past 24 hour(s)). Chest x-ray from last evening was reviewed which showed recurrence of left partial collapse. 6 results from 10/3/2020 were reviewed  Patient has left basal atelectasis. XR CHEST PORT   Final Result      XR CHEST PORT   Final Result   Impression: No change compared to single view chest October 17, 2020. XR CHEST PORT   Final Result      XR CHEST PORT   Final Result      XR CHEST PORT   Final Result      XR CHEST PORT   Final Result      XR CHEST PORT   Final Result      XR CHEST PORT   Final Result   IMPRESSION: Persistent findings compared to single view chest October 10, 2020. XR CHEST PORT   Final Result   IMPRESSION: No significant change. See above. XR CHEST PA LAT   Final Result   Impression:      Collapse of left lung again noted with decreased aeration of the upper lobe   compared to yesterday increased opacity of the right lung may be due to portable   technique. XR CHEST PORT   Final Result   Impression:      Stable aeration of the left upper lung with atelectasis. Stable appearing   bilateral pleural effusions. No significant change compared to the prior study from 10/7/2020. XR CHEST PORT   Final Result      XR FLUOROSCOPY UNDER 60 MINUTES   Final Result      XR CHEST PORT   Final Result      XR CHEST PORT   Final Result   FINDINGS: Impression: Frontal single view chest.      Continued opacification of the left hemithorax, obscuring the cardiac   silhouette. Right lung interstitial thickening, airspace disease, bronchial thickening,   pleural effusion similar to previous. No pneumothorax. CT CHEST WO CONT   Final Result   Impression:    1. Increased now complete opacification of the left bronchi with low density   material.   2. Slightly increased patchy airspace pneumonia in the right lung.    3. Unchanged loculated and nonloculated left pleural effusion, complete left   lung consolidation, right lung pleural effusion. CT ABD PELV W CONT   Final Result   IMPRESSION:   1. Large ventral hernia containing nonobstructed small and large intestine. 2. Moderate to large right pleural effusion and mild to moderate left pleural   effusion. There is near complete collapse of the visualized portions of the left   lower lobe and there is pleural thickening in the left hemithorax. 3. Patchy airspace disease in the right lower lobe along with right basilar   atelectasis. 4. Nonspecific left adrenal nodule. 5. Other findings as described. XR CHEST PORT   Final Result   Impression: Heterogeneous opacity in the right lung, not significantly changed. Now complete opacification of the left hemithorax. XR CHEST PORT   Final Result   IMPRESSION: No significant change. XR CHEST AP LAT   Final Result      XR CHEST AP LAT   Final Result   IMPRESSION:   1. Persistent opacification of the left hemithorax with volume loss. 2.  Moderate right pleural effusion with probable associated right basilar   atelectasis. XR CHEST PORT   Final Result   Impression: Increased volume loss left lung. Otherwise stable. XR CHEST PORT   Final Result   IMPRESSION:   1. Improved aeration of the left lung with persistent basilar consolidative   opacities and probable pleural effusions. 2. Other extensive interstitial and alveolar opacities are nonspecific, but   potentially related to pulmonary edema or infection. XR CHEST PORT   Final Result   Impression:Left lung collapse without improvement from prior study. XR CHEST PORT   Final Result   IMPRESSION:   1. There is milder enlargement of the cardiac silhouette as well as increasing   pulmonary vascular ill definition suspect for mild pulmonary edema. This could   be related to cardiogenic edema or fluid overload.    2.  There is been an improvement in the overall aeration of the left lung with   and improved visualization of vascular markings within the left upper lung zone. There still remains significant partial collapse of the left lung. 3.  There is increased patchy airspace disease laterally within the right lower   lobe just above the right lateral costophrenic angle. 4.  There are persistent moderate-sized bilateral pleural effusions. XR CHEST AP LAT   Final Result   IMPRESSION: Persistent collapse of the left lung with bilateral pleural   effusions. Increasing vascular congestion on the right. XR CHEST PA LAT   Final Result   Impression:   Ongoing near complete white out of the left lung. Little interval change since   the prior examination. CT CHEST WO CONT   Final Result   IMPRESSION: Complete collapse of the left lung due to complete bronchial   obstruction, possibly aspiration. Fluid overload also noted      XR ABD ACUTE W 1 V CHEST   Final Result   IMPRESSION: Opacification of the left hemithorax, questioned for remote   pneumonectomy or lung collapse with pleural fluid. Prominent right parenchymal/interstitial lung markings compatible with fibrosis   and possible partial vascular congestion. Small bowel gas, nonobstructive pattern. Assessment/Plan: This is a middle-aged male who was admitted because of increasing symptoms of shortness of breath. He is getting treated in hospital for pneumonia with IV Zosyn, was on Azithromycin. He is noted to be increasingly tachypneic and short of breath. He has been on supplemental oxygen. His chest x-ray is showing persistent left lung opacification from mucous plugging. This has not improved with aggressive pulmonary hygiene and 9 suction bronchoscopies. Patient is undergone multiple biopsies as well as transbronchial needle aspirations of multiple mediastinal nodes, all cytology/pathology has returned negative for malignancy thus far. .     Plan:     1.)    Left lung collapse/shortness of breath:  - Shortness of breath and hypoxia due to recurrent left lung collapse. He had multiple bronchoscopies done along with lavage but he continues to have left lung collapse. There is really no further need to continue doing this unless something else changes to improve his mucous clearance. He underwent EBUS on 10/13/20, mildly enlarged station 7 lymph node, significantly enlarged station 11 lymph node. Ultrasound-guided biopsy was done, negative for malignancy. Final results confirm no malignancy. CT of the abdomen pelvis shows a large hernia. Certainly affecting his ability to cough. He has been ordered pulmonary hygiene with nebulizers every 6 hours;  Aggressive chest PT, EZ-PAP therapy q6, acapella device as well as incentive spirometer use. Added guaifenesin 400 mg q6. Mucomyst also ordered. Patient has a history of refusing some of thee therapies. Likely due to developing depression from being admitted for so long. No need for daily CXR's unless his clinical situation changes. At this point, I feel we have exhausted our ability to sufficiently take care of this patient here at [facility]. Outside of an elective intubation with subsequent scheduled pulmonary hygiene as above and better ease to perform clean out bronchoscopies, I do not feel that this is appropriate without something else changing with his care. I would strongly recommend consideration of surgical management for his ventral hernia. If this is unable to be completed her at [facility], I would recommend consideration of transfer to [facility]. The hospitalist stated that they are planning on reaching out to Dr. Kleber Escalera, I will also be happy to discuss the case with Dr. Kleber Escalera over the phone as well. I did call Dr. David Grimm on the phone today, I left a voicemail for him to give me a call back so we can discuss the case. If surgery does occur, he will almost certainly require mechanical ventilation afterward. Higher chance he may require a tracheostomy, with which the patient is agreeable if necessary. Additionally, several days of mechanical ventilation will likely be a benefit for him from a pulmonary hygiene standpoint. He will not be able to refuse therapies and clean out bronchoscopies could be performed daily. .    2.)  COPD:  He has a history of COPD based on chronic smoking. Continue scheduled bronchodilators. Patient should be discharged with a LAMA/LABA such as Anoro and needs f/u in our office for PFT's and further management after discharge. Weaned off prednisone. 3.)  Pneumonia:   He is on IV Meropenem, was on Zosyn for >1 month. All cultures from multiple bronchoscopies are no growth to date. AFBs are negative, fungal culture showed moderate yeast but otherwise nothing significant. Fungitell is negative; overall trend in CRP is going down. Infectious disease consultation appreciated. Given no improvement in left lung mucous, IV Anidulafungin was started on 10/20/20.    4.)  Hypertension:  He is on antihypertensive medications, BP's stable. Patient also has clinically fluid overload. Echocardiogram done on 9/16 showed an EF of 60 to 52% grade 2 diastolic dysfunction and moderate to severe aortic stenosis. Right ventricle is normal in size and function. Decreased Lasix to 40 mg IV daily. 5.)  Ventral abdominal hernia:  Patient wants to have his ventral abdominal hernia fixed, my partners spoke to surgery service last week. This will be too extensive for surgery here at this hospital appparently, surgical service is recommending for him to be transferred to [facility] where surgery can be further pursued. Awaiting further discussions with Dr. Drake Moran regarding overall surgical plan.          Questions of patient were answered at bedside in detail  Case discussed in detail with RN, RT, and care team  Thank you for involving me in the care of this patient  I will follow with you closely during hospitalization    Time spent more than 30 minutes in direct patient care with no overlap      Sherman Rico, DO  Pulmonary and critical care

## 2020-10-22 LAB
CK SERPL-CCNC: 19 U/L (ref 39–308)
CRP SERPL-MCNC: 7.56 MG/DL (ref 0–0.6)

## 2020-10-22 PROCEDURE — 74011000250 HC RX REV CODE- 250: Performed by: INTERNAL MEDICINE

## 2020-10-22 PROCEDURE — 65270000029 HC RM PRIVATE

## 2020-10-22 PROCEDURE — 86140 C-REACTIVE PROTEIN: CPT

## 2020-10-22 PROCEDURE — 74011250637 HC RX REV CODE- 250/637: Performed by: HOSPITALIST

## 2020-10-22 PROCEDURE — 74011250637 HC RX REV CODE- 250/637: Performed by: INTERNAL MEDICINE

## 2020-10-22 PROCEDURE — 77010033678 HC OXYGEN DAILY

## 2020-10-22 PROCEDURE — 74011000258 HC RX REV CODE- 258: Performed by: INTERNAL MEDICINE

## 2020-10-22 PROCEDURE — 94640 AIRWAY INHALATION TREATMENT: CPT

## 2020-10-22 PROCEDURE — 83519 RIA NONANTIBODY: CPT

## 2020-10-22 PROCEDURE — 74011250636 HC RX REV CODE- 250/636: Performed by: HOSPITALIST

## 2020-10-22 PROCEDURE — 36415 COLL VENOUS BLD VENIPUNCTURE: CPT

## 2020-10-22 PROCEDURE — 99232 SBSQ HOSP IP/OBS MODERATE 35: CPT | Performed by: INTERNAL MEDICINE

## 2020-10-22 PROCEDURE — 94760 N-INVAS EAR/PLS OXIMETRY 1: CPT

## 2020-10-22 PROCEDURE — 82550 ASSAY OF CK (CPK): CPT

## 2020-10-22 PROCEDURE — 74011250636 HC RX REV CODE- 250/636: Performed by: INTERNAL MEDICINE

## 2020-10-22 RX ADMIN — IPRATROPIUM BROMIDE AND ALBUTEROL SULFATE 3 ML: .5; 3 SOLUTION RESPIRATORY (INHALATION) at 14:45

## 2020-10-22 RX ADMIN — FERROUS SULFATE TAB 325 MG (65 MG ELEMENTAL FE) 325 MG: 325 (65 FE) TAB at 08:26

## 2020-10-22 RX ADMIN — MEROPENEM 1 G: 1 INJECTION, POWDER, FOR SOLUTION INTRAVENOUS at 02:21

## 2020-10-22 RX ADMIN — IPRATROPIUM BROMIDE AND ALBUTEROL SULFATE 3 ML: .5; 3 SOLUTION RESPIRATORY (INHALATION) at 08:33

## 2020-10-22 RX ADMIN — DIPHENHYDRAMINE HYDROCHLORIDE 25 MG: 25 CAPSULE ORAL at 23:08

## 2020-10-22 RX ADMIN — GUAIFENESIN 400 MG: 200 SOLUTION ORAL at 18:35

## 2020-10-22 RX ADMIN — MEROPENEM 1 G: 1 INJECTION, POWDER, FOR SOLUTION INTRAVENOUS at 09:29

## 2020-10-22 RX ADMIN — DIPHENHYDRAMINE HYDROCHLORIDE 25 MG: 25 CAPSULE ORAL at 06:23

## 2020-10-22 RX ADMIN — GUAIFENESIN 400 MG: 200 SOLUTION ORAL at 00:21

## 2020-10-22 RX ADMIN — DIPHENHYDRAMINE HYDROCHLORIDE 25 MG: 25 CAPSULE ORAL at 14:46

## 2020-10-22 RX ADMIN — CYANOCOBALAMIN TAB 500 MCG 500 MCG: 500 TAB at 08:26

## 2020-10-22 RX ADMIN — GUAIFENESIN 400 MG: 200 SOLUTION ORAL at 12:08

## 2020-10-22 RX ADMIN — MEROPENEM 1 G: 1 INJECTION, POWDER, FOR SOLUTION INTRAVENOUS at 18:35

## 2020-10-22 RX ADMIN — OXYCODONE HYDROCHLORIDE AND ACETAMINOPHEN 1 TABLET: 5; 325 TABLET ORAL at 06:23

## 2020-10-22 RX ADMIN — GUAIFENESIN 400 MG: 200 SOLUTION ORAL at 23:08

## 2020-10-22 RX ADMIN — OXYCODONE HYDROCHLORIDE AND ACETAMINOPHEN 1 TABLET: 5; 325 TABLET ORAL at 23:08

## 2020-10-22 RX ADMIN — ATORVASTATIN CALCIUM 20 MG: 20 TABLET, FILM COATED ORAL at 21:33

## 2020-10-22 RX ADMIN — FUROSEMIDE 40 MG: 10 INJECTION, SOLUTION INTRAMUSCULAR; INTRAVENOUS at 08:26

## 2020-10-22 RX ADMIN — OXYCODONE HYDROCHLORIDE AND ACETAMINOPHEN 1 TABLET: 5; 325 TABLET ORAL at 14:46

## 2020-10-22 RX ADMIN — PANTOPRAZOLE SODIUM 40 MG: 40 TABLET, DELAYED RELEASE ORAL at 06:20

## 2020-10-22 RX ADMIN — AMLODIPINE BESYLATE 5 MG: 5 TABLET ORAL at 08:26

## 2020-10-22 RX ADMIN — SODIUM CHLORIDE 100 MG: 0.9 INJECTION INTRAVENOUS at 17:04

## 2020-10-22 RX ADMIN — GUAIFENESIN 400 MG: 200 SOLUTION ORAL at 06:20

## 2020-10-22 RX ADMIN — ACETYLCYSTEINE 600 MG: 200 SOLUTION ORAL; RESPIRATORY (INHALATION) at 08:34

## 2020-10-22 NOTE — PROGRESS NOTES
Comprehensive Nutrition Assessment    Type and Reason for Visit: Reassess(Goal)    Nutrition Recommendations/Plan:     Continue Regular diet  Resume Ensure pdg daily per pt request  Honor food preferences as able    Nursing to document %meal and supplement intakes in I/Os    Nutrition Assessment:  Admitted for PNA, hypokalemia, refractory pneumonia 2/2 left lung colapse. S/p EBUS, multiple bronchial washings and biopsies - all negative for malignancy. Noted abd hernia-surgery postponed until resp status improved. Heme/onc eval'd, rec'd outpatient PET scan if concerns for malignancy remain present. Ordered Regular diet, no recent intakes recorded x 2 weeks. Pt reported fair intakes, fatigue with menu items. RD visualized 50% of lunch consumed (grilled cheese, soup, fruit). Requested hot dog, cheetos for dinner - will honor, RD relayed preference as exception d/t not offered on pt menu. Pt also requesting resumption of Ensure pdg vanilla - will update. Drinking pedialyte regularly (supplied from home). Food preferences noted for grilled cheese, grilled ham/turkey and cheese sandwich, chips, vanilla pdgs, edgardo ice creams. Pt expressed frustrations with desire for hernia repair, adamant for need for surgical intervention despite MDs advising against. Will continue to monitor POC. Labs: H/H 8.4/27.6, No updated BMP.  Meds: Statin, meropnem, MoM, lasix, PPI, FeSu, B12.       Malnutrition Assessment:  Malnutrition Status:  Mild malnutrition    Context:  Chronic illness     Findings of the 6 clinical characteristics of malnutrition:   Energy Intake:  Mild decrease in energy intake (specify)  Weight Loss:  No significant weight loss     Body Fat Loss:  No significant body fat loss,     Muscle Mass Loss:  7 - Severe muscle mass loss, Calf (gastrocnemius), Thigh (quadraceps), Temples (temporalis)  Fluid Accumulation:  No significant fluid accumulation,        Estimated Daily Nutrient Needs:  Energy (kcal):  2010(HBE x1.2)  Protein (g):  94(1.2 g/kg)       Fluid (ml/day):  2010(1ml/kcal)    Nutrition Related Findings:  Pt reports discomfort with sleeping d/t needing to remain upright. Reports regular BMs daily, last one yesterday, stated feeling urge to have one likely this afternoon. No dysphagia. No n/v or c/d. No current edema documented. Wounds:    Moisture associate skin damage(excoriation to groin, sacrum; skin tear to buttock)       Current Nutrition Therapies:  DIET REGULAR  DIET NUTRITIONAL SUPPLEMENTS Dinner; Ensure Pudding    Anthropometric Measures:  · Height:  6' 0.99\" (185.4 cm)  · Current Body Wt:  75.1 kg (165 lb 9.1 oz)(10/21)   · Admission Body Wt:  182 lb    · Usual Body Wt:  143 lb(1 year ago)     · Ideal Body Wt:  184 lbs:  90 %   · BMI Category:  Normal weight (BMI 18.5-24. 9)     Noted wt loss since admit, some in part 2/2 fluid, erroneous measure.  Will continue to monitor wt trends    Nutrition Diagnosis:   No nutrition diagnosis at this time    Nutrition Interventions:   Food and/or Nutrient Delivery: Continue current diet(Resume ONS)  Nutrition Education and Counseling: No recommendations at this time  Coordination of Nutrition Care: Continued inpatient monitoring    Goals:  Intakes >/=75% of EENs (not met)  Wt maintenance with in +/-0.5kg (not met)  BMs every 1-2 days in 7 days  (met)    Nutrition Monitoring and Evaluation:   Behavioral-Environmental Outcomes: Readiness for change  Food/Nutrient Intake Outcomes: Food and nutrient intake  Physical Signs/Symptoms Outcomes: GI status, Weight    Discharge Planning:    No discharge needs at this time     Electronically signed by Richard Meléndez on 10/22/2020 at 3:28 PM    Contact: [phone]

## 2020-10-22 NOTE — PROGRESS NOTES
Pulm/CC Progress Note    Subjective:     Patient seen and examined in his room this afternoon. No acute issues overnight; he is still intermittently refusing some respiratory therapies due to pain in his abdomen when he coughs. He is awake and alert. On 3 L of oxygen via nasal cannula, but satting in the high 90's. Appears as comfortable as he usually does. Looks more depressed as two separate surgeons do not feel that it is a good time to pursue hernia repair at this time. Chest x-ray that was done on 10/19/20 showed continued collapse of left lung. No need for further CXR's unless the clinical situation changes. He has undergone 9 bronchoscopies with mucus suctioning and has had several biopsies of the lesions within his tracheobronchial tree as well as brushings and EBUS biopsies of station 11L and station 7. He has had squamous metaplasia return, but no definitive malignancy. No malignancy on EBUS biopsies. I discussed the case with Dr. Jessica Etienne on 10/19/20, we have all final results back and there is no definitive malignancy. Review of Systems   Has complains of shortness of breath. He is on nasal cannula oxygen. Denied any nausea vomiting. Has poor appetite    Objective:     Visit Vitals  /70   Pulse 78   Temp 97.5 °F (36.4 °C)   Resp 18   Ht 6' 0.99\" (1.854 m)   Wt 75.1 kg (165 lb 9.1 oz)   SpO2 99%   BMI 21.85 kg/m²    O2 Flow Rate (L/min): 3 l/min O2 Device: Nasal cannula    Temp (24hrs), Av °F (36.7 °C), Min:97.5 °F (36.4 °C), Max:98.8 °F (37.1 °C)      No intake/output data recorded.   10/20 1901 - 10/22 0700  In: -   Out: 1200 [Urine:1200]    Current Facility-Administered Medications   Medication Dose Route Frequency    furosemide (LASIX) injection 40 mg  40 mg IntraVENous DAILY    anidulafungin (ERAXIS) 100 mg in 0.9% sodium chloride 130 mL IVPB  100 mg IntraVENous Q24H    oxyCODONE-acetaminophen (PERCOCET) 5-325 mg per tablet 1 Tab  1 Tab Oral Q8H PRN    ferrous sulfate tablet 325 mg  1 Tab Oral DAILY WITH BREAKFAST    cyanocobalamin (VITAMIN B12) tablet 500 mcg  500 mcg Oral DAILY    amLODIPine (NORVASC) tablet 5 mg  5 mg Oral DAILY    meropenem (MERREM) 1 g in sterile water (preservative free) 20 mL IV syringe  1 g IntraVENous Q8H    albuterol-ipratropium (DUO-NEB) 2.5 MG-0.5 MG/3 ML  3 mL Nebulization Q6HWA RT    acetylcysteine (MUCOMYST) 200 mg/mL (20 %) solution 600 mg  600 mg Nebulization BID    diphenhydrAMINE (BENADRYL) capsule 25 mg  25 mg Oral Q6H PRN    guaiFENesin (ROBITUSSIN) 100 mg/5 mL oral liquid 400 mg  400 mg Oral Q6H    0.9% sodium chloride infusion 250 mL  250 mL IntraVENous PRN    atorvastatin (LIPITOR) tablet 20 mg  20 mg Oral DAILY    pantoprazole (PROTONIX) tablet 40 mg  40 mg Oral DAILY    acetaminophen (TYLENOL) tablet 650 mg  650 mg Oral Q4H PRN    alum-mag hydroxide-simeth (MYLANTA) oral suspension 30 mL  30 mL Oral Q4H PRN    magnesium hydroxide (MILK OF MAGNESIA) 400 mg/5 mL oral suspension 30 mL  30 mL Oral DAILY PRN    ondansetron (ZOFRAN) injection 4 mg  4 mg IntraVENous Q8H PRN       Physical Exam:  General: Alert and oriented x3.  3 L nasal cannula. Depressed. HEENT: atraumatic, PERRL, moist mucosa,     Neck: Trachea midline, no carotid bruit, no masses. Supple but thick. Respiratory: No breath sounds on the left side. Few crackles and rhonchi on the right side. No significant change. Cardiovascular: RRR, no m/r/g, Normal S1 and S2   abdomen: Has large ventral abdominal hernia, evidence of surgical scars soft,   Rectal: deferred  Extremities: no cyanosis or clubbing. He has significant pitting edema in the dependent portions and lower extremities. Integumentary: warm, dry, and pink, with no rash, purpura, or petechia.    Heme/Lymph: no lymphadenopathy, no bruises  Neurological: No focal motor deficit   psychiatric: cooperative with normal mood, affect, and cognition      Additional comments: Chest x-rays reviewed    Data Review    Recent Results (from the past 24 hour(s))   C REACTIVE PROTEIN, QT    Collection Time: 10/22/20  6:50 AM   Result Value Ref Range    C-Reactive protein 7.56 (H) 0.00 - 0.60 mg/dL         Chest x-ray from last evening was reviewed which showed recurrence of left partial collapse. 6 results from 10/3/2020 were reviewed  Patient has left basal atelectasis. XR CHEST PORT   Final Result      XR CHEST PORT   Final Result   Impression: No change compared to single view chest October 17, 2020. XR CHEST PORT   Final Result      XR CHEST PORT   Final Result      XR CHEST PORT   Final Result      XR CHEST PORT   Final Result      XR CHEST PORT   Final Result      XR CHEST PORT   Final Result   IMPRESSION: Persistent findings compared to single view chest October 10, 2020. XR CHEST PORT   Final Result   IMPRESSION: No significant change. See above. XR CHEST PA LAT   Final Result   Impression:      Collapse of left lung again noted with decreased aeration of the upper lobe   compared to yesterday increased opacity of the right lung may be due to portable   technique. XR CHEST PORT   Final Result   Impression:      Stable aeration of the left upper lung with atelectasis. Stable appearing   bilateral pleural effusions. No significant change compared to the prior study from 10/7/2020. XR CHEST PORT   Final Result      XR FLUOROSCOPY UNDER 60 MINUTES   Final Result      XR CHEST PORT   Final Result      XR CHEST PORT   Final Result   FINDINGS: Impression: Frontal single view chest.      Continued opacification of the left hemithorax, obscuring the cardiac   silhouette. Right lung interstitial thickening, airspace disease, bronchial thickening,   pleural effusion similar to previous. No pneumothorax. CT CHEST WO CONT   Final Result   Impression:    1.  Increased now complete opacification of the left bronchi with low density   material.   2. Slightly increased patchy airspace pneumonia in the right lung. 3. Unchanged loculated and nonloculated left pleural effusion, complete left   lung consolidation, right lung pleural effusion. CT ABD PELV W CONT   Final Result   IMPRESSION:   1. Large ventral hernia containing nonobstructed small and large intestine. 2. Moderate to large right pleural effusion and mild to moderate left pleural   effusion. There is near complete collapse of the visualized portions of the left   lower lobe and there is pleural thickening in the left hemithorax. 3. Patchy airspace disease in the right lower lobe along with right basilar   atelectasis. 4. Nonspecific left adrenal nodule. 5. Other findings as described. XR CHEST PORT   Final Result   Impression: Heterogeneous opacity in the right lung, not significantly changed. Now complete opacification of the left hemithorax. XR CHEST PORT   Final Result   IMPRESSION: No significant change. XR CHEST AP LAT   Final Result      XR CHEST AP LAT   Final Result   IMPRESSION:   1. Persistent opacification of the left hemithorax with volume loss. 2.  Moderate right pleural effusion with probable associated right basilar   atelectasis. XR CHEST PORT   Final Result   Impression: Increased volume loss left lung. Otherwise stable. XR CHEST PORT   Final Result   IMPRESSION:   1. Improved aeration of the left lung with persistent basilar consolidative   opacities and probable pleural effusions. 2. Other extensive interstitial and alveolar opacities are nonspecific, but   potentially related to pulmonary edema or infection. XR CHEST PORT   Final Result   Impression:Left lung collapse without improvement from prior study. XR CHEST PORT   Final Result   IMPRESSION:   1. There is milder enlargement of the cardiac silhouette as well as increasing   pulmonary vascular ill definition suspect for mild pulmonary edema.  This could   be related to cardiogenic edema or fluid overload. 2.  There is been an improvement in the overall aeration of the left lung with   and improved visualization of vascular markings within the left upper lung zone. There still remains significant partial collapse of the left lung. 3.  There is increased patchy airspace disease laterally within the right lower   lobe just above the right lateral costophrenic angle. 4.  There are persistent moderate-sized bilateral pleural effusions. XR CHEST AP LAT   Final Result   IMPRESSION: Persistent collapse of the left lung with bilateral pleural   effusions. Increasing vascular congestion on the right. XR CHEST PA LAT   Final Result   Impression:   Ongoing near complete white out of the left lung. Little interval change since   the prior examination. CT CHEST WO CONT   Final Result   IMPRESSION: Complete collapse of the left lung due to complete bronchial   obstruction, possibly aspiration. Fluid overload also noted      XR ABD ACUTE W 1 V CHEST   Final Result   IMPRESSION: Opacification of the left hemithorax, questioned for remote   pneumonectomy or lung collapse with pleural fluid. Prominent right parenchymal/interstitial lung markings compatible with fibrosis   and possible partial vascular congestion. Small bowel gas, nonobstructive pattern. Assessment/Plan: This is a middle-aged male who was admitted because of increasing symptoms of shortness of breath. He is getting treated in hospital for pneumonia with IV Zosyn, was on Azithromycin. He is noted to be increasingly tachypneic and short of breath. He has been on supplemental oxygen. His chest x-ray is showing persistent left lung opacification from mucous plugging. This has not improved with aggressive pulmonary hygiene and 9 suction bronchoscopies.   Patient is undergone multiple biopsies as well as transbronchial needle aspirations of multiple mediastinal nodes, all cytology/pathology has returned negative for malignancy thus far. .     Plan:     1.)    Left lung collapse/shortness of breath:  - Shortness of breath and hypoxia due to recurrent left lung collapse and deconditioning.  - He has had multiple bronchoscopies done along with lavage but he continues to have left lung collapse. There is really no further need to continue doing serial CXR's unless something else changes to improve his mucous clearance. - He underwent EBUS on 10/13/20, mildly enlarged station 7 lymph node, significantly enlarged station 11 lymph node. Ultrasound-guided biopsy was done, negative for malignancy. Final results confirm no malignancy. - CT of the abdomen pelvis shows a large hernia. Certainly affecting his ability to cough. However, he has been evaluated by two general surgeons here at [facility] and they are recommending against surgical repair of the hernia at this time due to his current pulmonary status, the size of the hernia, the extent of the operation, and loss of domain.  - His current pulmonary hygiene regimen includes: Duonebs every 6 hours, mucomyst nebulizers every 12 hours, chest physiotherapy every 6 hours, EZ-PAP therapy q6, acapella device as well as incentive spirometer use. Guaifenesin 400 mg every 6 hours also on board. - Patient has a history of refusing some of these therapies. Likely due to developing depression from being admitted for so long.  - At this point, I feel we have come fairly close to exhausting our ability to sufficiently take care of this patient here at [facility]. - A backup option will be elective intubation with subsequent scheduled pulmonary hygiene as above and better ease to perform clean out bronchoscopies. If this occurs, he will very likely end up with a tracheostomy, with which the patient is agreeable if necessary.  - Additionally, I discussed the case with the on-call Pulmonologist at [facility] (Dr. David Lopes).  I described the case in detail and he did not feel that [facility] would be able to offer much more than what we are offering here at [facility], from a pulmonary perspective. He did recommend obtaining a Neurology consultation to rule out a neuro-musculoskeletal disease.  - Neurology consultation has been placed today. - If we continue to not come up with good answers for Mr. Gloria Lee will discuss with the Pulmonary team over at [address] [place] [address] to see if they have any other additional recommendations. - I did call Dr. Michael Christine on the phone again today, I left a voicemail for him to give me a call back so we can discuss the case and figure out the best care plan. It would be great to know if it would make any sense to try an obtain another surgical option at a different facility. 2.)  COPD:  - He has a history of COPD based on chronic smoking.  - Continue scheduled bronchodilators. - Patient should be discharged with a LAMA/LABA such as Anoro and needs f/u in our office for PFT's and further management after discharge. - Weaned off prednisone. 3.)  Pneumonia:   - He is on IV Meropenem; was on Zosyn for >1 month. All cultures from multiple bronchoscopies are no growth to date. AFBs are negative, fungal culture showed moderate yeast but otherwise nothing significant.  - Fungitell is negative; overall trend in CRP is going down. - Infectious disease consultation appreciated. Given no improvement in left lung mucous clearance, IV Anidulafungin was started on 10/20/20.  - Repeat CXR in the morning.    4.)  Hypertension:  - He is on antihypertensive medications, BP's stable. - Echocardiogram done on 9/16 showed an EF of 60 to 17% grade 2 diastolic dysfunction and moderate to severe aortic stenosis. Right ventricle is normal in size and function.    - Continue Lasix to 40 mg IV daily. 5.)  Ventral abdominal hernia:  - Patient wants to have his ventral abdominal hernia fixed very badly. - Patient was independently evaluated by Dr. Michael Christine and separately by Dr. Jackelin Miller.  They are recommending against surgical repair of the hernia at this time due to his current pulmonary status, the size of the hernia, the extent of the operation, and loss of domain. - Awaiting further discussions with Dr. Griffin Mohs regarding overall care plan.          Questions of patient were answered at bedside in detail  Case discussed in detail with RN, RT, and care team  Thank you for involving me in the care of this patient  I will follow with you closely during hospitalization    Time spent more than 30 minutes in direct patient care with no overlap      Truman Navarro, DO  Pulmonary and critical care

## 2020-10-22 NOTE — PROGRESS NOTES
[id number] [facility] SURGERY PROGRESS NOTE          Chief Complaint: Large abdominal hernia    Subjective:  No new issues. No abdominal pain, nausea or vomiting. Passing flatus and having bowel movement. On supplemental Oxygen and has shortness of breath. CT scan shows no bowel obstruction/hernia with left lung collapse. Flex brochoscopy which showed multiple endobroncheal polypoid growth suspicious for malignancy, even though final report stated no malignancy. He had an EBUS with FNA today, cytology results showed no evidence for malignancy. His last CXR still shows complete left lung collapse. Past Medical History:   Past Medical History:   Diagnosis Date    Anal fistula 3/15/2010    Anxiety     ARF (acute renal failure) (HCC)     Colon perforation (HCC)     Genital herpes 3/15/2010    GERD (gastroesophageal reflux disease)     High cholesterol 3/15/2010    History of blood transfusion     HTN (hypertension) 3/15/2010    Hyponatremia     Ischemic colitis ([address]     left Carotid Artery Occlusion 3/15/2010    Noncompliance with treatment 3/15/2010    Pneumonia 2011    PUD (peptic ulcer disease)     Septic shock(785.52)     Stroke 2002 3/15/2010    Thromboembolus ([address]     rt thigh    TIA (transient ischemic attack) 2007    pt reported       Past Surgical History:   Past Surgical History:   Procedure Laterality Date    CARDIAC CATHETERIZATION      CARDIAC SURG PROCEDURE UNLIST      HX COLECTOMY  1/11/2014    Right hemicolectomy for free air, perforated viscus    US SCROTUM/TESTICLES      right testicle removed        Allergy:  Allergies   Allergen Reactions    Morphine Other (comments)     itching       Social History:  reports that he has been smoking. He has a 70.00 pack-year smoking history. He has never used smokeless tobacco. He reports current alcohol use of about 70.0 standard drinks of alcohol per week. He reports current drug use. Drug: Marijuana.      Family History:  Family History Problem Relation Age of Onset    Cancer Mother         Current Medications:  Current Facility-Administered Medications:     furosemide (LASIX) injection 40 mg, 40 mg, IntraVENous, DAILY, JuvencioMaciel sosa MD, 40 mg at 10/21/20 0827    anidulafungin (ERAXIS) 100 mg in 0.9% sodium chloride 130 mL IVPB, 100 mg, IntraVENous, Q24H, Jonas Parker MD, 100 mg at 10/21/20 1630    oxyCODONE-acetaminophen (PERCOCET) 5-325 mg per tablet 1 Tab, 1 Tab, Oral, Q8H PRN, Sanam Franz MD, 1 Tab at 10/21/20 2246    ferrous sulfate tablet 325 mg, 1 Tab, Oral, DAILY WITH BREAKFAST, Nupur Benton MD, 325 mg at 10/21/20 2116    cyanocobalamin (VITAMIN B12) tablet 500 mcg, 500 mcg, Oral, DAILY, Judith Benton MD, 500 mcg at 10/21/20 0826    amLODIPine (NORVASC) tablet 5 mg, 5 mg, Oral, DAILY, JuvencioMaciel sosa MD, 5 mg at 10/21/20 0826    meropenem (MERREM) 1 g in sterile water (preservative free) 20 mL IV syringe, 1 g, IntraVENous, Q8H, Jonas Parker MD, 1 g at 10/21/20 1630    albuterol-ipratropium (DUO-NEB) 2.5 MG-0.5 MG/3 ML, 3 mL, Nebulization, Q6HWA RT, Jarett Ocampo DO, 3 mL at 10/21/20 2023    acetylcysteine (MUCOMYST) 200 mg/mL (20 %) solution 600 mg, 600 mg, Nebulization, BID, Darby Boswell MD, 600 mg at 10/21/20 2023    diphenhydrAMINE (BENADRYL) capsule 25 mg, 25 mg, Oral, Q6H PRN, Jesus Morelos MD, 25 mg at 10/21/20 2246    guaiFENesin (ROBITUSSIN) 100 mg/5 mL oral liquid 400 mg, 400 mg, Oral, Q6H, Jarett Ocampo DO, 400 mg at 10/21/20 1630    0.9% sodium chloride infusion 250 mL, 250 mL, IntraVENous, PRN, Lisbeth Velasquez MD    atorvastatin (LIPITOR) tablet 20 mg, 20 mg, Oral, DAILY, Lisbeth Velasquez MD, 20 mg at 10/21/20 2006    pantoprazole (PROTONIX) tablet 40 mg, 40 mg, Oral, DAILY, Lisbeth Velasquez MD, 40 mg at 10/21/20 0610    acetaminophen (TYLENOL) tablet 650 mg, 650 mg, Oral, Q4H PRN, Lisbeth Velasquez MD, 650 mg at 09/24/20 2313    alum-mag hydroxide-simeth (MYLANTA) oral suspension 30 mL, 30 mL, Oral, Q4H PRN, Jose L Watkins MD, 30 mL at 10/18/20 1014    magnesium hydroxide (MILK OF MAGNESIA) 400 mg/5 mL oral suspension 30 mL, 30 mL, Oral, DAILY PRN, Jose L Watkins MD    ondansetron [facility]) injection 4 mg, 4 mg, IntraVENous, Q8H PRN, Jose L Watkins MD, Stopped at 10/07/20 1300     Immunization History: There is no immunization history on file for this patient. Complete    Review of Systems:     Constitutional:  no fever,  no chills,  no sweats, No weakness, No fatigue, No decreased activity. Eye: No recent visual problem, No icterus, No discharge, No double vision. Ear/Nose/Mouth/Throat: No decreased hearing, No ear pain, No nasal congestion, No sore throat. Respiratory:  shortness of breath,  cough, No sputum production, No hemoptysis,  wheezing, No cyanosis. Cardiovascular: No chest pain, No palpitations, No bradycardia, No tachycardia, No peripheral edema, No syncope. Gastrointestinal: No nausea,  No vomiting, No diarrhea, No constipation, No heartburn,  No abdominal pain. Genitourinary: No dysuria, No hematuria, No change in urine stream, No urethral discharge, No lesions. Hematology/Lymphatics: No bruising tendency, No bleeding tendency, No petechiae, No swollen lymph glands. Endocrine: No excessive thirst, No polyuria, No cold intolerance, No heat intolerance, No excessive hunger. Immunologic: Not immunocompromised, No recurrent fevers, No recurrent infections. Musculoskeletal: No back pain, No neck pain, No joint pain, No muscle pain, No claudication, No decreased range of motion, No trauma. Integumentary: No rash, No pruritus, No abrasions. Neurologic: Alert and oriented X4, No abnormal balance, No headache, No confusion, No numbness, No tingling. Psychiatric: No anxiety, No depression, No james. Physical Exam:     Vitals & Measurements:     Wt Readings from Last 3 Encounters:   10/21/20 75.1 kg (165 lb 9.1 oz)   02/26/19 82.6 kg (182 lb) 11/28/18 83.9 kg (185 lb)     Temp Readings from Last 3 Encounters:   10/21/20 97.7 °F (36.5 °C)   09/05/20 98.1 °F (36.7 °C) (Temporal)   02/26/19 97.9 °F (36.6 °C) (Oral)     BP Readings from Last 3 Encounters:   10/21/20 113/73   09/05/20 110/60   02/26/19 171/77     Pulse Readings from Last 3 Encounters:   10/21/20 98   09/05/20 86   02/26/19 100      Ht Readings from Last 3 Encounters:   10/15/20 6' 0.99\" (1.854 m)   02/26/19 6' 1\" (1.854 m)   11/28/18 6' 1\" (1.854 m)          General:  in respiratory distress  Head: Normal  Face: Nornal  HEENT: atraumatic, PERRLA, moist mucosa, normal pharynx, normal tonsils and adenoids, normal tongue, no fluid in sinuses  Neck: Trachea midline, no carotid bruit, no masses  Chest: Normal.  Respiratory: Normal chest wall expansion, shortness of breath, wheezing, left side rhonchi & no breath sounds. Cardiovascular: RRR, no m/r/g, Normal S1 and S2  Abdomen: Soft, non tender,distended, large hernia with thinned out skin and dilated blood vessels, partially reducible, large fascial defect with loss of domain, normal bowel sounds in all quadrants, no hepatosplenomegaly, no tympany. Incision scar +  Genitourinary: No inguinal hernia, normal external gentalia, Testis & scrotum normal, no renal angle tenderness  Rectal: deferred  Musculoskeletal: normal ROM in upper and lower extremities, No joint swelling. Integumentary: Warm, dry, and pink, with no rash, purpura, or petechia  Heme/Lymph: No lymphadenopathy, no bruises  Neurological:Cranial Nerves II-XII grossly intact, no gross motor or sensory deficit  Psychiatric: Cooperative with normal mood, affect, and cognition      Laboratory Values:   No results found for this or any previous visit (from the past 24 hour(s)). Assessment:  Large incisional hernia with loss of domain    Severe COPD     Respiratory failure -Complete left lung collapse- recurrent bronchoscopy.     On going smoking     Plan:      I had a discussion with patient and explained the details of the procedure and complexity of the surgery with his compromised respiratory status. Recommendation will be to hold off on surgical intervention for a non obstructed incisional hernia with compromised respiratory status. His pulmonary status has to be improved before a major surgery which is not an emergent procedure. I tried to explain but patient states that he would take any risk and will not change his mind and wants his hernia to be fixed. I stated that I will get a second opinion from my colleague. He seem to understand and agrees with plan. I have discussed with Dr. Morris Martinez and he is willing to see Mr. Herman Tripp in am.    Thank you for the consultation & allowing me to participate in the care of this patient.

## 2020-10-22 NOTE — CONSULTS
I was asked by Dr. Yaneli Choudhary to evaluate Mr. Alonzo Krause for repair of ventral incisional hernia. The patient is a 58-year-old gentleman who has been in the hospital quite some time with recurrent left lung collapse and mucous plugging. He has had multiple bronchoscopies at this point pulmonary medicine feels is nothing else they have to offer the patient. In terms of his hernia he has a large ventral hernia which he developed after abdominal surgery 5 years ago at [facility] for perforated bowel. He says he was offered repair 4 years ago but at that time did not want to have it done because he was afraid. He now states that it is uncomfortable when he coughs. He has no obstructive symptoms. He is tolerating a diet and having bowel movements. On examination he has a large ventral hernia with apparent loss of domain. This is only maximally partially reducible because of his loss of domain. With partial reduction of the hernia the patient admits that he has shortness of breath and difficulty breathing. Based on his current pulmonary status, the size of the hernia, the extent of the operation, the loss of domain I told Mr. Mendel Huertas  I recommend against repair at this time. Certainly following surgery he has very high risk remaining ventilator dependent. Mr. Mendel Huertas was not pleased to hear my recommendation and I told him that he can further discuss surgery with Dr. Yaneli Choudhary but I would strongly recommend against it at this time.

## 2020-10-22 NOTE — CONSULTS
Neurology Consult Note    HPI  Mr. Kristian Savage is a 63 y.o.  male with a history significant for TIA, HTN, HL, ischemic colitis, CAD, anxiety/depression who initially presented with shortness of breath. Per chart review, patient was found to have pneumonia and treated with IV piperacillin tazobactam azithromycin. Patient continued to be tachypneic and short of breath requiring supplemental oxygen. Patient was found to have left lung collapse and underwent several procedures including several biopsies and aspirations without any evidence of malignancy. During patient's stay, patient was found to have a large abdominal hernia which is conservatively being managed at the current time. Neurology being consulted to evaluate for possible neuromuscular disease as to the etiology of patient's persistent respiratory failure. Patient reports that he is doing fine at the current time. He repeatedly asks why he is being evaluated by neurology and feels that he does not have any neurologic issues. After patient was counseled regarding referring team's concern for neuromuscular pathology patient rebuffed this. Patient reports that he does not have any difficulty at home or in the hospital with blurry vision, double vision, swallowing or generalized weakness that worsens as the day progresses and is worse at nighttime. He specifically denies difficulty getting up from a chair or ambulating later in the day. He denies any twitching of his muscles or any cramping of any of his muscles. He has not seen any fasciculations in any of his muscles either. He reports that his grandfather had Crohn's disease but he has no other knowledge of autoimmune disorders in the family. Patient lives at home with a roommate. He reports that he ambulates using a Rollator due to lower back pain and arthritic issues in his legs. He can do all of his ADLs per his report.   He smokes 1-1/2 packs/day and has been doing this for many years. He reports he occasionally will have an alcoholic beverage but will not enumerate how much or how often. He denies any illicit substance use except for marijuana which she uses often. He has no known knowledge of what occurred when he had a TIA in the past but he reports he does not take any specific medications for it. He has no personal history of seizures or neoplastic disorders that he is aware of. CK: 19        Past Medical History:   Diagnosis Date    Anal fistula 3/15/2010    Anxiety     ARF (acute renal failure) (HCC)     Colon perforation (HCC)     Genital herpes 3/15/2010    GERD (gastroesophageal reflux disease)     High cholesterol 3/15/2010    History of blood transfusion     HTN (hypertension) 3/15/2010    Hyponatremia     Ischemic colitis ([address]     left Carotid Artery Occlusion 3/15/2010    Noncompliance with treatment 3/15/2010    Pneumonia 2011    PUD (peptic ulcer disease)     Septic shock(785.52)     Stroke 2002 3/15/2010    Thromboembolus ([address]     rt thigh    TIA (transient ischemic attack) 2007    pt reported      Past Surgical History:   Procedure Laterality Date    CARDIAC CATHETERIZATION      CARDIAC SURG PROCEDURE UNLIST      HX COLECTOMY  1/11/2014    Right hemicolectomy for free air, perforated viscus    US SCROTUM/TESTICLES      right testicle removed     Allergies   Allergen Reactions    Morphine Other (comments)     itching     Prior to Admission medications    Medication Sig Start Date End Date Taking? Authorizing Provider   omeprazole (PRILOSEC) 20 mg capsule Take 20 mg by mouth daily. Yes Provider, Historical   pantoprazole (PROTONIX) 40 mg tablet Take 1 Tab by mouth daily.  6/18/20   Ede Ceja MD   losartan (COZAAR) 100 mg tablet TAKE 1 TABLET BY MOUTH EVERY DAY 12/24/19   Ash Downs NP   simvastatin (ZOCOR) 40 mg tablet TAKE 1 TABLET BY MOUTH EVERY DAY AT NIGHT 12/24/19   Ash Downs NP     Family History   Problem Relation Age of Onset    Cancer Mother      Relationships   Social connections    Talks on phone: Not on file    Gets together: Not on file    Attends Latter day service: Not on file    Active member of club or organization: Not on file    Attends meetings of clubs or organizations: Not on file    Relationship status: Not on file         Medications  Current Outpatient Medications   Medication Instructions    losartan (COZAAR) 100 mg tablet TAKE 1 TABLET BY MOUTH EVERY DAY    omeprazole (PRILOSEC) 20 mg, Oral, DAILY    pantoprazole (PROTONIX) 40 mg, Oral, DAILY    simvastatin (ZOCOR) 40 mg tablet TAKE 1 TABLET BY MOUTH EVERY DAY AT NIGHT       Current Facility-Administered Medications   Medication Dose Route Frequency    furosemide (LASIX) injection 40 mg  40 mg IntraVENous DAILY    anidulafungin (ERAXIS) 100 mg in 0.9% sodium chloride 130 mL IVPB  100 mg IntraVENous Q24H    oxyCODONE-acetaminophen (PERCOCET) 5-325 mg per tablet 1 Tab  1 Tab Oral Q8H PRN    ferrous sulfate tablet 325 mg  1 Tab Oral DAILY WITH BREAKFAST    cyanocobalamin (VITAMIN B12) tablet 500 mcg  500 mcg Oral DAILY    amLODIPine (NORVASC) tablet 5 mg  5 mg Oral DAILY    meropenem (MERREM) 1 g in sterile water (preservative free) 20 mL IV syringe  1 g IntraVENous Q8H    albuterol-ipratropium (DUO-NEB) 2.5 MG-0.5 MG/3 ML  3 mL Nebulization Q6HWA RT    acetylcysteine (MUCOMYST) 200 mg/mL (20 %) solution 600 mg  600 mg Nebulization BID    diphenhydrAMINE (BENADRYL) capsule 25 mg  25 mg Oral Q6H PRN    guaiFENesin (ROBITUSSIN) 100 mg/5 mL oral liquid 400 mg  400 mg Oral Q6H    0.9% sodium chloride infusion 250 mL  250 mL IntraVENous PRN    atorvastatin (LIPITOR) tablet 20 mg  20 mg Oral DAILY    pantoprazole (PROTONIX) tablet 40 mg  40 mg Oral DAILY    acetaminophen (TYLENOL) tablet 650 mg  650 mg Oral Q4H PRN    alum-mag hydroxide-simeth (MYLANTA) oral suspension 30 mL  30 mL Oral Q4H PRN    magnesium hydroxide (MILK OF MAGNESIA) 400 mg/5 mL oral suspension 30 mL  30 mL Oral DAILY PRN    ondansetron (ZOFRAN) injection 4 mg  4 mg IntraVENous Q8H PRN        Review of Systems  A 14 point review was done and pertinent positives & negative findings are listed as part of the history of present illness, all other systems were reviewed and are negative. Objective  Temp:  [97.5 °F (36.4 °C)-98.8 °F (37.1 °C)]   Pulse (Heart Rate):  []   BP: ()/(55-74)   Resp Rate:  [16-20]   O2 Sat (%):  [93 %-100 %]   Weight:  [75.1 kg (165 lb 9.1 oz)]     Intake/Output Summary (Last 24 hours) at 10/22/2020 1446  Last data filed at 10/22/2020 0024  Gross per 24 hour   Intake    Output 1200 ml   Net -1200 ml     Wt Readings from Last 3 Encounters:   10/21/20 75.1 kg (165 lb 9.1 oz)   02/26/19 82.6 kg (182 lb)   11/28/18 83.9 kg (185 lb)        Physical/Neurological Exam  General: [de-identified]  male, compliant  Cardiovascular: normal rate and rhythm   Pulmonary: CTAB   Gastrointestinal/Abdomen: soft w/hypoactive bowel sounds   Skin: warm and dry      Patient awake, alert; following central and peripheral commands   Intact attention and concentration   No expressive or receptive aphasia;  No dysarthria   Pupils react to light bilaterally; EOM Intact   No visual field deficits on gross exam   Unable to visualize optic disc had a retinal vessels on funduscopic examination   Intact to light touch on face bilaterally   No facial droop   No gross hearing loss   Tongue is midline   Motor: 4+/5 Throughout  Mild to moderate dysmetria with finger-to-nose bilaterally especially in the right side  No abnormal movements   Normal tone throughout   No drift in BUE   Sensation to light touchintact grossly throughout   Reflexes: 1+ in bilateral ankles, 2+ in the bilateral knees and bilateral brachioradialis  Toes equivocal bilaterally y   Gait deferred       Labs  Recent Results (from the past 24 hour(s))   C REACTIVE PROTEIN, QT    Collection Time: 10/22/20  6:50 AM   Result Value Ref Range    C-Reactive protein 7.56 (H) 0.00 - 0.60 mg/dL          Significant Diagnostic Studies  All images independently visualized    XR CHEST PORT   Final Result      XR CHEST PORT   Final Result   Impression: No change compared to single view chest October 17, 2020. XR CHEST PORT   Final Result      XR CHEST PORT   Final Result      XR CHEST PORT   Final Result      XR CHEST PORT   Final Result      XR CHEST PORT   Final Result      XR CHEST PORT   Final Result   IMPRESSION: Persistent findings compared to single view chest October 10, 2020. XR CHEST PORT   Final Result   IMPRESSION: No significant change. See above. XR CHEST PA LAT   Final Result   Impression:      Collapse of left lung again noted with decreased aeration of the upper lobe   compared to yesterday increased opacity of the right lung may be due to portable   technique. XR CHEST PORT   Final Result   Impression:      Stable aeration of the left upper lung with atelectasis. Stable appearing   bilateral pleural effusions. No significant change compared to the prior study from 10/7/2020. XR CHEST PORT   Final Result      XR FLUOROSCOPY UNDER 60 MINUTES   Final Result      XR CHEST PORT   Final Result      XR CHEST PORT   Final Result   FINDINGS: Impression: Frontal single view chest.      Continued opacification of the left hemithorax, obscuring the cardiac   silhouette. Right lung interstitial thickening, airspace disease, bronchial thickening,   pleural effusion similar to previous. No pneumothorax. CT CHEST WO CONT   Final Result   Impression:    1. Increased now complete opacification of the left bronchi with low density   material.   2. Slightly increased patchy airspace pneumonia in the right lung. 3. Unchanged loculated and nonloculated left pleural effusion, complete left   lung consolidation, right lung pleural effusion.       CT ABD PELV W CONT   Final Result IMPRESSION:   1. Large ventral hernia containing nonobstructed small and large intestine. 2. Moderate to large right pleural effusion and mild to moderate left pleural   effusion. There is near complete collapse of the visualized portions of the left   lower lobe and there is pleural thickening in the left hemithorax. 3. Patchy airspace disease in the right lower lobe along with right basilar   atelectasis. 4. Nonspecific left adrenal nodule. 5. Other findings as described. XR CHEST PORT   Final Result   Impression: Heterogeneous opacity in the right lung, not significantly changed. Now complete opacification of the left hemithorax. XR CHEST PORT   Final Result   IMPRESSION: No significant change. XR CHEST AP LAT   Final Result      XR CHEST AP LAT   Final Result   IMPRESSION:   1. Persistent opacification of the left hemithorax with volume loss. 2.  Moderate right pleural effusion with probable associated right basilar   atelectasis. XR CHEST PORT   Final Result   Impression: Increased volume loss left lung. Otherwise stable. XR CHEST PORT   Final Result   IMPRESSION:   1. Improved aeration of the left lung with persistent basilar consolidative   opacities and probable pleural effusions. 2. Other extensive interstitial and alveolar opacities are nonspecific, but   potentially related to pulmonary edema or infection. XR CHEST PORT   Final Result   Impression:Left lung collapse without improvement from prior study. XR CHEST PORT   Final Result   IMPRESSION:   1. There is milder enlargement of the cardiac silhouette as well as increasing   pulmonary vascular ill definition suspect for mild pulmonary edema. This could   be related to cardiogenic edema or fluid overload. 2.  There is been an improvement in the overall aeration of the left lung with   and improved visualization of vascular markings within the left upper lung zone.    There still remains significant partial collapse of the left lung. 3.  There is increased patchy airspace disease laterally within the right lower   lobe just above the right lateral costophrenic angle. 4.  There are persistent moderate-sized bilateral pleural effusions. XR CHEST AP LAT   Final Result   IMPRESSION: Persistent collapse of the left lung with bilateral pleural   effusions. Increasing vascular congestion on the right. XR CHEST PA LAT   Final Result   Impression:   Ongoing near complete white out of the left lung. Little interval change since   the prior examination. CT CHEST WO CONT   Final Result   IMPRESSION: Complete collapse of the left lung due to complete bronchial   obstruction, possibly aspiration. Fluid overload also noted      XR ABD ACUTE W 1 V CHEST   Final Result   IMPRESSION: Opacification of the left hemithorax, questioned for remote   pneumonectomy or lung collapse with pleural fluid. Prominent right parenchymal/interstitial lung markings compatible with fibrosis   and possible partial vascular congestion. Small bowel gas, nonobstructive pattern. XR CHEST PORT    (Results Pending)         IMPRESSION  Mr. Davide Zambrano is a 63 y.o.  male with the above history initially presented with shortness of breath and was found to have a pneumonia which was treated with IV antibiotics. Patient has remained tachypneic and short of breath since admission and has had several biopsies and pulmonary procedures but without any etiology as to the patient's symptoms. Neurology being consulted to evaluate for possible underlying neuromuscular disease as to the etiology of patient's persistent pulmonary pathology. Etiology of patient's persistent pulmonary issues do not seem to be of a neuromuscular etiology (neuromuscular junctional or motor neuron) given patient's clinical history and examination. Will obtain acetylcholine receptor antibodies on an inpatient basis to evaluate for pathology. Requested that patient get NIF/VC but patient deferred this when respiratory therapy attempted. Reiterated to patient the importance of this and he reports he will try. Do not feel that patient currently needs a trial of prior stick mean or steroids based on the above. If patient continues to have difficulty with pulmonary issues, patient can be evaluated on an outpatient basis for neuromuscular junctional abnormalities. RECOMMENDATIONS      Concern For Neuromuscular Pathology  -Obtain: Acetylcholine Receptor Ab Panel   -NIF/VC by Respiratory Therapy      Diagnosis and plan was discussed extensively with patient who is in agreement with the above plan. They have been counseled regarding potential side effects of the interventions above and would like to proceed with the aforementioned plan. This document has been prepared by the Dragon voice recognition system, typographical errors may have occurred.  Attempts have been made to correct errors, however inadvertent errors may persist.

## 2020-10-22 NOTE — PROGRESS NOTES
Progress Note    Patient: Julio C Abreu MRN: [id number]  SSN: [id number]    YOB: 1960  Age: 63 y.o. Sex: male      Admit Date: 9/10/2020    LOS: 42 days        Subjective:     Patient  Feels frustated as second surgical opinion is against ventral hernia surgery and no need for transfer to Other hospital. He wants to cont iv abx course for now. He did not speak much at this time.             Current Facility-Administered Medications:     furosemide (LASIX) injection 40 mg, 40 mg, IntraVENous, DAILY, JuvencioMaciel sosa MD, 40 mg at 10/22/20 0826    anidulafungin (ERAXIS) 100 mg in 0.9% sodium chloride 130 mL IVPB, 100 mg, IntraVENous, Q24H, Andrew Alejandra MD, 100 mg at 10/21/20 1630    oxyCODONE-acetaminophen (PERCOCET) 5-325 mg per tablet 1 Tab, 1 Tab, Oral, Q8H PRN, Leobardo Hurt MD, 1 Tab at 10/22/20 7446    ferrous sulfate tablet 325 mg, 1 Tab, Oral, DAILY WITH BREAKFAST, SeanluAvel pichardo MD, 325 mg at 10/22/20 8289    cyanocobalamin (VITAMIN B12) tablet 500 mcg, 500 mcg, Oral, DAILY, SeanluLetitia pichardo MD, 500 mcg at 10/22/20 0826    amLODIPine (NORVASC) tablet 5 mg, 5 mg, Oral, DAILY, Maciel Henning MD, 5 mg at 10/22/20 0826    meropenem (MERREM) 1 g in sterile water (preservative free) 20 mL IV syringe, 1 g, IntraVENous, Q8H, Andrew Alejandra MD, 1 g at 10/22/20 0929    albuterol-ipratropium (DUO-NEB) 2.5 MG-0.5 MG/3 ML, 3 mL, Nebulization, Q6HWA RT, Jarett Ocampo DO, 3 mL at 10/22/20 0833    acetylcysteine (MUCOMYST) 200 mg/mL (20 %) solution 600 mg, 600 mg, Nebulization, BID, Darby Boswell MD, 600 mg at 10/22/20 0834    diphenhydrAMINE (BENADRYL) capsule 25 mg, 25 mg, Oral, Q6H PRN, Aldo HERNANDEZ MD, 25 mg at 10/22/20 3865    guaiFENesin (ROBITUSSIN) 100 mg/5 mL oral liquid 400 mg, 400 mg, Oral, Q6H, Jarett Ocampo DO, 400 mg at 10/22/20 0620    0.9% sodium chloride infusion 250 mL, 250 mL, IntraVENous, PRN, Neris Goddard MD    atorvastatin (LIPITOR) tablet 20 mg, 20 mg, Oral, DAILY, Sherie Gray MD, 20 mg at 10/21/20 2006    pantoprazole (PROTONIX) tablet 40 mg, 40 mg, Oral, DAILY, Sherie Gray MD, 40 mg at 10/22/20 0620    acetaminophen (TYLENOL) tablet 650 mg, 650 mg, Oral, Q4H PRN, Sherie Gray MD, 650 mg at 20 2319    alum-mag hydroxide-simeth (MYLANTA) oral suspension 30 mL, 30 mL, Oral, Q4H PRN, Sherie Gray MD, 30 mL at 10/18/20 1014    magnesium hydroxide (MILK OF MAGNESIA) 400 mg/5 mL oral suspension 30 mL, 30 mL, Oral, DAILY PRN, Sherie Gray MD    ondansetron [facility]) injection 4 mg, 4 mg, IntraVENous, Q8H PRN, Sherie rGay MD, Stopped at 10/07/20 1300    Objective:     Visit Vitals  /70   Pulse 100   Temp 99 °F (37.2 °C)   Resp 20   Ht 6' 0.99\" (1.854 m)   Wt 82.5 kg (181 lb 14.1 oz)   SpO2 97%   BMI 24.00 kg/m²    O2 Flow Rate (L/min): 3 l/min O2 Device: Nasal cannula     Temp (24hrs), Av.6 °F (37 °C), Min:97.2 °F (36.2 °C), Max:99.1 °F (37.3 °C)         Physical Exam:   General :  no respiratory distress noted   Lungs :  Not labored. CVS :  no gallop  Abdomen :  +ventral hernia    Extremities : No edema noted,  Neurological : Awake, alert, oriented to time place person.  No neurological deficits.    Psychiatric : Mood and affect normal    Lab/Data Review:  Recent Results (from the past 24 hour(s))   C REACTIVE PROTEIN, QT    Collection Time: 10/22/20  6:50 AM   Result Value Ref Range    C-Reactive protein 7.56 (H) 0.00 - 0.60 mg/dL     CT abd 10/5. IMPRESSION:  1. Large ventral hernia containing nonobstructed small and large intestine. 2. Moderate to large right pleural effusion and mild to moderate left pleural  effusion. There is near complete collapse of the visualized portions of the left  lower lobe and there is pleural thickening in the left hemithorax. 3. Patchy airspace disease in the right lower lobe along with right basilar  atelectasis. 4. Nonspecific left adrenal nodule.   5. Other findings as described. Pt at increased risk for procedure with resp compromise in addition to anatomical considerations of reducing massive hernia. Pt aware of inc mortality states \"I don't care if it kills me but it has to be done\". Abd ct pending, will need to recline on pillows for procedure as cannot lie flat. Pt aware of risk for surgery, likelihood that will remain on vent long term and he reiterates he wants it done despite the risk. Cardio to optimize for clearance. Surgery on case f/u recommendations  Next x-ray  AP upright view of the chest compared to 10/9/2020. Complete opacification of  the left hemithorax is again noted. Small right pleural effusion and diffuse  right-sided airspace disease suggesting pulmonary edema are again noted. No  pneumothorax. Cardiac silhouette is obscured.     IMPRESSION  IMPRESSION: No significant change. See above. Assessment and plan:        Acute hypoxic respiratory failure :   from persistent LL collapse s/p 9 bronchoscopies. All cytologies/path from ebus no malignancy  fungal cs 10/7 mod yeast. Fungitel? Remains on abx. ID appreciated. supplemental O2  Lasix iv bid  Changed to daily  Monitor clinically     left lung collapse/PNA:      from mucous plugging. large abdominal hernia may be contributing but surgery does not feel it is  ebus/bx paratracheal node 10/13 neg  ID, Pulmonology, oncology appreciated. Abx per ID. Mediastinal Adenopathy:Ebus/path neg. Hernia complicating, pt wants it reduced despite increased mortality risk. General surgeon on board,chronic 6+ years neglected follow up. Surgery cx appreciated Dr. Jorge Alas no surgery planned until the pneumonia resolved, resp issues improve. discussed with patient wants hernia repaired despite risk. D /w Dr. Jorge Alas 10/21 second opinion noted by Dr. Jessica Robledo 10/22 that surgery not recommended at this time as he is poor surgical candidate. COPD:  pulm following. Anemia: AOCI. Stable, follow. Feso4/b12 supplements, follow. HTN : low soft BP holding parameters added.   Monitor        DVT ppx: lovenox  PT/OT  DISPO: TBD pending medical stability    Signed By: Jareth Snyder MD     October 22, 2020

## 2020-10-23 ENCOUNTER — APPOINTMENT (OUTPATIENT)
Dept: GENERAL RADIOLOGY | Age: 60
DRG: 177 | End: 2020-10-23
Attending: INTERNAL MEDICINE
Payer: MEDICARE

## 2020-10-23 LAB
ANION GAP SERPL CALC-SCNC: 1 MMOL/L (ref 5–15)
BASOPHILS # BLD: 0 K/UL (ref 0–0.1)
BASOPHILS NFR BLD: 0 % (ref 0–1)
BUN SERPL-MCNC: 13 MG/DL (ref 6–20)
BUN/CREAT SERPL: 25 (ref 12–20)
CA-I BLD-MCNC: 9.2 MG/DL (ref 8.5–10.1)
CHLORIDE SERPL-SCNC: 91 MMOL/L (ref 97–108)
CO2 SERPL-SCNC: 38 MMOL/L (ref 21–32)
CREAT SERPL-MCNC: 0.53 MG/DL (ref 0.7–1.3)
CRP SERPL-MCNC: 9.12 MG/DL (ref 0–0.6)
DIFFERENTIAL METHOD BLD: ABNORMAL
EOSINOPHIL # BLD: 0.1 K/UL (ref 0–0.4)
EOSINOPHIL NFR BLD: 1 % (ref 0–7)
ERYTHROCYTE [DISTWIDTH] IN BLOOD BY AUTOMATED COUNT: 17.8 % (ref 11.5–14.5)
GLUCOSE SERPL-MCNC: 67 MG/DL (ref 65–100)
HCT VFR BLD AUTO: 25.1 % (ref 36.6–50.3)
HGB BLD-MCNC: 7.9 G/DL (ref 12.1–17)
IMM GRANULOCYTES # BLD AUTO: 0 K/UL (ref 0–0.04)
IMM GRANULOCYTES NFR BLD AUTO: 0 % (ref 0–0.5)
LYMPHOCYTES # BLD: 1.1 K/UL (ref 0.8–3.5)
LYMPHOCYTES NFR BLD: 11 % (ref 12–49)
MAGNESIUM SERPL-MCNC: 1.8 MG/DL (ref 1.6–2.4)
MCH RBC QN AUTO: 29.6 PG (ref 26–34)
MCHC RBC AUTO-ENTMCNC: 31.5 G/DL (ref 30–36.5)
MCV RBC AUTO: 94 FL (ref 80–99)
MONOCYTES # BLD: 1.2 K/UL (ref 0–1)
MONOCYTES NFR BLD: 13 % (ref 5–13)
NEUTS SEG # BLD: 7.3 K/UL (ref 1.8–8)
NEUTS SEG NFR BLD: 75 % (ref 32–75)
PHOSPHATE SERPL-MCNC: 3.6 MG/DL (ref 2.6–4.7)
PLATELET # BLD AUTO: 294 K/UL (ref 150–400)
PMV BLD AUTO: 11.7 FL (ref 8.9–12.9)
POTASSIUM SERPL-SCNC: 4.8 MMOL/L (ref 3.5–5.1)
RBC # BLD AUTO: 2.67 M/UL (ref 4.1–5.7)
SODIUM SERPL-SCNC: 130 MMOL/L (ref 136–145)
WBC # BLD AUTO: 9.7 K/UL (ref 4.1–11.1)

## 2020-10-23 PROCEDURE — 74011000250 HC RX REV CODE- 250: Performed by: INTERNAL MEDICINE

## 2020-10-23 PROCEDURE — 94760 N-INVAS EAR/PLS OXIMETRY 1: CPT

## 2020-10-23 PROCEDURE — 74011250637 HC RX REV CODE- 250/637: Performed by: INTERNAL MEDICINE

## 2020-10-23 PROCEDURE — 36415 COLL VENOUS BLD VENIPUNCTURE: CPT

## 2020-10-23 PROCEDURE — 86140 C-REACTIVE PROTEIN: CPT

## 2020-10-23 PROCEDURE — 94640 AIRWAY INHALATION TREATMENT: CPT

## 2020-10-23 PROCEDURE — 74011250637 HC RX REV CODE- 250/637: Performed by: HOSPITALIST

## 2020-10-23 PROCEDURE — 84100 ASSAY OF PHOSPHORUS: CPT

## 2020-10-23 PROCEDURE — 83735 ASSAY OF MAGNESIUM: CPT

## 2020-10-23 PROCEDURE — 85025 COMPLETE CBC W/AUTO DIFF WBC: CPT

## 2020-10-23 PROCEDURE — 74011250636 HC RX REV CODE- 250/636: Performed by: INTERNAL MEDICINE

## 2020-10-23 PROCEDURE — 71045 X-RAY EXAM CHEST 1 VIEW: CPT

## 2020-10-23 PROCEDURE — 65270000029 HC RM PRIVATE

## 2020-10-23 PROCEDURE — 99232 SBSQ HOSP IP/OBS MODERATE 35: CPT | Performed by: INTERNAL MEDICINE

## 2020-10-23 PROCEDURE — 74011250636 HC RX REV CODE- 250/636: Performed by: HOSPITALIST

## 2020-10-23 PROCEDURE — 77010033678 HC OXYGEN DAILY

## 2020-10-23 PROCEDURE — 80048 BASIC METABOLIC PNL TOTAL CA: CPT

## 2020-10-23 RX ORDER — FUROSEMIDE 10 MG/ML
40 INJECTION INTRAMUSCULAR; INTRAVENOUS EVERY 12 HOURS
Status: DISCONTINUED | OUTPATIENT
Start: 2020-10-23 | End: 2020-10-24

## 2020-10-23 RX ADMIN — IPRATROPIUM BROMIDE AND ALBUTEROL SULFATE 3 ML: .5; 3 SOLUTION RESPIRATORY (INHALATION) at 08:14

## 2020-10-23 RX ADMIN — FERROUS SULFATE TAB 325 MG (65 MG ELEMENTAL FE) 325 MG: 325 (65 FE) TAB at 09:19

## 2020-10-23 RX ADMIN — GUAIFENESIN 400 MG: 200 SOLUTION ORAL at 12:49

## 2020-10-23 RX ADMIN — AMLODIPINE BESYLATE 5 MG: 5 TABLET ORAL at 09:19

## 2020-10-23 RX ADMIN — OXYCODONE HYDROCHLORIDE AND ACETAMINOPHEN 1 TABLET: 5; 325 TABLET ORAL at 23:17

## 2020-10-23 RX ADMIN — ACETYLCYSTEINE 600 MG: 200 SOLUTION ORAL; RESPIRATORY (INHALATION) at 08:14

## 2020-10-23 RX ADMIN — DIPHENHYDRAMINE HYDROCHLORIDE 25 MG: 25 CAPSULE ORAL at 14:07

## 2020-10-23 RX ADMIN — FUROSEMIDE 40 MG: 10 INJECTION, SOLUTION INTRAMUSCULAR; INTRAVENOUS at 09:18

## 2020-10-23 RX ADMIN — CYANOCOBALAMIN TAB 500 MCG 500 MCG: 500 TAB at 09:19

## 2020-10-23 RX ADMIN — GUAIFENESIN 400 MG: 200 SOLUTION ORAL at 23:17

## 2020-10-23 RX ADMIN — PANTOPRAZOLE SODIUM 40 MG: 40 TABLET, DELAYED RELEASE ORAL at 06:03

## 2020-10-23 RX ADMIN — GUAIFENESIN 400 MG: 200 SOLUTION ORAL at 06:03

## 2020-10-23 RX ADMIN — DIPHENHYDRAMINE HYDROCHLORIDE 25 MG: 25 CAPSULE ORAL at 23:19

## 2020-10-23 RX ADMIN — MEROPENEM 1 G: 1 INJECTION, POWDER, FOR SOLUTION INTRAVENOUS at 09:19

## 2020-10-23 RX ADMIN — ATORVASTATIN CALCIUM 20 MG: 20 TABLET, FILM COATED ORAL at 20:36

## 2020-10-23 RX ADMIN — OXYCODONE HYDROCHLORIDE AND ACETAMINOPHEN 1 TABLET: 5; 325 TABLET ORAL at 14:07

## 2020-10-23 RX ADMIN — GUAIFENESIN 400 MG: 200 SOLUTION ORAL at 17:47

## 2020-10-23 RX ADMIN — MEROPENEM 1 G: 1 INJECTION, POWDER, FOR SOLUTION INTRAVENOUS at 02:17

## 2020-10-23 NOTE — PROGRESS NOTES
Pulm/CC Progress Note    Subjective:     Patient seen and examined in his room this afternoon. No acute issues overnight; he is still intermittently refusing some respiratory therapies due to pain in his abdomen when he coughs. He is awake and alert. On 3 L of oxygen via nasal cannula, but satting in the high 90's. Overall his mood is more depressed/angry secondary to the fact that two separate surgeons here at [facility] do not feel that it is a good time to pursue hernia repair at this time. This will be discussed again with him at the bedside with Dr. Michael Christine. Chest x-ray that was done on 10/19/20 showed continued collapse of left lung. No need for further CXR's unless the clinical situation changes. He has undergone 9 bronchoscopies with mucus suctioning and has had several biopsies of the lesions within his tracheobronchial tree as well as brushings and EBUS biopsies of station 11L and station 7. He has had squamous metaplasia return, but no definitive malignancy. No malignancy on EBUS biopsies. I discussed the case with Dr. Ainsley Schultz on 10/19/20, we have all final results back and there is no definitive malignancy. At this point, I feel as though the patient should be transferred to a tertiary care facility such as [facility] for further management. Review of Systems   Has complains of shortness of breath. He is on nasal cannula oxygen. Denied any nausea vomiting. Has poor appetite    Objective:     Visit Vitals  /75   Pulse 92   Temp 97.5 °F (36.4 °C)   Resp 18   Ht 6' 0.99\" (1.854 m)   Wt 75.2 kg (165 lb 12.6 oz)   SpO2 97%   BMI 21.88 kg/m²    O2 Flow Rate (L/min): 3 l/min O2 Device: Nasal cannula    Temp (24hrs), Av °F (36.7 °C), Min:97.5 °F (36.4 °C), Max:99.1 °F (37.3 °C)      No intake/output data recorded.   10/21 1901 - 10/23 0700  In: -   Out: 1200 [Urine:1200]    Current Facility-Administered Medications   Medication Dose Route Frequency    furosemide (LASIX) injection 40 mg  40 mg IntraVENous Q12H    anidulafungin (ERAXIS) 100 mg in 0.9% sodium chloride 130 mL IVPB  100 mg IntraVENous Q24H    oxyCODONE-acetaminophen (PERCOCET) 5-325 mg per tablet 1 Tab  1 Tab Oral Q8H PRN    ferrous sulfate tablet 325 mg  1 Tab Oral DAILY WITH BREAKFAST    cyanocobalamin (VITAMIN B12) tablet 500 mcg  500 mcg Oral DAILY    amLODIPine (NORVASC) tablet 5 mg  5 mg Oral DAILY    meropenem (MERREM) 1 g in sterile water (preservative free) 20 mL IV syringe  1 g IntraVENous Q8H    albuterol-ipratropium (DUO-NEB) 2.5 MG-0.5 MG/3 ML  3 mL Nebulization Q6HWA RT    acetylcysteine (MUCOMYST) 200 mg/mL (20 %) solution 600 mg  600 mg Nebulization BID    diphenhydrAMINE (BENADRYL) capsule 25 mg  25 mg Oral Q6H PRN    guaiFENesin (ROBITUSSIN) 100 mg/5 mL oral liquid 400 mg  400 mg Oral Q6H    0.9% sodium chloride infusion 250 mL  250 mL IntraVENous PRN    atorvastatin (LIPITOR) tablet 20 mg  20 mg Oral DAILY    pantoprazole (PROTONIX) tablet 40 mg  40 mg Oral DAILY    acetaminophen (TYLENOL) tablet 650 mg  650 mg Oral Q4H PRN    alum-mag hydroxide-simeth (MYLANTA) oral suspension 30 mL  30 mL Oral Q4H PRN    magnesium hydroxide (MILK OF MAGNESIA) 400 mg/5 mL oral suspension 30 mL  30 mL Oral DAILY PRN    ondansetron (ZOFRAN) injection 4 mg  4 mg IntraVENous Q8H PRN       Physical Exam:  General: Alert and oriented x3.  3 L nasal cannula. Depressed/frustrated. HEENT: atraumatic, PERRL, moist mucosa,     Neck: Trachea midline, no carotid bruit, no masses. Supple but thick. Respiratory: No breath sounds on the left side. Few crackles and rhonchi on the right side. No significant change. Cardiovascular: RRR, no m/r/g, Normal S1 and S2   abdomen: Has large ventral abdominal hernia, evidence of surgical scars soft,   Rectal: deferred  Extremities: no cyanosis or clubbing. He has significant pitting edema in the dependent portions and lower extremities.    Integumentary: warm, dry, and pink, with no rash, purpura, or petechia. Heme/Lymph: no lymphadenopathy, no bruises  Neurological: No focal motor deficit   psychiatric: cooperative with normal mood, affect, and cognition      Additional comments: Chest x-rays reviewed    Data Review    Recent Results (from the past 24 hour(s))   CK    Collection Time: 10/22/20  6:46 PM   Result Value Ref Range    CK 19 (L) 39 - 405 U/L   METABOLIC PANEL, BASIC    Collection Time: 10/23/20  8:50 AM   Result Value Ref Range    Sodium 130 (L) 136 - 145 mmol/L    Potassium 4.8 3.5 - 5.1 mmol/L    Chloride 91 (L) 97 - 108 mmol/L    CO2 38 (H) 21 - 32 mmol/L    Anion gap 1 (L) 5 - 15 mmol/L    Glucose 67 65 - 100 mg/dL    BUN 13 6 - 20 mg/dL    Creatinine 0.53 (L) 0.70 - 1.30 mg/dL    BUN/Creatinine ratio 25 (H) 12 - 20      GFR est AA >60 >60 ml/min/1.73m2    GFR est non-AA >60 >60 ml/min/1.73m2    Calcium 9.2 8.5 - 10.1 mg/dL   MAGNESIUM    Collection Time: 10/23/20  8:50 AM   Result Value Ref Range    Magnesium 1.8 1.6 - 2.4 mg/dL   PHOSPHORUS    Collection Time: 10/23/20  8:50 AM   Result Value Ref Range    Phosphorus 3.6 2.6 - 4.7 mg/dL   C REACTIVE PROTEIN, QT    Collection Time: 10/23/20  8:50 AM   Result Value Ref Range    C-Reactive protein 9.12 (H) 0.00 - 0.60 mg/dL         Chest x-ray from last evening was reviewed which showed recurrence of left partial collapse. 6 results from 10/3/2020 were reviewed  Patient has left basal atelectasis. XR CHEST PORT   Final Result      XR CHEST PORT   Final Result      XR CHEST PORT   Final Result   Impression: No change compared to single view chest October 17, 2020. XR CHEST PORT   Final Result      XR CHEST PORT   Final Result      XR CHEST PORT   Final Result      XR CHEST PORT   Final Result      XR CHEST PORT   Final Result      XR CHEST PORT   Final Result   IMPRESSION: Persistent findings compared to single view chest October 10, 2020. XR CHEST PORT   Final Result   IMPRESSION: No significant change. See above. XR CHEST PA LAT   Final Result   Impression:      Collapse of left lung again noted with decreased aeration of the upper lobe   compared to yesterday increased opacity of the right lung may be due to portable   technique. XR CHEST PORT   Final Result   Impression:      Stable aeration of the left upper lung with atelectasis. Stable appearing   bilateral pleural effusions. No significant change compared to the prior study from 10/7/2020. XR CHEST PORT   Final Result      XR FLUOROSCOPY UNDER 60 MINUTES   Final Result      XR CHEST PORT   Final Result      XR CHEST PORT   Final Result   FINDINGS: Impression: Frontal single view chest.      Continued opacification of the left hemithorax, obscuring the cardiac   silhouette. Right lung interstitial thickening, airspace disease, bronchial thickening,   pleural effusion similar to previous. No pneumothorax. CT CHEST WO CONT   Final Result   Impression:    1. Increased now complete opacification of the left bronchi with low density   material.   2. Slightly increased patchy airspace pneumonia in the right lung. 3. Unchanged loculated and nonloculated left pleural effusion, complete left   lung consolidation, right lung pleural effusion. CT ABD PELV W CONT   Final Result   IMPRESSION:   1. Large ventral hernia containing nonobstructed small and large intestine. 2. Moderate to large right pleural effusion and mild to moderate left pleural   effusion. There is near complete collapse of the visualized portions of the left   lower lobe and there is pleural thickening in the left hemithorax. 3. Patchy airspace disease in the right lower lobe along with right basilar   atelectasis. 4. Nonspecific left adrenal nodule. 5. Other findings as described. XR CHEST PORT   Final Result   Impression: Heterogeneous opacity in the right lung, not significantly changed. Now complete opacification of the left hemithorax.       XR CHEST PORT   Final Result   IMPRESSION: No significant change. XR CHEST AP LAT   Final Result      XR CHEST AP LAT   Final Result   IMPRESSION:   1. Persistent opacification of the left hemithorax with volume loss. 2.  Moderate right pleural effusion with probable associated right basilar   atelectasis. XR CHEST PORT   Final Result   Impression: Increased volume loss left lung. Otherwise stable. XR CHEST PORT   Final Result   IMPRESSION:   1. Improved aeration of the left lung with persistent basilar consolidative   opacities and probable pleural effusions. 2. Other extensive interstitial and alveolar opacities are nonspecific, but   potentially related to pulmonary edema or infection. XR CHEST PORT   Final Result   Impression:Left lung collapse without improvement from prior study. XR CHEST PORT   Final Result   IMPRESSION:   1. There is milder enlargement of the cardiac silhouette as well as increasing   pulmonary vascular ill definition suspect for mild pulmonary edema. This could   be related to cardiogenic edema or fluid overload. 2.  There is been an improvement in the overall aeration of the left lung with   and improved visualization of vascular markings within the left upper lung zone. There still remains significant partial collapse of the left lung. 3.  There is increased patchy airspace disease laterally within the right lower   lobe just above the right lateral costophrenic angle. 4.  There are persistent moderate-sized bilateral pleural effusions. XR CHEST AP LAT   Final Result   IMPRESSION: Persistent collapse of the left lung with bilateral pleural   effusions. Increasing vascular congestion on the right. XR CHEST PA LAT   Final Result   Impression:   Ongoing near complete white out of the left lung. Little interval change since   the prior examination.       CT CHEST WO CONT   Final Result   IMPRESSION: Complete collapse of the left lung due to complete bronchial   obstruction, possibly aspiration. Fluid overload also noted      XR ABD ACUTE W 1 V CHEST   Final Result   IMPRESSION: Opacification of the left hemithorax, questioned for remote   pneumonectomy or lung collapse with pleural fluid. Prominent right parenchymal/interstitial lung markings compatible with fibrosis   and possible partial vascular congestion. Small bowel gas, nonobstructive pattern. Assessment/Plan: This is a middle-aged male who was admitted because of increasing symptoms of shortness of breath. He is getting treated in hospital for pneumonia with IV Zosyn, was on Azithromycin. He is noted to be increasingly tachypneic and short of breath. He has been on supplemental oxygen. His chest x-ray is showing persistent left lung opacification from mucous plugging. This has not improved with aggressive pulmonary hygiene and 9 suction bronchoscopies. Patient is undergone multiple biopsies as well as transbronchial needle aspirations of multiple mediastinal nodes, all cytology/pathology has returned negative for malignancy thus far. .     Plan:     1.)    Left lung collapse/shortness of breath:  - Shortness of breath and hypoxia due to recurrent left lung collapse and deconditioning.  - He has had multiple bronchoscopies done along with lavage but he continues to have left lung collapse. There is really no further need to continue doing serial CXR's unless something else changes to improve his mucous clearance. - He underwent EBUS on 10/13/20, mildly enlarged station 7 lymph node, significantly enlarged station 11 lymph node. Ultrasound-guided biopsy was done, negative for malignancy. Final results confirm no malignancy. - CT of the abdomen pelvis shows a large hernia. Certainly affecting his ability to cough.  However, he has been evaluated by two general surgeons here at [facility] and they are recommending against surgical repair of the hernia at this time due to his current pulmonary status, the size of the hernia, the extent of the operation, and loss of domain.  - His current pulmonary hygiene regimen includes: Duonebs every 6 hours, mucomyst nebulizers every 12 hours, chest physiotherapy every 6 hours, EZ-PAP therapy q6, acapella device as well as incentive spirometer use. Guaifenesin 400 mg every 6 hours also on board. Patient has a history of refusing some of these therapies. Likely due to developing depression from being admitted for so long.  - At this point, I feel we have come fairly close to exhausting our ability to sufficiently take care of this patient here at [facility]. I believe that the patient would benefit most from being transferred to a tertiary care center such as [facility] for pulmonary, general surgery, and neurological second opinions. I believe that this will lead to an improved mood as I feel that he is frustrated with his care at this facility.  - I will be more than happy to discuss his case with the pulmonary team at [facility] if he is excepted at that facility.  - A backup option will be elective intubation with subsequent scheduled pulmonary hygiene as above and better ease to perform clean out bronchoscopies. If this occurs, he will very likely end up with a tracheostomy, with which the patient is agreeable if necessary.  - Additionally, I discussed the case with the on-call Pulmonologist at [address] [place] [address] (Dr. June Mane) on 10/22/20. I described the case in detail and he did not feel that [address] [place] [address] would be able to offer much more than what we are offering here at [facility], from a pulmonary perspective. He did recommend obtaining a Neurology consultation to rule out a neuro-musculoskeletal disease.  -Neurology did evaluate the patient this morning, they are not completely convinced that the patient has evidence of a neuromuscular disease on physical exam and history.   However, in acetylcholine receptor antibody panel has been sent and it was recommended that the patient perform NIF/VC testing, with which I completely agree. Unfortunately, patient has been declining these maneuvers this morning.  - I did talk with Dr. Art Ortiz multiple times on the phone today, I sincerely appreciate the assistance he has provided in the care plan of this patient.  -Slightly increased congestion pattern in the right lung on chest imaging today, will increase his Lasix back to twice daily. Blood pressures have been stable. 2.)  COPD:  - He has a history of COPD based on chronic smoking.  - Continue scheduled bronchodilators. - Patient should be discharged with a LAMA/LABA such as Anoro and needs f/u in our office for PFT's and further management after discharge. - Weaned off prednisone. 3.)  Pneumonia:   - He is on IV Meropenem; was on Zosyn for >1 month. All cultures from multiple bronchoscopies are no growth to date. AFBs are negative, fungal culture showed moderate yeast but otherwise nothing significant.  - Fungitell is negative; overall trend in CRP is going down. - Infectious disease consultation appreciated. Given no improvement in left lung mucous clearance, IV Anidulafungin was started on 10/20/20.  - Repeat CXR in the morning.    4.)  Hypertension:  - He is on antihypertensive medications, BP's stable. - Echocardiogram done on 9/16 showed an EF of 60 to 27% grade 2 diastolic dysfunction and moderate to severe aortic stenosis. Right ventricle is normal in size and function.    - Continue Lasix to 40 mg IV every 12 hours. 5.)  Ventral abdominal hernia:  - Patient wants to have his ventral abdominal hernia fixed very badly. - Patient was independently evaluated by Dr. Art Ortiz and separately by Dr. Morris Martinez. They are recommending against surgical repair of the hernia at this time due to his current pulmonary status, the size of the hernia, the extent of the operation, and loss of domain. -May benefit from third opinion at a tertiary care center.         Questions of patient were answered at bedside in detail  Case discussed in detail with RN, RT, and care team  Thank you for involving me in the care of this patient  I will follow with you closely during hospitalization    Time spent more than 30 minutes in direct patient care with no overlap      Fuad Guerrero, DO  Pulmonary and critical care

## 2020-10-23 NOTE — PROGRESS NOTES
Problem: Patient Education: Go to Patient Education Activity  Goal: Patient/Family Education  Description: LE HEP to improve strength and functional mobility       Outcome: Progressing Towards Goal

## 2020-10-23 NOTE — PROGRESS NOTES
Progress Note    Patient: Lee Ann Ma MRN: [id number]  SSN: [id number]    YOB: 1960  Age: 63 y.o. Sex: male      Admit Date: 9/10/2020    LOS: 43 days     Subjective:   Patient followed for chronic, refractory pneumonia of undetermined etiology despite extensive microbiologic work-up. Bronchial washings again only showed normal respiratory xiomara and moderate yeasts. CXR yesterday showed complete opacification of left hemithorax. CRP still trending upward. He remains afebrile. Currently on Meropenem and Anidulafungin. Patient resting comfortably with no new complaints. Objective:     Vitals:    10/22/20 2309 10/23/20 0700 10/23/20 0715 10/23/20 0814   BP: 124/68 121/75 121/75    Pulse: 92 92 92    Resp: 18 18 18    Temp: 97.9 °F (36.6 °C) 97.5 °F (36.4 °C) 97.5 °F (36.4 °C)    SpO2: 95% 99%  97%   Weight:       Height:            Intake and Output:  Current Shift: No intake/output data recorded. Last three shifts: 10/21 1901 - 10/23 0700  In: -   Out: 1200 [Urine:1200]    Physical Exam:   Vitals signs and nursing note reviewed. Constitutional:       General: He is not in acute distress. Appearance: He is ill-appearing. He is not toxic-appearing or diaphoretic. Pulmonary: Decreased breath sounds left lung field  Abdominal:      General: There is distension (ventral hernia with scarring and crusting). Tenderness: There is no abdominal tenderness. There is no guarding. Genitourinary:     Penis: Normal.       Comments: No Padilla  Musculoskeletal:      Right lower leg: Edema present. Left lower leg: Edema present. Skin:     Findings: Erythema (both calves) present. Neurological:      General: No focal deficit present. Mental Status: He is alert and oriented to person, place, and time. Psychiatric:         Mood and Affect: Mood normal.         Behavior: Behavior normal.         Thought Content:  Thought content normal.         Judgment: Judgment normal.      Lab/Data Review:    WBC 8,600  Procalcitonin <0.05  CRP 9.12 (7.56 (5.91 (8.04 (11.60 (12.50    (129  ANCA panel Negative    Serum fungitell <31 Negative    Bronchial washing fungus (10/7)  Pending  Bronchial washing AFB (10/7) smear negative  Bronchial washing culture (10/7) Normal respiratory xiomara  Bronchial washing culture (10/13) Normal respiratory xiomara FINAL  Bronchial washing fungal (10/13) Moderate yeasts consistent with colonization    Assessment:     1. Chronic pneumonia, likely post-obstructive, bronchial washing grew Autumn dublinensis, Day #39 IV Antibiotics, Day #13 IV Meropenem, Day #4 IV Anidulafungin. 2. Markedly elevated CRP, presumed secondary to #1, trending upward. 3. Abnormal bronchoscopy with nodular lesions left tracheobronchial tree, biopsy pending. 4. Chronic venous insufficiency with stasis dermatitis  5. Large ventral hernia, seen by General Surgery     Comment:   CRP still trending upward. It does not appear that anti-microbial therapy is making any impact, at least on his CRP, which suggests that perhaps the problem is not infection at this point. Atypical mycobacteria would be a consideration, however, I am not fond of any further empiric therapy. Apparently he has been on steroids without improvement. I support transfer to tertiary care facility. Plan:   1. Discontinue Meropenem and Andidulafungin  2. Agree with transfer to tertiary care facility  3.  Discussed recommendation with patient    Signed By: Celeste Abdi MD     October 23, 2020

## 2020-10-23 NOTE — PROGRESS NOTES
Progress Note    Patient: Ron Olivarez MRN: [id number]  SSN: [id number]    YOB: 1960  Age: 63 y.o.   Sex: male      Admit Date: 9/10/2020    LOS: 43 days        Subjective:     Patient  Feels frustated as second surgical opinion is against ventral hernia surgery  Case discussed with Dr Sheryl Ennis due to Left lung collapse/shortness of breath, and a second opinion for general surgeon for repair his  large Ventral abdominal hernia, I will initiate  call to [facility] transfer center           Current Facility-Administered Medications:     furosemide (LASIX) injection 40 mg, 40 mg, IntraVENous, Q12H, Jarett Ocampo DO    oxyCODONE-acetaminophen (PERCOCET) 5-325 mg per tablet 1 Tab, 1 Tab, Oral, Q8H PRN, Domenica Reynaga MD, 1 Tab at 10/23/20 1407    ferrous sulfate tablet 325 mg, 1 Tab, Oral, DAILY WITH BREAKFAST, Lexy Benton MD, 325 mg at 10/23/20 0919    cyanocobalamin (VITAMIN B12) tablet 500 mcg, 500 mcg, Oral, DAILY, Lexy Benton MD, 500 mcg at 10/23/20 0919    amLODIPine (NORVASC) tablet 5 mg, 5 mg, Oral, DAILY, Juvencio, Maciel PETERSON MD, 5 mg at 10/23/20 0919    albuterol-ipratropium (DUO-NEB) 2.5 MG-0.5 MG/3 ML, 3 mL, Nebulization, Q6HWA RT, Jarett Ocampo DO, 3 mL at 10/23/20 0814    acetylcysteine (MUCOMYST) 200 mg/mL (20 %) solution 600 mg, 600 mg, Nebulization, BID, Darby Boswell MD, 600 mg at 10/23/20 0814    diphenhydrAMINE (BENADRYL) capsule 25 mg, 25 mg, Oral, Q6H PRN, Jesus Cardenas MD, 25 mg at 10/23/20 1407    guaiFENesin (ROBITUSSIN) 100 mg/5 mL oral liquid 400 mg, 400 mg, Oral, Q6H, Jarett Ocampo DO, 400 mg at 10/23/20 1747    0.9% sodium chloride infusion 250 mL, 250 mL, IntraVENous, PRN, Blanca Weber MD    atorvastatin (LIPITOR) tablet 20 mg, 20 mg, Oral, DAILY, Blanca Weber MD, 20 mg at 10/22/20 2133    pantoprazole (PROTONIX) tablet 40 mg, 40 mg, Oral, DAILY, Blanca Weber MD, 40 mg at 10/23/20 0603    acetaminophen (TYLENOL) tablet 650 mg, 650 mg, Oral, Q4H PRN, Allie Rome MD, 650 mg at 20 2319    alum-mag hydroxide-simeth (MYLANTA) oral suspension 30 mL, 30 mL, Oral, Q4H PRN, Allie Rome MD, 30 mL at 10/18/20 1014    magnesium hydroxide (MILK OF MAGNESIA) 400 mg/5 mL oral suspension 30 mL, 30 mL, Oral, DAILY PRN, Allie Rome MD    ondansetron [facility]) injection 4 mg, 4 mg, IntraVENous, Q8H PRN, Allie Rome MD, Stopped at 10/07/20 1300    Objective:     Visit Vitals  /70   Pulse 100   Temp 99 °F (37.2 °C)   Resp 20   Ht 6' 0.99\" (1.854 m)   Wt 82.5 kg (181 lb 14.1 oz)   SpO2 97%   BMI 24.00 kg/m²    O2 Flow Rate (L/min): 3 l/min O2 Device: Nasal cannula     Temp (24hrs), Av.6 °F (37 °C), Min:97.2 °F (36.2 °C), Max:99.1 °F (37.3 °C)         Physical Exam:   General :  no respiratory distress noted   Lungs :  Not labored.   CVS :  no gallop  Abdomen :  +ventral hernia    Extremities : No edema noted,  Neurological : Awake, alert, oriented to time place person.  No neurological deficits.    Psychiatric : Mood and affect normal    Lab/Data Review:  Recent Results (from the past 24 hour(s))   METABOLIC PANEL, BASIC    Collection Time: 10/23/20  8:50 AM   Result Value Ref Range    Sodium 130 (L) 136 - 145 mmol/L    Potassium 4.8 3.5 - 5.1 mmol/L    Chloride 91 (L) 97 - 108 mmol/L    CO2 38 (H) 21 - 32 mmol/L    Anion gap 1 (L) 5 - 15 mmol/L    Glucose 67 65 - 100 mg/dL    BUN 13 6 - 20 mg/dL    Creatinine 0.53 (L) 0.70 - 1.30 mg/dL    BUN/Creatinine ratio 25 (H) 12 - 20      GFR est AA >60 >60 ml/min/1.73m2    GFR est non-AA >60 >60 ml/min/1.73m2    Calcium 9.2 8.5 - 10.1 mg/dL   MAGNESIUM    Collection Time: 10/23/20  8:50 AM   Result Value Ref Range    Magnesium 1.8 1.6 - 2.4 mg/dL   PHOSPHORUS    Collection Time: 10/23/20  8:50 AM   Result Value Ref Range    Phosphorus 3.6 2.6 - 4.7 mg/dL   C REACTIVE PROTEIN, QT    Collection Time: 10/23/20  8:50 AM   Result Value Ref Range    C-Reactive protein 9.12 (H) 0.00 - 0.60 mg/dL   CBC WITH AUTOMATED DIFF    Collection Time: 10/23/20  2:59 PM   Result Value Ref Range    WBC 9.7 4.1 - 11.1 K/uL    RBC 2.67 (L) 4.10 - 5.70 M/uL    HGB 7.9 (L) 12.1 - 17.0 g/dL    HCT 25.1 (L) 36.6 - 50.3 %    MCV 94.0 80.0 - 99.0 FL    MCH 29.6 26.0 - 34.0 PG    MCHC 31.5 30.0 - 36.5 g/dL    RDW 17.8 (H) 11.5 - 14.5 %    PLATELET 681 017 - 946 K/uL    MPV 11.7 8.9 - 12.9 FL    NEUTROPHILS 75 32 - 75 %    LYMPHOCYTES 11 (L) 12 - 49 %    MONOCYTES 13 5 - 13 %    EOSINOPHILS 1 0 - 7 %    BASOPHILS 0 0 - 1 %    IMMATURE GRANULOCYTES 0 0.0 - 0.5 %    ABS. NEUTROPHILS 7.3 1.8 - 8.0 K/UL    ABS. LYMPHOCYTES 1.1 0.8 - 3.5 K/UL    ABS. MONOCYTES 1.2 (H) 0.0 - 1.0 K/UL    ABS. EOSINOPHILS 0.1 0.0 - 0.4 K/UL    ABS. BASOPHILS 0.0 0.0 - 0.1 K/UL    ABS. IMM. GRANS. 0.0 0.00 - 0.04 K/UL    DF AUTOMATED       CT abd 10/5. IMPRESSION:  1. Large ventral hernia containing nonobstructed small and large intestine. 2. Moderate to large right pleural effusion and mild to moderate left pleural  effusion. There is near complete collapse of the visualized portions of the left  lower lobe and there is pleural thickening in the left hemithorax. 3. Patchy airspace disease in the right lower lobe along with right basilar  atelectasis. 4. Nonspecific left adrenal nodule. 5. Other findings as described. Pt at increased risk for procedure with resp compromise in addition to anatomical considerations of reducing massive hernia. Pt aware of inc mortality states \"I don't care if it kills me but it has to be done\". Abd ct pending, will need to recline on pillows for procedure as cannot lie flat. Pt aware of risk for surgery, likelihood that will remain on vent long term and he reiterates he wants it done despite the risk. Cardio to optimize for clearance. Surgery on case f/u recommendations  Next x-ray  AP upright view of the chest compared to 10/9/2020. Complete opacification of  the left hemithorax is again noted.  Small right pleural effusion and diffuse  right-sided airspace disease suggesting pulmonary edema are again noted. No  pneumothorax. Cardiac silhouette is obscured.     IMPRESSION  IMPRESSION: No significant change. See above. Assessment and plan:        Acute hypoxic respiratory failure : Currently on nasal cannula oxygen 3 liters per minute stable  from persistent LL collapse s/p 9 bronchoscopies. All cytologies/path from ebus no malignancy  fungal cs 10/7 mod yeast. Fungitel? Remains on abx. ID appreciated. supplemental O2  Lasix iv bid  Changed to daily  Monitor clinically     left lung collapse/PNA:   [place]ology [place] [facility] already have a couple bronchoscope from mucous plugging. large abdominal hernia may be contributing but surgery does not feel it is  ebus/bx paratracheal node 10/13 neg  ID, Pulmonology, oncology appreciated. Abx per ID. Mediastinal Adenopathy:Ebus/path neg. Hernia complicating, pt wants it reduced despite increased mortality risk. General surgeon on board,chronic 6+ years neglected follow up. Surgery cx appreciated Dr. Ogden Goes no surgery planned until the pneumonia resolved, resp issues improve. discussed with patient wants hernia repaired despite risk. D /w Dr. Ogden Goes 10/21 second opinion noted by Dr. Nadia Corral 10/22 that surgery not recommended at this time as he is poor surgical candidate. COPD:  pulm following. Anemia: AOCI. Stable, follow. Feso4/b12 supplements, follow. HTN : low soft BP holding parameters added.   Monitor      I will  contact with [facility] transfer center for higher level care and input  DVT ppx: lovenox  PT/OT  DISPO: TBD pending medical stability    Signed By: Vianney Toth MD     October 23, 2020

## 2020-10-23 NOTE — PROGRESS NOTES
Progress Note    Patient: Eber Saint MRN: [id number]  SSN: [id number]    YOB: 1960  Age: 63 y.o. Sex: male      Admit Date: 9/10/2020    LOS: 42 days     Subjective:   Patient followed for chronic, refractory pneumonia of undetermined etiology despite extensive microbiologic work-up. Bronchial washings again only showed normal respiratory xiomara and moderate yeasts. CXR yesterday showed complete opacification of left hemithorax. CRP trending downward back upward today. He remains afebrile. Currently on Meropenem and Anidulafungin. Objective:     Vitals:    10/22/20 0834 10/22/20 1150 10/22/20 1455 10/22/20 1945   BP:   109/70 115/71   Pulse:   94 91   Resp:   18 18   Temp:   97.8 °F (36.6 °C) 99.1 °F (37.3 °C)   SpO2: 99%  98% 97%   Weight:       Height:  6' 0.99\" (1.854 m)          Intake and Output:  Current Shift: No intake/output data recorded. Last three shifts: 10/21 0701 - 10/22 1900  In: -   Out: 1200 [Urine:1200]    Physical Exam:   Vitals signs and nursing note reviewed. Constitutional:       General: He is not in acute distress. Appearance: He is ill-appearing. He is not toxic-appearing or diaphoretic. Pulmonary: Decreased breath sounds left lung field  Abdominal:      General: There is distension (ventral hernia with scarring and crusting). Tenderness: There is no abdominal tenderness. There is no guarding. Genitourinary:     Penis: Normal.       Comments: No Padilla  Musculoskeletal:      Right lower leg: Edema present. Left lower leg: Edema present. Skin:     Findings: Erythema (both calves) present. Neurological:      General: No focal deficit present. Mental Status: He is alert and oriented to person, place, and time. Psychiatric:         Mood and Affect: Mood normal.         Behavior: Behavior normal.         Thought Content:  Thought content normal.         Judgment: Judgment normal.        Lab/Data Review:    WBC 8,600  Procalcitonin <0.05  CRP 7.56 (5.91 (8.04 (11.60 (12.50    (129  ANCA panel Negative    Serum fungitell <31 Negative    Bronchial washing fungus (10/7)  Pending  Bronchial washing AFB (10/7) smear negative  Bronchial washing culture (10/7) Normal respiratory xiomara  Bronchial washing culture (10/13) Normal respiratory xiomara FINAL  Bronchial washing fungal (10/13) Moderate yeasts consistent with colonization    Assessment:     1. Chronic pneumonia, likely post-obstructive, bronchial washing grew Autumn dublinensis, Day #39 IV Antibiotics, Day #12 IV Meropenem, Day #3 IV Anidulafungin. 2. Markedly elevated CRP, presumed secondary to #1, trending upward. 3. Abnormal bronchoscopy with nodular lesions left tracheobronchial tree, biopsy pending. 4. Chronic venous insufficiency with stasis dermatitis  5. Large ventral hernia, seen by General Surgery     Comment:   CRP trending upward. It does not appear that anti-microbial therapy is making any impact, at least on his CRP, which suggests that perhaps the problem is not infection at this point. Atypical mycobacteria would be a consideration, however, I am not fond of any further empiric therapy. Plan:   1. Continue IV Meropenem  2. Continue IV Andidulafungin  3. In am, repeat CBC, CRP, done  4. If CRP still trending upward tomorrow, I would favor stopping both Meropenem and Anidulafungin. 5. Not sure if he has been on steroids, but perhaps this is next step.     Signed By: Sylvia Denise MD     October 22, 2020

## 2020-10-23 NOTE — PROGRESS NOTES
CM reviewed chart. Patient at this time is not at the point for the next LOC. CM will continue to follow patient.

## 2020-10-23 NOTE — PROGRESS NOTES
LOS skin assessment done by myself and Kennard People, LPN. Pt has excoriation on sacrum and groin. Skin tear present to right buttock. Abrasion present to abdominal hernia, covered with mepilex. Scattered bruising, and scabs present to arms bilaterally.

## 2020-10-24 PROCEDURE — 74011250636 HC RX REV CODE- 250/636: Performed by: INTERNAL MEDICINE

## 2020-10-24 PROCEDURE — 99232 SBSQ HOSP IP/OBS MODERATE 35: CPT | Performed by: INTERNAL MEDICINE

## 2020-10-24 PROCEDURE — 74011250637 HC RX REV CODE- 250/637: Performed by: FAMILY MEDICINE

## 2020-10-24 PROCEDURE — 65270000029 HC RM PRIVATE

## 2020-10-24 PROCEDURE — 74011250637 HC RX REV CODE- 250/637: Performed by: INTERNAL MEDICINE

## 2020-10-24 PROCEDURE — 94760 N-INVAS EAR/PLS OXIMETRY 1: CPT

## 2020-10-24 PROCEDURE — 74011250637 HC RX REV CODE- 250/637: Performed by: HOSPITALIST

## 2020-10-24 PROCEDURE — 77010033678 HC OXYGEN DAILY

## 2020-10-24 RX ORDER — FUROSEMIDE 10 MG/ML
40 INJECTION INTRAMUSCULAR; INTRAVENOUS DAILY
Status: COMPLETED | OUTPATIENT
Start: 2020-10-25 | End: 2020-10-27

## 2020-10-24 RX ORDER — DIPHENHYDRAMINE HCL 25 MG
25 CAPSULE ORAL
Status: DISCONTINUED | OUTPATIENT
Start: 2020-10-24 | End: 2020-10-25

## 2020-10-24 RX ORDER — LANOLIN ALCOHOL/MO/W.PET/CERES
1 CREAM (GRAM) TOPICAL 2 TIMES DAILY WITH MEALS
Status: DISCONTINUED | OUTPATIENT
Start: 2020-10-24 | End: 2020-10-27 | Stop reason: HOSPADM

## 2020-10-24 RX ORDER — GUAIFENESIN 100 MG/5ML
400 SOLUTION ORAL EVERY 8 HOURS
Status: DISCONTINUED | OUTPATIENT
Start: 2020-10-24 | End: 2020-10-25

## 2020-10-24 RX ADMIN — OXYCODONE HYDROCHLORIDE AND ACETAMINOPHEN 1 TABLET: 5; 325 TABLET ORAL at 07:24

## 2020-10-24 RX ADMIN — GUAIFENESIN 400 MG: 200 SOLUTION ORAL at 22:06

## 2020-10-24 RX ADMIN — PANTOPRAZOLE SODIUM 40 MG: 40 TABLET, DELAYED RELEASE ORAL at 05:07

## 2020-10-24 RX ADMIN — FERROUS SULFATE TAB 325 MG (65 MG ELEMENTAL FE) 325 MG: 325 (65 FE) TAB at 08:14

## 2020-10-24 RX ADMIN — OXYCODONE HYDROCHLORIDE AND ACETAMINOPHEN 1 TABLET: 5; 325 TABLET ORAL at 14:33

## 2020-10-24 RX ADMIN — FUROSEMIDE 40 MG: 10 INJECTION, SOLUTION INTRAMUSCULAR; INTRAVENOUS at 08:14

## 2020-10-24 RX ADMIN — ACETAMINOPHEN 650 MG: 325 TABLET, FILM COATED ORAL at 20:17

## 2020-10-24 RX ADMIN — DIPHENHYDRAMINE HYDROCHLORIDE 25 MG: 25 CAPSULE ORAL at 07:24

## 2020-10-24 RX ADMIN — AMLODIPINE BESYLATE 5 MG: 5 TABLET ORAL at 08:14

## 2020-10-24 RX ADMIN — GUAIFENESIN 400 MG: 200 SOLUTION ORAL at 05:07

## 2020-10-24 RX ADMIN — GUAIFENESIN 400 MG: 200 SOLUTION ORAL at 13:47

## 2020-10-24 RX ADMIN — DIPHENHYDRAMINE HYDROCHLORIDE 25 MG: 25 CAPSULE ORAL at 22:06

## 2020-10-24 RX ADMIN — CYANOCOBALAMIN TAB 500 MCG 500 MCG: 500 TAB at 09:00

## 2020-10-24 RX ADMIN — FERROUS SULFATE TAB 325 MG (65 MG ELEMENTAL FE) 325 MG: 325 (65 FE) TAB at 17:00

## 2020-10-24 RX ADMIN — OXYCODONE HYDROCHLORIDE AND ACETAMINOPHEN 1 TABLET: 5; 325 TABLET ORAL at 22:06

## 2020-10-24 RX ADMIN — ATORVASTATIN CALCIUM 20 MG: 20 TABLET, FILM COATED ORAL at 20:18

## 2020-10-24 NOTE — PROGRESS NOTES
Progress Note    Patient: Renata Yung MRN: [id number]  SSN: [id number]    YOB: 1960  Age: 63 y.o. Sex: male      Admit Date: 9/10/2020    LOS: 44 days        Subjective:     Patient looks comfortable on 3 L nasal cannula oxygen in place, feels weak, overnight  no acute changes feels frustated as second surgical opinion is against ventral hernia surgery .  Case discussed with Dr Lajuana Boeck due to Left lung collapse/shortness of breath, persistent, patient had multiple bronchoscope negative for malignancy, per pulmonology recommendation will transfer patient to tertiary care facility, [facility], case discussed with the patient, he understands and agrees with the plan and a second opinion for general surgeon for repair his  large Ventral abdominal hernia, called to  [facility] transfer center, discussed with  hospitalist on-call physician Dr. Janeth Fields, initiated transfer process, he will discuss with their pulmonology, call back for final decision           Current Facility-Administered Medications:     furosemide (LASIX) injection 40 mg, 40 mg, IntraVENous, Q12H, Jarett Ocampo DO, 40 mg at 10/24/20 0814    oxyCODONE-acetaminophen (PERCOCET) 5-325 mg per tablet 1 Tab, 1 Tab, Oral, Q8H PRN, nJ Islas MD, 1 Tab at 10/24/20 7513    ferrous sulfate tablet 325 mg, 1 Tab, Oral, DAILY WITH BREAKFAST, Abdias Benton MD, 325 mg at 10/24/20 0814    cyanocobalamin (VITAMIN B12) tablet 500 mcg, 500 mcg, Oral, DAILY, Michelle Benton MD, 500 mcg at 10/24/20 0900    amLODIPine (NORVASC) tablet 5 mg, 5 mg, Oral, DAILY, Juvencio, Maciel PETERSON MD, 5 mg at 10/24/20 0814    albuterol-ipratropium (DUO-NEB) 2.5 MG-0.5 MG/3 ML, 3 mL, Nebulization, Q6HWA RT, Jarett Ocmapo DO, 3 mL at 10/23/20 0814    acetylcysteine (MUCOMYST) 200 mg/mL (20 %) solution 600 mg, 600 mg, Nebulization, BID, Darby Boswell MD, Stopped at 10/24/20 0900    diphenhydrAMINE (BENADRYL) capsule 25 mg, 25 mg, Oral, Q6H PRN, Jn Islas MD, 25 mg at 10/24/20 0724    guaiFENesin (ROBITUSSIN) 100 mg/5 mL oral liquid 400 mg, 400 mg, Oral, Q6H, Jarett Ocampo, , 400 mg at 10/24/20 0507    0.9% sodium chloride infusion 250 mL, 250 mL, IntraVENous, PRN, Sherie Gray MD    atorvastatin (LIPITOR) tablet 20 mg, 20 mg, Oral, DAILY, Sherie Gray MD, 20 mg at 10/23/20 2036    pantoprazole (PROTONIX) tablet 40 mg, 40 mg, Oral, DAILY, Sherie Gray MD, 40 mg at 10/24/20 0507    acetaminophen (TYLENOL) tablet 650 mg, 650 mg, Oral, Q4H PRN, Sherie Gray MD, 650 mg at 20 2319    alum-mag hydroxide-simeth (MYLANTA) oral suspension 30 mL, 30 mL, Oral, Q4H PRN, Sherie Gray MD, 30 mL at 10/18/20 1014    magnesium hydroxide (MILK OF MAGNESIA) 400 mg/5 mL oral suspension 30 mL, 30 mL, Oral, DAILY PRN, Sherie Gray MD    ondansetron [facility]) injection 4 mg, 4 mg, IntraVENous, Q8H PRN, Sherie Gray MD, Stopped at 10/07/20 1300    Objective:     Visit Vitals  /70   Pulse 100   Temp 99 °F (37.2 °C)   Resp 20   Ht 6' 0.99\" (1.854 m)   Wt 82.5 kg (181 lb 14.1 oz)   SpO2 97%   BMI 24.00 kg/m²    O2 Flow Rate (L/min): 3 l/min O2 Device: Nasal cannula     Temp (24hrs), Av.6 °F (37 °C), Min:97.2 °F (36.2 °C), Max:99.1 °F (37.3 °C)         Physical Exam:   General :  no respiratory distress noted   Lungs :  Not labored.   CVS :  no gallop  Abdomen :  +ventral hernia    Extremities : No edema noted,  Neurological : Awake, alert, oriented to time place person.  No neurological deficits.    Psychiatric : Mood and affect normal    Lab/Data Review:  Recent Results (from the past 24 hour(s))   CBC WITH AUTOMATED DIFF    Collection Time: 10/23/20  2:59 PM   Result Value Ref Range    WBC 9.7 4.1 - 11.1 K/uL    RBC 2.67 (L) 4.10 - 5.70 M/uL    HGB 7.9 (L) 12.1 - 17.0 g/dL    HCT 25.1 (L) 36.6 - 50.3 %    MCV 94.0 80.0 - 99.0 FL    MCH 29.6 26.0 - 34.0 PG    MCHC 31.5 30.0 - 36.5 g/dL    RDW 17.8 (H) 11.5 - 14.5 %    PLATELET 976 474 - 400 K/uL    MPV 11.7 8.9 - 12.9 FL    NEUTROPHILS 75 32 - 75 %    LYMPHOCYTES 11 (L) 12 - 49 %    MONOCYTES 13 5 - 13 %    EOSINOPHILS 1 0 - 7 %    BASOPHILS 0 0 - 1 %    IMMATURE GRANULOCYTES 0 0.0 - 0.5 %    ABS. NEUTROPHILS 7.3 1.8 - 8.0 K/UL    ABS. LYMPHOCYTES 1.1 0.8 - 3.5 K/UL    ABS. MONOCYTES 1.2 (H) 0.0 - 1.0 K/UL    ABS. EOSINOPHILS 0.1 0.0 - 0.4 K/UL    ABS. BASOPHILS 0.0 0.0 - 0.1 K/UL    ABS. IMM. GRANS. 0.0 0.00 - 0.04 K/UL    DF AUTOMATED       CT abd 10/5. IMPRESSION:  1. Large ventral hernia containing nonobstructed small and large intestine. 2. Moderate to large right pleural effusion and mild to moderate left pleural  effusion. There is near complete collapse of the visualized portions of the left  lower lobe and there is pleural thickening in the left hemithorax. 3. Patchy airspace disease in the right lower lobe along with right basilar  atelectasis. 4. Nonspecific left adrenal nodule. 5. Other findings as described. Pt at increased risk for procedure with resp compromise in addition to anatomical considerations of reducing massive hernia. Pt aware of inc mortality states \"I don't care if it kills me but it has to be done\". Abd ct pending, will need to recline on pillows for procedure as cannot lie flat. Pt aware of risk for surgery, likelihood that will remain on vent long term and he reiterates he wants it done despite the risk. Cardio to optimize for clearance. Surgery on case f/u recommendations  Next x-ray  AP upright view of the chest compared to 10/9/2020. Complete opacification of  the left hemithorax is again noted. Small right pleural effusion and diffuse  right-sided airspace disease suggesting pulmonary edema are again noted. No  pneumothorax. Cardiac silhouette is obscured.     IMPRESSION  IMPRESSION: No significant change. See above.     Assessment and plan:        Acute hypoxic respiratory failure : Currently on nasal cannula oxygen 3 liters per minute stable  from persistent LL collapse s/p 9 bronchoscopies. All cytologies/path from ebus no malignancy  fungal cs 10/7 mod yeast. Finished abx. ID appreciated. supplemental O2  Lasix iv daily ,. Monitor clinically     Left lung collapse/PNA:       Per pulmonology recommendation transfer to [facility] [place], large abdominal hernia may be contributing but surgery does not feel it is  ebus/bx paratracheal node 10/13 neg  ID, Pulmonology, oncology appreciated. Mediastinal Adenopathy:Ebus/path neg. Hernia complicating, pt wants it reduced despite increased mortality risk. General surgeon on board,chronic 6+ years no  follow up. Appreciated Dr. Hansel Ken, surgeon, no surgery planned until the pneumonia resolved, resp issues improve. discussed with patient wants hernia repaired despite risk. D /w Dr. Hansel Ken 10/21 second opinion noted by Dr. Nyla Castro 10/22 that surgery not recommended at this time as he is poor surgical candidate. COPD:  pulm following. Stable  Anemia: AOCI. Stable, follow. Feso4/b12 supplements, follow. Hypoglycemia episode may due to decreased p.o. taking, dietitian for nutrition support  HTN : low soft BP holding parameters added.   Monitor     Generalized weakness: PT OT encourage up to chair daily    I   contacted with [facility] transfer center for higher level care and input, awaiting for call back  DVT ppx: lovenox  PT/OT  DISPO: TBD pending medical stability    Signed By: Rona Pickard MD     October 24, 2020

## 2020-10-24 NOTE — PROGRESS NOTES
Progress Note    Patient: Kelsie Martin MRN: [id number]  SSN: [id number]    YOB: 1960  Age: 63 y.o. Sex: male      Admit Date: 9/10/2020    LOS: 44 days     Subjective:   Patient followed for chronic, refractory pneumonia of undetermined etiology despite extensive microbiologic work-up. Bronchial washings again only showed normal respiratory xiomara and moderate yeasts. CXR showing perstent opacification of left hemithorax. CRP still trending upward. He remains afebrile. Antimicrobial therapy was discontinued yesterday. Plans are to transfer him to [facility] for further evaluation. Patient resting comfortably. Objective:     Vitals:    10/23/20 2355 10/24/20 0752 10/24/20 0816 10/24/20 1513   BP: 116/70  100/64 98/60   Pulse: 95  86 (!) 104   Resp: 21  16 18   Temp: 97.7 °F (36.5 °C)  98.1 °F (36.7 °C) 98.3 °F (36.8 °C)   SpO2: 97% 98% 96% 100%   Weight:       Height:            Intake and Output:  Current Shift: No intake/output data recorded. Last three shifts: No intake/output data recorded. Physical Exam:   Vitals signs and nursing note reviewed. Constitutional:       General: He is not in acute distress. Appearance: He is ill-appearing. He is not toxic-appearing or diaphoretic. Pulmonary: Decreased breath sounds left lung field  Abdominal:      General: There is distension (ventral hernia with scarring and crusting). Tenderness: There is no abdominal tenderness. There is no guarding. Genitourinary:     Penis: Normal.       Comments: No Padilla  Musculoskeletal:      Right lower leg: Edema present. Left lower leg: Edema present. Skin:     Findings: Erythema (both calves) present. Neurological:      General: No focal deficit present. Mental Status: He is alert and oriented to person, place, and time. Psychiatric:         Mood and Affect: Mood normal.         Behavior: Behavior normal.         Thought Content:  Thought content normal.         Judgment: Judgment normal.        Lab/Data Review:    WBC 8,600  Procalcitonin <0.05  CRP 9.12 (7.56 (5.91 (8.04 (11.60 (12.50    (129  ANCA panel Negative    Serum fungitell <31 Negative    Bronchial washing fungus (10/7)  Pending  Bronchial washing AFB (10/7) smear negative  Bronchial washing culture (10/7) Normal respiratory xiomara  Bronchial washing culture (10/13) Normal respiratory xiomara FINAL  Bronchial washing fungal (10/13) Moderate yeasts consistent with colonization    Assessment:     1. Chronic pneumonia, likely post-obstructive, bronchial washing grew Autumn dublinensis, Day #39 IV Antibiotics, Day #13 IV Meropenem, Day #4 IV Anidulafungin. 2. Markedly elevated CRP, presumed secondary to #1, trending upward. 3. Abnormal bronchoscopy with nodular lesions left tracheobronchial tree, biopsy pending. 4. Chronic venous insufficiency with stasis dermatitis  5. Large ventral hernia, seen by General Surgery     Comment:   CRP still trending upward. It does not appear that anti-microbial therapy is making any impact, at least on his CRP, which suggests that perhaps the problem is not infection at this point. Atypical mycobacteria would be a consideration, however, I am not fond of any further empiric therapy. Apparently he has been on steroids without improvement. I support transfer to tertiary care facility. Plan:   1. No further antibiotic recommendations at this time  2. Agree with transfer to tertiary care facility  3.  In am, repeat CBC and CRP    Signed By: Cuate Romero MD     October 24, 2020

## 2020-10-24 NOTE — PROGRESS NOTES
[facility] SURGERY PROGRESS NOTE          Chief Complaint: Large abdominal hernia    Subjective:  No new issues. No abdominal pain, nausea or vomiting. Passing flatus and having bowel movement. On supplemental Oxygen and has shortness of breath. CT scan shows no bowel obstruction/hernia with left lung collapse. Flex brochoscopy which showed multiple endobroncheal polypoid growth suspicious for malignancy, even though final report stated no malignancy. He had an EBUS with FNA today, cytology results showed no evidence for malignancy. His last CXR still shows complete left lung collapse. An independent Surgical consultation by Dr. Saurabh Oswald also concurs with my opinion of high mortality & morbidity to proceed with repair of this large chronic hernia with loss of domain at this time with compromised pulmonary status especially when the hernia is non obstructed or strangulated.       Past Medical History:   Past Medical History:   Diagnosis Date    Anal fistula 3/15/2010    Anxiety     ARF (acute renal failure) (HCC)     Colon perforation (HCC)     Genital herpes 3/15/2010    GERD (gastroesophageal reflux disease)     High cholesterol 3/15/2010    History of blood transfusion     HTN (hypertension) 3/15/2010    Hyponatremia     Ischemic colitis ([address]     left Carotid Artery Occlusion 3/15/2010    Noncompliance with treatment 3/15/2010    Pneumonia 2011    PUD (peptic ulcer disease)     Septic shock(785.52)     Stroke 2002 3/15/2010    Thromboembolus ([address]     rt thigh    TIA (transient ischemic attack) 2007    pt reported       Past Surgical History:   Past Surgical History:   Procedure Laterality Date    CARDIAC CATHETERIZATION      CARDIAC SURG PROCEDURE UNLIST      HX COLECTOMY  1/11/2014    Right hemicolectomy for free air, perforated viscus    US SCROTUM/TESTICLES      right testicle removed        Allergy:  Allergies   Allergen Reactions    Morphine Other (comments)     itching Social History:  reports that he has been smoking. He has a 70.00 pack-year smoking history. He has never used smokeless tobacco. He reports current alcohol use of about 70.0 standard drinks of alcohol per week. He reports current drug use. Drug: Marijuana.      Family History:  Family History   Problem Relation Age of Onset    Cancer Mother         Current Medications:  Current Facility-Administered Medications:     furosemide (LASIX) injection 40 mg, 40 mg, IntraVENous, Q12H, Jarett Ocampo DO    oxyCODONE-acetaminophen (PERCOCET) 5-325 mg per tablet 1 Tab, 1 Tab, Oral, Q8H PRN, Frank HERNANDEZ MD, 1 Tab at 10/23/20 1407    ferrous sulfate tablet 325 mg, 1 Tab, Oral, DAILY WITH BREAKFAST, Stephane Benton MD, 325 mg at 10/23/20 0919    cyanocobalamin (VITAMIN B12) tablet 500 mcg, 500 mcg, Oral, DAILY, Hortencia Benton MD, 500 mcg at 10/23/20 0919    amLODIPine (NORVASC) tablet 5 mg, 5 mg, Oral, DAILY, Juvencio, Maciel PETERSON MD, 5 mg at 10/23/20 0919    albuterol-ipratropium (DUO-NEB) 2.5 MG-0.5 MG/3 ML, 3 mL, Nebulization, Q6HWA RT, Jarett Ocampo DO, 3 mL at 10/23/20 0814    acetylcysteine (MUCOMYST) 200 mg/mL (20 %) solution 600 mg, 600 mg, Nebulization, BID, Darby Boswell MD, 600 mg at 10/23/20 0814    diphenhydrAMINE (BENADRYL) capsule 25 mg, 25 mg, Oral, Q6H PRN, Jesus Smith MD, 25 mg at 10/23/20 1407    guaiFENesin (ROBITUSSIN) 100 mg/5 mL oral liquid 400 mg, 400 mg, Oral, Q6H, Jarett Ocampo DO, 400 mg at 10/23/20 1747    0.9% sodium chloride infusion 250 mL, 250 mL, IntraVENous, PRN, Jose L Watkins MD    atorvastatin (LIPITOR) tablet 20 mg, 20 mg, Oral, DAILY, Jose L Watkins MD, 20 mg at 10/23/20 2036    pantoprazole (PROTONIX) tablet 40 mg, 40 mg, Oral, DAILY, Jose L Watkins MD, 40 mg at 10/23/20 0603    acetaminophen (TYLENOL) tablet 650 mg, 650 mg, Oral, Q4H PRN, Jose L Watkins MD, 650 mg at 09/24/20 2319    alum-mag hydroxide-simeth (MYLANTA) oral suspension 30 mL, 30 mL, Oral, Q4H PRN, Allie Rome MD, 30 mL at 10/18/20 1014    magnesium hydroxide (MILK OF MAGNESIA) 400 mg/5 mL oral suspension 30 mL, 30 mL, Oral, DAILY PRN, Allie Rome MD    ondansetron [facility]) injection 4 mg, 4 mg, IntraVENous, Q8H PRN, Allie Rome MD, Stopped at 10/07/20 1300     Immunization History: There is no immunization history on file for this patient. Complete    Review of Systems:     Constitutional:  no fever,  no chills,  no sweats, No weakness, No fatigue, No decreased activity. Eye: No recent visual problem, No icterus, No discharge, No double vision. Ear/Nose/Mouth/Throat: No decreased hearing, No ear pain, No nasal congestion, No sore throat. Respiratory:  shortness of breath,  cough, No sputum production, No hemoptysis,  wheezing, No cyanosis. Cardiovascular: No chest pain, No palpitations, No bradycardia, No tachycardia, No peripheral edema, No syncope. Gastrointestinal: No nausea,  No vomiting, No diarrhea, No constipation, No heartburn,  No abdominal pain. Genitourinary: No dysuria, No hematuria, No change in urine stream, No urethral discharge, No lesions. Hematology/Lymphatics: No bruising tendency, No bleeding tendency, No petechiae, No swollen lymph glands. Endocrine: No excessive thirst, No polyuria, No cold intolerance, No heat intolerance, No excessive hunger. Immunologic: Not immunocompromised, No recurrent fevers, No recurrent infections. Musculoskeletal: No back pain, No neck pain, No joint pain, No muscle pain, No claudication, No decreased range of motion, No trauma. Integumentary: No rash, No pruritus, No abrasions. Neurologic: Alert and oriented X4, No abnormal balance, No headache, No confusion, No numbness, No tingling. Psychiatric: No anxiety, No depression, No james. Physical Exam:     Vitals & Measurements:     Wt Readings from Last 3 Encounters:   10/22/20 75.2 kg (165 lb 12.6 oz)   02/26/19 82.6 kg (182 lb)   11/28/18 83.9 kg (185 lb)     Temp Readings from Last 3 Encounters:   10/23/20 98.2 °F (36.8 °C)   09/05/20 98.1 °F (36.7 °C) (Temporal)   02/26/19 97.9 °F (36.6 °C) (Oral)     BP Readings from Last 3 Encounters:   10/23/20 109/66   09/05/20 110/60   02/26/19 171/77     Pulse Readings from Last 3 Encounters:   10/23/20 95   09/05/20 86   02/26/19 100      Ht Readings from Last 3 Encounters:   10/22/20 6' 0.99\" (1.854 m)   02/26/19 6' 1\" (1.854 m)   11/28/18 6' 1\" (1.854 m)          General:  in respiratory distress  Head: Normal  Face: Nornal  HEENT: atraumatic, PERRLA, moist mucosa, normal pharynx, normal tonsils and adenoids, normal tongue, no fluid in sinuses  Neck: Trachea midline, no carotid bruit, no masses  Chest: Normal.  Respiratory: Normal chest wall expansion, shortness of breath, wheezing, left side rhonchi & no breath sounds. Cardiovascular: RRR, no m/r/g, Normal S1 and S2  Abdomen: Soft, non tender,distended, large hernia with thinned out skin and dilated blood vessels, partially reducible, large fascial defect with loss of domain, normal bowel sounds in all quadrants, no hepatosplenomegaly, no tympany. Incision scar +  Genitourinary: No inguinal hernia, normal external gentalia, Testis & scrotum normal, no renal angle tenderness  Rectal: deferred  Musculoskeletal: normal ROM in upper and lower extremities, No joint swelling.   Integumentary: Warm, dry, and pink, with no rash, purpura, or petechia  Heme/Lymph: No lymphadenopathy, no bruises  Neurological:Cranial Nerves II-XII grossly intact, no gross motor or sensory deficit  Psychiatric: Cooperative with normal mood, affect, and cognition      Laboratory Values:   Recent Results (from the past 24 hour(s))   METABOLIC PANEL, BASIC    Collection Time: 10/23/20  8:50 AM   Result Value Ref Range    Sodium 130 (L) 136 - 145 mmol/L    Potassium 4.8 3.5 - 5.1 mmol/L    Chloride 91 (L) 97 - 108 mmol/L    CO2 38 (H) 21 - 32 mmol/L    Anion gap 1 (L) 5 - 15 mmol/L    Glucose 67 65 - 100 mg/dL    BUN 13 6 - 20 mg/dL    Creatinine 0.53 (L) 0.70 - 1.30 mg/dL    BUN/Creatinine ratio 25 (H) 12 - 20      GFR est AA >60 >60 ml/min/1.73m2    GFR est non-AA >60 >60 ml/min/1.73m2    Calcium 9.2 8.5 - 10.1 mg/dL   MAGNESIUM    Collection Time: 10/23/20  8:50 AM   Result Value Ref Range    Magnesium 1.8 1.6 - 2.4 mg/dL   PHOSPHORUS    Collection Time: 10/23/20  8:50 AM   Result Value Ref Range    Phosphorus 3.6 2.6 - 4.7 mg/dL   C REACTIVE PROTEIN, QT    Collection Time: 10/23/20  8:50 AM   Result Value Ref Range    C-Reactive protein 9.12 (H) 0.00 - 0.60 mg/dL   CBC WITH AUTOMATED DIFF    Collection Time: 10/23/20  2:59 PM   Result Value Ref Range    WBC 9.7 4.1 - 11.1 K/uL    RBC 2.67 (L) 4.10 - 5.70 M/uL    HGB 7.9 (L) 12.1 - 17.0 g/dL    HCT 25.1 (L) 36.6 - 50.3 %    MCV 94.0 80.0 - 99.0 FL    MCH 29.6 26.0 - 34.0 PG    MCHC 31.5 30.0 - 36.5 g/dL    RDW 17.8 (H) 11.5 - 14.5 %    PLATELET 961 933 - 587 K/uL    MPV 11.7 8.9 - 12.9 FL    NEUTROPHILS 75 32 - 75 %    LYMPHOCYTES 11 (L) 12 - 49 %    MONOCYTES 13 5 - 13 %    EOSINOPHILS 1 0 - 7 %    BASOPHILS 0 0 - 1 %    IMMATURE GRANULOCYTES 0 0.0 - 0.5 %    ABS. NEUTROPHILS 7.3 1.8 - 8.0 K/UL    ABS. LYMPHOCYTES 1.1 0.8 - 3.5 K/UL    ABS. MONOCYTES 1.2 (H) 0.0 - 1.0 K/UL    ABS. EOSINOPHILS 0.1 0.0 - 0.4 K/UL    ABS. BASOPHILS 0.0 0.0 - 0.1 K/UL    ABS. IMM. GRANS. 0.0 0.00 - 0.04 K/UL    DF AUTOMATED               Assessment:  Large incisional hernia with loss of domain    Severe COPD     Respiratory failure -Complete left lung collapse-inspite of multiple  bronchoscopy. On going smoking     Plan:    Again  I had a discussion with patient and explained the details of the procedure and complexity of the surgery with his compromised respiratory status. Recommendation will be to hold off on surgical intervention for a non obstructed incisional hernia with compromised respiratory status.  His pulmonary status has to be improved before a major surgery which is not an emergent procedure. I tried to explain but patient states that he would take any risk and will not change his mind and wants his hernia to be fixed inspite of the second surgical opinion. I have discussed with Dr. Niranjan Dewitt. Thank you for the consultation & allowing me to participate in the care of this patient.

## 2020-10-25 PROCEDURE — 74011250637 HC RX REV CODE- 250/637: Performed by: FAMILY MEDICINE

## 2020-10-25 PROCEDURE — 94760 N-INVAS EAR/PLS OXIMETRY 1: CPT

## 2020-10-25 PROCEDURE — 77010033678 HC OXYGEN DAILY

## 2020-10-25 PROCEDURE — 74011250636 HC RX REV CODE- 250/636: Performed by: FAMILY MEDICINE

## 2020-10-25 PROCEDURE — 99232 SBSQ HOSP IP/OBS MODERATE 35: CPT | Performed by: INTERNAL MEDICINE

## 2020-10-25 PROCEDURE — 74011250637 HC RX REV CODE- 250/637: Performed by: INTERNAL MEDICINE

## 2020-10-25 PROCEDURE — 65270000029 HC RM PRIVATE

## 2020-10-25 RX ORDER — OXYCODONE AND ACETAMINOPHEN 5; 325 MG/1; MG/1
1 TABLET ORAL
Status: DISCONTINUED | OUTPATIENT
Start: 2020-10-25 | End: 2020-10-27 | Stop reason: HOSPADM

## 2020-10-25 RX ORDER — OXYCODONE AND ACETAMINOPHEN 5; 325 MG/1; MG/1
1 TABLET ORAL
Status: DISCONTINUED | OUTPATIENT
Start: 2020-10-25 | End: 2020-10-25

## 2020-10-25 RX ORDER — DIPHENHYDRAMINE HCL 25 MG
25 CAPSULE ORAL
Status: DISCONTINUED | OUTPATIENT
Start: 2020-10-25 | End: 2020-10-27 | Stop reason: HOSPADM

## 2020-10-25 RX ADMIN — PANTOPRAZOLE SODIUM 40 MG: 40 TABLET, DELAYED RELEASE ORAL at 06:27

## 2020-10-25 RX ADMIN — ATORVASTATIN CALCIUM 20 MG: 20 TABLET, FILM COATED ORAL at 20:29

## 2020-10-25 RX ADMIN — FUROSEMIDE 40 MG: 10 INJECTION, SOLUTION INTRAMUSCULAR; INTRAVENOUS at 09:40

## 2020-10-25 RX ADMIN — DIPHENHYDRAMINE HYDROCHLORIDE 25 MG: 25 CAPSULE ORAL at 15:38

## 2020-10-25 RX ADMIN — OXYCODONE HYDROCHLORIDE AND ACETAMINOPHEN 1 TABLET: 5; 325 TABLET ORAL at 06:27

## 2020-10-25 RX ADMIN — FERROUS SULFATE TAB 325 MG (65 MG ELEMENTAL FE) 325 MG: 325 (65 FE) TAB at 17:19

## 2020-10-25 RX ADMIN — CYANOCOBALAMIN TAB 500 MCG 500 MCG: 500 TAB at 09:39

## 2020-10-25 RX ADMIN — OXYCODONE HYDROCHLORIDE AND ACETAMINOPHEN 1 TABLET: 5; 325 TABLET ORAL at 17:19

## 2020-10-25 RX ADMIN — GUAIFENESIN 400 MG: 200 SOLUTION ORAL at 06:27

## 2020-10-25 NOTE — PROGRESS NOTES
Progress Note    Patient: Brayden Powell MRN: [id number]  SSN: [id number]    YOB: 1960  Age: 63 y.o.   Sex: male      Admit Date: 9/10/2020    LOS: 45 days        Subjective:     Patient  Feels frustated as second surgical opinion is against ventral hernia surgery  Case discussed with Dr Israel Granados and call to [facility] [place] due to Left lung collapse  and patient also wants  second opinion for general surgeon for repair his  large Ventral abdominal hernia, after reviewed his medical record , patient is declined by [facility] for transfer, can be follow up by outpt, medically stable           Current Facility-Administered Medications:     oxyCODONE-acetaminophen (PERCOCET) 5-325 mg per tablet 1 Tab, 1 Tab, Oral, Q12H PRN, Reg Mena MD    diphenhydrAMINE (BENADRYL) capsule 25 mg, 25 mg, Oral, Q8H PRN, Reg Mena MD    furosemide (LASIX) injection 40 mg, 40 mg, IntraVENous, DAILY, Reg Mena MD, 40 mg at 10/25/20 0940    ferrous sulfate tablet 325 mg, 1 Tab, Oral, BID WITH MEALS, Reg Mena MD, Stopped at 10/25/20 0800    cyanocobalamin (VITAMIN B12) tablet 500 mcg, 500 mcg, Oral, DAILY, Michelle Benton MD, 500 mcg at 10/25/20 0939    albuterol-ipratropium (DUO-NEB) 2.5 MG-0.5 MG/3 ML, 3 mL, Nebulization, Q6HWA RT, Jarett Ocampo DO, 3 mL at 10/23/20 0814    acetylcysteine (MUCOMYST) 200 mg/mL (20 %) solution 600 mg, 600 mg, Nebulization, BID, Anusha Damon MD, Stopped at 10/24/20 0900    0.9% sodium chloride infusion 250 mL, 250 mL, IntraVENous, PRN, Kristen Sparks MD    atorvastatin (LIPITOR) tablet 20 mg, 20 mg, Oral, DAILY, Kristen Sparks MD, 20 mg at 10/24/20 2018    pantoprazole (PROTONIX) tablet 40 mg, 40 mg, Oral, DAILY, Kristen Sparks MD, 40 mg at 10/25/20 8268    acetaminophen (TYLENOL) tablet 650 mg, 650 mg, Oral, Q4H PRN, Kristen Sparks MD, 650 mg at 10/24/20 2017    alum-mag hydroxide-simeth (MYLANTA) oral suspension 30 mL, 30 mL, Oral, Q4H PRN, Marga Solis, Rona Dsouza MD, 30 mL at 10/18/20 1014    magnesium hydroxide (MILK OF MAGNESIA) 400 mg/5 mL oral suspension 30 mL, 30 mL, Oral, DAILY PRN, Courtney Babin MD    ondansetron [facility]) injection 4 mg, 4 mg, IntraVENous, Q8H PRN, Courtney Babin MD, Stopped at 10/07/20 1300    Objective:     Visit Vitals  /70   Pulse 100   Temp 99 °F (37.2 °C)   Resp 20   Ht 6' 0.99\" (1.854 m)   Wt 82.5 kg (181 lb 14.1 oz)   SpO2 97%   BMI 24.00 kg/m²    O2 Flow Rate (L/min): 3 l/min O2 Device: Nasal cannula     Temp (24hrs), Av.6 °F (37 °C), Min:97.2 °F (36.2 °C), Max:99.1 °F (37.3 °C)         Physical Exam:   General :  no respiratory distress noted   Lungs :  Not labored. CVS :  no gallop  Abdomen :  +ventral hernia    Extremities : No edema noted,  Neurological : Awake, alert, oriented to time place person.  No neurological deficits.    Psychiatric : Mood and affect normal    Lab/Data Review:  No results found for this or any previous visit (from the past 24 hour(s)). CT abd 10/5. IMPRESSION:  1. Large ventral hernia containing nonobstructed small and large intestine. 2. Moderate to large right pleural effusion and mild to moderate left pleural  effusion. There is near complete collapse of the visualized portions of the left  lower lobe and there is pleural thickening in the left hemithorax. 3. Patchy airspace disease in the right lower lobe along with right basilar  atelectasis. 4. Nonspecific left adrenal nodule. 5. Other findings as described. Pt at increased risk for procedure with resp compromise in addition to anatomical considerations of reducing massive hernia. Pt aware of inc mortality states \"I don't care if it kills me but it has to be done\". Abd ct pending, will need to recline on pillows for procedure as cannot lie flat. Pt aware of risk for surgery, likelihood that will remain on vent long term and he reiterates he wants it done despite the risk. Cardio to optimize for clearance.   Surgery on case f/u recommendations  Next x-ray  AP upright view of the chest compared to 10/9/2020. Complete opacification of  the left hemithorax is again noted. Small right pleural effusion and diffuse  right-sided airspace disease suggesting pulmonary edema are again noted. No  pneumothorax. Cardiac silhouette is obscured.     IMPRESSION  IMPRESSION: No significant change. See above. Assessment and plan:        Acute hypoxic respiratory failure : Currently on nasal cannula oxygen 3 liters per minute stable ,stable  from persistent LL collapse s/p 9 bronchoscopies. All cytologies/path from ebus no malignancy  fungal cs 10/7 mod yeast.  Finished on abx. ID appreciated. supplemental O2  Lasix iv  For short time,  Monitor clinically     left lung collapse/PNA:   [place]ology [place] [facility], he already have a couple bronchoscope from mucous plugging. large abdominal hernia may be contributing but surgery does not feel it is  ebus/bx paratracheal node 10/13 neg  ID, Pulmonology, oncology appreciated. Abx per ID. Mediastinal Adenopathy:Ebus/path neg. Hernia complicating, pt wants it reduced despite increased mortality risk. General surgeon on board,chronic 6+ years neglected follow up. Surgery cx appreciated Dr. Mile Lopez no surgery planned until the pneumonia resolved, resp issues improve. discussed with patient wants hernia repaired despite risk. D /w Dr. Mile Lopez 10/21 second opinion noted by Dr. Edgar Hurst 10/22 that surgery not recommended at this time as he is poor surgical candidate. MCV: out patient f/u    COPD:  pulm following. Anemia: AOCI. Stable, follow. Feso4/b12 supplements, follow. HTN : low soft BP holding parameters added.   Monitor      I  contacted with [facility] transfer center , but declined, case  discussed with the pulmonology    At this point ,patient is medically  Stable, may need to go to skilled nursing facility   for rehabilitation and to [place] [facility] [place] and general surgeon in the  future    DVT ppx: lovenox  PT/OT  DISPO:      Signed By: Mary Mead MD     October 25, 2020

## 2020-10-25 NOTE — PROGRESS NOTES
Progress Note    Patient: Elenita Dunbar MRN: [id number]  SSN: [id number]    YOB: 1960  Age: 63 y.o. Sex: male      Admit Date: 9/10/2020    LOS: 45 days     Subjective:   Patient followed for chronic, refractory pneumonia of undetermined etiology despite extensive microbiologic work-up and over 6 weeks of empiric antibiotics. Antibiotics were discontinued. Patient spiked to 100.7 overnight. Plans are to transfer him to [facility] for further evaluation. Patient resting comfortably. Objective:     Vitals:    10/25/20 0727 10/25/20 0735 10/25/20 1536 10/25/20 1714   BP: (!) 153/78 (!) 106/54 123/65    Pulse: 77 89 94    Resp: 18 18 18    Temp: 99 °F (37.2 °C) 98.5 °F (36.9 °C) 99.1 °F (37.3 °C)    SpO2: 97% 97% 97% 97%   Weight:       Height:            Intake and Output:  Current Shift: No intake/output data recorded. Last three shifts: 10/23 1901 - 10/25 0700  In: -   Out: 2100 [Urine:2100]    Physical Exam:   Vitals signs and nursing note reviewed. Constitutional:       General: He is not in acute distress. Appearance: He is ill-appearing. He is not toxic-appearing or diaphoretic. Pulmonary: Decreased breath sounds left lung field  Abdominal:      General: There is distension (ventral hernia with scarring and crusting). Tenderness: There is no abdominal tenderness. There is no guarding. Genitourinary:     Penis: Normal.       Comments: No Padilla  Musculoskeletal:      Right lower leg: Edema present. Left lower leg: Edema present. Skin:     Findings: Erythema (both calves) present. Neurological:      General: No focal deficit present. Mental Status: He is alert and oriented to person, place, and time. Psychiatric:         Mood and Affect: Mood normal.         Behavior: Behavior normal.         Thought Content:  Thought content normal.         Judgment: Judgment normal.        Lab/Data Review:    WBC 8,600  Procalcitonin <0.05  CRP 9.12 (7.56 (5.91 (8.04 (11.60 (12.50  (129  ANCA panel Negative    Serum fungitell <31 Negative    Bronchial washing fungus (10/7)  Pending  Bronchial washing AFB (10/7) smear negative  Bronchial washing culture (10/7) Normal respiratory xiomara  Bronchial washing culture (10/13) Normal respiratory xiomara FINAL  Bronchial washing fungal (10/13) Moderate yeasts consistent with colonization    Assessment:     1. Chronic pneumonia, likely post-obstructive, bronchial washing grew Candida dublinensis, s/p 39 IV Antibiotics, including Zosyn, Meropenem and Anidulafungin. 2. Markedly elevated CRP, presumed secondary to #1, trending upward. 3. Abnormal bronchoscopy with nodular lesions left tracheobronchial tree. 4. Chronic venous insufficiency with stasis dermatitis  5. Large ventral hernia, seen by General Surgery     Comment:   New fever after stopping antimicrobial therapy. Plan:   1. Would consider restarting Meropenem if fever continues  2. Agree with transfer to tertiary care facility  3.  In am, repeat CBC and CRP    Signed By: Raeann Delgado MD     October 25, 2020

## 2020-10-26 LAB
BASOPHILS # BLD: 0 K/UL (ref 0–0.1)
BASOPHILS # BLD: 0 K/UL (ref 0–0.1)
BASOPHILS NFR BLD: 0 % (ref 0–1)
BASOPHILS NFR BLD: 0 % (ref 0–1)
CRP SERPL-MCNC: 13 MG/DL (ref 0–0.6)
DIFFERENTIAL METHOD BLD: ABNORMAL
DIFFERENTIAL METHOD BLD: ABNORMAL
EOSINOPHIL # BLD: 0 K/UL (ref 0–0.4)
EOSINOPHIL # BLD: 0 K/UL (ref 0–0.4)
EOSINOPHIL NFR BLD: 0 % (ref 0–7)
EOSINOPHIL NFR BLD: 0 % (ref 0–7)
ERYTHROCYTE [DISTWIDTH] IN BLOOD BY AUTOMATED COUNT: 15.4 % (ref 11.5–14.5)
ERYTHROCYTE [DISTWIDTH] IN BLOOD BY AUTOMATED COUNT: 15.8 % (ref 11.5–14.5)
HCT VFR BLD AUTO: 25.5 % (ref 36.6–50.3)
HCT VFR BLD AUTO: 26.8 % (ref 36.6–50.3)
HGB BLD-MCNC: 8 G/DL (ref 12.1–17)
HGB BLD-MCNC: 8.3 G/DL (ref 12.1–17)
IMM GRANULOCYTES # BLD AUTO: 0 K/UL (ref 0–0.04)
IMM GRANULOCYTES # BLD AUTO: 0 K/UL (ref 0–0.04)
IMM GRANULOCYTES NFR BLD AUTO: 0 % (ref 0–0.5)
IMM GRANULOCYTES NFR BLD AUTO: 1 % (ref 0–0.5)
LYMPHOCYTES # BLD: 1.1 K/UL (ref 0.8–3.5)
LYMPHOCYTES # BLD: 1.5 K/UL (ref 0.8–3.5)
LYMPHOCYTES NFR BLD: 25 % (ref 12–49)
LYMPHOCYTES NFR BLD: 28 % (ref 12–49)
MCH RBC QN AUTO: 29.3 PG (ref 26–34)
MCH RBC QN AUTO: 29.3 PG (ref 26–34)
MCHC RBC AUTO-ENTMCNC: 31 G/DL (ref 30–36.5)
MCHC RBC AUTO-ENTMCNC: 31.4 G/DL (ref 30–36.5)
MCV RBC AUTO: 93.4 FL (ref 80–99)
MCV RBC AUTO: 94.7 FL (ref 80–99)
MONOCYTES # BLD: 0.9 K/UL (ref 0–1)
MONOCYTES # BLD: 1.1 K/UL (ref 0–1)
MONOCYTES NFR BLD: 16 % (ref 5–13)
MONOCYTES NFR BLD: 25 % (ref 5–13)
NEUTS SEG # BLD: 2 K/UL (ref 1.8–8)
NEUTS SEG # BLD: 2.9 K/UL (ref 1.8–8)
NEUTS SEG NFR BLD: 49 % (ref 32–75)
NEUTS SEG NFR BLD: 56 % (ref 32–75)
PLATELET # BLD AUTO: 318 K/UL (ref 150–400)
PLATELET # BLD AUTO: 326 K/UL (ref 150–400)
PMV BLD AUTO: 10.1 FL (ref 8.9–12.9)
PMV BLD AUTO: 9.1 FL (ref 8.9–12.9)
RBC # BLD AUTO: 2.73 M/UL (ref 4.1–5.7)
RBC # BLD AUTO: 2.83 M/UL (ref 4.1–5.7)
RBC MORPH BLD: ABNORMAL
WBC # BLD AUTO: 4.2 K/UL (ref 4.1–11.1)
WBC # BLD AUTO: 5.3 K/UL (ref 4.1–11.1)

## 2020-10-26 PROCEDURE — 97530 THERAPEUTIC ACTIVITIES: CPT

## 2020-10-26 PROCEDURE — 85025 COMPLETE CBC W/AUTO DIFF WBC: CPT

## 2020-10-26 PROCEDURE — 36415 COLL VENOUS BLD VENIPUNCTURE: CPT

## 2020-10-26 PROCEDURE — 74011250636 HC RX REV CODE- 250/636: Performed by: FAMILY MEDICINE

## 2020-10-26 PROCEDURE — 65270000029 HC RM PRIVATE

## 2020-10-26 PROCEDURE — 86140 C-REACTIVE PROTEIN: CPT

## 2020-10-26 PROCEDURE — 99232 SBSQ HOSP IP/OBS MODERATE 35: CPT | Performed by: INTERNAL MEDICINE

## 2020-10-26 PROCEDURE — 74011250637 HC RX REV CODE- 250/637: Performed by: INTERNAL MEDICINE

## 2020-10-26 PROCEDURE — 74011250637 HC RX REV CODE- 250/637: Performed by: FAMILY MEDICINE

## 2020-10-26 PROCEDURE — 77010033678 HC OXYGEN DAILY

## 2020-10-26 RX ADMIN — OXYCODONE HYDROCHLORIDE AND ACETAMINOPHEN 1 TABLET: 5; 325 TABLET ORAL at 09:47

## 2020-10-26 RX ADMIN — FUROSEMIDE 40 MG: 10 INJECTION, SOLUTION INTRAMUSCULAR; INTRAVENOUS at 09:46

## 2020-10-26 RX ADMIN — PANTOPRAZOLE SODIUM 40 MG: 40 TABLET, DELAYED RELEASE ORAL at 06:09

## 2020-10-26 RX ADMIN — ATORVASTATIN CALCIUM 20 MG: 20 TABLET, FILM COATED ORAL at 20:43

## 2020-10-26 RX ADMIN — FERROUS SULFATE TAB 325 MG (65 MG ELEMENTAL FE) 325 MG: 325 (65 FE) TAB at 17:41

## 2020-10-26 RX ADMIN — FERROUS SULFATE TAB 325 MG (65 MG ELEMENTAL FE) 325 MG: 325 (65 FE) TAB at 09:47

## 2020-10-26 RX ADMIN — OXYCODONE HYDROCHLORIDE AND ACETAMINOPHEN 1 TABLET: 5; 325 TABLET ORAL at 17:41

## 2020-10-26 RX ADMIN — OXYCODONE HYDROCHLORIDE AND ACETAMINOPHEN 1 TABLET: 5; 325 TABLET ORAL at 01:45

## 2020-10-26 RX ADMIN — DIPHENHYDRAMINE HYDROCHLORIDE 25 MG: 25 CAPSULE ORAL at 09:47

## 2020-10-26 RX ADMIN — DIPHENHYDRAMINE HYDROCHLORIDE 25 MG: 25 CAPSULE ORAL at 17:41

## 2020-10-26 RX ADMIN — CYANOCOBALAMIN TAB 500 MCG 500 MCG: 500 TAB at 09:47

## 2020-10-26 RX ADMIN — DIPHENHYDRAMINE HYDROCHLORIDE 25 MG: 25 CAPSULE ORAL at 01:45

## 2020-10-26 NOTE — PROGRESS NOTES
PHYSICAL THERAPY TREATMENT: WEEKLY REASSESSMENT  Patient: Eber Saint (6 y.o. male)  Date: 10/26/2020  Primary Diagnosis: Hypertension [I10]  Procedure(s) (LRB):  Endobronchial Ultrasound (EBUS) (N/A) 13 Days Post-Op   Precautions: falls      ASSESSMENT  Patient continues with skilled PT services and is progressing towards goals. Pt. Is self limiting  with decreased motivation and requires encouragement to participate in therapy . Presents to PT with generalized weakness , balance deficits , decreased activity tolerance and decreased functional mobility , requires Shant for rolling to R side , supine >sit with Shant sit >supine with mod A , sit <>stand x 2 trials , mod A x 2  with cues for hand and feet placement , able to take few side steps -3' towards the [facility] with RW , modA x 2 , needed cues for sequencing . Awaiting SNF placement . Current Level of Function Impacting Discharge (mobility/balance): Requires modAx 2 for functional mobility . Other factors to consider for discharge: decreased motivation and decreased safety awareness        PLAN :  Goals have been updated based on progression since last assessment. Patient continues to benefit from skilled intervention to address the above impairments. Recommendations and Planned Interventions: bed mobility training, transfer training, gait training, therapeutic exercises, patient and family training/education, and therapeutic activities      Frequency/Duration: Patient will be followed by physical therapy:  3 times a week to address goals. Recommendation for discharge: (in order for the patient to meet his/her long term goals)  SNF     This discharge recommendation:  Has been made in collaboration with the attending provider and/or case management    IF patient discharges home will need the following DME: to be determined (TBD)         SUBJECTIVE:   Patient stated I am OK.     OBJECTIVE DATA SUMMARY:   HISTORY:    Past Medical History: Diagnosis Date    Anal fistula 3/15/2010    Anxiety     ARF (acute renal failure) (HCC)     Colon perforation (HCC)     Genital herpes 3/15/2010    GERD (gastroesophageal reflux disease)     High cholesterol 3/15/2010    History of blood transfusion     HTN (hypertension) 3/15/2010    Hyponatremia     Ischemic colitis ([facility] [address]     left Carotid Artery Occlusion 3/15/2010    Noncompliance with treatment 3/15/2010    Pneumonia 2011    PUD (peptic ulcer disease)     Septic shock(785.52)     Stroke 2002 3/15/2010    Thromboembolus ([facility] [address]     rt thigh    TIA (transient ischemic attack) 2007    pt reported     Past Surgical History:   Procedure Laterality Date    CARDIAC CATHETERIZATION      CARDIAC SURG PROCEDURE UNLIST      HX COLECTOMY  1/11/2014    Right hemicolectomy for free air, perforated viscus    US SCROTUM/TESTICLES      right testicle removed       Personal factors and/or comorbidities impacting plan of care:     Home Situation  Home Environment: Private residence  One/Two Story Residence: One story  Living Alone: No  Support Systems: Spouse/Significant Other/Partner  Patient Expects to be Discharged to[de-identified] Rehabilitation facility  Current DME Used/Available at Home: None    EXAMINATION/PRESENTATION/DECISION MAKING:   Critical Behavior:  Neurologic State: Alert  Orientation Level: Appropriate for age, Oriented X4  Cognition: Appropriate decision making, Appropriate for age attention/concentration, Appropriate safety awareness  Safety/Judgement: Fall prevention  Hearing: Auditory  Auditory Impairment: None    Range Of Motion:   Decreased , functional     Strength:     -3/5 grossly     Tone & Sensation:    Normal  Intact      Pain : 5/10 in lower back    Functional Mobility:  Bed Mobility:  Rolling: Minimum assistance  Supine to Sit: Minimum assistance  Sit to Supine: Moderate assistance  Scooting: Stand-by assistance  Transfers:  Sit to Stand:  Moderate assistance;Assist x2  Stand to Sit: Moderate assistance;Assist x2    Balance:   Sitting: Intact; With support  Standing: Impaired;Pull to stand; With support  Standing - Static: Poor  Ambulation/Gait Training:  Distance (ft): 3 Feet (ft)  Assistive Device: Gait belt;Walker, rolling  Ambulation - Level of Assistance: Moderate assistance;Assist x2    Base of Support: Widened     Speed/Kaitlin: Slow  Step Length: Left shortened;Right shortened        Pain Ratin/10    Activity Tolerance:   Good and requires rest breaks  Please refer to the flowsheet for vital signs taken during this treatment. After treatment patient left in no apparent distress:   Supine in bed, Call bell within reach, and Bed / chair alarm activated    COMMUNICATION/EDUCATION:   The patients plan of care was discussed with: Registered nurse. Fall prevention education was provided and the patient/caregiver indicated understanding. and Patient understands intent and goals of therapy, but is neutral about his/her participation. [place] with OT for increased pt's safety and maximum functional outcome .      Thank you for this referral.  Libby Nova   Time Calculation: 25 mins

## 2020-10-26 NOTE — PROGRESS NOTES
Progress Note    Patient: Davide Zambrano MRN: [id number]  SSN: [id number]    YOB: 1960  Age: 63 y.o. Sex: male      Admit Date: 9/10/2020    LOS: 46 days     Subjective:   Patient followed for chronic, refractory pneumonia of undetermined etiology despite extensive microbiologic work-up and over 6 weeks of empiric antibiotics. Antibiotics were discontinued. No further temperature spikes and WBC remains abnormal, however, his CRP continues to trend  Upward. He was not accepted for transfer to [facility], therefore, will be going to Rehab. Patient resting comfortably but frustrated and disappointed he is not to have hernia repaired. Objective:     Vitals:    10/25/20 2331 10/26/20 0731 10/26/20 0931 10/26/20 1500   BP: 113/60 (!) 106/57  103/66   Pulse: 85 80  90   Resp: 18 20  20   Temp: 98.6 °F (37 °C) 98.2 °F (36.8 °C)  98.7 °F (37.1 °C)   SpO2: 100% 100% 100% 98%   Weight:       Height:            Intake and Output:  Current Shift: No intake/output data recorded. Last three shifts: 10/24 1901 - 10/26 0700  In: 600 [P.O.:600]  Out: 2100 [Urine:2100]    Physical Exam:   Vitals signs and nursing note reviewed. Constitutional:       General: He is not in acute distress. Appearance: He is ill-appearing. He is not toxic-appearing or diaphoretic. Pulmonary: Decreased breath sounds left lung field  Abdominal:      General: There is distension (ventral hernia with scarring and crusting). Tenderness: There is no abdominal tenderness. There is no guarding. Genitourinary:     Penis: Normal.       Comments: No Padilla  Musculoskeletal:      Right lower leg: Edema present. Left lower leg: Edema present. Skin:     Findings: Erythema (both calves) present. Neurological:      General: No focal deficit present. Mental Status: He is alert and oriented to person, place, and time.    Psychiatric:         Mood and Affect: Mood normal.         Behavior: Behavior normal.         Thought Content: Thought content normal.         Judgment: Judgment normal.        Lab/Data Review:    WBC 4,200  Procalcitonin <0.05  CRP 13.00 (9.12 (7.56 (5.91 (8.04 (11.60 (12.50    (129  ANCA panel Negative    Serum fungitell <31 Negative    Bronchial washing fungus (10/7)  Pending  Bronchial washing AFB (10/7) smear negative  Bronchial washing culture (10/7) Normal respiratory xiomara  Bronchial washing culture (10/13) Normal respiratory xiomara FINAL  Bronchial washing fungal (10/13) Moderate yeasts consistent with colonization    Assessment:     1. Chronic pneumonia, likely post-obstructive, bronchial washing grew Candida dublinensis, s/p 39 IV Antibiotics, including Zosyn, Meropenem and Anidulafungin. 2. Markedly elevated CRP, presumed secondary to #1, trending upward. 3. Abnormal bronchoscopy with nodular lesions left tracheobronchial tree. 4. Chronic venous insufficiency with stasis dermatitis  5. Large ventral hernia, seen by General Surgery     Comment:   CRP continues to trend back upward though it never normalized on Meropenem. Plan:   1. No further recommendations at this time; would consider restarting Meropenem if he develops fever or CRP continues to trend upward  2. Will continue to follow while here  3.  In am, repeat CBC and CRP    Signed By: Fatuma De La Fuente MD     October 26, 2020

## 2020-10-26 NOTE — PROGRESS NOTES
OCCUPATIONAL THERAPY RE-EVALUATION / WEEKLY REASSESSMENT  Patient: Giorgi Darby (87 y.o. male)  Date: 10/26/2020  Diagnosis: Hypertension [I10]   <principal problem not specified>  Procedure(s) (LRB):  Endobronchial Ultrasound (EBUS) (N/A) 13 Days Post-Op  Precautions: Fall    Chart, occupational therapy assessment, plan of care, and goals were reviewed. ASSESSMENT  Based on the objective data described below, pt continues to present with overall deficits in generalized strength, functional activity tolerance, static/dynamic sitting/standing balance, pain, and self-limiting behaviors impacting overall ADL performance and functional transfers/mobility. Since initial evaluation, pt has intermittently been participating with therapy making very slow progress towards goals. Pt received in semi-supine upon OT/PT arrival, agreeable to working with therapies with increased encouragement although pt intermittently agitated throughout session. He currently requires min-mod A x2 for bed mobility, min A for UB dressing, min A to simulate donning of socks EOB, and mod A x2 for functional sit><stand to/from EOB. Pt requires increased encouragement to participate in ADLs, agreeable to donning clean gown today, yet refusing any other EOB self cares at this time. Pt remains on 3L O2 via NC requiring rest breaks in between functional activity. He was due for re-assessment today to update progress and POC as necessary. Pt would benefit from continued skilled OT services to address impairments and improve overall IND and safety with ADLs and functional mobility. OT recommending d/c to SNF once medically appropriate.      Other factors to consider for discharge: Family/social support, DME, overall decline in ADLs from PLOF         PLAN :  Recommendations and Planned Interventions: self care training, functional mobility training, therapeutic exercise, balance training, therapeutic activities, endurance activities, and patient education    Frequency/Duration: Patient will be followed by occupational therapy 1 time a week to address goals. Recommend with staff: Encourage participation in bed level ADLs    Recommend next OT session: LB dressing    Recommendation for discharge: (in order for the patient to meet his/her long term goals)  SNF    This discharge recommendation:  Has been made in collaboration with the attending provider and/or case management       SUBJECTIVE:   Patient stated let's hurry up and get this over with!    OBJECTIVE DATA SUMMARY:   Hospital course since last seen and reason for reevaluation: Since initial evaluation, pt has intermittently been participating with therapy making very slow progress towards goals. He currently requires min-mod A x2 for bed mobility, min A for UB dressing, min A to simulate donning of socks EOB, and mod A x2 for functional sit><stand to/from EOB. Cognitive/Behavioral Status:  Neurologic State: Alert  Orientation Level: Oriented X4  Cognition: Follows commands; Appropriate decision making    Hearing: Auditory  Auditory Impairment: None    Vision/Perceptual:       WFL    Range of Motion:  B UE AROM generally decreased, functional for ADL purposes. Strength:  B UE AROM generally decreased, functional for ADL purposes. Coordination:     Fine Motor Skills-Upper: Left Intact; Right Intact    Gross Motor Skills-Upper: Left Intact; Right Intact    Tone & Sensation:  WFL    Functional Mobility and Transfers for ADLs:  Bed Mobility:  Rolling: Minimum assistance  Supine to Sit: Minimum assistance  Sit to Supine: Moderate assistance  Scooting: Stand-by assistance    Transfers:  Sit to Stand: Moderate assistance;Assist x2    Balance:  Sitting: Intact; With support  Standing: Impaired;Pull to stand; With support  Standing - Static: Poor    ADL Intervention and task modifications:  Feeding  Container Management: Set-up  Drink to Mouth:  [place] Gown: Minimum  assistance    Lower Body Dressing Assistance  Socks: Minimum assistance(sImulated )  Position Performed: Seated edge of bed    Therapeutic Exercises:   Pt would benefit from continued UE HEP to improve overall UE AROM/strength during hospital stay. Functional Measure:    [facility] AM-PACTM \"6 Clicks\"                                                       Daily Activity Inpatient Short Form  How much help from another person does the patient currently need. .. Total; A Lot A Little None   1. Putting on and taking off regular lower body clothing? []  1 [x]  2 []  3 []  4   2. Bathing (including washing, rinsing, drying)? []  1 [x]  2 []  3 []  4   3. Toileting, which includes using toilet, bedpan or urinal? [x] 1 []  2 []  3 []  4   4. Putting on and taking off regular upper body clothing? []  1 []  2 [x]  3 []  4   5. Taking care of personal grooming such as brushing teeth? []  1 []  2 [x]  3 []  4   6. Eating meals? []  1 []  2 [x]  3 []  4   © 2007, Trustees of [facility], under license to [employer]. All rights reserved     Score: 14/24     Interpretation of Tool:  Represents clinically-significant functional categories (i.e. Activities of daily living). Percentage of Impairment CH    0%   CI    1-19% CJ    20-39% CK    40-59% CL    60-79% CM    80-99% CN     100%   [facility]  Score 6-24 24 23 20-22 15-19 10-14 7-9 6        Pain:  Back pain reported, however not formally rated at this time. Activity Tolerance:   Fair and requires rest breaks    After treatment patient left in no apparent distress:   Supine in bed, Heels elevated for pressure relief, and Call bell within reach    COMMUNICATION/COLLABORATION:   The patients plan of care was discussed with: Physical therapist and Registered nurse. OT/PT sessions occurred together for increased patient and clinician safety as pt with decreased activity tolerance and requires A of 2 for mobility at this time.     Amaya Raza Stewart  Time Calculation: 23 mins   Problem: Self Care Deficits Care Plan (Adult)  Goal: *Acute Goals and Plan of Care (Insert Text)  Description: OT goals updated 10/26/20 to reflect progress during hospital stay. 1. Pt will be SBA sit < - >  prep for toileting LRAD  2. Pt will be SBA toileting/toilet transfer/cloth mgmt LRAD  3. Pt will be SBA sponge bathing sitting/standing at sink LRAD  4. Pt will be mod A LE dressing EOB  5. Pt will be SBA grooming standing at sink LRAD  6.  Pt will be I following UE HEP in prep for self care tasks    Outcome: Not Progressing Towards Goal

## 2020-10-26 NOTE — PROGRESS NOTES
CM was informed patient was not accepted to any hospital's for transfer and that he is stable to transition to rehab. Patient will need PT and OT evals which he has been declining. Notified Dr. Sanya Dawson for new orders. Spoke with the patient at the bedside who is extremely frustrated with the whole process. He did acknowledge his understanding of needing to work with PT and OT and gave SNF chocie for anywhere close to [place]. Clinical updates sent via [facility]. Several facilities had previously accepted the patient, CM checking bed availability pending PT and OT updates.

## 2020-10-26 NOTE — PROGRESS NOTES
Pulm/CC Progress Note    Subjective:     Patient seen and examined in his room    No acute issues overnight; he is still intermittently refusing some respiratory therapies due to pain in his abdomen when he coughs. He is awake and alert. On 3 L of oxygen via nasal cannula, but satting in the high 90's. Overall his mood is more depressed/angry secondary to the fact that two separate surgeons here at [facility] do not feel that it is a good time to pursue hernia repair at this time. This will be discussed again with him at the bedside with Dr. Bianca Johnson. Chest x-ray that was done on 10/19/20 showed continued collapse of left lung. No need for further CXR's unless the clinical situation changes. He has undergone 9 bronchoscopies with mucus suctioning and has had several biopsies of the lesions within his tracheobronchial tree as well as brushings and EBUS biopsies of station 11L and station 7. He has had squamous metaplasia return, but no definitive malignancy. No malignancy on EBUS biopsies. I discussed the case with Dr. Mony Hagan on 10/19/20, we have all final results back and there is no definitive malignancy. Review of Systems   Has complains of shortness of breath. He is on nasal cannula oxygen. Denied any nausea vomiting. Has poor appetite    Objective:     Visit Vitals  BP (!) 106/57   Pulse 80   Temp 98.2 °F (36.8 °C)   Resp 20   Ht 6' 0.99\" (1.854 m)   Wt 75.2 kg (165 lb 12.6 oz)   SpO2 100%   BMI 21.88 kg/m²    O2 Flow Rate (L/min): 2 l/min O2 Device: Nasal cannula    Temp (24hrs), Av.6 °F (37 °C), Min:98.2 °F (36.8 °C), Max:99.1 °F (37.3 °C)      No intake/output data recorded.   10/24 1901 - 10/26 0700  In: 600 [P.O.:600]  Out: 2100 [Urine:2100]    Current Facility-Administered Medications   Medication Dose Route Frequency    diphenhydrAMINE (BENADRYL) capsule 25 mg  25 mg Oral Q8H PRN    oxyCODONE-acetaminophen (PERCOCET) 5-325 mg per tablet 1 Tab  1 Tab Oral Q8H PRN  furosemide (LASIX) injection 40 mg  40 mg IntraVENous DAILY    ferrous sulfate tablet 325 mg  1 Tab Oral BID WITH MEALS    cyanocobalamin (VITAMIN B12) tablet 500 mcg  500 mcg Oral DAILY    albuterol-ipratropium (DUO-NEB) 2.5 MG-0.5 MG/3 ML  3 mL Nebulization Q6HWA RT    acetylcysteine (MUCOMYST) 200 mg/mL (20 %) solution 600 mg  600 mg Nebulization BID    0.9% sodium chloride infusion 250 mL  250 mL IntraVENous PRN    atorvastatin (LIPITOR) tablet 20 mg  20 mg Oral DAILY    pantoprazole (PROTONIX) tablet 40 mg  40 mg Oral DAILY    acetaminophen (TYLENOL) tablet 650 mg  650 mg Oral Q4H PRN    alum-mag hydroxide-simeth (MYLANTA) oral suspension 30 mL  30 mL Oral Q4H PRN    magnesium hydroxide (MILK OF MAGNESIA) 400 mg/5 mL oral suspension 30 mL  30 mL Oral DAILY PRN    ondansetron (ZOFRAN) injection 4 mg  4 mg IntraVENous Q8H PRN       Physical Exam:  General: Alert and oriented x3.  3 L nasal cannula. Depressed/frustrated. HEENT: atraumatic, PERRL, moist mucosa,     Neck: Trachea midline, no carotid bruit, no masses. Supple but thick. Respiratory: No breath sounds on the left side. Few crackles and rhonchi on the right side. No significant change. Cardiovascular: RRR, no m/r/g, Normal S1 and S2   abdomen: Has large ventral abdominal hernia, evidence of surgical scars soft,   Rectal: deferred  Extremities: no cyanosis or clubbing. He has significant pitting edema in the dependent portions and lower extremities. Integumentary: warm, dry, and pink, with no rash, purpura, or petechia.    Heme/Lymph: no lymphadenopathy, no bruises  Neurological: No focal motor deficit   psychiatric: cooperative with normal mood, affect, and cognition      Additional comments: Chest x-rays reviewed    Data Review    Recent Results (from the past 24 hour(s))   CBC WITH AUTOMATED DIFF    Collection Time: 10/26/20  7:54 AM   Result Value Ref Range    WBC 4.2 4.1 - 11.1 K/uL    RBC 2.83 (L) 4.10 - 5.70 M/uL    HGB 8.3 (L) 12.1 - 17.0 g/dL    HCT 26.8 (L) 36.6 - 50.3 %    MCV 94.7 80.0 - 99.0 FL    MCH 29.3 26.0 - 34.0 PG    MCHC 31.0 30.0 - 36.5 g/dL    RDW 15.4 (H) 11.5 - 14.5 %    PLATELET 126 281 - 758 K/uL    MPV 9.1 8.9 - 12.9 FL    NEUTROPHILS 49 32 - 75 %    LYMPHOCYTES 25 12 - 49 %    MONOCYTES 25 (H) 5 - 13 %    EOSINOPHILS 0 0 - 7 %    BASOPHILS 0 0 - 1 %    IMMATURE GRANULOCYTES 1 (H) 0.0 - 0.5 %    ABS. NEUTROPHILS 2.0 1.8 - 8.0 K/UL    ABS. LYMPHOCYTES 1.1 0.8 - 3.5 K/UL    ABS. MONOCYTES 1.1 (H) 0.0 - 1.0 K/UL    ABS. EOSINOPHILS 0.0 0.0 - 0.4 K/UL    ABS. BASOPHILS 0.0 0.0 - 0.1 K/UL    ABS. IMM. GRANS. 0.0 0.00 - 0.04 K/UL    DF AUTOMATED      RBC COMMENTS Normocytic, Normochromic     C REACTIVE PROTEIN, QT    Collection Time: 10/26/20  7:54 AM   Result Value Ref Range    C-Reactive protein 13.00 (H) 0.00 - 0.60 mg/dL         Chest x-ray from last evening was reviewed which showed recurrence of left partial collapse. 6 results from 10/3/2020 were reviewed  Patient has left basal atelectasis. XR CHEST PORT   Final Result      XR CHEST PORT   Final Result      XR CHEST PORT   Final Result   Impression: No change compared to single view chest October 17, 2020. XR CHEST PORT   Final Result      XR CHEST PORT   Final Result      XR CHEST PORT   Final Result      XR CHEST PORT   Final Result      XR CHEST PORT   Final Result      XR CHEST PORT   Final Result   IMPRESSION: Persistent findings compared to single view chest October 10, 2020. XR CHEST PORT   Final Result   IMPRESSION: No significant change. See above. XR CHEST PA LAT   Final Result   Impression:      Collapse of left lung again noted with decreased aeration of the upper lobe   compared to yesterday increased opacity of the right lung may be due to portable   technique. XR CHEST PORT   Final Result   Impression:      Stable aeration of the left upper lung with atelectasis. Stable appearing   bilateral pleural effusions.    No significant change compared to the prior study from 10/7/2020. XR CHEST PORT   Final Result      XR FLUOROSCOPY UNDER 60 MINUTES   Final Result      XR CHEST PORT   Final Result      XR CHEST PORT   Final Result   FINDINGS: Impression: Frontal single view chest.      Continued opacification of the left hemithorax, obscuring the cardiac   silhouette. Right lung interstitial thickening, airspace disease, bronchial thickening,   pleural effusion similar to previous. No pneumothorax. CT CHEST WO CONT   Final Result   Impression:    1. Increased now complete opacification of the left bronchi with low density   material.   2. Slightly increased patchy airspace pneumonia in the right lung. 3. Unchanged loculated and nonloculated left pleural effusion, complete left   lung consolidation, right lung pleural effusion. CT ABD PELV W CONT   Final Result   IMPRESSION:   1. Large ventral hernia containing nonobstructed small and large intestine. 2. Moderate to large right pleural effusion and mild to moderate left pleural   effusion. There is near complete collapse of the visualized portions of the left   lower lobe and there is pleural thickening in the left hemithorax. 3. Patchy airspace disease in the right lower lobe along with right basilar   atelectasis. 4. Nonspecific left adrenal nodule. 5. Other findings as described. XR CHEST PORT   Final Result   Impression: Heterogeneous opacity in the right lung, not significantly changed. Now complete opacification of the left hemithorax. XR CHEST PORT   Final Result   IMPRESSION: No significant change. XR CHEST AP LAT   Final Result      XR CHEST AP LAT   Final Result   IMPRESSION:   1. Persistent opacification of the left hemithorax with volume loss. 2.  Moderate right pleural effusion with probable associated right basilar   atelectasis. XR CHEST PORT   Final Result   Impression: Increased volume loss left lung.  Otherwise stable. XR CHEST PORT   Final Result   IMPRESSION:   1. Improved aeration of the left lung with persistent basilar consolidative   opacities and probable pleural effusions. 2. Other extensive interstitial and alveolar opacities are nonspecific, but   potentially related to pulmonary edema or infection. XR CHEST PORT   Final Result   Impression:Left lung collapse without improvement from prior study. XR CHEST PORT   Final Result   IMPRESSION:   1. There is milder enlargement of the cardiac silhouette as well as increasing   pulmonary vascular ill definition suspect for mild pulmonary edema. This could   be related to cardiogenic edema or fluid overload. 2.  There is been an improvement in the overall aeration of the left lung with   and improved visualization of vascular markings within the left upper lung zone. There still remains significant partial collapse of the left lung. 3.  There is increased patchy airspace disease laterally within the right lower   lobe just above the right lateral costophrenic angle. 4.  There are persistent moderate-sized bilateral pleural effusions. XR CHEST AP LAT   Final Result   IMPRESSION: Persistent collapse of the left lung with bilateral pleural   effusions. Increasing vascular congestion on the right. XR CHEST PA LAT   Final Result   Impression:   Ongoing near complete white out of the left lung. Little interval change since   the prior examination. CT CHEST WO CONT   Final Result   IMPRESSION: Complete collapse of the left lung due to complete bronchial   obstruction, possibly aspiration. Fluid overload also noted      XR ABD ACUTE W 1 V CHEST   Final Result   IMPRESSION: Opacification of the left hemithorax, questioned for remote   pneumonectomy or lung collapse with pleural fluid. Prominent right parenchymal/interstitial lung markings compatible with fibrosis   and possible partial vascular congestion.       Small bowel gas, nonobstructive pattern. Assessment/Plan: This is a middle-aged male who was admitted because of increasing symptoms of shortness of breath. He is getting treated in hospital for pneumonia with IV Zosyn, was on Azithromycin. He is noted to be increasingly tachypneic and short of breath. He has been on supplemental oxygen. His chest x-ray is showing persistent left lung opacification from mucous plugging. This has not improved with aggressive pulmonary hygiene and 9 suction bronchoscopies. Patient is undergone multiple biopsies as well as transbronchial needle aspirations of multiple mediastinal nodes, all cytology/pathology has returned negative for malignancy thus far. .     Plan:     1.)    Left lung collapse/shortness of breath:  Continues to have collapsed left lung  Status post multiple bronchoscopies  Biopsies, brushing, lavage, and EBUS all negative for malignancy  Evaluated by neurology who do not think he has significant neuromuscular issue  Not candidate for transfer to higher level facility since he has been refused for acceptance by both [facility] and [facility]    2.)  COPD:  - He has a history of COPD based on chronic smoking.  - Continue scheduled bronchodilators. - Patient should be discharged with a LAMA/LABA such as Anoro and needs f/u in our office for PFT's and further management after discharge. - Weaned off prednisone. 3.)  Pneumonia:   - He is on IV Meropenem; was on Zosyn for >1 month. All cultures from multiple bronchoscopies are no growth to date. AFBs are negative, fungal culture showed moderate yeast but otherwise nothing significant.  - Fungitell is negative; overall trend in CRP is going down. - Infectious disease consultation appreciated. Given no improvement in left lung mucous clearance, IV Anidulafungin was started on 10/20/20.  - Repeat CXR in the morning.    4.)  Hypertension:  - He is on antihypertensive medications, BP's stable.   - Echocardiogram done on 9/16 showed an EF of 60 to 62% grade 2 diastolic dysfunction and moderate to severe aortic stenosis. Right ventricle is normal in size and function.    - Continue Lasix to 40 mg IV every 12 hours. 5.)  Ventral abdominal hernia:  - Patient wants to have his ventral abdominal hernia fixed very badly. - Patient was independently evaluated by Dr. Radha Zaidi and separately by Dr. Omar Morley. They are recommending against surgical repair of the hernia at this time due to his current pulmonary status, the size of the hernia, the extent of the operation, and loss of domain.   -May benefit from third opinion at a tertiary care center to centers have refused to take him    Discussed with patient by me and by the hospitalist this morning regarding his treatment options for possible intubation with bronchopulmonary toilet and possible tracheostomy versus transfer to rehab facility and patient would prefer transfer to rehab facility at this time    Questions of patient were answered at bedside in detail  Case discussed in detail with RN, RT, and care team  Thank you for involving me in the care of this patient  I will follow with you closely during hospitalization    Time spent more than 30 minutes in direct patient care with no overlap      Valentin Acosta MD  Pulmonary and critical care

## 2020-10-26 NOTE — PROGRESS NOTES
Hospitalist Progress Note               Daily Progress Note: 10/26/2020      Subjective: The patient is seen for follow  up. The patient is seen for follow  up. He is a 61-year-old male admitted 9/10 initially with altered mental status after a fall. He had shortness of breath and generalized weakness on admission. Following admission patient has developed recurrent left lung collapse and chronic refractory pneumonia of undetermined etiology despite extensive microbiologic work-up and over 6 weeks of empiric antibiotics. He has undergone 9 bronchoscopies during this hospital stay. He underwent EBUS on 10/13. There has been no evidence for malignancy. He has been receiving chest physiotherapy, EZ Pap therapy, Acapella as well as incentive spirometry. Patient has been focused primarily on somebody fixing a large ventral hernia although he has been declined by 2 general surgeons. Was evaluated by neurology in regards to a possible neuromuscular disease which was not felt to be the case. Chest x-ray has showed persistent opacification of left hemithorax    Pulmonology recommended transfer to a tertiary care facility. He was declined by at [facility] and yesterday was declined by [facility].     Dr Sheree Gracia felt patient could be discharged to rehab    Problem List:  Problem List as of 10/26/2020 Date Reviewed: 10/2/2020          Codes Class Noted - Resolved    Hypertension ICD-10-CM: I10  ICD-9-CM: 401.9  10/9/2020 - Present        Abdominal wall hernia ICD-10-CM: K43.9  ICD-9-CM: 553.20  8/1/2017 - Present        DDD (degenerative disc disease), lumbar ICD-10-CM: M51.36  ICD-9-CM: 722.52  5/1/2017 - Present        Chronic pain ICD-10-CM: G89.29  ICD-9-CM: 338.29  1/6/2015 - Present        Aspiration pneumonia ([facility] [address] ICD-10-CM: J69.0  ICD-9-CM: 507.0  1/19/2014 - Present        Hypernatremia ICD-10-CM: E87.0  ICD-9-CM: 276.0  1/14/2014 - Present        Ischemic colitis ([facility] [address] ICD-10-CM: K55.9  ICD-9-CM: 557.9  1/13/2014 - Present        Colon perforation ([place] [address].) ICD-10-CM: K63.1  ICD-9-CM: 569.83  1/13/2014 - Present        Acute respiratory failure with hypoxia (HCC) ICD-10-CM: J96.01  ICD-9-CM: 518.81  1/13/2014 - Present        Acute blood loss anemia ICD-10-CM: D62  ICD-9-CM: 285.1  1/13/2014 - Present        JESUS (acute kidney injury) ([place] [address].) ICD-10-CM: N17.9  ICD-9-CM: 584.9  1/13/2014 - Present        Pneumonia ICD-10-CM: J18.9  ICD-9-CM: 486  1/1/2014 - Present        Elevated INR ICD-10-CM: R79.1  ICD-9-CM: 790.92  1/1/2014 - Present        Alcoholism ([place] [address].) ICD-10-CM: F10.20  ICD-9-CM: 303.90  1/1/2014 - Present        Nicotine addiction ICD-10-CM: F17.200  ICD-9-CM: 305.1  1/1/2014 - Present        PVD (peripheral vascular disease) ([place] [address].) ICD-10-CM: I73.9  ICD-9-CM: 443.9  7/31/2013 - Present        Hyponatremia ICD-10-CM: E87.1  ICD-9-CM: 276.1  5/30/2012 - Present        ETOH abuse ICD-10-CM: F10.10  ICD-9-CM: 305.00  5/30/2012 - Present        Coagulation disorder ([place] [address].) ICD-10-CM: D68.9  ICD-9-CM: 286.9  5/30/2012 - Present        Allergic rhinitis due to other allergen ICD-10-CM: J30.89  ICD-9-CM: 477.8  12/27/2011 - Present        Stroke 2002 (Chronic) ICD-10-CM: I63.9  ICD-9-CM: 434.91  3/15/2010 - Present        Anal fistula (Chronic) ICD-10-CM: K60.3  ICD-9-CM: 565.1  3/15/2010 - Present        HTN (hypertension) (Chronic) ICD-10-CM: I10  ICD-9-CM: 401.9  3/15/2010 - Present        Genital herpes (Chronic) ICD-10-CM: A60.00  ICD-9-CM: 054.10  3/15/2010 - Present        Noncompliance with treatment (Chronic) ICD-10-CM: Z91.19  ICD-9-CM: V15.81  3/15/2010 - Present        High cholesterol (Chronic) ICD-10-CM: E78.00  ICD-9-CM: 272.0  3/15/2010 - Present        left Carotid Artery Occlusion (Chronic) ICD-10-CM: Q09.32  ICD-9-CM: 433.10  3/15/2010 - Present              Medications reviewed  Current Facility-Administered Medications   Medication Dose Route Frequency    diphenhydrAMINE (BENADRYL) capsule 25 mg  25 mg Oral Q8H PRN    oxyCODONE-acetaminophen (PERCOCET) 5-325 mg per tablet 1 Tab  1 Tab Oral Q8H PRN    furosemide (LASIX) injection 40 mg  40 mg IntraVENous DAILY    ferrous sulfate tablet 325 mg  1 Tab Oral BID WITH MEALS    cyanocobalamin (VITAMIN B12) tablet 500 mcg  500 mcg Oral DAILY    albuterol-ipratropium (DUO-NEB) 2.5 MG-0.5 MG/3 ML  3 mL Nebulization Q6HWA RT    acetylcysteine (MUCOMYST) 200 mg/mL (20 %) solution 600 mg  600 mg Nebulization BID    0.9% sodium chloride infusion 250 mL  250 mL IntraVENous PRN    atorvastatin (LIPITOR) tablet 20 mg  20 mg Oral DAILY    pantoprazole (PROTONIX) tablet 40 mg  40 mg Oral DAILY    acetaminophen (TYLENOL) tablet 650 mg  650 mg Oral Q4H PRN    alum-mag hydroxide-simeth (MYLANTA) oral suspension 30 mL  30 mL Oral Q4H PRN    magnesium hydroxide (MILK OF MAGNESIA) 400 mg/5 mL oral suspension 30 mL  30 mL Oral DAILY PRN    ondansetron (ZOFRAN) injection 4 mg  4 mg IntraVENous Q8H PRN       Review of Systems:   A comprehensive review of systems was negative except for that written in the HPI. Objective:   Physical Exam:     Visit Vitals  BP (!) 106/57   Pulse 80   Temp 98.2 °F (36.8 °C)   Resp 20   Ht 6' 0.99\" (1.854 m)   Wt 75.2 kg (165 lb 12.6 oz)   SpO2 100%   BMI 21.88 kg/m²    O2 Flow Rate (L/min): 2 l/min O2 Device: Nasal cannula    Temp (24hrs), Av.6 °F (37 °C), Min:98.2 °F (36.8 °C), Max:99.1 °F (37.3 °C)    No intake/output data recorded. 10/24 1901 - 10/26 0700  In: 600 [P.O.:600]  Out: 2100 [Urine:2100]    General:  Alert, cooperative, no distress, appears stated age. Lungs:    Decreased breath sounds left lung   Chest wall:  No tenderness or deformity. Heart:  Regular rate and rhythm, S1, S2 normal, no murmur, click, rub or gallop. Abdomen:    Large ventral hernia present   Extremities: Extremities normal, atraumatic, no cyanosis or edema. Pulses: 2+ and symmetric all extremities.    Skin: Skin color, texture, turgor normal. No rashes or lesions   Neurologic: CNII-XII intact. No gross sensory or motor deficits     Data Review:       Recent Days:  Recent Labs     10/26/20  0754 10/23/20  1459   WBC 4.2 9.7   HGB 8.3* 7.9*   HCT 26.8* 25.1*    294     No results for input(s): NA, K, CL, CO2, GLU, BUN, CREA, CA, MG, PHOS, ALB, TBIL, TBILI, ALT, INR, INREXT in the last 72 hours. No lab exists for component: SGOT  No results for input(s): PH, PCO2, PO2, HCO3, FIO2 in the last 72 hours. 24 Hour Results:  Recent Results (from the past 24 hour(s))   CBC WITH AUTOMATED DIFF    Collection Time: 10/26/20  7:54 AM   Result Value Ref Range    WBC 4.2 4.1 - 11.1 K/uL    RBC 2.83 (L) 4.10 - 5.70 M/uL    HGB 8.3 (L) 12.1 - 17.0 g/dL    HCT 26.8 (L) 36.6 - 50.3 %    MCV 94.7 80.0 - 99.0 FL    MCH 29.3 26.0 - 34.0 PG    MCHC 31.0 30.0 - 36.5 g/dL    RDW 15.4 (H) 11.5 - 14.5 %    PLATELET 577 856 - 775 K/uL    MPV 9.1 8.9 - 12.9 FL    NEUTROPHILS PENDING %    LYMPHOCYTES PENDING %    MONOCYTES PENDING %    EOSINOPHILS PENDING %    BASOPHILS PENDING %    IMMATURE GRANULOCYTES PENDING %    ABS. NEUTROPHILS PENDING K/UL    ABS. LYMPHOCYTES PENDING K/UL    ABS. MONOCYTES PENDING K/UL    ABS. EOSINOPHILS PENDING K/UL    ABS. BASOPHILS PENDING K/UL    ABS. IMM. GRANS. PENDING K/UL    DF PENDING    C REACTIVE PROTEIN, QT    Collection Time: 10/26/20  7:54 AM   Result Value Ref Range    C-Reactive protein 13.00 (H) 0.00 - 0.60 mg/dL       XR CHEST PORT   Final Result      XR CHEST PORT   Final Result      XR CHEST PORT   Final Result   Impression: No change compared to single view chest October 17, 2020. XR CHEST PORT   Final Result      XR CHEST PORT   Final Result      XR CHEST PORT   Final Result      XR CHEST PORT   Final Result      XR CHEST PORT   Final Result      XR CHEST PORT   Final Result   IMPRESSION: Persistent findings compared to single view chest October 10, 2020.       XR CHEST PORT   Final Result IMPRESSION: No significant change. See above. XR CHEST PA LAT   Final Result   Impression:      Collapse of left lung again noted with decreased aeration of the upper lobe   compared to yesterday increased opacity of the right lung may be due to portable   technique. XR CHEST PORT   Final Result   Impression:      Stable aeration of the left upper lung with atelectasis. Stable appearing   bilateral pleural effusions. No significant change compared to the prior study from 10/7/2020. XR CHEST PORT   Final Result      XR FLUOROSCOPY UNDER 60 MINUTES   Final Result      XR CHEST PORT   Final Result      XR CHEST PORT   Final Result   FINDINGS: Impression: Frontal single view chest.      Continued opacification of the left hemithorax, obscuring the cardiac   silhouette. Right lung interstitial thickening, airspace disease, bronchial thickening,   pleural effusion similar to previous. No pneumothorax. CT CHEST WO CONT   Final Result   Impression:    1. Increased now complete opacification of the left bronchi with low density   material.   2. Slightly increased patchy airspace pneumonia in the right lung. 3. Unchanged loculated and nonloculated left pleural effusion, complete left   lung consolidation, right lung pleural effusion. CT ABD PELV W CONT   Final Result   IMPRESSION:   1. Large ventral hernia containing nonobstructed small and large intestine. 2. Moderate to large right pleural effusion and mild to moderate left pleural   effusion. There is near complete collapse of the visualized portions of the left   lower lobe and there is pleural thickening in the left hemithorax. 3. Patchy airspace disease in the right lower lobe along with right basilar   atelectasis. 4. Nonspecific left adrenal nodule. 5. Other findings as described. XR CHEST PORT   Final Result   Impression: Heterogeneous opacity in the right lung, not significantly changed.     Now complete opacification of the left hemithorax. XR CHEST PORT   Final Result   IMPRESSION: No significant change. XR CHEST AP LAT   Final Result      XR CHEST AP LAT   Final Result   IMPRESSION:   1. Persistent opacification of the left hemithorax with volume loss. 2.  Moderate right pleural effusion with probable associated right basilar   atelectasis. XR CHEST PORT   Final Result   Impression: Increased volume loss left lung. Otherwise stable. XR CHEST PORT   Final Result   IMPRESSION:   1. Improved aeration of the left lung with persistent basilar consolidative   opacities and probable pleural effusions. 2. Other extensive interstitial and alveolar opacities are nonspecific, but   potentially related to pulmonary edema or infection. XR CHEST PORT   Final Result   Impression:Left lung collapse without improvement from prior study. XR CHEST PORT   Final Result   IMPRESSION:   1. There is milder enlargement of the cardiac silhouette as well as increasing   pulmonary vascular ill definition suspect for mild pulmonary edema. This could   be related to cardiogenic edema or fluid overload. 2.  There is been an improvement in the overall aeration of the left lung with   and improved visualization of vascular markings within the left upper lung zone. There still remains significant partial collapse of the left lung. 3.  There is increased patchy airspace disease laterally within the right lower   lobe just above the right lateral costophrenic angle. 4.  There are persistent moderate-sized bilateral pleural effusions. XR CHEST AP LAT   Final Result   IMPRESSION: Persistent collapse of the left lung with bilateral pleural   effusions. Increasing vascular congestion on the right. XR CHEST PA LAT   Final Result   Impression:   Ongoing near complete white out of the left lung. Little interval change since   the prior examination.       CT CHEST WO CONT   Final Result   IMPRESSION: Complete collapse of the left lung due to complete bronchial   obstruction, possibly aspiration. Fluid overload also noted      XR ABD ACUTE W 1 V CHEST   Final Result   IMPRESSION: Opacification of the left hemithorax, questioned for remote   pneumonectomy or lung collapse with pleural fluid. Prominent right parenchymal/interstitial lung markings compatible with fibrosis   and possible partial vascular congestion. Small bowel gas, nonobstructive pattern. Assessment:  Refractory left lung collapse/pneumonia. Completed 6 weeks of empiric IV antibiotic   -Has been declined by [facility]    Acute respiratory failure with hypoxia. Mediastinal adenopathy. EBUS and pathology negative    COPD with exacerbation    Chronic anemia    Hypertension    Large ventral hernia      Plan:  Discussed with pulmonology this morning. Our 2 options are discharge to a skilled nursing facility versus treat aggressively with intubation and likely ultimately tracheostomy    Patient indicates he opts for the rehabilitation option. The only reason he would choose the more aggressive approach would be if it included getting his hernia fixed which would not be the case      Care Plan discussed with: Consultant Pulmonogist    Total time spent with patient: 30 minutes.     Eboni Pradhan MD

## 2020-10-27 VITALS
SYSTOLIC BLOOD PRESSURE: 94 MMHG | DIASTOLIC BLOOD PRESSURE: 58 MMHG | BODY MASS INDEX: 21.97 KG/M2 | RESPIRATION RATE: 18 BRPM | WEIGHT: 165.79 LBS | HEIGHT: 73 IN | HEART RATE: 71 BPM | TEMPERATURE: 97.3 F | OXYGEN SATURATION: 100 %

## 2020-10-27 LAB
CRP SERPL-MCNC: 9.12 MG/DL (ref 0–0.6)
PROCALCITONIN SERPL-MCNC: <0.05 NG/ML

## 2020-10-27 PROCEDURE — 94760 N-INVAS EAR/PLS OXIMETRY 1: CPT

## 2020-10-27 PROCEDURE — 74011250637 HC RX REV CODE- 250/637: Performed by: FAMILY MEDICINE

## 2020-10-27 PROCEDURE — 77010033678 HC OXYGEN DAILY

## 2020-10-27 PROCEDURE — 74011250636 HC RX REV CODE- 250/636: Performed by: FAMILY MEDICINE

## 2020-10-27 PROCEDURE — 86140 C-REACTIVE PROTEIN: CPT

## 2020-10-27 PROCEDURE — 36415 COLL VENOUS BLD VENIPUNCTURE: CPT

## 2020-10-27 PROCEDURE — 84145 PROCALCITONIN (PCT): CPT

## 2020-10-27 PROCEDURE — 99232 SBSQ HOSP IP/OBS MODERATE 35: CPT | Performed by: INTERNAL MEDICINE

## 2020-10-27 PROCEDURE — 74011250637 HC RX REV CODE- 250/637: Performed by: INTERNAL MEDICINE

## 2020-10-27 RX ORDER — LANOLIN ALCOHOL/MO/W.PET/CERES
325 CREAM (GRAM) TOPICAL
Qty: 30 TAB | Refills: 0 | Status: SHIPPED
Start: 2020-10-27

## 2020-10-27 RX ORDER — IPRATROPIUM BROMIDE AND ALBUTEROL SULFATE 2.5; .5 MG/3ML; MG/3ML
3 SOLUTION RESPIRATORY (INHALATION)
Qty: 30 NEBULE | Refills: 0 | Status: SHIPPED
Start: 2020-10-27

## 2020-10-27 RX ORDER — ACETAMINOPHEN 325 MG/1
650 TABLET ORAL
Qty: 30 TAB | Refills: 0 | Status: SHIPPED
Start: 2020-10-27

## 2020-10-27 RX ORDER — ACETYLCYSTEINE 200 MG/ML
600 SOLUTION ORAL; RESPIRATORY (INHALATION) 2 TIMES DAILY
Qty: 100 ML | Refills: 0 | Status: SHIPPED
Start: 2020-10-27

## 2020-10-27 RX ORDER — LANOLIN ALCOHOL/MO/W.PET/CERES
500 CREAM (GRAM) TOPICAL DAILY
Qty: 30 TAB | Refills: 0 | Status: SHIPPED | OUTPATIENT
Start: 2020-10-28

## 2020-10-27 RX ADMIN — OXYCODONE HYDROCHLORIDE AND ACETAMINOPHEN 1 TABLET: 5; 325 TABLET ORAL at 02:07

## 2020-10-27 RX ADMIN — FUROSEMIDE 40 MG: 10 INJECTION, SOLUTION INTRAMUSCULAR; INTRAVENOUS at 10:05

## 2020-10-27 RX ADMIN — DIPHENHYDRAMINE HYDROCHLORIDE 25 MG: 25 CAPSULE ORAL at 10:05

## 2020-10-27 RX ADMIN — PANTOPRAZOLE SODIUM 40 MG: 40 TABLET, DELAYED RELEASE ORAL at 06:30

## 2020-10-27 RX ADMIN — FERROUS SULFATE TAB 325 MG (65 MG ELEMENTAL FE) 325 MG: 325 (65 FE) TAB at 10:04

## 2020-10-27 RX ADMIN — OXYCODONE HYDROCHLORIDE AND ACETAMINOPHEN 1 TABLET: 5; 325 TABLET ORAL at 10:05

## 2020-10-27 RX ADMIN — CYANOCOBALAMIN TAB 500 MCG 500 MCG: 500 TAB at 10:04

## 2020-10-27 RX ADMIN — DIPHENHYDRAMINE HYDROCHLORIDE 25 MG: 25 CAPSULE ORAL at 02:07

## 2020-10-27 NOTE — PROGRESS NOTES
Spoke with pt and his roommate Catalina  regarding plan to discharge to [facility] and [address] [place] [address], they both agree with the plan. Discharge plan of care/case management plan validated with provider discharge order.

## 2020-10-27 NOTE — PROGRESS NOTES
CM met with patient at bedside. Patient given IMM notice. CM called Admissions liaison at [facility] to notify her of clinical updates being sent.

## 2020-10-27 NOTE — PROGRESS NOTES
Pulm/CC Progress Note    Subjective:     Patient seen and examined in his room    No acute issues overnight; he is still intermittently refusing some respiratory therapies due to pain in his abdomen when he coughs. He is awake and alert. On 3 L of oxygen via nasal cannula, but satting in the high 90's. No acute distress  Lying in bed comfortably    Overall his mood is more depressed/angry secondary to the fact that two separate surgeons here at [facility] do not feel that it is a good time to pursue hernia repair at this time. This will be discussed again with him at the bedside with Dr. Caleb Viera. Chest x-ray that was done on 10/19/20 showed continued collapse of left lung. No need for further CXR's unless the clinical situation changes. He has undergone 9 bronchoscopies with mucus suctioning and has had several biopsies of the lesions within his tracheobronchial tree as well as brushings and EBUS biopsies of station 11L and station 7. He has had squamous metaplasia return, but no definitive malignancy. No malignancy on EBUS biopsies. Dr. Lorraine Kong discussed the case with Dr. Piotr Doshi on 10/19/20, we have all final results back and there is no definitive malignancy. Review of Systems   Has complains of shortness of breath. He is on nasal cannula oxygen. Denied any nausea vomiting. Has poor appetite    Objective:     Visit Vitals  BP (!) 94/58   Pulse 71   Temp 97.3 °F (36.3 °C)   Resp 18   Ht 6' 0.99\" (1.854 m)   Wt 75.2 kg (165 lb 12.6 oz)   SpO2 100%   BMI 21.88 kg/m²    O2 Flow Rate (L/min): 2 l/min O2 Device: Nasal cannula    Temp (24hrs), Av.9 °F (36.6 °C), Min:97.3 °F (36.3 °C), Max:98.5 °F (36.9 °C)      No intake/output data recorded.   10/25 1901 - 10/27 0700  In: 1080 [P.O.:1080]  Out: 2450 [Urine:2450]    Current Facility-Administered Medications   Medication Dose Route Frequency    diphenhydrAMINE (BENADRYL) capsule 25 mg  25 mg Oral Q8H PRN    oxyCODONE-acetaminophen (PERCOCET) 5-325 mg per tablet 1 Tab  1 Tab Oral Q8H PRN    ferrous sulfate tablet 325 mg  1 Tab Oral BID WITH MEALS    cyanocobalamin (VITAMIN B12) tablet 500 mcg  500 mcg Oral DAILY    albuterol-ipratropium (DUO-NEB) 2.5 MG-0.5 MG/3 ML  3 mL Nebulization Q6HWA RT    acetylcysteine (MUCOMYST) 200 mg/mL (20 %) solution 600 mg  600 mg Nebulization BID    0.9% sodium chloride infusion 250 mL  250 mL IntraVENous PRN    atorvastatin (LIPITOR) tablet 20 mg  20 mg Oral DAILY    pantoprazole (PROTONIX) tablet 40 mg  40 mg Oral DAILY    acetaminophen (TYLENOL) tablet 650 mg  650 mg Oral Q4H PRN    alum-mag hydroxide-simeth (MYLANTA) oral suspension 30 mL  30 mL Oral Q4H PRN    magnesium hydroxide (MILK OF MAGNESIA) 400 mg/5 mL oral suspension 30 mL  30 mL Oral DAILY PRN    ondansetron (ZOFRAN) injection 4 mg  4 mg IntraVENous Q8H PRN       Physical Exam:  General: Alert and oriented x3.  3 L nasal cannula. Depressed/frustrated. HEENT: atraumatic, PERRL, moist mucosa,     Neck: Trachea midline, no carotid bruit, no masses. Supple but thick. Respiratory: No breath sounds on the left side. Few crackles and rhonchi on the right side. No significant change. Cardiovascular: RRR, no m/r/g, Normal S1 and S2   abdomen: Has large ventral abdominal hernia, evidence of surgical scars soft,   Rectal: deferred  Extremities: no cyanosis or clubbing. He has significant pitting edema in the dependent portions and lower extremities. Integumentary: warm, dry, and pink, with no rash, purpura, or petechia.    Heme/Lymph: no lymphadenopathy, no bruises  Neurological: No focal motor deficit   psychiatric: cooperative with normal mood, affect, and cognition      Additional comments: Chest x-rays reviewed    Data Review    Recent Results (from the past 24 hour(s))   CBC WITH AUTOMATED DIFF    Collection Time: 10/26/20  9:55 PM   Result Value Ref Range    WBC 5.3 4.1 - 11.1 K/uL    RBC 2.73 (L) 4.10 - 5.70 M/uL    HGB 8.0 (L) 12.1 - 17.0 g/dL    HCT 25.5 (L) 36.6 - 50.3 %    MCV 93.4 80.0 - 99.0 FL    MCH 29.3 26.0 - 34.0 PG    MCHC 31.4 30.0 - 36.5 g/dL    RDW 15.8 (H) 11.5 - 14.5 %    PLATELET 966 878 - 225 K/uL    MPV 10.1 8.9 - 12.9 FL    NEUTROPHILS 56 32 - 75 %    LYMPHOCYTES 28 12 - 49 %    MONOCYTES 16 (H) 5 - 13 %    EOSINOPHILS 0 0 - 7 %    BASOPHILS 0 0 - 1 %    IMMATURE GRANULOCYTES 0 0.0 - 0.5 %    ABS. NEUTROPHILS 2.9 1.8 - 8.0 K/UL    ABS. LYMPHOCYTES 1.5 0.8 - 3.5 K/UL    ABS. MONOCYTES 0.9 0.0 - 1.0 K/UL    ABS. EOSINOPHILS 0.0 0.0 - 0.4 K/UL    ABS. BASOPHILS 0.0 0.0 - 0.1 K/UL    ABS. IMM. GRANS. 0.0 0.00 - 0.04 K/UL    DF AUTOMATED     C REACTIVE PROTEIN, QT    Collection Time: 10/27/20 10:30 AM   Result Value Ref Range    C-Reactive protein 9.12 (H) 0.00 - 0.60 mg/dL   PROCALCITONIN    Collection Time: 10/27/20 10:30 AM   Result Value Ref Range    Procalcitonin <0.05 (H) 0 ng/mL         Chest x-ray from last evening was reviewed which showed recurrence of left partial collapse. 6 results from 10/3/2020 were reviewed  Patient has left basal atelectasis. XR CHEST PORT   Final Result      XR CHEST PORT   Final Result      XR CHEST PORT   Final Result   Impression: No change compared to single view chest October 17, 2020. XR CHEST PORT   Final Result      XR CHEST PORT   Final Result      XR CHEST PORT   Final Result      XR CHEST PORT   Final Result      XR CHEST PORT   Final Result      XR CHEST PORT   Final Result   IMPRESSION: Persistent findings compared to single view chest October 10, 2020. XR CHEST PORT   Final Result   IMPRESSION: No significant change. See above. XR CHEST PA LAT   Final Result   Impression:      Collapse of left lung again noted with decreased aeration of the upper lobe   compared to yesterday increased opacity of the right lung may be due to portable   technique. XR CHEST PORT   Final Result   Impression:      Stable aeration of the left upper lung with atelectasis.  Stable appearing   bilateral pleural effusions. No significant change compared to the prior study from 10/7/2020. XR CHEST PORT   Final Result      XR FLUOROSCOPY UNDER 60 MINUTES   Final Result      XR CHEST PORT   Final Result      XR CHEST PORT   Final Result   FINDINGS: Impression: Frontal single view chest.      Continued opacification of the left hemithorax, obscuring the cardiac   silhouette. Right lung interstitial thickening, airspace disease, bronchial thickening,   pleural effusion similar to previous. No pneumothorax. CT CHEST WO CONT   Final Result   Impression:    1. Increased now complete opacification of the left bronchi with low density   material.   2. Slightly increased patchy airspace pneumonia in the right lung. 3. Unchanged loculated and nonloculated left pleural effusion, complete left   lung consolidation, right lung pleural effusion. CT ABD PELV W CONT   Final Result   IMPRESSION:   1. Large ventral hernia containing nonobstructed small and large intestine. 2. Moderate to large right pleural effusion and mild to moderate left pleural   effusion. There is near complete collapse of the visualized portions of the left   lower lobe and there is pleural thickening in the left hemithorax. 3. Patchy airspace disease in the right lower lobe along with right basilar   atelectasis. 4. Nonspecific left adrenal nodule. 5. Other findings as described. XR CHEST PORT   Final Result   Impression: Heterogeneous opacity in the right lung, not significantly changed. Now complete opacification of the left hemithorax. XR CHEST PORT   Final Result   IMPRESSION: No significant change. XR CHEST AP LAT   Final Result      XR CHEST AP LAT   Final Result   IMPRESSION:   1. Persistent opacification of the left hemithorax with volume loss. 2.  Moderate right pleural effusion with probable associated right basilar   atelectasis.       XR CHEST PORT   Final Result   Impression: Increased volume loss left lung. Otherwise stable. XR CHEST PORT   Final Result   IMPRESSION:   1. Improved aeration of the left lung with persistent basilar consolidative   opacities and probable pleural effusions. 2. Other extensive interstitial and alveolar opacities are nonspecific, but   potentially related to pulmonary edema or infection. XR CHEST PORT   Final Result   Impression:Left lung collapse without improvement from prior study. XR CHEST PORT   Final Result   IMPRESSION:   1. There is milder enlargement of the cardiac silhouette as well as increasing   pulmonary vascular ill definition suspect for mild pulmonary edema. This could   be related to cardiogenic edema or fluid overload. 2.  There is been an improvement in the overall aeration of the left lung with   and improved visualization of vascular markings within the left upper lung zone. There still remains significant partial collapse of the left lung. 3.  There is increased patchy airspace disease laterally within the right lower   lobe just above the right lateral costophrenic angle. 4.  There are persistent moderate-sized bilateral pleural effusions. XR CHEST AP LAT   Final Result   IMPRESSION: Persistent collapse of the left lung with bilateral pleural   effusions. Increasing vascular congestion on the right. XR CHEST PA LAT   Final Result   Impression:   Ongoing near complete white out of the left lung. Little interval change since   the prior examination. CT CHEST WO CONT   Final Result   IMPRESSION: Complete collapse of the left lung due to complete bronchial   obstruction, possibly aspiration. Fluid overload also noted      XR ABD ACUTE W 1 V CHEST   Final Result   IMPRESSION: Opacification of the left hemithorax, questioned for remote   pneumonectomy or lung collapse with pleural fluid.       Prominent right parenchymal/interstitial lung markings compatible with fibrosis   and possible partial vascular congestion. Small bowel gas, nonobstructive pattern. Assessment/Plan: This is a middle-aged male who was admitted because of increasing symptoms of shortness of breath. He is getting treated in hospital for pneumonia with IV Zosyn, was on Azithromycin. He is noted to be increasingly tachypneic and short of breath. He has been on supplemental oxygen. His chest x-ray is showing persistent left lung opacification from mucous plugging. This has not improved with aggressive pulmonary hygiene and 9 suction bronchoscopies. Patient is undergone multiple biopsies as well as transbronchial needle aspirations of multiple mediastinal nodes, all cytology/pathology has returned negative for malignancy thus far. .     Plan:     1.)    Left lung collapse/shortness of breath:  Continues to have collapsed left lung  Status post multiple bronchoscopies  Biopsies, brushing, lavage, and EBUS all negative for malignancy  Evaluated by neurology who do not think he has significant neuromuscular issue  Not candidate for transfer to higher level facility since he has been refused for acceptance by both [facility] and [place]    2.)  COPD:  - He has a history of COPD based on chronic smoking.  - Continue scheduled bronchodilators. - Patient should be discharged with a LAMA/LABA such as Anoro and needs f/u in our office for PFT's and further management after discharge. - Weaned off prednisone. 3.)  Pneumonia:   - He is on IV Meropenem; was on Zosyn for >1 month. All cultures from multiple bronchoscopies are no growth to date. AFBs are negative, fungal culture showed moderate yeast but otherwise nothing significant.  - Fungitell is negative; overall trend in CRP is going down. - Infectious disease consultation appreciated.   Given no improvement in left lung mucous clearance, IV Anidulafungin was started on 10/20/20.  - Repeat CXR in the morning.    4.)  Hypertension:  - He is on antihypertensive medications, BP's stable. - Echocardiogram done on 9/16 showed an EF of 60 to 65% grade 2 diastolic dysfunction and moderate to severe aortic stenosis. Right ventricle is normal in size and function.    - Continue Lasix to 40 mg IV every 12 hours. 5.)  Ventral abdominal hernia:  - Patient wants to have his ventral abdominal hernia fixed very badly. - Patient was independently evaluated by Dr. Griffin Mohs and separately by Dr. Saurabh Oswald. They are recommending against surgical repair of the hernia at this time due to his current pulmonary status, the size of the hernia, the extent of the operation, and loss of domain.   -May benefit from third opinion at a tertiary care center to centers have refused to take him    Discussed with patient by me and by the hospitalist this morning regarding his treatment options for possible intubation with bronchopulmonary toilet and possible tracheostomy versus transfer to rehab facility and patient would prefer transfer to rehab facility at this time    Questions of patient were answered at bedside in detail  Case discussed in detail with RN, RT, and care team  Thank you for involving me in the care of this patient  I will follow with you closely during hospitalization    Time spent more than 30 minutes in direct patient care with no overlap      Kleber Castillo MD  Pulmonary and critical care

## 2020-10-27 NOTE — PROGRESS NOTES
Hospitalist Progress Note               Daily Progress Note: 10/27/2020      Subjective: The patient is seen for follow  up. SNF referral has been initiated. There has been no change in his clinical status over the past 24 hours.   He understands the need to work with PT and OT in order to get stronger      Problem List:  Problem List as of 10/27/2020 Date Reviewed: 10/2/2020          Codes Class Noted - Resolved    Hypertension ICD-10-CM: I10  ICD-9-CM: 401.9  10/9/2020 - Present        Abdominal wall hernia ICD-10-CM: K43.9  ICD-9-CM: 553.20  8/1/2017 - Present        DDD (degenerative disc disease), lumbar ICD-10-CM: M51.36  ICD-9-CM: 722.52  5/1/2017 - Present        Chronic pain ICD-10-CM: G89.29  ICD-9-CM: 338.29  1/6/2015 - Present        Aspiration pneumonia ([facility] ICD-10-CM: J69.0  ICD-9-CM: 507.0  1/19/2014 - Present        Hypernatremia ICD-10-CM: E87.0  ICD-9-CM: 276.0  1/14/2014 - Present        Ischemic colitis ([facility] ICD-10-CM: K55.9  ICD-9-CM: 557.9  1/13/2014 - Present        Colon perforation ([facility] ICD-10-CM: K63.1  ICD-9-CM: 569.83  1/13/2014 - Present        Acute respiratory failure with hypoxia ([facility] ICD-10-CM: J96.01  ICD-9-CM: 518.81  1/13/2014 - Present        Acute blood loss anemia ICD-10-CM: D62  ICD-9-CM: 285.1  1/13/2014 - Present        JESUS (acute kidney injury) ([facility] ICD-10-CM: N17.9  ICD-9-CM: 584.9  1/13/2014 - Present        Pneumonia ICD-10-CM: J18.9  ICD-9-CM: 486  1/1/2014 - Present        Elevated INR ICD-10-CM: R79.1  ICD-9-CM: 790.92  1/1/2014 - Present        Alcoholism ([facility] ICD-10-CM: F10.20  ICD-9-CM: 303.90  1/1/2014 - Present        Nicotine addiction ICD-10-CM: F17.200  ICD-9-CM: 305.1  1/1/2014 - Present        PVD (peripheral vascular disease) ([facility] ICD-10-CM: I73.9  ICD-9-CM: 443.9  7/31/2013 - Present        Hyponatremia ICD-10-CM: E87.1  ICD-9-CM: 276.1  5/30/2012 - Present        ETOH abuse ICD-10-CM: F10.10  ICD-9-CM: 305.00  5/30/2012 - Present Coagulation disorder ([facility] ICD-10-CM: D68.9  ICD-9-CM: 286.9  5/30/2012 - Present        Allergic rhinitis due to other allergen ICD-10-CM: J30.89  ICD-9-CM: 477.8  12/27/2011 - Present        Stroke 2002 (Chronic) ICD-10-CM: I63.9  ICD-9-CM: 434.91  3/15/2010 - Present        Anal fistula (Chronic) ICD-10-CM: K60.3  ICD-9-CM: 565.1  3/15/2010 - Present        HTN (hypertension) (Chronic) ICD-10-CM: I10  ICD-9-CM: 401.9  3/15/2010 - Present        Genital herpes (Chronic) ICD-10-CM: A60.00  ICD-9-CM: 054.10  3/15/2010 - Present        Noncompliance with treatment (Chronic) ICD-10-CM: Z91.19  ICD-9-CM: V15.81  3/15/2010 - Present        High cholesterol (Chronic) ICD-10-CM: E78.00  ICD-9-CM: 272.0  3/15/2010 - Present        left Carotid Artery Occlusion (Chronic) ICD-10-CM: I54.29  ICD-9-CM: 433.10  3/15/2010 - Present              Medications reviewed  Current Facility-Administered Medications   Medication Dose Route Frequency    diphenhydrAMINE (BENADRYL) capsule 25 mg  25 mg Oral Q8H PRN    oxyCODONE-acetaminophen (PERCOCET) 5-325 mg per tablet 1 Tab  1 Tab Oral Q8H PRN    furosemide (LASIX) injection 40 mg  40 mg IntraVENous DAILY    ferrous sulfate tablet 325 mg  1 Tab Oral BID WITH MEALS    cyanocobalamin (VITAMIN B12) tablet 500 mcg  500 mcg Oral DAILY    albuterol-ipratropium (DUO-NEB) 2.5 MG-0.5 MG/3 ML  3 mL Nebulization Q6HWA RT    acetylcysteine (MUCOMYST) 200 mg/mL (20 %) solution 600 mg  600 mg Nebulization BID    0.9% sodium chloride infusion 250 mL  250 mL IntraVENous PRN    atorvastatin (LIPITOR) tablet 20 mg  20 mg Oral DAILY    pantoprazole (PROTONIX) tablet 40 mg  40 mg Oral DAILY    acetaminophen (TYLENOL) tablet 650 mg  650 mg Oral Q4H PRN    alum-mag hydroxide-simeth (MYLANTA) oral suspension 30 mL  30 mL Oral Q4H PRN    magnesium hydroxide (MILK OF MAGNESIA) 400 mg/5 mL oral suspension 30 mL  30 mL Oral DAILY PRN    ondansetron (ZOFRAN) injection 4 mg  4 mg IntraVENous Q8H PRN Review of Systems:   A comprehensive review of systems was negative except for that written in the HPI. Objective:   Physical Exam:     Visit Vitals  /66 (BP 1 Location: Right arm, BP Patient Position: At rest)   Pulse 82   Temp 98.5 °F (36.9 °C)   Resp 18   Ht 6' 0.99\" (1.854 m)   Wt 75.2 kg (165 lb 12.6 oz)   SpO2 99%   BMI 21.88 kg/m²    O2 Flow Rate (L/min): 2 l/min O2 Device: Nasal cannula    Temp (24hrs), Av.2 °F (36.8 °C), Min:97.7 °F (36.5 °C), Max:98.7 °F (37.1 °C)    No intake/output data recorded. 10/25 1901 - 10/27 0700  In: 1080 [P.O.:1080]  Out: 2450 [Urine:2450]    General:  Alert, cooperative, no distress, appears stated age. Lungs:    Decreased breath sounds left lung   Chest wall:  No tenderness or deformity. Heart:  Regular rate and rhythm, S1, S2 normal, no murmur, click, rub or gallop. Abdomen:    Large ventral hernia present   Extremities: Extremities normal, atraumatic, no cyanosis or edema. Pulses: 2+ and symmetric all extremities. Skin: Skin color, texture, turgor normal. No rashes or lesions   Neurologic: CNII-XII intact. No gross sensory or motor deficits     Data Review:       Recent Days:  Recent Labs     10/26/20  2155 10/26/20  0754   WBC 5.3 4.2   HGB 8.0* 8.3*   HCT 25.5* 26.8*    326     No results for input(s): NA, K, CL, CO2, GLU, BUN, CREA, CA, MG, PHOS, ALB, TBIL, TBILI, ALT, INR, INREXT, INREXT in the last 72 hours. No lab exists for component: SGOT  No results for input(s): PH, PCO2, PO2, HCO3, FIO2 in the last 72 hours.     24 Hour Results:  Recent Results (from the past 24 hour(s))   CBC WITH AUTOMATED DIFF    Collection Time: 10/26/20  9:55 PM   Result Value Ref Range    WBC 5.3 4.1 - 11.1 K/uL    RBC 2.73 (L) 4.10 - 5.70 M/uL    HGB 8.0 (L) 12.1 - 17.0 g/dL    HCT 25.5 (L) 36.6 - 50.3 %    MCV 93.4 80.0 - 99.0 FL    MCH 29.3 26.0 - 34.0 PG    MCHC 31.4 30.0 - 36.5 g/dL    RDW 15.8 (H) 11.5 - 14.5 %    PLATELET 276 235 - 747 K/uL MPV 10.1 8.9 - 12.9 FL    NEUTROPHILS 56 32 - 75 %    LYMPHOCYTES 28 12 - 49 %    MONOCYTES 16 (H) 5 - 13 %    EOSINOPHILS 0 0 - 7 %    BASOPHILS 0 0 - 1 %    IMMATURE GRANULOCYTES 0 0.0 - 0.5 %    ABS. NEUTROPHILS 2.9 1.8 - 8.0 K/UL    ABS. LYMPHOCYTES 1.5 0.8 - 3.5 K/UL    ABS. MONOCYTES 0.9 0.0 - 1.0 K/UL    ABS. EOSINOPHILS 0.0 0.0 - 0.4 K/UL    ABS. BASOPHILS 0.0 0.0 - 0.1 K/UL    ABS. IMM. GRANS. 0.0 0.00 - 0.04 K/UL    DF AUTOMATED         XR CHEST PORT   Final Result      XR CHEST PORT   Final Result      XR CHEST PORT   Final Result   Impression: No change compared to single view chest October 17, 2020. XR CHEST PORT   Final Result      XR CHEST PORT   Final Result      XR CHEST PORT   Final Result      XR CHEST PORT   Final Result      XR CHEST PORT   Final Result      XR CHEST PORT   Final Result   IMPRESSION: Persistent findings compared to single view chest October 10, 2020. XR CHEST PORT   Final Result   IMPRESSION: No significant change. See above. XR CHEST PA LAT   Final Result   Impression:      Collapse of left lung again noted with decreased aeration of the upper lobe   compared to yesterday increased opacity of the right lung may be due to portable   technique. XR CHEST PORT   Final Result   Impression:      Stable aeration of the left upper lung with atelectasis. Stable appearing   bilateral pleural effusions. No significant change compared to the prior study from 10/7/2020. XR CHEST PORT   Final Result      XR FLUOROSCOPY UNDER 60 MINUTES   Final Result      XR CHEST PORT   Final Result      XR CHEST PORT   Final Result   FINDINGS: Impression: Frontal single view chest.      Continued opacification of the left hemithorax, obscuring the cardiac   silhouette. Right lung interstitial thickening, airspace disease, bronchial thickening,   pleural effusion similar to previous. No pneumothorax. CT CHEST WO CONT   Final Result   Impression:    1.  Increased now complete opacification of the left bronchi with low density   material.   2. Slightly increased patchy airspace pneumonia in the right lung. 3. Unchanged loculated and nonloculated left pleural effusion, complete left   lung consolidation, right lung pleural effusion. CT ABD PELV W CONT   Final Result   IMPRESSION:   1. Large ventral hernia containing nonobstructed small and large intestine. 2. Moderate to large right pleural effusion and mild to moderate left pleural   effusion. There is near complete collapse of the visualized portions of the left   lower lobe and there is pleural thickening in the left hemithorax. 3. Patchy airspace disease in the right lower lobe along with right basilar   atelectasis. 4. Nonspecific left adrenal nodule. 5. Other findings as described. XR CHEST PORT   Final Result   Impression: Heterogeneous opacity in the right lung, not significantly changed. Now complete opacification of the left hemithorax. XR CHEST PORT   Final Result   IMPRESSION: No significant change. XR CHEST AP LAT   Final Result      XR CHEST AP LAT   Final Result   IMPRESSION:   1. Persistent opacification of the left hemithorax with volume loss. 2.  Moderate right pleural effusion with probable associated right basilar   atelectasis. XR CHEST PORT   Final Result   Impression: Increased volume loss left lung. Otherwise stable. XR CHEST PORT   Final Result   IMPRESSION:   1. Improved aeration of the left lung with persistent basilar consolidative   opacities and probable pleural effusions. 2. Other extensive interstitial and alveolar opacities are nonspecific, but   potentially related to pulmonary edema or infection. XR CHEST PORT   Final Result   Impression:Left lung collapse without improvement from prior study. XR CHEST PORT   Final Result   IMPRESSION:   1.   There is milder enlargement of the cardiac silhouette as well as increasing   pulmonary vascular ill definition suspect for mild pulmonary edema. This could   be related to cardiogenic edema or fluid overload. 2.  There is been an improvement in the overall aeration of the left lung with   and improved visualization of vascular markings within the left upper lung zone. There still remains significant partial collapse of the left lung. 3.  There is increased patchy airspace disease laterally within the right lower   lobe just above the right lateral costophrenic angle. 4.  There are persistent moderate-sized bilateral pleural effusions. XR CHEST AP LAT   Final Result   IMPRESSION: Persistent collapse of the left lung with bilateral pleural   effusions. Increasing vascular congestion on the right. XR CHEST PA LAT   Final Result   Impression:   Ongoing near complete white out of the left lung. Little interval change since   the prior examination. CT CHEST WO CONT   Final Result   IMPRESSION: Complete collapse of the left lung due to complete bronchial   obstruction, possibly aspiration. Fluid overload also noted      XR ABD ACUTE W 1 V CHEST   Final Result   IMPRESSION: Opacification of the left hemithorax, questioned for remote   pneumonectomy or lung collapse with pleural fluid. Prominent right parenchymal/interstitial lung markings compatible with fibrosis   and possible partial vascular congestion. Small bowel gas, nonobstructive pattern. Assessment:  Refractory left lung collapse/pneumonia. Completed 6 weeks of empiric IV antibiotic   -Has been declined by [facility]    Acute respiratory failure with hypoxia. Mediastinal adenopathy. EBUS and pathology negative    COPD with exacerbation    Chronic anemia    Hypertension    Large ventral hernia      Plan:  SNF referral underway        Care Plan discussed with: Consultant Pulmonogist    Total time spent with patient: 20 minutes.     Lien Tobin MD

## 2020-10-27 NOTE — DISCHARGE INSTRUCTIONS
Patient Education        High Blood Pressure: Care Instructions  Overview     It's normal for blood pressure to go up and down throughout the day. But if it stays up, you have high blood pressure. Another name for high blood pressure is hypertension. Despite what a lot of people think, high blood pressure usually doesn't cause headaches or make you feel dizzy or lightheaded. It usually has no symptoms. But it does increase your risk of stroke, heart attack, and other problems. You and your doctor will talk about your risks of these problems based on your blood pressure. Your doctor will give you a goal for your blood pressure. Your goal will be based on your health and your age. Lifestyle changes, such as eating healthy and being active, are always important to help lower blood pressure. You might also take medicine to reach your blood pressure goal.  Follow-up care is a key part of your treatment and safety. Be sure to make and go to all appointments, and call your doctor if you are having problems. It's also a good idea to know your test results and keep a list of the medicines you take. How can you care for yourself at home? Medical treatment  · If you stop taking your medicine, your blood pressure will go back up. You may take one or more types of medicine to lower your blood pressure. Be safe with medicines. Take your medicine exactly as prescribed. Call your doctor if you think you are having a problem with your medicine. · Talk to your doctor before you start taking aspirin every day. Aspirin can help certain people lower their risk of a heart attack or stroke. But taking aspirin isn't right for everyone, because it can cause serious bleeding. · See your doctor regularly. You may need to see the doctor more often at first or until your blood pressure comes down.   · If you are taking blood pressure medicine, talk to your doctor before you take decongestants or anti-inflammatory medicine, such as ibuprofen. Some of these medicines can raise blood pressure. · Learn how to check your blood pressure at home. Lifestyle changes  · Stay at a healthy weight. This is especially important if you put on weight around the waist. Losing even 10 pounds can help you lower your blood pressure. · If your doctor recommends it, get more exercise. Walking is a good choice. Bit by bit, increase the amount you walk every day. Try for at least 30 minutes on most days of the week. You also may want to swim, bike, or do other activities. · Avoid or limit alcohol. Talk to your doctor about whether you can drink any alcohol. · Try to limit how much sodium you eat to less than 2,300 milligrams (mg) a day. Your doctor may ask you to try to eat less than 1,500 mg a day. · Eat plenty of fruits (such as bananas and oranges), vegetables, legumes, whole grains, and low-fat dairy products. · Lower the amount of saturated fat in your diet. Saturated fat is found in animal products such as milk, cheese, and meat. Limiting these foods may help you lose weight and also lower your risk for heart disease. · Do not smoke. Smoking increases your risk for heart attack and stroke. If you need help quitting, talk to your doctor about stop-smoking programs and medicines. These can increase your chances of quitting for good. When should you call for help? Call  911 anytime you think you may need emergency care. This may mean having symptoms that suggest that your blood pressure is causing a serious heart or blood vessel problem. Your blood pressure may be over 180/120. For example, call 911 if:    · You have symptoms of a heart attack. These may include:  ? Chest pain or pressure, or a strange feeling in the chest.  ? Sweating. ? Shortness of breath. ? Nausea or vomiting. ? Pain, pressure, or a strange feeling in the back, neck, jaw, or upper belly or in one or both shoulders or arms. ? Lightheadedness or sudden weakness.   ? A fast or irregular heartbeat.     · You have symptoms of a stroke. These may include:  ? Sudden numbness, tingling, weakness, or loss of movement in your face, arm, or leg, especially on only one side of your body. ? Sudden vision changes. ? Sudden trouble speaking. ? Sudden confusion or trouble understanding simple statements. ? Sudden problems with walking or balance. ? A sudden, severe headache that is different from past headaches.     · You have severe back or belly pain. Do not wait until your blood pressure comes down on its own. Get help right away. Call your doctor now or seek immediate care if:    · Your blood pressure is much higher than normal (such as 180/120 or higher), but you don't have symptoms.     · You think high blood pressure is causing symptoms, such as:  ? Severe headache.  ? Blurry vision. Watch closely for changes in your health, and be sure to contact your doctor if:    · Your blood pressure measures higher than your doctor recommends at least 2 times. That means the top number is higher or the bottom number is higher, or both.     · You think you may be having side effects from your blood pressure medicine. Where can you learn more? Go to http://www.gray.com/  Enter [id number] in the search box to learn more about \"High Blood Pressure: Care Instructions. \"  Current as of: December 16, 2019               Content Version: 12.6  © 4834-7288 [employer], [employer]. Care instructions adapted under license by [employer] (which disclaims liability or warranty for this information). If you have questions about a medical condition or this instruction, always ask your healthcare professional. [place] [address] any warranty or liability for your use of this information. Patient Education        Pneumonia: Care Instructions  Your Care Instructions     Pneumonia is an infection of the lungs.  Most cases are caused by infections from bacteria or viruses. Pneumonia may be mild or very severe. If it is caused by bacteria, you will be treated with antibiotics. It may take a few weeks to a few months to recover fully from pneumonia, depending on how sick you were and whether your overall health is good. Follow-up care is a key part of your treatment and safety. Be sure to make and go to all appointments, and call your doctor if you are having problems. It's also a good idea to know your test results and keep a list of the medicines you take. How can you care for yourself at home? · Take your antibiotics exactly as directed. Do not stop taking the medicine just because you are feeling better. You need to take the full course of antibiotics. · Take your medicines exactly as prescribed. Call your doctor if you think you are having a problem with your medicine. · Get plenty of rest and sleep. You may feel weak and tired for a while, but your energy level will improve with time. · To prevent dehydration, drink plenty of fluids, enough so that your urine is light yellow or clear like water. Choose water and other caffeine-free clear liquids until you feel better. If you have kidney, heart, or liver disease and have to limit fluids, talk with your doctor before you increase the amount of fluids you drink. · Take care of your cough so you can rest. A cough that brings up mucus from your lungs is common with pneumonia. It is one way your body gets rid of the infection. But if coughing keeps you from resting or causes severe fatigue and chest-wall pain, talk to your doctor. He or she may suggest that you take a medicine to reduce the cough. · Use a vaporizer or humidifier to add moisture to your bedroom. Follow the directions for cleaning the machine. · Do not smoke or allow others to smoke around you. Smoke will make your cough last longer. If you need help quitting, talk to your doctor about stop-smoking programs and medicines.  These can increase your chances of quitting for good. · Take an over-the-counter pain medicine, such as acetaminophen (Tylenol), ibuprofen (Advil, Motrin), or naproxen (Aleve). Read and follow all instructions on the label. · Do not take two or more pain medicines at the same time unless the doctor told you to. Many pain medicines have acetaminophen, which is Tylenol. Too much acetaminophen (Tylenol) can be harmful. · If you were given a spirometer to measure how well your lungs are working, use it as instructed. This can help your doctor tell how your recovery is going. · To prevent pneumonia in the future, talk to your doctor about getting a flu vaccine (once a year) and a pneumococcal vaccine (one time only for most people). When should you call for help? Call 911 anytime you think you may need emergency care. For example, call if:    · You have severe trouble breathing. Call your doctor now or seek immediate medical care if:    · You cough up dark brown or bloody mucus (sputum).     · You have new or worse trouble breathing.     · You are dizzy or lightheaded, or you feel like you may faint. Watch closely for changes in your health, and be sure to contact your doctor if:    · You have a new or higher fever.     · You are coughing more deeply or more often.     · You are not getting better after 2 days (48 hours).     · You do not get better as expected. Where can you learn more? Go to http://www.CloudCheckr.com/  Enter D336 in the search box to learn more about \"Pneumonia: Care Instructions. \"  Current as of: February 24, 2020               Content Version: 12.6  © 9406-6967 [employer]. Care instructions adapted under license by [employer] (which disclaims liability or warranty for this information).  If you have questions about a medical condition or this instruction, always ask your healthcare professional. [facility] any warranty or liability for your use of this information.

## 2020-10-27 NOTE — PROGRESS NOTES
Patient has been accepted to [facility] at this time. He will be going to room 202B Report can be called to [phone]. Patient notified, has been given his IMM, does not want to appeal. He is unhappy about his health status and very short with staff, but ultimately agrees with the plan. Discharge plan of care/case management plan validated with provider discharge order.   CM and Primary Nurse completed discharge checklist.

## 2020-10-27 NOTE — DISCHARGE SUMMARY
Physician Discharge Summary     Patient ID:    Renata Yung  [id number]  57 y.o.  1960    Admit date: 9/10/2020    Discharge date : 10/27/2020    Chronic Diagnoses:    Problem List as of 10/27/2020 Date Reviewed: 10/2/2020          Codes Class Noted - Resolved    Hypertension ICD-10-CM: I10  ICD-9-CM: 401.9  10/9/2020 - Present        Abdominal wall hernia ICD-10-CM: K43.9  ICD-9-CM: 553.20  8/1/2017 - Present        DDD (degenerative disc disease), lumbar ICD-10-CM: M51.36  ICD-9-CM: 722.52  5/1/2017 - Present        Chronic pain ICD-10-CM: G89.29  ICD-9-CM: 338.29  1/6/2015 - Present        Aspiration pneumonia ([facility] ICD-10-CM: J69.0  ICD-9-CM: 507.0  1/19/2014 - Present        Hypernatremia ICD-10-CM: E87.0  ICD-9-CM: 276.0  1/14/2014 - Present        Ischemic colitis ([facility] ICD-10-CM: K55.9  ICD-9-CM: 557.9  1/13/2014 - Present        Colon perforation ([facility] ICD-10-CM: K63.1  ICD-9-CM: 569.83  1/13/2014 - Present        Acute respiratory failure with hypoxia (HCC) ICD-10-CM: J96.01  ICD-9-CM: 518.81  1/13/2014 - Present        Acute blood loss anemia ICD-10-CM: D62  ICD-9-CM: 285.1  1/13/2014 - Present        JESUS (acute kidney injury) ([facility] ICD-10-CM: N17.9  ICD-9-CM: 584.9  1/13/2014 - Present        Pneumonia ICD-10-CM: J18.9  ICD-9-CM: 486  1/1/2014 - Present        Elevated INR ICD-10-CM: R79.1  ICD-9-CM: 790.92  1/1/2014 - Present        Alcoholism ([facility] ICD-10-CM: F10.20  ICD-9-CM: 303.90  1/1/2014 - Present        Nicotine addiction ICD-10-CM: F17.200  ICD-9-CM: 305.1  1/1/2014 - Present        PVD (peripheral vascular disease) ([facility] ICD-10-CM: I73.9  ICD-9-CM: 443.9  7/31/2013 - Present        Hyponatremia ICD-10-CM: E87.1  ICD-9-CM: 276.1  5/30/2012 - Present        ETOH abuse ICD-10-CM: F10.10  ICD-9-CM: 305.00  5/30/2012 - Present        Coagulation disorder ([facility] ICD-10-CM: D68.9  ICD-9-CM: 286.9  5/30/2012 - Present        Allergic rhinitis due to other allergen ICD-10-CM: J30.89  ICD-9-CM: 477.8  12/27/2011 - Present        Stroke 2002 (Chronic) ICD-10-CM: I63.9  ICD-9-CM: 434.91  3/15/2010 - Present        Anal fistula (Chronic) ICD-10-CM: K60.3  ICD-9-CM: 565.1  3/15/2010 - Present        HTN (hypertension) (Chronic) ICD-10-CM: I10  ICD-9-CM: 401.9  3/15/2010 - Present        Genital herpes (Chronic) ICD-10-CM: A60.00  ICD-9-CM: 054.10  3/15/2010 - Present        Noncompliance with treatment (Chronic) ICD-10-CM: Z91.19  ICD-9-CM: V15.81  3/15/2010 - Present        High cholesterol (Chronic) ICD-10-CM: E78.00  ICD-9-CM: 272.0  3/15/2010 - Present        left Carotid Artery Occlusion (Chronic) ICD-10-CM: J92.05  ICD-9-CM: 433.10  3/15/2010 - Present          22    Final Diagnoses:   Refractory left lung collapse/pneumonia. Completed 6 weeks of empiric IV antibiotic              Acute respiratory failure with hypoxia.      Mediastinal adenopathy. EBUS and pathology negative     COPD with exacerbation     Chronic anemia     Hypertension     Large ventral hernia, not currently a surgical candidate    History of stroke    Peripheral vascular disease with history of left carotid artery occlusion    Alcohol abuse    Tobacco abuse    Acute kidney injury    Metabolic encephalopathy    Reason for Hospitalization:   He is a 81-year-old male admitted 9/10 initially with altered mental status after a fall. He had shortness of breath and generalized weakness on admission. Hospital Course: Following admission patient has developed recurrent left lung collapse and chronic refractory pneumonia of undetermined etiology despite extensive microbiologic work-up and over 6 weeks of empiric antibiotics. He has undergone 9 bronchoscopies during this hospital stay. His lung would temporarily reexpand following bronchoscopy but then would collapse shortly thereafter.     He underwent EBUS on 10/13.   There has been no evidence for malignancy.     He has been receiving chest physiotherapy, EZ Pap therapy, Acapella as well as incentive spirometry.     Patient has been focused primarily on somebody fixing a large ventral hernia although he has been declined by 2 general surgeons to very high surgical risk.        Patient was evaluated by neurology in regards to a possible neuromuscular disease which was not felt to be the case.     Chest x-ray has showed persistent opacification of left hemithorax     Pulmonology recommended transfer to a tertiary care facility. He was declined by at [facility] and yesterday was declined by [facility]. At this point it appears patient will have a chronic collapse of the left lung    He was felt appropriate for discharge to rehabilitation at a skilled nursing facility and was accepted on 10/27          Discharge Medications:   Current Discharge Medication List      START taking these medications    Details   cyanocobalamin (VITAMIN B12) 500 mcg tablet Take 1 Tab by mouth daily. Qty: 30 Tab, Refills: 0      ferrous sulfate 325 mg (65 mg iron) tablet Take 1 Tab by mouth Daily (before breakfast). Qty: 30 Tab, Refills: 0      acetaminophen (TYLENOL) 325 mg tablet Take 2 Tabs by mouth every four (4) hours as needed for Pain. Qty: 30 Tab, Refills: 0      albuterol-ipratropium (DUO-NEB) 2.5 mg-0.5 mg/3 ml nebu 3 mL by Nebulization route every six (6) hours as needed for Wheezing. Qty: 30 Nebule, Refills: 0      acetylcysteine (MUCOMYST) 200 mg/mL (20 %) solution 3 mL by Nebulization route two (2) times a day. Qty: 100 mL, Refills: 0         CONTINUE these medications which have NOT CHANGED    Details   omeprazole (PRILOSEC) 20 mg capsule Take 20 mg by mouth daily. pantoprazole (PROTONIX) 40 mg tablet Take 1 Tab by mouth daily.   Qty: 90 Tab, Refills: 3      losartan (COZAAR) 100 mg tablet TAKE 1 TABLET BY MOUTH EVERY DAY  Qty: 90 Tab, Refills: 3      simvastatin (ZOCOR) 40 mg tablet TAKE 1 TABLET BY MOUTH EVERY DAY AT NIGHT  Qty: 90 Tab, Refills: 3         STOP taking these medications       cefdinir (OMNICEF) 300 mg capsule Comments:   Reason for Stopping: Follow up Care:    1. Sergey Fernandez MD in 1-2 weeks. Please call to set up an appointment shortly after discharge. Diet:  Regular Diet    Disposition:  SNF. Advanced Directive:   FULL x   DNR      Discharge Exam:  General:  Alert, cooperative, no distress, appears stated age. Lungs:   Clear to auscultation bilaterally. Decreased breath sounds left lung   Chest wall:  No tenderness or deformity. Heart:  Regular rate and rhythm, S1, S2 normal, no murmur, click, rub or gallop. Abdomen:   Soft, non-tender. Very large ventral hernia is present. Extremities: Extremities normal, atraumatic, no cyanosis or edema. Pulses: 2+ and symmetric all extremities. Skin: Skin color, texture, turgor normal. No rashes or lesions   Neurologic: CNII-XII intact. No gross sensory or motor deficits        CONSULTATIONS: Pulmonary/Intensive care    Significant Diagnostic Studies:   No results found for requested labs within first 48 hours of the last admission day. Recent Labs     10/26/20  2155 10/26/20  0754   WBC 5.3 4.2   HGB 8.0* 8.3*   HCT 25.5* 26.8*    326     No results for input(s): NA, K, CL, CO2, BUN, CREA, GLU, CA, MG, PHOS, URICA in the last 72 hours. No results for input(s): ALT, AP, TBIL, TBILI, TP, ALB, GLOB, GGT, AML, LPSE in the last 72 hours. No lab exists for component: SGOT, GPT, AMYP, HLPSE  No results for input(s): INR, PTP, APTT, INREXT in the last 72 hours. No results for input(s): FE, TIBC, PSAT, FERR in the last 72 hours. No results for input(s): PH, PCO2, PO2 in the last 72 hours. No results for input(s): CPK, CKMB in the last 72 hours.     No lab exists for component: TROPONINI  Lab Results   Component Value Date/Time    Glucose (POC) 93 10/10/2020 08:59 AM    Glucose (POC) 105 (H) 09/12/2020 08:26 PM    Glucose (POC) 153 (H) 01/17/2014 05:28 AM    Glucose (POC) 94 01/16/2014 06:16 AM    Glucose (POC) 108 01/16/2014 12:02 AM       Discharge time spent 35 minutes    Signed:  Brianda Collier MD  10/27/2020  2:29 PM

## 2020-10-27 NOTE — PROGRESS NOTES
Clinical Updates have finally uploaded to [address] Heather Lopez. Awaiting bed assignment to  [facility] today.

## 2020-10-27 NOTE — PROGRESS NOTES
Patient has been accepted to [facility] at this time. He will be going to room 202B Report can be called to [phone]. Patient notified, has been given his IMM, does not want to appeal. He is unhappy about his health status and very short with staff, but ultimately agrees with the plan.

## 2020-10-27 NOTE — PROGRESS NOTES
Clinical information that was faxed into [facility] at 56 has not uploaded to [facility] as of yet for [facility] review. CM refaxed information to [facility] in attempt to upload again.

## 2020-10-27 NOTE — PROGRESS NOTES
Progress Note    Patient: Brayden Powell MRN: [id number]  SSN: [id number]    YOB: 1960  Age: 63 y.o. Sex: male      Admit Date: 9/10/2020    LOS: 47 days     Subjective:   Patient followed for chronic, refractory pneumonia of undetermined etiology despite extensive microbiologic work-up and over 6 weeks of empiric antibiotics. Antibiotics were discontinued. No further temperature spikes and WBC remains normal.  In addition, today his CRP has decreased and procalcitonin remains normal.  It appears that he has transferred to Rehab. Objective:     Vitals:    10/26/20 2049 10/26/20 2333 10/27/20 0739 10/27/20 0819   BP: (!) 113/56 (!) 105/51  127/66   Pulse: 86 86  82   Resp: 20 20  18   Temp: 97.7 °F (36.5 °C) 98 °F (36.7 °C)  98.5 °F (36.9 °C)   SpO2: 97% 95% 99% 99%   Weight:       Height:            Intake and Output:  Current Shift: No intake/output data recorded. Last three shifts: 10/25 1901 - 10/27 0700  In: 1080 [P.O.:1080]  Out: 2450 [Urine:2450]    Physical Exam:   Patient unavailable for re-examination  Vitals signs and nursing note reviewed. Constitutional:       General: He is not in acute distress. Appearance: He is ill-appearing. He is not toxic-appearing or diaphoretic. Pulmonary: Decreased breath sounds left lung field  Abdominal:      General: There is distension (ventral hernia with scarring and crusting). Tenderness: There is no abdominal tenderness. There is no guarding. Genitourinary:     Penis: Normal.       Comments: No Padilla  Musculoskeletal:      Right lower leg: Edema present. Left lower leg: Edema present. Skin:     Findings: Erythema (both calves) present. Neurological:      General: No focal deficit present. Mental Status: He is alert and oriented to person, place, and time. Psychiatric:         Mood and Affect: Mood normal.         Behavior: Behavior normal.         Thought Content:  Thought content normal.         Judgment: Judgment normal.        Lab/Data Review:    WBC 5,300  Procalcitonin <0.05  CRP Pending 9.12 (13.00 (9.12 (7.56 (5.91 (8.04 (11.60 (12.50    (129  ANCA panel Negative    Serum fungitell <31 Negative    Bronchial washing fungus (10/7)  Pending  Bronchial washing AFB (10/7) smear negative  Bronchial washing culture (10/7) Normal respiratory xiomraa  Bronchial washing culture (10/13) Normal respiratory xiomara FINAL  Bronchial washing fungal (10/13) Moderate yeasts consistent with colonization    Assessment:     1. Chronic pneumonia, likely post-obstructive, bronchial washing grew Candida dublinensis, s/p #9 IV Antibiotics, including Zosyn, Meropenem and Anidulafungin. 2. Markedly elevated CRP, presumed secondary to #1, trending upward. 3. Abnormal bronchoscopy with nodular lesions left tracheobronchial tree. 4. Chronic venous insufficiency with stasis dermatitis  5. Large ventral hernia, seen by General Surgery     Comment:   CRP trending downward without antibiotics and procalcitonin remains normal, mitigating against ongoing bacterial infection. Plan:   1. No further antibiotics recommended at this time  2.  Will continue to follow        Signed By: Jessica Day MD     October 27, 2020

## 2020-10-27 NOTE — PROGRESS NOTES
Pt. Discharged to [facility], vitals stable, midline discontinued no complications. Discharge instructions provided, he verbalized understanding. Pt accounted for his belongings. [facility]

## 2020-11-03 LAB
ACHR AB SER-SCNC: 0.1 NMOL/L
ACHR BLOCK AB SER-ACNC: 13 %
ACHR MOD AB/ACHR TOTAL SFR SER: <12 %
STRIA MUS AB TITR SER IF: NEGATIVE {TITER}

## 2020-11-17 LAB
ACID FAST STN SPEC: NEGATIVE
MYCOBACTERIUM SPEC QL CULT: NEGATIVE
SPECIMEN PREPARATION: NORMAL
SPECIMEN SOURCE: NORMAL

## 2020-11-20 NOTE — ANESTHESIA POSTPROCEDURE EVALUATION
Procedure(s):  BRONCHOSCOPY.     general    <BSHSIANPOST>    INITIAL Post-op Vital signs:   Vitals Value Taken Time   BP 94/58 10/27/2020  2:27 PM   Temp 36.3 °C (97.3 °F) 10/27/2020  2:27 PM   Pulse 71 10/27/2020  2:27 PM   Resp 18 10/27/2020  2:27 PM   SpO2 100 % 10/27/2020  2:27 PM

## 2021-11-15 NOTE — PROGRESS NOTES
Hospital Discharge Follow-Up      Date/Time:  4/20/2018 10:08 AM    Patient was admitted to [facility] on 4/15/18 and discharged on 4/20/18 for bilateral longitudinal sacral fracture. The physician discharge summary was available at the time of outreach. Patient was discharged to [facility]. [facility] was contacted within 1 business day of discharge. 1012 - Spoke with nurse on patient's unit at [facility] who is faxing a copy of the patient's [facility] for medication reconciliation. Also left message with facility's  Randal Gallego requesting updates on discharge planning. Will complete medication reconciliation when [facility] received and call patient for follow-up upon discharge from [facility]. 1226 - Medication reconciliation completed. Noted that patient was previously on Aspirin at home, which was not mentioned in discharge medication list or MAR from [facility]. Called [facility] to make them aware patient was on Aspirin 81mg daily at home (verified on AVS from office visit 2/2018). Nurse states she will speak to their doctor to discuss if the medication should be ordered. KEB      Medication:     New Medications at Discharge: nicotine patch, librium, duragesic, roxicet, ultram, lidoderm, triderm, folic acid, thiamine, vitamin d3, calcium, MVI, prednisone  Changed Medications at Discharge: none  Discontinued Medications at Discharge: simvastatin, losartan, claritin    Medication reconciliation was performed with [facility]    Referral to Pharm D needed: no     Current Outpatient Prescriptions   Medication Sig    calcium carbonate (CALTREX) 600 mg calcium (1,500 mg) tablet Take 600 mg by mouth two (2) times a day.  chlordiazePOXIDE (LIBRIUM) 5 mg capsule Take 25 mg by mouth every six (6) hours as needed for Anxiety.  cholecalciferol, VITAMIN D3, (VITAMIN D3) 5,000 unit tab tablet Take  by mouth daily.  folic acid (FOLVITE) 1 mg tablet Take  by mouth daily.     lidocaine (LIDODERM) 5 % 2 Patches by TransDERmal route every twenty-four (24) hours. Apply patch to the affected area for 12 hours a day and remove for 12 hours a day.  multivitamin (ONE A DAY) tablet Take 1 Tab by mouth daily.  nicotine (NICODERM CQ) 21 mg/24 hr 1 Patch by TransDERmal route every twenty-four (24) hours.  OXYCODONE HCL/ACETAMINOPHEN (ROXICET PO) Take 1 Tab by mouth every six (6) hours as needed for Pain.  pantoprazole (PROTONIX) 40 mg tablet Take 40 mg by mouth two (2) times a day.  predniSONE (STERAPRED) 5 mg dose pack Take  by mouth See Admin Instructions. See administration instruction per 5mg dose pack    thiamine (B-1) 100 mg tablet Take  by mouth daily.  traMADol (ULTRAM) 50 mg tablet Take 50 mg by mouth every six (6) hours as needed for Pain.  triamcinolone acetonide (TRIDERM) 0.1 % topical cream Apply  to affected area two (2) times a day. use thin layer    fentaNYL (DURAGESIC) 75 mcg/hr 1 Patch by TransDERmal route every seventy-two (72) hours. Max Daily Amount: 1 Patch.  omeprazole (PRILOSEC) 20 mg capsule Take 1 Cap by mouth daily.  naloxone (NARCAN) 4 mg/actuation spry 1 Spray by Nasal route as needed. Indications: OPIATE-INDUCED RESPIRATORY DEPRESSION    aspirin delayed-release 81 mg tablet Take 1 Tab by mouth daily. No current facility-administered medications for this visit.         Medications Discontinued During This Encounter   Medication Reason    fentaNYL (DURAGESIC) 75 mcg/hr Duplicate Order    fentaNYL (DURAGESIC) 75 mcg/hr Duplicate Order    losartan (COZAAR) 100 mg tablet Discontinued by Another Clinician    loratadine (CLARITIN) 10 mg tablet Discontinued by Another Clinician    simvastatin (ZOCOR) 40 mg tablet Discontinued by Another Clinician       PCP/Specialist follow up:   Future Appointments  Date Time Provider Julieta Walker   4/26/2018 11:10 AM Brandi Dougherty MD [facility] SCHED   5/1/2018 2:30 PM Brandi Dougherty MD [facility] [address] l[address] 9years ago

## (undated) DEVICE — SINGLE USE ASPIRATION NEEDLE: Brand: SINGLE USE ASPIRATION NEEDLE

## (undated) DEVICE — MAJ-1414 SINGLE USE ADPATER BIOPSY VALV: Brand: SINGLE USE ADAPTOR BIOPSY VALVE

## (undated) DEVICE — YANKAUER,BULB TIP,W/O VENT,RIGID,STERILE: Brand: MEDLINE

## (undated) DEVICE — DRAPE,REIN 53X77,STERILE: Brand: MEDLINE

## (undated) DEVICE — TURNOVER KIT OR CUST CMB FLD GEN LF

## (undated) DEVICE — TUBING SUCTION UNIV 3/16 INX20 FT W/ SCALLOPED CONN STRL

## (undated) DEVICE — SYRINGE 20ML E/T: Brand: SYRINGE 20ML E/T

## (undated) DEVICE — BOWL UTIL 16OZ STRL --

## (undated) DEVICE — COVER LT HNDL BLU PLAS

## (undated) DEVICE — SINGLE USE SUCTION VALVE MAJ-209: Brand: SINGLE USE SUCTION VALVE (STERILE)

## (undated) DEVICE — STERILE POLYISOPRENE POWDER-FREE SURGICAL GLOVES: Brand: PROTEXIS

## (undated) DEVICE — TRAP,MUCUS SPECIMEN, 80CC: Brand: MEDLINE

## (undated) DEVICE — CONTAINER,SPECIMEN,PNEU TUBE,4OZ,OR STRL: Brand: MEDLINE

## (undated) DEVICE — PRESSURE MONITORING ACCESSORY: Brand: TRUWAVE

## (undated) DEVICE — MARKER SKN REG TIP W/RULER -- STRL

## (undated) DEVICE — GAUZE,SPONGE,4"X4",16PLY,STRL,LF,10/TRAY: Brand: MEDLINE

## (undated) DEVICE — PAD,NON-ADHERENT,2X3,STERILE,LF,1/PK: Brand: MEDLINE

## (undated) DEVICE — BRUSH CYTO L150CM CHN L2MM BRIST DIA1.9MM PTFE S STL BULL

## (undated) DEVICE — FORCEPS BX L240CM JAW DIA2.2MM RAD JAW 4 HOT DISP

## (undated) DEVICE — TOWEL,OR,DSP,ST,BLUE,STD,6/PK,12PK/CS: Brand: MEDLINE

## (undated) DEVICE — PROTECTOR EYE AD

## (undated) DEVICE — ADAPTER TBNG DIA15MM SWVL FBROPT BRONCHSCP TERM 2 AXIS PEEP

## (undated) DEVICE — LAMINECTOMY ARM CRADLE FOAM POSITIONER: Brand: CARDINAL HEALTH

## (undated) DEVICE — GOWN,PREVENTION PLUS,XLN/XL,ST,24/CS: Brand: MEDLINE

## (undated) DEVICE — BASIC SINGLE BASIN-LF: Brand: MEDLINE INDUSTRIES, INC.

## (undated) DEVICE — Device: Brand: BALLOON

## (undated) DEVICE — Device

## (undated) DEVICE — SYRINGE MED 10CC ECC TIP W/O NDL

## (undated) DEVICE — SOLUTION ANTIFOG 6 ML FOAM PD BTL MR CLR

## (undated) DEVICE — SINGLE USE BIOPSY VALVE MAJ-210: Brand: SINGLE USE BIOPSY VALVE (STERILE)

## (undated) DEVICE — GARMENT CMPR STD UNV CALF FLWT -- RN VENTILATE NS DISP 17- IN